# Patient Record
Sex: FEMALE | Race: BLACK OR AFRICAN AMERICAN | Employment: FULL TIME | URBAN - NONMETROPOLITAN AREA
[De-identification: names, ages, dates, MRNs, and addresses within clinical notes are randomized per-mention and may not be internally consistent; named-entity substitution may affect disease eponyms.]

---

## 2017-04-25 ENCOUNTER — HOSPITAL ENCOUNTER (EMERGENCY)
Age: 37
Discharge: HOME | End: 2017-04-25
Attending: EMERGENCY MEDICINE
Payer: COMMERCIAL

## 2017-04-25 ENCOUNTER — APPOINTMENT (OUTPATIENT)
Dept: GENERAL RADIOLOGY | Age: 37
End: 2017-04-25
Payer: COMMERCIAL

## 2017-04-25 VITALS
HEART RATE: 89 BPM | OXYGEN SATURATION: 98 % | DIASTOLIC BLOOD PRESSURE: 82 MMHG | RESPIRATION RATE: 18 BRPM | TEMPERATURE: 98.7 F | SYSTOLIC BLOOD PRESSURE: 126 MMHG

## 2017-04-25 DIAGNOSIS — S91.332A PUNCTURE WOUND OF FOOT, LEFT, INITIAL ENCOUNTER: Primary | ICD-10-CM

## 2017-04-25 DIAGNOSIS — M79.5 FOREIGN BODY IN SOFT TISSUE: ICD-10-CM

## 2017-04-25 PROCEDURE — 99282 EMERGENCY DEPT VISIT SF MDM: CPT

## 2017-04-25 PROCEDURE — 90715 TDAP VACCINE 7 YRS/> IM: CPT | Performed by: NURSE PRACTITIONER

## 2017-04-25 PROCEDURE — 6360000002 HC RX W HCPCS: Performed by: NURSE PRACTITIONER

## 2017-04-25 PROCEDURE — 90471 IMMUNIZATION ADMIN: CPT | Performed by: NURSE PRACTITIONER

## 2017-04-25 PROCEDURE — 73650 X-RAY EXAM OF HEEL: CPT

## 2017-04-25 PROCEDURE — 99282 EMERGENCY DEPT VISIT SF MDM: CPT | Performed by: EMERGENCY MEDICINE

## 2017-04-25 RX ORDER — LEFLUNOMIDE 10 MG/1
10 TABLET ORAL DAILY
COMMUNITY
End: 2017-06-29

## 2017-04-25 RX ADMIN — TETANUS TOXOID, REDUCED DIPHTHERIA TOXOID AND ACELLULAR PERTUSSIS VACCINE, ADSORBED 0.5 ML: 5; 2.5; 8; 8; 2.5 SUSPENSION INTRAMUSCULAR at 19:31

## 2017-04-25 ASSESSMENT — PAIN SCALES - GENERAL: PAINLEVEL_OUTOF10: 5

## 2017-04-27 ENCOUNTER — OFFICE VISIT (OUTPATIENT)
Dept: URGENT CARE | Age: 37
End: 2017-04-27
Payer: COMMERCIAL

## 2017-04-27 VITALS
TEMPERATURE: 97.8 F | RESPIRATION RATE: 20 BRPM | BODY MASS INDEX: 53.92 KG/M2 | WEIGHT: 293 LBS | HEIGHT: 62 IN | DIASTOLIC BLOOD PRESSURE: 81 MMHG | SYSTOLIC BLOOD PRESSURE: 131 MMHG | HEART RATE: 79 BPM | OXYGEN SATURATION: 99 %

## 2017-04-27 DIAGNOSIS — J02.9 SORE THROAT: ICD-10-CM

## 2017-04-27 DIAGNOSIS — J06.9 VIRAL URI: Primary | ICD-10-CM

## 2017-04-27 LAB — S PYO AG THROAT QL: NORMAL

## 2017-04-27 PROCEDURE — 99213 OFFICE O/P EST LOW 20 MIN: CPT | Performed by: NURSE PRACTITIONER

## 2017-04-27 PROCEDURE — 87880 STREP A ASSAY W/OPTIC: CPT | Performed by: NURSE PRACTITIONER

## 2017-04-27 RX ORDER — CETIRIZINE HYDROCHLORIDE 10 MG/1
10 TABLET ORAL DAILY
Qty: 30 TABLET | Refills: 0 | Status: SHIPPED | OUTPATIENT
Start: 2017-04-27 | End: 2017-06-29

## 2017-04-27 RX ORDER — FLUTICASONE PROPIONATE 50 MCG
1 SPRAY, SUSPENSION (ML) NASAL DAILY
Qty: 1 BOTTLE | Refills: 3 | Status: SHIPPED | OUTPATIENT
Start: 2017-04-27 | End: 2017-06-29

## 2017-04-27 ASSESSMENT — ENCOUNTER SYMPTOMS
VOMITING: 1
SORE THROAT: 1
COUGH: 0

## 2017-06-13 ENCOUNTER — HOSPITAL ENCOUNTER (OUTPATIENT)
Dept: GENERAL RADIOLOGY | Age: 37
Discharge: HOME | End: 2017-06-13
Payer: COMMERCIAL

## 2017-06-13 DIAGNOSIS — R00.2 PALPITATIONS: ICD-10-CM

## 2017-06-13 PROCEDURE — 71020 XR CHEST STANDARD TWO VW: CPT

## 2017-06-27 ENCOUNTER — HOSPITAL ENCOUNTER (OUTPATIENT)
Dept: WOMENS IMAGING | Age: 37
Discharge: HOME | End: 2017-06-27
Payer: COMMERCIAL

## 2017-06-27 DIAGNOSIS — N63.0 BREAST MASS: ICD-10-CM

## 2017-06-27 PROCEDURE — 76642 ULTRASOUND BREAST LIMITED: CPT

## 2017-06-27 PROCEDURE — G0279 TOMOSYNTHESIS, MAMMO: HCPCS

## 2017-06-29 ENCOUNTER — HOSPITAL ENCOUNTER (OUTPATIENT)
Dept: GENERAL RADIOLOGY | Age: 37
Discharge: HOME | End: 2017-06-29
Payer: COMMERCIAL

## 2017-06-29 ENCOUNTER — OFFICE VISIT (OUTPATIENT)
Dept: URGENT CARE | Age: 37
End: 2017-06-29
Payer: MEDICAID

## 2017-06-29 VITALS
DIASTOLIC BLOOD PRESSURE: 109 MMHG | TEMPERATURE: 98.6 F | WEIGHT: 281.2 LBS | BODY MASS INDEX: 51.75 KG/M2 | HEART RATE: 97 BPM | OXYGEN SATURATION: 100 % | RESPIRATION RATE: 20 BRPM | HEIGHT: 62 IN | SYSTOLIC BLOOD PRESSURE: 183 MMHG

## 2017-06-29 DIAGNOSIS — L03.319 CELLULITIS AND ABSCESS OF TRUNK: Primary | ICD-10-CM

## 2017-06-29 DIAGNOSIS — L02.219 CELLULITIS AND ABSCESS OF TRUNK: Primary | ICD-10-CM

## 2017-06-29 DIAGNOSIS — G93.2 IDIOPATHIC INTRACRANIAL HYPERTENSION: ICD-10-CM

## 2017-06-29 PROCEDURE — 73502 X-RAY EXAM HIP UNI 2-3 VIEWS: CPT

## 2017-06-29 PROCEDURE — 99213 OFFICE O/P EST LOW 20 MIN: CPT | Performed by: NURSE PRACTITIONER

## 2017-06-29 RX ORDER — SULFAMETHOXAZOLE AND TRIMETHOPRIM 800; 160 MG/1; MG/1
TABLET ORAL
COMMUNITY
Start: 2017-06-28 | End: 2017-08-10 | Stop reason: ALTCHOICE

## 2017-06-29 RX ORDER — ACETAZOLAMIDE 250 MG/1
TABLET ORAL
COMMUNITY
Start: 2017-05-19 | End: 2017-06-29 | Stop reason: SDUPTHER

## 2017-06-29 RX ORDER — MUPIROCIN CALCIUM 20 MG/G
CREAM TOPICAL
Qty: 15 G | Refills: 0 | Status: SHIPPED | OUTPATIENT
Start: 2017-06-29 | End: 2017-07-29

## 2017-06-29 RX ORDER — LANCETS 28 GAUGE
EACH MISCELLANEOUS
COMMUNITY
Start: 2017-06-19 | End: 2018-08-21 | Stop reason: SDUPTHER

## 2017-06-29 RX ORDER — LEFLUNOMIDE 20 MG/1
TABLET ORAL
COMMUNITY
Start: 2017-06-25 | End: 2018-05-14 | Stop reason: SDUPTHER

## 2017-06-29 RX ORDER — METHYLPREDNISOLONE 4 MG/1
TABLET ORAL
Qty: 1 KIT | Refills: 0 | Status: SHIPPED | OUTPATIENT
Start: 2017-06-29 | End: 2017-07-05

## 2017-06-29 RX ORDER — SITAGLIPTIN 100 MG/1
TABLET, FILM COATED ORAL
Refills: 5 | COMMUNITY
Start: 2017-06-15 | End: 2017-09-08 | Stop reason: SDUPTHER

## 2017-07-01 ENCOUNTER — OFFICE VISIT (OUTPATIENT)
Dept: URGENT CARE | Age: 37
End: 2017-07-01
Payer: COMMERCIAL

## 2017-07-01 VITALS
SYSTOLIC BLOOD PRESSURE: 136 MMHG | RESPIRATION RATE: 20 BRPM | DIASTOLIC BLOOD PRESSURE: 84 MMHG | HEART RATE: 84 BPM | OXYGEN SATURATION: 98 % | TEMPERATURE: 98.2 F

## 2017-07-01 DIAGNOSIS — N61.0 CELLULITIS OF BREAST: Primary | ICD-10-CM

## 2017-07-01 PROCEDURE — 99212 OFFICE O/P EST SF 10 MIN: CPT | Performed by: FAMILY MEDICINE

## 2017-07-01 ASSESSMENT — ENCOUNTER SYMPTOMS
SHORTNESS OF BREATH: 0
COLOR CHANGE: 0
COUGH: 0

## 2017-07-04 LAB
GRAM STAIN RESULT: ABNORMAL
ORGANISM: ABNORMAL
WOUND/ABSCESS: ABNORMAL

## 2017-07-14 ENCOUNTER — TELEPHONE (OUTPATIENT)
Dept: FAMILY MEDICINE CLINIC | Age: 37
End: 2017-07-14

## 2017-07-14 RX ORDER — FLUCONAZOLE 150 MG/1
7 TABLET ORAL DAILY
Refills: 0 | COMMUNITY
Start: 2017-06-27 | End: 2017-07-14 | Stop reason: SDUPTHER

## 2017-07-14 RX ORDER — FLUCONAZOLE 100 MG/1
100 TABLET ORAL DAILY
Qty: 7 TABLET | Refills: 0 | Status: CANCELLED | OUTPATIENT
Start: 2017-07-14 | End: 2017-07-21

## 2017-07-17 RX ORDER — FLUCONAZOLE 150 MG/1
TABLET ORAL
Qty: 1 TABLET | Refills: 0 | COMMUNITY
Start: 2017-07-17 | End: 2017-08-10 | Stop reason: ALTCHOICE

## 2017-08-03 ENCOUNTER — HOSPITAL ENCOUNTER (EMERGENCY)
Age: 37
Discharge: HOME | End: 2017-08-04
Attending: EMERGENCY MEDICINE
Payer: COMMERCIAL

## 2017-08-03 DIAGNOSIS — E11.65 UNCONTROLLED TYPE 2 DIABETES MELLITUS WITH HYPERGLYCEMIA, WITHOUT LONG-TERM CURRENT USE OF INSULIN (HCC): Primary | ICD-10-CM

## 2017-08-03 LAB
GLUCOSE BLD-MCNC: 418 MG/DL (ref 70–99)
PERFORMED ON: ABNORMAL

## 2017-08-03 PROCEDURE — 82948 REAGENT STRIP/BLOOD GLUCOSE: CPT

## 2017-08-03 PROCEDURE — 99284 EMERGENCY DEPT VISIT MOD MDM: CPT

## 2017-08-03 RX ORDER — 0.9 % SODIUM CHLORIDE 0.9 %
1000 INTRAVENOUS SOLUTION INTRAVENOUS ONCE
Status: COMPLETED | OUTPATIENT
Start: 2017-08-03 | End: 2017-08-04

## 2017-08-04 VITALS
BODY MASS INDEX: 48.76 KG/M2 | HEIGHT: 62 IN | DIASTOLIC BLOOD PRESSURE: 82 MMHG | TEMPERATURE: 97.8 F | HEART RATE: 72 BPM | RESPIRATION RATE: 20 BRPM | WEIGHT: 265 LBS | SYSTOLIC BLOOD PRESSURE: 139 MMHG | OXYGEN SATURATION: 96 %

## 2017-08-04 LAB
ALBUMIN SERPL-MCNC: 4 G/DL (ref 3.5–5.2)
ALP BLD-CCNC: 115 U/L (ref 35–104)
ALT SERPL-CCNC: 20 U/L (ref 5–33)
ANION GAP SERPL CALCULATED.3IONS-SCNC: 16 MMOL/L (ref 7–19)
AST SERPL-CCNC: 12 U/L (ref 5–32)
BASOPHILS ABSOLUTE: 0.1 K/UL (ref 0–0.2)
BASOPHILS RELATIVE PERCENT: 0.8 % (ref 0–1)
BILIRUB SERPL-MCNC: <0.2 MG/DL (ref 0.2–1.2)
BUN BLDV-MCNC: 10 MG/DL (ref 6–20)
CALCIUM SERPL-MCNC: 9.2 MG/DL (ref 8.6–10)
CHLORIDE BLD-SCNC: 95 MMOL/L (ref 98–111)
CO2: 23 MMOL/L (ref 22–29)
CREAT SERPL-MCNC: 0.7 MG/DL (ref 0.5–0.9)
EOSINOPHILS ABSOLUTE: 0 K/UL (ref 0–0.6)
EOSINOPHILS RELATIVE PERCENT: 0.4 % (ref 0–5)
GFR NON-AFRICAN AMERICAN: >60
GLUCOSE BLD-MCNC: 327 MG/DL (ref 70–99)
GLUCOSE BLD-MCNC: 446 MG/DL (ref 74–109)
HBA1C MFR BLD: 13.5 %
HCT VFR BLD CALC: 37.7 % (ref 37–47)
HEMOGLOBIN: 11.6 G/DL (ref 12–16)
LYMPHOCYTES ABSOLUTE: 2.5 K/UL (ref 1.1–4.5)
LYMPHOCYTES RELATIVE PERCENT: 25 % (ref 20–40)
MCH RBC QN AUTO: 22.6 PG (ref 27–31)
MCHC RBC AUTO-ENTMCNC: 30.8 G/DL (ref 33–37)
MCV RBC AUTO: 73.3 FL (ref 81–99)
MONOCYTES ABSOLUTE: 0.6 K/UL (ref 0–0.9)
MONOCYTES RELATIVE PERCENT: 6 % (ref 0–10)
NEUTROPHILS ABSOLUTE: 6.7 K/UL (ref 1.5–7.5)
NEUTROPHILS RELATIVE PERCENT: 67.4 % (ref 50–65)
PDW BLD-RTO: 19.5 % (ref 11.5–14.5)
PERFORMED ON: ABNORMAL
PLATELET # BLD: 238 K/UL (ref 130–400)
POTASSIUM SERPL-SCNC: 4.2 MMOL/L (ref 3.5–5)
RBC # BLD: 5.14 M/UL (ref 4.2–5.4)
SODIUM BLD-SCNC: 134 MMOL/L (ref 136–145)
TOTAL PROTEIN: 7.7 G/DL (ref 6.6–8.7)
WBC # BLD: 10 K/UL (ref 4.8–10.8)

## 2017-08-04 PROCEDURE — 36415 COLL VENOUS BLD VENIPUNCTURE: CPT

## 2017-08-04 PROCEDURE — 85025 COMPLETE CBC W/AUTO DIFF WBC: CPT

## 2017-08-04 PROCEDURE — 83036 HEMOGLOBIN GLYCOSYLATED A1C: CPT

## 2017-08-04 PROCEDURE — 82948 REAGENT STRIP/BLOOD GLUCOSE: CPT

## 2017-08-04 PROCEDURE — 6370000000 HC RX 637 (ALT 250 FOR IP): Performed by: EMERGENCY MEDICINE

## 2017-08-04 PROCEDURE — 99282 EMERGENCY DEPT VISIT SF MDM: CPT | Performed by: EMERGENCY MEDICINE

## 2017-08-04 PROCEDURE — 80053 COMPREHEN METABOLIC PANEL: CPT

## 2017-08-04 PROCEDURE — 2580000003 HC RX 258: Performed by: EMERGENCY MEDICINE

## 2017-08-04 RX ADMIN — INSULIN HUMAN 8 UNITS: 100 INJECTION, SOLUTION PARENTERAL at 00:15

## 2017-08-04 RX ADMIN — SODIUM CHLORIDE 1000 ML: 9 INJECTION, SOLUTION INTRAVENOUS at 00:15

## 2017-08-04 ASSESSMENT — ENCOUNTER SYMPTOMS
VOMITING: 0
DIARRHEA: 0
ABDOMINAL PAIN: 0
NAUSEA: 0
SHORTNESS OF BREATH: 0

## 2017-08-10 ENCOUNTER — HOSPITAL ENCOUNTER (EMERGENCY)
Age: 37
Discharge: HOME | End: 2017-08-10
Attending: EMERGENCY MEDICINE
Payer: COMMERCIAL

## 2017-08-10 ENCOUNTER — APPOINTMENT (OUTPATIENT)
Dept: CT IMAGING | Age: 37
End: 2017-08-10
Payer: COMMERCIAL

## 2017-08-10 VITALS
WEIGHT: 245 LBS | HEIGHT: 62 IN | SYSTOLIC BLOOD PRESSURE: 132 MMHG | BODY MASS INDEX: 45.08 KG/M2 | HEART RATE: 78 BPM | RESPIRATION RATE: 20 BRPM | OXYGEN SATURATION: 99 % | DIASTOLIC BLOOD PRESSURE: 90 MMHG | TEMPERATURE: 98 F

## 2017-08-10 DIAGNOSIS — G43.109 COMPLICATED MIGRAINE: ICD-10-CM

## 2017-08-10 DIAGNOSIS — R73.9 HYPERGLYCEMIA: Primary | ICD-10-CM

## 2017-08-10 DIAGNOSIS — R51.9 NONINTRACTABLE HEADACHE, UNSPECIFIED CHRONICITY PATTERN, UNSPECIFIED HEADACHE TYPE: ICD-10-CM

## 2017-08-10 LAB
ANION GAP SERPL CALCULATED.3IONS-SCNC: 14 MMOL/L (ref 7–19)
BUN BLDV-MCNC: 13 MG/DL (ref 6–20)
CALCIUM SERPL-MCNC: 9.6 MG/DL (ref 8.6–10)
CHLORIDE BLD-SCNC: 96 MMOL/L (ref 98–111)
CO2: 25 MMOL/L (ref 22–29)
CREAT SERPL-MCNC: 0.6 MG/DL (ref 0.5–0.9)
GFR NON-AFRICAN AMERICAN: >60
GLUCOSE BLD-MCNC: 269 MG/DL (ref 74–109)
HCG QUALITATIVE: NEGATIVE
HCT VFR BLD CALC: 37.9 % (ref 37–47)
HEMOGLOBIN: 11.4 G/DL (ref 12–16)
MCH RBC QN AUTO: 22.3 PG (ref 27–31)
MCHC RBC AUTO-ENTMCNC: 30.1 G/DL (ref 33–37)
MCV RBC AUTO: 74 FL (ref 81–99)
PDW BLD-RTO: 19.7 % (ref 11.5–14.5)
PLATELET # BLD: 222 K/UL (ref 130–400)
POTASSIUM SERPL-SCNC: 4.2 MMOL/L (ref 3.5–5)
RBC # BLD: 5.12 M/UL (ref 4.2–5.4)
SODIUM BLD-SCNC: 135 MMOL/L (ref 136–145)
WBC # BLD: 7.9 K/UL (ref 4.8–10.8)

## 2017-08-10 PROCEDURE — 96375 TX/PRO/DX INJ NEW DRUG ADDON: CPT

## 2017-08-10 PROCEDURE — 85027 COMPLETE CBC AUTOMATED: CPT

## 2017-08-10 PROCEDURE — 99283 EMERGENCY DEPT VISIT LOW MDM: CPT | Performed by: EMERGENCY MEDICINE

## 2017-08-10 PROCEDURE — 99284 EMERGENCY DEPT VISIT MOD MDM: CPT

## 2017-08-10 PROCEDURE — 6360000002 HC RX W HCPCS: Performed by: EMERGENCY MEDICINE

## 2017-08-10 PROCEDURE — 2580000003 HC RX 258: Performed by: EMERGENCY MEDICINE

## 2017-08-10 PROCEDURE — 36415 COLL VENOUS BLD VENIPUNCTURE: CPT

## 2017-08-10 PROCEDURE — 70450 CT HEAD/BRAIN W/O DYE: CPT

## 2017-08-10 PROCEDURE — 80048 BASIC METABOLIC PNL TOTAL CA: CPT

## 2017-08-10 PROCEDURE — 84703 CHORIONIC GONADOTROPIN ASSAY: CPT

## 2017-08-10 PROCEDURE — 96374 THER/PROPH/DIAG INJ IV PUSH: CPT

## 2017-08-10 RX ORDER — DIPHENHYDRAMINE HYDROCHLORIDE 50 MG/ML
25 INJECTION INTRAMUSCULAR; INTRAVENOUS ONCE
Status: COMPLETED | OUTPATIENT
Start: 2017-08-10 | End: 2017-08-10

## 2017-08-10 RX ORDER — KETOROLAC TROMETHAMINE 30 MG/ML
30 INJECTION, SOLUTION INTRAMUSCULAR; INTRAVENOUS ONCE
Status: COMPLETED | OUTPATIENT
Start: 2017-08-10 | End: 2017-08-10

## 2017-08-10 RX ORDER — METOCLOPRAMIDE HYDROCHLORIDE 5 MG/ML
10 INJECTION INTRAMUSCULAR; INTRAVENOUS ONCE
Status: COMPLETED | OUTPATIENT
Start: 2017-08-10 | End: 2017-08-10

## 2017-08-10 RX ORDER — 0.9 % SODIUM CHLORIDE 0.9 %
500 INTRAVENOUS SOLUTION INTRAVENOUS ONCE
Status: COMPLETED | OUTPATIENT
Start: 2017-08-10 | End: 2017-08-10

## 2017-08-10 RX ORDER — CETIRIZINE HYDROCHLORIDE 10 MG/1
10 TABLET ORAL DAILY
COMMUNITY
End: 2017-11-02 | Stop reason: CLARIF

## 2017-08-10 RX ADMIN — METOCLOPRAMIDE 10 MG: 5 INJECTION, SOLUTION INTRAMUSCULAR; INTRAVENOUS at 01:48

## 2017-08-10 RX ADMIN — DIPHENHYDRAMINE HYDROCHLORIDE 25 MG: 50 INJECTION, SOLUTION INTRAMUSCULAR; INTRAVENOUS at 01:48

## 2017-08-10 RX ADMIN — SODIUM CHLORIDE 500 ML: 9 INJECTION, SOLUTION INTRAVENOUS at 01:48

## 2017-08-10 RX ADMIN — KETOROLAC TROMETHAMINE 30 MG: 30 INJECTION, SOLUTION INTRAMUSCULAR at 01:48

## 2017-08-10 ASSESSMENT — ENCOUNTER SYMPTOMS
EYE PAIN: 0
DIARRHEA: 0
VOMITING: 0
SHORTNESS OF BREATH: 0
ABDOMINAL PAIN: 0

## 2017-08-10 ASSESSMENT — PAIN SCALES - GENERAL
PAINLEVEL_OUTOF10: 7
PAINLEVEL_OUTOF10: 5
PAINLEVEL_OUTOF10: 0

## 2017-08-10 ASSESSMENT — PAIN DESCRIPTION - LOCATION: LOCATION: HEAD

## 2017-08-11 ENCOUNTER — OFFICE VISIT (OUTPATIENT)
Dept: FAMILY MEDICINE CLINIC | Age: 37
End: 2017-08-11
Payer: COMMERCIAL

## 2017-08-11 VITALS
HEIGHT: 62 IN | HEART RATE: 82 BPM | WEIGHT: 257.6 LBS | OXYGEN SATURATION: 98 % | SYSTOLIC BLOOD PRESSURE: 142 MMHG | BODY MASS INDEX: 47.41 KG/M2 | DIASTOLIC BLOOD PRESSURE: 78 MMHG

## 2017-08-11 DIAGNOSIS — G44.201 ACUTE INTRACTABLE TENSION-TYPE HEADACHE: ICD-10-CM

## 2017-08-11 DIAGNOSIS — R03.0 ELEVATED BLOOD PRESSURE READING: ICD-10-CM

## 2017-08-11 DIAGNOSIS — E11.9 TYPE 2 DIABETES MELLITUS WITHOUT COMPLICATION, WITHOUT LONG-TERM CURRENT USE OF INSULIN (HCC): Primary | ICD-10-CM

## 2017-08-11 PROCEDURE — 99214 OFFICE O/P EST MOD 30 MIN: CPT | Performed by: CLINICAL NURSE SPECIALIST

## 2017-08-11 RX ORDER — LISINOPRIL 10 MG/1
10 TABLET ORAL DAILY
Qty: 90 TABLET | Refills: 3 | Status: SHIPPED | OUTPATIENT
Start: 2017-08-11 | End: 2017-11-02 | Stop reason: ALTCHOICE

## 2017-08-11 RX ORDER — HYDROXYCHLOROQUINE SULFATE 200 MG/1
TABLET, FILM COATED ORAL
Refills: 5 | COMMUNITY
Start: 2017-06-29 | End: 2019-06-26 | Stop reason: ALTCHOICE

## 2017-08-11 RX ORDER — CLONIDINE HYDROCHLORIDE 0.1 MG/1
0.1 TABLET ORAL ONCE
Status: COMPLETED | OUTPATIENT
Start: 2017-08-11 | End: 2017-08-11

## 2017-08-11 RX ADMIN — CLONIDINE HYDROCHLORIDE 0.1 MG: 0.1 TABLET ORAL at 11:13

## 2017-08-11 ASSESSMENT — ENCOUNTER SYMPTOMS
TROUBLE SWALLOWING: 0
EYE PAIN: 0
DIARRHEA: 1
BACK PAIN: 0
EYE DISCHARGE: 0
FACIAL SWELLING: 0
WHEEZING: 0
COUGH: 0
CONSTIPATION: 0
CHEST TIGHTNESS: 0
SORE THROAT: 0
ABDOMINAL PAIN: 0
SHORTNESS OF BREATH: 0
RHINORRHEA: 1
SINUS PRESSURE: 0
EYE REDNESS: 0

## 2017-08-18 ENCOUNTER — OFFICE VISIT (OUTPATIENT)
Dept: FAMILY MEDICINE CLINIC | Age: 37
End: 2017-08-18
Payer: COMMERCIAL

## 2017-08-18 VITALS
DIASTOLIC BLOOD PRESSURE: 84 MMHG | SYSTOLIC BLOOD PRESSURE: 128 MMHG | RESPIRATION RATE: 20 BRPM | HEART RATE: 94 BPM | TEMPERATURE: 97.9 F | OXYGEN SATURATION: 98 % | WEIGHT: 261 LBS | HEIGHT: 62 IN | BODY MASS INDEX: 48.03 KG/M2

## 2017-08-18 DIAGNOSIS — E11.9 TYPE 2 DIABETES MELLITUS WITHOUT COMPLICATION, WITHOUT LONG-TERM CURRENT USE OF INSULIN (HCC): Primary | ICD-10-CM

## 2017-08-18 PROCEDURE — 99213 OFFICE O/P EST LOW 20 MIN: CPT | Performed by: FAMILY MEDICINE

## 2017-08-19 ASSESSMENT — ENCOUNTER SYMPTOMS
DIARRHEA: 0
ABDOMINAL PAIN: 0
ANAL BLEEDING: 0
COUGH: 0
SHORTNESS OF BREATH: 0
CONSTIPATION: 0
CHEST TIGHTNESS: 0
NAUSEA: 0

## 2017-09-08 ENCOUNTER — OFFICE VISIT (OUTPATIENT)
Dept: FAMILY MEDICINE CLINIC | Age: 37
End: 2017-09-08
Payer: COMMERCIAL

## 2017-09-08 VITALS
OXYGEN SATURATION: 99 % | SYSTOLIC BLOOD PRESSURE: 116 MMHG | HEART RATE: 86 BPM | DIASTOLIC BLOOD PRESSURE: 74 MMHG | RESPIRATION RATE: 18 BRPM

## 2017-09-08 DIAGNOSIS — E11.9 TYPE 2 DIABETES MELLITUS WITHOUT COMPLICATION, WITHOUT LONG-TERM CURRENT USE OF INSULIN (HCC): Primary | ICD-10-CM

## 2017-09-08 DIAGNOSIS — R03.0 ELEVATED BLOOD PRESSURE READING: ICD-10-CM

## 2017-09-08 PROCEDURE — 99212 OFFICE O/P EST SF 10 MIN: CPT | Performed by: CLINICAL NURSE SPECIALIST

## 2017-09-08 RX ORDER — SITAGLIPTIN 100 MG/1
100 TABLET, FILM COATED ORAL DAILY
Qty: 90 TABLET | Refills: 3 | Status: SHIPPED | OUTPATIENT
Start: 2017-09-08 | End: 2018-03-05 | Stop reason: ALTCHOICE

## 2017-09-08 RX ORDER — DULOXETIN HYDROCHLORIDE 20 MG/1
20 CAPSULE, DELAYED RELEASE ORAL DAILY
COMMUNITY
End: 2017-09-08 | Stop reason: SDUPTHER

## 2017-09-08 RX ORDER — DULOXETIN HYDROCHLORIDE 60 MG/1
60 CAPSULE, DELAYED RELEASE ORAL DAILY
Qty: 120 CAPSULE | Refills: 2 | Status: SHIPPED | OUTPATIENT
Start: 2017-09-08 | End: 2018-07-06

## 2017-09-08 ASSESSMENT — ENCOUNTER SYMPTOMS
NAUSEA: 0
VOMITING: 0
COUGH: 0

## 2017-10-30 LAB
ALBUMIN SERPL-MCNC: 3.8 G/DL (ref 3.5–5.2)
ALP BLD-CCNC: 61 U/L (ref 35–104)
ALT SERPL-CCNC: 11 U/L (ref 5–33)
ANION GAP SERPL CALCULATED.3IONS-SCNC: 12 MMOL/L (ref 7–19)
AST SERPL-CCNC: 22 U/L (ref 5–32)
BASOPHILS ABSOLUTE: 0 K/UL (ref 0–0.2)
BASOPHILS RELATIVE PERCENT: 0.7 % (ref 0–1)
BILIRUB SERPL-MCNC: <0.2 MG/DL (ref 0.2–1.2)
BUN BLDV-MCNC: 11 MG/DL (ref 6–20)
C-REACTIVE PROTEIN: 3.5 MG/DL (ref 0–0.5)
CALCIUM SERPL-MCNC: 8.8 MG/DL (ref 8.6–10)
CHLORIDE BLD-SCNC: 101 MMOL/L (ref 98–111)
CO2: 28 MMOL/L (ref 22–29)
CREAT SERPL-MCNC: 0.6 MG/DL (ref 0.5–0.9)
EOSINOPHILS ABSOLUTE: 0.2 K/UL (ref 0–0.6)
EOSINOPHILS RELATIVE PERCENT: 3.9 % (ref 0–5)
GFR NON-AFRICAN AMERICAN: >60
GLUCOSE BLD-MCNC: 75 MG/DL (ref 74–109)
HCT VFR BLD CALC: 34.1 % (ref 37–47)
HEMOGLOBIN: 9.9 G/DL (ref 12–16)
LYMPHOCYTES ABSOLUTE: 2.2 K/UL (ref 1.1–4.5)
LYMPHOCYTES RELATIVE PERCENT: 36.3 % (ref 20–40)
MCH RBC QN AUTO: 22.3 PG (ref 27–31)
MCHC RBC AUTO-ENTMCNC: 29 G/DL (ref 33–37)
MCV RBC AUTO: 76.8 FL (ref 81–99)
MONOCYTES ABSOLUTE: 0.4 K/UL (ref 0–0.9)
MONOCYTES RELATIVE PERCENT: 5.9 % (ref 0–10)
NEUTROPHILS ABSOLUTE: 3.1 K/UL (ref 1.5–7.5)
NEUTROPHILS RELATIVE PERCENT: 52.9 % (ref 50–65)
PDW BLD-RTO: 17.2 % (ref 11.5–14.5)
PLATELET # BLD: 280 K/UL (ref 130–400)
PMV BLD AUTO: 11.2 FL (ref 9.4–12.3)
POTASSIUM SERPL-SCNC: 4.2 MMOL/L (ref 3.5–5)
RBC # BLD: 4.44 M/UL (ref 4.2–5.4)
SEDIMENTATION RATE, ERYTHROCYTE: 36 MM/HR (ref 0–20)
SODIUM BLD-SCNC: 141 MMOL/L (ref 136–145)
TOTAL PROTEIN: 7.5 G/DL (ref 6.6–8.7)
WBC # BLD: 5.9 K/UL (ref 4.8–10.8)

## 2017-11-02 ENCOUNTER — OFFICE VISIT (OUTPATIENT)
Dept: FAMILY MEDICINE CLINIC | Age: 37
End: 2017-11-02
Payer: MEDICAID

## 2017-11-02 VITALS
RESPIRATION RATE: 18 BRPM | WEIGHT: 281.5 LBS | DIASTOLIC BLOOD PRESSURE: 78 MMHG | BODY MASS INDEX: 51.8 KG/M2 | HEART RATE: 86 BPM | TEMPERATURE: 98.5 F | HEIGHT: 62 IN | OXYGEN SATURATION: 98 % | SYSTOLIC BLOOD PRESSURE: 122 MMHG

## 2017-11-02 DIAGNOSIS — E78.01 FAMILIAL HYPERCHOLESTEROLEMIA: ICD-10-CM

## 2017-11-02 DIAGNOSIS — I10 ESSENTIAL (PRIMARY) HYPERTENSION: Primary | ICD-10-CM

## 2017-11-02 DIAGNOSIS — E11.9 TYPE 2 DIABETES MELLITUS WITHOUT COMPLICATION, WITHOUT LONG-TERM CURRENT USE OF INSULIN (HCC): ICD-10-CM

## 2017-11-02 DIAGNOSIS — B07.0 PLANTAR WART OF LEFT FOOT: ICD-10-CM

## 2017-11-02 DIAGNOSIS — D64.9 ANEMIA, UNSPECIFIED TYPE: ICD-10-CM

## 2017-11-02 LAB — IRON: 33 UG/DL (ref 37–145)

## 2017-11-02 PROCEDURE — 99214 OFFICE O/P EST MOD 30 MIN: CPT | Performed by: FAMILY MEDICINE

## 2017-11-02 PROCEDURE — 17110 DESTRUCTION B9 LES UP TO 14: CPT | Performed by: FAMILY MEDICINE

## 2017-11-02 RX ORDER — LOSARTAN POTASSIUM 50 MG/1
50 TABLET ORAL DAILY
Qty: 30 TABLET | Refills: 5 | Status: SHIPPED | OUTPATIENT
Start: 2017-11-02 | End: 2018-05-30 | Stop reason: SDUPTHER

## 2017-11-02 NOTE — PROGRESS NOTES
120 capsule 2    JANUVIA 100 MG tablet Take 1 tablet by mouth daily 90 tablet 3    hydroxychloroquine (PLAQUENIL) 200 MG tablet TAKE 2 TABLETS DAILY WITH FOOD  5    leflunomide (ARAVA) 20 MG tablet       metFORMIN (GLUCOPHAGE) 1000 MG tablet       FREESTYLE LANCETS MISC        No current facility-administered medications for this visit. Allergies   Allergen Reactions    Mushroom Extract Complex        Health Maintenance   Topic Date Due    Diabetic foot exam  08/17/1990    Diabetic retinal exam  08/17/1990    Lipid screen  08/17/1990    HIV screen  08/17/1995    Diabetic microalbuminuria test  08/17/1998    Pneumococcal med risk (1 of 1 - PPSV23) 08/17/1999    Cervical cancer screen  08/17/2001    Diabetic hemoglobin A1C test  11/04/2017    Flu vaccine (1) 10/01/2018 (Originally 9/1/2017)    DTaP/Tdap/Td vaccine (2 - Td) 04/25/2027       Subjective:      Review of Systems  Review of Systems   Constitutional: Negative for chills and fever. HENT: Negative for congestion. Respiratory: Negative for cough, chest tightness and shortness of breath. Cardiovascular: Negative for chest pain, palpitations and leg swelling. Gastrointestinal: Negative for abdominal pain, anal bleeding, constipation, diarrhea and nausea. Genitourinary: Negative for difficulty urinating. Psychiatric/Behavioral: Negative. See HPI for visit specific review of symptoms. All others negative      Objective:   /78   Pulse 86   Temp 98.5 °F (36.9 °C) (Temporal)   Resp 18   Ht 5' 2\" (1.575 m)   Wt 281 lb 8 oz (127.7 kg)   SpO2 98%   BMI 51.49 kg/m²   Physical Exam  Physical Exam   Constitutional: appears well-developed. does not appear ill. Eyes: Pupils are equal, round, and reactive to light. Neck: Normal range of motion. Neck supple. Cardiovascular: Normal rate and regular rhythm. Exam reveals no friction rub. No murmur heard.   Pulmonary/Chest: Effort normal and breath sounds normal. No

## 2017-11-27 ENCOUNTER — TELEPHONE (OUTPATIENT)
Dept: FAMILY MEDICINE CLINIC | Age: 37
End: 2017-11-27

## 2017-12-15 LAB
QUANTIFERON (R) TB GOLD (INCUBATED): NEGATIVE
QUANTIFERON MITOGEN: 8.21 IU/ML
QUANTIFERON NIL: 0.04 IU/ML
QUANTIFERON TB AG MINUS NIL: 0 IU/ML (ref 0–0.34)

## 2018-01-08 ENCOUNTER — OFFICE VISIT (OUTPATIENT)
Dept: FAMILY MEDICINE CLINIC | Age: 38
End: 2018-01-08
Payer: MEDICAID

## 2018-01-08 VITALS
HEART RATE: 82 BPM | TEMPERATURE: 97.6 F | SYSTOLIC BLOOD PRESSURE: 126 MMHG | OXYGEN SATURATION: 98 % | WEIGHT: 263.5 LBS | DIASTOLIC BLOOD PRESSURE: 88 MMHG | BODY MASS INDEX: 48.19 KG/M2 | RESPIRATION RATE: 20 BRPM

## 2018-01-08 DIAGNOSIS — D64.9 ANEMIA, UNSPECIFIED TYPE: ICD-10-CM

## 2018-01-08 DIAGNOSIS — E78.01 FAMILIAL HYPERCHOLESTEROLEMIA: ICD-10-CM

## 2018-01-08 DIAGNOSIS — G47.33 OBSTRUCTIVE SLEEP APNEA: Primary | ICD-10-CM

## 2018-01-08 DIAGNOSIS — E11.9 TYPE 2 DIABETES MELLITUS WITHOUT COMPLICATION, WITHOUT LONG-TERM CURRENT USE OF INSULIN (HCC): ICD-10-CM

## 2018-01-08 DIAGNOSIS — I10 ESSENTIAL (PRIMARY) HYPERTENSION: ICD-10-CM

## 2018-01-08 LAB
ALBUMIN SERPL-MCNC: 3.7 G/DL (ref 3.5–5.2)
ALP BLD-CCNC: 62 U/L (ref 35–104)
ALT SERPL-CCNC: 12 U/L (ref 5–33)
ANION GAP SERPL CALCULATED.3IONS-SCNC: 9 MMOL/L (ref 7–19)
AST SERPL-CCNC: 16 U/L (ref 5–32)
BASOPHILS ABSOLUTE: 0.1 K/UL (ref 0–0.2)
BASOPHILS RELATIVE PERCENT: 0.7 % (ref 0–1)
BILIRUB SERPL-MCNC: 0.4 MG/DL (ref 0.2–1.2)
BUN BLDV-MCNC: 7 MG/DL (ref 6–20)
CALCIUM SERPL-MCNC: 9.1 MG/DL (ref 8.6–10)
CHLORIDE BLD-SCNC: 103 MMOL/L (ref 98–111)
CO2: 29 MMOL/L (ref 22–29)
CREAT SERPL-MCNC: 0.6 MG/DL (ref 0.5–0.9)
CREATININE URINE: 369.2 MG/DL (ref 4.2–622)
EOSINOPHILS ABSOLUTE: 0.2 K/UL (ref 0–0.6)
EOSINOPHILS RELATIVE PERCENT: 3 % (ref 0–5)
FERRITIN: 40 NG/ML (ref 13–150)
GFR NON-AFRICAN AMERICAN: >60
GLUCOSE BLD-MCNC: 79 MG/DL (ref 74–109)
HBA1C MFR BLD: 5.2 %
HCT VFR BLD CALC: 35.4 % (ref 37–47)
HEMOGLOBIN: 10.5 G/DL (ref 12–16)
IRON: 57 UG/DL (ref 37–145)
LYMPHOCYTES ABSOLUTE: 2.3 K/UL (ref 1.1–4.5)
LYMPHOCYTES RELATIVE PERCENT: 34.7 % (ref 20–40)
MCH RBC QN AUTO: 22.1 PG (ref 27–31)
MCHC RBC AUTO-ENTMCNC: 29.7 G/DL (ref 33–37)
MCV RBC AUTO: 74.5 FL (ref 81–99)
MICROALBUMIN UR-MCNC: 3.8 MG/DL (ref 0–19)
MICROALBUMIN/CREAT UR-RTO: 10.3 MG/G
MONOCYTES ABSOLUTE: 0.5 K/UL (ref 0–0.9)
MONOCYTES RELATIVE PERCENT: 7.8 % (ref 0–10)
NEUTROPHILS ABSOLUTE: 3.6 K/UL (ref 1.5–7.5)
NEUTROPHILS RELATIVE PERCENT: 53.7 % (ref 50–65)
PDW BLD-RTO: 17.5 % (ref 11.5–14.5)
PLATELET # BLD: 270 K/UL (ref 130–400)
PMV BLD AUTO: 11.2 FL (ref 9.4–12.3)
POTASSIUM SERPL-SCNC: 4.1 MMOL/L (ref 3.5–5)
RBC # BLD: 4.75 M/UL (ref 4.2–5.4)
SODIUM BLD-SCNC: 141 MMOL/L (ref 136–145)
TOTAL PROTEIN: 7.2 G/DL (ref 6.6–8.7)
VITAMIN B-12: 364 PG/ML (ref 211–946)
WBC # BLD: 6.7 K/UL (ref 4.8–10.8)

## 2018-01-08 PROCEDURE — 99213 OFFICE O/P EST LOW 20 MIN: CPT | Performed by: FAMILY MEDICINE

## 2018-01-08 RX ORDER — ACETAZOLAMIDE 250 MG/1
TABLET ORAL
Refills: 10 | COMMUNITY
Start: 2017-12-14 | End: 2018-12-18

## 2018-01-09 ENCOUNTER — TELEPHONE (OUTPATIENT)
Dept: FAMILY MEDICINE CLINIC | Age: 38
End: 2018-01-09

## 2018-01-11 ENCOUNTER — TELEPHONE (OUTPATIENT)
Dept: FAMILY MEDICINE CLINIC | Age: 38
End: 2018-01-11

## 2018-01-12 ENCOUNTER — CLINICAL DOCUMENTATION (OUTPATIENT)
Dept: FAMILY MEDICINE CLINIC | Age: 38
End: 2018-01-12

## 2018-02-02 ENCOUNTER — OFFICE VISIT (OUTPATIENT)
Dept: FAMILY MEDICINE CLINIC | Age: 38
End: 2018-02-02
Payer: MEDICAID

## 2018-02-02 VITALS
HEART RATE: 85 BPM | WEIGHT: 272 LBS | OXYGEN SATURATION: 98 % | TEMPERATURE: 96.5 F | SYSTOLIC BLOOD PRESSURE: 122 MMHG | BODY MASS INDEX: 49.75 KG/M2 | RESPIRATION RATE: 18 BRPM | DIASTOLIC BLOOD PRESSURE: 78 MMHG

## 2018-02-02 DIAGNOSIS — M79.10 MYALGIA: ICD-10-CM

## 2018-02-02 DIAGNOSIS — R60.9 FLUID RETENTION: ICD-10-CM

## 2018-02-02 DIAGNOSIS — R53.83 FATIGUE, UNSPECIFIED TYPE: ICD-10-CM

## 2018-02-02 DIAGNOSIS — R06.02 SHORTNESS OF BREATH: ICD-10-CM

## 2018-02-02 DIAGNOSIS — J02.9 SORE THROAT: ICD-10-CM

## 2018-02-02 DIAGNOSIS — M79.10 MYALGIA: Primary | ICD-10-CM

## 2018-02-02 LAB
ALBUMIN SERPL-MCNC: 4 G/DL (ref 3.5–5.2)
ALP BLD-CCNC: 63 U/L (ref 35–104)
ALT SERPL-CCNC: 10 U/L (ref 5–33)
ANION GAP SERPL CALCULATED.3IONS-SCNC: 14 MMOL/L (ref 7–19)
AST SERPL-CCNC: 12 U/L (ref 5–32)
BASOPHILS ABSOLUTE: 0.1 K/UL (ref 0–0.2)
BASOPHILS RELATIVE PERCENT: 1 % (ref 0–1)
BILIRUB SERPL-MCNC: <0.2 MG/DL (ref 0.2–1.2)
BUN BLDV-MCNC: 9 MG/DL (ref 6–20)
CALCIUM SERPL-MCNC: 8.9 MG/DL (ref 8.6–10)
CHLORIDE BLD-SCNC: 99 MMOL/L (ref 98–111)
CO2: 26 MMOL/L (ref 22–29)
CREAT SERPL-MCNC: 0.5 MG/DL (ref 0.5–0.9)
EOSINOPHILS ABSOLUTE: 0.2 K/UL (ref 0–0.6)
EOSINOPHILS RELATIVE PERCENT: 2.9 % (ref 0–5)
GFR NON-AFRICAN AMERICAN: >60
GLUCOSE BLD-MCNC: 76 MG/DL (ref 74–109)
HCT VFR BLD CALC: 35.3 % (ref 37–47)
HEMOGLOBIN: 10.2 G/DL (ref 12–16)
INFLUENZA A ANTIBODY: NORMAL
INFLUENZA B ANTIBODY: NORMAL
LYMPHOCYTES ABSOLUTE: 2.6 K/UL (ref 1.1–4.5)
LYMPHOCYTES RELATIVE PERCENT: 36.5 % (ref 20–40)
MCH RBC QN AUTO: 22 PG (ref 27–31)
MCHC RBC AUTO-ENTMCNC: 28.9 G/DL (ref 33–37)
MCV RBC AUTO: 76.2 FL (ref 81–99)
MONOCYTES ABSOLUTE: 0.4 K/UL (ref 0–0.9)
MONOCYTES RELATIVE PERCENT: 6 % (ref 0–10)
NEUTROPHILS ABSOLUTE: 3.8 K/UL (ref 1.5–7.5)
NEUTROPHILS RELATIVE PERCENT: 53.3 % (ref 50–65)
PDW BLD-RTO: 18.5 % (ref 11.5–14.5)
PLATELET # BLD: 311 K/UL (ref 130–400)
PMV BLD AUTO: 11.1 FL (ref 9.4–12.3)
POTASSIUM SERPL-SCNC: 4.2 MMOL/L (ref 3.5–5)
RBC # BLD: 4.63 M/UL (ref 4.2–5.4)
S PYO AG THROAT QL: NORMAL
SEDIMENTATION RATE, ERYTHROCYTE: 34 MM/HR (ref 0–20)
SODIUM BLD-SCNC: 139 MMOL/L (ref 136–145)
TOTAL PROTEIN: 7.6 G/DL (ref 6.6–8.7)
TSH SERPL DL<=0.05 MIU/L-ACNC: 1.55 UIU/ML (ref 0.27–4.2)
WBC # BLD: 7.2 K/UL (ref 4.8–10.8)

## 2018-02-02 PROCEDURE — 99213 OFFICE O/P EST LOW 20 MIN: CPT | Performed by: FAMILY MEDICINE

## 2018-02-02 PROCEDURE — 87804 INFLUENZA ASSAY W/OPTIC: CPT | Performed by: FAMILY MEDICINE

## 2018-02-02 PROCEDURE — 87880 STREP A ASSAY W/OPTIC: CPT | Performed by: FAMILY MEDICINE

## 2018-02-02 RX ORDER — DULOXETIN HYDROCHLORIDE 30 MG/1
CAPSULE, DELAYED RELEASE ORAL
Refills: 5 | COMMUNITY
Start: 2017-11-15 | End: 2018-02-02

## 2018-02-02 RX ORDER — AZITHROMYCIN 250 MG/1
TABLET, FILM COATED ORAL
Qty: 1 PACKET | Refills: 0 | Status: SHIPPED | OUTPATIENT
Start: 2018-02-02 | End: 2018-02-12

## 2018-02-02 RX ORDER — FUROSEMIDE 40 MG/1
40 TABLET ORAL DAILY
Qty: 60 TABLET | Refills: 3 | Status: SHIPPED | OUTPATIENT
Start: 2018-02-02 | End: 2020-08-21 | Stop reason: ALTCHOICE

## 2018-02-02 RX ORDER — FLUTICASONE PROPIONATE 50 MCG
SPRAY, SUSPENSION (ML) NASAL
Refills: 3 | COMMUNITY
Start: 2017-10-31 | End: 2018-08-07 | Stop reason: ALTCHOICE

## 2018-02-02 RX ORDER — DICLOFENAC SODIUM 75 MG/1
75 TABLET, DELAYED RELEASE ORAL 2 TIMES DAILY
Qty: 60 TABLET | Refills: 3 | Status: SHIPPED | OUTPATIENT
Start: 2018-02-02 | End: 2018-07-06 | Stop reason: SDUPTHER

## 2018-02-02 NOTE — PROGRESS NOTES
ONCE A WEEK 4 pen 2    losartan (COZAAR) 50 MG tablet Take 1 tablet by mouth daily 30 tablet 5    DULoxetine (CYMBALTA) 60 MG extended release capsule Take 1 capsule by mouth daily 120 capsule 2    JANUVIA 100 MG tablet Take 1 tablet by mouth daily 90 tablet 3    hydroxychloroquine (PLAQUENIL) 200 MG tablet TAKE 2 TABLETS DAILY WITH FOOD  5    leflunomide (ARAVA) 20 MG tablet       FREESTYLE LANCETS MISC        No current facility-administered medications for this visit. Allergies   Allergen Reactions    Mushroom Extract Complex        Health Maintenance   Topic Date Due    Diabetic foot exam  08/17/1990    Diabetic retinal exam  08/17/1990    Lipid screen  08/17/1990    HIV screen  08/17/1995    Pneumococcal med risk (1 of 1 - PPSV23) 08/17/1999    Cervical cancer screen  08/17/2001    Flu vaccine (1) 10/01/2018 (Originally 9/1/2017)    A1C test (Diabetic or Prediabetic)  01/08/2019    Diabetic microalbuminuria test  01/08/2019    Potassium monitoring  02/02/2019    Creatinine monitoring  02/02/2019    DTaP/Tdap/Td vaccine (2 - Td) 04/25/2027       Subjective:      Review of Systems   Constitutional: Positive for fever. Negative for chills. HENT: Positive for sinus pain, sinus pressure and sore throat. Negative for congestion. Respiratory: Positive for cough. Negative for chest tightness and shortness of breath. Cardiovascular: Negative for chest pain. Gastrointestinal: Negative for abdominal pain, constipation, diarrhea and nausea. Psychiatric/Behavioral: Negative. See HPI for visit specific review of symptoms. All others negative      Objective:   /78   Pulse 85   Temp 96.5 °F (35.8 °C) (Temporal)   Resp 18   Wt 272 lb (123.4 kg)   SpO2 98%   BMI 49.75 kg/m²   Physical Exam   Constitutional: She appears well-developed. She does not appear ill. HENT:   Nose: Right sinus exhibits maxillary sinus tenderness. Left sinus exhibits maxillary sinus tenderness. Eyes: Pupils are equal, round, and reactive to light. Neck: Normal range of motion. Neck supple. Cardiovascular: Normal rate and regular rhythm. Exam reveals no friction rub. No murmur heard. Pulmonary/Chest: Effort normal and breath sounds normal. No respiratory distress. She has no wheezes. She has no rales. Abdominal: Soft. Bowel sounds are normal. She exhibits no distension. There is no tenderness. There is no rebound and no guarding. Musculoskeletal: She exhibits no edema. Neurological: She is alert. Psychiatric: She has a normal mood and affect.  Her behavior is normal.         Recent Results (from the past 672 hour(s))   Hemoglobin A1C    Collection Time: 01/08/18 12:43 PM   Result Value Ref Range    Hemoglobin A1C 5.2 See comment %   Microalbumin / Creatinine Urine Ratio    Collection Time: 01/08/18 12:43 PM   Result Value Ref Range    Microalbumin, Random Urine 3.80 0.00 - 19.00 mg/dL    Creatinine, Ur 369.2 4.2 - 622.0 mg/dL    Microalbumin Creatinine Ratio 10.3 mg/g   Comprehensive Metabolic Panel    Collection Time: 01/08/18 12:43 PM   Result Value Ref Range    Sodium 141 136 - 145 mmol/L    Potassium 4.1 3.5 - 5.0 mmol/L    Chloride 103 98 - 111 mmol/L    CO2 29 22 - 29 mmol/L    Anion Gap 9 7 - 19 mmol/L    Glucose 79 74 - 109 mg/dL    BUN 7 6 - 20 mg/dL    CREATININE 0.6 0.5 - 0.9 mg/dL    GFR Non-African American >60 >60    Calcium 9.1 8.6 - 10.0 mg/dL    Total Protein 7.2 6.6 - 8.7 g/dL    Alb 3.7 3.5 - 5.2 g/dL    Total Bilirubin 0.4 0.2 - 1.2 mg/dL    Alkaline Phosphatase 62 35 - 104 U/L    ALT 12 5 - 33 U/L    AST 16 5 - 32 U/L   Iron    Collection Time: 01/08/18 12:43 PM   Result Value Ref Range    Iron 57 37 - 145 ug/dL   Vitamin B12    Collection Time: 01/08/18 12:43 PM   Result Value Ref Range    Vitamin B-12 364 211 - 946 pg/mL   Ferritin    Collection Time: 01/08/18 12:43 PM   Result Value Ref Range    Ferritin 40.0 13.0 - 150.0 ng/mL   CBC Auto Differential    Collection tab (250 mg) on days 2 through 5. Dispense: 1 packet; Refill: 0    3. Shortness of breath  Likely related to acute bronchitis and sinus issues    4. Fluid retention  Recommend she weighed daily and start taking Lasix. She may discontinue Lasix when her weight has dropped 4-5 pounds. She may then take as needed. If swelling persists return to clinic    5. Fatigue, unspecified type  Check following labs  - TSH without Reflex; Future  - CBC Auto Differential; Future  - Urinalysis      We'll call back regarding the results of the above labs    No Follow-up on file. Discussed use, benefit, and side effects of prescribed medications. All patient questions answered. Pt voiced understanding. Reviewed health maintenance. Instructed to continue current medications, diet and exercise. Patient agreed with treatment plan. Follow up as directed.

## 2018-02-05 ASSESSMENT — ENCOUNTER SYMPTOMS
SINUS PAIN: 1
CONSTIPATION: 0
COUGH: 1
SORE THROAT: 1
ABDOMINAL PAIN: 0
CHEST TIGHTNESS: 0
NAUSEA: 0
SINUS PRESSURE: 1
SHORTNESS OF BREATH: 0
DIARRHEA: 0

## 2018-02-20 ENCOUNTER — OFFICE VISIT (OUTPATIENT)
Dept: URGENT CARE | Age: 38
End: 2018-02-20

## 2018-02-20 DIAGNOSIS — R05.9 COUGH: Primary | ICD-10-CM

## 2018-02-20 PROCEDURE — 99999 PR OFFICE/OUTPT VISIT,PROCEDURE ONLY: CPT | Performed by: NURSE PRACTITIONER

## 2018-03-02 ENCOUNTER — HOSPITAL ENCOUNTER (EMERGENCY)
Age: 38
Discharge: HOME | End: 2018-03-02
Attending: EMERGENCY MEDICINE
Payer: MEDICAID

## 2018-03-02 VITALS
DIASTOLIC BLOOD PRESSURE: 69 MMHG | OXYGEN SATURATION: 96 % | SYSTOLIC BLOOD PRESSURE: 129 MMHG | HEIGHT: 62 IN | HEART RATE: 77 BPM | WEIGHT: 270 LBS | TEMPERATURE: 97.5 F | BODY MASS INDEX: 49.69 KG/M2 | RESPIRATION RATE: 18 BRPM

## 2018-03-02 DIAGNOSIS — E11.65 POORLY CONTROLLED TYPE 2 DIABETES MELLITUS (HCC): Primary | ICD-10-CM

## 2018-03-02 LAB
ALBUMIN SERPL-MCNC: 3.7 G/DL (ref 3.5–5.2)
ALP BLD-CCNC: 62 U/L (ref 35–104)
ALT SERPL-CCNC: 12 U/L (ref 5–33)
ANION GAP SERPL CALCULATED.3IONS-SCNC: 10 MMOL/L (ref 7–19)
AST SERPL-CCNC: 10 U/L (ref 5–32)
BASOPHILS ABSOLUTE: 0.1 K/UL (ref 0–0.2)
BASOPHILS RELATIVE PERCENT: 0.8 % (ref 0–1)
BILIRUB SERPL-MCNC: <0.2 MG/DL (ref 0.2–1.2)
BILIRUBIN URINE: NEGATIVE
BLOOD, URINE: NEGATIVE
BUN BLDV-MCNC: 11 MG/DL (ref 6–20)
CALCIUM SERPL-MCNC: 8.8 MG/DL (ref 8.6–10)
CHLORIDE BLD-SCNC: 100 MMOL/L (ref 98–111)
CLARITY: CLEAR
CO2: 27 MMOL/L (ref 22–29)
COLOR: YELLOW
CREAT SERPL-MCNC: 0.5 MG/DL (ref 0.5–0.9)
EOSINOPHILS ABSOLUTE: 0.2 K/UL (ref 0–0.6)
EOSINOPHILS RELATIVE PERCENT: 3.7 % (ref 0–5)
GFR NON-AFRICAN AMERICAN: >60
GLUCOSE BLD-MCNC: 83 MG/DL (ref 74–109)
GLUCOSE URINE: NEGATIVE MG/DL
HCG QUALITATIVE: NEGATIVE
HCT VFR BLD CALC: 34.6 % (ref 37–47)
HEMOGLOBIN: 10.2 G/DL (ref 12–16)
KETONES, URINE: NEGATIVE MG/DL
LEUKOCYTE ESTERASE, URINE: NEGATIVE
LYMPHOCYTES ABSOLUTE: 2.2 K/UL (ref 1.1–4.5)
LYMPHOCYTES RELATIVE PERCENT: 33.5 % (ref 20–40)
MCH RBC QN AUTO: 22.3 PG (ref 27–31)
MCHC RBC AUTO-ENTMCNC: 29.5 G/DL (ref 33–37)
MCV RBC AUTO: 75.7 FL (ref 81–99)
MONOCYTES ABSOLUTE: 0.4 K/UL (ref 0–0.9)
MONOCYTES RELATIVE PERCENT: 5.4 % (ref 0–10)
NEUTROPHILS ABSOLUTE: 3.6 K/UL (ref 1.5–7.5)
NEUTROPHILS RELATIVE PERCENT: 56.3 % (ref 50–65)
NITRITE, URINE: NEGATIVE
PDW BLD-RTO: 17.9 % (ref 11.5–14.5)
PH UA: 5.5
PLATELET # BLD: 257 K/UL (ref 130–400)
PMV BLD AUTO: 10.2 FL (ref 9.4–12.3)
POTASSIUM SERPL-SCNC: 4.3 MMOL/L (ref 3.5–5)
PROTEIN UA: NEGATIVE MG/DL
RBC # BLD: 4.57 M/UL (ref 4.2–5.4)
SODIUM BLD-SCNC: 137 MMOL/L (ref 136–145)
SPECIFIC GRAVITY UA: 1.02
TOTAL PROTEIN: 6.9 G/DL (ref 6.6–8.7)
UROBILINOGEN, URINE: 0.2 E.U./DL
WBC # BLD: 6.4 K/UL (ref 4.8–10.8)

## 2018-03-02 PROCEDURE — 84703 CHORIONIC GONADOTROPIN ASSAY: CPT

## 2018-03-02 PROCEDURE — 99285 EMERGENCY DEPT VISIT HI MDM: CPT | Performed by: EMERGENCY MEDICINE

## 2018-03-02 PROCEDURE — 93005 ELECTROCARDIOGRAM TRACING: CPT

## 2018-03-02 PROCEDURE — 80053 COMPREHEN METABOLIC PANEL: CPT

## 2018-03-02 PROCEDURE — 81003 URINALYSIS AUTO W/O SCOPE: CPT

## 2018-03-02 PROCEDURE — 36415 COLL VENOUS BLD VENIPUNCTURE: CPT

## 2018-03-02 PROCEDURE — 85025 COMPLETE CBC W/AUTO DIFF WBC: CPT

## 2018-03-02 PROCEDURE — 99284 EMERGENCY DEPT VISIT MOD MDM: CPT

## 2018-03-02 ASSESSMENT — ENCOUNTER SYMPTOMS
NAUSEA: 1
SHORTNESS OF BREATH: 0
VOMITING: 0

## 2018-03-02 NOTE — ED PROVIDER NOTES
[facility] EM[facility] DEPT  eMERGENCY dEPARTMENT eNCOUnter      Pt Name: Yenni Lyn  MRN: [id number]  [place] 1980  Date of evaluation: 3/2/2018  Provider: Latoya Kirby MD    [address] [place] [address]       Chief Complaint   Patient presents with    Dizziness         HISTORY OF PRESENT ILLNESS   (Location/Symptom, Timing/Onset, Context/Setting, Quality, Duration, Modifying Factors, Severity)  Note limiting factors. Yenni Lyn is a 40 y.o. female who presents to the emergency department      The history is provided by the patient. Dizziness   Quality:  Lightheadedness  Severity:  Moderate  Onset quality:  Sudden  Timing:  Intermittent (when \"blood sugar low\" 60s. fluctuates to 200s.)  Chronicity:  New  Relieved by:  Nothing  Exacerbated by: \"low blood sugar\"  Ineffective treatments:  None tried  Associated symptoms: headaches, nausea and weakness    Associated symptoms: no chest pain, no palpitations, no shortness of breath and no vomiting    Associated symptoms comment:  Diet fluctuates, weight fluctuating      Nursing Notes were reviewed. REVIEW OF SYSTEMS    (2-9 systems for level 4, 10 or more for level 5)     Review of Systems   Respiratory: Negative for shortness of breath. Cardiovascular: Negative for chest pain and palpitations. Gastrointestinal: Positive for nausea. Negative for vomiting. Neurological: Positive for dizziness, weakness and headaches. Negative for seizures and speech difficulty. All other systems reviewed and are negative. Except as noted above the remainder of the review of systems was reviewed and negative.        PAST MEDICAL HISTORY     Past Medical History:   Diagnosis Date    Anemia     Chronic rheumatic arthritis ([address]     Elevated blood pressure reading     Type 2 diabetes mellitus ([address]          SURGICAL HISTORY       Past Surgical History:   Procedure Laterality Date    CHOLECYSTECTOMY           CURRENT MEDICATIONS       Discharge Medication List as of 3/2/2018  5:16 PM      CONTINUE these medications which have NOT CHANGED    Details   fluticasone (FLONASE) 50 MCG/ACT nasal spray 1 SPRAY BY NASAL ROUTE DAILY, R-3Historical Med      furosemide (LASIX) 40 MG tablet Take 1 tablet by mouth daily, Disp-60 tablet, R-3Normal      diclofenac (VOLTAREN) 75 MG EC tablet Take 1 tablet by mouth 2 times daily, Disp-60 tablet, R-3Normal      acetaZOLAMIDE (DIAMOX) 250 MG tablet TAKE 2 TABLETS BY MOUTH TWICE A DAY, R-10Historical Med      metFORMIN (GLUCOPHAGE) 1000 MG tablet Take 1 tablet by mouth daily (with breakfast), Disp-30 tablet, [id number]      !! TRULICITY 9.53 CJ/2.1YX SOPN INJECT 0.75 MG INTO THE SKIN ONCE A WEEK, Disp-2 pen, [id number]      !! TRULICITY 3.39 CG/9.5BZ SOPN INJECT 0.75 MG INTO THE SKIN ONCE A WEEK, Disp-4 pen, R-2Normal      losartan (COZAAR) 50 MG tablet Take 1 tablet by mouth daily, Disp-30 tablet, R-5Normal      DULoxetine (CYMBALTA) 60 MG extended release capsule Take 1 capsule by mouth daily, Disp-120 capsule, R-2Print      JANUVIA 100 MG tablet Take 1 tablet by mouth daily, Disp-90 tablet, R-3, DAWPrint      hydroxychloroquine (PLAQUENIL) 200 MG tablet TAKE 2 TABLETS DAILY WITH FOOD, R-5Historical Med      leflunomide (ARAVA) 20 MG tablet Historical Med      FREESTYLE LANCETS MISC Starting 6/19/2017, Until Discontinued, Historical Med       !! - Potential duplicate medications found. Please discuss with provider.           ALLERGIES     Mushroom extract complex    FAMILY HISTORY       Family History   Problem Relation Age of Onset    High Blood Pressure Father     Diabetes Father     Atrial Fibrillation Father     Diabetes Sister     Diabetes Maternal Grandmother           SOCIAL HISTORY       Social History     Social History    Marital status: Single     Spouse name: N/A    Number of children: N/A    Years of education: N/A     Social History Main Topics    Smoking status: Never Smoker    Smokeless tobacco: Never Used    Alcohol use No    Drug use: No    Sexual activity: Not Asked     Other Topics Concern    None     Social History Narrative    None       SCREENINGS    [place] Coma Scale  Eye Opening: Spontaneous  Best Verbal Response: Oriented  Best Motor Response: Obeys commands  [place] Coma Scale Score: 15        PHYSICAL EXAM    (up to 7 for level 4, 8 or more for level 5)     ED Triage Vitals [03/02/18 1423]   BP Temp Temp Source Pulse Resp SpO2 Height Weight   (!) 124/106 97.7 °F (36.5 °C) Oral 84 17 98 % 5' 2\" (1.575 m) 270 lb (122.5 kg)       Physical Exam   Constitutional: She is oriented to person, place, and time. She appears well-developed and well-nourished. No distress. HENT:   Mouth/Throat: Oropharynx is clear and moist.   Eyes: EOM are normal. Pupils are equal, round, and reactive to light. Neck: Normal range of motion. Neck supple. Cardiovascular: Normal rate, regular rhythm and normal heart sounds. Pulmonary/Chest: Effort normal and breath sounds normal.   Musculoskeletal: She exhibits no edema. Neurological: She is alert and oriented to person, place, and time. Skin: Skin is warm and dry. She is not diaphoretic. Psychiatric: She has a normal mood and affect. Nursing note and vitals reviewed. DIAGNOSTIC RESULTS     EKG: All EKG's are interpreted by the Emergency Department Physician who either signs or Co-signs this chart in the absence of a cardiologist.     Regular rate and rhythm. Normal P waves and MS interval. Normal QRS. Normal QT interval. No ST elevation or depression. This EKG was interpreted by me.      RADIOLOGY:   Non-plain film images such as CT, Ultrasound and MRI are read by the radiologist. Plain radiographic images are visualized and preliminarily interpreted by the emergency physician with the below findings:        Interpretation per the Radiologist below, if available at the time of this note:    No orders to display         ED BEDSIDE ULTRASOUND:   Performed by ED Physician - none    LABS:  Labs Reviewed   CBC WITH AUTO DIFFERENTIAL - Abnormal; Notable for the following:        Result Value    Hemoglobin 10.2 (*)     Hematocrit 34.6 (*)     MCV 75.7 (*)     MCH 22.3 (*)     MCHC 29.5 (*)     RDW 17.9 (*)     All other components within normal limits   COMPREHENSIVE METABOLIC PANEL   URINALYSIS   HCG, SERUM, QUALITATIVE       All other labs were within normal range or not returned as of this dictation. EMERGENCY DEPARTMENT COURSE and DIFFERENTIAL DIAGNOSIS/MDM:   Vitals:    Vitals:    03/02/18 1423 03/02/18 1603 03/02/18 1702   BP: (!) 124/106 132/83 129/69   Pulse: 84 76 77   Resp: 17 18 18   Temp: 97.7 °F (36.5 °C)  97.5 °F (36.4 °C)   TempSrc: Oral     SpO2: 98% 97% 96%   Weight: 270 lb (122.5 kg)     Height: 5' 2\" (1.575 m)             MDM    CRITICAL CARE TIME   Total Critical Care time was 0 minutes, excluding separately reportable procedures. There was a high probability of clinically significant/life threatening deterioration in the patient's condition which required my urgent intervention. CONSULTS:  None    PROCEDURES:  Unless otherwise noted below, none     Procedures    FINAL IMPRESSION      1. Poorly controlled type 2 diabetes mellitus [facility])          DISPOSITION/PLAN   DISPOSITION        PATIENT REFERRED TO:  Mita Silver MD  [facility] [address]  [phone]    Schedule an appointment as soon as possible for a visit   Keep sugar log as we discussed before appointment. Follow diabetic diet.       DISCHARGE MEDICATIONS:  Discharge Medication List as of 3/2/2018  5:16 PM             (Please note that portions of this note were completed with a voice recognition program.  Efforts were made to edit the dictations but occasionally words are mis-transcribed.)    Jourdan Doan MD (electronically signed)  Attending Emergency Physician          Jourdan Doan MD  03/02/18 2970

## 2018-03-05 ENCOUNTER — OFFICE VISIT (OUTPATIENT)
Dept: FAMILY MEDICINE CLINIC | Age: 38
End: 2018-03-05
Payer: MEDICAID

## 2018-03-05 VITALS
RESPIRATION RATE: 18 BRPM | SYSTOLIC BLOOD PRESSURE: 142 MMHG | HEART RATE: 85 BPM | DIASTOLIC BLOOD PRESSURE: 98 MMHG | BODY MASS INDEX: 52.13 KG/M2 | TEMPERATURE: 98 F | WEIGHT: 285 LBS | OXYGEN SATURATION: 98 %

## 2018-03-05 DIAGNOSIS — E16.2 HYPOGLYCEMIA: Primary | ICD-10-CM

## 2018-03-05 LAB
EKG P AXIS: 33 DEGREES
EKG P-R INTERVAL: 150 MS
EKG Q-T INTERVAL: 384 MS
EKG QRS DURATION: 78 MS
EKG QTC CALCULATION (BAZETT): 420 MS
EKG T AXIS: 48 DEGREES
GLUCOSE BLD-MCNC: 69 MG/DL

## 2018-03-05 PROCEDURE — G8427 DOCREV CUR MEDS BY ELIG CLIN: HCPCS | Performed by: FAMILY MEDICINE

## 2018-03-05 PROCEDURE — 99213 OFFICE O/P EST LOW 20 MIN: CPT | Performed by: FAMILY MEDICINE

## 2018-03-05 PROCEDURE — G8484 FLU IMMUNIZE NO ADMIN: HCPCS | Performed by: FAMILY MEDICINE

## 2018-03-05 PROCEDURE — 1036F TOBACCO NON-USER: CPT | Performed by: FAMILY MEDICINE

## 2018-03-05 PROCEDURE — 82962 GLUCOSE BLOOD TEST: CPT | Performed by: FAMILY MEDICINE

## 2018-03-05 PROCEDURE — G8417 CALC BMI ABV UP PARAM F/U: HCPCS | Performed by: FAMILY MEDICINE

## 2018-03-30 ENCOUNTER — APPOINTMENT (OUTPATIENT)
Dept: CT IMAGING | Facility: HOSPITAL | Age: 38
End: 2018-03-30

## 2018-03-30 ENCOUNTER — APPOINTMENT (OUTPATIENT)
Dept: GENERAL RADIOLOGY | Facility: HOSPITAL | Age: 38
End: 2018-03-30

## 2018-03-30 ENCOUNTER — HOSPITAL ENCOUNTER (EMERGENCY)
Facility: HOSPITAL | Age: 38
Discharge: HOME | End: 2018-03-30
Admitting: EMERGENCY MEDICINE

## 2018-03-30 VITALS
RESPIRATION RATE: 16 BRPM | HEART RATE: 72 BPM | SYSTOLIC BLOOD PRESSURE: 171 MMHG | DIASTOLIC BLOOD PRESSURE: 91 MMHG | WEIGHT: 290 LBS | OXYGEN SATURATION: 98 % | BODY MASS INDEX: 53.37 KG/M2 | HEIGHT: 62 IN | TEMPERATURE: 98.2 F

## 2018-03-30 DIAGNOSIS — S63.92XA HAND SPRAIN, LEFT, INITIAL ENCOUNTER: ICD-10-CM
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
## 2018-03-30 DIAGNOSIS — S33.5XXA LUMBAR SPRAIN, INITIAL ENCOUNTER: ICD-10-CM

## 2018-03-30 DIAGNOSIS — S63.502A WRIST SPRAIN, LEFT, INITIAL ENCOUNTER: Primary | ICD-10-CM

## 2018-03-30 LAB
B-HCG UR QL: NEGATIVE
INTERNAL NEGATIVE CONTROL: NEGATIVE
INTERNAL POSITIVE CONTROL: POSITIVE
Lab: NORMAL

## 2018-03-30 PROCEDURE — 99283 EMERGENCY DEPT VISIT LOW MDM: CPT

## 2018-03-30 PROCEDURE — 70450 CT HEAD/BRAIN W/O DYE: CPT

## 2018-03-30 PROCEDURE — 73110 X-RAY EXAM OF WRIST: CPT

## 2018-03-30 PROCEDURE — 73130 X-RAY EXAM OF HAND: CPT

## 2018-03-30 PROCEDURE — 72110 X-RAY EXAM L-2 SPINE 4/>VWS: CPT

## 2018-03-30 RX ORDER — NAPROXEN 500 MG/1
500 TABLET ORAL 2 TIMES DAILY PRN
Qty: 15 TABLET | Refills: 0 | Status: SHIPPED | OUTPATIENT
Start: 2018-03-30

## 2018-03-30 RX ORDER — HYDROCODONE BITARTRATE AND ACETAMINOPHEN 5; 325 MG/1; MG/1
1 TABLET ORAL EVERY 4 HOURS PRN
Qty: 15 TABLET | Refills: 0 | Status: SHIPPED | OUTPATIENT
Start: 2018-03-30

## 2018-03-30 RX ORDER — ONDANSETRON 4 MG/1
4 TABLET, ORALLY DISINTEGRATING ORAL ONCE
Status: COMPLETED | OUTPATIENT
Start: 2018-03-30 | End: 2018-03-30

## 2018-03-30 RX ORDER — HYDROCODONE BITARTRATE AND ACETAMINOPHEN 5; 325 MG/1; MG/1
1 TABLET ORAL ONCE
Status: COMPLETED | OUTPATIENT
Start: 2018-03-30 | End: 2018-03-30

## 2018-03-30 RX ADMIN — ONDANSETRON 4 MG: 4 TABLET, ORALLY DISINTEGRATING ORAL at 11:15

## 2018-03-30 RX ADMIN — HYDROCODONE BITARTRATE AND ACETAMINOPHEN 1 TABLET: 5; 325 TABLET ORAL at 11:13

## 2018-04-03 NOTE — ED NOTES
"ED Call Back Questions    1. How are you doing since leaving the Emergency Department?    Doing very well  2. Do you have any questions about your discharge instructions? No     3. Have you filled your new prescriptions yet? Yes   a. Do you have any questions about those medications? N/A    4. Were you able to make a follow-up appointment with the physician? Yes     5. Do you have a primary care physician? Yes   a. If No, would you like for me to set you up with one? N/A  i. If Yes, “I will have our ED  give you a call right back at this number to work with you on the best time for an appointment.”    6. We are always looking to get better at what we do. Do you have any suggestions for what we can do to be even better? No   a. If Yes, \"Thank you for sharing your concerns. I apologize. I will follow up with our manager and patient . Would you like someone to call you back?\" No     7. Is there anything else I can do for you? No   Visit was very good     Damián Hernandez  04/03/18 1249    "

## 2018-05-07 RX ORDER — LEFLUNOMIDE 20 MG/1
TABLET ORAL
Qty: 30 TABLET | OUTPATIENT
Start: 2018-05-07

## 2018-05-14 DIAGNOSIS — Z76.0 MEDICATION REFILL: Primary | ICD-10-CM

## 2018-05-14 RX ORDER — LEFLUNOMIDE 20 MG/1
20 TABLET ORAL DAILY
Qty: 30 TABLET | Refills: 3 | Status: SHIPPED | OUTPATIENT
Start: 2018-05-14 | End: 2018-07-31 | Stop reason: ALTCHOICE

## 2018-05-22 ENCOUNTER — APPOINTMENT (OUTPATIENT)
Dept: GENERAL RADIOLOGY | Age: 38
End: 2018-05-22
Payer: MEDICAID

## 2018-05-22 ENCOUNTER — OFFICE VISIT (OUTPATIENT)
Dept: URGENT CARE | Age: 38
End: 2018-05-22

## 2018-05-22 ENCOUNTER — HOSPITAL ENCOUNTER (EMERGENCY)
Age: 38
Discharge: HOME | End: 2018-05-22
Payer: MEDICAID

## 2018-05-22 VITALS
RESPIRATION RATE: 16 BRPM | SYSTOLIC BLOOD PRESSURE: 136 MMHG | HEIGHT: 62 IN | HEART RATE: 75 BPM | WEIGHT: 280 LBS | BODY MASS INDEX: 51.53 KG/M2 | DIASTOLIC BLOOD PRESSURE: 75 MMHG | TEMPERATURE: 98.7 F | OXYGEN SATURATION: 98 %

## 2018-05-22 VITALS — SYSTOLIC BLOOD PRESSURE: 156 MMHG | DIASTOLIC BLOOD PRESSURE: 86 MMHG

## 2018-05-22 DIAGNOSIS — I10 HYPERTENSION, UNSPECIFIED TYPE: ICD-10-CM

## 2018-05-22 DIAGNOSIS — R07.89 BURNING IN THE CHEST: ICD-10-CM

## 2018-05-22 DIAGNOSIS — I10 ESSENTIAL HYPERTENSION: Primary | ICD-10-CM

## 2018-05-22 DIAGNOSIS — R07.9 CHEST PAIN, UNSPECIFIED TYPE: ICD-10-CM

## 2018-05-22 DIAGNOSIS — R06.02 SHORTNESS OF BREATH: ICD-10-CM

## 2018-05-22 DIAGNOSIS — R61 SWEATING PROFUSELY: ICD-10-CM

## 2018-05-22 DIAGNOSIS — R11.0 NAUSEA: Primary | ICD-10-CM

## 2018-05-22 LAB
ALBUMIN SERPL-MCNC: 3.7 G/DL (ref 3.5–5.2)
ALP BLD-CCNC: 66 U/L (ref 35–104)
ALT SERPL-CCNC: 17 U/L (ref 5–33)
ANION GAP SERPL CALCULATED.3IONS-SCNC: 8 MMOL/L (ref 7–19)
AST SERPL-CCNC: 13 U/L (ref 5–32)
BACTERIA: NEGATIVE /HPF
BASOPHILS ABSOLUTE: 0.1 K/UL (ref 0–0.2)
BASOPHILS RELATIVE PERCENT: 1 % (ref 0–1)
BILIRUB SERPL-MCNC: <0.2 MG/DL (ref 0.2–1.2)
BILIRUBIN URINE: NEGATIVE
BLOOD, URINE: ABNORMAL
BUN BLDV-MCNC: 10 MG/DL (ref 6–20)
CALCIUM SERPL-MCNC: 9 MG/DL (ref 8.6–10)
CHLORIDE BLD-SCNC: 105 MMOL/L (ref 98–111)
CLARITY: ABNORMAL
CO2: 27 MMOL/L (ref 22–29)
COLOR: ABNORMAL
CREAT SERPL-MCNC: 0.7 MG/DL (ref 0.5–0.9)
EOSINOPHILS ABSOLUTE: 0.2 K/UL (ref 0–0.6)
EOSINOPHILS RELATIVE PERCENT: 3.4 % (ref 0–5)
EPITHELIAL CELLS, UA: 1 /HPF (ref 0–5)
GFR NON-AFRICAN AMERICAN: >60
GLUCOSE BLD-MCNC: 103 MG/DL (ref 74–109)
GLUCOSE URINE: NEGATIVE MG/DL
HCG(URINE) PREGNANCY TEST: NEGATIVE
HCT VFR BLD CALC: 34.1 % (ref 37–47)
HEMOGLOBIN: 10.2 G/DL (ref 12–16)
HYALINE CASTS: 0 /HPF (ref 0–8)
KETONES, URINE: NEGATIVE MG/DL
LEUKOCYTE ESTERASE, URINE: NEGATIVE
LYMPHOCYTES ABSOLUTE: 2.5 K/UL (ref 1.1–4.5)
LYMPHOCYTES RELATIVE PERCENT: 42.3 % (ref 20–40)
MCH RBC QN AUTO: 22.9 PG (ref 27–31)
MCHC RBC AUTO-ENTMCNC: 29.9 G/DL (ref 33–37)
MCV RBC AUTO: 76.5 FL (ref 81–99)
MONOCYTES ABSOLUTE: 0.4 K/UL (ref 0–0.9)
MONOCYTES RELATIVE PERCENT: 6.4 % (ref 0–10)
NEUTROPHILS ABSOLUTE: 2.8 K/UL (ref 1.5–7.5)
NEUTROPHILS RELATIVE PERCENT: 46.6 % (ref 50–65)
NITRITE, URINE: NEGATIVE
PDW BLD-RTO: 18.7 % (ref 11.5–14.5)
PH UA: 6
PLATELET # BLD: 205 K/UL (ref 130–400)
PMV BLD AUTO: 10.9 FL (ref 9.4–12.3)
POTASSIUM SERPL-SCNC: 4.2 MMOL/L (ref 3.5–5)
PROTEIN UA: ABNORMAL MG/DL
RBC # BLD: 4.46 M/UL (ref 4.2–5.4)
RBC UA: >900 /HPF (ref 0–4)
SODIUM BLD-SCNC: 140 MMOL/L (ref 136–145)
SPECIFIC GRAVITY UA: 1.03
TOTAL PROTEIN: 7.3 G/DL (ref 6.6–8.7)
TROPONIN: <0.01 NG/ML (ref 0–0.03)
TROPONIN: <0.01 NG/ML (ref 0–0.03)
URINE REFLEX TO CULTURE: ABNORMAL
UROBILINOGEN, URINE: 0.2 E.U./DL
WBC # BLD: 5.9 K/UL (ref 4.8–10.8)
WBC UA: 2 /HPF (ref 0–5)

## 2018-05-22 PROCEDURE — 71045 X-RAY EXAM CHEST 1 VIEW: CPT

## 2018-05-22 PROCEDURE — 36415 COLL VENOUS BLD VENIPUNCTURE: CPT

## 2018-05-22 PROCEDURE — 99283 EMERGENCY DEPT VISIT LOW MDM: CPT

## 2018-05-22 PROCEDURE — 99284 EMERGENCY DEPT VISIT MOD MDM: CPT | Performed by: PHYSICIAN ASSISTANT

## 2018-05-22 PROCEDURE — 80053 COMPREHEN METABOLIC PANEL: CPT

## 2018-05-22 PROCEDURE — 99999 PR OFFICE/OUTPT VISIT,PROCEDURE ONLY: CPT | Performed by: NURSE PRACTITIONER

## 2018-05-22 PROCEDURE — 93005 ELECTROCARDIOGRAM TRACING: CPT

## 2018-05-22 PROCEDURE — 84484 ASSAY OF TROPONIN QUANT: CPT

## 2018-05-22 PROCEDURE — 85025 COMPLETE CBC W/AUTO DIFF WBC: CPT

## 2018-05-22 PROCEDURE — 81025 URINE PREGNANCY TEST: CPT

## 2018-05-22 PROCEDURE — 81001 URINALYSIS AUTO W/SCOPE: CPT

## 2018-05-22 PROCEDURE — 6370000000 HC RX 637 (ALT 250 FOR IP): Performed by: PHYSICIAN ASSISTANT

## 2018-05-22 RX ORDER — ASPIRIN 81 MG/1
81 TABLET, CHEWABLE ORAL DAILY
Qty: 30 TABLET | Refills: 0 | Status: SHIPPED | OUTPATIENT
Start: 2018-05-22 | End: 2018-12-18

## 2018-05-22 RX ORDER — CLONIDINE HYDROCHLORIDE 0.1 MG/1
0.1 TABLET ORAL ONCE
Status: COMPLETED | OUTPATIENT
Start: 2018-05-22 | End: 2018-05-22

## 2018-05-22 RX ORDER — ASPIRIN 325 MG
325 TABLET ORAL ONCE
Status: COMPLETED | OUTPATIENT
Start: 2018-05-22 | End: 2018-05-22

## 2018-05-22 RX ADMIN — CLONIDINE HYDROCHLORIDE 0.1 MG: 0.1 TABLET ORAL at 16:34

## 2018-05-22 RX ADMIN — ASPIRIN 325 MG ORAL TABLET 325 MG: 325 PILL ORAL at 16:34

## 2018-05-22 ASSESSMENT — ENCOUNTER SYMPTOMS
APNEA: 0
WHEEZING: 0
RHINORRHEA: 0
SINUS PRESSURE: 0
SORE THROAT: 0
VOMITING: 0
ABDOMINAL DISTENTION: 0
DIARRHEA: 0
NAUSEA: 0
COUGH: 0
ABDOMINAL PAIN: 0
EYE PAIN: 0
SHORTNESS OF BREATH: 0
CONSTIPATION: 0
CHEST TIGHTNESS: 0

## 2018-05-23 LAB
EKG P AXIS: 59 DEGREES
EKG P-R INTERVAL: 152 MS
EKG Q-T INTERVAL: 418 MS
EKG QRS DURATION: 78 MS
EKG QTC CALCULATION (BAZETT): 432 MS
EKG T AXIS: 26 DEGREES

## 2018-05-31 DIAGNOSIS — I10 ESSENTIAL (PRIMARY) HYPERTENSION: ICD-10-CM

## 2018-05-31 RX ORDER — LOSARTAN POTASSIUM 50 MG/1
50 TABLET ORAL DAILY
Qty: 30 TABLET | Refills: 5 | Status: SHIPPED | OUTPATIENT
Start: 2018-05-31 | End: 2018-11-13 | Stop reason: SDUPTHER

## 2018-07-06 ENCOUNTER — OFFICE VISIT (OUTPATIENT)
Dept: FAMILY MEDICINE CLINIC | Age: 38
End: 2018-07-06
Payer: MEDICAID

## 2018-07-06 VITALS
HEIGHT: 62 IN | OXYGEN SATURATION: 98 % | TEMPERATURE: 98.3 F | WEIGHT: 293 LBS | SYSTOLIC BLOOD PRESSURE: 120 MMHG | HEART RATE: 81 BPM | BODY MASS INDEX: 53.92 KG/M2 | DIASTOLIC BLOOD PRESSURE: 68 MMHG

## 2018-07-06 DIAGNOSIS — L98.9 SKIN LESION: ICD-10-CM

## 2018-07-06 DIAGNOSIS — G56.03 BILATERAL CARPAL TUNNEL SYNDROME: ICD-10-CM

## 2018-07-06 DIAGNOSIS — I10 ESSENTIAL (PRIMARY) HYPERTENSION: Primary | ICD-10-CM

## 2018-07-06 DIAGNOSIS — M79.7 FIBROMYALGIA: ICD-10-CM

## 2018-07-06 DIAGNOSIS — R07.9 CHEST PAIN, UNSPECIFIED TYPE: ICD-10-CM

## 2018-07-06 DIAGNOSIS — M79.10 MYALGIA: ICD-10-CM

## 2018-07-06 DIAGNOSIS — M06.9 RHEUMATOID ARTHRITIS INVOLVING MULTIPLE SITES, UNSPECIFIED RHEUMATOID FACTOR PRESENCE: ICD-10-CM

## 2018-07-06 DIAGNOSIS — R51.9 NONINTRACTABLE HEADACHE, UNSPECIFIED CHRONICITY PATTERN, UNSPECIFIED HEADACHE TYPE: ICD-10-CM

## 2018-07-06 PROCEDURE — 99214 OFFICE O/P EST MOD 30 MIN: CPT | Performed by: NURSE PRACTITIONER

## 2018-07-06 PROCEDURE — 93000 ELECTROCARDIOGRAM COMPLETE: CPT | Performed by: NURSE PRACTITIONER

## 2018-07-06 PROCEDURE — G8417 CALC BMI ABV UP PARAM F/U: HCPCS | Performed by: NURSE PRACTITIONER

## 2018-07-06 PROCEDURE — 1036F TOBACCO NON-USER: CPT | Performed by: NURSE PRACTITIONER

## 2018-07-06 PROCEDURE — G8427 DOCREV CUR MEDS BY ELIG CLIN: HCPCS | Performed by: NURSE PRACTITIONER

## 2018-07-06 RX ORDER — DICLOFENAC SODIUM 75 MG/1
75 TABLET, DELAYED RELEASE ORAL 2 TIMES DAILY PRN
Qty: 60 TABLET | Refills: 3 | Status: SHIPPED | OUTPATIENT
Start: 2018-07-06 | End: 2018-12-18

## 2018-07-06 RX ORDER — TIZANIDINE 4 MG/1
4 TABLET ORAL 2 TIMES DAILY PRN
Qty: 60 TABLET | Refills: 2 | Status: SHIPPED | OUTPATIENT
Start: 2018-07-06 | End: 2019-06-21 | Stop reason: SDUPTHER

## 2018-07-06 ASSESSMENT — ENCOUNTER SYMPTOMS
DIARRHEA: 0
SORE THROAT: 0
COUGH: 0
WHEEZING: 0
SHORTNESS OF BREATH: 0
ABDOMINAL PAIN: 0
NAUSEA: 0
CHEST TIGHTNESS: 0

## 2018-07-06 NOTE — PROGRESS NOTES
rheumatic arthritis (HCC)     Elevated blood pressure reading     Type 2 diabetes mellitus (HCC)        Current Outpatient Prescriptions   Medication Sig Dispense Refill    diclofenac (VOLTAREN) 75 MG EC tablet Take 1 tablet by mouth 2 times daily as needed for Pain 60 tablet 3    tiZANidine (ZANAFLEX) 4 MG tablet Take 1 tablet by mouth 2 times daily as needed (pain) . May cause drowsiness. 60 tablet 2    Elastic Bandages & Supports (WRIST SPLINT LEFT/RIGHT) MISC Bilateral wrist splints, use as directed. Dx G56.03 2 each 0    losartan (COZAAR) 50 MG tablet TAKE 1 TABLET BY MOUTH DAILY 30 tablet 5    aspirin (ASPIRIN CHILDRENS) 81 MG chewable tablet Take 1 tablet by mouth daily 30 tablet 0    leflunomide (ARAVA) 20 MG tablet Take 1 tablet by mouth daily 30 tablet 3    glucose blood VI test strips (ASCENSIA AUTODISC VI;ONE TOUCH ULTRA TEST VI) strip 1 each by In Vitro route 2 times daily As needed. 200 each 3    fluticasone (FLONASE) 50 MCG/ACT nasal spray 1 SPRAY BY NASAL ROUTE DAILY  3    furosemide (LASIX) 40 MG tablet Take 1 tablet by mouth daily 60 tablet 3    acetaZOLAMIDE (DIAMOX) 250 MG tablet TAKE 2 TABLETS BY MOUTH TWICE A DAY  10    hydroxychloroquine (PLAQUENIL) 200 MG tablet TAKE 2 TABLETS DAILY WITH FOOD  5    FREESTYLE LANCETS MISC        No current facility-administered medications for this visit.         Allergies   Allergen Reactions    Mushroom Extract Complex        Past Surgical History:   Procedure Laterality Date    CHOLECYSTECTOMY         Social History   Substance Use Topics    Smoking status: Never Smoker    Smokeless tobacco: Never Used    Alcohol use No       Family History   Problem Relation Age of Onset    High Blood Pressure Father     Diabetes Father     Atrial Fibrillation Father     Diabetes Sister     Diabetes Maternal Grandmother        /68   Pulse 81   Temp 98.3 °F (36.8 °C) (Temporal)   Ht 5' 2\" (1.575 m)   Wt 296 lb 4 oz (134.4 kg)   SpO2 98%

## 2018-07-31 ENCOUNTER — HOSPITAL ENCOUNTER (EMERGENCY)
Age: 38
Discharge: HOME | End: 2018-07-31
Attending: EMERGENCY MEDICINE
Payer: MEDICAID

## 2018-07-31 ENCOUNTER — APPOINTMENT (OUTPATIENT)
Dept: GENERAL RADIOLOGY | Age: 38
End: 2018-07-31
Payer: MEDICAID

## 2018-07-31 VITALS
BODY MASS INDEX: 53.37 KG/M2 | HEIGHT: 62 IN | OXYGEN SATURATION: 98 % | TEMPERATURE: 97.9 F | SYSTOLIC BLOOD PRESSURE: 130 MMHG | DIASTOLIC BLOOD PRESSURE: 83 MMHG | WEIGHT: 290 LBS | RESPIRATION RATE: 14 BRPM | HEART RATE: 87 BPM

## 2018-07-31 DIAGNOSIS — R07.89 NON-CARDIAC CHEST PAIN: Primary | ICD-10-CM

## 2018-07-31 LAB
ALBUMIN SERPL-MCNC: 3.9 G/DL (ref 3.5–5.2)
ALP BLD-CCNC: 70 U/L (ref 35–104)
ALT SERPL-CCNC: 23 U/L (ref 5–33)
ANION GAP SERPL CALCULATED.3IONS-SCNC: 12 MMOL/L (ref 7–19)
APTT: 28.9 SEC (ref 26–36.2)
AST SERPL-CCNC: 16 U/L (ref 5–32)
BASOPHILS ABSOLUTE: 0.1 K/UL (ref 0–0.2)
BASOPHILS RELATIVE PERCENT: 0.9 % (ref 0–1)
BILIRUB SERPL-MCNC: <0.2 MG/DL (ref 0.2–1.2)
BUN BLDV-MCNC: 10 MG/DL (ref 6–20)
CALCIUM SERPL-MCNC: 8.9 MG/DL (ref 8.6–10)
CHLORIDE BLD-SCNC: 100 MMOL/L (ref 98–111)
CO2: 26 MMOL/L (ref 22–29)
CREAT SERPL-MCNC: 0.7 MG/DL (ref 0.5–0.9)
EOSINOPHILS ABSOLUTE: 0.3 K/UL (ref 0–0.6)
EOSINOPHILS RELATIVE PERCENT: 3.8 % (ref 0–5)
GFR NON-AFRICAN AMERICAN: >60
GLUCOSE BLD-MCNC: 111 MG/DL (ref 74–109)
HCT VFR BLD CALC: 35.5 % (ref 37–47)
HEMOGLOBIN: 10.7 G/DL (ref 12–16)
INR BLD: 0.96 (ref 0.88–1.18)
LYMPHOCYTES ABSOLUTE: 2.3 K/UL (ref 1.1–4.5)
LYMPHOCYTES RELATIVE PERCENT: 35.8 % (ref 20–40)
MCH RBC QN AUTO: 23.3 PG (ref 27–31)
MCHC RBC AUTO-ENTMCNC: 30.1 G/DL (ref 33–37)
MCV RBC AUTO: 77.3 FL (ref 81–99)
MONOCYTES ABSOLUTE: 0.5 K/UL (ref 0–0.9)
MONOCYTES RELATIVE PERCENT: 7.1 % (ref 0–10)
NEUTROPHILS ABSOLUTE: 3.4 K/UL (ref 1.5–7.5)
NEUTROPHILS RELATIVE PERCENT: 52.1 % (ref 50–65)
PDW BLD-RTO: 17.2 % (ref 11.5–14.5)
PLATELET # BLD: 223 K/UL (ref 130–400)
PMV BLD AUTO: 11.2 FL (ref 9.4–12.3)
POTASSIUM SERPL-SCNC: 3.9 MMOL/L (ref 3.5–5)
PROTHROMBIN TIME: 12.7 SEC (ref 12–14.6)
RBC # BLD: 4.59 M/UL (ref 4.2–5.4)
SODIUM BLD-SCNC: 138 MMOL/L (ref 136–145)
TOTAL PROTEIN: 7.5 G/DL (ref 6.6–8.7)
TROPONIN: <0.01 NG/ML (ref 0–0.03)
WBC # BLD: 6.5 K/UL (ref 4.8–10.8)

## 2018-07-31 PROCEDURE — 71046 X-RAY EXAM CHEST 2 VIEWS: CPT

## 2018-07-31 PROCEDURE — 85730 THROMBOPLASTIN TIME PARTIAL: CPT

## 2018-07-31 PROCEDURE — 85025 COMPLETE CBC W/AUTO DIFF WBC: CPT

## 2018-07-31 PROCEDURE — 80053 COMPREHEN METABOLIC PANEL: CPT

## 2018-07-31 PROCEDURE — 6370000000 HC RX 637 (ALT 250 FOR IP): Performed by: EMERGENCY MEDICINE

## 2018-07-31 PROCEDURE — 93005 ELECTROCARDIOGRAM TRACING: CPT

## 2018-07-31 PROCEDURE — 96375 TX/PRO/DX INJ NEW DRUG ADDON: CPT

## 2018-07-31 PROCEDURE — 36415 COLL VENOUS BLD VENIPUNCTURE: CPT

## 2018-07-31 PROCEDURE — 99284 EMERGENCY DEPT VISIT MOD MDM: CPT | Performed by: EMERGENCY MEDICINE

## 2018-07-31 PROCEDURE — 96374 THER/PROPH/DIAG INJ IV PUSH: CPT

## 2018-07-31 PROCEDURE — 84484 ASSAY OF TROPONIN QUANT: CPT

## 2018-07-31 PROCEDURE — 99285 EMERGENCY DEPT VISIT HI MDM: CPT

## 2018-07-31 PROCEDURE — 6360000002 HC RX W HCPCS: Performed by: EMERGENCY MEDICINE

## 2018-07-31 PROCEDURE — 85610 PROTHROMBIN TIME: CPT

## 2018-07-31 RX ORDER — KETOROLAC TROMETHAMINE 30 MG/ML
30 INJECTION, SOLUTION INTRAMUSCULAR; INTRAVENOUS ONCE
Status: COMPLETED | OUTPATIENT
Start: 2018-07-31 | End: 2018-07-31

## 2018-07-31 RX ORDER — ASPIRIN 325 MG
325 TABLET ORAL ONCE
Status: COMPLETED | OUTPATIENT
Start: 2018-07-31 | End: 2018-07-31

## 2018-07-31 RX ORDER — LORAZEPAM 2 MG/ML
1 INJECTION INTRAMUSCULAR ONCE
Status: COMPLETED | OUTPATIENT
Start: 2018-07-31 | End: 2018-07-31

## 2018-07-31 RX ADMIN — ASPIRIN 325 MG ORAL TABLET 325 MG: 325 PILL ORAL at 21:35

## 2018-07-31 RX ADMIN — LORAZEPAM 1 MG: 2 INJECTION INTRAMUSCULAR; INTRAVENOUS at 21:36

## 2018-07-31 RX ADMIN — KETOROLAC TROMETHAMINE 30 MG: 30 INJECTION, SOLUTION INTRAMUSCULAR; INTRAVENOUS at 21:35

## 2018-07-31 ASSESSMENT — ENCOUNTER SYMPTOMS
ABDOMINAL PAIN: 0
SHORTNESS OF BREATH: 1
COLOR CHANGE: 0
CHEST TIGHTNESS: 0
NAUSEA: 1
BACK PAIN: 0
ABDOMINAL DISTENTION: 0
TROUBLE SWALLOWING: 0
PHOTOPHOBIA: 0
SORE THROAT: 0
DIARRHEA: 0
COUGH: 0
WHEEZING: 0
CONSTIPATION: 0
EYE PAIN: 0
RHINORRHEA: 0
VOMITING: 0

## 2018-07-31 ASSESSMENT — PAIN SCALES - GENERAL
PAINLEVEL_OUTOF10: 9
PAINLEVEL_OUTOF10: 6
PAINLEVEL_OUTOF10: 8

## 2018-08-01 NOTE — ED PROVIDER NOTES
and dry. No rash noted. Psychiatric: She has a normal mood and affect. Her behavior is normal. Judgment and thought content normal.   Nursing note and vitals reviewed. DIAGNOSTIC RESULTS     EKG: All EKG's are interpreted by the Emergency Department Physician who either signs or Co-signs this chart in the absence of a cardiologist.    NSR with rate of 84, normal axis, No acute ST elev/depression. RADIOLOGY:   Non-plain film images such as CT, Ultrasound and MRI are read by the radiologist. Plain radiographic images are visualized and preliminarily interpreted by the emergency physician with the below findings:    XR CHEST STANDARD (2 VW)   Final Result   Impression: No evidence of acute cardiopulmonary disease. Signed by Dr Ruben Barney on 7/31/2018 8:36 PM              LABS:  Labs Reviewed   CBC WITH AUTO DIFFERENTIAL - Abnormal; Notable for the following:        Result Value    Hemoglobin 10.7 (*)     Hematocrit 35.5 (*)     MCV 77.3 (*)     MCH 23.3 (*)     MCHC 30.1 (*)     RDW 17.2 (*)     All other components within normal limits   COMPREHENSIVE METABOLIC PANEL - Abnormal; Notable for the following:     Glucose 111 (*)     All other components within normal limits   TROPONIN   APTT   PROTIME-INR       All other labs were within normal range or not returned as of this dictation. EMERGENCY DEPARTMENT COURSE and DIFFERENTIAL DIAGNOSIS/MDM:   Vitals:    Vitals:    07/31/18 2010 07/31/18 2031 07/31/18 2132 07/31/18 2302   BP:  (!) 145/80 135/82 130/83   Pulse:  78 76 87   Resp:    14   Temp:    97.9 °F (36.6 °C)   SpO2:  95% 92% 98%   Weight: 290 lb (131.5 kg)      Height: 5' 2\" (1.575 m)          MDM  Number of Diagnoses or Management Options  Non-cardiac chest pain: new and requires workup  Diagnosis management comments: I do not believe patient's complaint this evening is related to a cardiac cause. She did not have a CVA as she feared.  She feels better at

## 2018-08-02 LAB
EKG P AXIS: 62 DEGREES
EKG P-R INTERVAL: 126 MS
EKG Q-T INTERVAL: 412 MS
EKG QRS DURATION: 80 MS
EKG QTC CALCULATION (BAZETT): 454 MS
EKG T AXIS: 13 DEGREES

## 2018-08-07 ENCOUNTER — OFFICE VISIT (OUTPATIENT)
Dept: CARDIOLOGY | Age: 38
End: 2018-08-07
Payer: MEDICAID

## 2018-08-07 VITALS
RESPIRATION RATE: 18 BRPM | WEIGHT: 293 LBS | HEIGHT: 62 IN | BODY MASS INDEX: 53.92 KG/M2 | SYSTOLIC BLOOD PRESSURE: 132 MMHG | HEART RATE: 83 BPM | DIASTOLIC BLOOD PRESSURE: 74 MMHG

## 2018-08-07 DIAGNOSIS — M79.7 FIBROMYALGIA: ICD-10-CM

## 2018-08-07 DIAGNOSIS — I10 ESSENTIAL HYPERTENSION: ICD-10-CM

## 2018-08-07 DIAGNOSIS — E11.9 TYPE 2 DIABETES MELLITUS WITHOUT COMPLICATION, WITHOUT LONG-TERM CURRENT USE OF INSULIN (HCC): ICD-10-CM

## 2018-08-07 DIAGNOSIS — M06.9 RHEUMATOID ARTHRITIS INVOLVING MULTIPLE SITES, UNSPECIFIED RHEUMATOID FACTOR PRESENCE: ICD-10-CM

## 2018-08-07 DIAGNOSIS — R07.89 OTHER CHEST PAIN: Primary | ICD-10-CM

## 2018-08-07 PROCEDURE — G8427 DOCREV CUR MEDS BY ELIG CLIN: HCPCS | Performed by: CLINICAL NURSE SPECIALIST

## 2018-08-07 PROCEDURE — 1036F TOBACCO NON-USER: CPT | Performed by: CLINICAL NURSE SPECIALIST

## 2018-08-07 PROCEDURE — 93000 ELECTROCARDIOGRAM COMPLETE: CPT | Performed by: CLINICAL NURSE SPECIALIST

## 2018-08-07 PROCEDURE — G8417 CALC BMI ABV UP PARAM F/U: HCPCS | Performed by: CLINICAL NURSE SPECIALIST

## 2018-08-07 PROCEDURE — 2022F DILAT RTA XM EVC RTNOPTHY: CPT | Performed by: CLINICAL NURSE SPECIALIST

## 2018-08-07 PROCEDURE — 3044F HG A1C LEVEL LT 7.0%: CPT | Performed by: CLINICAL NURSE SPECIALIST

## 2018-08-07 PROCEDURE — 99203 OFFICE O/P NEW LOW 30 MIN: CPT | Performed by: CLINICAL NURSE SPECIALIST

## 2018-08-07 RX ORDER — LEFLUNOMIDE 10 MG/1
10 TABLET ORAL DAILY
COMMUNITY
End: 2018-09-25 | Stop reason: SDUPTHER

## 2018-08-07 ASSESSMENT — ENCOUNTER SYMPTOMS
BLURRED VISION: 0
DIARRHEA: 1
SHORTNESS OF BREATH: 0
ORTHOPNEA: 0
NAUSEA: 1
COUGH: 0

## 2018-08-07 NOTE — PROGRESS NOTES
headache 08/11/2017    Type 2 diabetes mellitus (HCC)     Elevated blood pressure reading      Past Medical History:   Diagnosis Date    Anemia     Chronic rheumatic arthritis (HCC)     Elevated blood pressure reading     Fibromyalgia     Obesity     Type 2 diabetes mellitus (HCC)      Past Surgical History:   Procedure Laterality Date    CHOLECYSTECTOMY       Family History   Problem Relation Age of Onset    High Blood Pressure Father     Diabetes Father     Atrial Fibrillation Father     Heart Failure Father     Stroke Father     Diabetes Sister     Diabetes Maternal Grandmother     Heart Attack Other      Social History   Substance Use Topics    Smoking status: Never Smoker    Smokeless tobacco: Never Used    Alcohol use No      Current Outpatient Prescriptions   Medication Sig Dispense Refill    leflunomide (ARAVA) 10 MG tablet Take 10 mg by mouth daily      Adalimumab (HUMIRA) 20 MG/0.4ML PSKT Inject into the skin      diclofenac (VOLTAREN) 75 MG EC tablet Take 1 tablet by mouth 2 times daily as needed for Pain 60 tablet 3    Elastic Bandages & Supports (WRIST SPLINT LEFT/RIGHT) MISC Bilateral wrist splints, use as directed. Dx G56.03 2 each 0    losartan (COZAAR) 50 MG tablet TAKE 1 TABLET BY MOUTH DAILY 30 tablet 5    aspirin (ASPIRIN CHILDRENS) 81 MG chewable tablet Take 1 tablet by mouth daily 30 tablet 0    glucose blood VI test strips (ASCENSIA AUTODISC VI;ONE TOUCH ULTRA TEST VI) strip 1 each by In Vitro route 2 times daily As needed. 200 each 3    furosemide (LASIX) 40 MG tablet Take 1 tablet by mouth daily 60 tablet 3    acetaZOLAMIDE (DIAMOX) 250 MG tablet TAKE 2 TABLETS BY MOUTH TWICE A DAY  10    hydroxychloroquine (PLAQUENIL) 200 MG tablet TAKE 2 TABLETS DAILY WITH FOOD  5    FREESTYLE LANCETS MISC       tiZANidine (ZANAFLEX) 4 MG tablet Take 1 tablet by mouth 2 times daily as needed (pain) . May cause drowsiness.  60 tablet 2     No current facility-administered medications for this visit. Allergies: Mushroom extract complex    Review of Systems  Review of Systems   Constitutional: Positive for malaise/fatigue (Chronic). Negative for chills and fever. HENT: Negative for congestion. Eyes: Negative for blurred vision. Respiratory: Negative for cough and shortness of breath. Cardiovascular: Positive for chest pain and leg swelling (mild). Negative for palpitations, orthopnea and PND. Gastrointestinal: Positive for diarrhea and nausea. Belching   Musculoskeletal: Negative for falls and myalgias. Skin: Negative for rash. Neurological: Negative for dizziness, sensory change and speech change. Endo/Heme/Allergies: Does not bruise/bleed easily. Psychiatric/Behavioral: Negative for depression. The patient is not nervous/anxious. Objective  Vital Signs - /74   Pulse 83   Resp 18   Ht 5' 2\" (1.575 m)   Wt 300 lb (136.1 kg)   BMI 54.87 kg/m²   Physical Exam   Constitutional: She is oriented to person, place, and time. She appears well-developed and well-nourished. No distress. Obese   HENT:   Head: Normocephalic and atraumatic. Eyes: Pupils are equal, round, and reactive to light. Right eye exhibits no discharge. Left eye exhibits no discharge. Neck: No JVD present. No tracheal deviation present. Cardiovascular: Normal rate, regular rhythm, normal heart sounds and intact distal pulses. Exam reveals no gallop and no friction rub. No murmur heard. No carotid bruit   Pulmonary/Chest: Effort normal and breath sounds normal. No respiratory distress. She has no wheezes. She has no rales. Abdominal: Soft. There is no tenderness. Musculoskeletal: She exhibits no edema. Neurological: She is alert and oriented to person, place, and time. No cranial nerve deficit. Skin: Skin is warm and dry. No rash noted. Psychiatric: She has a normal mood and affect.  Her behavior is normal. Judgment normal.   Nursing note and vitals most days of the week. Cardiac / Healthy Diet  Continue current medications as directed  Continue plan of treatment  It is always recommended that you bring your medications bottles with you to each visit - this is for your safety!        NATACHA Gomez

## 2018-08-20 ENCOUNTER — OFFICE VISIT (OUTPATIENT)
Dept: FAMILY MEDICINE CLINIC | Age: 38
End: 2018-08-20
Payer: MEDICAID

## 2018-08-20 VITALS
DIASTOLIC BLOOD PRESSURE: 92 MMHG | BODY MASS INDEX: 56.15 KG/M2 | WEIGHT: 293 LBS | OXYGEN SATURATION: 98 % | SYSTOLIC BLOOD PRESSURE: 142 MMHG | TEMPERATURE: 98.6 F | HEART RATE: 78 BPM | RESPIRATION RATE: 18 BRPM

## 2018-08-20 DIAGNOSIS — M13.0 POLYARTHRITIS: ICD-10-CM

## 2018-08-20 DIAGNOSIS — R07.9 CHEST PAIN, UNSPECIFIED TYPE: ICD-10-CM

## 2018-08-20 DIAGNOSIS — M79.7 FIBROMYALGIA: ICD-10-CM

## 2018-08-20 DIAGNOSIS — I10 ESSENTIAL (PRIMARY) HYPERTENSION: ICD-10-CM

## 2018-08-20 DIAGNOSIS — E11.9 TYPE 2 DIABETES MELLITUS WITHOUT COMPLICATION, WITHOUT LONG-TERM CURRENT USE OF INSULIN (HCC): ICD-10-CM

## 2018-08-20 DIAGNOSIS — E28.2 PCOS (POLYCYSTIC OVARIAN SYNDROME): ICD-10-CM

## 2018-08-20 DIAGNOSIS — M79.10 MYALGIA: Primary | ICD-10-CM

## 2018-08-20 LAB
ALBUMIN SERPL-MCNC: 3.7 G/DL (ref 3.5–5.2)
ALP BLD-CCNC: 65 U/L (ref 35–104)
ALT SERPL-CCNC: 24 U/L (ref 5–33)
ANION GAP SERPL CALCULATED.3IONS-SCNC: 14 MMOL/L (ref 7–19)
AST SERPL-CCNC: 17 U/L (ref 5–32)
BACTERIA: NEGATIVE /HPF
BILIRUB SERPL-MCNC: <0.2 MG/DL (ref 0.2–1.2)
BILIRUBIN URINE: NEGATIVE
BLOOD, URINE: NEGATIVE
BUN BLDV-MCNC: 9 MG/DL (ref 6–20)
C-REACTIVE PROTEIN: 1.98 MG/DL (ref 0–0.5)
CALCIUM SERPL-MCNC: 8.9 MG/DL (ref 8.6–10)
CHLORIDE BLD-SCNC: 101 MMOL/L (ref 98–111)
CLARITY: CLEAR
CO2: 25 MMOL/L (ref 22–29)
COLOR: YELLOW
CREAT SERPL-MCNC: 0.6 MG/DL (ref 0.5–0.9)
EPITHELIAL CELLS, UA: 5 /HPF (ref 0–5)
GFR NON-AFRICAN AMERICAN: >60
GLUCOSE BLD-MCNC: 91 MG/DL (ref 74–109)
GLUCOSE URINE: NEGATIVE MG/DL
HBA1C MFR BLD: 6.4 % (ref 4–6)
HYALINE CASTS: 3 /HPF (ref 0–8)
KETONES, URINE: NEGATIVE MG/DL
LEUKOCYTE ESTERASE, URINE: NEGATIVE
NITRITE, URINE: NEGATIVE
PH UA: 6
POTASSIUM SERPL-SCNC: 4 MMOL/L (ref 3.5–5)
PROTEIN UA: ABNORMAL MG/DL
RBC UA: 1 /HPF (ref 0–4)
SEDIMENTATION RATE, ERYTHROCYTE: 34 MM/HR (ref 0–20)
SODIUM BLD-SCNC: 140 MMOL/L (ref 136–145)
SPECIFIC GRAVITY UA: 1.03
TOTAL PROTEIN: 7.3 G/DL (ref 6.6–8.7)
URIC ACID, SERUM: 3.8 MG/DL (ref 2.4–5.7)
UROBILINOGEN, URINE: 0.2 E.U./DL
WBC UA: 2 /HPF (ref 0–5)

## 2018-08-20 PROCEDURE — 99214 OFFICE O/P EST MOD 30 MIN: CPT | Performed by: FAMILY MEDICINE

## 2018-08-20 PROCEDURE — 1036F TOBACCO NON-USER: CPT | Performed by: FAMILY MEDICINE

## 2018-08-20 PROCEDURE — 3044F HG A1C LEVEL LT 7.0%: CPT | Performed by: FAMILY MEDICINE

## 2018-08-20 PROCEDURE — 2022F DILAT RTA XM EVC RTNOPTHY: CPT | Performed by: FAMILY MEDICINE

## 2018-08-20 PROCEDURE — G8417 CALC BMI ABV UP PARAM F/U: HCPCS | Performed by: FAMILY MEDICINE

## 2018-08-20 PROCEDURE — G8427 DOCREV CUR MEDS BY ELIG CLIN: HCPCS | Performed by: FAMILY MEDICINE

## 2018-08-20 RX ORDER — METHYLPREDNISOLONE 4 MG/1
TABLET ORAL
Qty: 1 KIT | Refills: 0 | Status: SHIPPED | OUTPATIENT
Start: 2018-08-20 | End: 2018-09-25 | Stop reason: ALTCHOICE

## 2018-08-20 RX ORDER — DULOXETIN HYDROCHLORIDE 30 MG/1
CAPSULE, DELAYED RELEASE ORAL
Qty: 30 CAPSULE | Refills: 5 | Status: SHIPPED | OUTPATIENT
Start: 2018-08-20 | End: 2018-09-25 | Stop reason: ALTCHOICE

## 2018-08-20 RX ORDER — DULOXETIN HYDROCHLORIDE 60 MG/1
60 CAPSULE, DELAYED RELEASE ORAL DAILY
Qty: 30 CAPSULE | Refills: 3 | Status: SHIPPED | OUTPATIENT
Start: 2018-08-20 | End: 2018-09-25 | Stop reason: ALTCHOICE

## 2018-08-20 NOTE — PATIENT INSTRUCTIONS
Instructions. \"     If you do not have an account, please click on the \"Sign Up Now\" link. Current as of: May 12, 2017  Content Version: 11.7  © 3651-9088 [employer]. Care instructions adapted under license by [facility] ([facility]). If you have questions about a medical condition or this instruction, always ask your healthcare professional. [address] any warranty or liability for your use of this information.

## 2018-08-20 NOTE — PROGRESS NOTES
[facility] [address] [place], [place] [address] [address] [address]  Dept: [phone]  Dept Fax: [phone]  Loc: [phone]    Emma Barroso is a 45 y.o. female who presents today for her medical conditions/complaints as noted below. Emma Barroso is here for Other (6-8 week follow up)        HPI:   CC: Here today to discuss the following:    Was in ED on 7/31 for numbness right side of face and chest pain. EKG showed normal sinus rhythm. Chest x-ray showed no acute cardiopulmonary disease. She has since followed up in the cardiology office. She was scheduled for 2-D echocardiogram. As well as a stress test. She's no longer having the symptoms. She has a hx of rheumatoid arthritis. She sees rheumatologist, Dr. Alejandro Marrero. She's been having worsening body aches lately. She has also been diagnosed with fibromyalgia and she feels this is the source of her discomfort. She is primarily having soft tissue pain of the shoulders or arms or back in lower extremities. She is having no signs of joint swelling or joint stiffness. She was able to successfully wean off her medication for diabetes she has since gained weight and noticed her blood sugars creeping up. She is requesting be placed back on metformin which may also assist in her weight loss.           HPI    Past Medical History:   Diagnosis Date    Anemia     Chronic rheumatic arthritis ([address]     Elevated blood pressure reading     Fibromyalgia     Obesity     Type 2 diabetes mellitus (HCC)       Past Surgical History:   Procedure Laterality Date    CHOLECYSTECTOMY         Family History   Problem Relation Age of Onset    High Blood Pressure Father     Diabetes Father     Atrial Fibrillation Father     Heart Failure Father     Stroke Father     Diabetes Sister     Diabetes Maternal Grandmother     Heart Attack Other        Social History   Substance Use Topics    Smoking status: Never Smoker    Calculation (Marti) 454 ms    P Axis 62 degrees    T Axis 13 degrees   CBC Auto Differential    Collection Time: 07/31/18  8:15 PM   Result Value Ref Range    WBC 6.5 4.8 - 10.8 K/uL    RBC 4.59 4.20 - 5.40 M/uL    Hemoglobin 10.7 (L) 12.0 - 16.0 g/dL    Hematocrit 35.5 (L) 37.0 - 47.0 %    MCV 77.3 (L) 81.0 - 99.0 fL    MCH 23.3 (L) 27.0 - 31.0 pg    MCHC 30.1 (L) 33.0 - 37.0 g/dL    RDW 17.2 (H) 11.5 - 14.5 %    Platelets 540 599 - 783 K/uL    MPV 11.2 9.4 - 12.3 fL    Neutrophils % 52.1 50.0 - 65.0 %    Lymphocytes % 35.8 20.0 - 40.0 %    Monocytes % 7.1 0.0 - 10.0 %    Eosinophils % 3.8 0.0 - 5.0 %    Basophils % 0.9 0.0 - 1.0 %    Neutrophils # 3.4 1.5 - 7.5 K/uL    Lymphocytes # 2.3 1.1 - 4.5 K/uL    Monocytes # 0.50 0.00 - 0.90 K/uL    Eosinophils # 0.30 0.00 - 0.60 K/uL    Basophils # 0.10 0.00 - 0.20 K/uL   Comprehensive Metabolic Panel    Collection Time: 07/31/18  8:15 PM   Result Value Ref Range    Sodium 138 136 - 145 mmol/L    Potassium 3.9 3.5 - 5.0 mmol/L    Chloride 100 98 - 111 mmol/L    CO2 26 22 - 29 mmol/L    Anion Gap 12 7 - 19 mmol/L    Glucose 111 (H) 74 - 109 mg/dL    BUN 10 6 - 20 mg/dL    CREATININE 0.7 0.5 - 0.9 mg/dL    GFR Non-African American >60 >60    Calcium 8.9 8.6 - 10.0 mg/dL    Total Protein 7.5 6.6 - 8.7 g/dL    Alb 3.9 3.5 - 5.2 g/dL    Total Bilirubin <0.2 0.2 - 1.2 mg/dL    Alkaline Phosphatase 70 35 - 104 U/L    ALT 23 5 - 33 U/L    AST 16 5 - 32 U/L   Troponin    Collection Time: 07/31/18  8:15 PM   Result Value Ref Range    Troponin <0.01 0.00 - 0.03 ng/mL   APTT    Collection Time: 07/31/18  8:15 PM   Result Value Ref Range    aPTT 28.9 26.0 - 36.2 sec   Protime-INR    Collection Time: 07/31/18  8:15 PM   Result Value Ref Range    Protime 12.7 12.0 - 14.6 sec    INR 0.96 0.88 - 1.18               Assessment & Plan: The following diagnoses and conditions are stable with no further orders unless indicated:  1.  Myalgia  Start her on Cymbalta 30 mg daily  Discussed risks and benefits of this new medication. Discussed potential side effects. She voiced understanding. 2. Chest pain, unspecified type  We'll await further input from cardiology. She is scheduled for stress test    3. Polyarthritis  We'll check the following lab studies to confirm her myalgia is more related to her fibromyalgia versus her history of rheumatoid arthritis  - Sedimentation Rate; Future  - C-Reactive Protein; Future  - Comprehensive Metabolic Panel; Future  - Uric Acid; Future    4. Essential (primary) hypertension  BP Readings from Last 3 Encounters:   08/21/18 130/79   08/20/18 (!) 142/92   08/07/18 132/74     Stable  - Urinalysis with Microscopic; Future    5. Type 2 diabetes mellitus without complication, without long-term current use of insulin ([facility])  Lab Results   Component Value Date    LABA1C 6.4 (H) 08/20/2018     No results found for: EAG  Restart metformin twice a day  Discussed risks and benefits of this new medication. Discussed potential side effects. She voiced understanding.     - Hemoglobin A1C; Future  - metFORMIN (GLUCOPHAGE) 500 MG tablet; Take 1 tablet by mouth 2 times daily (with meals)  Dispense: 60 tablet; Refill: 3    6. Fibromyalgia  Would recommend she take the Cymbalta 30 mg for 1-2 weeks then increase to 60 mg we'll see her back in one month  - DULoxetine (CYMBALTA) 30 MG extended release capsule; TAKE ONE CAPSULE BY MOUTH EVERY DAY  Dispense: 30 capsule; Refill: 5  - DULoxetine (CYMBALTA) 60 MG extended release capsule; Take 1 capsule by mouth daily  Dispense: 30 capsule; Refill: 3    7. PCOS (polycystic ovarian syndrome)  Discussed aggressive diet exercise and weight loss. We'll start her on metformin as well for PCO S            Return in about 1 month (around 9/20/2018) for Routine follow up - 15 minute visit. Discussed use, benefit, and side effects of prescribed medications. All patient questions answered. Pt voiced understanding.  Reviewed health maintenance. Instructed to continue current medications, diet and exercise. Patient agreed with treatment plan. Follow up as directed.

## 2018-08-21 ENCOUNTER — HOSPITAL ENCOUNTER (OUTPATIENT)
Dept: NON INVASIVE DIAGNOSTICS | Age: 38
Discharge: HOME | End: 2018-08-21
Payer: MEDICAID

## 2018-08-21 VITALS
WEIGHT: 293 LBS | HEART RATE: 107 BPM | DIASTOLIC BLOOD PRESSURE: 79 MMHG | SYSTOLIC BLOOD PRESSURE: 130 MMHG | BODY MASS INDEX: 56.15 KG/M2

## 2018-08-21 DIAGNOSIS — R07.89 OTHER CHEST PAIN: ICD-10-CM

## 2018-08-21 DIAGNOSIS — I10 ESSENTIAL HYPERTENSION: ICD-10-CM

## 2018-08-21 LAB
LV EF: 63 %
LVEF MODALITY: NORMAL

## 2018-08-21 PROCEDURE — 93306 TTE W/DOPPLER COMPLETE: CPT

## 2018-08-21 PROCEDURE — 93350 STRESS TTE ONLY: CPT

## 2018-08-21 PROCEDURE — 93017 CV STRESS TEST TRACING ONLY: CPT

## 2018-08-21 PROCEDURE — 2580000003 HC RX 258: Performed by: NURSE PRACTITIONER

## 2018-08-21 PROCEDURE — 6360000002 HC RX W HCPCS: Performed by: NURSE PRACTITIONER

## 2018-08-21 RX ORDER — DOBUTAMINE HYDROCHLORIDE 200 MG/100ML
10 INJECTION INTRAVENOUS CONTINUOUS PRN
Status: ACTIVE | OUTPATIENT
Start: 2018-08-21 | End: 2018-08-22

## 2018-08-21 RX ORDER — SODIUM CHLORIDE 9 MG/ML
INJECTION, SOLUTION INTRAVENOUS
Status: COMPLETED | OUTPATIENT
Start: 2018-08-21 | End: 2018-08-21

## 2018-08-21 RX ORDER — SODIUM CHLORIDE 0.9 % (FLUSH) 0.9 %
10 SYRINGE (ML) INJECTION PRN
Status: ACTIVE | OUTPATIENT
Start: 2018-08-21 | End: 2018-08-22

## 2018-08-21 RX ADMIN — DOBUTAMINE HYDROCHLORIDE 10 MCG/KG/MIN: 200 INJECTION INTRAVENOUS at 10:35

## 2018-08-21 RX ADMIN — SODIUM CHLORIDE: 9 INJECTION, SOLUTION INTRAVENOUS at 10:35

## 2018-08-21 RX ADMIN — SODIUM CHLORIDE, PRESERVATIVE FREE 10 ML: 5 INJECTION INTRAVENOUS at 10:30

## 2018-08-21 RX ADMIN — SODIUM CHLORIDE, PRESERVATIVE FREE 10 ML: 5 INJECTION INTRAVENOUS at 10:00

## 2018-08-25 ASSESSMENT — ENCOUNTER SYMPTOMS
NAUSEA: 0
ANAL BLEEDING: 0
SHORTNESS OF BREATH: 0
DIARRHEA: 0
CONSTIPATION: 0
ABDOMINAL PAIN: 0
BACK PAIN: 1
CHEST TIGHTNESS: 0
COUGH: 0

## 2018-08-31 PROCEDURE — 93306 TTE W/DOPPLER COMPLETE: CPT | Performed by: INTERNAL MEDICINE

## 2018-09-25 ENCOUNTER — HOSPITAL ENCOUNTER (OUTPATIENT)
Dept: GENERAL RADIOLOGY | Age: 38
Discharge: HOME | End: 2018-09-25
Payer: MEDICAID

## 2018-09-25 ENCOUNTER — OFFICE VISIT (OUTPATIENT)
Dept: FAMILY MEDICINE CLINIC | Age: 38
End: 2018-09-25
Payer: MEDICAID

## 2018-09-25 VITALS
OXYGEN SATURATION: 95 % | SYSTOLIC BLOOD PRESSURE: 128 MMHG | WEIGHT: 293 LBS | TEMPERATURE: 97.5 F | HEIGHT: 62 IN | BODY MASS INDEX: 53.92 KG/M2 | DIASTOLIC BLOOD PRESSURE: 82 MMHG | HEART RATE: 81 BPM

## 2018-09-25 DIAGNOSIS — M25.551 PAIN OF BOTH HIP JOINTS: Primary | ICD-10-CM

## 2018-09-25 DIAGNOSIS — M25.561 ACUTE PAIN OF RIGHT KNEE: ICD-10-CM

## 2018-09-25 DIAGNOSIS — R20.0 BILATERAL HAND NUMBNESS: ICD-10-CM

## 2018-09-25 DIAGNOSIS — I10 ESSENTIAL (PRIMARY) HYPERTENSION: ICD-10-CM

## 2018-09-25 DIAGNOSIS — M79.7 FIBROMYALGIA: ICD-10-CM

## 2018-09-25 DIAGNOSIS — M25.552 PAIN OF BOTH HIP JOINTS: Primary | ICD-10-CM

## 2018-09-25 DIAGNOSIS — Z76.0 MEDICATION REFILL: ICD-10-CM

## 2018-09-25 DIAGNOSIS — E11.9 TYPE 2 DIABETES MELLITUS WITHOUT COMPLICATION, WITHOUT LONG-TERM CURRENT USE OF INSULIN (HCC): ICD-10-CM

## 2018-09-25 DIAGNOSIS — M06.9 RHEUMATOID ARTHRITIS INVOLVING MULTIPLE SITES, UNSPECIFIED RHEUMATOID FACTOR PRESENCE: ICD-10-CM

## 2018-09-25 DIAGNOSIS — E28.2 PCOS (POLYCYSTIC OVARIAN SYNDROME): ICD-10-CM

## 2018-09-25 PROCEDURE — 99214 OFFICE O/P EST MOD 30 MIN: CPT | Performed by: FAMILY MEDICINE

## 2018-09-25 PROCEDURE — G8427 DOCREV CUR MEDS BY ELIG CLIN: HCPCS | Performed by: FAMILY MEDICINE

## 2018-09-25 PROCEDURE — G8417 CALC BMI ABV UP PARAM F/U: HCPCS | Performed by: FAMILY MEDICINE

## 2018-09-25 PROCEDURE — 1036F TOBACCO NON-USER: CPT | Performed by: FAMILY MEDICINE

## 2018-09-25 PROCEDURE — 2022F DILAT RTA XM EVC RTNOPTHY: CPT | Performed by: FAMILY MEDICINE

## 2018-09-25 PROCEDURE — 73562 X-RAY EXAM OF KNEE 3: CPT

## 2018-09-25 PROCEDURE — 3044F HG A1C LEVEL LT 7.0%: CPT | Performed by: FAMILY MEDICINE

## 2018-09-25 RX ORDER — LEFLUNOMIDE 20 MG/1
TABLET ORAL
Qty: 30 TABLET | Refills: 3 | Status: SHIPPED | OUTPATIENT
Start: 2018-09-25 | End: 2020-02-25 | Stop reason: SDUPTHER

## 2018-09-25 RX ORDER — PREGABALIN 75 MG/1
75 CAPSULE ORAL 2 TIMES DAILY
Qty: 60 CAPSULE | Refills: 3 | Status: SHIPPED | OUTPATIENT
Start: 2018-09-25 | End: 2018-11-13 | Stop reason: CLARIF

## 2018-09-25 ASSESSMENT — ENCOUNTER SYMPTOMS
BACK PAIN: 1
CHEST TIGHTNESS: 0
ANAL BLEEDING: 0
NAUSEA: 0
CONSTIPATION: 0
COUGH: 0
ABDOMINAL PAIN: 0
SHORTNESS OF BREATH: 0
DIARRHEA: 0

## 2018-09-25 NOTE — PROGRESS NOTES
Topics    Smoking status: Never Smoker    Smokeless tobacco: Never Used    Alcohol use No      Current Outpatient Prescriptions   Medication Sig Dispense Refill    leflunomide (ARAVA) 20 MG tablet TAKE 1 TABLET BY MOUTH EVERY DAY 30 tablet 3    pregabalin (LYRICA) 75 MG capsule Take 1 capsule by mouth 2 times daily for 30 days. . 60 capsule 3    FREESTYLE LANCETS MISC FOR USE TWICE DAILY 200 each 2    metFORMIN (GLUCOPHAGE) 500 MG tablet Take 1 tablet by mouth 2 times daily (with meals) 60 tablet 3    Adalimumab (HUMIRA) 20 MG/0.4ML PSKT Inject into the skin      diclofenac (VOLTAREN) 75 MG EC tablet Take 1 tablet by mouth 2 times daily as needed for Pain 60 tablet 3    tiZANidine (ZANAFLEX) 4 MG tablet Take 1 tablet by mouth 2 times daily as needed (pain) . May cause drowsiness. 60 tablet 2    Elastic Bandages & Supports (WRIST SPLINT LEFT/RIGHT) MISC Bilateral wrist splints, use as directed. Dx G56.03 2 each 0    losartan (COZAAR) 50 MG tablet TAKE 1 TABLET BY MOUTH DAILY 30 tablet 5    aspirin (ASPIRIN CHILDRENS) 81 MG chewable tablet Take 1 tablet by mouth daily 30 tablet 0    glucose blood VI test strips (ASCENSIA AUTODISC VI;ONE TOUCH ULTRA TEST VI) strip 1 each by In Vitro route 2 times daily As needed. 200 each 3    furosemide (LASIX) 40 MG tablet Take 1 tablet by mouth daily 60 tablet 3    acetaZOLAMIDE (DIAMOX) 250 MG tablet TAKE 2 TABLETS BY MOUTH TWICE A DAY  10    hydroxychloroquine (PLAQUENIL) 200 MG tablet TAKE 2 TABLETS DAILY WITH FOOD  5     No current facility-administered medications for this visit.       Allergies   Allergen Reactions    Mushroom Extract Complex        Health Maintenance   Topic Date Due    Diabetic foot exam  08/17/1990    Diabetic retinal exam  08/17/1990    Lipid screen  08/17/1990    HIV screen  08/17/1995    Pneumococcal med risk (1 of 1 - PPSV23) 08/17/1999    Cervical cancer screen  08/17/2001    Flu vaccine (1) 10/01/2018 (Originally 9/1/2018)   [facility] Diabetic microalbuminuria test  01/08/2019    A1C test (Diabetic or Prediabetic)  08/20/2019    Potassium monitoring  08/20/2019    Creatinine monitoring  08/20/2019    DTaP/Tdap/Td vaccine (2 - Td) 04/25/2027       Subjective:      Review of Systems   Constitutional: Positive for fatigue. Negative for chills and fever. HENT: Negative for congestion. Respiratory: Negative for cough, chest tightness and shortness of breath. Cardiovascular: Negative for chest pain, palpitations and leg swelling. Gastrointestinal: Negative for abdominal pain, anal bleeding, constipation, diarrhea and nausea. Genitourinary: Negative for difficulty urinating. Musculoskeletal: Positive for arthralgias, back pain, joint swelling, myalgias, neck pain and neck stiffness. Negative for gait problem. Psychiatric/Behavioral: Negative. See HPI for visit specific review of symptoms. All others negative      Objective:   /82 (Site: Right Upper Arm)   Pulse 81   Temp 97.5 °F (36.4 °C) (Temporal)   Ht 5' 2\" (1.575 m)   Wt 298 lb (135.2 kg)   SpO2 95%   BMI 54.50 kg/m²   Physical Exam   Constitutional: She appears well-developed. She does not appear ill. Eyes: Pupils are equal, round, and reactive to light. Neck: Normal range of motion. Neck supple. Cardiovascular: Normal rate and regular rhythm. Exam reveals no friction rub. No murmur heard. Pulmonary/Chest: Effort normal and breath sounds normal. No respiratory distress. She has no wheezes. She has no rales. Abdominal: Soft. Bowel sounds are normal. She exhibits no distension. There is no tenderness. There is no rebound and no guarding. Musculoskeletal: She exhibits no edema. Neurological: She is alert. Psychiatric: She has a normal mood and affect.  Her behavior is normal.         No results found for this or any previous visit (from the past 672 hour(s)).         Summary   Normal study with no clinical, electrocardiographic, or

## 2018-09-26 ENCOUNTER — TELEPHONE (OUTPATIENT)
Dept: FAMILY MEDICINE CLINIC | Age: 38
End: 2018-09-26

## 2018-10-04 DIAGNOSIS — M79.7 FIBROMYALGIA: Primary | ICD-10-CM

## 2018-10-04 RX ORDER — GABAPENTIN 300 MG/1
300 CAPSULE ORAL 3 TIMES DAILY
Qty: 90 CAPSULE | Refills: 3 | Status: SHIPPED | OUTPATIENT
Start: 2018-10-04 | End: 2018-12-18

## 2018-10-04 NOTE — TELEPHONE ENCOUNTER
Spoke with Flo Washington and let her know about the medication change. She voices understanding and agrees to this plan.  [address]

## 2018-11-13 ENCOUNTER — OFFICE VISIT (OUTPATIENT)
Dept: FAMILY MEDICINE CLINIC | Age: 38
End: 2018-11-13
Payer: MEDICAID

## 2018-11-13 VITALS
OXYGEN SATURATION: 100 % | RESPIRATION RATE: 16 BRPM | BODY MASS INDEX: 56.15 KG/M2 | HEART RATE: 84 BPM | DIASTOLIC BLOOD PRESSURE: 78 MMHG | WEIGHT: 293 LBS | TEMPERATURE: 97.3 F | SYSTOLIC BLOOD PRESSURE: 130 MMHG

## 2018-11-13 DIAGNOSIS — E11.9 TYPE 2 DIABETES MELLITUS WITHOUT COMPLICATION, WITHOUT LONG-TERM CURRENT USE OF INSULIN (HCC): ICD-10-CM

## 2018-11-13 DIAGNOSIS — Z00.00 WELL ADULT EXAM: ICD-10-CM

## 2018-11-13 DIAGNOSIS — Z76.0 MEDICATION REFILL: ICD-10-CM

## 2018-11-13 DIAGNOSIS — R05.9 COUGH: ICD-10-CM

## 2018-11-13 DIAGNOSIS — M79.7 FIBROMYALGIA: Primary | ICD-10-CM

## 2018-11-13 DIAGNOSIS — I10 ESSENTIAL (PRIMARY) HYPERTENSION: ICD-10-CM

## 2018-11-13 DIAGNOSIS — J10.1 INFLUENZA A: ICD-10-CM

## 2018-11-13 DIAGNOSIS — E78.01 FAMILIAL HYPERCHOLESTEROLEMIA: ICD-10-CM

## 2018-11-13 LAB
INFLUENZA A ANTIBODY: ABNORMAL
INFLUENZA B ANTIBODY: ABNORMAL

## 2018-11-13 PROCEDURE — G8427 DOCREV CUR MEDS BY ELIG CLIN: HCPCS | Performed by: NURSE PRACTITIONER

## 2018-11-13 PROCEDURE — 3044F HG A1C LEVEL LT 7.0%: CPT | Performed by: NURSE PRACTITIONER

## 2018-11-13 PROCEDURE — 86580 TB INTRADERMAL TEST: CPT | Performed by: NURSE PRACTITIONER

## 2018-11-13 PROCEDURE — 87804 INFLUENZA ASSAY W/OPTIC: CPT | Performed by: NURSE PRACTITIONER

## 2018-11-13 PROCEDURE — 2022F DILAT RTA XM EVC RTNOPTHY: CPT | Performed by: NURSE PRACTITIONER

## 2018-11-13 PROCEDURE — G8484 FLU IMMUNIZE NO ADMIN: HCPCS | Performed by: NURSE PRACTITIONER

## 2018-11-13 PROCEDURE — 99395 PREV VISIT EST AGE 18-39: CPT | Performed by: NURSE PRACTITIONER

## 2018-11-13 PROCEDURE — 1036F TOBACCO NON-USER: CPT | Performed by: NURSE PRACTITIONER

## 2018-11-13 PROCEDURE — G8417 CALC BMI ABV UP PARAM F/U: HCPCS | Performed by: NURSE PRACTITIONER

## 2018-11-13 RX ORDER — OSELTAMIVIR PHOSPHATE 75 MG/1
75 CAPSULE ORAL 2 TIMES DAILY
Qty: 10 CAPSULE | Refills: 0 | Status: SHIPPED | OUTPATIENT
Start: 2018-11-13 | End: 2018-11-18

## 2018-11-13 RX ORDER — LEFLUNOMIDE 20 MG/1
TABLET ORAL
Qty: 30 TABLET | Refills: 3 | Status: CANCELLED | OUTPATIENT
Start: 2018-11-13

## 2018-11-13 RX ORDER — LOSARTAN POTASSIUM 50 MG/1
50 TABLET ORAL DAILY
Qty: 30 TABLET | Refills: 5 | Status: SHIPPED | OUTPATIENT
Start: 2018-11-13 | End: 2019-08-03 | Stop reason: SDUPTHER

## 2018-11-13 ASSESSMENT — ENCOUNTER SYMPTOMS
SORE THROAT: 1
ABDOMINAL PAIN: 0
SHORTNESS OF BREATH: 0
COUGH: 1
CHEST TIGHTNESS: 0
DIARRHEA: 0
NAUSEA: 0
WHEEZING: 0

## 2018-11-13 NOTE — PROGRESS NOTES
Alcohol use No       Family History   Problem Relation Age of Onset    High Blood Pressure Father     Diabetes Father     Atrial Fibrillation Father     Heart Failure Father     Stroke Father     Diabetes Sister     Diabetes Maternal Grandmother     Heart Attack Other        /78   Pulse 84   Temp 97.3 °F (36.3 °C) (Temporal)   Resp 16   Wt (!) 307 lb (139.3 kg)   SpO2 100%   BMI 56.15 kg/m²     Physical Exam   Constitutional: She appears well-developed and well-nourished. HENT:   Head: Normocephalic. Right Ear: External ear normal.   Left Ear: External ear normal.   Nose: Nose normal.   Mouth/Throat: Oropharynx is clear and moist. No oropharyngeal exudate. Eyes: Pupils are equal, round, and reactive to light. Conjunctivae and EOM are normal.   Neck: Normal range of motion. Neck supple. No JVD present. No tracheal deviation present. No thyromegaly present. Cardiovascular: Normal rate, regular rhythm, normal heart sounds and intact distal pulses. No murmur heard. Pulmonary/Chest: Effort normal and breath sounds normal. No respiratory distress. Abdominal: Soft. Bowel sounds are normal. She exhibits no distension and no mass. There is no tenderness. There is no rebound and no guarding. Musculoskeletal: Normal range of motion. She exhibits no edema. Lymphadenopathy:     She has no cervical adenopathy. Neurological:   Grossly intact   Skin: Skin is warm and dry. No rash noted. Psychiatric: She has a normal mood and affect. Vitals reviewed. Assessment:    ICD-10-CM    1. Fibromyalgia M79.7    2. Essential (primary) hypertension I10 losartan (COZAAR) 50 MG tablet     CBC Auto Differential   3. Medication refill Z76.0    4. Familial hypercholesterolemia E78.01 Comprehensive Metabolic Panel     Lipid Panel   5. Type 2 diabetes mellitus without complication, without long-term current use of insulin (HCC) E11.9 Hemoglobin A1C     Microalbumin / Creatinine Urine Ratio   6.  Cough options were discussed in detail. Patient was instructed to call the office if she questionsregarding her treatment. Should her conditions worsen, she should return to office to be reassessed by NATACHA Woods. she Should to go the closest Emergency Department for any emergency. They verbalizedunderstanding the above instructions. No Follow-up on file.

## 2018-11-14 DIAGNOSIS — M79.7 FIBROMYALGIA: Primary | ICD-10-CM

## 2018-11-15 DIAGNOSIS — E78.01 FAMILIAL HYPERCHOLESTEROLEMIA: ICD-10-CM

## 2018-11-15 DIAGNOSIS — I10 ESSENTIAL (PRIMARY) HYPERTENSION: ICD-10-CM

## 2018-11-15 DIAGNOSIS — E11.9 TYPE 2 DIABETES MELLITUS WITHOUT COMPLICATION, WITHOUT LONG-TERM CURRENT USE OF INSULIN (HCC): ICD-10-CM

## 2018-11-15 DIAGNOSIS — Z00.00 WELL ADULT EXAM: ICD-10-CM

## 2018-11-15 LAB
ALBUMIN SERPL-MCNC: 3.8 G/DL (ref 3.5–5.2)
ALP BLD-CCNC: 71 U/L (ref 35–104)
ALT SERPL-CCNC: 45 U/L (ref 5–33)
ANION GAP SERPL CALCULATED.3IONS-SCNC: 9 MMOL/L (ref 7–19)
AST SERPL-CCNC: 22 U/L (ref 5–32)
BASOPHILS ABSOLUTE: 0.1 K/UL (ref 0–0.2)
BASOPHILS RELATIVE PERCENT: 0.7 % (ref 0–1)
BILIRUB SERPL-MCNC: 0.4 MG/DL (ref 0.2–1.2)
BUN BLDV-MCNC: 6 MG/DL (ref 6–20)
CALCIUM SERPL-MCNC: 9.2 MG/DL (ref 8.6–10)
CHLORIDE BLD-SCNC: 101 MMOL/L (ref 98–111)
CHOLESTEROL, TOTAL: 147 MG/DL (ref 160–199)
CO2: 28 MMOL/L (ref 22–29)
CREAT SERPL-MCNC: 0.6 MG/DL (ref 0.5–0.9)
CREATININE URINE: 203.1 MG/DL (ref 4.2–622)
EOSINOPHILS ABSOLUTE: 0.4 K/UL (ref 0–0.6)
EOSINOPHILS RELATIVE PERCENT: 5.5 % (ref 0–5)
GFR NON-AFRICAN AMERICAN: >60
GLUCOSE BLD-MCNC: 118 MG/DL (ref 74–109)
HBA1C MFR BLD: 6.2 % (ref 4–6)
HCT VFR BLD CALC: 36.9 % (ref 37–47)
HDLC SERPL-MCNC: 48 MG/DL (ref 65–121)
HEMOGLOBIN: 11.1 G/DL (ref 12–16)
LDL CHOLESTEROL CALCULATED: 80 MG/DL
LYMPHOCYTES ABSOLUTE: 2.1 K/UL (ref 1.1–4.5)
LYMPHOCYTES RELATIVE PERCENT: 28.8 % (ref 20–40)
MCH RBC QN AUTO: 23.9 PG (ref 27–31)
MCHC RBC AUTO-ENTMCNC: 30.1 G/DL (ref 33–37)
MCV RBC AUTO: 79.4 FL (ref 81–99)
MICROALBUMIN UR-MCNC: <1.2 MG/DL (ref 0–19)
MICROALBUMIN/CREAT UR-RTO: NORMAL MG/G
MONOCYTES ABSOLUTE: 0.5 K/UL (ref 0–0.9)
MONOCYTES RELATIVE PERCENT: 6.6 % (ref 0–10)
NEUTROPHILS ABSOLUTE: 4.1 K/UL (ref 1.5–7.5)
NEUTROPHILS RELATIVE PERCENT: 58 % (ref 50–65)
PDW BLD-RTO: 15.8 % (ref 11.5–14.5)
PLATELET # BLD: 247 K/UL (ref 130–400)
PMV BLD AUTO: 10.9 FL (ref 9.4–12.3)
POTASSIUM SERPL-SCNC: 4.1 MMOL/L (ref 3.5–5)
RBC # BLD: 4.65 M/UL (ref 4.2–5.4)
SODIUM BLD-SCNC: 138 MMOL/L (ref 136–145)
TOTAL PROTEIN: 7.5 G/DL (ref 6.6–8.7)
TRIGL SERPL-MCNC: 96 MG/DL (ref 0–149)
TSH SERPL DL<=0.05 MIU/L-ACNC: 1.75 UIU/ML (ref 0.27–4.2)
WBC # BLD: 7.1 K/UL (ref 4.8–10.8)

## 2018-11-16 DIAGNOSIS — I10 ESSENTIAL HYPERTENSION: ICD-10-CM

## 2018-11-16 DIAGNOSIS — E11.9 TYPE 2 DIABETES MELLITUS WITHOUT COMPLICATION, WITHOUT LONG-TERM CURRENT USE OF INSULIN (HCC): ICD-10-CM

## 2018-11-16 DIAGNOSIS — E78.01 FAMILIAL HYPERCHOLESTEROLEMIA: Primary | ICD-10-CM

## 2018-12-18 ENCOUNTER — APPOINTMENT (OUTPATIENT)
Dept: GENERAL RADIOLOGY | Age: 38
End: 2018-12-18
Payer: MEDICAID

## 2018-12-18 ENCOUNTER — HOSPITAL ENCOUNTER (EMERGENCY)
Age: 38
Discharge: HOME | End: 2018-12-18
Payer: MEDICAID

## 2018-12-18 VITALS
OXYGEN SATURATION: 97 % | TEMPERATURE: 98.3 F | DIASTOLIC BLOOD PRESSURE: 90 MMHG | BODY MASS INDEX: 53.37 KG/M2 | RESPIRATION RATE: 17 BRPM | WEIGHT: 290 LBS | HEART RATE: 89 BPM | SYSTOLIC BLOOD PRESSURE: 160 MMHG | HEIGHT: 62 IN

## 2018-12-18 DIAGNOSIS — J10.1 INFLUENZA B: ICD-10-CM

## 2018-12-18 DIAGNOSIS — J40 BRONCHITIS: Primary | ICD-10-CM

## 2018-12-18 LAB
ALBUMIN SERPL-MCNC: 3.9 G/DL (ref 3.5–5.2)
ALP BLD-CCNC: 75 U/L (ref 35–104)
ALT SERPL-CCNC: 24 U/L (ref 5–33)
ANION GAP SERPL CALCULATED.3IONS-SCNC: 7 MMOL/L (ref 7–19)
AST SERPL-CCNC: 16 U/L (ref 5–32)
BASOPHILS ABSOLUTE: 0.1 K/UL (ref 0–0.2)
BASOPHILS RELATIVE PERCENT: 0.8 % (ref 0–1)
BILIRUB SERPL-MCNC: <0.2 MG/DL (ref 0.2–1.2)
BUN BLDV-MCNC: 9 MG/DL (ref 6–20)
CALCIUM SERPL-MCNC: 9 MG/DL (ref 8.6–10)
CHLORIDE BLD-SCNC: 100 MMOL/L (ref 98–111)
CO2: 31 MMOL/L (ref 22–29)
CREAT SERPL-MCNC: 0.6 MG/DL (ref 0.5–0.9)
EKG P AXIS: 58 DEGREES
EKG P-R INTERVAL: 118 MS
EKG Q-T INTERVAL: 386 MS
EKG QRS DURATION: 80 MS
EKG QTC CALCULATION (BAZETT): 440 MS
EKG T AXIS: 13 DEGREES
EOSINOPHILS ABSOLUTE: 0.3 K/UL (ref 0–0.6)
EOSINOPHILS RELATIVE PERCENT: 3.4 % (ref 0–5)
GFR NON-AFRICAN AMERICAN: >60
GLUCOSE BLD-MCNC: 168 MG/DL (ref 74–109)
HCT VFR BLD CALC: 37.3 % (ref 37–47)
HEMOGLOBIN: 11.1 G/DL (ref 12–16)
LYMPHOCYTES ABSOLUTE: 2.6 K/UL (ref 1.1–4.5)
LYMPHOCYTES RELATIVE PERCENT: 33.8 % (ref 20–40)
MCH RBC QN AUTO: 23.5 PG (ref 27–31)
MCHC RBC AUTO-ENTMCNC: 29.8 G/DL (ref 33–37)
MCV RBC AUTO: 79 FL (ref 81–99)
MONOCYTES ABSOLUTE: 0.4 K/UL (ref 0–0.9)
MONOCYTES RELATIVE PERCENT: 4.9 % (ref 0–10)
NEUTROPHILS ABSOLUTE: 4.4 K/UL (ref 1.5–7.5)
NEUTROPHILS RELATIVE PERCENT: 56.7 % (ref 50–65)
PDW BLD-RTO: 15.7 % (ref 11.5–14.5)
PLATELET # BLD: 240 K/UL (ref 130–400)
PMV BLD AUTO: 10.5 FL (ref 9.4–12.3)
POTASSIUM REFLEX MAGNESIUM: 4 MMOL/L (ref 3.5–5)
PRO-BNP: 63 PG/ML (ref 0–450)
RAPID INFLUENZA  B AGN: POSITIVE
RAPID INFLUENZA A AGN: NEGATIVE
RBC # BLD: 4.72 M/UL (ref 4.2–5.4)
SODIUM BLD-SCNC: 138 MMOL/L (ref 136–145)
TOTAL PROTEIN: 7.3 G/DL (ref 6.6–8.7)
TROPONIN: <0.01 NG/ML (ref 0–0.03)
TSH SERPL DL<=0.05 MIU/L-ACNC: 2.46 UIU/ML (ref 0.27–4.2)
WBC # BLD: 7.7 K/UL (ref 4.8–10.8)

## 2018-12-18 PROCEDURE — 85025 COMPLETE CBC W/AUTO DIFF WBC: CPT

## 2018-12-18 PROCEDURE — 99285 EMERGENCY DEPT VISIT HI MDM: CPT

## 2018-12-18 PROCEDURE — 87804 INFLUENZA ASSAY W/OPTIC: CPT

## 2018-12-18 PROCEDURE — 71046 X-RAY EXAM CHEST 2 VIEWS: CPT

## 2018-12-18 PROCEDURE — 83880 ASSAY OF NATRIURETIC PEPTIDE: CPT

## 2018-12-18 PROCEDURE — 80053 COMPREHEN METABOLIC PANEL: CPT

## 2018-12-18 PROCEDURE — 2580000003 HC RX 258: Performed by: NURSE PRACTITIONER

## 2018-12-18 PROCEDURE — 99283 EMERGENCY DEPT VISIT LOW MDM: CPT | Performed by: NURSE PRACTITIONER

## 2018-12-18 PROCEDURE — 84443 ASSAY THYROID STIM HORMONE: CPT

## 2018-12-18 PROCEDURE — 36415 COLL VENOUS BLD VENIPUNCTURE: CPT

## 2018-12-18 PROCEDURE — 84484 ASSAY OF TROPONIN QUANT: CPT

## 2018-12-18 PROCEDURE — 6360000002 HC RX W HCPCS: Performed by: NURSE PRACTITIONER

## 2018-12-18 PROCEDURE — 96374 THER/PROPH/DIAG INJ IV PUSH: CPT

## 2018-12-18 PROCEDURE — 93005 ELECTROCARDIOGRAM TRACING: CPT

## 2018-12-18 RX ORDER — 0.9 % SODIUM CHLORIDE 0.9 %
1000 INTRAVENOUS SOLUTION INTRAVENOUS ONCE
Status: COMPLETED | OUTPATIENT
Start: 2018-12-18 | End: 2018-12-18

## 2018-12-18 RX ORDER — OSELTAMIVIR PHOSPHATE 75 MG/1
75 CAPSULE ORAL 2 TIMES DAILY
Qty: 10 CAPSULE | Refills: 0 | Status: SHIPPED | OUTPATIENT
Start: 2018-12-18 | End: 2018-12-23

## 2018-12-18 RX ORDER — KETOROLAC TROMETHAMINE 30 MG/ML
60 INJECTION, SOLUTION INTRAMUSCULAR; INTRAVENOUS ONCE
Status: DISCONTINUED | OUTPATIENT
Start: 2018-12-18 | End: 2018-12-18

## 2018-12-18 RX ORDER — FLUTICASONE PROPIONATE 50 MCG
2 SPRAY, SUSPENSION (ML) NASAL DAILY
Qty: 1 BOTTLE | Refills: 0 | Status: SHIPPED | OUTPATIENT
Start: 2018-12-18 | End: 2019-06-26 | Stop reason: ALTCHOICE

## 2018-12-18 RX ORDER — CETIRIZINE HYDROCHLORIDE 10 MG/1
10 TABLET ORAL DAILY
Qty: 30 TABLET | Refills: 0 | Status: SHIPPED | OUTPATIENT
Start: 2018-12-18 | End: 2019-06-26 | Stop reason: ALTCHOICE

## 2018-12-18 RX ORDER — KETOROLAC TROMETHAMINE 30 MG/ML
30 INJECTION, SOLUTION INTRAMUSCULAR; INTRAVENOUS ONCE
Status: COMPLETED | OUTPATIENT
Start: 2018-12-18 | End: 2018-12-18

## 2018-12-18 RX ORDER — GUAIFENESIN 600 MG/1
600 TABLET, EXTENDED RELEASE ORAL 2 TIMES DAILY
Qty: 30 TABLET | Refills: 0 | Status: SHIPPED | OUTPATIENT
Start: 2018-12-18 | End: 2019-01-02

## 2018-12-18 RX ORDER — PROMETHAZINE HYDROCHLORIDE 25 MG/1
25 TABLET ORAL EVERY 6 HOURS PRN
Qty: 15 TABLET | Refills: 0 | Status: SHIPPED | OUTPATIENT
Start: 2018-12-18 | End: 2018-12-25

## 2018-12-18 RX ORDER — BENZONATATE 100 MG/1
100-200 CAPSULE ORAL 3 TIMES DAILY PRN
Qty: 20 CAPSULE | Refills: 0 | Status: SHIPPED | OUTPATIENT
Start: 2018-12-18 | End: 2018-12-25

## 2018-12-18 RX ADMIN — SODIUM CHLORIDE 1000 ML: 9 INJECTION, SOLUTION INTRAVENOUS at 02:28

## 2018-12-18 RX ADMIN — KETOROLAC TROMETHAMINE 30 MG: 30 INJECTION, SOLUTION INTRAMUSCULAR at 01:42

## 2018-12-18 ASSESSMENT — PAIN DESCRIPTION - FREQUENCY: FREQUENCY: CONTINUOUS

## 2018-12-18 ASSESSMENT — PAIN DESCRIPTION - ONSET: ONSET: ON-GOING

## 2018-12-18 ASSESSMENT — ENCOUNTER SYMPTOMS
BACK PAIN: 0
ABDOMINAL PAIN: 0
COUGH: 1
WHEEZING: 0
SORE THROAT: 0
NAUSEA: 0
EYE DISCHARGE: 0
DIARRHEA: 0
VOMITING: 0
SHORTNESS OF BREATH: 1

## 2018-12-18 ASSESSMENT — PAIN SCALES - GENERAL
PAINLEVEL_OUTOF10: 3
PAINLEVEL_OUTOF10: 7
PAINLEVEL_OUTOF10: 7

## 2018-12-18 ASSESSMENT — PAIN DESCRIPTION - DESCRIPTORS: DESCRIPTORS: STABBING

## 2018-12-18 ASSESSMENT — PAIN DESCRIPTION - PAIN TYPE: TYPE: ACUTE PAIN

## 2018-12-18 ASSESSMENT — PAIN DESCRIPTION - LOCATION: LOCATION: GENERALIZED

## 2018-12-18 NOTE — ED PROVIDER NOTES
SOCIAL HISTORY       Social History     Social History    Marital status: Single     Spouse name: N/A    Number of children: N/A    Years of education: N/A     Social History Main Topics    Smoking status: Never Smoker    Smokeless tobacco: Never Used    Alcohol use No    Drug use: No    Sexual activity: Not Asked     Other Topics Concern    None     Social History Narrative    None       SCREENINGS    Freedom Coma Scale  Eye Opening: Spontaneous  Best Verbal Response: Oriented  Best Motor Response: Obeys commands  Kareem Coma Scale Score: 15      PHYSICAL EXAM    (up to 7 forlevel 4, 8 or more for level 5)     ED Triage Vitals [12/18/18 0019]   BP Temp Temp Source Pulse Resp SpO2 Height Weight   (!) 176/102 98.3 °F (36.8 °C) Oral 94 18 98 % 5' 2\" (1.575 m) 290 lb (131.5 kg)       Physical Exam   Constitutional: She is oriented to person, place, and time. She appears well-developed and well-nourished. No distress. HENT:   Head: Normocephalic. Mouth/Throat: No oropharyngeal exudate. Eyes: Pupils are equal, round, and reactive to light. EOM are normal. Right eye exhibits no discharge. Left eye exhibits no discharge. No scleral icterus. Neck: Normal range of motion. No tracheal deviation present. Cardiovascular: Normal rate, regular rhythm and normal heart sounds. No murmur heard. Pulmonary/Chest: Effort normal and breath sounds normal. No stridor. No respiratory distress. She has no wheezes. Abdominal: Soft. Bowel sounds are normal. She exhibits no distension. There is no tenderness. Musculoskeletal: Normal range of motion. Lymphadenopathy:     She has no cervical adenopathy. Neurological: She is alert and oriented to person, place, and time. Skin: Skin is warm and dry. Capillary refill takes less than 2 seconds. No rash noted. She is not diaphoretic. No erythema. No pallor. Psychiatric: She has a normal mood and affect.  Her behavior is normal. Judgment and thought content normal.   Nursing note and vitals reviewed. DIAGNOSTIC RESULTS     RADIOLOGY:   Non-plain film images such as CT, Ultrasound and MRI are read by the radiologist. Plain radiographic images are visualized and preliminarilyinterpreted by No att. providers found with the below findings:        Interpretation per the Radiologist below, if available at the time of this note:    XR CHEST STANDARD (2 VW)   Final Result   No active cardiopulmonary disease. The above finding are recorded on a digital voice clip in PACS. Signed by Dr Guero [place] on 12/18/2018 7:08 AM          LABS:  Labs Reviewed   RAPID INFLUENZA A/B ANTIGENS - Abnormal; Notable for the following:        Result Value    Rapid Influenza B Ag POSITIVE (*)     All other components within normal limits    Narrative:     CALL  [facility] tel. ,  Microbiology results called to and read back by Tristin Tovar RN/ER,  12/18/2018 01:58, by TEVIN   CBC WITH AUTO DIFFERENTIAL - Abnormal; Notable for the following:     Hemoglobin 11.1 (*)     MCV 79.0 (*)     MCH 23.5 (*)     MCHC 29.8 (*)     RDW 15.7 (*)     All other components within normal limits   COMPREHENSIVE METABOLIC PANEL W/ REFLEX TO MG FOR LOW K - Abnormal; Notable for the following:     CO2 31 (*)     Glucose 168 (*)     All other components within normal limits   TSH WITHOUT REFLEX   BRAIN NATRIURETIC PEPTIDE   TROPONIN     EKG shows a normal sinus rhythm heart rate of 92 there is some left atrial enlargement however no ST elevation or depression there is some borderline T-wave abnormalities noted throughout. All other labs were within normal range or notreturned as of this dictation.     RE-ASSESSMENT        EMERGENCY DEPARTMENT COURSE and DIFFERENTIAL DIAGNOSIS/MDM:   Vitals:    Vitals:    12/18/18 0019 12/18/18 0345   BP: (!) 176/102 (!) 160/90   Pulse: 94 89   Resp: 18 17   Temp: 98.3 °F (36.8 °C)    TempSrc: Oral    SpO2: 98% 97%   Weight: 290 lb (131.5 kg)    Height: 5' 2\" (1.575 m) worsen      DISCHARGE MEDICATIONS:  Discharge Medication List as of 12/18/2018  2:50 AM      START taking these medications    Details   guaiFENesin (MUCINEX) 600 MG extended release tablet Take 1 tablet by mouth 2 times daily for 15 days, Disp-30 tablet, R-0Print      benzonatate (TESSALON) 100 MG capsule Take 1-2 capsules by mouth 3 times daily as needed for Cough, Disp-20 capsule, R-0Print      cetirizine (ZYRTEC ALLERGY) 10 MG tablet Take 1 tablet by mouth daily, Disp-30 tablet, R-0Print      fluticasone (FLONASE) 50 MCG/ACT nasal spray 2 sprays by Nasal route daily, Disp-1 Bottle, R-0Print             (Please note that portions of this note were completed with a voice recognition program.  Efforts were made to edit the dictations but occasionallywords are mis-transcribed.)    NATACHA Goyal APRN  12/18/18 1823

## 2019-01-13 DIAGNOSIS — E11.9 TYPE 2 DIABETES MELLITUS WITHOUT COMPLICATION, WITHOUT LONG-TERM CURRENT USE OF INSULIN (HCC): ICD-10-CM

## 2019-02-15 ENCOUNTER — OFFICE VISIT (OUTPATIENT)
Dept: FAMILY MEDICINE CLINIC | Age: 39
End: 2019-02-15
Payer: MEDICAID

## 2019-02-15 VITALS — BODY MASS INDEX: 56.7 KG/M2 | WEIGHT: 293 LBS

## 2019-02-15 DIAGNOSIS — M06.9 RHEUMATOID ARTHRITIS INVOLVING MULTIPLE SITES, UNSPECIFIED RHEUMATOID FACTOR PRESENCE: ICD-10-CM

## 2019-02-15 DIAGNOSIS — E78.01 FAMILIAL HYPERCHOLESTEROLEMIA: ICD-10-CM

## 2019-02-15 DIAGNOSIS — I10 ESSENTIAL HYPERTENSION: ICD-10-CM

## 2019-02-15 DIAGNOSIS — E28.2 PCOS (POLYCYSTIC OVARIAN SYNDROME): ICD-10-CM

## 2019-02-15 DIAGNOSIS — M79.7 FIBROMYALGIA: ICD-10-CM

## 2019-02-15 DIAGNOSIS — E11.9 TYPE 2 DIABETES MELLITUS WITHOUT COMPLICATION, WITHOUT LONG-TERM CURRENT USE OF INSULIN (HCC): ICD-10-CM

## 2019-02-15 DIAGNOSIS — F41.8 DEPRESSION WITH ANXIETY: Primary | ICD-10-CM

## 2019-02-15 LAB
ALBUMIN SERPL-MCNC: 3.8 G/DL (ref 3.5–5.2)
ALP BLD-CCNC: 65 U/L (ref 35–104)
ALT SERPL-CCNC: 8 U/L (ref 5–33)
ANION GAP SERPL CALCULATED.3IONS-SCNC: 14 MMOL/L (ref 7–19)
AST SERPL-CCNC: 8 U/L (ref 5–32)
BASOPHILS ABSOLUTE: 0.1 K/UL (ref 0–0.2)
BASOPHILS RELATIVE PERCENT: 0.8 % (ref 0–1)
BILIRUB SERPL-MCNC: <0.2 MG/DL (ref 0.2–1.2)
BUN BLDV-MCNC: 10 MG/DL (ref 6–20)
CALCIUM SERPL-MCNC: 9.1 MG/DL (ref 8.6–10)
CHLORIDE BLD-SCNC: 100 MMOL/L (ref 98–111)
CHOLESTEROL, TOTAL: 130 MG/DL (ref 160–199)
CO2: 25 MMOL/L (ref 22–29)
CREAT SERPL-MCNC: 0.6 MG/DL (ref 0.5–0.9)
CREATININE URINE: 192.9 MG/DL (ref 4.2–622)
EOSINOPHILS ABSOLUTE: 0.3 K/UL (ref 0–0.6)
EOSINOPHILS RELATIVE PERCENT: 3.9 % (ref 0–5)
GFR NON-AFRICAN AMERICAN: >60
GLUCOSE BLD-MCNC: 112 MG/DL (ref 74–109)
HBA1C MFR BLD: 6.5 % (ref 4–6)
HCT VFR BLD CALC: 35.6 % (ref 37–47)
HDLC SERPL-MCNC: 45 MG/DL (ref 65–121)
HEMOGLOBIN: 10.2 G/DL (ref 12–16)
LDL CHOLESTEROL CALCULATED: 71 MG/DL
LYMPHOCYTES ABSOLUTE: 2.9 K/UL (ref 1.1–4.5)
LYMPHOCYTES RELATIVE PERCENT: 36.3 % (ref 20–40)
MCH RBC QN AUTO: 23.1 PG (ref 27–31)
MCHC RBC AUTO-ENTMCNC: 28.7 G/DL (ref 33–37)
MCV RBC AUTO: 80.5 FL (ref 81–99)
MICROALBUMIN UR-MCNC: <1.2 MG/DL (ref 0–19)
MICROALBUMIN/CREAT UR-RTO: NORMAL MG/G
MONOCYTES ABSOLUTE: 0.6 K/UL (ref 0–0.9)
MONOCYTES RELATIVE PERCENT: 7 % (ref 0–10)
NEUTROPHILS ABSOLUTE: 4.1 K/UL (ref 1.5–7.5)
NEUTROPHILS RELATIVE PERCENT: 51.6 % (ref 50–65)
PDW BLD-RTO: 17.2 % (ref 11.5–14.5)
PLATELET # BLD: 239 K/UL (ref 130–400)
PMV BLD AUTO: 10.9 FL (ref 9.4–12.3)
POTASSIUM SERPL-SCNC: 4.4 MMOL/L (ref 3.5–5)
RBC # BLD: 4.42 M/UL (ref 4.2–5.4)
SODIUM BLD-SCNC: 139 MMOL/L (ref 136–145)
TOTAL PROTEIN: 7.2 G/DL (ref 6.6–8.7)
TRIGL SERPL-MCNC: 70 MG/DL (ref 0–149)
WBC # BLD: 8 K/UL (ref 4.8–10.8)

## 2019-02-15 PROCEDURE — 99214 OFFICE O/P EST MOD 30 MIN: CPT | Performed by: FAMILY MEDICINE

## 2019-02-15 PROCEDURE — G8417 CALC BMI ABV UP PARAM F/U: HCPCS | Performed by: FAMILY MEDICINE

## 2019-02-15 PROCEDURE — 2022F DILAT RTA XM EVC RTNOPTHY: CPT | Performed by: FAMILY MEDICINE

## 2019-02-15 PROCEDURE — 1036F TOBACCO NON-USER: CPT | Performed by: FAMILY MEDICINE

## 2019-02-15 PROCEDURE — G8427 DOCREV CUR MEDS BY ELIG CLIN: HCPCS | Performed by: FAMILY MEDICINE

## 2019-02-15 PROCEDURE — G8484 FLU IMMUNIZE NO ADMIN: HCPCS | Performed by: FAMILY MEDICINE

## 2019-02-15 PROCEDURE — 3044F HG A1C LEVEL LT 7.0%: CPT | Performed by: FAMILY MEDICINE

## 2019-02-15 RX ORDER — BUPROPION HYDROCHLORIDE 150 MG/1
150 TABLET ORAL EVERY MORNING
Qty: 30 TABLET | Refills: 3 | Status: SHIPPED | OUTPATIENT
Start: 2019-02-15 | End: 2019-03-05 | Stop reason: ALTCHOICE

## 2019-02-15 ASSESSMENT — PATIENT HEALTH QUESTIONNAIRE - PHQ9
8. MOVING OR SPEAKING SO SLOWLY THAT OTHER PEOPLE COULD HAVE NOTICED. OR THE OPPOSITE, BEING SO FIGETY OR RESTLESS THAT YOU HAVE BEEN MOVING AROUND A LOT MORE THAN USUAL: 0
10. IF YOU CHECKED OFF ANY PROBLEMS, HOW DIFFICULT HAVE THESE PROBLEMS MADE IT FOR YOU TO DO YOUR WORK, TAKE CARE OF THINGS AT HOME, OR GET ALONG WITH OTHER PEOPLE: 2
SUM OF ALL RESPONSES TO PHQ QUESTIONS 1-9: 21
SUM OF ALL RESPONSES TO PHQ QUESTIONS 1-9: 21
7. TROUBLE CONCENTRATING ON THINGS, SUCH AS READING THE NEWSPAPER OR WATCHING TELEVISION: 3
6. FEELING BAD ABOUT YOURSELF - OR THAT YOU ARE A FAILURE OR HAVE LET YOURSELF OR YOUR FAMILY DOWN: 3
5. POOR APPETITE OR OVEREATING: 3
9. THOUGHTS THAT YOU WOULD BE BETTER OFF DEAD, OR OF HURTING YOURSELF: 0
SUM OF ALL RESPONSES TO PHQ9 QUESTIONS 1 & 2: 6
2. FEELING DOWN, DEPRESSED OR HOPELESS: 3
3. TROUBLE FALLING OR STAYING ASLEEP: 3
4. FEELING TIRED OR HAVING LITTLE ENERGY: 3
1. LITTLE INTEREST OR PLEASURE IN DOING THINGS: 3

## 2019-03-05 ENCOUNTER — OFFICE VISIT (OUTPATIENT)
Dept: FAMILY MEDICINE CLINIC | Age: 39
End: 2019-03-05
Payer: MEDICAID

## 2019-03-05 ENCOUNTER — HOSPITAL ENCOUNTER (OUTPATIENT)
Dept: VASCULAR LAB | Age: 39
Discharge: HOME | End: 2019-03-05
Payer: MEDICAID

## 2019-03-05 VITALS
OXYGEN SATURATION: 100 % | BODY MASS INDEX: 55.79 KG/M2 | DIASTOLIC BLOOD PRESSURE: 84 MMHG | SYSTOLIC BLOOD PRESSURE: 134 MMHG | TEMPERATURE: 98.5 F | WEIGHT: 293 LBS | HEART RATE: 79 BPM

## 2019-03-05 DIAGNOSIS — M79.605 LEFT LEG PAIN: ICD-10-CM

## 2019-03-05 DIAGNOSIS — M25.562 CHRONIC PAIN OF LEFT KNEE: Primary | ICD-10-CM

## 2019-03-05 DIAGNOSIS — F41.8 DEPRESSION WITH ANXIETY: ICD-10-CM

## 2019-03-05 DIAGNOSIS — G89.29 CHRONIC PAIN OF LEFT KNEE: Primary | ICD-10-CM

## 2019-03-05 PROCEDURE — G8427 DOCREV CUR MEDS BY ELIG CLIN: HCPCS | Performed by: FAMILY MEDICINE

## 2019-03-05 PROCEDURE — G8417 CALC BMI ABV UP PARAM F/U: HCPCS | Performed by: FAMILY MEDICINE

## 2019-03-05 PROCEDURE — 99213 OFFICE O/P EST LOW 20 MIN: CPT | Performed by: FAMILY MEDICINE

## 2019-03-05 PROCEDURE — G8484 FLU IMMUNIZE NO ADMIN: HCPCS | Performed by: FAMILY MEDICINE

## 2019-03-05 PROCEDURE — 93971 EXTREMITY STUDY: CPT

## 2019-03-05 PROCEDURE — 1036F TOBACCO NON-USER: CPT | Performed by: FAMILY MEDICINE

## 2019-04-08 ENCOUNTER — OFFICE VISIT (OUTPATIENT)
Dept: FAMILY MEDICINE CLINIC | Age: 39
End: 2019-04-08
Payer: MEDICAID

## 2019-04-08 VITALS
HEART RATE: 83 BPM | SYSTOLIC BLOOD PRESSURE: 132 MMHG | WEIGHT: 293 LBS | DIASTOLIC BLOOD PRESSURE: 84 MMHG | OXYGEN SATURATION: 99 % | BODY MASS INDEX: 55.05 KG/M2 | TEMPERATURE: 97.3 F

## 2019-04-08 DIAGNOSIS — E11.9 TYPE 2 DIABETES MELLITUS WITHOUT COMPLICATION, WITHOUT LONG-TERM CURRENT USE OF INSULIN (HCC): ICD-10-CM

## 2019-04-08 DIAGNOSIS — M54.42 MIDLINE LOW BACK PAIN WITH LEFT-SIDED SCIATICA, UNSPECIFIED CHRONICITY: ICD-10-CM

## 2019-04-08 DIAGNOSIS — F41.8 DEPRESSION WITH ANXIETY: ICD-10-CM

## 2019-04-08 DIAGNOSIS — M76.32 ILIOTIBIAL BAND SYNDROME OF LEFT SIDE: ICD-10-CM

## 2019-04-08 DIAGNOSIS — R20.0 LEFT LEG NUMBNESS: Primary | ICD-10-CM

## 2019-04-08 DIAGNOSIS — I10 ESSENTIAL HYPERTENSION: ICD-10-CM

## 2019-04-08 PROCEDURE — 2022F DILAT RTA XM EVC RTNOPTHY: CPT | Performed by: FAMILY MEDICINE

## 2019-04-08 PROCEDURE — G8417 CALC BMI ABV UP PARAM F/U: HCPCS | Performed by: FAMILY MEDICINE

## 2019-04-08 PROCEDURE — 1036F TOBACCO NON-USER: CPT | Performed by: FAMILY MEDICINE

## 2019-04-08 PROCEDURE — 99214 OFFICE O/P EST MOD 30 MIN: CPT | Performed by: FAMILY MEDICINE

## 2019-04-08 PROCEDURE — 3044F HG A1C LEVEL LT 7.0%: CPT | Performed by: FAMILY MEDICINE

## 2019-04-08 PROCEDURE — G8427 DOCREV CUR MEDS BY ELIG CLIN: HCPCS | Performed by: FAMILY MEDICINE

## 2019-04-08 RX ORDER — SULFASALAZINE 500 MG/1
2 TABLET ORAL DAILY
Refills: 3 | COMMUNITY
Start: 2019-01-18 | End: 2020-02-25 | Stop reason: SDUPTHER

## 2019-04-08 RX ORDER — CARBAMAZEPINE 100 MG/1
100 CAPSULE, EXTENDED RELEASE ORAL 2 TIMES DAILY
Qty: 60 CAPSULE | Refills: 5 | Status: SHIPPED | OUTPATIENT
Start: 2019-04-08 | End: 2019-06-26 | Stop reason: ALTCHOICE

## 2019-04-08 NOTE — PROGRESS NOTES
[facility] [address] [place] [address] Way [address]  Dept: [phone]  Dept Fax: [phone]  Loc: [phone]    Jennifer Abel is a 45 y.o. female who presents today for her medical conditions/complaints as noted below. Jennifer Abel is here for 1 Month Follow-Up        HPI:   CC: Here today to discuss the following:    She is here for follow-up after initiating Contrave for weight loss. She's lost about 4 pounds since her last visit. No adverse effects of the medication. She wishes to continue with it. Unfortunately, she continues to have left leg pain. Primarily it's associated with left hip pain radiating down laterally to her left knee. Pain worse with abduction of the left hip. She also has pain with bending of the left knee. No swelling or redness. No recent falls. Pain described as a dull ache at times. She also has a history of chronic lower back pain. Diabetes Mellitus  Has been compliant with medications. No side effects of medications since last visit. No polyuria, polydipsia, or vision changes since last visit. No symptomatic episodes of hypoglycemia. Hypertension  Compliant with medications. No adverse effects from medication. No lightheadedness, palpitations, or chest pain. Depression with Anxiety  Overall she's feeling well. She feels the Wellbutrin component of her Barbarann Yas is helping her mood.         HPI    Past Medical History:   Diagnosis Date    Anemia     Chronic rheumatic arthritis ([address]     Elevated blood pressure reading     Fibromyalgia     Obesity     Type 2 diabetes mellitus (HCC)       Past Surgical History:   Procedure Laterality Date    CHOLECYSTECTOMY         Family History   Problem Relation Age of Onset    High Blood Pressure Father     Diabetes Father     Atrial Fibrillation Father     Heart Failure Father     Stroke Father     Diabetes Sister     Diabetes Maternal Grandmother     Heart Attack Other        Social History     Tobacco Use    Smoking status: Never Smoker    Smokeless tobacco: Never Used   Substance Use Topics    Alcohol use: No     Current Outpatient Medications   Medication Sig Dispense Refill    sulfaSALAzine (AZULFIDINE) 500 MG tablet Take 2 tablets by mouth daily  3    carBAMazepine (CARBATROL) 100 MG extended release capsule Take 1 capsule by mouth 2 times daily 60 capsule 5    naltrexone-bupropion (CONTRAVE) 8-90 MG per extended release tablet Take 2 tablets by mouth 2 times daily 120 tablet 2    metFORMIN (GLUCOPHAGE) 500 MG tablet TAKE 1 TABLET BY MOUTH TWICE A DAY WITH MEALS 60 tablet 3    losartan (COZAAR) 50 MG tablet Take 1 tablet by mouth daily 30 tablet 5    leflunomide (ARAVA) 20 MG tablet TAKE 1 TABLET BY MOUTH EVERY DAY 30 tablet 3    FREESTYLE LANCETS MISC FOR USE TWICE DAILY 200 each 2    Adalimumab (HUMIRA) 20 MG/0.4ML PSKT Inject into the skin      tiZANidine (ZANAFLEX) 4 MG tablet Take 1 tablet by mouth 2 times daily as needed (pain) . May cause drowsiness. 60 tablet 2    Elastic Bandages & Supports (WRIST SPLINT LEFT/RIGHT) MISC Bilateral wrist splints, use as directed. Dx G56.03 2 each 0    glucose blood VI test strips (ASCENSIA AUTODISC VI;ONE TOUCH ULTRA TEST VI) strip 1 each by In Vitro route 2 times daily As needed. 200 each 3    furosemide (LASIX) 40 MG tablet Take 1 tablet by mouth daily 60 tablet 3    cetirizine (ZYRTEC ALLERGY) 10 MG tablet Take 1 tablet by mouth daily 30 tablet 0    fluticasone (FLONASE) 50 MCG/ACT nasal spray 2 sprays by Nasal route daily 1 Bottle 0    hydroxychloroquine (PLAQUENIL) 200 MG tablet TAKE 2 TABLETS DAILY WITH FOOD  5     No current facility-administered medications for this visit.       Allergies   Allergen Reactions    Mushroom Extract Complex        Health Maintenance   Topic Date Due    Pneumococcal 0-64 years Vaccine (1 of 1 - PPSV23) 08/17/1986    Diabetic foot exam  08/17/1990    Diabetic retinal exam  08/17/1990    Varicella Vaccine (1 of 2 - 13+ 2-dose series) 08/17/1993    HIV screen  08/17/1995    Hepatitis B Vaccine (1 of 3 - Risk 3-dose series) 08/17/1999    Cervical cancer screen  08/17/2001    Flu vaccine (Season Ended) 09/01/2019    A1C test (Diabetic or Prediabetic)  02/15/2020    Diabetic microalbuminuria test  02/15/2020    Lipid screen  02/15/2020    Potassium monitoring  02/15/2020    Creatinine monitoring  02/15/2020    DTaP/Tdap/Td vaccine (2 - Td) 04/25/2027       Subjective:      Review of Systems   Constitutional: Positive for fatigue. Negative for chills and fever. HENT: Negative for congestion. Respiratory: Negative for cough, chest tightness and shortness of breath. Cardiovascular: Negative for chest pain, palpitations and leg swelling. Gastrointestinal: Negative for abdominal pain, anal bleeding, constipation, diarrhea and nausea. Genitourinary: Negative for difficulty urinating. Musculoskeletal: Positive for arthralgias, back pain, gait problem, myalgias, neck pain and neck stiffness. Negative for joint swelling. Psychiatric/Behavioral: Negative. SeeHPI for visit specific review of symptoms. All others negative      Objective:   /84   Pulse 83   Temp 97.3 °F (36.3 °C)   Wt (!) 301 lb (136.5 kg)   SpO2 99%   BMI 55.05 kg/m²   Physical Exam   Constitutional: She appears well-developed. She does not appear ill. Eyes: Pupils are equal, round, and reactive to light. Neck: Normal range of motion. Neck supple. Cardiovascular: Normal rate and regular rhythm. Exam reveals no friction rub. No murmur heard. Pulmonary/Chest: Effort normal and breath sounds normal. No respiratory distress. She has no wheezes. She has no rales. Abdominal: Soft. Bowel sounds are normal. She exhibits no distension. There is no tenderness. There is no rebound and no guarding. Musculoskeletal: She exhibits no edema.    Tender overlying the left greater trochanter. Tenderness along the left lateral thigh. Full range of motion. Normal gait   Neurological: She is alert. Psychiatric: She has a normal mood and affect. Her behavior is normal.         No results found for this or any previous visit (from the past 672 hour(s)). Assessment & Plan: The following diagnoses and conditions are stable with no further orders unless indicated:  1. Left leg numbness  Recommended nerve conduction study of the left lower extremity to further evaluate the numbness. Recommended referral to physical therapy  - Nerve conduction test; Future  - [facility]    2. Iliotibial band syndrome of left side  She had received an injection by her rheumatologist for trochanteric bursitis without much relief. Suggested she may have iliotibial band syndrome. We'll get input from physical therapy  - [facility]    3. Midline low back pain with left-sided sciatica, unspecified chronicity    - [facility]    4. Type 2 diabetes mellitus without complication, without long-term current use of insulin ([facility])  Lab Results   Component Value Date    LABA1C 6.5 (H) 02/15/2019     No results found for: EAG  Blood sugar readings have been stable. Hopefully with weight loss we can see improvement in her diabetes    5. Essential hypertension  BP Readings from Last 3 Encounters:   04/08/19 132/84   03/05/19 134/84   12/18/18 (!) 160/90     Stable    6. Depression with anxiety  stable            Return in about 6 weeks (around 5/20/2019). Discussed use, benefit, and side effects of prescribed medications. All patient questions answered. Pt voiced understanding. Reviewed health maintenance. Instructedto continue current medications, diet and exercise. Patient agreed with treatmentplan. Follow up as directed.

## 2019-04-14 ASSESSMENT — ENCOUNTER SYMPTOMS
CONSTIPATION: 0
COUGH: 0
NAUSEA: 0
BACK PAIN: 1
CHEST TIGHTNESS: 0
ANAL BLEEDING: 0
DIARRHEA: 0
SHORTNESS OF BREATH: 0
ABDOMINAL PAIN: 0

## 2019-04-22 DIAGNOSIS — G89.29 CHRONIC PAIN OF LEFT KNEE: ICD-10-CM

## 2019-04-22 DIAGNOSIS — R20.0 LEFT LEG NUMBNESS: Primary | ICD-10-CM

## 2019-04-22 DIAGNOSIS — M54.42 MIDLINE LOW BACK PAIN WITH LEFT-SIDED SCIATICA, UNSPECIFIED CHRONICITY: ICD-10-CM

## 2019-04-22 DIAGNOSIS — M25.562 CHRONIC PAIN OF LEFT KNEE: ICD-10-CM

## 2019-04-26 ENCOUNTER — HOSPITAL ENCOUNTER (OUTPATIENT)
Dept: NEUROLOGY | Age: 39
Discharge: HOME | End: 2019-04-26
Payer: MEDICAID

## 2019-04-26 PROCEDURE — 95886 MUSC TEST DONE W/N TEST COMP: CPT | Performed by: PSYCHIATRY & NEUROLOGY

## 2019-04-26 PROCEDURE — 95908 NRV CNDJ TST 3-4 STUDIES: CPT

## 2019-04-26 PROCEDURE — 95886 MUSC TEST DONE W/N TEST COMP: CPT

## 2019-04-26 PROCEDURE — 95908 NRV CNDJ TST 3-4 STUDIES: CPT | Performed by: PSYCHIATRY & NEUROLOGY

## 2019-04-30 ENCOUNTER — HOSPITAL ENCOUNTER (OUTPATIENT)
Dept: PHYSICAL THERAPY | Age: 39
Setting detail: THERAPIES SERIES
Discharge: HOME | End: 2019-04-30
Payer: MEDICAID

## 2019-04-30 ENCOUNTER — HOSPITAL ENCOUNTER (EMERGENCY)
Age: 39
Discharge: LEFT BEFORE DISCHARGE | End: 2019-04-30
Payer: MEDICAID

## 2019-04-30 VITALS
TEMPERATURE: 98 F | HEART RATE: 95 BPM | RESPIRATION RATE: 18 BRPM | OXYGEN SATURATION: 98 % | DIASTOLIC BLOOD PRESSURE: 106 MMHG | SYSTOLIC BLOOD PRESSURE: 179 MMHG | HEIGHT: 62 IN | BODY MASS INDEX: 53.92 KG/M2 | WEIGHT: 293 LBS

## 2019-04-30 PROCEDURE — 4500000002 HC ER NO CHARGE

## 2019-04-30 PROCEDURE — 97163 PT EVAL HIGH COMPLEX 45 MIN: CPT

## 2019-04-30 ASSESSMENT — PAIN DESCRIPTION - LOCATION
LOCATION: BACK
LOCATION: BACK;HIP;LEG

## 2019-04-30 ASSESSMENT — PAIN SCALES - GENERAL: PAINLEVEL_OUTOF10: 10

## 2019-04-30 ASSESSMENT — PAIN DESCRIPTION - DESCRIPTORS: DESCRIPTORS: DULL

## 2019-04-30 ASSESSMENT — PAIN DESCRIPTION - PAIN TYPE: TYPE: ACUTE PAIN;CHRONIC PAIN

## 2019-04-30 NOTE — PROGRESS NOTES
stretch   Exercise 8: seated forward bend neutral and to right   Exercise 9: seated posterior pelvic tilts   Exercise 10: seated ta contraction with sit to stand  Exercise 11: standing right sidebend  Exercise 12: standing marching   Exercise 13: seated left laq  Exercise 14: seated left hs curls   Exercise 15: seated marching   Exercise 16: estim wtih moist heat to bilateral low back                       Assessment   Conditions Requiring Skilled Therapeutic Intervention  Body structures, Functions, Activity limitations: Decreased functional mobility ; Decreased ADL status; Decreased ROM; Decreased strength; Increased Pain;Decreased endurance;Decreased high-level IADLs  Assessment: Patient has constant left back and le pain that is increased with ambulation, standing, and general activity. Patient has decreased strength, sensation and increased muscular tension. She will benefit from skilled PT to increase strength, rom, and function with decreased pain  Treatment Diagnosis: lumbago with radiculopathy   Prognosis: Good  Decision Making: High Complexity  History: fibromyalgia, rheumatoid arthritis, obesity   Exam: decreased strength, decreased rom, pain with ambulation and standing, increased muscular tension   Clinical Presentation: unstable   Patient Education: clinical findings, poc   REQUIRES PT FOLLOW UP: Yes         Plan   Plan  Times per week: 2x/week  Plan weeks: 6 weeks  Current Treatment Recommendations: Strengthening, ROM, Functional Mobility Training, Safety Education & Training, Positioning, Home Exercise Program, Manual Therapy - Soft Tissue Mobilization, Endurance Training, Neuromuscular Re-education, Pain Management, Modalities, Manual Therapy - Joint Manipulation, Patient/Caregiver Education & Training                                                        Goals  Short term goals  Time Frame for Short term goals: 4 Weeks   Short term goal 1:  Increase strength to LLE psoas to 5/5, quad and hs 5/5  Short term goal 2: Increase left hip flex to 100 degrees without pain increase  Short term goal 3: Patient to have no > 5/10 pain in low back and LLE  Short term goal 4: Patient to be independent with HEP   Long term goals  Time Frame for Long term goals : 6 Weeks  Long term goal 1:  Increase function to report increased ability to stand for adl's with decreased pain increase  Long term goal 2: Patient to report decreased occurrance of radiating pain episodes into LLE   Long term goal 3: Patient to report decreased left foot numbness by 50%  Long term goal 4: Patient to demo increased function by scoring <= 35% impariment on LE Functional Scale   Patient Goals   Patient goals : decrease episodes of LLE pain        Therapy Time   Individual Concurrent Group Co-treatment   Time In 0842         Time Out 0910         Minutes [address], PT    Electronically signed by Ron Aguilar PT on 4/30/2019 at 9:49 AM

## 2019-05-02 ENCOUNTER — HOSPITAL ENCOUNTER (OUTPATIENT)
Dept: GENERAL RADIOLOGY | Age: 39
Discharge: HOME | End: 2019-05-02
Payer: MEDICAID

## 2019-05-02 ENCOUNTER — OFFICE VISIT (OUTPATIENT)
Dept: FAMILY MEDICINE CLINIC | Age: 39
End: 2019-05-02
Payer: MEDICAID

## 2019-05-02 VITALS
BODY MASS INDEX: 55.6 KG/M2 | TEMPERATURE: 97.8 F | OXYGEN SATURATION: 99 % | SYSTOLIC BLOOD PRESSURE: 122 MMHG | HEART RATE: 87 BPM | WEIGHT: 293 LBS | DIASTOLIC BLOOD PRESSURE: 76 MMHG

## 2019-05-02 DIAGNOSIS — M54.42 CHRONIC BILATERAL LOW BACK PAIN WITH LEFT-SIDED SCIATICA: ICD-10-CM

## 2019-05-02 DIAGNOSIS — G89.29 CHRONIC BILATERAL LOW BACK PAIN WITH LEFT-SIDED SCIATICA: ICD-10-CM

## 2019-05-02 DIAGNOSIS — M54.2 NECK PAIN: Primary | ICD-10-CM

## 2019-05-02 DIAGNOSIS — M06.9 RHEUMATOID ARTHRITIS INVOLVING MULTIPLE SITES, UNSPECIFIED RHEUMATOID FACTOR PRESENCE: ICD-10-CM

## 2019-05-02 DIAGNOSIS — M54.2 NECK PAIN: ICD-10-CM

## 2019-05-02 DIAGNOSIS — E11.9 TYPE 2 DIABETES MELLITUS WITHOUT COMPLICATION, WITHOUT LONG-TERM CURRENT USE OF INSULIN (HCC): ICD-10-CM

## 2019-05-02 PROCEDURE — 99214 OFFICE O/P EST MOD 30 MIN: CPT | Performed by: FAMILY MEDICINE

## 2019-05-02 PROCEDURE — 72100 X-RAY EXAM L-S SPINE 2/3 VWS: CPT

## 2019-05-02 PROCEDURE — 2022F DILAT RTA XM EVC RTNOPTHY: CPT | Performed by: FAMILY MEDICINE

## 2019-05-02 PROCEDURE — 1036F TOBACCO NON-USER: CPT | Performed by: FAMILY MEDICINE

## 2019-05-02 PROCEDURE — G8428 CUR MEDS NOT DOCUMENT: HCPCS | Performed by: FAMILY MEDICINE

## 2019-05-02 PROCEDURE — 72040 X-RAY EXAM NECK SPINE 2-3 VW: CPT

## 2019-05-02 PROCEDURE — 3044F HG A1C LEVEL LT 7.0%: CPT | Performed by: FAMILY MEDICINE

## 2019-05-02 PROCEDURE — G8417 CALC BMI ABV UP PARAM F/U: HCPCS | Performed by: FAMILY MEDICINE

## 2019-05-02 RX ORDER — DEXAMETHASONE SODIUM PHOSPHATE 10 MG/ML
10 INJECTION INTRAMUSCULAR; INTRAVENOUS ONCE
Status: COMPLETED | OUTPATIENT
Start: 2019-05-02 | End: 2019-05-02

## 2019-05-02 RX ORDER — PREDNISONE 20 MG/1
TABLET ORAL
Qty: 18 TABLET | Refills: 0 | Status: SHIPPED | OUTPATIENT
Start: 2019-05-02 | End: 2019-06-26 | Stop reason: ALTCHOICE

## 2019-05-02 RX ADMIN — DEXAMETHASONE SODIUM PHOSPHATE 10 MG: 10 INJECTION INTRAMUSCULAR; INTRAVENOUS at 11:49

## 2019-05-02 NOTE — PROGRESS NOTES
[facility] [address] [place] [address]  [address] [place] [address] Way [facility]  Dept: [phone]  Dept Fax: [phone]  Loc: [phone]    Eloise Weiss is a 45 y.o. female who presents today for her medical conditions/complaints as noted below. Eloise Weiss is here for Back Pain (Continues to have back and leg pain, was in PT on Tuesday and woke yesterday in worse pain. Took some of her mother's Lortab.)        HPI:   CC: Here today to discuss the following:    She continues to have issues with diffuse body aches and pains. She continues to have problems with her neck and lower back as well. She's found no relief with her current treatment regimens. Imaging studies and nerve conduction studies have failed to show obvious abnormalities. She does have a history of chronic rheumatoid arthritis and is currently off treatment due to insurance issues. HPI    Past Medical History:   Diagnosis Date    Anemia     Chronic rheumatic arthritis ([address]     Elevated blood pressure reading     Fibromyalgia     Obesity     Type 2 diabetes mellitus (HCC)       Past Surgical History:   Procedure Laterality Date    CHOLECYSTECTOMY         Family History   Problem Relation Age of Onset    High Blood Pressure Father     Diabetes Father     Atrial Fibrillation Father     Heart Failure Father     Stroke Father     Diabetes Sister     Diabetes Maternal Grandmother     Heart Attack Other        Social History     Tobacco Use    Smoking status: Never Smoker    Smokeless tobacco: Never Used   Substance Use Topics    Alcohol use: No     Current Outpatient Medications   Medication Sig Dispense Refill    predniSONE (DELTASONE) 20 MG tablet 3 tablets PO once a day for 3 days, 2 tablets PO once a day for 3 days, 1 tablet PO once a day for 3 days.  18 tablet 0    sulfaSALAzine (AZULFIDINE) 500 MG tablet Take 2 tablets by mouth daily  3    carBAMazepine (CARBATROL) 100 MG extended release capsule Take 1 capsule by mouth 2 times daily 60 capsule 5    naltrexone-bupropion (CONTRAVE) 8-90 MG per extended release tablet Take 2 tablets by mouth 2 times daily 120 tablet 2    metFORMIN (GLUCOPHAGE) 500 MG tablet TAKE 1 TABLET BY MOUTH TWICE A DAY WITH MEALS 60 tablet 3    losartan (COZAAR) 50 MG tablet Take 1 tablet by mouth daily 30 tablet 5    leflunomide (ARAVA) 20 MG tablet TAKE 1 TABLET BY MOUTH EVERY DAY 30 tablet 3    FREESTYLE LANCETS MISC FOR USE TWICE DAILY 200 each 2    tiZANidine (ZANAFLEX) 4 MG tablet Take 1 tablet by mouth 2 times daily as needed (pain) . May cause drowsiness. 60 tablet 2    Elastic Bandages & Supports (WRIST SPLINT LEFT/RIGHT) MISC Bilateral wrist splints, use as directed. Dx G56.03 2 each 0    glucose blood VI test strips (ASCENSIA AUTODISC VI;ONE TOUCH ULTRA TEST VI) strip 1 each by In Vitro route 2 times daily As needed. 200 each 3    furosemide (LASIX) 40 MG tablet Take 1 tablet by mouth daily 60 tablet 3    cetirizine (ZYRTEC ALLERGY) 10 MG tablet Take 1 tablet by mouth daily 30 tablet 0    fluticasone (FLONASE) 50 MCG/ACT nasal spray 2 sprays by Nasal route daily 1 Bottle 0    Adalimumab (HUMIRA) 20 MG/0.4ML PSKT Inject into the skin      hydroxychloroquine (PLAQUENIL) 200 MG tablet TAKE 2 TABLETS DAILY WITH FOOD  5     No current facility-administered medications for this visit.       Allergies   Allergen Reactions    Mushroom Extract Complex        Health Maintenance   Topic Date Due    Pneumococcal 0-64 years Vaccine (1 of 1 - PPSV23) 08/17/1986    Diabetic foot exam  08/17/1990    Diabetic retinal exam  08/17/1990    Varicella Vaccine (1 of 2 - 13+ 2-dose series) 08/17/1993    HIV screen  08/17/1995    Hepatitis B Vaccine (1 of 3 - Risk 3-dose series) 08/17/1999    Cervical cancer screen  08/17/2001    Flu vaccine (Season Ended) 09/01/2019    A1C test (Diabetic or Prediabetic)  02/15/2020    Diabetic microalbuminuria test  02/15/2020  Lipid screen  02/15/2020    Potassium monitoring  02/15/2020    Creatinine monitoring  02/15/2020    DTaP/Tdap/Td vaccine (2 - Td) 04/25/2027       Subjective:      Review of Systems   Constitutional: Negative for chills and fever. HENT: Negative for congestion. Respiratory: Negative for cough, chest tightness and shortness of breath. Cardiovascular: Negative for chest pain, palpitations and leg swelling. Gastrointestinal: Negative for abdominal pain, anal bleeding, constipation, diarrhea and nausea. Genitourinary: Negative for difficulty urinating. Musculoskeletal: Positive for arthralgias, back pain, gait problem, myalgias, neck pain and neck stiffness. Psychiatric/Behavioral: Negative. SeeHPI for visit specific review of symptoms. All others negative      Objective:   /76   Pulse 87   Temp 97.8 °F (36.6 °C) (Temporal)   Wt (!) 304 lb (137.9 kg)   SpO2 99%   BMI 55.60 kg/m²   Physical Exam   Constitutional: She appears well-developed. She does not appear ill. Eyes: Pupils are equal, round, and reactive to light. Neck: Normal range of motion. Neck supple. Cardiovascular: Normal rate and regular rhythm. Exam reveals no friction rub. No murmur heard. Pulmonary/Chest: Effort normal and breath sounds normal. No respiratory distress. She has no wheezes. She has no rales. Abdominal: Soft. Bowel sounds are normal. She exhibits no distension. There is no tenderness. There is no rebound and no guarding. Musculoskeletal: She exhibits no edema. Neurological: She is alert. Psychiatric: She has a normal mood and affect. Her behavior is normal.         No results found for this or any previous visit (from the past 672 hour(s)). Assessment & Plan: The following diagnoses and conditions are stable with no further orders unless indicated:  1. Neck pain  X-ray of her cervical spine shows mild anterolisthesis at C2 to C3 and mild endplate spurring.  No prescribed medications. All patient questions answered. Pt voiced understanding. Reviewed health maintenance. Instructedto continue current medications, diet and exercise. Patient agreed with treatmentplan. Follow up as directed.

## 2019-05-02 NOTE — PATIENT INSTRUCTIONS
1. Given dexamethasone in the office today (steroid)  2. Start prednisone as prescribed tomorrow  3. May take zanaflex as needed  4. Get xrays today in room 103  5. Call Rheumatology regarding you Humira  6.  Stop physical therapy until Humira can be resumed for at least a month

## 2019-05-12 ASSESSMENT — ENCOUNTER SYMPTOMS
CHEST TIGHTNESS: 0
NAUSEA: 0
CONSTIPATION: 0
ABDOMINAL PAIN: 0
ANAL BLEEDING: 0
BACK PAIN: 1
SHORTNESS OF BREATH: 0
DIARRHEA: 0
COUGH: 0

## 2019-05-21 ENCOUNTER — OFFICE VISIT (OUTPATIENT)
Dept: FAMILY MEDICINE CLINIC | Age: 39
End: 2019-05-21
Payer: MEDICAID

## 2019-05-21 VITALS
HEART RATE: 85 BPM | SYSTOLIC BLOOD PRESSURE: 124 MMHG | OXYGEN SATURATION: 100 % | TEMPERATURE: 98.2 F | BODY MASS INDEX: 55.6 KG/M2 | DIASTOLIC BLOOD PRESSURE: 80 MMHG | WEIGHT: 293 LBS

## 2019-05-21 DIAGNOSIS — I10 ESSENTIAL HYPERTENSION: ICD-10-CM

## 2019-05-21 DIAGNOSIS — M06.9 RHEUMATOID ARTHRITIS INVOLVING MULTIPLE SITES, UNSPECIFIED RHEUMATOID FACTOR PRESENCE: Primary | ICD-10-CM

## 2019-05-21 DIAGNOSIS — M79.7 FIBROMYALGIA: ICD-10-CM

## 2019-05-21 DIAGNOSIS — F41.8 DEPRESSION WITH ANXIETY: ICD-10-CM

## 2019-05-21 DIAGNOSIS — E11.9 TYPE 2 DIABETES MELLITUS WITHOUT COMPLICATION, WITHOUT LONG-TERM CURRENT USE OF INSULIN (HCC): ICD-10-CM

## 2019-05-21 PROCEDURE — G8427 DOCREV CUR MEDS BY ELIG CLIN: HCPCS | Performed by: FAMILY MEDICINE

## 2019-05-21 PROCEDURE — 99214 OFFICE O/P EST MOD 30 MIN: CPT | Performed by: FAMILY MEDICINE

## 2019-05-21 PROCEDURE — G8417 CALC BMI ABV UP PARAM F/U: HCPCS | Performed by: FAMILY MEDICINE

## 2019-05-21 PROCEDURE — 2022F DILAT RTA XM EVC RTNOPTHY: CPT | Performed by: FAMILY MEDICINE

## 2019-05-21 PROCEDURE — 3044F HG A1C LEVEL LT 7.0%: CPT | Performed by: FAMILY MEDICINE

## 2019-05-21 PROCEDURE — 1036F TOBACCO NON-USER: CPT | Performed by: FAMILY MEDICINE

## 2019-05-21 RX ORDER — PREDNISONE 20 MG/1
20 TABLET ORAL DAILY
Qty: 18 TABLET | Refills: 0 | Status: SHIPPED | OUTPATIENT
Start: 2019-05-21 | End: 2019-06-26 | Stop reason: ALTCHOICE

## 2019-05-21 NOTE — PROGRESS NOTES
[facility]  [address] [place] [address]  Dept: [phone]  Dept Fax: [phone]  Loc: [phone]    Saeid Joel is a 45 y.o. female who presents today for her medical conditions/complaints as noted below. Saeid Joel is here for Neck Pain (6 week follow up ) and Back Pain        HPI:   CC: Here today to discuss the following:    She was last seen about 6 weeks ago for increased muscle and joint pain. This was attributed to her untreated rheumatoid arthritis flares of her fibromyalgia. She received a steroid course which she states provided considerable improvement in her pain. She has since seen her rheumatologist and been started on Humira. Overall she feels she's doing much better but kitty setback over the past week. No obvious triggers. She's noticed increased joint pain and swelling. She's also noticed pain with minimal exertion and ambulation. HPI    Past Medical History:   Diagnosis Date    Anemia     Chronic rheumatic arthritis ([address]     Elevated blood pressure reading     Fibromyalgia     Obesity     Type 2 diabetes mellitus (HCC)       Past Surgical History:   Procedure Laterality Date    CHOLECYSTECTOMY         Family History   Problem Relation Age of Onset    High Blood Pressure Father     Diabetes Father     Atrial Fibrillation Father     Heart Failure Father     Stroke Father     Diabetes Sister     Diabetes Maternal Grandmother     Heart Attack Other        Social History     Tobacco Use    Smoking status: Never Smoker    Smokeless tobacco: Never Used   Substance Use Topics    Alcohol use: No     Current Outpatient Medications   Medication Sig Dispense Refill    predniSONE (DELTASONE) 20 MG tablet Take 1 tablet by mouth daily Take 3 tablets for 3 days, 2 tablets for 3 days, then 1 tablet for 3 days.  18 tablet 0    sulfaSALAzine (AZULFIDINE) 500 MG tablet Take 2 tablets by mouth daily  3    carBAMazepine (CARBATROL) 100 MG extended release capsule Take 1 capsule by mouth 2 times daily 60 capsule 5    naltrexone-bupropion (CONTRAVE) 8-90 MG per extended release tablet Take 2 tablets by mouth 2 times daily 120 tablet 2    metFORMIN (GLUCOPHAGE) 500 MG tablet TAKE 1 TABLET BY MOUTH TWICE A DAY WITH MEALS 60 tablet 3    cetirizine (ZYRTEC ALLERGY) 10 MG tablet Take 1 tablet by mouth daily 30 tablet 0    fluticasone (FLONASE) 50 MCG/ACT nasal spray 2 sprays by Nasal route daily 1 Bottle 0    losartan (COZAAR) 50 MG tablet Take 1 tablet by mouth daily 30 tablet 5    leflunomide (ARAVA) 20 MG tablet TAKE 1 TABLET BY MOUTH EVERY DAY 30 tablet 3    FREESTYLE LANCETS MISC FOR USE TWICE DAILY 200 each 2    Adalimumab (HUMIRA) 20 MG/0.4ML PSKT Inject into the skin      tiZANidine (ZANAFLEX) 4 MG tablet Take 1 tablet by mouth 2 times daily as needed (pain) . May cause drowsiness. 60 tablet 2    Elastic Bandages & Supports (WRIST SPLINT LEFT/RIGHT) MISC Bilateral wrist splints, use as directed. Dx G56.03 2 each 0    glucose blood VI test strips (ASCENSIA AUTODISC VI;ONE TOUCH ULTRA TEST VI) strip 1 each by In Vitro route 2 times daily As needed. 200 each 3    furosemide (LASIX) 40 MG tablet Take 1 tablet by mouth daily 60 tablet 3    predniSONE (DELTASONE) 20 MG tablet 3 tablets PO once a day for 3 days, 2 tablets PO once a day for 3 days, 1 tablet PO once a day for 3 days. 18 tablet 0    hydroxychloroquine (PLAQUENIL) 200 MG tablet TAKE 2 TABLETS DAILY WITH FOOD  5     No current facility-administered medications for this visit.       Allergies   Allergen Reactions    Mushroom Extract Complex        Health Maintenance   Topic Date Due    Pneumococcal 0-64 years Vaccine (1 of 1 - PPSV23) 08/17/1986    Diabetic foot exam  08/17/1990    Diabetic retinal exam  08/17/1990    Varicella Vaccine (1 of 2 - 13+ 2-dose series) 08/17/1993    HIV screen  08/17/1995    Hepatitis B Vaccine (1 of 3 - Risk Plan:        The following diagnoses and conditions are stable with no further orders unless indicated:  1. Rheumatoid arthritis involving multiple sites, unspecified rheumatoid factor presence ([facility]  She is requesting another course of steroids. I discussed the long-term harm in taking continual steroids. She voices an understanding. Unfortunately, she does not feel the Humira is provided her the relief this in. We'll give her a relatively short course of steroids at 60 mg for 3 days then 40 mg for 3 days followed by 20 mg for 3 days. Advised her to keep an eye on her blood sugar    2. Fibromyalgia  It is also possible her flareups of pain related to fibromyalgia. She is not interested in starting new medication at this time  3. Depression with anxiety  Overall her mood has been stable. 4. Type 2 diabetes mellitus without complication, without long-term current use of insulin ([facility]  Advised her to check her blood sugar daily. He is having good control of her blood sugar but concerned about continuing with the steroid use    5. Essential hypertension  BP Readings from Last 3 Encounters:   05/21/19 124/80   05/02/19 122/76   04/08/19 132/84     Stable            Return in about 3 months (around 8/21/2019) for Routine follow up - 15 minute visit. Discussed use, benefit, and side effects of prescribed medications. All patient questions answered. Pt voiced understanding. Reviewed health maintenance. Instructedto continue current medications, diet and exercise. Patient agreed with treatmentplan. Follow up as directed.

## 2019-05-23 ENCOUNTER — HOSPITAL ENCOUNTER (OUTPATIENT)
Dept: PHYSICAL THERAPY | Age: 39
Setting detail: THERAPIES SERIES
Discharge: HOME | End: 2019-05-23
Payer: MEDICAID

## 2019-05-23 PROCEDURE — 97110 THERAPEUTIC EXERCISES: CPT

## 2019-05-23 PROCEDURE — G0283 ELEC STIM OTHER THAN WOUND: HCPCS

## 2019-05-23 ASSESSMENT — PAIN DESCRIPTION - LOCATION: LOCATION: BACK;LEG;HIP

## 2019-05-23 ASSESSMENT — PAIN DESCRIPTION - PAIN TYPE: TYPE: CHRONIC PAIN

## 2019-05-23 ASSESSMENT — PAIN SCALES - GENERAL: PAINLEVEL_OUTOF10: 7

## 2019-05-23 ASSESSMENT — PAIN DESCRIPTION - ORIENTATION: ORIENTATION: LOWER;LEFT

## 2019-05-23 NOTE — PROGRESS NOTES
Physical Therapy  Daily Treatment Note  Date: 2019  Patient Name: Gerson Gomez  MRN: [id number]     :   1980    Subjective:   General  Referring Practitioner: Amilcar Patrick  PT Visit Information  Onset Date: 19  PT Insurance Information: [facility]  Total # of Visits Approved: 8  Total # of Visits to Date: 2  Plan of Care/Certification Expiration Date: 19  Progress Note Due Date: 19  Subjective: Patient states her low back always hurts and her L leg is more intermittent, after initial evaluation stated she hurt for about a week and went to the Dr and he started on a steriod dose pack  Patient Currently in Pain: Yes  Pain Level: 7  Pain Type: Chronic pain  Pain Location: Back;Leg;Hip  Pain Orientation: Lower; Left       Treatment Activities:         Exercises  Exercise 1: supine bilateral trunk rotation  x 10         *Patient has RA and fibromyalgia  Exercise 2: supine sktc with towel  5\" x 5 ea  Exercise 3: supine posterior pelvic tilts x 10  Exercise 4: supine ta contraction alone 5\" x 10 , (alt ue, alt le x 5 ea)  Exercise 5: supine left hs 90-90 stretch manually (gentle)  10\" x 3  Exercise 6: supine left frog leg stretch  30\" x 2  Exercise 7: sidelying left obers manual stretch 10\" x 3  Exercise 8: seated forward bend neutral and to right x 5 ea  Exercise 9: seated posterior pelvic tilts x 10  Exercise 10: seated ta contraction with sit to stand x 5  Exercise 11: seated marching x 10  Exercise 12: seated left hs curls (red) x 10  Exercise 13: seated left laq  x 10  Exercise 14: standing right sidebend  x 10  Exercise 15: standing marching x 10  Exercise 16: estim wtih moist heat to bilateral low back (IFC)  x 20'         Assessment:   Conditions Requiring Skilled Therapeutic Intervention  Body structures, Functions, Activity limitations: Decreased functional mobility ; Decreased ADL status; Decreased ROM; Decreased strength; Increased Pain;Decreased endurance;Decreased high-level IADLs  Assessment: Initiated POC per initial evaluation, had difficulty relaxing with manual h/s stretching and verbal and tactile cues to properly perform exercises after instruction. Advance exercises as appropriate  Treatment Diagnosis: lumbago with radiculopathy   REQUIRES PT FOLLOW UP: Yes      G-Code:    Goals:  Short term goals  Time Frame for Short term goals: 4 Weeks   Short term goal 1: Increase strength to LLE psoas to 5/5, quad and hs 5/5  Short term goal 2: Increase left hip flex to 100 degrees without pain increase  Short term goal 3: Patient to have no > 5/10 pain in low back and LLE  Short term goal 4: Patient to be independent with HEP   Long term goals  Time Frame for Long term goals : 6 Weeks  Long term goal 1:  Increase function to report increased ability to stand for adl's with decreased pain increase  Long term goal 2: Patient to report decreased occurrance of radiating pain episodes into LLE   Long term goal 3: Patient to report decreased left foot numbness by 50%  Long term goal 4: Patient to demo increased function by scoring <= 35% impariment on LE Functional Scale   Patient Goals   Patient goals : decrease episodes of LLE pain     Plan:    Times per week: 2x/week  Plan weeks: 6 weeks  Current Treatment Recommendations: Strengthening, ROM, Functional Mobility Training, Safety Education & Training, Positioning, Home Exercise Program, Manual Therapy - Soft Tissue Mobilization, Endurance Training, Neuromuscular Re-education, Pain Management, Modalities, Manual Therapy - Joint Manipulation, Patient/Caregiver Education & Training  Timed Code Treatment Minutes: 35 Minutes     Therapy Time   Individual Concurrent Group Co-treatment   Time In 2628         Time Out 8053         Minutes 55         Timed Code Treatment Minutes: 1503 Karsten Arrieta PTA    Electronically signed by Conor Austin PTA on 5/23/2019 at 4:48 PM

## 2019-05-29 ENCOUNTER — HOSPITAL ENCOUNTER (OUTPATIENT)
Dept: PHYSICAL THERAPY | Age: 39
Setting detail: THERAPIES SERIES
Discharge: HOME | End: 2019-05-29
Payer: MEDICAID

## 2019-05-29 PROCEDURE — 97110 THERAPEUTIC EXERCISES: CPT

## 2019-05-29 PROCEDURE — G0283 ELEC STIM OTHER THAN WOUND: HCPCS

## 2019-05-29 ASSESSMENT — ENCOUNTER SYMPTOMS
NAUSEA: 0
DIARRHEA: 0
ABDOMINAL PAIN: 0
CHEST TIGHTNESS: 0
CONSTIPATION: 0
SHORTNESS OF BREATH: 0
COUGH: 0
ANAL BLEEDING: 0

## 2019-05-29 ASSESSMENT — PAIN DESCRIPTION - PAIN TYPE: TYPE: CHRONIC PAIN

## 2019-05-29 ASSESSMENT — PAIN SCALES - GENERAL: PAINLEVEL_OUTOF10: 6

## 2019-05-29 NOTE — PROGRESS NOTES
Physical Therapy  Daily Treatment Note  Date: 2019  Patient Name: Callie Solomon  MRN: [id number]     :   1980    Subjective:   General  Referring Practitioner: Milton Worthington  PT Visit Information  Onset Date: 19  PT Insurance Information: [facility]  Total # of Visits Approved: 8  Total # of Visits to Date: 3  Plan of Care/Certification Expiration Date: 19  Progress Note Due Date: 19  Subjective: Patient states her pain isn't too bad today but then rates it at a 6/10. tolerated session last visit better than initial evaluation and wasn't sore for days like she was after initial eval  Patient Currently in Pain: Yes  Pain Level: 6  Pain Type: Chronic pain       Treatment Activities:        Exercises  Exercise 1: supine bilateral trunk rotation  x 10         *Patient has RA and fibromyalgia  Exercise 2: supine sktc with towel  5\" x 5 ea  Exercise 3: supine posterior pelvic tilts x 10  Exercise 4: supine ta contraction alone 5\" x 10 , (alt ue, alt le x 10 ea)  Exercise 5: supine left hs 90-90 stretch manually (gentle)  10\" x 3  Exercise 6: supine left frog leg stretch  30\" x 2  Exercise 7: sidelying left obers manual stretch 10\" x 3  Exercise 8: seated forward bend neutral and to right x 5 ea  Exercise 9: seated posterior pelvic tilts x 10  Exercise 10: seated ta contraction with sit to stand x 5  Exercise 11: seated marching x 10  Exercise 12: seated left hs curls (red) x 10  Exercise 13: seated left laq  x 10  Exercise 14: standing right sidebend  x 10  Exercise 15: standing marching x 10  Exercise 16: estim wtih moist heat to bilateral low back (IFC)  x 20'  Exercise 18: ; HEP ISSUED      Assessment:   Conditions Requiring Skilled Therapeutic Intervention  Body structures, Functions, Activity limitations: Decreased functional mobility ; Decreased ADL status; Decreased ROM; Decreased strength; Increased Pain;Decreased endurance;Decreased high-level IADLs  Assessment: HEP issued with pictures and written instructions and reviewed during therapy session. Increased reps with abdominal exercises and required cues mainly for proper technique with pelvic tilts. Treatment Diagnosis: lumbago with radiculopathy   REQUIRES PT FOLLOW UP: Yes      G-Code:    Goals:  Short term goals  Time Frame for Short term goals: 4 Weeks   Short term goal 1: Increase strength to LLE psoas to 5/5, quad and hs 5/5  Short term goal 2: Increase left hip flex to 100 degrees without pain increase  Short term goal 3: Patient to have no > 5/10 pain in low back and LLE  Short term goal 4: Patient to be independent with HEP   Long term goals  Time Frame for Long term goals : 6 Weeks  Long term goal 1:  Increase function to report increased ability to stand for adl's with decreased pain increase  Long term goal 2: Patient to report decreased occurrance of radiating pain episodes into LLE   Long term goal 3: Patient to report decreased left foot numbness by 50%  Long term goal 4: Patient to demo increased function by scoring <= 35% impariment on LE Functional Scale   Patient Goals   Patient goals : decrease episodes of LLE pain     Plan:    Times per week: 2x/week  Plan weeks: 6 weeks  Current Treatment Recommendations: Strengthening, ROM, Functional Mobility Training, Safety Education & Training, Positioning, Home Exercise Program, Manual Therapy - Soft Tissue Mobilization, Endurance Training, Neuromuscular Re-education, Pain Management, Modalities, Manual Therapy - Joint Manipulation, Patient/Caregiver Education & Training       Therapy Time   Individual Concurrent Group Co-treatment   Time In 4288         Time Out 1438         Minutes 50         Timed Code Treatment Minutes: 130 Suma Talbot PTA    Electronically signed by Demetrius Kevin PTA on 5/29/2019 at 2:41 PM

## 2019-06-01 ENCOUNTER — HOSPITAL ENCOUNTER (EMERGENCY)
Age: 39
Discharge: HOME | End: 2019-06-01
Attending: EMERGENCY MEDICINE
Payer: MEDICAID

## 2019-06-01 VITALS
TEMPERATURE: 98.6 F | BODY MASS INDEX: 53.92 KG/M2 | RESPIRATION RATE: 16 BRPM | DIASTOLIC BLOOD PRESSURE: 88 MMHG | SYSTOLIC BLOOD PRESSURE: 156 MMHG | HEART RATE: 79 BPM | WEIGHT: 293 LBS | OXYGEN SATURATION: 95 % | HEIGHT: 62 IN

## 2019-06-01 DIAGNOSIS — R10.9 ACUTE ABDOMINAL PAIN: Primary | ICD-10-CM

## 2019-06-01 DIAGNOSIS — R19.5 CHANGE IN STOOL: ICD-10-CM

## 2019-06-01 LAB
ALBUMIN SERPL-MCNC: 3.8 G/DL (ref 3.5–5.2)
ALP BLD-CCNC: 70 U/L (ref 35–104)
ALT SERPL-CCNC: 13 U/L (ref 5–33)
ANION GAP SERPL CALCULATED.3IONS-SCNC: 10 MMOL/L (ref 7–19)
AST SERPL-CCNC: 9 U/L (ref 5–32)
BASOPHILS ABSOLUTE: 0.1 K/UL (ref 0–0.2)
BASOPHILS RELATIVE PERCENT: 0.5 % (ref 0–1)
BILIRUB SERPL-MCNC: 0.3 MG/DL (ref 0.2–1.2)
BUN BLDV-MCNC: 12 MG/DL (ref 6–20)
CALCIUM SERPL-MCNC: 9.2 MG/DL (ref 8.6–10)
CHLORIDE BLD-SCNC: 99 MMOL/L (ref 98–111)
CO2: 28 MMOL/L (ref 22–29)
CREAT SERPL-MCNC: 0.6 MG/DL (ref 0.5–0.9)
EOSINOPHILS ABSOLUTE: 0.1 K/UL (ref 0–0.6)
EOSINOPHILS RELATIVE PERCENT: 0.5 % (ref 0–5)
GFR NON-AFRICAN AMERICAN: >60
GLUCOSE BLD-MCNC: 107 MG/DL (ref 74–109)
HCG QUALITATIVE: NEGATIVE
HCT VFR BLD CALC: 36.2 % (ref 37–47)
HEMOGLOBIN: 10.9 G/DL (ref 12–16)
LIPASE: 14 U/L (ref 13–60)
LYMPHOCYTES ABSOLUTE: 2.8 K/UL (ref 1.1–4.5)
LYMPHOCYTES RELATIVE PERCENT: 27.9 % (ref 20–40)
MCH RBC QN AUTO: 23.2 PG (ref 27–31)
MCHC RBC AUTO-ENTMCNC: 30.1 G/DL (ref 33–37)
MCV RBC AUTO: 77.2 FL (ref 81–99)
MONOCYTES ABSOLUTE: 0.8 K/UL (ref 0–0.9)
MONOCYTES RELATIVE PERCENT: 8.3 % (ref 0–10)
NEUTROPHILS ABSOLUTE: 6.2 K/UL (ref 1.5–7.5)
NEUTROPHILS RELATIVE PERCENT: 62.1 % (ref 50–65)
PDW BLD-RTO: 17.5 % (ref 11.5–14.5)
PLATELET # BLD: 271 K/UL (ref 130–400)
PMV BLD AUTO: 11.8 FL (ref 9.4–12.3)
POTASSIUM SERPL-SCNC: 4.1 MMOL/L (ref 3.5–5)
RBC # BLD: 4.69 M/UL (ref 4.2–5.4)
SODIUM BLD-SCNC: 137 MMOL/L (ref 136–145)
TOTAL PROTEIN: 7.4 G/DL (ref 6.6–8.7)
WBC # BLD: 10 K/UL (ref 4.8–10.8)

## 2019-06-01 PROCEDURE — 85025 COMPLETE CBC W/AUTO DIFF WBC: CPT

## 2019-06-01 PROCEDURE — 84703 CHORIONIC GONADOTROPIN ASSAY: CPT

## 2019-06-01 PROCEDURE — 99283 EMERGENCY DEPT VISIT LOW MDM: CPT | Performed by: EMERGENCY MEDICINE

## 2019-06-01 PROCEDURE — 80053 COMPREHEN METABOLIC PANEL: CPT

## 2019-06-01 PROCEDURE — 36415 COLL VENOUS BLD VENIPUNCTURE: CPT

## 2019-06-01 PROCEDURE — 83690 ASSAY OF LIPASE: CPT

## 2019-06-01 PROCEDURE — 99284 EMERGENCY DEPT VISIT MOD MDM: CPT

## 2019-06-01 ASSESSMENT — ENCOUNTER SYMPTOMS
RHINORRHEA: 0
SORE THROAT: 0
SHORTNESS OF BREATH: 0
NAUSEA: 1
DIARRHEA: 1
ABDOMINAL PAIN: 1
COUGH: 0
VOMITING: 1
RECTAL PAIN: 0
BACK PAIN: 0
BLOOD IN STOOL: 0

## 2019-06-01 NOTE — ED PROVIDER NOTES
[facility] EMERGENCY DEPT  eMERGENCY dEPARTMENT eNCOUnter      Pt Name: Alfreda Amado  MRN: [id number]  [place] 1980  Date of evaluation: 6/1/2019  Provider: Natalee Croft MD    [address]       Chief Complaint   Patient presents with    Abdominal Pain     pt presents to ED with c/o abdominal pain x 3 months. states that she passed 2 possible worms today. she states they were long and clear and had to be pulled from bottom. HISTORY OF PRESENT ILLNESS   (Location/Symptom, Timing/Onset,Context/Setting, Quality, Duration, Modifying Factors, Severity)  Note limiting factors. Alfreda Amado is a 45 y.o. female who presents to the emergency department for concern of crampy abdominal pain intermittently over the past 3 months. Patient states that she has had alternating stool with constipation and somewhat softer. States that she's been drinking some type of tea drink and that her stool has seemed to have improved. She is on contrave to help with her decreasing appetite and states since then she has had intermittent nausea and occasional vomiting. She denies any focal , will pain. Her main concern is today she noticed to clear lung structures in her stool. States that she does not believe that they're moving but was concerned that possibly were worms. She denies any anal pruritis. Only recent travel was to [place] back in March. Denies eating raw fish or raw meats. HPI    NursingNotes were reviewed. REVIEW OF SYSTEMS    (2-9 systems for level 4, 10 or more for level 5)     Review of Systems   Constitutional: Negative for activity change, appetite change, chills, fever and unexpected weight change. HENT: Negative for rhinorrhea and sore throat. Respiratory: Negative for cough and shortness of breath. Cardiovascular: Negative for chest pain and leg swelling. Gastrointestinal: Positive for abdominal pain, diarrhea, nausea and vomiting. Negative for blood in stool and rectal pain. MISC Disp-200 each, R-2, Normal      Adalimumab (HUMIRA) 20 MG/0.4ML PSKT Inject into the skinHistorical Med      tiZANidine (ZANAFLEX) 4 MG tablet Take 1 tablet by mouth 2 times daily as needed (pain) . May cause drowsiness. , Disp-60 tablet, R-2Normal      Elastic Bandages & Supports (WRIST SPLINT LEFT/RIGHT) MISC Bilateral wrist splints, use as directed. Dx G56.03, Disp-2 each, R-0Print      glucose blood VI test strips (ASCENSIA AUTODISC VI;ONE TOUCH ULTRA TEST VI) strip 2 TIMES DAILY Starting Mon 3/26/2018, Disp-200 each, R-3, NormalAs needed. furosemide (LASIX) 40 MG tablet Take 1 tablet by mouth daily, Disp-60 tablet, R-3Normal      hydroxychloroquine (PLAQUENIL) 200 MG tablet TAKE 2 TABLETS DAILY WITH FOOD, R-5Historical Med       !! - Potential duplicate medications found. Please discuss with provider.           ALLERGIES     Mushroom extract complex    FAMILY HISTORY       Family History   Problem Relation Age of Onset    High Blood Pressure Father     Diabetes Father     Atrial Fibrillation Father     Heart Failure Father     Stroke Father     Diabetes Sister     Diabetes Maternal Grandmother     Heart Attack Other           SOCIAL HISTORY       Social History     Socioeconomic History    Marital status: Single     Spouse name: Not on file    Number of children: Not on file    Years of education: Not on file    Highest education level: Not on file   Occupational History    Not on file   Social Needs    Financial resource strain: Not on file    Food insecurity:     Worry: Not on file     Inability: Not on file    Transportation needs:     Medical: Not on file     Non-medical: Not on file   Tobacco Use    Smoking status: Never Smoker    Smokeless tobacco: Never Used   Substance and Sexual Activity    Alcohol use: No    Drug use: No    Sexual activity: Not on file   Lifestyle    Physical activity:     Days per week: Not on file     Minutes per session: Not on file    Stress: Not on file   Relationships    Social connections:     Talks on phone: Not on file     Gets together: Not on file     Attends Sabianist service: Not on file     Active member of club or organization: Not on file     Attends meetings of clubs or organizations: Not on file     Relationship status: Not on file    Intimate partner violence:     Fear of current or ex partner: Not on file     Emotionally abused: Not on file     Physically abused: Not on file     Forced sexual activity: Not on file   Other Topics Concern    Not on file   Social History Narrative    Not on file       SCREENINGS             PHYSICAL EXAM    (up to 7 for level 4, 8 or more for level 5)     ED Triage Vitals   BP Temp Temp Source Pulse Resp SpO2 Height Weight   06/01/19 1649 06/01/19 1648 06/01/19 1648 06/01/19 1648 06/01/19 1648 06/01/19 1648 06/01/19 1648 06/01/19 1648   (!) 188/107 98.6 °F (37 °C) Oral 79 16 95 % 5' 2\" (1.575 m) (!) 304 lb (137.9 kg)       Physical Exam   Constitutional: She is oriented to person, place, and time. She appears well-developed and well-nourished. No distress. HENT:   Head: Normocephalic and atraumatic. Right Ear: External ear normal.   Left Ear: External ear normal.   Eyes: Conjunctivae and EOM are normal.   Neck: Normal range of motion. No tracheal deviation present. Cardiovascular: Normal rate, regular rhythm, normal heart sounds and intact distal pulses. No murmur heard. Pulmonary/Chest: Breath sounds normal. No respiratory distress. She has no wheezes. She has no rales. Abdominal: Soft. She exhibits no mass. There is no tenderness. There is no guarding. Musculoskeletal: Normal range of motion. She exhibits no edema. Neurological: She is alert and oriented to person, place, and time. GCS eye subscore is 4. GCS verbal subscore is 5. GCS motor subscore is 6. Skin: Skin is warm and dry. Vitals reviewed.       DIAGNOSTIC RESULTS               No orders to display           LABS:  Labs Reviewed CBC WITH AUTO DIFFERENTIAL - Abnormal; Notable for the following components:       Result Value    Hemoglobin 10.9 (*)     Hematocrit 36.2 (*)     MCV 77.2 (*)     MCH 23.2 (*)     MCHC 30.1 (*)     RDW 17.5 (*)     All other components within normal limits   CULTURE STOOL   GASTROINTESTINAL PANEL BY DNA   C DIFF TOXIN/ANTIGEN   O&P PANEL ([facility] [employer]) #1   GIARDIA ANTIGEN   COMPREHENSIVE METABOLIC PANEL   LIPASE   HCG, SERUM, QUALITATIVE       All other labs were within normal range or not returned as of this dictation. EMERGENCY DEPARTMENT COURSE and DIFFERENTIAL DIAGNOSIS/MDM:   Vitals:    Vitals:    06/01/19 1648   BP: (!) 156/88   Pulse: 79   Resp: 16   Temp: 98.6 °F (37 °C)   TempSrc: Oral   SpO2: 95%   Weight: (!) 304 lb (137.9 kg)   Height: 5' 2\" (1.575 m)       MDM  Number of Diagnoses or Management Options     Amount and/or Complexity of Data Reviewed  Clinical lab tests: ordered and reviewed      abd soft and benign, labs reassuring, unable to provide stool sample, instructed to return back with sample to lab for further evaluation        CONSULTS:  None    PROCEDURES:  Unless otherwise noted below, none     Procedures    FINAL IMPRESSION      1. Acute abdominal pain    2.  Change in stool          DISPOSITION/PLAN   DISPOSITION Decision To Discharge 06/01/2019 06:29:03 PM      PATIENT REFERRED TO:  Natalee Enrique MD  [address]  [phone]    Schedule an appointment as soon as possible for a visit on 6/5/2019        DISCHARGE MEDICATIONS:  Discharge Medication List as of 6/1/2019  6:30 PM             (Please note that portions of this note were completed with a voice recognition program.  Efforts were made to edit thedictations but occasionally words are mis-transcribed.)    Abdiaziz Chavez MD (electronically signed)  Attending Emergency Physician        Kev Lockhart MD  06/01/19 0579

## 2019-06-05 ENCOUNTER — APPOINTMENT (OUTPATIENT)
Dept: PHYSICAL THERAPY | Age: 39
End: 2019-06-05
Payer: MEDICAID

## 2019-06-07 ENCOUNTER — APPOINTMENT (OUTPATIENT)
Dept: PHYSICAL THERAPY | Age: 39
End: 2019-06-07
Payer: MEDICAID

## 2019-06-12 ENCOUNTER — APPOINTMENT (OUTPATIENT)
Dept: PHYSICAL THERAPY | Age: 39
End: 2019-06-12
Payer: MEDICAID

## 2019-06-17 ENCOUNTER — APPOINTMENT (OUTPATIENT)
Dept: PHYSICAL THERAPY | Age: 39
End: 2019-06-17
Payer: MEDICAID

## 2019-06-18 ENCOUNTER — HOSPITAL ENCOUNTER (OUTPATIENT)
Dept: PHYSICAL THERAPY | Age: 39
Setting detail: THERAPIES SERIES
Discharge: HOME | End: 2019-06-18
Payer: MEDICAID

## 2019-06-18 PROCEDURE — G0283 ELEC STIM OTHER THAN WOUND: HCPCS

## 2019-06-18 PROCEDURE — 97110 THERAPEUTIC EXERCISES: CPT

## 2019-06-18 ASSESSMENT — PAIN SCALES - GENERAL: PAINLEVEL_OUTOF10: 7

## 2019-06-18 ASSESSMENT — PAIN DESCRIPTION - DESCRIPTORS: DESCRIPTORS: ACHING

## 2019-06-18 ASSESSMENT — PAIN DESCRIPTION - LOCATION: LOCATION: BACK;LEG;HIP

## 2019-06-18 ASSESSMENT — PAIN DESCRIPTION - PAIN TYPE: TYPE: CHRONIC PAIN

## 2019-06-18 ASSESSMENT — PAIN DESCRIPTION - ORIENTATION: ORIENTATION: LEFT

## 2019-06-18 NOTE — PROGRESS NOTES
Physical Therapy  Daily Treatment Note/Re-assessment  Date: 2019  Patient Name: Bay Tripp  MRN: [id number]     :   1980    Subjective:   General  Chart Reviewed: Yes  Response To Previous Treatment: Patient with no complaints from previous session. Referring Practitioner: Melissa Jane  PT Visit Information  Onset Date: 19  PT Insurance Information: [employer]  Total # of Visits Approved: 8  Total # of Visits to Date: 4  Plan of Care/Certification Expiration Date: 19  Progress Note Due Date: 19  Subjective  Subjective: States that last week was bad- she had traveled to a [place], was walking on concrete, and sitting for long periods. Pain was increased. Has been trying to perform some stretches, though they are painful.   Pain Screening  Patient Currently in Pain: Yes  Pain Assessment  Pain Assessment: 0-10  Pain Level: 7  Pain Type: Chronic pain  Pain Location: Back;Leg;Hip  Pain Orientation: Left  Pain Descriptors: Aching  Vital Signs  Patient Currently in Pain: Yes       Treatment Activities:                                      Exercises  Exercise 1: supine bilateral trunk rotation  x 10         *Patient has RA and fibromyalgia  Exercise 2: supine sktc with towel  5\" x 5 ea- not today  Exercise 3: supine posterior pelvic tilts x 10  Exercise 4: supine ta contraction alone 5\" x 10 , (alt ue, alt le x 10 ea)  Exercise 5: supine left hs 90-90 stretch manually (gentle)  10\" x 3   (contract/relax today)  Exercise 6: supine left frog leg stretch  30\" x 2  Exercise 7: sidelying left obers manual stretch 10\" x 3- not today  Exercise 8: seated forward bend neutral and to right x 5 ea  Exercise 9: seated posterior pelvic tilts x 10- not today  Exercise 10: seated ta contraction with sit to stand x 5  Exercise 11: seated marching x 10  Exercise 12: seated left hs curls (red) x 10- not today  Exercise 13: seated left laq  x 10  Exercise 14: standing right sidebend  x 10  Exercise 15: standing marching x 10- not today  Exercise 16: estim wtih moist heat to bilateral low back (IFC)  x 20'                                   Assessment:   Conditions Requiring Skilled Therapeutic Intervention  Body structures, Functions, Activity limitations: Decreased functional mobility ; Decreased ADL status; Decreased ROM; Decreased strength; Increased Pain;Decreased endurance;Decreased high-level IADLs  Assessment: Re-assessment today. Pt continues with low back and hip pain, though states she has no LLE numbness. Facial grimacing noted during exercises- pt instructed to keep stretches within a pain-free ROM. Has 4 further visits approved by insurance, and will continue to benefit from skilled PT intervention. Treatment Diagnosis: lumbago with radiculopathy   REQUIRES PT FOLLOW UP: Yes  Discharge Recommendations: Continue to assess pending progress      G-Code:     OutComes Score                                                     Goals:  Short term goals  Time Frame for Short term goals: 4 Weeks   Short term goal 1: Increase strength to LLE psoas to 5/5, quad and hs 5/5- progress(6/18: L hip flex 5/5, knee ext 5/5, knee flex 4+/5)  Short term goal 2: Increase left hip flex to 100 degrees without pain increase- progress(6/18: 105 degrees but with low back pain)  Short term goal 3: Patient to have no > 5/10 pain in low back and LLE- not met(6/18: States 7/10 at this time.)  Short term goal 4: Patient to be independent with HEP- progress(6/18: Has been trying stretches occasionally, but with extra pain, and \"not every day. \")  Long term goals  Time Frame for Long term goals : 6 Weeks  Long term goal 1:  Increase function to report increased ability to stand for adl's with decreased pain increase- not met(6/18: Continues with increased pain with prolonged standing, and also with prolonged sitting.)  Long term goal 2: Patient to report decreased occurrance of radiating pain episodes into LLE - progress(6/18: Pain is intermittent. \"Not so bad now that I can't walk. \")  Long term goal 3: Patient to report decreased left foot numbness by 50%- met(6/18: Denies numbness in L foot.)  Long term goal 4: Patient to demo increased function by scoring <= 35% impariment on LE Functional Scale - not met(6/18: 56.25% impairment)  Patient Goals   Patient goals : decrease episodes of LLE pain     Plan:    Plan  Times per week: 2x/week  Plan weeks: 6 weeks  Current Treatment Recommendations: Strengthening, ROM, Functional Mobility Training, Safety Education & Training, Positioning, Home Exercise Program, Manual Therapy - Soft Tissue Mobilization, Endurance Training, Neuromuscular Re-education, Pain Management, Modalities, Manual Therapy - Joint Manipulation, Patient/Caregiver Education & Training  Timed Code Treatment Minutes: 30 Minutes(+20' IFC)     Therapy Time   Individual Concurrent Group Co-treatment   Time In 1259         Time Out 1349         Minutes 50         Timed Code Treatment Minutes: 30 Minutes(+20' IFC)       Selwyn Osler, PT

## 2019-06-19 ENCOUNTER — HOSPITAL ENCOUNTER (OUTPATIENT)
Dept: PHYSICAL THERAPY | Age: 39
Setting detail: THERAPIES SERIES
Discharge: HOME | End: 2019-06-19
Payer: MEDICAID

## 2019-06-19 PROCEDURE — G0283 ELEC STIM OTHER THAN WOUND: HCPCS

## 2019-06-19 PROCEDURE — 97110 THERAPEUTIC EXERCISES: CPT

## 2019-06-19 ASSESSMENT — PAIN SCALES - GENERAL: PAINLEVEL_OUTOF10: 10

## 2019-06-19 NOTE — PROGRESS NOTES
Physical Therapy  Daily Treatment Note  Date: 2019  Patient Name: Deepti Leslie  MRN: [id number]     :   1980    Subjective:   General  Referring Practitioner: Tana Freeman  PT Visit Information  Onset Date: 19  PT Insurance Information: Debra Carlin  Total # of Visits Approved: 8  Total # of Visits to Date: 5  Plan of Care/Certification Expiration Date: 19  Progress Note Due Date: 19  Subjective: everything feels like it is flaired up today  Patient Currently in Pain: Yes  Pain Level: 10       Treatment Activities:          Exercises  Exercise 1: supine bilateral trunk rotation  x 10         *Patient has RA and fibromyalgia  Exercise 2: supine sktc with towel  5\" x 5 ea  Exercise 3: supine posterior pelvic tilts x 10  Exercise 4: supine ta contraction alone 5\" x 10 , (alt ue, alt le x 10 ea)  Exercise 5: supine left hs 90-90 stretch manually (gentle)  10\" x 3   (contract/relax today)  Exercise 6: supine left frog leg stretch  30\" x 2  Exercise 7: sidelying left obers manual stretch 10\" x 3  Exercise 8: seated forward bend neutral and to right x 5 ea  Exercise 9: seated posterior pelvic tilts x 10  Exercise 10: seated ta contraction with sit to stand x 5  Exercise 11: seated marching x 10  Exercise 12: seated left hs curls (red) x 10  Exercise 13: seated left laq  x 10  Exercise 14: standing right sidebend  x 10  Exercise 15: standing marching x 10  Exercise 16: estim wtih moist heat to bilateral low back (IFC)  x 20'  Exercise 18: ; HEP ISSUED      Assessment:   Conditions Requiring Skilled Therapeutic Intervention  Body structures, Functions, Activity limitations: Decreased functional mobility ; Decreased ADL status; Decreased ROM; Decreased strength; Increased Pain;Decreased endurance;Decreased high-level IADLs  Assessment: Patient rated pain level at 10 prior to session but able to go thru exercise routine w/o complaints or facial grimace, Exercises not performed on reassessment added back to routine today  Treatment Diagnosis: lumbago with radiculopathy   REQUIRES PT FOLLOW UP: Yes      G-Code:    Goals:  Short term goals  Time Frame for Short term goals: 4 Weeks   Short term goal 1: Increase strength to LLE psoas to 5/5, quad and hs 5/5- progress(6/18: L hip flex 5/5, knee ext 5/5, knee flex 4+/5)  Short term goal 2: Increase left hip flex to 100 degrees without pain increase- progress(6/18: 105 degrees but with low back pain)  Short term goal 3: Patient to have no > 5/10 pain in low back and LLE- not met(6/18: States 7/10 at this time.)  Short term goal 4: Patient to be independent with HEP- progress(6/18: Has been trying stretches occasionally, but with extra pain, and \"not every day. \")  Long term goals  Time Frame for Long term goals : 6 Weeks  Long term goal 1: Increase function to report increased ability to stand for adl's with decreased pain increase- not met(6/18: Continues with increased pain with prolonged standing, and also with prolonged sitting.)  Long term goal 2: Patient to report decreased occurrance of radiating pain episodes into LLE - progress(6/18: Pain is intermittent. \"Not so bad now that I can't walk. \")  Long term goal 3: Patient to report decreased left foot numbness by 50%- met(6/18: Denies numbness in L foot.)  Long term goal 4: Patient to demo increased function by scoring <= 35% impariment on LE Functional Scale - not met(6/18: 56.25% impairment)  Patient Goals   Patient goals : decrease episodes of LLE pain     Plan:    Times per week: 2x/week  Plan weeks: 6 weeks  Current Treatment Recommendations: Strengthening, ROM, Functional Mobility Training, Safety Education & Training, Positioning, Home Exercise Program, Manual Therapy - Soft Tissue Mobilization, Endurance Training, Neuromuscular Re-education, Pain Management, Modalities, Manual Therapy - Joint Manipulation, Patient/Caregiver Education & Training       Therapy Time   Individual Concurrent Group Co-treatment Time In 1332         Time Out 1422         Minutes 50         Timed Code Treatment Minutes: 130 Suma Talbot PTA    Electronically signed by Jammie Patton PTA on 6/19/2019 at 2:25 PM

## 2019-06-21 RX ORDER — TIZANIDINE 4 MG/1
4 TABLET ORAL 2 TIMES DAILY PRN
Qty: 60 TABLET | Refills: 2 | Status: SHIPPED | OUTPATIENT
Start: 2019-06-21 | End: 2019-09-19 | Stop reason: SDUPTHER

## 2019-06-26 ENCOUNTER — OFFICE VISIT (OUTPATIENT)
Dept: FAMILY MEDICINE CLINIC | Age: 39
End: 2019-06-26
Payer: MEDICAID

## 2019-06-26 VITALS — HEART RATE: 84 BPM | TEMPERATURE: 96.6 F | OXYGEN SATURATION: 98 % | WEIGHT: 293 LBS | BODY MASS INDEX: 56.7 KG/M2

## 2019-06-26 DIAGNOSIS — M06.9 RHEUMATOID ARTHRITIS INVOLVING MULTIPLE SITES, UNSPECIFIED RHEUMATOID FACTOR PRESENCE: Primary | ICD-10-CM

## 2019-06-26 DIAGNOSIS — E11.9 TYPE 2 DIABETES MELLITUS WITHOUT COMPLICATION, WITHOUT LONG-TERM CURRENT USE OF INSULIN (HCC): ICD-10-CM

## 2019-06-26 DIAGNOSIS — M79.7 FIBROMYALGIA: ICD-10-CM

## 2019-06-26 PROCEDURE — 99213 OFFICE O/P EST LOW 20 MIN: CPT | Performed by: CLINICAL NURSE SPECIALIST

## 2019-06-26 PROCEDURE — G8417 CALC BMI ABV UP PARAM F/U: HCPCS | Performed by: CLINICAL NURSE SPECIALIST

## 2019-06-26 PROCEDURE — 1036F TOBACCO NON-USER: CPT | Performed by: CLINICAL NURSE SPECIALIST

## 2019-06-26 PROCEDURE — 2022F DILAT RTA XM EVC RTNOPTHY: CPT | Performed by: CLINICAL NURSE SPECIALIST

## 2019-06-26 PROCEDURE — 3044F HG A1C LEVEL LT 7.0%: CPT | Performed by: CLINICAL NURSE SPECIALIST

## 2019-06-26 PROCEDURE — G8427 DOCREV CUR MEDS BY ELIG CLIN: HCPCS | Performed by: CLINICAL NURSE SPECIALIST

## 2019-06-26 RX ORDER — IBUPROFEN 600 MG/1
600 TABLET ORAL 3 TIMES DAILY PRN
Qty: 90 TABLET | Refills: 0 | Status: SHIPPED | OUTPATIENT
Start: 2019-06-26 | End: 2019-07-22 | Stop reason: ALTCHOICE

## 2019-06-26 ASSESSMENT — ENCOUNTER SYMPTOMS
FACIAL SWELLING: 0
ABDOMINAL PAIN: 0
CONSTIPATION: 0
DIARRHEA: 0
SHORTNESS OF BREATH: 0
WHEEZING: 0
TROUBLE SWALLOWING: 0
SORE THROAT: 0
COUGH: 0
CHEST TIGHTNESS: 0
BACK PAIN: 1
COLOR CHANGE: 0
SINUS PRESSURE: 0

## 2019-06-26 NOTE — LETTER
[address] [facility]  [address] [facility]  Phone: [phone]  Fax: [phone]    NATACHA Ibanez        June 26, 2019     Patient: Germania Crook   YOB: 1980   Date of Visit: 6/26/2019       To Whom It May Concern: It is my medical opinion that Gema Juares has current medical diagnoses of rheumatoid arthritis and fibromyalgia that causes chronic pain. Due to this pain she is unable to work. If you have any questions or concerns, please don't hesitate to call.     Sincerely,        NATACHA Ibanez

## 2019-06-26 NOTE — PROGRESS NOTES
SUBJECTIVE:  Roberto Levin is a 45 y.o. who presents today for Letter for School/Work (Patient is needing a letter for long term disability.) and Diabetes (Patient is needing to get a order for a new glucometer and diabetic shoes.)      HPI    Ms Savannah Talavera presents today needing a letter stating she has chronic disease that causes pain that affects her ability to work. She has chronic RA that affects several joints as well as fibromyalgia diagnosed and treated by Dr Holly Duffy, rheumatology. She is using tizanidine prn, just started back on Humira as well as sulfasalazine. She is also attending PT for her left lower back and leg pain, which is helping. Due to these diseases she cannot sit or stand for one single period of time. She has been disabled for 2 years, currently in appeals for continuation. Her pain is uncontrolled, has acute flare ups at times. Does not really want anything to alter her mind or mentation. Type 2 DM without complication, needing new orders for glucometer and supplies as well as diabetic shoes. Foot exam today. Testing once daily.      Past Medical History:   Diagnosis Date    Anemia     Chronic rheumatic arthritis (HCC)     Elevated blood pressure reading     Fibromyalgia     Obesity     Type 2 diabetes mellitus (HCC)      Past Surgical History:   Procedure Laterality Date    CHOLECYSTECTOMY       Family History   Problem Relation Age of Onset    High Blood Pressure Father     Diabetes Father     Atrial Fibrillation Father     Heart Failure Father     Stroke Father     Diabetes Sister     Diabetes Maternal Grandmother     Heart Attack Other      Social History     Tobacco Use    Smoking status: Never Smoker    Smokeless tobacco: Never Used   Substance Use Topics    Alcohol use: No     Current Outpatient Medications   Medication Sig Dispense Refill    ibuprofen (ADVIL;MOTRIN) 600 MG tablet Take 1 tablet by mouth 3 times daily as needed for Pain 90 tablet 0    Psychiatric/Behavioral: Negative for confusion. The patient is not nervous/anxious. OBJECTIVE:  Pulse 84   Temp 96.6 °F (35.9 °C) (Temporal)   Wt (!) 310 lb (140.6 kg)   SpO2 98%   BMI 56.70 kg/m²    Physical Exam   Constitutional: She is oriented to person, place, and time. She appears well-developed and well-nourished. No distress. HENT:   Head: Normocephalic. Cardiovascular:   Pulses:       Dorsalis pedis pulses are 2+ on the right side, and 2+ on the left side. Musculoskeletal:        Lumbar back: She exhibits pain. Right foot: There is no deformity. Left foot: There is no deformity. Feet:   Right Foot:   Protective Sensation: 4 sites tested. 4 sites sensed. Skin Integrity: Positive for callus and dry skin. Left Foot:   Protective Sensation: 4 sites tested. 4 sites sensed. Skin Integrity: Positive for callus and dry skin. Neurological: She is alert and oriented to person, place, and time. ASSESSMENT/PLAN:  1. Rheumatoid arthritis involving multiple sites, unspecified rheumatoid factor presence (HCC)  - ibuprofen (ADVIL;MOTRIN) 600 MG tablet; Take 1 tablet by mouth 3 times daily as needed for Pain  Dispense: 90 tablet; Refill: 0    2. Fibromyalgia  - ibuprofen (ADVIL;MOTRIN) 600 MG tablet; Take 1 tablet by mouth 3 times daily as needed for Pain  Dispense: 90 tablet; Refill: 0    3. Type 2 diabetes mellitus without complication, without long-term current use of insulin ([facility] [address]  - DME Order for Diabetic Testing Supplies as OP  - DME Order for Glucometer as OP  - DME Order for (Specify) as OP  - HM DIABETES FOOT EXAM    Letter given to her for LTD with statement of her RA and fibro  May add IBU but limit to acute pain only given her HTN and DM dx          Return for already scheduled.

## 2019-06-27 ENCOUNTER — TELEPHONE (OUTPATIENT)
Dept: FAMILY MEDICINE CLINIC | Age: 39
End: 2019-06-27

## 2019-06-27 ENCOUNTER — APPOINTMENT (OUTPATIENT)
Dept: PHYSICAL THERAPY | Age: 39
End: 2019-06-27
Payer: MEDICAID

## 2019-06-27 DIAGNOSIS — E11.9 TYPE 2 DIABETES MELLITUS WITHOUT COMPLICATION, WITHOUT LONG-TERM CURRENT USE OF INSULIN (HCC): Primary | ICD-10-CM

## 2019-06-27 RX ORDER — BLOOD-GLUCOSE METER
1 KIT MISCELLANEOUS DAILY
Qty: 1 KIT | Refills: 0 | Status: SHIPPED | OUTPATIENT
Start: 2019-06-27 | End: 2020-08-21 | Stop reason: SDUPTHER

## 2019-06-27 NOTE — PROGRESS NOTES
to 100 degrees without pain increase- progress(6/18: 105 degrees but with low back pain)  Short term goal 3: Patient to have no > 5/10 pain in low back and LLE- not met(6/18: States 7/10 at this time.)  Short term goal 4: Patient to be independent with HEP- progress(6/18: Has been trying stretches occasionally, but with extra pain, and \"not every day. \")    Long term goals  Time Frame for Long term goals : 6 Weeks  Long term goal 1: Increase function to report increased ability to stand for adl's with decreased pain increase- not met(6/18: Continues with increased pain with prolonged standing, and also with prolonged sitting.)  Long term goal 2: Patient to report decreased occurrance of radiating pain episodes into LLE - progress(6/18: Pain is intermittent. \"Not so bad now that I can't walk. \")  Long term goal 3: Patient to report decreased left foot numbness by 50%- met(6/18: Denies numbness in L foot.)  Long term goal 4: Patient to demo increased function by scoring <= 35% impariment on LE Functional Scale - not met(6/18: 56.25% impairment)         Electronically signed by Remi Benitez PT on 6/27/2019 at 1:33 PM

## 2019-06-27 NOTE — TELEPHONE ENCOUNTER
Patient is needing RX for diabetic meter sent to [address] [place] [address]. Pharmacy told her they needed script for this.

## 2019-07-09 ENCOUNTER — TELEPHONE (OUTPATIENT)
Dept: FAMILY MEDICINE CLINIC | Age: 39
End: 2019-07-09

## 2019-07-09 DIAGNOSIS — E11.9 TYPE 2 DIABETES MELLITUS WITHOUT COMPLICATION, WITHOUT LONG-TERM CURRENT USE OF INSULIN (HCC): Primary | ICD-10-CM

## 2019-07-09 RX ORDER — GLUCOSAMINE HCL/CHONDROITIN SU 500-400 MG
CAPSULE ORAL
Qty: 100 STRIP | Refills: 3 | Status: SHIPPED | OUTPATIENT
Start: 2019-07-09 | End: 2020-08-21 | Stop reason: SDUPTHER

## 2019-08-03 DIAGNOSIS — I10 ESSENTIAL (PRIMARY) HYPERTENSION: ICD-10-CM

## 2019-08-05 RX ORDER — LOSARTAN POTASSIUM 50 MG/1
TABLET ORAL
Qty: 30 TABLET | Refills: 5 | Status: SHIPPED | OUTPATIENT
Start: 2019-08-05 | End: 2020-04-22

## 2019-08-22 DIAGNOSIS — E11.9 TYPE 2 DIABETES MELLITUS WITHOUT COMPLICATION, WITHOUT LONG-TERM CURRENT USE OF INSULIN (HCC): ICD-10-CM

## 2019-08-23 ENCOUNTER — OFFICE VISIT (OUTPATIENT)
Dept: FAMILY MEDICINE CLINIC | Age: 39
End: 2019-08-23
Payer: MEDICAID

## 2019-08-23 VITALS
HEART RATE: 87 BPM | SYSTOLIC BLOOD PRESSURE: 126 MMHG | TEMPERATURE: 98.1 F | BODY MASS INDEX: 54.6 KG/M2 | OXYGEN SATURATION: 98 % | DIASTOLIC BLOOD PRESSURE: 82 MMHG | WEIGHT: 293 LBS

## 2019-08-23 DIAGNOSIS — I10 ESSENTIAL (PRIMARY) HYPERTENSION: ICD-10-CM

## 2019-08-23 DIAGNOSIS — M06.9 RHEUMATOID ARTHRITIS INVOLVING MULTIPLE SITES, UNSPECIFIED RHEUMATOID FACTOR PRESENCE: ICD-10-CM

## 2019-08-23 DIAGNOSIS — E28.2 PCOS (POLYCYSTIC OVARIAN SYNDROME): ICD-10-CM

## 2019-08-23 DIAGNOSIS — B07.8 COMMON WART: ICD-10-CM

## 2019-08-23 DIAGNOSIS — E11.9 TYPE 2 DIABETES MELLITUS WITHOUT COMPLICATION, WITHOUT LONG-TERM CURRENT USE OF INSULIN (HCC): Primary | ICD-10-CM

## 2019-08-23 DIAGNOSIS — M79.7 FIBROMYALGIA: ICD-10-CM

## 2019-08-23 LAB — HBA1C MFR BLD: 6.2 %

## 2019-08-23 PROCEDURE — G8427 DOCREV CUR MEDS BY ELIG CLIN: HCPCS | Performed by: FAMILY MEDICINE

## 2019-08-23 PROCEDURE — G8417 CALC BMI ABV UP PARAM F/U: HCPCS | Performed by: FAMILY MEDICINE

## 2019-08-23 PROCEDURE — 99214 OFFICE O/P EST MOD 30 MIN: CPT | Performed by: FAMILY MEDICINE

## 2019-08-23 PROCEDURE — 83036 HEMOGLOBIN GLYCOSYLATED A1C: CPT | Performed by: FAMILY MEDICINE

## 2019-08-23 PROCEDURE — 2022F DILAT RTA XM EVC RTNOPTHY: CPT | Performed by: FAMILY MEDICINE

## 2019-08-23 PROCEDURE — 3044F HG A1C LEVEL LT 7.0%: CPT | Performed by: FAMILY MEDICINE

## 2019-08-23 PROCEDURE — 1036F TOBACCO NON-USER: CPT | Performed by: FAMILY MEDICINE

## 2019-08-23 PROCEDURE — 17110 DESTRUCTION B9 LES UP TO 14: CPT | Performed by: FAMILY MEDICINE

## 2019-08-23 RX ORDER — ACETAZOLAMIDE 125 MG/1
125 TABLET ORAL DAILY
Refills: 5 | COMMUNITY
Start: 2019-08-14 | End: 2020-02-25 | Stop reason: SDUPTHER

## 2019-08-23 NOTE — PROGRESS NOTES
(COZAAR) 50 MG tablet TAKE 1 TABLET BY MOUTH EVERY DAY 30 tablet 5    ONE TOUCH LANCETS MISC 1 each by Does not apply route daily 100 each 3    blood glucose monitor strips Test 1 times a day & as needed for symptoms of irregular blood glucose. 100 strip 3    blood glucose monitor kit and supplies Test one times a day & as needed for symptoms of irregular blood glucose. 1 kit 0    glucose monitoring kit (FREESTYLE) monitoring kit 1 kit by Does not apply route daily 1 kit 0    tiZANidine (ZANAFLEX) 4 MG tablet TAKE 1 TABLET BY MOUTH 2 TIMES DAILY AS NEEDED (PAIN) . MAY CAUSE DROWSINESS. 60 tablet 2    sulfaSALAzine (AZULFIDINE) 500 MG tablet Take 2 tablets by mouth daily  3    leflunomide (ARAVA) 20 MG tablet TAKE 1 TABLET BY MOUTH EVERY DAY 30 tablet 3    FREESTYLE LANCETS MISC FOR USE TWICE DAILY 200 each 2    Adalimumab (HUMIRA) 20 MG/0.4ML PSKT Inject into the skin      glucose blood VI test strips (ASCENSIA AUTODISC VI;ONE TOUCH ULTRA TEST VI) strip 1 each by In Vitro route 2 times daily As needed. 200 each 3    furosemide (LASIX) 40 MG tablet Take 1 tablet by mouth daily 60 tablet 3     No current facility-administered medications for this visit.       Allergies   Allergen Reactions    Mushroom Extract Complex        Health Maintenance   Topic Date Due    Pneumococcal 0-64 years Vaccine (1 of 1 - PPSV23) 08/17/1986    Diabetic retinal exam  08/17/1990    Varicella Vaccine (1 of 2 - 13+ 2-dose series) 08/17/1993    HIV screen  08/17/1995    Hepatitis B Vaccine (1 of 3 - Risk 3-dose series) 08/17/1999    Cervical cancer screen  08/17/2001    Flu vaccine (1) 09/01/2019    Diabetic microalbuminuria test  02/15/2020    Lipid screen  02/15/2020    Potassium monitoring  06/01/2020    Creatinine monitoring  06/01/2020    Diabetic foot exam  06/26/2020    A1C test (Diabetic or Prediabetic)  08/23/2020    DTaP/Tdap/Td vaccine (2 - Td) 04/25/2027       Subjective:      Review of Systems  Review of Systems   Constitutional: Negative for chills and fever. HENT: Negative for congestion. Respiratory: Negative for cough, chest tightness and shortness of breath. Cardiovascular: Negative for chest pain, palpitations and leg swelling. Gastrointestinal: Negative for abdominal pain, anal bleeding, constipation, diarrhea and nausea. Genitourinary: Negative for difficulty urinating. Psychiatric/Behavioral: Negative. SeeHPI for visit specific review of symptoms. All others negative      Objective:   /82   Pulse 87   Temp 98.1 °F (36.7 °C)   Wt 298 lb 8 oz (135.4 kg)   SpO2 98%   BMI 54.60 kg/m²   Physical Exam    Physical Exam   Constitutional: She appears well-developed. Does not appear ill. Eyes: Pupils are equal, round, and reactive to light. Conjunctiva and Lids normal.  Neck: Normal range of motion. Neck supple. No masses. Neck Symmetric. Normal tracheal position. No thyroid enlargement  Cardiovascular: Normal rate and regular rhythm. Exam reveals no friction rub. Carotid arteries: no bruit observed. No murmur heard. Respiratory:  Effort normal and breath sounds normal. No respiratory distress. No wheezes. No rales. No use of accessory muscles or intercostal retractions. Abdominal: Soft. Bowel sounds are normal. exhibits no distension. There is no tenderness. There is no rebound and no guarding. Musculoskeletal: exhibits no edema. Normal gait. Neurological: alert. Psychiatric: normal mood and affect. Her behavior is normal. Normal judgement and insight observed. Skin: Inflamed warty lesion on the right index finger and right thumb  Recent Results (from the past 672 hour(s))   POCT glycosylated hemoglobin (Hb A1C)    Collection Time: 08/23/19 12:14 PM   Result Value Ref Range    Hemoglobin A1C 6.2 %               Assessment & Plan: The following diagnoses and conditions are stable with no further orders unless indicated:  1.  Type 2 diabetes mellitus without

## 2019-09-19 RX ORDER — TIZANIDINE 4 MG/1
4 TABLET ORAL 2 TIMES DAILY PRN
Qty: 60 TABLET | Refills: 2 | Status: SHIPPED | OUTPATIENT
Start: 2019-09-19 | End: 2019-12-19

## 2019-11-25 ENCOUNTER — OFFICE VISIT (OUTPATIENT)
Dept: FAMILY MEDICINE CLINIC | Age: 39
End: 2019-11-25
Payer: MEDICAID

## 2019-11-25 VITALS
BODY MASS INDEX: 48.82 KG/M2 | OXYGEN SATURATION: 99 % | SYSTOLIC BLOOD PRESSURE: 126 MMHG | WEIGHT: 293 LBS | DIASTOLIC BLOOD PRESSURE: 78 MMHG | TEMPERATURE: 96.6 F | HEIGHT: 65 IN | HEART RATE: 96 BPM

## 2019-11-25 DIAGNOSIS — G89.29 CHRONIC PAIN OF RIGHT KNEE: ICD-10-CM

## 2019-11-25 DIAGNOSIS — E11.9 TYPE 2 DIABETES MELLITUS WITHOUT COMPLICATION, WITHOUT LONG-TERM CURRENT USE OF INSULIN (HCC): ICD-10-CM

## 2019-11-25 DIAGNOSIS — Z76.0 MEDICATION REFILL: ICD-10-CM

## 2019-11-25 DIAGNOSIS — Z13.220 LIPID SCREENING: ICD-10-CM

## 2019-11-25 DIAGNOSIS — M25.561 CHRONIC PAIN OF RIGHT KNEE: ICD-10-CM

## 2019-11-25 DIAGNOSIS — J06.9 VIRAL URI: ICD-10-CM

## 2019-11-25 DIAGNOSIS — I10 ESSENTIAL (PRIMARY) HYPERTENSION: ICD-10-CM

## 2019-11-25 DIAGNOSIS — E11.9 TYPE 2 DIABETES MELLITUS WITHOUT COMPLICATION, WITHOUT LONG-TERM CURRENT USE OF INSULIN (HCC): Primary | ICD-10-CM

## 2019-11-25 LAB
ALBUMIN SERPL-MCNC: 3.8 G/DL (ref 3.5–5.2)
ALP BLD-CCNC: 75 U/L (ref 35–104)
ALT SERPL-CCNC: 12 U/L (ref 5–33)
ANION GAP SERPL CALCULATED.3IONS-SCNC: 12 MMOL/L (ref 7–19)
ANISOCYTOSIS: ABNORMAL
AST SERPL-CCNC: 11 U/L (ref 5–32)
BASOPHILS ABSOLUTE: 0 K/UL (ref 0–0.2)
BASOPHILS RELATIVE PERCENT: 0.6 % (ref 0–1)
BILIRUB SERPL-MCNC: <0.2 MG/DL (ref 0.2–1.2)
BUN BLDV-MCNC: 10 MG/DL (ref 6–20)
CALCIUM SERPL-MCNC: 9 MG/DL (ref 8.6–10)
CHLORIDE BLD-SCNC: 102 MMOL/L (ref 98–111)
CHOLESTEROL, TOTAL: 154 MG/DL (ref 160–199)
CO2: 25 MMOL/L (ref 22–29)
CREAT SERPL-MCNC: 0.5 MG/DL (ref 0.5–0.9)
EOSINOPHILS ABSOLUTE: 0.2 K/UL (ref 0–0.6)
EOSINOPHILS RELATIVE PERCENT: 3.7 % (ref 0–5)
GFR NON-AFRICAN AMERICAN: >60
GLUCOSE BLD-MCNC: 166 MG/DL (ref 74–109)
HBA1C MFR BLD: 6.2 % (ref 4–6)
HCT VFR BLD CALC: 35.1 % (ref 37–47)
HDLC SERPL-MCNC: 47 MG/DL (ref 65–121)
HEMOGLOBIN: 10.1 G/DL (ref 12–16)
HYPOCHROMIA: ABNORMAL
IMMATURE GRANULOCYTES #: 0.1 K/UL
LDL CHOLESTEROL CALCULATED: 89 MG/DL
LYMPHOCYTES ABSOLUTE: 1.9 K/UL (ref 1.1–4.5)
LYMPHOCYTES RELATIVE PERCENT: 29.4 % (ref 20–40)
MCH RBC QN AUTO: 21.8 PG (ref 27–31)
MCHC RBC AUTO-ENTMCNC: 28.8 G/DL (ref 33–37)
MCV RBC AUTO: 75.6 FL (ref 81–99)
MONOCYTES ABSOLUTE: 0.4 K/UL (ref 0–0.9)
MONOCYTES RELATIVE PERCENT: 5.5 % (ref 0–10)
NEUTROPHILS ABSOLUTE: 3.9 K/UL (ref 1.5–7.5)
NEUTROPHILS RELATIVE PERCENT: 60 % (ref 50–65)
OVALOCYTES: ABNORMAL
PDW BLD-RTO: 19.4 % (ref 11.5–14.5)
PLATELET # BLD: 277 K/UL (ref 130–400)
PLATELET SLIDE REVIEW: ADEQUATE
PMV BLD AUTO: 11.6 FL (ref 9.4–12.3)
POLYCHROMASIA: ABNORMAL
POTASSIUM SERPL-SCNC: 4.1 MMOL/L (ref 3.5–5)
RBC # BLD: 4.64 M/UL (ref 4.2–5.4)
SODIUM BLD-SCNC: 139 MMOL/L (ref 136–145)
TOTAL PROTEIN: 7.2 G/DL (ref 6.6–8.7)
TRIGL SERPL-MCNC: 91 MG/DL (ref 0–149)
WBC # BLD: 6.5 K/UL (ref 4.8–10.8)

## 2019-11-25 PROCEDURE — G8427 DOCREV CUR MEDS BY ELIG CLIN: HCPCS | Performed by: FAMILY MEDICINE

## 2019-11-25 PROCEDURE — 99214 OFFICE O/P EST MOD 30 MIN: CPT | Performed by: FAMILY MEDICINE

## 2019-11-25 PROCEDURE — 2022F DILAT RTA XM EVC RTNOPTHY: CPT | Performed by: FAMILY MEDICINE

## 2019-11-25 PROCEDURE — G8417 CALC BMI ABV UP PARAM F/U: HCPCS | Performed by: FAMILY MEDICINE

## 2019-11-25 PROCEDURE — G8484 FLU IMMUNIZE NO ADMIN: HCPCS | Performed by: FAMILY MEDICINE

## 2019-11-25 PROCEDURE — 1036F TOBACCO NON-USER: CPT | Performed by: FAMILY MEDICINE

## 2019-11-25 PROCEDURE — 3044F HG A1C LEVEL LT 7.0%: CPT | Performed by: FAMILY MEDICINE

## 2019-11-25 RX ORDER — LEFLUNOMIDE 20 MG/1
TABLET ORAL
Qty: 30 TABLET | Refills: 3 | Status: CANCELLED | OUTPATIENT
Start: 2019-11-25

## 2019-12-19 RX ORDER — TIZANIDINE 4 MG/1
4 TABLET ORAL 2 TIMES DAILY PRN
Qty: 60 TABLET | Refills: 2 | Status: SHIPPED | OUTPATIENT
Start: 2019-12-19 | End: 2020-03-20

## 2019-12-27 ENCOUNTER — APPOINTMENT (OUTPATIENT)
Dept: GENERAL RADIOLOGY | Age: 39
End: 2019-12-27
Payer: MEDICAID

## 2019-12-27 ENCOUNTER — APPOINTMENT (OUTPATIENT)
Dept: CT IMAGING | Age: 39
End: 2019-12-27
Payer: MEDICAID

## 2019-12-27 ENCOUNTER — HOSPITAL ENCOUNTER (EMERGENCY)
Age: 39
Discharge: HOME | End: 2019-12-28
Payer: MEDICAID

## 2019-12-27 DIAGNOSIS — J01.20 ACUTE ETHMOIDAL SINUSITIS, RECURRENCE NOT SPECIFIED: Primary | ICD-10-CM

## 2019-12-27 DIAGNOSIS — Z87.39 HISTORY OF FIBROMYALGIA: ICD-10-CM

## 2019-12-27 LAB
ALBUMIN SERPL-MCNC: 3.9 G/DL (ref 3.5–5.2)
ALP BLD-CCNC: 74 U/L (ref 35–104)
ALT SERPL-CCNC: 12 U/L (ref 5–33)
ANION GAP SERPL CALCULATED.3IONS-SCNC: 10 MMOL/L (ref 7–19)
ANISOCYTOSIS: ABNORMAL
AST SERPL-CCNC: 15 U/L (ref 5–32)
BASOPHILS ABSOLUTE: 0.1 K/UL (ref 0–0.2)
BASOPHILS RELATIVE PERCENT: 0.7 % (ref 0–1)
BILIRUB SERPL-MCNC: <0.2 MG/DL (ref 0.2–1.2)
BUN BLDV-MCNC: 10 MG/DL (ref 6–20)
C-REACTIVE PROTEIN: 1.83 MG/DL (ref 0–0.5)
CALCIUM SERPL-MCNC: 9.1 MG/DL (ref 8.6–10)
CHLORIDE BLD-SCNC: 103 MMOL/L (ref 98–111)
CO2: 26 MMOL/L (ref 22–29)
CREAT SERPL-MCNC: 0.8 MG/DL (ref 0.5–0.9)
EOSINOPHILS ABSOLUTE: 0.3 K/UL (ref 0–0.6)
EOSINOPHILS RELATIVE PERCENT: 3.8 % (ref 0–5)
GFR NON-AFRICAN AMERICAN: >60
GLUCOSE BLD-MCNC: 114 MG/DL (ref 74–109)
HCT VFR BLD CALC: 36.6 % (ref 37–47)
HEMOGLOBIN: 10.5 G/DL (ref 12–16)
HYPOCHROMIA: ABNORMAL
IMMATURE GRANULOCYTES #: 0 K/UL
LIPASE: 26 U/L (ref 13–60)
LYMPHOCYTES ABSOLUTE: 2.7 K/UL (ref 1.1–4.5)
LYMPHOCYTES RELATIVE PERCENT: 34.6 % (ref 20–40)
MCH RBC QN AUTO: 21.3 PG (ref 27–31)
MCHC RBC AUTO-ENTMCNC: 28.7 G/DL (ref 33–37)
MCV RBC AUTO: 74.4 FL (ref 81–99)
MICROCYTES: ABNORMAL
MONOCYTES ABSOLUTE: 0.4 K/UL (ref 0–0.9)
MONOCYTES RELATIVE PERCENT: 5.5 % (ref 0–10)
NEUTROPHILS ABSOLUTE: 4.3 K/UL (ref 1.5–7.5)
NEUTROPHILS RELATIVE PERCENT: 55.1 % (ref 50–65)
OVALOCYTES: ABNORMAL
PDW BLD-RTO: 17.7 % (ref 11.5–14.5)
PLATELET # BLD: 294 K/UL (ref 130–400)
PLATELET SLIDE REVIEW: ADEQUATE
PMV BLD AUTO: 10.5 FL (ref 9.4–12.3)
POIKILOCYTES: ABNORMAL
POTASSIUM REFLEX MAGNESIUM: 4.1 MMOL/L (ref 3.5–5)
RBC # BLD: 4.92 M/UL (ref 4.2–5.4)
SEDIMENTATION RATE, ERYTHROCYTE: 31 MM/HR (ref 0–20)
SODIUM BLD-SCNC: 139 MMOL/L (ref 136–145)
TARGET CELLS: ABNORMAL
TEAR DROP CELLS: ABNORMAL
TOTAL PROTEIN: 7.5 G/DL (ref 6.6–8.7)
TROPONIN: <0.01 NG/ML (ref 0–0.03)
TROPONIN: <0.01 NG/ML (ref 0–0.03)
WBC # BLD: 7.7 K/UL (ref 4.8–10.8)

## 2019-12-27 PROCEDURE — 85025 COMPLETE CBC W/AUTO DIFF WBC: CPT

## 2019-12-27 PROCEDURE — 80053 COMPREHEN METABOLIC PANEL: CPT

## 2019-12-27 PROCEDURE — 93005 ELECTROCARDIOGRAM TRACING: CPT | Performed by: NURSE PRACTITIONER

## 2019-12-27 PROCEDURE — 86140 C-REACTIVE PROTEIN: CPT

## 2019-12-27 PROCEDURE — 6360000002 HC RX W HCPCS: Performed by: NURSE PRACTITIONER

## 2019-12-27 PROCEDURE — 83690 ASSAY OF LIPASE: CPT

## 2019-12-27 PROCEDURE — 99284 EMERGENCY DEPT VISIT MOD MDM: CPT

## 2019-12-27 PROCEDURE — 84484 ASSAY OF TROPONIN QUANT: CPT

## 2019-12-27 PROCEDURE — 96374 THER/PROPH/DIAG INJ IV PUSH: CPT

## 2019-12-27 PROCEDURE — 85652 RBC SED RATE AUTOMATED: CPT

## 2019-12-27 PROCEDURE — 36415 COLL VENOUS BLD VENIPUNCTURE: CPT

## 2019-12-27 PROCEDURE — 70450 CT HEAD/BRAIN W/O DYE: CPT

## 2019-12-27 PROCEDURE — 71045 X-RAY EXAM CHEST 1 VIEW: CPT

## 2019-12-27 RX ORDER — KETOROLAC TROMETHAMINE 30 MG/ML
30 INJECTION, SOLUTION INTRAMUSCULAR; INTRAVENOUS ONCE
Status: COMPLETED | OUTPATIENT
Start: 2019-12-27 | End: 2019-12-27

## 2019-12-27 RX ADMIN — KETOROLAC TROMETHAMINE 30 MG: 30 INJECTION, SOLUTION INTRAMUSCULAR; INTRAVENOUS at 22:54

## 2019-12-27 ASSESSMENT — PAIN SCALES - GENERAL
PAINLEVEL_OUTOF10: 7
PAINLEVEL_OUTOF10: 8

## 2019-12-28 VITALS
HEIGHT: 62 IN | TEMPERATURE: 98.7 F | BODY MASS INDEX: 53.92 KG/M2 | WEIGHT: 293 LBS | OXYGEN SATURATION: 95 % | DIASTOLIC BLOOD PRESSURE: 90 MMHG | SYSTOLIC BLOOD PRESSURE: 137 MMHG | RESPIRATION RATE: 20 BRPM | HEART RATE: 85 BPM

## 2019-12-28 PROCEDURE — 36415 COLL VENOUS BLD VENIPUNCTURE: CPT

## 2019-12-28 PROCEDURE — 84484 ASSAY OF TROPONIN QUANT: CPT

## 2019-12-28 RX ORDER — AMOXICILLIN AND CLAVULANATE POTASSIUM 875; 125 MG/1; MG/1
1 TABLET, FILM COATED ORAL 2 TIMES DAILY
Qty: 20 TABLET | Refills: 0 | Status: SHIPPED | OUTPATIENT
Start: 2019-12-28 | End: 2020-01-07

## 2019-12-28 RX ORDER — PREDNISONE 20 MG/1
20 TABLET ORAL 2 TIMES DAILY
Qty: 10 TABLET | Refills: 0 | Status: SHIPPED | OUTPATIENT
Start: 2019-12-28 | End: 2020-01-02

## 2019-12-28 ASSESSMENT — ENCOUNTER SYMPTOMS
CONSTIPATION: 0
SORE THROAT: 0
ABDOMINAL DISTENTION: 0
CHEST TIGHTNESS: 0
SINUS PAIN: 0
TROUBLE SWALLOWING: 0
ABDOMINAL PAIN: 0
DIARRHEA: 0
COUGH: 0
STRIDOR: 0
BACK PAIN: 0
EYE ITCHING: 0
COLOR CHANGE: 0
EYE REDNESS: 0
BLOOD IN STOOL: 0
WHEEZING: 0
SHORTNESS OF BREATH: 0
EYE PAIN: 0
EYE DISCHARGE: 0
VOMITING: 0
ALLERGIC/IMMUNOLOGIC NEGATIVE: 1
PHOTOPHOBIA: 0
NAUSEA: 0

## 2019-12-29 LAB
EKG P AXIS: 29 DEGREES
EKG P AXIS: 56 DEGREES
EKG P-R INTERVAL: 130 MS
EKG P-R INTERVAL: 162 MS
EKG Q-T INTERVAL: 404 MS
EKG Q-T INTERVAL: 408 MS
EKG QRS DURATION: 76 MS
EKG QRS DURATION: 76 MS
EKG QTC CALCULATION (BAZETT): 434 MS
EKG QTC CALCULATION (BAZETT): 439 MS
EKG T AXIS: 19 DEGREES
EKG T AXIS: 23 DEGREES

## 2019-12-29 PROCEDURE — 93010 ELECTROCARDIOGRAM REPORT: CPT | Performed by: INTERNAL MEDICINE

## 2020-01-18 ENCOUNTER — OFFICE VISIT (OUTPATIENT)
Dept: URGENT CARE | Age: 40
End: 2020-01-18
Payer: MEDICAID

## 2020-01-18 VITALS
OXYGEN SATURATION: 98 % | TEMPERATURE: 98.6 F | HEART RATE: 85 BPM | SYSTOLIC BLOOD PRESSURE: 138 MMHG | DIASTOLIC BLOOD PRESSURE: 86 MMHG | BODY MASS INDEX: 53.92 KG/M2 | RESPIRATION RATE: 18 BRPM | WEIGHT: 293 LBS | HEIGHT: 62 IN

## 2020-01-18 LAB
INFLUENZA A ANTIBODY: NEGATIVE
INFLUENZA B ANTIBODY: NEGATIVE
S PYO AG THROAT QL: NORMAL

## 2020-01-18 PROCEDURE — 99213 OFFICE O/P EST LOW 20 MIN: CPT | Performed by: NURSE PRACTITIONER

## 2020-01-18 PROCEDURE — G8484 FLU IMMUNIZE NO ADMIN: HCPCS | Performed by: NURSE PRACTITIONER

## 2020-01-18 PROCEDURE — 1036F TOBACCO NON-USER: CPT | Performed by: NURSE PRACTITIONER

## 2020-01-18 PROCEDURE — G8417 CALC BMI ABV UP PARAM F/U: HCPCS | Performed by: NURSE PRACTITIONER

## 2020-01-18 PROCEDURE — 87804 INFLUENZA ASSAY W/OPTIC: CPT | Performed by: NURSE PRACTITIONER

## 2020-01-18 PROCEDURE — G8427 DOCREV CUR MEDS BY ELIG CLIN: HCPCS | Performed by: NURSE PRACTITIONER

## 2020-01-18 PROCEDURE — 87880 STREP A ASSAY W/OPTIC: CPT | Performed by: NURSE PRACTITIONER

## 2020-01-18 ASSESSMENT — ENCOUNTER SYMPTOMS
SORE THROAT: 1
EYE REDNESS: 0
EYE DISCHARGE: 0
COUGH: 1
ABDOMINAL PAIN: 0
WHEEZING: 0
VOMITING: 0
DIARRHEA: 1

## 2020-01-18 NOTE — PATIENT INSTRUCTIONS
Patient Education        Gastroenteritis: Care Instructions  Your Care Instructions    Gastroenteritis is an illness that may cause nausea, vomiting, and diarrhea. It is sometimes called \"stomach flu. \" It can be caused by bacteria or a virus. You will probably begin to feel better in 1 to 2 days. In the meantime, get plenty of rest and make sure you do not become dehydrated. Dehydration occurs when your body loses too much fluid. Follow-up care is a key part of your treatment and safety. Be sure to make and go to all appointments, and call your doctor if you are having problems. It's also a good idea to know your test results and keep a list of the medicines you take. How can you care for yourself at home? · If your doctor prescribed antibiotics, take them as directed. Do not stop taking them just because you feel better. You need to take the full course of antibiotics. · Drink plenty of fluids to prevent dehydration, enough so that your urine is light yellow or clear like water. Choose water and other caffeine-free clear liquids until you feel better. If you have kidney, heart, or liver disease and have to limit fluids, talk with your doctor before you increase your fluid intake. · Drink fluids slowly, in frequent, small amounts, because drinking too much too fast can cause vomiting. · Begin eating mild foods, such as dry toast, yogurt, applesauce, bananas, and rice. Avoid spicy, hot, or high-fat foods, and do not drink alcohol or caffeine for a day or two. Do not drink milk or eat ice cream until you are feeling better. How to prevent gastroenteritis  · Keep hot foods hot and cold foods cold. · Do not eat meats, dressings, salads, or other foods that have been kept at room temperature for more than 2 hours. · Use a thermometer to check your refrigerator. It should be between 34°F and 40°F.  · Defrost meats in the refrigerator or microwave, not on the kitchen counter.   · Keep your hands and your kitchen with viral gastorenteritis. 2. Increase oral fluids and rest. Advance diet as tolerated. 3. If worsening this weekend, go to Ed. 4. Pt informed that rapid strep and flu are neg. 5. Recheck with Dr. Pauline Boyce as planned.

## 2020-01-18 NOTE — PROGRESS NOTES
09/01/2019    Diabetic microalbuminuria test  02/15/2020    Diabetic foot exam  06/26/2020    A1C test (Diabetic or Prediabetic)  11/25/2020    Lipid screen  11/25/2020    Potassium monitoring  12/27/2020    Creatinine monitoring  12/27/2020    DTaP/Tdap/Td vaccine (2 - Td) 04/25/2027       Subjective:     Review of Systems   Constitutional: Negative for activity change and fever. HENT: Positive for congestion and sore throat. Eyes: Negative for discharge and redness. Respiratory: Positive for cough. Negative for wheezing. Gastrointestinal: Positive for diarrhea. Negative for abdominal pain and vomiting. Skin: Negative for rash. Objective:     Physical Exam  Constitutional:       Appearance: She is well-developed. HENT:      Head: Normocephalic and atraumatic. Right Ear: Hearing, tympanic membrane, ear canal and external ear normal.      Left Ear: Hearing, tympanic membrane, ear canal and external ear normal.      Nose: Rhinorrhea present. Mouth/Throat:      Pharynx: Uvula midline. Posterior oropharyngeal erythema present. Eyes:      General:         Right eye: No discharge. Left eye: No discharge. Conjunctiva/sclera: Conjunctivae normal.      Pupils: Pupils are equal, round, and reactive to light. Neck:      Musculoskeletal: Normal range of motion and neck supple. Cardiovascular:      Rate and Rhythm: Normal rate and regular rhythm. Heart sounds: Normal heart sounds. Pulmonary:      Effort: Pulmonary effort is normal.      Breath sounds: Normal breath sounds. Abdominal:      General: Bowel sounds are normal.      Tenderness: There is no tenderness. Musculoskeletal: Normal range of motion. Skin:     General: Skin is warm. Findings: No rash. Neurological:      Mental Status: She is alert and oriented to person, place, and time. Psychiatric:         Behavior: Behavior normal. Behavior is cooperative. Thought Content:  Thought content take the full course of antibiotics. · Drink plenty of fluids to prevent dehydration, enough so that your urine is light yellow or clear like water. Choose water and other caffeine-free clear liquids until you feel better. If you have kidney, heart, or liver disease and have to limit fluids, talk with your doctor before you increase your fluid intake. · Drink fluids slowly, in frequent, small amounts, because drinking too much too fast can cause vomiting. · Begin eating mild foods, such as dry toast, yogurt, applesauce, bananas, and rice. Avoid spicy, hot, or high-fat foods, and do not drink alcohol or caffeine for a day or two. Do not drink milk or eat ice cream until you are feeling better. How to prevent gastroenteritis  · Keep hot foods hot and cold foods cold. · Do not eat meats, dressings, salads, or other foods that have been kept at room temperature for more than 2 hours. · Use a thermometer to check your refrigerator. It should be between 34°F and 40°F.  · Defrost meats in the refrigerator or microwave, not on the kitchen counter. · Keep your hands and your kitchen clean. Wash your hands, cutting boards, and countertops with hot soapy water frequently. · Cook meat until it is well done. · Do not eat raw eggs or uncooked sauces made with raw eggs. · Do not take chances. If food looks or tastes spoiled, throw it out. When should you call for help? Call 911 anytime you think you may need emergency care. For example, call if:    · You vomit blood or what looks like coffee grounds.     · You passed out (lost consciousness).     · You pass maroon or very bloody stools.    Call your doctor now or seek immediate medical care if:    · You have severe belly pain.     · You have signs of needing more fluids.  You have sunken eyes, a dry mouth, and pass only a little dark urine.     · You feel like you are going to faint.     · You have increased belly pain that does not go away in 1 to 2 days.     · You have new or increased nausea, or you are vomiting.     · You have a new or higher fever.     · Your stools are black and tarlike or have streaks of blood.    Watch closely for changes in your health, and be sure to contact your doctor if:    · You are dizzy or lightheaded.     · You urinate less than usual, or your urine is dark yellow or brown.     · You do not feel better with each day that goes by. Where can you learn more? Go to [email or web address]://[email or web address]. org and sign in to your [employer] account. Enter N142 in the [employer] Payment box to learn more about \"Gastroenteritis: Care Instructions. \"     If you do not have an account, please click on the \"Sign Up Now\" link. Current as of: June 9, 2019  Content Version: 12.3  © 7014-8849 Healthwise, Incorporated. Care instructions adapted under license by [facility] ([facility]). If you have questions about a medical condition or this instruction, always ask your healthcare professional. [place] [address] any warranty or liability for your use of this information. 1. Discussed that symptoms are consistent with viral gastorenteritis. 2. Increase oral fluids and rest. Advance diet as tolerated. 3. If worsening this weekend, go to Ed. 4. Pt informed that rapid strep and flu are neg. 5. Recheck with Dr. Yara Rinaldi as planned.         Electronically signed by NATACHA Dumont NP on 1/18/2020 at 11:41 AM

## 2020-02-25 ENCOUNTER — OFFICE VISIT (OUTPATIENT)
Dept: FAMILY MEDICINE CLINIC | Age: 40
End: 2020-02-25
Payer: MEDICAID

## 2020-02-25 VITALS
HEART RATE: 98 BPM | OXYGEN SATURATION: 99 % | WEIGHT: 293 LBS | DIASTOLIC BLOOD PRESSURE: 74 MMHG | SYSTOLIC BLOOD PRESSURE: 136 MMHG | TEMPERATURE: 97.2 F | BODY MASS INDEX: 56.52 KG/M2

## 2020-02-25 PROCEDURE — 3046F HEMOGLOBIN A1C LEVEL >9.0%: CPT | Performed by: FAMILY MEDICINE

## 2020-02-25 PROCEDURE — G8417 CALC BMI ABV UP PARAM F/U: HCPCS | Performed by: FAMILY MEDICINE

## 2020-02-25 PROCEDURE — 2022F DILAT RTA XM EVC RTNOPTHY: CPT | Performed by: FAMILY MEDICINE

## 2020-02-25 PROCEDURE — 99214 OFFICE O/P EST MOD 30 MIN: CPT | Performed by: FAMILY MEDICINE

## 2020-02-25 PROCEDURE — G8427 DOCREV CUR MEDS BY ELIG CLIN: HCPCS | Performed by: FAMILY MEDICINE

## 2020-02-25 PROCEDURE — 1036F TOBACCO NON-USER: CPT | Performed by: FAMILY MEDICINE

## 2020-02-25 PROCEDURE — G8484 FLU IMMUNIZE NO ADMIN: HCPCS | Performed by: FAMILY MEDICINE

## 2020-02-25 RX ORDER — SULFASALAZINE 500 MG/1
1000 TABLET ORAL DAILY
Qty: 180 TABLET | Refills: 0 | Status: SHIPPED | OUTPATIENT
Start: 2020-02-25 | End: 2020-08-21 | Stop reason: ALTCHOICE

## 2020-02-25 RX ORDER — ACETAZOLAMIDE 125 MG/1
125 TABLET ORAL DAILY
Qty: 90 TABLET | Refills: 5 | Status: SHIPPED | OUTPATIENT
Start: 2020-02-25 | End: 2020-08-21 | Stop reason: ALTCHOICE

## 2020-02-25 RX ORDER — LEFLUNOMIDE 20 MG/1
TABLET ORAL
Qty: 90 TABLET | Refills: 0 | Status: SHIPPED | OUTPATIENT
Start: 2020-02-25 | End: 2020-08-21 | Stop reason: ALTCHOICE

## 2020-02-25 NOTE — PROGRESS NOTES
Neurological:      Mental Status: She is alert. Psychiatric:         Behavior: Behavior normal.           No results found for this or any previous visit (from the past 672 hour(s)). Assessment & Plan: The following diagnoses and conditions are stable with no further orders unless indicated:  1. Type 2 diabetes mellitus without complication, without long-term current use of insulin ([facility])  Lab Results   Component Value Date    LABA1C 6.2 (H) 11/25/2019    LABA1C 6.2 08/23/2019    LABA1C 6.5 (H) 02/15/2019     Lab Results   Component Value Date    LABMICR <1.20 02/15/2019    LDLCALC 89 11/25/2019    CREATININE 0.8 12/27/2019     Discussed lifestyle changes such as diet and exercise. Recommended eliminate processed food from diet such as sugar and fried foods. Recommended exercising at least 150 minutes/week. Try to do full body resistance training twice a week as well. Offered suggestions for calorie counting such as phone apps and online resources such as [employer] fitness pal and Lose it. Discussed healthy weight. We will need to check following labs prior to next visit    - Hemoglobin A1C; Future  - Comprehensive Metabolic Panel; Future  - Microalbumin / Creatinine Urine Ratio; Future    2. Medication refill  Advised her to contact her rheumatologist.  Explained to her I do not manage rheumatoid arthritis and would need to her to follow-up with her rheumatologist on a regular basis and request further refills from them  - leflunomide (ARAVA) 20 MG tablet; TAKE 1 TABLET BY MOUTH EVERY DAY  Dispense: 90 tablet; Refill: 0    3. Essential (primary) hypertension  BP Readings from Last 3 Encounters:   02/25/20 136/74   01/18/20 138/86   12/28/19 (!) 137/90     Blood pressure stable continue with current medication  - CBC Auto Differential; Future    4. Fibromyalgia  Overall her symptoms are stable.     5. Idiopathic intracranial hypertension  Advised her to follow-up with her neurologist.  She should

## 2020-02-27 ASSESSMENT — ENCOUNTER SYMPTOMS
CHEST TIGHTNESS: 0
CONSTIPATION: 0
ABDOMINAL PAIN: 0
BACK PAIN: 1
DIARRHEA: 0
NAUSEA: 0
ANAL BLEEDING: 0
COUGH: 0
SHORTNESS OF BREATH: 0

## 2020-03-11 ENCOUNTER — OFFICE VISIT (OUTPATIENT)
Dept: URGENT CARE | Age: 40
End: 2020-03-11
Payer: MEDICAID

## 2020-03-11 VITALS
RESPIRATION RATE: 18 BRPM | SYSTOLIC BLOOD PRESSURE: 155 MMHG | TEMPERATURE: 98.2 F | HEART RATE: 88 BPM | WEIGHT: 293 LBS | DIASTOLIC BLOOD PRESSURE: 86 MMHG | HEIGHT: 62 IN | BODY MASS INDEX: 53.92 KG/M2 | OXYGEN SATURATION: 97 %

## 2020-03-11 LAB
INFLUENZA A ANTIBODY: NEGATIVE
INFLUENZA B ANTIBODY: NEGATIVE
S PYO AG THROAT QL: NORMAL

## 2020-03-11 PROCEDURE — 87880 STREP A ASSAY W/OPTIC: CPT | Performed by: NURSE PRACTITIONER

## 2020-03-11 PROCEDURE — 87804 INFLUENZA ASSAY W/OPTIC: CPT | Performed by: NURSE PRACTITIONER

## 2020-03-11 PROCEDURE — 99213 OFFICE O/P EST LOW 20 MIN: CPT | Performed by: NURSE PRACTITIONER

## 2020-03-11 RX ORDER — LEVOCETIRIZINE DIHYDROCHLORIDE 5 MG/1
5 TABLET, FILM COATED ORAL NIGHTLY
Qty: 30 TABLET | Refills: 0 | Status: SHIPPED | OUTPATIENT
Start: 2020-03-11 | End: 2020-04-10

## 2020-03-11 RX ORDER — FLUTICASONE PROPIONATE 50 MCG
2 SPRAY, SUSPENSION (ML) NASAL DAILY
Qty: 1 BOTTLE | Refills: 0 | Status: SHIPPED | OUTPATIENT
Start: 2020-03-11 | End: 2020-09-04

## 2020-03-11 RX ORDER — METHYLPREDNISOLONE 4 MG/1
TABLET ORAL
Qty: 1 KIT | Refills: 0 | Status: SHIPPED | OUTPATIENT
Start: 2020-03-11 | End: 2020-08-21 | Stop reason: ALTCHOICE

## 2020-03-11 ASSESSMENT — ENCOUNTER SYMPTOMS
EYES NEGATIVE: 1
VOMITING: 0
WHEEZING: 0
SORE THROAT: 0
DIARRHEA: 0
COUGH: 1
NAUSEA: 0
ABDOMINAL PAIN: 0
SINUS PRESSURE: 0
RHINORRHEA: 1
SHORTNESS OF BREATH: 0

## 2020-03-11 ASSESSMENT — VISUAL ACUITY: OU: 1

## 2020-03-11 NOTE — PROGRESS NOTES
[facility] URGENT CARE  [address] [place] [address] [address]  [address] [place] [id number]  Dept: [phone]  Loc: Cesar Truong is a 44 y.o. female who presents today for her medical conditions/complaintsas noted below. Eliza Faulkner is c/o of Rash (on back and chest); Cough; Congestion; and Fatigue        HPI:     Rash   This is a new problem. The current episode started 1 to 4 weeks ago. The problem has been waxing and waning since onset. The affected locations include the chest, torso and back. The rash is characterized by redness and itchiness. It is unknown if there was an exposure to a precipitant. Associated symptoms include congestion, coughing, fatigue and rhinorrhea. Pertinent negatives include no anorexia, diarrhea, fever, joint pain, shortness of breath, sore throat or vomiting. Past treatments include nothing. The treatment provided no relief. Her past medical history is significant for allergies. Cough   This is a new problem. The current episode started in the past 7 days. The problem has been unchanged. The problem occurs every few minutes. The cough is non-productive. Associated symptoms include ear pain, nasal congestion, a rash and rhinorrhea. Pertinent negatives include no chills, ear congestion, fever, headaches, myalgias, sore throat, shortness of breath or wheezing. Associated symptoms comments: Right ear. Nothing aggravates the symptoms. She has tried rest and body position changes for the symptoms. The treatment provided mild relief. Her past medical history is significant for environmental allergies. Fatigue   This is a new problem. The current episode started in the past 7 days. The problem occurs intermittently. The problem has been unchanged. Associated symptoms include congestion, coughing, fatigue and a rash. Pertinent negatives include no abdominal pain, anorexia, arthralgias, chills, fever, headaches, myalgias, nausea, sore throat or vomiting. Nothing aggravates the symptoms. She has tried nothing for the symptoms. The treatment provided no relief. She is having an intermittent red rash to chest and back off and on for 2 weeks. She notes the areas itch at times but them wax and wane and disappear and re appear in different areas. She has had no shortness of breath or difficulty swallowing or breathing. Past Medical History:   Diagnosis Date    Anemia     Chronic rheumatic arthritis (HCC)     Elevated blood pressure reading     Fibromyalgia     Obesity     Type 2 diabetes mellitus (HCC)      Past Surgical History:   Procedure Laterality Date    CHOLECYSTECTOMY         Family History   Problem Relation Age of Onset    High Blood Pressure Father     Diabetes Father     Atrial Fibrillation Father     Heart Failure Father     Stroke Father     Diabetes Sister     Diabetes Maternal Grandmother     Heart Attack Other        Social History     Tobacco Use    Smoking status: Never Smoker    Smokeless tobacco: Never Used   Substance Use Topics    Alcohol use: Yes     Comment: occ      Current Outpatient Medications   Medication Sig Dispense Refill    methylPREDNISolone (MEDROL DOSEPACK) 4 MG tablet Take by mouth. 1 kit 0    levocetirizine (XYZAL) 5 MG tablet Take 1 tablet by mouth nightly 30 tablet 0    fluticasone (FLONASE) 50 MCG/ACT nasal spray 2 sprays by Each Nostril route daily 1 Bottle 0    sulfaSALAzine (AZULFIDINE) 500 MG tablet Take 2 tablets by mouth daily 180 tablet 0    leflunomide (ARAVA) 20 MG tablet TAKE 1 TABLET BY MOUTH EVERY DAY 90 tablet 0    acetaZOLAMIDE (DIAMOX) 125 MG tablet Take 1 tablet by mouth daily 90 tablet 5    tiZANidine (ZANAFLEX) 4 MG tablet TAKE 1 TABLET BY MOUTH 2 TIMES DAILY AS NEEDED (PAIN) . MAY CAUSE DROWSINESS.  60 tablet 2    diclofenac sodium 1 % GEL Apply 4 g topically 4 times daily 4 Tube 1    losartan (COZAAR) 50 MG tablet TAKE 1 TABLET BY MOUTH EVERY DAY 30 tablet 5    ONE TOUCH LANCETS Negative. Respiratory: Positive for cough. Negative for shortness of breath and wheezing. Gastrointestinal: Negative for abdominal pain, anorexia, diarrhea, nausea and vomiting. Musculoskeletal: Negative for arthralgias, joint pain and myalgias. Skin: Positive for rash. Allergic/Immunologic: Positive for environmental allergies. Neurological: Negative for headaches. Hematological: Negative.        :Objective      Physical Exam  Vitals signs and nursing note reviewed. Constitutional:       General: She is awake. She is not in acute distress. Appearance: Normal appearance. She is well-developed and well-groomed. She is morbidly obese. She is not ill-appearing or toxic-appearing. HENT:      Head: Normocephalic. Right Ear: Hearing, tympanic membrane, ear canal and external ear normal.      Left Ear: Hearing, tympanic membrane, ear canal and external ear normal.      Nose: Rhinorrhea present. Rhinorrhea is clear. Right Turbinates: Swollen. Left Turbinates: Swollen. Right Sinus: No maxillary sinus tenderness or frontal sinus tenderness. Left Sinus: No maxillary sinus tenderness or frontal sinus tenderness. Mouth/Throat:      Lips: Pink. Mouth: Mucous membranes are moist.      Pharynx: Oropharynx is clear. Uvula midline. Posterior oropharyngeal erythema present. Tonsils: 0 on the right. 0 on the left. Eyes:      General: Vision grossly intact. Neck:      Musculoskeletal: Neck supple. Trachea: Phonation normal.   Cardiovascular:      Rate and Rhythm: Normal rate and regular rhythm. Heart sounds: Normal heart sounds, S1 normal and S2 normal. No murmur. No friction rub. No gallop. Pulmonary:      Effort: Pulmonary effort is normal. No respiratory distress. Breath sounds: Normal breath sounds and air entry. No wheezing, rhonchi or rales. Musculoskeletal:         General: No tenderness or deformity.    Lymphadenopathy:      Head:      Right side of head: No tonsillar adenopathy. Left side of head: No tonsillar adenopathy. Skin:     General: Skin is warm and dry. Capillary Refill: Capillary refill takes less than 2 seconds. Findings: Rash present. Rash is urticarial.      Comments: Urticarial rash to mid chest and upper back and right upper shoulder area, rash blanches to pressure   Neurological:      General: No focal deficit present. Mental Status: She is alert, oriented to person, place, and time and easily aroused. Mental status is at baseline. Psychiatric:         Attention and Perception: Attention normal.         Mood and Affect: Mood normal.         Speech: Speech normal.         Behavior: Behavior normal. Behavior is cooperative. BP (!) 155/86   Pulse 88   Temp 98.2 °F (36.8 °C)   Resp 18   Ht 5' 2\" (1.575 m)   Wt 298 lb (135.2 kg)   LMP 2020   SpO2 97%   BMI 54.50 kg/m²     :Assessment       Diagnosis Orders   1. Cough  POCT rapid strep A    POCT Influenza A/B   2. Allergic rhinitis, unspecified seasonality, unspecified trigger     3. Urticaria, acute         :Plan      Orders Placed This Encounter   Procedures    POCT rapid strep A    POCT Influenza A/B     Results for orders placed or performed in visit on 20   POCT rapid strep A   Result Value Ref Range    Strep A Ag None Detected None Detected   POCT Influenza A/B   Result Value Ref Range    Influenza A Ab Negative     Influenza B Ab Negative        No follow-ups on file. Orders Placed This Encounter   Medications    methylPREDNISolone (MEDROL DOSEPACK) 4 MG tablet     Sig: Take by mouth.      Dispense:  1 kit     Refill:  0    levocetirizine (XYZAL) 5 MG tablet     Sig: Take 1 tablet by mouth nightly     Dispense:  30 tablet     Refill:  0    fluticasone (FLONASE) 50 MCG/ACT nasal spray     Si sprays by Each Nostril route daily     Dispense:  1 Bottle     Refill:  0        Patient Instructions     Plenty of fluids  Rest  OTC giving an epinephrine shot call  911, even if you feel better.   Call 911 if:    · You have symptoms of a severe allergic reaction. These may include:  ? Sudden raised, red areas (hives) all over your body. ? Swelling of the throat, mouth, lips, or tongue. ? Trouble breathing. ? Passing out (losing consciousness). Or you may feel very lightheaded or suddenly feel weak, confused, or restless.     · You have been given an epinephrine shot, even if you feel better.    Call your doctor now or seek immediate medical care if:    · You have symptoms of an allergic reaction, such as:  ? A rash or hives (raised, red areas on the skin). ? Itching. ? Swelling. ? Belly pain, nausea, or vomiting.    Watch closely for changes in your health, and be sure to contact your doctor if:    · You do not get better as expected. Where can you learn more? Go to https://Solaria.BioMedFlex. org and sign in to your [employer] SEQ account. Enter P293 in the [facility] box to learn more about \"Allergies: Care Instructions. \"     If you do not have an account, please click on the \"Sign Up Now\" link. Current as of: April 7, 2019  Content Version: 12.3  © 2366-2769 Healthwise, Incorporated. Care instructions adapted under license by [facility] ([facility]). If you have questions about a medical condition or this instruction, always ask your healthcare professional. [place] [address] any warranty or liability for your use of this information. Patient Education        Hives: Care Instructions  Your Care Instructions  Hives are raised, red, itchy patches of skin. They are also called wheals or welts. They usually have red borders and pale centers. Hives range in size from ¼ inch to 3 inches or more across. They may seem to move from place to place on the skin. Several hives may form a large area of raised, red skin.   You can get hives after an insect sting, after taking medicine or eating certain foods, or because of infection or stress. Other causes include plants, things you breathe in, makeup, heat, cold, sunlight, and latex. You cannot spread hives to other people. Hives may last a few minutes or a few days, but a single spot may last less than 36 hours. Follow-up care is a key part of your treatment and safety. Be sure to make and go to all appointments, and call your doctor if you are having problems. It's also a good idea to know your test results and keep a list of the medicines you take. How can you care for yourself at home? · Avoid whatever you think may have caused your hives, such as a certain food or medicine. However, you may not know the cause. · Put a cool, wet towel on the area to relieve itching. · Take an over-the-counter antihistamine, such as diphenhydramine (Benadryl), cetirizine (Zyrtec), or loratadine (Claritin), to help stop the hives and calm the itching. Read and follow directions on the label. These medicines can make you feel sleepy. Do not drive while using them. · Stay away from strong soaps, detergents, and chemicals. These can make itching worse. When should you call for help? Call 911 anytime you think you may need emergency care. For example, call if:    · You have symptoms of a severe allergic reaction. These may include:  ? Sudden raised, red areas (hives) all over your body. ? Swelling of the throat, mouth, lips, or tongue. ? Trouble breathing. ? Passing out (losing consciousness). Or you may feel very lightheaded or suddenly feel weak, confused, or restless.    Call your doctor now or seek immediate medical care if:    · You have symptoms of an allergic reaction, such as:  ? A rash or hives (raised, red areas on the skin). ? Itching. ? Swelling. ?  Belly pain, nausea, or vomiting.     · You get hives after you start a new medicine.     · Hives have not gone away after 24 hours.    Watch closely for changes in your health, and be sure to contact your doctor if:    voiced understanding.        Electronically signed by NATACHA Lou CNP on 3/11/2020 at 2:12 PM

## 2020-03-11 NOTE — PATIENT INSTRUCTIONS
Plenty of fluids  Rest  OTC Tylenol or Motrin as needed  Medrol dosepack as directed  Xyzal 5 mg po daily  Flonase 2 sprays each nostril at bedtime  Follow up with PCP or return to Urgent Care for worsening or unresolved symptoms. Patient Education        Allergies: Care Instructions  Your Care Instructions    Allergies occur when your body's defense system (immune system) overreacts to certain substances. The immune system treats a harmless substance as if it were a harmful germ or virus. Many things can cause this overreaction, including pollens, medicine, food, dust, animal dander, and mold. Allergies can be mild or severe. Mild allergies can be managed with home treatment. But medicine may be needed to prevent problems. Managing your allergies is an important part of staying healthy. Your doctor may suggest that you have allergy testing to help find out what is causing your allergies. When you know what things trigger your symptoms, you can avoid them. This can prevent allergy symptoms and other health problems. For severe allergies that cause reactions that affect your whole body (anaphylactic reactions), your doctor may prescribe a shot of epinephrine to carry with you in case you have a severe reaction. Learn how to give yourself the shot and keep it with you at all times. Make sure it is not . Follow-up care is a key part of your treatment and safety. Be sure to make and go to all appointments, and call your doctor if you are having problems. It's also a good idea to know your test results and keep a list of the medicines you take. How can you care for yourself at home? · If you have been told by your doctor that dust or dust mites are causing your allergy, decrease the dust around your bed:  ? Wash sheets, pillowcases, and other bedding in hot water every week. ? Use dust-proof covers for pillows, duvets, and mattresses. Avoid plastic covers because they tear easily and do not \"breathe. \" and an itchy mouth.    After giving an epinephrine shot call  911, even if you feel better.   Call 911 if:    · You have symptoms of a severe allergic reaction. These may include:  ? Sudden raised, red areas (hives) all over your body. ? Swelling of the throat, mouth, lips, or tongue. ? Trouble breathing. ? Passing out (losing consciousness). Or you may feel very lightheaded or suddenly feel weak, confused, or restless.     · You have been given an epinephrine shot, even if you feel better.    Call your doctor now or seek immediate medical care if:    · You have symptoms of an allergic reaction, such as:  ? A rash or hives (raised, red areas on the skin). ? Itching. ? Swelling. ? Belly pain, nausea, or vomiting.    Watch closely for changes in your health, and be sure to contact your doctor if:    · You do not get better as expected. Where can you learn more? Go to https://Affinity China.Inkling Systems. org and sign in to your "[employer] account. Enter L166 in the [employer] box to learn more about \"Allergies: Care Instructions. \"     If you do not have an account, please click on the \"Sign Up Now\" link. Current as of: April 7, 2019  Content Version: 12.3  © 6683-8101 Healthwise, [employer]. Care instructions adapted under license by [facility] ([facility]). If you have questions about a medical condition or this instruction, always ask your healthcare professional. [place] [address] any warranty or liability for your use of this information. Patient Education        Hives: Care Instructions  Your Care Instructions  Hives are raised, red, itchy patches of skin. They are also called wheals or welts. They usually have red borders and pale centers. Hives range in size from ¼ inch to 3 inches or more across. They may seem to move from place to place on the skin. Several hives may form a large area of raised, red skin.   You can get hives after an insect sting, after taking medicine or contact your doctor if:    · You do not get better as expected. Where can you learn more? Go to https://chpepiceweb.Competitive Technologies. org and sign in to your Milk Mantra account. Enter B282 in the [facility] box to learn more about \"Hives: Care Instructions. \"     If you do not have an account, please click on the \"Sign Up Now\" link. Current as of: June 26, 2019  Content Version: 12.3  © 1426-7158 Mixbook. Care instructions adapted under license by [facility] ([facility]). If you have questions about a medical condition or this instruction, always ask your healthcare professional. [place] [address] any warranty or liability for your use of this information. Patient Education        Rhinitis: Care Instructions  Your Care Instructions  Rhinitis is swelling and irritation in the nose. Allergies and infections are often the cause. Your nose may run or feel stuffy. Other symptoms are itchy and sore eyes, ears, throat, and mouth. If allergies are the cause, your doctor may do tests to find out what you are allergic to. You may be able to stop symptoms if you avoid the things that cause them. Your doctor may suggest or prescribe medicine to ease your symptoms. Follow-up care is a key part of your treatment and safety. Be sure to make and go to all appointments, and call your doctor if you are having problems. It's also a good idea to know your test results and keep a list of the medicines you take. How can you care for yourself at home? · If your rhinitis is caused by allergies, try to find out what sets off (triggers) your symptoms. Take steps to avoid these triggers. ? Avoid yard work. It can stir up both pollen and mold. ? Do not smoke or allow others to smoke around you. If you need help quitting, talk to your doctor about stop-smoking programs and medicines. These can increase your chances of quitting for good.   ? Do not use aerosol sprays, cleaning products, or

## 2020-03-20 RX ORDER — TIZANIDINE 4 MG/1
TABLET ORAL
Qty: 60 TABLET | Refills: 2 | Status: SHIPPED | OUTPATIENT
Start: 2020-03-20 | End: 2020-09-04

## 2020-04-22 RX ORDER — LOSARTAN POTASSIUM 50 MG/1
TABLET ORAL
Qty: 30 TABLET | Refills: 5 | Status: SHIPPED | OUTPATIENT
Start: 2020-04-22 | End: 2020-09-04

## 2020-06-01 ENCOUNTER — VIRTUAL VISIT (OUTPATIENT)
Dept: FAMILY MEDICINE CLINIC | Age: 40
End: 2020-06-01
Payer: COMMERCIAL

## 2020-06-01 PROCEDURE — 99213 OFFICE O/P EST LOW 20 MIN: CPT | Performed by: FAMILY MEDICINE

## 2020-06-01 PROCEDURE — G8428 CUR MEDS NOT DOCUMENT: HCPCS | Performed by: FAMILY MEDICINE

## 2020-06-01 RX ORDER — FAMOTIDINE 20 MG/1
20 TABLET, FILM COATED ORAL 2 TIMES DAILY
Qty: 28 TABLET | Refills: 0 | Status: SHIPPED | OUTPATIENT
Start: 2020-06-01 | End: 2020-08-21 | Stop reason: ALTCHOICE

## 2020-06-01 RX ORDER — METHYLPREDNISOLONE 4 MG/1
TABLET ORAL
Qty: 1 KIT | Refills: 0 | Status: SHIPPED | OUTPATIENT
Start: 2020-06-01 | End: 2020-06-07

## 2020-06-01 ASSESSMENT — ENCOUNTER SYMPTOMS
CONSTIPATION: 0
CHEST TIGHTNESS: 0
SHORTNESS OF BREATH: 0
NAUSEA: 0
DIARRHEA: 0
ANAL BLEEDING: 0
COUGH: 0
ABDOMINAL PAIN: 0

## 2020-06-01 NOTE — PROGRESS NOTES
 Stroke Father     Diabetes Sister     Diabetes Maternal Grandmother     Heart Attack Other        PHYSICAL EXAMINATION:  [ INSTRUCTIONS:  \"[x]\" Indicates a positive item  \"[]\" Indicates a negative item  -- DELETE ALL ITEMS NOT EXAMINED]  Vital Signs: (As obtained by patient/caregiver or practitioner observation)    Blood pressure-  Heart rate-    Respiratory rate-    Temperature-  Pulse oximetry-     Constitutional: [x] Appears well-developed and well-nourished [x] No apparent distress      [] Abnormal-   Mental status  [x] Alert and awake  [x] Oriented to person/place/time []Able to follow commands      Eyes:  EOM    [x]  Normal  [] Abnormal-  Sclera  []  Normal  [] Abnormal -         Discharge []  None visible  [] Abnormal -    HENT:   [x] Normocephalic, atraumatic. [] Abnormal   [] Mouth/Throat: Mucous membranes are moist.     External Ears [] Normal  [] Abnormal-     Neck: [x] No visualized mass     Pulmonary/Chest: [x] Respiratory effort normal.  [] No visualized signs of difficulty breathing or respiratory distress        [] Abnormal-      Musculoskeletal:   [x] Normal gait with no signs of ataxia         [] Normal range of motion of neck        [] Abnormal-       Neurological:        [x] No Facial Asymmetry (Cranial nerve 7 motor function) (limited exam to video visit)          [] No gaze palsy        [] Abnormal-         Skin:        [] No significant exanthematous lesions or discoloration noted on facial skin         [x] Abnormal- erythematous papular rash on chest         Psychiatric:       [x] Normal Affect [] No Hallucinations        [] Abnormal-     Other pertinent observable physical exam findings-     ASSESSMENT/PLAN:  1. Skin rash  Tx with medrol dose pack. Benadryl for pruritis, pepcid for possible hives. Continue to hold off on sulfasalazine as seems to be improving per patient.    Refer to Derm to consider other etiology vs. Cutaneous lupus, drug allergy, urticaria, etc. Will follow

## 2020-08-21 ENCOUNTER — TELEPHONE (OUTPATIENT)
Dept: FAMILY MEDICINE CLINIC | Age: 40
End: 2020-08-21

## 2020-08-21 ENCOUNTER — NURSE TRIAGE (OUTPATIENT)
Dept: OTHER | Facility: CLINIC | Age: 40
End: 2020-08-21

## 2020-08-21 ENCOUNTER — TELEMEDICINE (OUTPATIENT)
Dept: FAMILY MEDICINE CLINIC | Age: 40
End: 2020-08-21
Payer: COMMERCIAL

## 2020-08-21 PROCEDURE — G8427 DOCREV CUR MEDS BY ELIG CLIN: HCPCS | Performed by: NURSE PRACTITIONER

## 2020-08-21 PROCEDURE — 99214 OFFICE O/P EST MOD 30 MIN: CPT | Performed by: NURSE PRACTITIONER

## 2020-08-21 PROCEDURE — 3046F HEMOGLOBIN A1C LEVEL >9.0%: CPT | Performed by: NURSE PRACTITIONER

## 2020-08-21 PROCEDURE — 2022F DILAT RTA XM EVC RTNOPTHY: CPT | Performed by: NURSE PRACTITIONER

## 2020-08-21 RX ORDER — GLUCOSAMINE HCL/CHONDROITIN SU 500-400 MG
CAPSULE ORAL
Qty: 100 STRIP | Refills: 5 | Status: SHIPPED | OUTPATIENT
Start: 2020-08-21 | End: 2020-09-04

## 2020-08-21 RX ORDER — BLOOD-GLUCOSE METER
KIT MISCELLANEOUS
Qty: 1 KIT | Refills: 0 | Status: SHIPPED | OUTPATIENT
Start: 2020-08-21 | End: 2020-09-04

## 2020-08-21 RX ORDER — LANCETS 30 GAUGE
1 EACH MISCELLANEOUS DAILY
Qty: 100 EACH | Refills: 3 | Status: SHIPPED | OUTPATIENT
Start: 2020-08-21 | End: 2020-09-04

## 2020-08-21 RX ORDER — UREA 10 %
800 LOTION (ML) TOPICAL DAILY
COMMUNITY
End: 2020-09-04

## 2020-08-21 RX ORDER — FERROUS SULFATE 325(65) MG
325 TABLET ORAL
COMMUNITY
End: 2020-09-04

## 2020-08-21 ASSESSMENT — ENCOUNTER SYMPTOMS
ABDOMINAL PAIN: 0
BLOOD IN STOOL: 0
COUGH: 0
CHEST TIGHTNESS: 0
SORE THROAT: 0
WHEEZING: 0
NAUSEA: 0
DIARRHEA: 0
SHORTNESS OF BREATH: 0
VOMITING: 0

## 2020-08-21 NOTE — TELEPHONE ENCOUNTER
Please request records from Dr. Paula Alex - last office note and recent labs. She has an appt with Giovanna Green on Tues.

## 2020-08-21 NOTE — PROGRESS NOTES
2020    TELEHEALTH EVALUATION -- Audio/Visual (During JFUPF-34 public health emergency)    HPI:    Sierra Ackerman (:  1980) has requested an audio/video evaluation for the following concern(s):    Virtual visit per my chart. Due to technical difficulties, visit was converted to telephone visit. RA followed by Dr. Broderick Simpson. She has been off all RA meds due to anemia. She is supposed to be on Humira but has had insurance issues, this is being addressed. She will start back on this soon. She had recent appointment with Dr. Broderick Simpson, labs. Apparently anemia has worsened and he has referred her to hematology. She has had significant weakness, fatigue. He started her on ferrous sulfate this week. She is taking folic acid on her own. Some of this is related to her RA flares off of her medication, but worse than normal.  She denies any abdominal pain, bowel changes, or rectal bleeding. No nausea or vomiting. She has never had colonoscopy or EGD. DM2, diet managed. She has been unable to check blood sugar recently, needs new supplies. A1c in November was 6.2. Review of Systems   Constitutional: Positive for fatigue. Negative for chills and fever. HENT: Negative for congestion, ear pain and sore throat. Eyes: Negative for visual disturbance. Respiratory: Negative for cough, chest tightness, shortness of breath and wheezing. Cardiovascular: Negative for chest pain. Gastrointestinal: Negative for abdominal pain, blood in stool, diarrhea, nausea and vomiting. Musculoskeletal: Negative for arthralgias and myalgias. Skin: Negative for rash. Neurological: Positive for weakness. Negative for dizziness and light-headedness. Prior to Visit Medications    Medication Sig Taking?  Authorizing Provider   ferrous sulfate (IRON 325) 325 (65 Fe) MG tablet Take 325 mg by mouth daily (with breakfast) Yes Historical Provider, MD   folic acid (FOLVITE) 169 MCG tablet Take 800 mcg by mouth daily Yes Historical Provider, MD   glucose monitoring kit (FREESTYLE) monitoring kit Check blood sugar fasting daily and as needed for symptoms of irregular blood sugar  ICD E11.9 Yes NATACHA Oconnell   Lancets MISC 1 each by Does not apply route daily ICD 10: E11.9 Yes NATACHA Oconnell   blood glucose monitor strips Test 1 time a day fasting & as needed for symptoms of irregular blood glucose. Dispense sufficient amount for indicated testing frequency plus additional to accommodate PRN testing needs.   ICD 10:E11.9 Yes NerisNATACHA Reynoso   losartan (COZAAR) 50 MG tablet TAKE 1 TABLET BY MOUTH EVERY DAY  Neris Alken SusanNATACHA griffith   tiZANidine (ZANAFLEX) 4 MG tablet TAKE 1 TABLET BY MOUTH TWICE A DAY AS NEEDED FOR PAIN  NATACHA Kim   fluticasone (FLONASE) 50 MCG/ACT nasal spray 2 sprays by Each Nostril route daily  Gila Pack, APRN - CNP   ONE TOUCH LANCETS MISC 1 each by Does not apply route daily  NATACHA Prabhakar   Adalimumab (HUMIRA) 20 MG/0.4ML PSKT Inject into the skin  Historical Provider, MD       Social History     Tobacco Use    Smoking status: Never Smoker    Smokeless tobacco: Never Used   Substance Use Topics    Alcohol use: Yes     Comment: occ    Drug use: No        Allergies   Allergen Reactions    Mushroom Extract Complex    ,   Past Medical History:   Diagnosis Date    Anemia     Chronic rheumatic arthritis ([address]     Elevated blood pressure reading     Fibromyalgia     Obesity     Type 2 diabetes mellitus (HCC)    ,   Past Surgical History:   Procedure Laterality Date    CHOLECYSTECTOMY     ,   Social History     Tobacco Use    Smoking status: Never Smoker    Smokeless tobacco: Never Used   Substance Use Topics    Alcohol use: Yes     Comment: occ    Drug use: No   ,   Family History   Problem Relation Age of Onset    High Blood Pressure Father     Diabetes Father     Atrial Fibrillation Father     Heart Failure Father     Stroke Father     Diabetes Sister     Diabetes Maternal Grandmother     Heart Attack Other        PHYSICAL EXAMINATION:  [ INSTRUCTIONS:  \"[x]\" Indicates a positive item  \"[]\" Indicates a negative item  -- DELETE ALL ITEMS NOT EXAMINED]  Vital Signs: (As obtained by patient/caregiver or practitioner observation)  Patient-Reported Vitals 8/21/2020   Patient-Reported Weight 291   Patient-Reported Height 5'2        Blood pressure-  Heart rate-    Respiratory rate-    Temperature-  Pulse oximetry-     Constitutional: [x] Appears well-developed and well-nourished [x] No apparent distress      [] Abnormal-   Mental status  [x] Alert and awake  [x] Oriented to person/place/time [x]Able to follow commands      Eyes:  EOM    [x]  Normal  [] Abnormal-  Sclera  [x]  Normal  [] Abnormal -         Discharge [x]  None visible  [] Abnormal -    HENT:   [x] Normocephalic, atraumatic. [] Abnormal   [] Mouth/Throat: Mucous membranes are moist.     External Ears [] Normal  [] Abnormal-     Neck: [] No visualized mass     Pulmonary/Chest: [x] Respiratory effort normal.  [x] No visualized signs of difficulty breathing or respiratory distress        [] Abnormal-      Musculoskeletal:   [] Normal gait with no signs of ataxia         [] Normal range of motion of neck        [] Abnormal-       Neurological:        [x] No Facial Asymmetry (Cranial nerve 7 motor function) (limited exam to video visit)          [x] No gaze palsy        [] Abnormal-         Skin:        [x] No significant exanthematous lesions or discoloration noted on facial skin         [] Abnormal-            Psychiatric:       [x] Normal Affect [x] No Hallucinations        [] Abnormal-     Other pertinent observable physical exam findings-     ASSESSMENT/PLAN:  1. Anemia, unspecified type  - We will request records from Dr. Misti Garza, labs. - Continue with hematology referral per rheumatology  - Will likely need GI referral for work-up.   We will review records when available. 2. Rheumatoid arthritis involving multiple sites, unspecified rheumatoid factor presence ([address]  - She will start back on Humira once worked out with insurance. - Continue routine follow-up with rheumatology    3. Type 2 diabetes mellitus without complication, without long-term current use of insulin (HCC)  - Needs A1c next week at her visit  - Diabetic supplies sent to pharmacy  - Advised to continue with dietary changes, working on weight loss      Follow-up with Dr. Daquan Bone next week as scheduled    Return for as scheduled. Omid Child is a 36 y.o. female being evaluated by a Virtual Visit (video visit) encounter to address concerns as mentioned above. A caregiver was present when appropriate. Due to this being a TeleHealth encounter (During HSGUO-03 public health emergency), evaluation of the following organ systems was limited: Vitals/Constitutional/EENT/Resp/CV/GI//MS/Neuro/Skin/Heme-Lymph-Imm. Pursuant to the emergency declaration under the [address] [place] [address] authority and the [employer] Act, this Virtual Visit was conducted with patient's (and/or legal guardian's) consent, to reduce the patient's risk of exposure to COVID-19 and provide necessary medical care. The patient (and/or legal guardian) has also been advised to contact this office for worsening conditions or problems, and seek emergency medical treatment and/or call 911 if deemed necessary. Patient identification was verified at the start of the visit: Yes    Total time spent on this encounter: Not billed by time    Services were provided through a video synchronous discussion virtually to substitute for in-person clinic visit. Patient and provider were located at their individual homes. --NATACHA Hinojosa on 8/21/2020 at 4:23 PM    An electronic signature was used to authenticate this note.

## 2020-08-25 ENCOUNTER — HOSPITAL ENCOUNTER (OUTPATIENT)
Dept: GENERAL RADIOLOGY | Age: 40
Discharge: HOME | End: 2020-08-25
Payer: COMMERCIAL

## 2020-08-25 ENCOUNTER — TELEMEDICINE (OUTPATIENT)
Dept: FAMILY MEDICINE CLINIC | Age: 40
End: 2020-08-25
Payer: COMMERCIAL

## 2020-08-25 DIAGNOSIS — E11.9 TYPE 2 DIABETES MELLITUS WITHOUT COMPLICATION, WITHOUT LONG-TERM CURRENT USE OF INSULIN (HCC): ICD-10-CM

## 2020-08-25 DIAGNOSIS — I10 ESSENTIAL (PRIMARY) HYPERTENSION: ICD-10-CM

## 2020-08-25 DIAGNOSIS — R53.83 OTHER FATIGUE: ICD-10-CM

## 2020-08-25 DIAGNOSIS — Z13.220 LIPID SCREENING: ICD-10-CM

## 2020-08-25 LAB
ALBUMIN SERPL-MCNC: 3.8 G/DL (ref 3.5–5.2)
ALP BLD-CCNC: 73 U/L (ref 35–104)
ALT SERPL-CCNC: 11 U/L (ref 5–33)
ANION GAP SERPL CALCULATED.3IONS-SCNC: 12 MMOL/L (ref 7–19)
ANISOCYTOSIS: ABNORMAL
AST SERPL-CCNC: 11 U/L (ref 5–32)
BASOPHILS ABSOLUTE: 0.1 K/UL (ref 0–0.2)
BASOPHILS RELATIVE PERCENT: 0.6 % (ref 0–1)
BILIRUB SERPL-MCNC: 0.4 MG/DL (ref 0.2–1.2)
BUN BLDV-MCNC: 9 MG/DL (ref 6–20)
CALCIUM SERPL-MCNC: 9.2 MG/DL (ref 8.6–10)
CHLORIDE BLD-SCNC: 101 MMOL/L (ref 98–111)
CHOLESTEROL, TOTAL: 148 MG/DL (ref 160–199)
CO2: 25 MMOL/L (ref 22–29)
CREAT SERPL-MCNC: 0.5 MG/DL (ref 0.5–0.9)
CREATININE URINE: 304.4 MG/DL (ref 4.2–622)
EOSINOPHILS ABSOLUTE: 0.3 K/UL (ref 0–0.6)
EOSINOPHILS RELATIVE PERCENT: 3.8 % (ref 0–5)
GFR AFRICAN AMERICAN: >59
GFR NON-AFRICAN AMERICAN: >60
GLUCOSE BLD-MCNC: 149 MG/DL (ref 74–109)
HBA1C MFR BLD: 7.3 % (ref 4–6)
HCT VFR BLD CALC: 33.4 % (ref 37–47)
HDLC SERPL-MCNC: 49 MG/DL (ref 65–121)
HEMOGLOBIN: 9.7 G/DL (ref 12–16)
HYPOCHROMIA: ABNORMAL
IMMATURE GRANULOCYTES #: 0.1 K/UL
LDL CHOLESTEROL CALCULATED: 82 MG/DL
LYMPHOCYTES ABSOLUTE: 2.5 K/UL (ref 1.1–4.5)
LYMPHOCYTES RELATIVE PERCENT: 27.6 % (ref 20–40)
MCH RBC QN AUTO: 20.4 PG (ref 27–31)
MCHC RBC AUTO-ENTMCNC: 29 G/DL (ref 33–37)
MCV RBC AUTO: 70.3 FL (ref 81–99)
MICROALBUMIN UR-MCNC: <1.2 MG/DL (ref 0–19)
MICROALBUMIN/CREAT UR-RTO: NORMAL MG/G
MICROCYTES: ABNORMAL
MONOCYTES ABSOLUTE: 0.4 K/UL (ref 0–0.9)
MONOCYTES RELATIVE PERCENT: 4.5 % (ref 0–10)
NEUTROPHILS ABSOLUTE: 5.7 K/UL (ref 1.5–7.5)
NEUTROPHILS RELATIVE PERCENT: 62.8 % (ref 50–65)
OVALOCYTES: ABNORMAL
PDW BLD-RTO: 19.2 % (ref 11.5–14.5)
PLATELET # BLD: 259 K/UL (ref 130–400)
PLATELET SLIDE REVIEW: ADEQUATE
PMV BLD AUTO: 11.5 FL (ref 9.4–12.3)
POTASSIUM SERPL-SCNC: 4 MMOL/L (ref 3.5–5)
RBC # BLD: 4.75 M/UL (ref 4.2–5.4)
SODIUM BLD-SCNC: 138 MMOL/L (ref 136–145)
T4 FREE: 1.23 NG/DL (ref 0.93–1.7)
TOTAL PROTEIN: 7.1 G/DL (ref 6.6–8.7)
TRIGL SERPL-MCNC: 86 MG/DL (ref 0–149)
TSH SERPL DL<=0.05 MIU/L-ACNC: 2.5 UIU/ML (ref 0.27–4.2)
WBC # BLD: 9.1 K/UL (ref 4.8–10.8)

## 2020-08-25 PROCEDURE — 2022F DILAT RTA XM EVC RTNOPTHY: CPT | Performed by: FAMILY MEDICINE

## 2020-08-25 PROCEDURE — 71046 X-RAY EXAM CHEST 2 VIEWS: CPT

## 2020-08-25 PROCEDURE — 99214 OFFICE O/P EST MOD 30 MIN: CPT | Performed by: FAMILY MEDICINE

## 2020-08-25 PROCEDURE — 3051F HG A1C>EQUAL 7.0%<8.0%: CPT | Performed by: FAMILY MEDICINE

## 2020-08-25 PROCEDURE — G8428 CUR MEDS NOT DOCUMENT: HCPCS | Performed by: FAMILY MEDICINE

## 2020-08-25 ASSESSMENT — PATIENT HEALTH QUESTIONNAIRE - PHQ9
2. FEELING DOWN, DEPRESSED OR HOPELESS: 0
SUM OF ALL RESPONSES TO PHQ9 QUESTIONS 1 & 2: 0
1. LITTLE INTEREST OR PLEASURE IN DOING THINGS: 0
SUM OF ALL RESPONSES TO PHQ QUESTIONS 1-9: 0
SUM OF ALL RESPONSES TO PHQ QUESTIONS 1-9: 0

## 2020-08-25 ASSESSMENT — ENCOUNTER SYMPTOMS
ABDOMINAL PAIN: 0
CHEST TIGHTNESS: 0
BACK PAIN: 1
NAUSEA: 0
SHORTNESS OF BREATH: 1
DIARRHEA: 0
COUGH: 0
CONSTIPATION: 0
ANAL BLEEDING: 0

## 2020-09-03 NOTE — PROGRESS NOTES
OP HEMATOLOGY/ONCOLOGY CONSULTATION      Pt Name: Elie Rojas  YOB: 1980  MRN: [id number]  Referring provider: NATACHA Almodovar  Requesting provider: Dr. Clinton Xavier  Reason for consultation: severe anemia  Date of evaluation: 9/4/2020    History Obtained From:  patient, electronic medical record    CHIEF COMPLAINT:    Chief Complaint   Patient presents with    New Patient     Severe Anemia     HISTORY OF PRESENT ILLNESS:    Elie Rojas is a 36 y.o.  female with a significant PMH of rheumatoid arthritis, fibromyalgia, type 2 diabetes, SETH wit CPAP use. She is referred to the clinic by Dr. Yary Acevedo for evaluation of severe anemia. Doc Santos has been off medications for rheumatoid arthritis since May, 2020. She was taken off leflunomide due to anemia. She stopped sulfasalazine due to rash (awaiting dermatology referral). She has been prescribed Humira and awaits insurance approval prior to initiation. Review of CBCs:  · CBC 4/15/2020: WBC 6.7, Hgb 11.1/MCV 74, platelets 499,701  · CBC 5/19/2020: WBC 6.9, Hgb 9.1/MCV 71, platelets 463,875  · CBC 8/25/2020: WBC 9.1, Hgb 9.7/MCV 70.3, platelets 873,475    Additional Labs 8/25/2020:  · CMP: creatinine 0.5/GFR >60, Ca+ 9.2, TP 7.1  · TSH: 2.5    Ferrous Sulfate po BID was initiated ~8/25/2020. Doc Santos has been taking folic acid on her own. Doc Santos reports heavy menses. Menstrual cycle is regular, occurring every month though lasts approximately 14 days and is painful with heavy clotting. She denies significant change in bowel habits, though states her bowels have been dark since iron initiation. She has had increased nausea since initiation of oral iron. Additional symptoms include significant fatigue, dizziness and exertional dyspnea. Hgb at presentation 9/4/2020 is 10.4 with MCV 72.4. Parenteral iron with Injectafer 750 mg IV weekly x2 doses will be arranged for symptomatic anemia and oral iron intolerance.   Follow-up with GYN, Dr. Hyun Helton, recommended regarding menorrhagia. Venkat Grady is agreeable to make an appointment. Agree with GI referral, in process by PCP according to Venkat Grady. Stool for occult blood requested. Past Medical History:   Diagnosis Date    Anemia     Chronic rheumatic arthritis ([address]     Elevated blood pressure reading     Fibromyalgia     Obesity     Type 2 diabetes mellitus (HCC)      Past Surgical History:   Procedure Laterality Date    CHOLECYSTECTOMY         Current Outpatient Medications:     vitamin B-12 (CYANOCOBALAMIN) 100 MCG tablet, Take 300 mcg by mouth daily OTC, Disp: , Rfl:     folic acid (FOLVITE) 1 MG tablet, Take 1 mg by mouth daily, Disp: , Rfl:     ferrous sulfate (IRON 325) 325 (65 Fe) MG tablet, Take 325 mg by mouth 2 times daily, Disp: , Rfl:     metFORMIN (GLUCOPHAGE) 500 MG tablet, TAKE 1 TABLET BY MOUTH TWICE A DAY WITH MEALS, Disp: 60 tablet, Rfl: 3    Adalimumab (HUMIRA) 20 MG/0.4ML PSKT, Inject into the skin, Disp: , Rfl:    Allergies:    Allergies   Allergen Reactions    Mushroom Extract Complex      Social History     Tobacco Use    Smoking status: Never Smoker    Smokeless tobacco: Never Used   Substance Use Topics    Alcohol use: Yes     Comment: occ    Drug use: No     Family History   Problem Relation Age of Onset    High Blood Pressure Father     Diabetes Father     Atrial Fibrillation Father     Heart Failure Father     Stroke Father     Diabetes Sister     Diabetes Maternal Grandmother     Heart Attack Other      Health Maintenance   Topic Date Due    Varicella vaccine (1 of 2 - 2-dose childhood series) 08/17/1981    Pneumococcal 0-64 years Vaccine (1 of 1 - PPSV23) 08/17/1986    Diabetic retinal exam  08/17/1990    HIV screen  08/17/1995    Hepatitis B vaccine (1 of 3 - Risk 3-dose series) 08/17/1999    Cervical cancer screen  08/17/2001    Diabetic foot exam  06/26/2020    Flu vaccine (1) 09/01/2020    A1C test (Diabetic or Prediabetic) diaphoretic. HENT:      Head: Normocephalic and atraumatic. Right Ear: External ear normal.      Left Ear: External ear normal.      Nose: Nose normal.      Mouth/Throat:      Mouth: Mucous membranes are moist.   Eyes:      General: No scleral icterus. Right eye: No discharge. Left eye: No discharge. Conjunctiva/sclera: Conjunctivae normal.   Neck:      Musculoskeletal: Neck supple. Trachea: No tracheal deviation. Cardiovascular:      Rate and Rhythm: Normal rate and regular rhythm. Pulmonary:      Effort: Pulmonary effort is normal. No respiratory distress. Breath sounds: Normal breath sounds. No wheezing or rales. Abdominal:      General: Bowel sounds are normal. There is no distension. Palpations: Abdomen is soft. Tenderness: There is no abdominal tenderness. There is no guarding. Genitourinary:     Comments: Exam deferred  Musculoskeletal:         General: No tenderness or deformity. Comments: Normal ROM all four extremities   Lymphadenopathy:      Cervical: No cervical adenopathy. Right cervical: No superficial cervical adenopathy. Left cervical: No superficial cervical adenopathy. Upper Body:      Right upper body: No supraclavicular adenopathy. Left upper body: No supraclavicular adenopathy. Comments: No bulky palpable cervical, clavicular, axillary or inguinal adenopathies on the left or right. Skin:     General: Skin is warm and dry. Findings: No rash. Neurological:      Mental Status: She is alert and oriented to person, place, and time. Comments: follows commands, non-focal   Psychiatric:         Behavior: Behavior normal. Behavior is cooperative. Thought Content: Thought content normal.         Judgment: Judgment normal.      Comments: Alert and oriented to person, place and time.        /86   Pulse 102   Temp 97.3 °F (36.3 °C) (Temporal)   Ht 5' 2\" (1.575 m)   Wt 293 lb (132.9 kg)   SpO2 99%   BMI 53.59 kg/m²   Wt Readings from Last 3 Encounters:   09/04/20 293 lb (132.9 kg)   03/11/20 298 lb (135.2 kg)   02/25/20 (!) 309 lb (140.2 kg)     Labs reviewed by me:  CBC 09/04/20: WBC 10.36, Hgb 10.4/MCV 72.4, platelets 879,848. Differentials normal.    ASSESSMENT/PLAN:    1. Iron deficiency anemia due to chronic blood loss    2. Microcytic anemia    3. Menorrhalgia        Orders Placed This Encounter   Procedures    Iron and TIBC    Ferritin    Vitamin B12    Folate     Yue continues to have significant weakness, exertional dyspnea, dizziness despite oral iron. Additionally, she reports increased nausea with oral iron supplementation. Iron studies are requested with plans to arrange parenteral iron with Injectafer 750 mg IV weekly x2 doses. Following discussion of risks, benefits and expectations, Venkat Grady desires to proceed. I have encouraged Venkat Grady to arrange a follow-up appointment with her gynecologist, Dr. Hyun Helton. She is anticipating referral to GI by Dr. Patito Guardado. We will go ahead and check stool for occult blood. CBC will be repeated in 6 weeks and I will see Venkat Grady in follow-up in 3 months. Return in about 3 months (around 12/4/2020) for follow up with NATACHA Jarrell. 30 minute encounter with the patient, out of which more than 50% of the time was spent in counseling patient or family and coordination of care. Counseling included but was not limited to time spent reviewing labs, imaging studies/ treatment plan and answering questions.        NATACHA Davis  10:38 AM  9/4/2020

## 2020-09-04 ENCOUNTER — OFFICE VISIT (OUTPATIENT)
Dept: HEMATOLOGY | Age: 40
End: 2020-09-04
Payer: COMMERCIAL

## 2020-09-04 ENCOUNTER — HOSPITAL ENCOUNTER (OUTPATIENT)
Dept: INFUSION THERAPY | Age: 40
Discharge: HOME | End: 2020-09-04
Payer: COMMERCIAL

## 2020-09-04 VITALS
DIASTOLIC BLOOD PRESSURE: 86 MMHG | WEIGHT: 293 LBS | OXYGEN SATURATION: 99 % | HEART RATE: 102 BPM | TEMPERATURE: 97.3 F | HEIGHT: 62 IN | BODY MASS INDEX: 53.92 KG/M2 | SYSTOLIC BLOOD PRESSURE: 138 MMHG

## 2020-09-04 DIAGNOSIS — D64.9 ANEMIA, UNSPECIFIED TYPE: ICD-10-CM

## 2020-09-04 DIAGNOSIS — D50.9 MICROCYTIC ANEMIA: ICD-10-CM

## 2020-09-04 DIAGNOSIS — D50.0 IRON DEFICIENCY ANEMIA DUE TO CHRONIC BLOOD LOSS: ICD-10-CM

## 2020-09-04 LAB
BASOPHILS ABSOLUTE: 0.03 K/UL (ref 0.01–0.08)
BASOPHILS RELATIVE PERCENT: 0.3 % (ref 0.1–1.2)
EOSINOPHILS ABSOLUTE: 0.37 K/UL (ref 0.04–0.54)
EOSINOPHILS RELATIVE PERCENT: 3.6 % (ref 0.7–7)
FERRITIN: 27.7 NG/ML (ref 6.2–137)
FOLATE: 23 NG/ML (ref 2.7–20)
HCT VFR BLD CALC: 35.1 % (ref 34.1–44.9)
HEMOGLOBIN: 10.4 G/DL (ref 11.2–15.7)
IRON SATURATION: 11 % (ref 14–50)
IRON: 23 UG/DL (ref 37–170)
LYMPHOCYTES ABSOLUTE: 2.97 K/UL (ref 1.18–3.74)
LYMPHOCYTES RELATIVE PERCENT: 28.7 % (ref 19.3–53.1)
MCH RBC QN AUTO: 21.4 PG (ref 25.6–32.2)
MCHC RBC AUTO-ENTMCNC: 29.6 G/DL (ref 32.3–35.5)
MCV RBC AUTO: 72.4 FL (ref 79.4–94.8)
MONOCYTES ABSOLUTE: 0.59 K/UL (ref 0.24–0.82)
MONOCYTES RELATIVE PERCENT: 5.7 % (ref 4.7–12.5)
NEUTROPHILS ABSOLUTE: 6.4 K/UL (ref 1.56–6.13)
NEUTROPHILS RELATIVE PERCENT: 61.7 % (ref 34–71.1)
PDW BLD-RTO: 19.8 % (ref 11.7–14.4)
PLATELET # BLD: 276 K/UL (ref 182–369)
PMV BLD AUTO: 10.6 FL (ref 7.4–10.4)
RBC # BLD: 4.85 M/UL (ref 3.93–5.22)
TOTAL IRON BINDING CAPACITY: 214 UG/DL (ref 265–497)
VITAMIN B-12: 893 PG/ML (ref 239–931)
WBC # BLD: 10.36 K/UL (ref 3.98–10.04)

## 2020-09-04 PROCEDURE — G8417 CALC BMI ABV UP PARAM F/U: HCPCS | Performed by: NURSE PRACTITIONER

## 2020-09-04 PROCEDURE — 99211 OFF/OP EST MAY X REQ PHY/QHP: CPT

## 2020-09-04 PROCEDURE — 83550 IRON BINDING TEST: CPT

## 2020-09-04 PROCEDURE — 1036F TOBACCO NON-USER: CPT | Performed by: NURSE PRACTITIONER

## 2020-09-04 PROCEDURE — 85025 COMPLETE CBC W/AUTO DIFF WBC: CPT

## 2020-09-04 PROCEDURE — 82728 ASSAY OF FERRITIN: CPT

## 2020-09-04 PROCEDURE — 99204 OFFICE O/P NEW MOD 45 MIN: CPT | Performed by: NURSE PRACTITIONER

## 2020-09-04 PROCEDURE — 82607 VITAMIN B-12: CPT

## 2020-09-04 PROCEDURE — 83540 ASSAY OF IRON: CPT

## 2020-09-04 PROCEDURE — G8427 DOCREV CUR MEDS BY ELIG CLIN: HCPCS | Performed by: NURSE PRACTITIONER

## 2020-09-04 PROCEDURE — 82746 ASSAY OF FOLIC ACID SERUM: CPT

## 2020-09-04 RX ORDER — METHYLPREDNISOLONE SODIUM SUCCINATE 125 MG/2ML
125 INJECTION, POWDER, LYOPHILIZED, FOR SOLUTION INTRAMUSCULAR; INTRAVENOUS ONCE
Status: CANCELLED | OUTPATIENT
Start: 2020-09-11

## 2020-09-04 RX ORDER — DIPHENHYDRAMINE HYDROCHLORIDE 50 MG/ML
50 INJECTION INTRAMUSCULAR; INTRAVENOUS ONCE
Status: CANCELLED | OUTPATIENT
Start: 2020-09-11

## 2020-09-04 RX ORDER — ACETAZOLAMIDE 500 MG/5ML
INJECTION, POWDER, LYOPHILIZED, FOR SOLUTION INTRAVENOUS
COMMUNITY
Start: 2019-04-10 | End: 2020-09-04

## 2020-09-04 RX ORDER — HEPARIN SODIUM (PORCINE) LOCK FLUSH IV SOLN 100 UNIT/ML 100 UNIT/ML
500 SOLUTION INTRAVENOUS PRN
Status: CANCELLED | OUTPATIENT
Start: 2020-09-11

## 2020-09-04 RX ORDER — LISINOPRIL 10 MG/1
TABLET ORAL
COMMUNITY
Start: 2019-04-10 | End: 2020-09-04

## 2020-09-04 RX ORDER — UBIDECARENONE 75 MG
300 CAPSULE ORAL DAILY
COMMUNITY
End: 2021-05-24 | Stop reason: ALTCHOICE

## 2020-09-04 RX ORDER — FOLIC ACID 1 MG/1
1 TABLET ORAL DAILY
COMMUNITY
End: 2021-05-24 | Stop reason: ALTCHOICE

## 2020-09-04 RX ORDER — SODIUM CHLORIDE 0.9 % (FLUSH) 0.9 %
10 SYRINGE (ML) INJECTION PRN
Status: CANCELLED | OUTPATIENT
Start: 2020-09-11

## 2020-09-04 RX ORDER — DULAGLUTIDE 0.75 MG/.5ML
INJECTION, SOLUTION SUBCUTANEOUS
COMMUNITY
Start: 2019-04-10 | End: 2020-09-04

## 2020-09-04 RX ORDER — SODIUM CHLORIDE 9 MG/ML
INJECTION, SOLUTION INTRAVENOUS CONTINUOUS
Status: CANCELLED | OUTPATIENT
Start: 2020-09-11

## 2020-09-04 RX ORDER — FERROUS SULFATE 325(65) MG
325 TABLET ORAL 2 TIMES DAILY
COMMUNITY
End: 2020-12-04 | Stop reason: SINTOL

## 2020-09-04 RX ORDER — SODIUM CHLORIDE 0.9 % (FLUSH) 0.9 %
5 SYRINGE (ML) INJECTION PRN
Status: CANCELLED | OUTPATIENT
Start: 2020-09-11

## 2020-09-04 RX ORDER — EPINEPHRINE 1 MG/ML
0.3 INJECTION, SOLUTION, CONCENTRATE INTRAVENOUS PRN
Status: CANCELLED | OUTPATIENT
Start: 2020-09-11

## 2020-09-04 ASSESSMENT — ENCOUNTER SYMPTOMS
DIARRHEA: 0
SORE THROAT: 0
VOMITING: 0
ABDOMINAL PAIN: 0
EYE ITCHING: 0
TROUBLE SWALLOWING: 0
EYE DISCHARGE: 0
CONSTIPATION: 0
COUGH: 0
EYE REDNESS: 0
PHOTOPHOBIA: 0
SHORTNESS OF BREATH: 1
NAUSEA: 1
WHEEZING: 0
BACK PAIN: 1

## 2020-09-04 NOTE — PATIENT INSTRUCTIONS
Patient Education        Iron Deficiency Anemia: Care Instructions  Your Care Instructions     Anemia means that you don't have enough red blood cells. Red blood cells carry oxygen around your body. When you have anemia, it can make you pale, weak, and tired. Many things can cause anemia. The most common cause is loss of blood. This can happen if you have heavy menstrual periods. It can also happen if you have bleeding in your stomach or bowel. You can also get anemia if you don't have enough iron in your diet or if it's hard for your body to absorb iron. In some cases, pregnancy causes anemia. That's because a pregnant woman needs more iron. Your doctor may do more tests to find the cause of your anemia. If a disease or other health problem is causing it, your doctor will treat that problem. It's important to follow up with your doctor to make sure that your iron level returns to normal.  Follow-up care is a key part of your treatment and safety. Be sure to make and go to all appointments, and call your doctor if you are having problems. It's also a good idea to know your test results and keep a list of the medicines you take. How can you care for yourself at home? · If your doctor recommended iron pills, take them as directed. ? Try to take the pills on an empty stomach. You can do this about 1 hour before or 2 hours after meals. But you may need to take iron with food to avoid an upset stomach. ? Do not take antacids or drink milk or anything with caffeine within 2 hours of when you take your iron. They can keep your body from absorbing the iron well. ? Vitamin C helps your body absorb iron. You may want to take iron pills with a glass of orange juice or some other food high in vitamin C.  ? Iron pills may cause stomach problems. These include heartburn, nausea, diarrhea, constipation, and cramps. It can help to drink plenty of fluids and include fruits, vegetables, and fiber in your diet.   ? It's normal for iron pills to make your stool a greenish or grayish black. But internal bleeding can also cause dark stool. So it's important to tell your doctor about any color changes. ? Call your doctor if you think you are having a problem with your iron pills. Even after you start to feel better, it will take several months for your body to build up its supply of iron. ? If you miss a pill, don't take a double dose. ? Keep iron pills out of the reach of small children. Too much iron can be very dangerous. · Eat foods with a lot of iron. These include red meat, shellfish, poultry, and eggs. They also include beans, raisins, whole-grain bread, and leafy green vegetables. · Steam your vegetables. This is the best way to prepare them if you want to get as much iron as possible. · Be safe with medicines. Do not take nonsteroidal anti-inflammatory pain relievers unless your doctor tells you to. These include aspirin, naproxen (Aleve), and ibuprofen (Advil, Motrin). · Liquid iron can stain your teeth. But you can mix it with water or juice and drink it with a straw. Then it won't get on your teeth. When should you call for help? [id number] anytime you think you may need emergency care. For example, call if:  · You passed out (lost consciousness). Call your doctor now or seek immediate medical care if:  · You are short of breath. · You are dizzy or light-headed, or you feel like you may faint. · You have new or worse bleeding. Watch closely for changes in your health, and be sure to contact your doctor if:  · You feel weaker or more tired than usual.  · You do not get better as expected. Where can you learn more? Go to [email or web address].[email or web address] org and sign in to your [employer] account. Enter L208 in the [employer] box to learn more about \"Iron Deficiency Anemia: Care Instructions. \"     If you do not have an account, please click on the \"Sign Up Now\" link.   Current as of: November 8, 4789               UEUOEJG Version: 12.5  © 3921-8340 Healthwise, [employer]. Care instructions adapted under license by [facility] ([facility]). If you have questions about a medical condition or this instruction, always ask your healthcare professional. [address] any warranty or liability for your use of this information. Patient Education        ferric carboxymaltose  Pronunciation:  JUSTIN snyder anna BOX ee LINNL tose  Brand:  Injectafer  What is the most important information I should know about ferric carboxymaltose? You should not use ferric carboxymaltose if you have iron overload disorder, or anemia that is not caused by iron deficiency. What is ferric carboxymaltose? Ferric carboxymaltose is an iron replacement product. You normally get iron from the foods you eat. Iron helps your body produce red blood cells that carry oxygen through your blood to tissues and organs. Ferric carboxymaltose is used in adults to treat iron deficiency anemia (a lack of red blood cells caused by having too little iron in the body). Ferric carboxymaltose is usually given when iron taken orally (by mouth) is not effective. Ferric carboxymaltose may also be used for purposes not listed in this medication guide. What should I discuss with my healthcare provider before receiving ferric carboxymaltose? You should not use ferric carboxymaltose if you are allergic to it, or if you have:  · iron overload disorder (hemochromatosis, hemosiderosis); or  · anemia that is not caused by iron deficiency. Tell your doctor if you have ever had:  · liver disease; or  · high blood pressure. It is not known whether this medicine will harm an unborn baby. Tell your doctor if you are pregnant or plan to become pregnant. It may not be safe to breast-feed a baby while you are using this medicine. Ask your doctor about any risks. How is ferric carboxymaltose given? Ferric carboxymaltose is given as an infusion into a vein. pressure;  · flushing (warmth, redness, or tingly feeling); or  · low phosphorus levels. This is not a complete list of side effects and others may occur. Call your doctor for medical advice about side effects. You may report side effects to FDA at 7-657-EFC-1671. What other drugs will affect ferric carboxymaltose? Other drugs may affect ferric carboxymaltose, including prescription and over-the-counter medicines, vitamins, and herbal products. Tell your doctor about all your current medicines and any medicine you start or stop using. Where can I get more information? Your pharmacist can provide more information about ferric carboxymaltose. Remember, keep this and all other medicines out of the reach of children, never share your medicines with others, and use this medication only for the indication prescribed. Every effort has been made to ensure that the information provided by [address] is accurate, up-to-date, and complete, but no guarantee is made to that effect. Drug information contained herein may be time sensitive. [employer]der information has been compiled for use by healthcare practitioners and consumers in the [place] and therefore [employer] does not warrant that uses outside of the [place] are appropriate, unless specifically indicated otherwise. [employer]'s drug information does not endorse drugs, diagnose patients or recommend therapy. [employer] drug information is an informational resource designed to assist licensed healthcare practitioners in caring for their patients and/or to serve consumers viewing this service as a supplement to, and not a substitute for, the expertise, skill, knowledge and judgment of healthcare practitioners. The absence of a warning for a given drug or drug combination in no way should be construed to indicate that the drug or drug combination is safe, effective or appropriate for any given patient.  [employer] does not assume any responsibility for

## 2020-09-22 ENCOUNTER — HOSPITAL ENCOUNTER (OUTPATIENT)
Dept: INFUSION THERAPY | Age: 40
Setting detail: INFUSION SERIES
Discharge: HOME | End: 2020-09-22
Payer: COMMERCIAL

## 2020-09-22 VITALS
DIASTOLIC BLOOD PRESSURE: 89 MMHG | SYSTOLIC BLOOD PRESSURE: 152 MMHG | HEART RATE: 78 BPM | TEMPERATURE: 97.7 F | OXYGEN SATURATION: 98 % | RESPIRATION RATE: 17 BRPM

## 2020-09-22 DIAGNOSIS — D50.9 IRON DEFICIENCY ANEMIA, UNSPECIFIED IRON DEFICIENCY ANEMIA TYPE: Primary | ICD-10-CM

## 2020-09-22 PROCEDURE — 2580000003 HC RX 258: Performed by: NURSE PRACTITIONER

## 2020-09-22 PROCEDURE — 96365 THER/PROPH/DIAG IV INF INIT: CPT

## 2020-09-22 PROCEDURE — 6360000002 HC RX W HCPCS: Performed by: NURSE PRACTITIONER

## 2020-09-22 RX ORDER — SODIUM CHLORIDE 0.9 % (FLUSH) 0.9 %
5 SYRINGE (ML) INJECTION PRN
Status: CANCELLED | OUTPATIENT
Start: 2020-09-29

## 2020-09-22 RX ORDER — SODIUM CHLORIDE 0.9 % (FLUSH) 0.9 %
10 SYRINGE (ML) INJECTION PRN
Status: CANCELLED | OUTPATIENT
Start: 2020-09-29

## 2020-09-22 RX ORDER — SODIUM CHLORIDE 9 MG/ML
INJECTION, SOLUTION INTRAVENOUS CONTINUOUS
Status: CANCELLED | OUTPATIENT
Start: 2020-09-29

## 2020-09-22 RX ORDER — SODIUM CHLORIDE 9 MG/ML
INJECTION, SOLUTION INTRAVENOUS CONTINUOUS
Status: ACTIVE | OUTPATIENT
Start: 2020-09-22 | End: 2020-09-22

## 2020-09-22 RX ORDER — DIPHENHYDRAMINE HYDROCHLORIDE 50 MG/ML
50 INJECTION INTRAMUSCULAR; INTRAVENOUS ONCE
Status: CANCELLED | OUTPATIENT
Start: 2020-09-29

## 2020-09-22 RX ORDER — METHYLPREDNISOLONE SODIUM SUCCINATE 125 MG/2ML
125 INJECTION, POWDER, LYOPHILIZED, FOR SOLUTION INTRAMUSCULAR; INTRAVENOUS ONCE
Status: CANCELLED | OUTPATIENT
Start: 2020-09-29

## 2020-09-22 RX ORDER — EPINEPHRINE 1 MG/ML
0.3 INJECTION, SOLUTION, CONCENTRATE INTRAVENOUS PRN
Status: CANCELLED | OUTPATIENT
Start: 2020-09-29

## 2020-09-22 RX ORDER — HEPARIN SODIUM (PORCINE) LOCK FLUSH IV SOLN 100 UNIT/ML 100 UNIT/ML
500 SOLUTION INTRAVENOUS PRN
Status: CANCELLED | OUTPATIENT
Start: 2020-09-29

## 2020-09-22 RX ADMIN — SODIUM CHLORIDE: 9 INJECTION, SOLUTION INTRAVENOUS at 10:00

## 2020-09-22 RX ADMIN — FERRIC CARBOXYMALTOSE INJECTION 750 MG: 50 INJECTION, SOLUTION INTRAVENOUS at 10:24

## 2020-09-29 ENCOUNTER — HOSPITAL ENCOUNTER (OUTPATIENT)
Dept: INFUSION THERAPY | Age: 40
Setting detail: INFUSION SERIES
Discharge: HOME | End: 2020-09-29
Payer: COMMERCIAL

## 2020-09-29 DIAGNOSIS — D50.9 IRON DEFICIENCY ANEMIA, UNSPECIFIED IRON DEFICIENCY ANEMIA TYPE: Primary | ICD-10-CM

## 2020-09-29 PROCEDURE — 2580000003 HC RX 258: Performed by: NURSE PRACTITIONER

## 2020-09-29 PROCEDURE — 96365 THER/PROPH/DIAG IV INF INIT: CPT

## 2020-09-29 PROCEDURE — 6360000002 HC RX W HCPCS: Performed by: NURSE PRACTITIONER

## 2020-09-29 RX ORDER — SODIUM CHLORIDE 0.9 % (FLUSH) 0.9 %
5 SYRINGE (ML) INJECTION PRN
Status: CANCELLED | OUTPATIENT
Start: 2020-09-29

## 2020-09-29 RX ORDER — SODIUM CHLORIDE 9 MG/ML
INJECTION, SOLUTION INTRAVENOUS CONTINUOUS
Status: CANCELLED | OUTPATIENT
Start: 2020-09-29

## 2020-09-29 RX ORDER — SODIUM CHLORIDE 0.9 % (FLUSH) 0.9 %
10 SYRINGE (ML) INJECTION PRN
Status: CANCELLED | OUTPATIENT
Start: 2020-09-29

## 2020-09-29 RX ORDER — SODIUM CHLORIDE 9 MG/ML
INJECTION, SOLUTION INTRAVENOUS CONTINUOUS
Status: ACTIVE | OUTPATIENT
Start: 2020-09-29 | End: 2020-09-29

## 2020-09-29 RX ORDER — METHYLPREDNISOLONE SODIUM SUCCINATE 125 MG/2ML
125 INJECTION, POWDER, LYOPHILIZED, FOR SOLUTION INTRAMUSCULAR; INTRAVENOUS ONCE
Status: CANCELLED | OUTPATIENT
Start: 2020-09-29

## 2020-09-29 RX ORDER — EPINEPHRINE 1 MG/ML
0.3 INJECTION, SOLUTION, CONCENTRATE INTRAVENOUS PRN
Status: CANCELLED | OUTPATIENT
Start: 2020-09-29

## 2020-09-29 RX ORDER — HEPARIN SODIUM (PORCINE) LOCK FLUSH IV SOLN 100 UNIT/ML 100 UNIT/ML
500 SOLUTION INTRAVENOUS PRN
Status: CANCELLED | OUTPATIENT
Start: 2020-09-29

## 2020-09-29 RX ORDER — DIPHENHYDRAMINE HYDROCHLORIDE 50 MG/ML
50 INJECTION INTRAMUSCULAR; INTRAVENOUS ONCE
Status: CANCELLED | OUTPATIENT
Start: 2020-09-29

## 2020-09-29 RX ADMIN — FERRIC CARBOXYMALTOSE INJECTION 750 MG: 50 INJECTION, SOLUTION INTRAVENOUS at 11:05

## 2020-09-29 RX ADMIN — SODIUM CHLORIDE: 9 INJECTION, SOLUTION INTRAVENOUS at 11:06

## 2020-11-05 ENCOUNTER — OFFICE VISIT (OUTPATIENT)
Dept: FAMILY MEDICINE CLINIC | Age: 40
End: 2020-11-05
Payer: COMMERCIAL

## 2020-11-05 VITALS
SYSTOLIC BLOOD PRESSURE: 112 MMHG | TEMPERATURE: 97 F | BODY MASS INDEX: 54.5 KG/M2 | HEART RATE: 79 BPM | OXYGEN SATURATION: 99 % | DIASTOLIC BLOOD PRESSURE: 82 MMHG | WEIGHT: 293 LBS

## 2020-11-05 PROCEDURE — 99213 OFFICE O/P EST LOW 20 MIN: CPT | Performed by: FAMILY MEDICINE

## 2020-11-05 PROCEDURE — 1036F TOBACCO NON-USER: CPT | Performed by: FAMILY MEDICINE

## 2020-11-05 PROCEDURE — G8427 DOCREV CUR MEDS BY ELIG CLIN: HCPCS | Performed by: FAMILY MEDICINE

## 2020-11-05 PROCEDURE — 2022F DILAT RTA XM EVC RTNOPTHY: CPT | Performed by: FAMILY MEDICINE

## 2020-11-05 PROCEDURE — G8417 CALC BMI ABV UP PARAM F/U: HCPCS | Performed by: FAMILY MEDICINE

## 2020-11-05 PROCEDURE — 3051F HG A1C>EQUAL 7.0%<8.0%: CPT | Performed by: FAMILY MEDICINE

## 2020-11-05 PROCEDURE — G8484 FLU IMMUNIZE NO ADMIN: HCPCS | Performed by: FAMILY MEDICINE

## 2020-11-05 RX ORDER — SYRING-NEEDL,DISP,INSUL,0.3 ML 30 GX5/16"
SYRINGE, EMPTY DISPOSABLE MISCELLANEOUS
Qty: 60 DEVICE | Refills: 11 | Status: SHIPPED | OUTPATIENT
Start: 2020-11-05 | End: 2020-11-05 | Stop reason: SDUPTHER

## 2020-11-05 RX ORDER — SYRING-NEEDL,DISP,INSUL,0.3 ML 30 GX5/16"
SYRINGE, EMPTY DISPOSABLE MISCELLANEOUS
Qty: 60 DEVICE | Refills: 11 | Status: SHIPPED | OUTPATIENT
Start: 2020-11-05

## 2020-11-05 NOTE — PROGRESS NOTES
[facility] PHYSICIAN SERVICES  [facility] FAMILY MEDICINE  [address] [facility] [address] [address]  Dept: [phone]  Dept Fax: [phone]  Loc: [phone]    Antonia Hull is a 36 y.o. female who presents today for her medical conditions/complaints as noted below. Antonia Hull is here for Follow-up        HPI:   CC: Here today to discuss the following:    Diabetes Mellitus  Has been compliant with medications. No side effects of medications since last visit. No polyuria, polydipsia, or vision changes since last visit. No symptomatic episodes of hypoglycemia. She has a history of PCOS as well. She continues to take Metformin. She has a history of fibromyalgia and rheumatoid arthritis  She continues to be followed by rheumatology. She is currently on Humira. HPI    Past Medical History:   Diagnosis Date    Anemia     Chronic rheumatic arthritis ([address]     Elevated blood pressure reading     Fibromyalgia     Intracranial hypertension     Obesity     PCOS (polycystic ovarian syndrome)     Sleep apnea     Type 2 diabetes mellitus ([address]       Past Surgical History:   Procedure Laterality Date    CHOLECYSTECTOMY         Family History   Problem Relation Age of Onset    High Blood Pressure Father     Diabetes Father     Atrial Fibrillation Father     Heart Failure Father     Stroke Father     Diabetes Sister     Diabetes Maternal Grandmother     Heart Attack Other        Social History     Tobacco Use    Smoking status: Former Smoker     Years: 2.00     Types: Cigarettes    Smokeless tobacco: Never Used   Substance Use Topics    Alcohol use:  Yes     Alcohol/week: 1.0 standard drinks     Types: 1 Glasses of wine per week     Comment: occ     Current Outpatient Medications   Medication Sig Dispense Refill    Omega-3 Fatty Acids (FISH OIL PO) Take by mouth      Lancet Device MISC 1 lancet twice a day 60 Device 11    vitamin B-12 (CYANOCOBALAMIN) 100 MCG tablet Take 300 mcg by mouth daily OTC      metFORMIN (GLUCOPHAGE) 500 MG tablet TAKE 1 TABLET BY MOUTH TWICE A DAY WITH MEALS 60 tablet 3    Adalimumab (HUMIRA) 20 MG/0.4ML PSKT Inject into the skin      folic acid (FOLVITE) 1 MG tablet Take 1 mg by mouth daily      ferrous sulfate (IRON 325) 325 (65 Fe) MG tablet Take 325 mg by mouth 2 times daily       No current facility-administered medications for this visit. Allergies   Allergen Reactions    Mushroom Extract Complex        Health Maintenance   Topic Date Due    Varicella vaccine (1 of 2 - 2-dose childhood series) 08/17/1981    Pneumococcal 0-64 years Vaccine (1 of 1 - PPSV23) 08/17/1986    Diabetic retinal exam  08/17/1990    HIV screen  08/17/1995    Hepatitis B vaccine (1 of 3 - Risk 3-dose series) 08/17/1999    Cervical cancer screen  08/17/2001    Diabetic foot exam  06/26/2020    Flu vaccine (1) 09/01/2020    A1C test (Diabetic or Prediabetic)  08/25/2021    Diabetic microalbuminuria test  08/25/2021    Lipid screen  08/25/2021    Potassium monitoring  08/25/2021    Creatinine monitoring  08/25/2021    DTaP/Tdap/Td vaccine (2 - Td) 04/25/2027    Hepatitis A vaccine  Aged Out    Hib vaccine  Aged Out    Meningococcal (ACWY) vaccine  Aged Out       Subjective:      Review of Systems    Review of Systems   Constitutional: Negative for chills and fever. HENT: Negative for congestion. Respiratory: Negative for cough, chest tightness and shortness of breath. Cardiovascular: Negative for chest pain, palpitations and leg swelling. Gastrointestinal: Negative for abdominal pain, anal bleeding, constipation, diarrhea and nausea. Genitourinary: Negative for difficulty urinating. Psychiatric/Behavioral: Negative. SeeHPI for visit specific review of symptoms.   All others negative      Objective:   /82   Pulse 79   Temp 97 °F (36.1 °C)   Wt 298 lb (135.2 kg)   SpO2 99%   BMI 54.50 kg/m²   Physical Exam  Physical Exam

## 2020-11-06 PROBLEM — G44.201 ACUTE INTRACTABLE TENSION-TYPE HEADACHE: Status: RESOLVED | Noted: 2017-08-11 | Resolved: 2020-11-06

## 2020-11-06 PROBLEM — I10 ESSENTIAL (PRIMARY) HYPERTENSION: Status: RESOLVED | Noted: 2017-11-02 | Resolved: 2020-11-06

## 2020-11-23 ENCOUNTER — TELEPHONE (OUTPATIENT)
Dept: FAMILY MEDICINE CLINIC | Age: 40
End: 2020-11-23

## 2020-12-04 ENCOUNTER — OFFICE VISIT (OUTPATIENT)
Dept: HEMATOLOGY | Age: 40
End: 2020-12-04
Payer: COMMERCIAL

## 2020-12-04 ENCOUNTER — HOSPITAL ENCOUNTER (OUTPATIENT)
Dept: INFUSION THERAPY | Age: 40
Discharge: HOME | End: 2020-12-04
Payer: COMMERCIAL

## 2020-12-04 VITALS
OXYGEN SATURATION: 97 % | WEIGHT: 293 LBS | HEIGHT: 62 IN | BODY MASS INDEX: 53.92 KG/M2 | HEART RATE: 84 BPM | DIASTOLIC BLOOD PRESSURE: 98 MMHG | SYSTOLIC BLOOD PRESSURE: 146 MMHG | TEMPERATURE: 96.8 F

## 2020-12-04 DIAGNOSIS — D64.9 ANEMIA, UNSPECIFIED TYPE: ICD-10-CM

## 2020-12-04 LAB
BASOPHILS ABSOLUTE: 0.02 K/UL (ref 0.01–0.08)
BASOPHILS RELATIVE PERCENT: 0.3 % (ref 0.1–1.2)
EOSINOPHILS ABSOLUTE: 0.24 K/UL (ref 0.04–0.54)
EOSINOPHILS RELATIVE PERCENT: 3.1 % (ref 0.7–7)
HCT VFR BLD CALC: 38.2 % (ref 34.1–44.9)
HEMOGLOBIN: 12.4 G/DL (ref 11.2–15.7)
LYMPHOCYTES ABSOLUTE: 2.25 K/UL (ref 1.18–3.74)
LYMPHOCYTES RELATIVE PERCENT: 29.4 % (ref 19.3–53.1)
MCH RBC QN AUTO: 26.8 PG (ref 25.6–32.2)
MCHC RBC AUTO-ENTMCNC: 32.5 G/DL (ref 32.3–35.5)
MCV RBC AUTO: 82.7 FL (ref 79.4–94.8)
MONOCYTES ABSOLUTE: 0.52 K/UL (ref 0.24–0.82)
MONOCYTES RELATIVE PERCENT: 6.8 % (ref 4.7–12.5)
NEUTROPHILS ABSOLUTE: 4.62 K/UL (ref 1.56–6.13)
NEUTROPHILS RELATIVE PERCENT: 60.4 % (ref 34–71.1)
PDW BLD-RTO: 20 % (ref 11.7–14.4)
PLATELET # BLD: 208 K/UL (ref 182–369)
PMV BLD AUTO: 11.5 FL (ref 7.4–10.4)
RBC # BLD: 4.62 M/UL (ref 3.93–5.22)
WBC # BLD: 7.65 K/UL (ref 3.98–10.04)

## 2020-12-04 PROCEDURE — 85025 COMPLETE CBC W/AUTO DIFF WBC: CPT

## 2020-12-04 PROCEDURE — G8417 CALC BMI ABV UP PARAM F/U: HCPCS | Performed by: NURSE PRACTITIONER

## 2020-12-04 PROCEDURE — G8484 FLU IMMUNIZE NO ADMIN: HCPCS | Performed by: NURSE PRACTITIONER

## 2020-12-04 PROCEDURE — G8427 DOCREV CUR MEDS BY ELIG CLIN: HCPCS | Performed by: NURSE PRACTITIONER

## 2020-12-04 PROCEDURE — 99213 OFFICE O/P EST LOW 20 MIN: CPT | Performed by: NURSE PRACTITIONER

## 2020-12-04 PROCEDURE — 1036F TOBACCO NON-USER: CPT | Performed by: NURSE PRACTITIONER

## 2020-12-04 PROCEDURE — 99211 OFF/OP EST MAY X REQ PHY/QHP: CPT

## 2020-12-04 RX ORDER — BLOOD-GLUCOSE METER
KIT MISCELLANEOUS
COMMUNITY
Start: 2020-09-17

## 2020-12-04 ASSESSMENT — ENCOUNTER SYMPTOMS
SORE THROAT: 0
PHOTOPHOBIA: 0
EYE REDNESS: 0
NAUSEA: 1
COUGH: 0
SHORTNESS OF BREATH: 1
DIARRHEA: 0
BACK PAIN: 1
EYE ITCHING: 0
CONSTIPATION: 0
EYE DISCHARGE: 0
WHEEZING: 0
TROUBLE SWALLOWING: 0
VOMITING: 0
ABDOMINAL PAIN: 0

## 2020-12-04 NOTE — PROGRESS NOTES
OP HEMATOLOGY/ONCOLOGY PROGRESS NOTE      Pt Name: Laura Mckeon  YOB: 1980  MRN: [id number]  Referring provider: NATACHA Manning  PCP: Dr. Maida Alvarenga  Rheumatology: Dr. Antonina Whipple    Date of evaluation: 12/4/2020    History Obtained From:  patient, electronic medical record    CHIEF COMPLAINT:    Chief Complaint   Patient presents with    Follow-up       Iron deficiency anemia due to chronic blood loss       HISTORY OF PRESENT ILLNESS:    Laura Mckeon is a 36 y.o.  female       HEMATOLOGY HISTORY: Iron Deficiency Anemia  Mj Sims was seen in hematology consultation 9/4/2020, referred by Dr. Alisa Lesches for evaluation of severe anemia. PMH is significant for rheumatoid arthritis, fibromyalgia, type 2 diabetes, SETH with CPAP use. Ariana Ayala had been off medications for rheumatoid arthritis since May, 2020. She was taken off leflunomide due to anemia. She stopped sulfasalazine due to rash. She is now taking Humira. Review of CBCs:  · CBC 4/15/2020: WBC 6.7, Hgb 11.1/MCV 74, platelets 272,421  · CBC 5/19/2020: WBC 6.9, Hgb 9.1/MCV 71, platelets 354,525  · CBC 8/25/2020: WBC 9.1, Hgb 9.7/MCV 70.3, platelets 144,518    Additional Labs 8/25/2020:  · CMP: creatinine 0.5/GFR >60, Ca+ 9.2, TP 7.1  · TSH: 2.5    Ferrous Sulfate po BID was initiated ~8/25/2020. Ariana Ayala has been taking folic acid on her own. Ariana Ayala reports heavy menses. Menstrual cycle is regular, occurring every month though lasts approximately 14 days and is painful with heavy clotting. She denies significant change in bowel habits, though states her bowels have been dark since iron initiation. She has had increased nausea since initiation of oral iron. Additional symptoms include significant fatigue, dizziness and exertional dyspnea. Hgb at presentation 9/4/2020 was 10.4 with MCV 72.4. Parenteral iron with Injectafer 750 mg IV delivered 9/22/2020 and 9/29/2020.   Occult blood stool was negative x3 on 10/1/2020. Jimena Young was referred to GI by her PCP. She did not keep this appointment as her likely source of anemia is related to menorrhagia. Jimena Young resumed BCP 11/2020 with decrease in vaginal bleeding. She was previously followed by Dr. Charlee Silva, now establishing care with [address] TREATMENT SUMMARY:  · Injectafer 9/2020    Past Medical History:   Diagnosis Date    Anemia     Chronic rheumatic arthritis ([address]     Elevated blood pressure reading     Fibromyalgia     Intracranial hypertension     Obesity     PCOS (polycystic ovarian syndrome)     Sleep apnea     Type 2 diabetes mellitus (HCC)      Past Surgical History:   Procedure Laterality Date    CHOLECYSTECTOMY         Current Outpatient Medications:     Blood Glucose Monitoring Suppl (FREESTYLE LITE) ANDREEA, USE AS DIRECTED, Disp: , Rfl:     Omega-3 Fatty Acids (FISH OIL PO), Take by mouth, Disp: , Rfl:     Lancet Device MISC, 1 lancet twice a day, Disp: 60 Device, Rfl: 11    vitamin B-12 (CYANOCOBALAMIN) 100 MCG tablet, Take 300 mcg by mouth daily OTC, Disp: , Rfl:     folic acid (FOLVITE) 1 MG tablet, Take 1 mg by mouth daily, Disp: , Rfl:    Allergies: Allergies   Allergen Reactions    Mushroom Extract Complex      Social History     Tobacco Use    Smoking status: Former Smoker     Years: 2.00     Types: Cigarettes    Smokeless tobacco: Never Used   Substance Use Topics    Alcohol use:  Yes     Alcohol/week: 1.0 standard drinks     Types: 1 Glasses of wine per week     Comment: occ    Drug use: No     Family History   Problem Relation Age of Onset    High Blood Pressure Father     Diabetes Father     Atrial Fibrillation Father     Heart Failure Father     Stroke Father     Diabetes Sister     Diabetes Maternal Grandmother     Heart Attack Other      Health Maintenance   Topic Date Due    Varicella vaccine (1 of 2 - 2-dose childhood series) 08/17/1981    Diabetic retinal exam  08/17/1990    HIV screen 08/17/1995    Hepatitis B vaccine (1 of 3 - Risk 3-dose series) 08/17/1999    Cervical cancer screen  08/17/2001    Diabetic foot exam  06/26/2020    Flu vaccine (1) 09/01/2020    A1C test (Diabetic or Prediabetic)  08/25/2021    Diabetic microalbuminuria test  08/25/2021    Lipid screen  08/25/2021    DTaP/Tdap/Td vaccine (2 - Td) 04/25/2027    Hepatitis A vaccine  Aged Out    Hib vaccine  Aged Out    Meningococcal (ACWY) vaccine  Aged Out    Pneumococcal 0-64 years Vaccine  Aged Out     Subjective   Review of Systems   Constitutional: Positive for fatigue (Significantly improved) and unexpected weight change (gain). Negative for fever. No night sweats   HENT: Negative for dental problem, hearing loss, mouth sores, nosebleeds, sore throat and trouble swallowing. Eyes: Negative for photophobia, discharge, redness and itching. No blurring of vision, no double vision   Respiratory: Positive for shortness of breath (exertional, improved). Negative for cough and wheezing. No hemoptysis   Cardiovascular: Negative for chest pain, palpitations and leg swelling. Gastrointestinal: Positive for nausea. Negative for abdominal pain, constipation, diarrhea and vomiting. Endocrine: Negative for cold intolerance and heat intolerance. Genitourinary: Positive for menstrual problem (heavy menses with clotting, improved with oral BCP). Negative for dysuria, frequency, hematuria and urgency. Musculoskeletal: Positive for arthralgias and back pain. Negative for joint swelling and myalgias. Rheumatoid arthritis    Skin: Negative for pallor and rash. Allergic/Immunologic: Negative for environmental allergies and immunocompromised state. Neurological: Negative for dizziness, seizures, syncope and numbness. Hematological: Negative for adenopathy. Does not bruise/bleed easily. Psychiatric/Behavioral: Negative for agitation, behavioral problems and confusion.  The patient is not time.       BP (!) 146/98   Pulse 84   Temp 96.8 °F (36 °C) (Temporal)   Ht 5' 2\" (1.575 m)   Wt 293 lb 8 oz (133.1 kg)   SpO2 97%   BMI 53.68 kg/m²   Wt Readings from Last 3 Encounters:   12/04/20 293 lb 8 oz (133.1 kg)   11/05/20 298 lb (135.2 kg)   09/04/20 293 lb (132.9 kg)     Labs reviewed by me:  CBC 12/04/20: WBC 7.65, Hgb 12.4/MCV 82.7, platelets 640,706    ASSESSMENT/PLAN:    1. Iron deficiency anemia due to chronic blood loss    2. Menorrhagia with regular cycle    3. Elevated blood pressure reading without diagnosis of hypertension    4. BMI 50.0-59.9, adult (HCC)      Iron deficiency anemia related to heavy menses  Yue discontinued oral iron due to GI upset including nausea. Jair Rojas did not attend her appointment for GI referral.  Hemoccult stool x3 was negative on 10/1/2020. She received parenteral iron with Injectafer 9/22/2020 and 9/29/2020. She has an appointment with Brianna Nelson next week regarding heavy menses. Elevated blood pressure reading in office without diagnosis of hypertension  Blood pressure is 146/98, asymptomatic  Jair Rojas states her blood pressure is normally much better  She has a means of checking her blood pressure at home and is agreeable to report persistent elevations to her PCP    Body mass index is 53.68 kg/m². BMI is above goal.  Federal guidelines recommend that people under the age of 72 should have a BMI of 18.5-25 and people age 72 and older should have a BMI of 23-30. If yours is outside the range, we recommend you utilize a diet and exercise program to get yours into the range. We also recommend you speak with your primary care doctor should any specific advice be needed regarding special diets or programs which would be appropriate for your circumstances. Orders Placed This Encounter   Procedures    Iron and TIBC    Ferritin     CBC will be repeated in 2 months, sooner if needed.     Return in about 4 months (around 4/4/2021) for follow up with NATACHA Churchill.         NATACHA Wagoner  8:06 PM  12/4/2020

## 2020-12-08 RX ORDER — FERROUS SULFATE 325(65) MG
325 TABLET ORAL
Qty: 30 TABLET | Refills: 2 | Status: SHIPPED | OUTPATIENT
Start: 2020-12-08 | End: 2021-05-24 | Stop reason: ALTCHOICE

## 2020-12-22 ENCOUNTER — OFFICE VISIT (OUTPATIENT)
Dept: OBGYN | Age: 40
End: 2020-12-22
Payer: COMMERCIAL

## 2020-12-22 VITALS
HEART RATE: 72 BPM | DIASTOLIC BLOOD PRESSURE: 72 MMHG | HEIGHT: 62 IN | WEIGHT: 291 LBS | SYSTOLIC BLOOD PRESSURE: 135 MMHG | BODY MASS INDEX: 53.55 KG/M2

## 2020-12-22 DIAGNOSIS — Z11.3 SCREENING FOR STD (SEXUALLY TRANSMITTED DISEASE): ICD-10-CM

## 2020-12-22 PROBLEM — Z80.41 FAMILY HISTORY OF OVARIAN CANCER: Status: ACTIVE | Noted: 2020-12-22

## 2020-12-22 PROBLEM — N92.0 MENORRHAGIA WITH REGULAR CYCLE: Status: ACTIVE | Noted: 2020-12-22

## 2020-12-22 LAB
HAV IGM SER IA-ACNC: NORMAL
HEPATITIS B CORE IGM ANTIBODY: NORMAL
HEPATITIS B SURFACE ANTIGEN INTERPRETATION: NORMAL
HEPATITIS C ANTIBODY INTERPRETATION: NORMAL
HIV-1 P24 AG: NORMAL
RAPID HIV 1&2: NORMAL

## 2020-12-22 PROCEDURE — G8427 DOCREV CUR MEDS BY ELIG CLIN: HCPCS | Performed by: ADVANCED PRACTICE MIDWIFE

## 2020-12-22 PROCEDURE — 99204 OFFICE O/P NEW MOD 45 MIN: CPT | Performed by: ADVANCED PRACTICE MIDWIFE

## 2020-12-22 PROCEDURE — G8484 FLU IMMUNIZE NO ADMIN: HCPCS | Performed by: ADVANCED PRACTICE MIDWIFE

## 2020-12-22 PROCEDURE — 99386 PREV VISIT NEW AGE 40-64: CPT | Performed by: ADVANCED PRACTICE MIDWIFE

## 2020-12-22 PROCEDURE — G8417 CALC BMI ABV UP PARAM F/U: HCPCS | Performed by: ADVANCED PRACTICE MIDWIFE

## 2020-12-22 PROCEDURE — 1036F TOBACCO NON-USER: CPT | Performed by: ADVANCED PRACTICE MIDWIFE

## 2020-12-22 RX ORDER — NORGESTIMATE AND ETHINYL ESTRADIOL 7DAYSX3 28
1 KIT ORAL DAILY
Qty: 84 TABLET | Refills: 3 | Status: SHIPPED | OUTPATIENT
Start: 2020-12-22 | End: 2021-11-29

## 2020-12-22 ASSESSMENT — ENCOUNTER SYMPTOMS
RESPIRATORY NEGATIVE: 1
ALLERGIC/IMMUNOLOGIC NEGATIVE: 1
GASTROINTESTINAL NEGATIVE: 1
EYES NEGATIVE: 1

## 2020-12-22 NOTE — PROGRESS NOTES
Pt presents today for pap smear and breast exam.  She also complains of anemia and states her periods are heavy. Pt started birth control a month ago and is having BTB. Prescribed by Dr. Celio Torre and not sure which ones. Pt also would like to get her tubes tied.     Last mammogram:  2018  Last pap smear:  2018, normal  Contraception:  OCP  :  2  Para:  1  AB:  1  Last bone density:  na  Last colonoscopy:   never

## 2020-12-22 NOTE — PATIENT INSTRUCTIONS
Patient Education        Breast Self-Exam: Care Instructions  Your Care Instructions     A breast self-exam is when you check your breasts for lumps or changes. This regular exam helps you learn how your breasts normally look and feel. Most breast problems or changes are not because of cancer. Breast self-exam is not a substitute for a mammogram. Having regular breast exams by your doctor and regular mammograms improve your chances of finding any problems with your breasts. Some women set a time each month to do a step-by-step breast self-exam. Other women like a less formal system. They might look at their breasts as they brush their teeth, or feel their breasts once in a while in the shower. If you notice a change in your breast, tell your doctor. Follow-up care is a key part of your treatment and safety. Be sure to make and go to all appointments, and call your doctor if you are having problems. It's also a good idea to know your test results and keep a list of the medicines you take. How do you do a breast self-exam?  · The best time to examine your breasts is usually one week after your menstrual period begins. Your breasts should not be tender then. If you do not have periods, you might do your exam on a day of the month that is easy to remember. · To examine your breasts:  ? Remove all your clothes above the waist and lie down. When you are lying down, your breast tissue spreads evenly over your chest wall, which makes it easier to feel all your breast tissue. ? Use the padsnot the fingertipsof the 3 middle fingers of your left hand to check your right breast. Move your fingers slowly in small coin-sized circles that overlap. ? Use three levels of pressure to feel of all your breast tissue. Use light pressure to feel the tissue close to the skin surface. Use medium pressure to feel a little deeper. Use firm pressure to feel your tissue close to your breastbone and ribs. Use each pressure level to feel your breast tissue before moving on to the next spot. ? Check your entire breast, moving up and down as if following a strip from the collarbone to the bra line, and from the armpit to the ribs. Repeat until you have covered the entire breast.  ? Repeat this procedure for your left breast, using the pads of the 3 middle fingers of your right hand. · To examine your breasts while in the shower:  ? Place one arm over your head and lightly soap your breast on that side. ? Using the pads of your fingers, gently move your hand over your breast (in the strip pattern described above), feeling carefully for any lumps or changes. ? Repeat for the other breast.  · Have your doctor inspect anything you notice to see if you need further testing. Where can you learn more? Go to https://Genius Digital.Audibase. org and sign in to your BlueSprig account. Enter P148 in the [facility] box to learn more about \"Breast Self-Exam: Care Instructions. \"     If you do not have an account, please click on the \"Sign Up Now\" link. Current as of: April 29, 2020               Content Version: 12.6  © 6100-0145 appweevr, Incorporated. Care instructions adapted under license by [facility]. If you have questions about a medical condition or this instruction, always ask your healthcare professional. [place] [address] any warranty or liability for your use of this information.          Patient Education        Body Mass Index: Care Instructions  Your Care Instructions Body mass index (BMI) can help you see if your weight is raising your risk for health problems. It uses a formula to compare how much you weigh with how tall you are. · A BMI lower than 18.5 is considered underweight. · A BMI between 18.5 and 24.9 is considered healthy. · A BMI between 25 and 29.9 is considered overweight. A BMI of 30 or higher is considered obese. If your BMI is in the normal range, it means that you have a lower risk for weight-related health problems. If your BMI is in the overweight or obese range, you may be at increased risk for weight-related health problems, such as high blood pressure, heart disease, stroke, arthritis or joint pain, and diabetes. If your BMI is in the underweight range, you may be at increased risk for health problems such as fatigue, lower protection (immunity) against illness, muscle loss, bone loss, hair loss, and hormone problems. BMI is just one measure of your risk for weight-related health problems. You may be at higher risk for health problems if you are not active, you eat an unhealthy diet, or you drink too much alcohol or use tobacco products. Follow-up care is a key part of your treatment and safety. Be sure to make and go to all appointments, and call your doctor if you are having problems. It's also a good idea to know your test results and keep a list of the medicines you take. How can you care for yourself at home? · Practice healthy eating habits. This includes eating plenty of fruits, vegetables, whole grains, lean protein, and low-fat dairy. · If your doctor recommends it, get more exercise. Walking is a good choice. Bit by bit, increase the amount you walk every day. Try for at least 30 minutes on most days of the week. · Do not smoke. Smoking can increase your risk for health problems. If you need help quitting, talk to your doctor about stop-smoking programs and medicines. These can increase your chances of quitting for good. · Limit alcohol to 2 drinks a day for men and 1 drink a day for women. Too much alcohol can cause health problems. If you have a BMI higher than 25  · Your doctor may do other tests to check your risk for weight-related health problems. This may include measuring the distance around your waist. A waist measurement of more than 40 inches in men or 35 inches in women can increase the risk of weight-related health problems. · Talk with your doctor about steps you can take to stay healthy or improve your health. You may need to make lifestyle changes to lose weight and stay healthy, such as changing your diet and getting regular exercise. If you have a BMI lower than 18.5  · Your doctor may do other tests to check your risk for health problems. · Talk with your doctor about steps you can take to stay healthy or improve your health. You may need to make lifestyle changes to gain or maintain weight and stay healthy, such as getting more healthy foods in your diet and doing exercises to build muscle. Where can you learn more? Go to https://IPLSHOP BrasilpeBroadSoft.ZeePearl. org and sign in to your Wealthfront account. Enter S176 in the [facility] box to learn more about \"Body Mass Index: Care Instructions. \"     If you do not have an account, please click on the \"Sign Up Now\" link. Current as of: December 11, 2019               Content Version: 12.6  © 5228-3892 SpotlessCity, Incorporated. Care instructions adapted under license by [facility] ([facility] If you have questions about a medical condition or this instruction, always ask your healthcare professional. [place] [address] any warranty or liability for your use of this information.          Patient Education        A Healthy Lifestyle: Care Instructions  Your Care Instructions A healthy lifestyle can help you feel good, stay at a healthy weight, and have plenty of energy for both work and play. A healthy lifestyle is something you can share with your whole family. A healthy lifestyle also can lower your risk for serious health problems, such as high blood pressure, heart disease, and diabetes. You can follow a few steps listed below to improve your health and the health of your family. Follow-up care is a key part of your treatment and safety. Be sure to make and go to all appointments, and call your doctor if you are having problems. It's also a good idea to know your test results and keep a list of the medicines you take. How can you care for yourself at home? · Do not eat too much sugar, fat, or fast foods. You can still have dessert and treats now and then. The goal is moderation. · Start small to improve your eating habits. Pay attention to portion sizes, drink less juice and soda pop, and eat more fruits and vegetables. ? Eat a healthy amount of food. A 3-ounce serving of meat, for example, is about the size of a deck of cards. Fill the rest of your plate with vegetables and whole grains. ? Limit the amount of soda and sports drinks you have every day. Drink more water when you are thirsty. ? Eat at least 5 servings of fruits and vegetables every day. It may seem like a lot, but it is not hard to reach this goal. A serving or helping is 1 piece of fruit, 1 cup of vegetables, or 2 cups of leafy, raw vegetables. Have an apple or some carrot sticks as an afternoon snack instead of a candy bar.  Try to have fruits and/or vegetables at every meal. ? Walk to do errands or to take your child to school or the bus.  ? Go for a family bike ride after dinner instead of watching TV. Where can you learn more? Go to [email or web address]://[email or web address] [email or web address].healthCarreira [email or web address] org and sign in to your iFit account. Enter W121 in the [facility] box to learn more about \"A Healthy Lifestyle: Care Instructions. \"     If you do not have an account, please click on the \"Sign Up Now\" link. Current as of: January 31, 2020               Content Version: 12.6  © 0642-7915 SnapYeti, DRESSBOOM. Care instructions adapted under license by [facility] [place] ([facility]). If you have questions about a medical condition or this instruction, always ask your healthcare professional. [address] any warranty or liability for your use of this information. Patient Education        Combination Birth Control Pills: Care Instructions  Your Care Instructions     Combination birth control pills are used to prevent pregnancy. They give you a regular dose of the hormones estrogen and progestin. You take a hormone pill every day to prevent pregnancy. Birth control pills come in packs. The most common type has 3 weeks of hormone pills. Some packs have sugar pills (they do not contain any hormones) for the fourth week. During that fourth no-hormone week, you have your period. After the fourth week (28 days), you start a new pack. Some birth control pills are packaged in different ways. For example, some have hormone pills for the fourth week instead of sugar pills. Taking hormones for the entire month causes you to not have periods or to have fewer periods. Others are packaged so that you have a period every 3 months. Your doctor will tell you what type of pills you have. Follow-up care is a key part of your treatment and safety. Be sure to make and go to all appointments, and call your doctor if you are having problems. It's also a good idea to know your test results and keep a list of the medicines you take. How can you care for yourself at home? How do you take the pill? · Follow your doctor's instructions about when to start taking your pills. Use backup birth control, such as a condom, or don't have intercourse for 7 days after you start your pills. · Take your pills every day, at about the same time of day. To help yourself do this, try to take them when you do something else every day, such as brushing your teeth. What if you forget to take a pill? Always read the label for specific instructions, or call your doctor. Here are some basic guidelines:  · If you miss 1 hormone pill, take it as soon as you remember. Ask your doctor if you may need to use a backup birth control method, such as a condom, or not have intercourse. · If you miss 2 or more hormone pills, take one as soon as you remember you forgot them. Then read the pill label or call your doctor about instructions on how to take your missed pills. Use a backup method of birth control or don't have intercourse for 7 days. Pregnancy is more likely if you miss more than 1 pill. · If you had intercourse, you can use emergency contraception to help prevent pregnancy. The most effective emergency contraception is the copper IUD (inserted by a doctor). You can also get emergency contraceptive pills without a prescription [facility]. What else do you need to know? · The pill can have side effects. ? You may have very light or skipped periods. ? You may have bleeding between periods (spotting). This usually decreases after 3 to 4 months. ? You may have mood changes, less interest in sex, or weight gain. · The pill may reduce acne, heavy bleeding and cramping, and symptoms of premenstrual syndrome. · Check with your doctor before you use any other medicines, including over-the-counter medicines, vitamins, herbal products, and supplements. Birth control hormones may not work as well to prevent pregnancy when combined with other medicines. · The pill doesn't protect against sexually transmitted infection (STIs), such as herpes or HIV/AIDS. If you're not sure whether your sex partner might have an STI, use a condom to protect against disease. When should you call for help? Call your doctor now or seek immediate medical care if:    · You have severe belly pain.     · You have signs of a blood clot, such as:  ? Pain in your calf, back of the knee, thigh, or groin. ? Redness and swelling in your leg or groin.     · You have blurred vision or other problems seeing.     · You have a severe headache.     · You have severe trouble breathing. Watch closely for changes in your health, and be sure to contact your doctor if:    · You think you might be pregnant.     · You think you may be depressed.     · You think you may have been exposed to or have a sexually transmitted infection. Where can you learn more? Go to https://Proxima Cancion.healthPaper Battery [email or web address] org and sign in to your mBlox account. Enter Y044 in the [facility] box to learn more about \"Combination Birth Control Pills: Care Instructions. \"     If you do not have an account, please click on the \"Sign Up Now\" link. Current as of: February 11, 2020               Content Version: 12.6  © 5502-0359 Renaissance Learning, Vimodi. Care instructions adapted under license by [facility] ([facility]). If you have questions about a medical condition or this instruction, always ask your healthcare professional. [place] [address] any warranty or liability for your use of this information.          Patient Education        Well Visit, Ages 25 to 48: Care Instructions  Your Care Instructions Physical exams can help you stay healthy. Your doctor has checked your overall health and may have suggested ways to take good care of yourself. He or she also may have recommended tests. At home, you can help prevent illness with healthy eating, regular exercise, and other steps. Follow-up care is a key part of your treatment and safety. Be sure to make and go to all appointments, and call your doctor if you are having problems. It's also a good idea to know your test results and keep a list of the medicines you take. How can you care for yourself at home? · Reach and stay at a healthy weight. This will lower your risk for many problems, such as obesity, diabetes, heart disease, and high blood pressure. · Get at least 30 minutes of physical activity on most days of the week. Walking is a good choice. You also may want to do other activities, such as running, swimming, cycling, or playing tennis or team sports. Discuss any changes in your exercise program with your doctor. · Do not smoke or allow others to smoke around you. If you need help quitting, talk to your doctor about stop-smoking programs and medicines. These can increase your chances of quitting for good. · Talk to your doctor about whether you have any risk factors for sexually transmitted infections (STIs). Having one sex partner (who does not have STIs and does not have sex with anyone else) is a good way to avoid these infections. · Use birth control if you do not want to have children at this time. Talk with your doctor about the choices available and what might be best for you. · Protect your skin from too much sun. When you're outdoors from 10 a.m. to 4 p.m., stay in the shade or cover up with clothing and a hat with a wide brim. Wear sunglasses that block UV rays. Even when it's cloudy, put broad-spectrum sunscreen (SPF 30 or higher) on any exposed skin. · See a dentist one or two times a year for checkups and to have your teeth cleaned. · Wear a seat belt in the car. Follow your doctor's advice about when to have certain tests. These tests can spot problems early. For everyone  · Cholesterol. Have the fat (cholesterol) in your blood tested after age 21. Your doctor will tell you how often to have this done based on your age, family history, or other things that can increase your risk for heart disease. · Blood pressure. Have your blood pressure checked during a routine doctor visit. Your doctor will tell you how often to check your blood pressure based on your age, your blood pressure results, and other factors. · Vision. Talk with your doctor about how often to have a glaucoma test.  · Diabetes. Ask your doctor whether you should have tests for diabetes. · Colon cancer. Your risk for colorectal cancer gets higher as you get older. Some experts say that adults should start regular screening at age 48 and stop at age 76. Others say to start before age 48 or continue after age 76. Talk with your doctor about your risk and when to start and stop screening. For women  · Breast exam and mammogram. Talk to your doctor about when you should have a clinical breast exam and a mammogram. Medical experts differ on whether and how often women under 50 should have these tests. Your doctor can help you decide what is right for you. · Cervical cancer screening test and pelvic exam. Begin with a Pap test at age 24. The test often is part of a pelvic exam. Starting at age 27, you may choose to have a Pap test, an HPV test, or both tests at the same time (called co-testing). Talk with your doctor about how often to have testing. · Tests for sexually transmitted infections (STIs). Ask whether you should have tests for STIs. You may be at risk if you have sex with more than one person, especially if your partners do not wear condoms.   For men · Tests for sexually transmitted infections (STIs). Ask whether you should have tests for STIs. You may be at risk if you have sex with more than one person, especially if you do not wear a condom. · Testicular cancer exam. Ask your doctor whether you should check your testicles regularly. · Prostate exam. Talk to your doctor about whether you should have a blood test (called a PSA test) for prostate cancer. Experts differ on whether and when men should have this test. Some experts suggest it if you are older than 39 and are -American or have a father or brother who got prostate cancer when he was younger than 72. When should you call for help? Watch closely for changes in your health, and be sure to contact your doctor if you have any problems or symptoms that concern you. Where can you learn more? Go to https://DatabrickspeMichigan Home Brokerseweb.healthACCO [email or web address]. org and sign in to your Unified Inbox account. Enter P072 in the [employer] box to learn more about \"Well Visit, Ages 25 to 48: Care Instructions. \"     If you do not have an account, please click on the \"Sign Up Now\" link. Current as of: May 27, 2020               Content Version: 12.6  © 8070-0169 CRAVE, Incorporated. Care instructions adapted under license by [facility] ([facility]). If you have questions about a medical condition or this instruction, always ask your healthcare professional. [address] any warranty or liability for your use of this information.

## 2020-12-22 NOTE — PROGRESS NOTES
[facility] RHIANNA VAZQUEZ OB/GYN  CNM Office Note    Jason Geronimo is a 36 y.o. female who presents today for her medical conditions/ complaints as noted below. Chief Complaint   Patient presents with   [address] presents for annual gyn exam. She c/o BTB on first month of OCP. She has hx of fibroids with heavy menstrual bleeding and anemia. She desires BTL and possible ablation. She requests STD screen and is due for mammogram.      Last mammogram:    Last pap smear:  2018, normal  Contraception:  OCP  :  2  Para:  1  AB:  1  Last bone density:  na  Last colonoscopy:   never  Patient Active Problem List   Diagnosis    Type 2 diabetes mellitus ([address]    Anemia    Familial hypercholesterolemia    Chronic rheumatic arthritis ([address]    Fibromyalgia    Bilateral carpal tunnel syndrome    PCOS (polycystic ovarian syndrome)    Iron deficiency anemia       Patient's last menstrual period was 2020.     Past Medical History:   Diagnosis Date    Anemia     Chronic rheumatic arthritis ([address]     Elevated blood pressure reading     Fibromyalgia     Intracranial hypertension     Obesity     PCOS (polycystic ovarian syndrome)     Sleep apnea     Type 2 diabetes mellitus ([address]      Past Surgical History:   Procedure Laterality Date    CHOLECYSTECTOMY       Family History   Problem Relation Age of Onset    High Blood Pressure Father     Diabetes Father     Atrial Fibrillation Father     Heart Failure Father     Stroke Father     Diabetes Sister     Diabetes Maternal Grandmother     Heart Attack Other     Ovarian Cancer Paternal Grandmother      Social History     Tobacco Use    Smoking status: Former Smoker     Years: 2.00     Types: Cigarettes    Smokeless tobacco: Never Used   Substance Use Topics    Alcohol use:  Yes     Alcohol/week: 1.0 standard drinks     Types: 1 Glasses of wine per week     Comment: occ       Current Outpatient Medications Medication Sig Dispense Refill    ferrous sulfate (IRON 325) 325 (65 Fe) MG tablet Take 1 tablet by mouth daily (with breakfast) 30 tablet 2    Blood Glucose Monitoring Suppl (FREESTYLE LITE) ANDREEA USE AS DIRECTED      Omega-3 Fatty Acids (FISH OIL PO) Take by mouth      Lancet Device MISC 1 lancet twice a day 60 Device 11    vitamin B-12 (CYANOCOBALAMIN) 100 MCG tablet Take 300 mcg by mouth daily OTC      folic acid (FOLVITE) 1 MG tablet Take 1 mg by mouth daily       No current facility-administered medications for this visit. Allergies   Allergen Reactions    Mushroom Extract Complex      Vitals:    12/22/20 1055   BP: 135/72   Pulse: 72     Body mass index is 53.22 kg/m². Review of Systems   Constitutional: Negative. HENT: Negative. Eyes: Negative. Respiratory: Negative. Cardiovascular: Negative. Gastrointestinal: Negative. Endocrine: Negative. Genitourinary: Positive for menstrual problem. Musculoskeletal: Negative. Skin: Negative. Allergic/Immunologic: Negative. Neurological: Negative. Hematological: Negative. Psychiatric/Behavioral: Negative. Physical Exam  Constitutional:       Appearance: She is well-developed. HENT:      Head: Normocephalic and atraumatic. Eyes:      Conjunctiva/sclera: Conjunctivae normal.      Pupils: Pupils are equal, round, and reactive to light. Neck:      Musculoskeletal: Normal range of motion and neck supple. Thyroid: No thyromegaly. Trachea: No tracheal deviation. Cardiovascular:      Rate and Rhythm: Normal rate and regular rhythm. Heart sounds: Normal heart sounds. No murmur. Pulmonary:      Effort: Pulmonary effort is normal. No respiratory distress. Breath sounds: Normal breath sounds. Chest:      Comments: Breasts symmetrical without tenderness, masses, or nipple discharge. Nipples everted bilaterally. Abdominal:      General: There is no distension. Palpations: Abdomen is soft. Tenderness: There is no abdominal tenderness. There is no guarding. Genitourinary:     General: Normal vulva. Exam position: Lithotomy position. Labia:         Right: No rash or lesion. Left: No rash or lesion. Vagina: Normal. No erythema or lesions. Cervix: Normal.      Uterus: Normal. Not tender. Adnexa: Right adnexa normal and left adnexa normal.        Right: No mass, tenderness or fullness. Left: No mass, tenderness or fullness. Musculoskeletal: Normal range of motion. Skin:     General: Skin is warm and dry. Capillary Refill: Capillary refill takes less than 2 seconds. Neurological:      Mental Status: She is alert and oriented to person, place, and time. Psychiatric:         Behavior: Behavior normal.         Thought Content: Thought content normal.         Judgment: Judgment normal.          Diagnosis Orders   1. Encounter to establish care     2. Screening for STD (sexually transmitted disease)  Herpes simplex virus (HSV) I/II antibodies IgG & IgM w/ reflex    Hepatitis Panel, Acute    HIV Rapid 1&2    RPR Reflex to Titer and TPPA   3. Encounter for gynecological examination without abnormal finding  PAP SMEAR    Human papillomavirus (HPV) DNA probe thin prep high risk   4. Menorrhagia with regular cycle     5. Screening for malignant neoplasm of cervix  PAP SMEAR    Human papillomavirus (HPV) DNA probe thin prep high risk   6. Screening mammogram, encounter for  GABRIEL DIGITAL SCREEN W OR WO CAD BILATERAL       MEDICATIONS:  No orders of the defined types were placed in this encounter.       ORDERS:  Orders Placed This Encounter   Procedures    GABRIEL DIGITAL SCREEN W OR WO CAD BILATERAL    Herpes simplex virus (HSV) I/II antibodies IgG & IgM w/ reflex    Hepatitis Panel, Acute    HIV Rapid 1&2    RPR Reflex to Titer and TPPA    PAP SMEAR    Human papillomavirus (HPV) DNA probe thin prep high risk       PLAN: 1. WWE - PAP w HPV collected, mammogram ordered, SBE reviewed and CBE performed. No annual labs today. 2. Heavy periods - u/s ordered to evaluate fibroids  3. Contraception - Refill, was on sprintec. 4. STD screen - Diatherix collected  Risks, benefits, and alternatives were discussed with Dalia Sage today concerning contraception choices. No contraindications were noted. ACHES (abdominal pain, chest pain, headaches, visual changes, or severe leg pain) were reviewed with the patient and I stressed the importance of stopping the medication and notifying the provider if these occurred. I also educated the patient on menstrual irregularity associated with OCP initiation and/or continuation.

## 2020-12-26 LAB
HERPES TYPE 1/2 IGM COMBINED: 1.08 IV
HERPES TYPE I/II IGG COMBINED: >22.4 IV
HSV 1 GLYCOPROTEIN G AB IGG: 54.8 IV
HSV 2 GLYCOPROTEIN G AB IGG: >23.6 IV

## 2020-12-27 LAB — RPR: NORMAL

## 2020-12-29 ENCOUNTER — TELEPHONE (OUTPATIENT)
Dept: OBGYN | Age: 40
End: 2020-12-29

## 2020-12-29 RX ORDER — METRONIDAZOLE 500 MG/1
500 TABLET ORAL 2 TIMES DAILY
Qty: 14 TABLET | Refills: 0 | Status: SHIPPED | OUTPATIENT
Start: 2020-12-29 | End: 2021-01-05

## 2020-12-29 RX ORDER — DOXYCYCLINE HYCLATE 100 MG
100 TABLET ORAL 2 TIMES DAILY
Qty: 14 TABLET | Refills: 0 | Status: SHIPPED | OUTPATIENT
Start: 2020-12-29 | End: 2021-01-05

## 2020-12-30 LAB
HPV COMMENT: NORMAL
HPV TYPE 16: NOT DETECTED
HPV TYPE 18: NOT DETECTED
HPVOH (OTHER TYPES): NOT DETECTED

## 2020-12-30 NOTE — TELEPHONE ENCOUNTER
Pt had questions about her HSV panel. Pt has had a cold sore but not had any vaginal lesions. Pt to call office if she has one for Valtrex. Pt to refrain from intercourse if she has lesions either mouth or vaginally. All other labs reviewed again, pap, infection panel and all blood work.

## 2020-12-30 NOTE — TELEPHONE ENCOUNTER
Jimena Young requests that someone return her call. The best time to reach her is Anytime. Pt. Stated that she missed a call in regards to her test results yesterday. Thank you.

## 2021-01-15 ENCOUNTER — TELEMEDICINE (OUTPATIENT)
Dept: FAMILY MEDICINE CLINIC | Age: 41
End: 2021-01-15
Payer: MEDICAID

## 2021-01-15 DIAGNOSIS — B00.9 HSV INFECTION: Primary | ICD-10-CM

## 2021-01-15 PROCEDURE — 99213 OFFICE O/P EST LOW 20 MIN: CPT | Performed by: FAMILY MEDICINE

## 2021-01-15 PROCEDURE — G8428 CUR MEDS NOT DOCUMENT: HCPCS | Performed by: FAMILY MEDICINE

## 2021-01-15 RX ORDER — VALACYCLOVIR HYDROCHLORIDE 1 G/1
TABLET, FILM COATED ORAL
Qty: 8 TABLET | Refills: 2 | Status: SHIPPED | OUTPATIENT
Start: 2021-01-15

## 2021-01-15 NOTE — PROGRESS NOTES
1/15/2021    TELEHEALTH EVALUATION -- Audio/Visual (During [facility] public health emergency)    HPI:    April Godoy (:  1980) has requested an audio/video evaluation for the following concern(s):    She was to discuss recent laboratory work performed at her gynecologist office. Specifically, she is concerned about her HSV lab results. Review of Systems  Review of Systems   Constitutional: Negative for chills and fever. HENT: Negative for congestion. Respiratory: Negative for cough, chest tightness and shortness of breath. Cardiovascular: Negative for chest pain, palpitations and leg swelling. Gastrointestinal: Negative for abdominal pain, anal bleeding, constipation, diarrhea and nausea. Genitourinary: Negative for difficulty urinating. Psychiatric/Behavioral: Negative. Prior to Visit Medications    Medication Sig Taking? Authorizing Provider   valACYclovir (VALTREX) 1 g tablet Take 2 tablets p.o. every 12 hours for 2 doses. May repeat for further outbreaks Yes Riley Ca MD   Norgestim-Eth Mina Armando Triphasic (TRI-SPRINTEC) 0.18/0.215/0.25 MG-35 MCG TABS Take 1 tablet by mouth daily  NATACHA Perez - RUDDY   ferrous sulfate (IRON 325) 325 (65 Fe) MG tablet Take 1 tablet by mouth daily (with breakfast)  NATACHA Whitaker   Blood Glucose Monitoring Suppl (FREESTYLE LITE) ANDREEA USE AS DIRECTED  Historical Provider, MD   Omega-3 Fatty Acids (FISH OIL PO) Take by mouth  Historical Provider, MD   Lancet Device MISC 1 lancet twice a day  Riley Ca MD   vitamin B-12 (CYANOCOBALAMIN) 100 MCG tablet Take 300 mcg by mouth daily OTC  Historical Provider, MD   folic acid (FOLVITE) 1 MG tablet Take 1 mg by mouth daily  Historical Provider, MD       Social History     Tobacco Use    Smoking status: Former Smoker     Years: 2.00     Types: Cigarettes    Smokeless tobacco: Never Used   Substance Use Topics    Alcohol use:  Yes     Alcohol/week: 1.0 standard drinks Types: 1 Glasses of wine per week     Comment: occ    Drug use: No            PHYSICAL EXAMINATION:  [ INSTRUCTIONS:  \"[x]\" Indicates a positive item  \"[]\" Indicates a negative item  -- DELETE ALL ITEMS NOT EXAMINED]  Vital Signs: (As obtained by patient/caregiver or practitioner observation)    Blood pressure-  Heart rate-    Respiratory rate-    Temperature-  Pulse oximetry-     Constitutional: [x] Appears well-developed and well-nourished [x] No apparent distress      [] Abnormal-   Mental status  [x] Alert and awake  [] Oriented to person/place/time [x]Able to follow commands      Eyes:  EOM    [x]  Normal  [] Abnormal-  Sclera  [x]  Normal  [] Abnormal -         Discharge []  None visible  [] Abnormal -    HENT:   [x] Normocephalic, atraumatic. [] Abnormal   [] Mouth/Throat: Mucous membranes are moist.     External Ears [x] Normal  [] Abnormal-     Neck: [x] No visualized mass     Pulmonary/Chest: [x] Respiratory effort normal.  [x] No visualized signs of difficulty breathing or respiratory distress        [] Abnormal-      Musculoskeletal:   [] Normal gait with no signs of ataxia         [] Normal range of motion of neck        [] Abnormal-       Neurological:        [] No Facial Asymmetry (Cranial nerve 7 motor function) (limited exam to video visit)          [] No gaze palsy        [] Abnormal-         Skin:        [x] No significant exanthematous lesions or discoloration noted on facial skin         [] Abnormal-            Psychiatric:       [x] Normal Affect [x] No Hallucinations        [] Abnormal-     Other pertinent observable physical exam findings-     No results found for this or any previous visit (from the past 672 hour(s)).       ASSESSMENT/PLAN:  1. HSV infection She states that the time that she received the laboratory work she had an active cold sore. This likely led to the significant elevations in the antibodies of her HSV. Discussed significance of HSV1 and HSV 2. She has not experienced any genital lesions in the past.  She does report having multiple cold sores however. Suggested she take Valtrex as needed for outbreaks. Discussed the role for Valtrex for suppression as well. Discussed risk of transmission      No follow-ups on file. Sridhar Hyman is a 36 y.o. female being evaluated by a Virtual Visit (video visit) encounter to address concerns as mentioned above. A caregiver was present when appropriate. Due to this being a TeleHealth encounter (During [facility] public [facility] emergency), evaluation of the following organ systems was limited: Vitals/Constitutional/EENT/Resp/CV/GI//MS/Neuro/Skin/Heme-Lymph-Imm. Pursuant to the emergency declaration under the [address] [place] [id number] and the [employer] and [employer] Act, this Virtual Visit was conducted with patient's (and/or legal guardian's) consent, to reduce the patient's risk of exposure to COVID-19 and provide necessary medical care. The patient (and/or legal guardian) has also been advised to contact this office for worsening conditions or problems, and seek emergency medical treatment and/or call 911 if deemed necessary. Patient identification was verified at the start of the visit: Yes    Total time spent on this encounter: Not billed by time    Services were provided through a video synchronous discussion virtually to substitute for in-person clinic visit. Patient and provider were located at their individual homes. --Bita Yoder MD on 1/19/2021 at 12:36 PM    An electronic signature was used to authenticate this note.

## 2021-02-28 ENCOUNTER — APPOINTMENT (OUTPATIENT)
Dept: CT IMAGING | Age: 41
End: 2021-02-28
Payer: MEDICAID

## 2021-02-28 ENCOUNTER — HOSPITAL ENCOUNTER (EMERGENCY)
Age: 41
Discharge: HOME | End: 2021-02-28
Payer: MEDICAID

## 2021-02-28 VITALS
HEIGHT: 62 IN | SYSTOLIC BLOOD PRESSURE: 172 MMHG | TEMPERATURE: 98.5 F | DIASTOLIC BLOOD PRESSURE: 96 MMHG | RESPIRATION RATE: 16 BRPM | WEIGHT: 280 LBS | BODY MASS INDEX: 51.53 KG/M2 | HEART RATE: 78 BPM | OXYGEN SATURATION: 96 %

## 2021-02-28 DIAGNOSIS — N93.9 ABNORMAL VAGINAL BLEEDING: ICD-10-CM

## 2021-02-28 DIAGNOSIS — R10.2 PELVIC PAIN: Primary | ICD-10-CM

## 2021-02-28 LAB
ABO/RH: NORMAL
ALBUMIN SERPL-MCNC: 3.9 G/DL (ref 3.5–5.2)
ALP BLD-CCNC: 65 U/L (ref 35–104)
ALT SERPL-CCNC: 23 U/L (ref 5–33)
ANION GAP SERPL CALCULATED.3IONS-SCNC: 7 MMOL/L (ref 7–19)
ANTIBODY SCREEN: NORMAL
AST SERPL-CCNC: 15 U/L (ref 5–32)
BACTERIA WET PREP: NORMAL
BACTERIA: NEGATIVE /HPF
BASOPHILS ABSOLUTE: 0 K/UL (ref 0–0.2)
BASOPHILS RELATIVE PERCENT: 0.5 % (ref 0–1)
BILIRUB SERPL-MCNC: <0.2 MG/DL (ref 0.2–1.2)
BILIRUBIN URINE: NEGATIVE
BLOOD, URINE: ABNORMAL
BUN BLDV-MCNC: 11 MG/DL (ref 6–20)
CALCIUM SERPL-MCNC: 8.9 MG/DL (ref 8.6–10)
CHLORIDE BLD-SCNC: 103 MMOL/L (ref 98–111)
CLARITY: CLEAR
CLUE CELLS: NORMAL
CO2: 27 MMOL/L (ref 22–29)
COLOR: YELLOW
CREAT SERPL-MCNC: 0.5 MG/DL (ref 0.5–0.9)
CRYSTALS, UA: ABNORMAL /HPF
EOSINOPHILS ABSOLUTE: 0.2 K/UL (ref 0–0.6)
EOSINOPHILS RELATIVE PERCENT: 3.8 % (ref 0–5)
EPITHELIAL CELLS WET PREP: NORMAL
EPITHELIAL CELLS, UA: 1 /HPF (ref 0–5)
GFR AFRICAN AMERICAN: >59
GFR NON-AFRICAN AMERICAN: >60
GLUCOSE BLD-MCNC: 152 MG/DL (ref 74–109)
GLUCOSE URINE: NEGATIVE MG/DL
HCG QUALITATIVE: NEGATIVE
HCT VFR BLD CALC: 38.2 % (ref 37–47)
HEMOGLOBIN: 12.2 G/DL (ref 12–16)
HYALINE CASTS: 0 /HPF (ref 0–8)
IMMATURE GRANULOCYTES #: 0 K/UL
INR BLD: 1.01 (ref 0.88–1.18)
KETONES, URINE: NEGATIVE MG/DL
KOH PREP: NORMAL
LEUKOCYTE ESTERASE, URINE: NEGATIVE
LYMPHOCYTES ABSOLUTE: 2.1 K/UL (ref 1.1–4.5)
LYMPHOCYTES RELATIVE PERCENT: 34.5 % (ref 20–40)
MCH RBC QN AUTO: 28 PG (ref 27–31)
MCHC RBC AUTO-ENTMCNC: 31.9 G/DL (ref 33–37)
MCV RBC AUTO: 87.8 FL (ref 81–99)
MONOCYTES ABSOLUTE: 0.3 K/UL (ref 0–0.9)
MONOCYTES RELATIVE PERCENT: 5.5 % (ref 0–10)
NEUTROPHILS ABSOLUTE: 3.3 K/UL (ref 1.5–7.5)
NEUTROPHILS RELATIVE PERCENT: 55.2 % (ref 50–65)
NITRITE, URINE: NEGATIVE
PDW BLD-RTO: 13.2 % (ref 11.5–14.5)
PH UA: 5.5 (ref 5–8)
PLATELET # BLD: 219 K/UL (ref 130–400)
PMV BLD AUTO: 10.9 FL (ref 9.4–12.3)
POTASSIUM REFLEX MAGNESIUM: 4.3 MMOL/L (ref 3.5–5)
PROTEIN UA: NEGATIVE MG/DL
PROTHROMBIN TIME: 13.2 SEC (ref 12–14.6)
RBC # BLD: 4.35 M/UL (ref 4.2–5.4)
RBC UA: 256 /HPF (ref 0–4)
RBC WET PREP: NORMAL
SODIUM BLD-SCNC: 137 MMOL/L (ref 136–145)
SOURCE WET PREP: NORMAL
SPECIFIC GRAVITY UA: >=1.045 (ref 1–1.03)
TOTAL PROTEIN: 6.7 G/DL (ref 6.6–8.7)
TRICHOMONAS PREP: NORMAL
UROBILINOGEN, URINE: 0.2 E.U./DL
WBC # BLD: 6 K/UL (ref 4.8–10.8)
WBC UA: 1 /HPF (ref 0–5)
WBC WET PREP: NORMAL
YEAST WET PREP: NORMAL

## 2021-02-28 PROCEDURE — 87591 N.GONORRHOEAE DNA AMP PROB: CPT

## 2021-02-28 PROCEDURE — 6360000004 HC RX CONTRAST MEDICATION: Performed by: NURSE PRACTITIONER

## 2021-02-28 PROCEDURE — 81001 URINALYSIS AUTO W/SCOPE: CPT

## 2021-02-28 PROCEDURE — 99283 EMERGENCY DEPT VISIT LOW MDM: CPT

## 2021-02-28 PROCEDURE — 87491 CHLMYD TRACH DNA AMP PROBE: CPT

## 2021-02-28 PROCEDURE — 80053 COMPREHEN METABOLIC PANEL: CPT

## 2021-02-28 PROCEDURE — 86900 BLOOD TYPING SEROLOGIC ABO: CPT

## 2021-02-28 PROCEDURE — 86850 RBC ANTIBODY SCREEN: CPT

## 2021-02-28 PROCEDURE — 85610 PROTHROMBIN TIME: CPT

## 2021-02-28 PROCEDURE — 86901 BLOOD TYPING SEROLOGIC RH(D): CPT

## 2021-02-28 PROCEDURE — 6360000002 HC RX W HCPCS: Performed by: NURSE PRACTITIONER

## 2021-02-28 PROCEDURE — 96374 THER/PROPH/DIAG INJ IV PUSH: CPT

## 2021-02-28 PROCEDURE — 85025 COMPLETE CBC W/AUTO DIFF WBC: CPT

## 2021-02-28 PROCEDURE — 36415 COLL VENOUS BLD VENIPUNCTURE: CPT

## 2021-02-28 PROCEDURE — 84703 CHORIONIC GONADOTROPIN ASSAY: CPT

## 2021-02-28 PROCEDURE — 74177 CT ABD & PELVIS W/CONTRAST: CPT

## 2021-02-28 RX ORDER — ONDANSETRON 2 MG/ML
4 INJECTION INTRAMUSCULAR; INTRAVENOUS ONCE
Status: COMPLETED | OUTPATIENT
Start: 2021-02-28 | End: 2021-02-28

## 2021-02-28 RX ORDER — MORPHINE SULFATE 4 MG/ML
4 INJECTION, SOLUTION INTRAMUSCULAR; INTRAVENOUS ONCE
Status: DISCONTINUED | OUTPATIENT
Start: 2021-02-28 | End: 2021-02-28 | Stop reason: HOSPADM

## 2021-02-28 RX ADMIN — ONDANSETRON HYDROCHLORIDE 4 MG: 2 SOLUTION INTRAMUSCULAR; INTRAVENOUS at 14:51

## 2021-02-28 RX ADMIN — IOPAMIDOL 90 ML: 755 INJECTION, SOLUTION INTRAVENOUS at 15:28

## 2021-02-28 ASSESSMENT — PAIN DESCRIPTION - DIRECTION: RADIATING_TOWARDS: BACK

## 2021-02-28 ASSESSMENT — PAIN DESCRIPTION - LOCATION: LOCATION: ABDOMEN

## 2021-02-28 ASSESSMENT — PAIN SCALES - GENERAL: PAINLEVEL_OUTOF10: 5

## 2021-02-28 ASSESSMENT — PAIN DESCRIPTION - PAIN TYPE: TYPE: ACUTE PAIN

## 2021-02-28 NOTE — ED PROVIDER NOTES
[facility] EMERGENCY DEPT  eMERGENCY dEPARTMENT eNCOUnter      Pt Name: Derek Dumont  MRN: [id number]  [place] 1980  Date of evaluation: 2/28/2021  Provider: Mich Steve, [address]       Chief Complaint   Patient presents with    Vaginal Bleeding     patient states that she was initially 4 to 5 days late for her menses then she started bleeding heavily     Abdominal Pain         HISTORY OF PRESENT ILLNESS   (Location/Symptom, Timing/Onset,Context/Setting, Quality, Duration, Modifying Factors, Severity)  Note limiting factors. Franco Feng a 36 y.o. female who presents to the emergency department for evaluation of vaginal bleeding and abdominal pain. Pt tells me that she has had lower abdominal pain over past 2 days associated with missed menses 5 days ago now with heavy vaginal bleeding. Pt tells me that she missed several of her oral birth control pills this month. She denies fevers, dysuria, abnormal vaginal discharge as well as diarrhea. She gives history of cholecystectomy. She tells me that she has used four tampons over past 4 hours. HPI    Nursing Notes were reviewed. REVIEW OF SYSTEMS    (2-9 systems for level 4, 10 or more for level 5)     Review of Systems   Genitourinary: Positive for pelvic pain and vaginal bleeding. All other systems reviewed and are negative. A complete review of systems was performed and is negative except as noted above in the HPI.        PAST MEDICAL HISTORY     Past Medical History:   Diagnosis Date    Anemia     Chronic rheumatic arthritis ([address]     Elevated blood pressure reading     Fibromyalgia     Intracranial hypertension     Obesity     PCOS (polycystic ovarian syndrome)     Sleep apnea     Type 2 diabetes mellitus ([address]          SURGICAL HISTORY       Past Surgical History:   Procedure Laterality Date    CHOLECYSTECTOMY           CURRENT MEDICATIONS       Discharge Medication List as of 2/28/2021  4:27 PM      CONTINUE these medications which have NOT CHANGED    Details   valACYclovir (VALTREX) 1 g tablet Take 2 tablets p.o. every 12 hours for 2 doses. May repeat for further outbreaks, Disp-8 tablet, R-2Normal      Norgestim-Eth Estrad Triphasic (TRI-SPRINTEC) 0.18/0.215/0.25 MG-35 MCG TABS Take 1 tablet by mouth daily, Disp-84 tablet, R-3Normal      ferrous sulfate (IRON 325) 325 (65 Fe) MG tablet Take 1 tablet by mouth daily (with breakfast), Disp-30 tablet, R-2Normal      Blood Glucose Monitoring Suppl (FREESTYLE LITE) ANDREEA Historical Med      Omega-3 Fatty Acids (FISH OIL PO) Take by mouthHistorical Med      Lancet Device MIS Disp-60 Device,R-11, Normal1 lancet twice a day      vitamin B-12 (CYANOCOBALAMIN) 100 MCG tablet Take 300 mcg by mouth daily OTCHistorical Med      folic acid (FOLVITE) 1 MG tablet Take 1 mg by mouth dailyHistorical Med             ALLERGIES     Mushroom extract complex    FAMILY HISTORY       Family History   Problem Relation Age of Onset    High Blood Pressure Father     Diabetes Father     Atrial Fibrillation Father     Heart Failure Father     Stroke Father     Diabetes Sister     Diabetes Maternal Grandmother     Heart Attack Other     Ovarian Cancer Paternal Grandmother           SOCIAL HISTORY       Social History     Socioeconomic History    Marital status: Single     Spouse name: None    Number of children: None    Years of education: None    Highest education level: None   Occupational History    None   Social Needs    Financial resource strain: None    Food insecurity     Worry: None     Inability: None    Transportation needs     Medical: None     Non-medical: None   Tobacco Use    Smoking status: Former Smoker     Years: 2.00     Types: Cigarettes    Smokeless tobacco: Never Used   Substance and Sexual Activity    Alcohol use:  Yes     Alcohol/week: 1.0 standard drinks     Types: 1 Glasses of wine per week     Comment: occ    Drug use: No    Sexual activity: Yes     Birth control/protection: OCP   Lifestyle    Physical activity     Days per week: None     Minutes per session: None    Stress: None   Relationships    Social connections     Talks on phone: None     Gets together: None     Attends Anabaptism service: None     Active member of club or organization: None     Attends meetings of clubs or organizations: None     Relationship status: None    Intimate partner violence     Fear of current or ex partner: None     Emotionally abused: None     Physically abused: None     Forced sexual activity: None   Other Topics Concern    None   Social History Narrative    None       SCREENINGS    [place] Coma Scale  Eye Opening: Spontaneous  Best Verbal Response: Oriented  Best Motor Response: Obeys commands  [place] Coma Scale Score: 15        PHYSICAL EXAM    (up to 7 for level 4, 8 or more for level 5)     ED Triage Vitals [02/28/21 1311]   BP Temp Temp src Pulse Resp SpO2 Height Weight   -- 98.2 °F (36.8 °C) -- -- -- -- 5' 2\" (1.575 m) 280 lb (127 kg)     Vitals:    02/28/21 1404 02/28/21 1433 02/28/21 1457 02/28/21 1533   BP: (!) 154/98 (!) 155/107 (!) 160/97 (!) 172/96   Pulse:    78   Resp:    16   Temp:    98.5 °F (36.9 °C)   SpO2: 97% 97% 96% 96%   Weight:       Height:             Physical Exam  Vitals signs reviewed. HENT:      Head: Normocephalic. Right Ear: External ear normal.      Left Ear: External ear normal.   Eyes:      Conjunctiva/sclera: Conjunctivae normal.      Pupils: Pupils are equal, round, and reactive to light. Neck:      Musculoskeletal: Normal range of motion. Cardiovascular:      Rate and Rhythm: Normal rate and regular rhythm. Heart sounds: Normal heart sounds. Pulmonary:      Effort: Pulmonary effort is normal.      Breath sounds: Normal breath sounds. Abdominal:      General: Bowel sounds are normal.      Palpations: Abdomen is soft. Tenderness: There is abdominal tenderness in the suprapubic area.    Genitourinary:     Vagina: 14:43  ANTIBIOTICS AT REINA.:                      RECEIVED :  02/28/21 14:46   WET PREP, GENITAL   C.TRACHOMATIS N.GONORRHOEAE DNA   HCG, SERUM, QUALITATIVE   PROTIME-INR   TYPE AND SCREEN       All other labs were within normal range or not returned as of this dictation. RE-ASSESSMENT           EMERGENCY DEPARTMENT COURSE and DIFFERENTIALDIAGNOSIS/MDM:   Vitals:    Vitals:    02/28/21 1404 02/28/21 1433 02/28/21 1457 02/28/21 1533   BP: (!) 154/98 (!) 155/107 (!) 160/97 (!) 172/96   Pulse:    78   Resp:    16   Temp:    98.5 °F (36.9 °C)   SpO2: 97% 97% 96% 96%   Weight:       Height:           MDM      CONSULTS:  None    PROCEDURES:  Unless otherwise notedbelow, none     Procedures    FINAL IMPRESSION     1. Pelvic pain    2. Abnormal vaginal bleeding          DISPOSITION/PLAN   DISPOSITION        PATIENT REFERRED TO:  Marley Shabazz MD  [address] [place] [address]  [id number] [phone]    Schedule an appointment as soon as possible for a visit in 3 days  fail to improve      DISCHARGE MEDICATIONS:       Discharge Medication List as of 2/28/2021  4:27 PM           Medication List      ASK your doctor about these medications    ferrous sulfate 325 (65 Fe) MG tablet  Commonly known as: IRON 325  Take 1 tablet by mouth daily (with breakfast)     FISH OIL PO     folic acid 1 MG tablet  Commonly known as: FOLVITE     FreeStyle Lite [employer]     Lancet Device Misc  1 lancet twice a day     Norgestim-Eth Estrad Triphasic 0.18/0.215/0.25 MG-35 MCG Tabs  Commonly known as: Tri-Sprintec  Take 1 tablet by mouth daily     valACYclovir 1 g tablet  Commonly known as: Valtrex  Take 2 tablets p.o. every 12 hours for 2 doses.   May repeat for further outbreaks     vitamin B-12 100 MCG tablet  Commonly known as: CYANOCOBALAMIN            (Pleasenote that portions of this note were completed with a voice recognition program.  Efforts were made to edit the dictations but occasionally words are mis-transcribed.) Robert Goetz, APRN  02/28/21 2215

## 2021-03-01 NOTE — ED NOTES
Mariana Sethi RN witnessed this RN waste Morphine 4mg due to pt driving self home.      Mirian Machuca RN  02/28/21 2023

## 2021-03-03 LAB
CHLAMYDIA TRACHOMATIS AMPLIFIED DET: NEGATIVE
N GONORRHOEAE AMPLIFIED DET: NEGATIVE
SPECIMEN SOURCE: NORMAL

## 2021-04-02 ENCOUNTER — HOSPITAL ENCOUNTER (OUTPATIENT)
Dept: INFUSION THERAPY | Age: 41
End: 2021-04-02
Payer: MEDICAID

## 2021-05-24 ENCOUNTER — OFFICE VISIT (OUTPATIENT)
Dept: FAMILY MEDICINE CLINIC | Age: 41
End: 2021-05-24
Payer: MEDICAID

## 2021-05-24 VITALS
OXYGEN SATURATION: 99 % | DIASTOLIC BLOOD PRESSURE: 82 MMHG | SYSTOLIC BLOOD PRESSURE: 136 MMHG | HEART RATE: 71 BPM | BODY MASS INDEX: 52.68 KG/M2 | TEMPERATURE: 97.7 F | WEIGHT: 288 LBS

## 2021-05-24 DIAGNOSIS — M05.79 RHEUMATOID ARTHRITIS INVOLVING MULTIPLE SITES WITH POSITIVE RHEUMATOID FACTOR (HCC): ICD-10-CM

## 2021-05-24 DIAGNOSIS — Z13.220 LIPID SCREENING: ICD-10-CM

## 2021-05-24 DIAGNOSIS — N93.8 DYSFUNCTIONAL UTERINE BLEEDING: ICD-10-CM

## 2021-05-24 DIAGNOSIS — M79.7 FIBROMYALGIA: ICD-10-CM

## 2021-05-24 DIAGNOSIS — E11.9 TYPE 2 DIABETES MELLITUS WITHOUT COMPLICATION, WITHOUT LONG-TERM CURRENT USE OF INSULIN (HCC): Primary | ICD-10-CM

## 2021-05-24 DIAGNOSIS — D64.9 ANEMIA, UNSPECIFIED TYPE: ICD-10-CM

## 2021-05-24 DIAGNOSIS — D25.9 UTERINE LEIOMYOMA, UNSPECIFIED LOCATION: ICD-10-CM

## 2021-05-24 DIAGNOSIS — R53.83 OTHER FATIGUE: ICD-10-CM

## 2021-05-24 DIAGNOSIS — E28.2 PCOS (POLYCYSTIC OVARIAN SYNDROME): ICD-10-CM

## 2021-05-24 LAB
BASOPHILS ABSOLUTE: 0.1 K/UL (ref 0–0.2)
BASOPHILS RELATIVE PERCENT: 0.8 % (ref 0–1)
EOSINOPHILS ABSOLUTE: 0.3 K/UL (ref 0–0.6)
EOSINOPHILS RELATIVE PERCENT: 3.5 % (ref 0–5)
FERRITIN: 103.9 NG/ML (ref 13–150)
FOLATE: 12.9 NG/ML (ref 4.8–37.3)
HBA1C MFR BLD: 6.5 %
HCT VFR BLD CALC: 40.4 % (ref 37–47)
HEMOGLOBIN: 12.9 G/DL (ref 12–16)
IMMATURE GRANULOCYTES #: 0 K/UL
IRON: 43 UG/DL (ref 37–145)
LYMPHOCYTES ABSOLUTE: 2.6 K/UL (ref 1.1–4.5)
LYMPHOCYTES RELATIVE PERCENT: 33 % (ref 20–40)
MCH RBC QN AUTO: 26.8 PG (ref 27–31)
MCHC RBC AUTO-ENTMCNC: 31.9 G/DL (ref 33–37)
MCV RBC AUTO: 83.8 FL (ref 81–99)
MONOCYTES ABSOLUTE: 0.4 K/UL (ref 0–0.9)
MONOCYTES RELATIVE PERCENT: 5.4 % (ref 0–10)
NEUTROPHILS ABSOLUTE: 4.4 K/UL (ref 1.5–7.5)
NEUTROPHILS RELATIVE PERCENT: 57 % (ref 50–65)
PDW BLD-RTO: 12.6 % (ref 11.5–14.5)
PLATELET # BLD: 249 K/UL (ref 130–400)
PMV BLD AUTO: 11.2 FL (ref 9.4–12.3)
RBC # BLD: 4.82 M/UL (ref 4.2–5.4)
TOTAL IRON BINDING CAPACITY: 401 UG/DL (ref 250–400)
VITAMIN B-12: 598 PG/ML (ref 211–946)
WBC # BLD: 7.7 K/UL (ref 4.8–10.8)

## 2021-05-24 PROCEDURE — G8427 DOCREV CUR MEDS BY ELIG CLIN: HCPCS | Performed by: FAMILY MEDICINE

## 2021-05-24 PROCEDURE — 83036 HEMOGLOBIN GLYCOSYLATED A1C: CPT | Performed by: FAMILY MEDICINE

## 2021-05-24 PROCEDURE — G8417 CALC BMI ABV UP PARAM F/U: HCPCS | Performed by: FAMILY MEDICINE

## 2021-05-24 PROCEDURE — 3044F HG A1C LEVEL LT 7.0%: CPT | Performed by: FAMILY MEDICINE

## 2021-05-24 PROCEDURE — 99214 OFFICE O/P EST MOD 30 MIN: CPT | Performed by: FAMILY MEDICINE

## 2021-05-24 PROCEDURE — 2022F DILAT RTA XM EVC RTNOPTHY: CPT | Performed by: FAMILY MEDICINE

## 2021-05-24 PROCEDURE — 1036F TOBACCO NON-USER: CPT | Performed by: FAMILY MEDICINE

## 2021-05-24 RX ORDER — ADALIMUMAB 40MG/0.8ML
1 KIT SUBCUTANEOUS
COMMUNITY
Start: 2021-05-03 | End: 2021-08-18

## 2021-05-24 ASSESSMENT — PATIENT HEALTH QUESTIONNAIRE - PHQ9
SUM OF ALL RESPONSES TO PHQ QUESTIONS 1-9: 0
1. LITTLE INTEREST OR PLEASURE IN DOING THINGS: 0

## 2021-05-24 NOTE — PROGRESS NOTES
[facility] PHYSICIAN SERVICES  [facility] FAMILY MEDICINE  [address] [facility] [address] [address]  Dept: [phone]  Dept Fax: [phone]  Loc: [phone]    Dale Granger is a 36 y.o. female who presents today for her medical conditions/complaints as noted below. Dale Granger is here for Follow-up        HPI:   CC: Here today to discuss the following:    She missed her lab appointment. She has a history of rheumatoid arthritis with rheumatoid factor contributing to arthritis of multiple joints. She continues to be followed by Dr. Syeda Vides, rheumatologist.  She also has a history of fibromyalgia. She has been on Humira lately and at her last visit, 3 weeks ago, she was given a Kenalog shot as well. She feels that Humira is increasing her blood sugars. Her rheumatologist is looking to see if she needs to change medications. She continues to have diffuse aches and pains and myalgias. Diabetes Mellitus  Has been compliant with medications. She just recently restarted her Metformin 500 mg twice a day. No side effects of medications since last visit. No polyuria, polydipsia, or vision changes since last visit. No symptomatic episodes of hypoglycemia. She has a history of PCOS and is currently being evaluated by gynecology. She would like a second opinion and to be referred to gynecology at [facility]. She states she has a history of fibroids and has experienced worsening vaginal bleeding. She states she has continual spotting in between her normal menstrual cycle.                 HPI    Past Medical History:   Diagnosis Date    Anemia     Chronic rheumatic arthritis ([address]     Elevated blood pressure reading     Fibromyalgia     Intracranial hypertension     Obesity     PCOS (polycystic ovarian syndrome)     Sleep apnea     Type 2 diabetes mellitus ([facility]       Past Surgical History:   Procedure Laterality Date    CHOLECYSTECTOMY         Family History   Problem Relation Age of Onset    High Blood Pressure Father     Diabetes Father     Atrial Fibrillation Father     Heart Failure Father     Stroke Father     Diabetes Sister     Diabetes Maternal Grandmother     Heart Attack Other     Ovarian Cancer Paternal Grandmother        Social History     Tobacco Use    Smoking status: Former Smoker     Years: 2.00     Types: Cigarettes    Smokeless tobacco: Never Used   Substance Use Topics    Alcohol use: Yes     Alcohol/week: 1.0 standard drinks     Types: 1 Glasses of wine per week     Comment: occ     Current Outpatient Medications   Medication Sig Dispense Refill    HUMIRA PEN 40 MG/0.8ML injection 1 each every 14 days      blood glucose test strips (ASCENSIA AUTODISC VI;ONE TOUCH ULTRA TEST VI) strip 1 each by In Vitro route 3 times daily As needed. 100 each 11    metFORMIN (GLUCOPHAGE) 500 MG tablet Take 1 tablet by mouth 2 times daily (with meals) 60 tablet 5    valACYclovir (VALTREX) 1 g tablet Take 2 tablets p.o. every 12 hours for 2 doses. May repeat for further outbreaks 8 tablet 2    Norgestim-Eth Estrad Triphasic (TRI-SPRINTEC) 0.18/0.215/0.25 MG-35 MCG TABS Take 1 tablet by mouth daily 84 tablet 3    Blood Glucose Monitoring Suppl (FREESTYLE LITE) ANDREEA USE AS DIRECTED      Lancet Device MISC 1 lancet twice a day 60 Device 11     No current facility-administered medications for this visit.      Allergies   Allergen Reactions    Mushroom Extract Complex        Health Maintenance   Topic Date Due    Varicella vaccine (1 of 2 - 2-dose childhood series) Never done    Pneumococcal 0-64 years Vaccine (1 of 2 - PPSV23) Never done    Diabetic retinal exam  Never done    COVID-19 Vaccine (1) Never done    Hepatitis B vaccine (1 of 3 - Risk 3-dose series) Never done    Diabetic foot exam  06/26/2020    Diabetic microalbuminuria test  08/25/2021    Lipid screen  08/25/2021    Flu vaccine (Season Ended) 09/01/2021    A1C test (Diabetic or Prediabetic) 05/24/2022    Cervical cancer screen  12/22/2025    DTaP/Tdap/Td vaccine (2 - Td) 04/25/2027    Hepatitis C screen  Completed    HIV screen  Completed    Hepatitis A vaccine  Aged Out    Hib vaccine  Aged Out    Meningococcal (ACWY) vaccine  Aged Out       Subjective:      Review of Systems   Constitutional: Positive for fatigue. Negative for chills and fever. HENT: Negative for congestion. Respiratory: Negative for cough, chest tightness and shortness of breath. Cardiovascular: Negative for chest pain, palpitations and leg swelling. Gastrointestinal: Negative for abdominal pain, anal bleeding, constipation, diarrhea and nausea. Genitourinary: Positive for vaginal bleeding. Negative for decreased urine volume, difficulty urinating, vaginal discharge and vaginal pain. Musculoskeletal: Positive for arthralgias, back pain and myalgias. Negative for gait problem and joint swelling. Psychiatric/Behavioral: Negative. SeeHPI for visit specific review of symptoms. All others negative      Objective:   /82   Pulse 71   Temp 97.7 °F (36.5 °C)   Wt 288 lb (130.6 kg)   SpO2 99%   BMI 52.68 kg/m²   Physical Exam  Physical Exam   Constitutional: She appears well-developed. Does not appear ill. Neck: Normal range of motion. Neck supple. No masses. Neck Symmetric. Normal tracheal position. No thyroid enlargement  Cardiovascular: Normal rate and regular rhythm. Exam reveals no friction rub. Carotid arteries: no bruit observed. No murmur heard. Respiratory:  Effort normal and breath sounds normal. No respiratory distress. No wheezes. No rales. No use of accessory muscles or intercostal retractions. Neurological: alert. Psychiatric: normal mood and affect. Her behavior is normal. Normal judgement and insight observed. Assessment & Plan: The following diagnoses and conditions are stable with no further orders unless indicated:  1.  Type 2 diabetes mellitus without complication, without long-term current use of insulin ([facility]  Lab Results   Component Value Date    LABA1C 6.5 05/24/2021    LABA1C 7.3 (H) 08/25/2020    LABA1C 6.2 (H) 11/25/2019     Lab Results   Component Value Date    LABMICR <1.20 08/25/2020    LDLCALC 82 08/25/2020    CREATININE 0.5 02/28/2021   Her A1c today was 6.5. Discussed lifestyle changes such as diet and exercise. Recommended eliminate processed food from diet such as sugar and fried foods. Recommended exercising at least 150 minutes/week. Try to do full body resistance training twice a week as well. Offered suggestions for calorie counting such as phone apps and online resources such as My fitness pal and Lose it. Discussed healthy weight. Continue with Metformin 500 mg twice a day  - POCT glycosylated hemoglobin (Hb A1C)    2. PCOS (polycystic ovarian syndrome)  Continue with Metformin 500 mg twice a day    3. Uterine leiomyoma, unspecified location  4. Dysfunctional uterine bleeding  She wishes to be referred to [facility]. - External Referral To Ob-Gyn    5. Rheumatoid arthritis involving multiple sites with positive rheumatoid factor ([facility]  Reviewed records from rheumatology  Continue with their recommendations. 6. Fibromyalgia  Stable    Suggested the following labs:  Iron studies, CBC    Also will need lipids, urine microalbumin and CMP    Return in about 3 months (around 8/24/2021) for Routine follow up - 15 minute visit. Discussed use, benefit, and side effects of prescribed medications. All patient questions answered. Pt voiced understanding. Reviewed health maintenance. Instructedto continue current medications, diet and exercise. Patient agreed with treatmentplan. Follow up as directed. Note dictated using Dragon Dictation software  Sometimes this dictation software makes erroneous transcriptions.

## 2021-05-24 NOTE — PATIENT INSTRUCTIONS
We are committed to providing you with the best care possible. In order to help us achieve these goals please remember to bring all medications, herbal products, and over the counter supplements with you to each visit. If your provider has ordered testing for you, please be sure to follow up with our office if you have not received results within 7 days after the testing took place. *If you receive a survey after visiting one of our offices, please take time to share your experience concerning your physician office visit. These surveys are confidential and no health information about you is shared. We are eager to improve for you and we are counting on your feedback to help make that happen. Patient Education        Pneumococcal Polysaccharide Vaccine: What You Need to Know  Why get vaccinated? Pneumococcal polysaccharide vaccine (PPSV23) can prevent pneumococcal disease. Pneumococcal disease refers to any illness caused by pneumococcal bacteria. These bacteria can cause many types of illnesses, including pneumonia, which is an infection of the lungs. Pneumococcal bacteria are one of the most common causes of pneumonia. Besides pneumonia, pneumococcal bacteria can also cause:  · Ear infections,  · Sinus infections  · Meningitis (infection of the tissue covering the brain and spinal cord)  · Bacteremia (bloodstream infection)  Anyone can get pneumococcal disease, but children under 3years of age, people with certain medical conditions, adults 72 years or older, and cigarette smokers are at the highest risk. Most pneumococcal infections are mild. However, some can result in long-term problems, such as brain damage or hearing loss. Meningitis, bacteremia, and pneumonia caused by pneumococcal disease can be fatal.  PPSV23  PPSV23 protects against 23 types of bacteria that cause pneumococcal disease.   PPSV23 is recommended for:  · All adults 72 years or older,  · Anyone 2 years or older with certain medical conditions that can lead to an increased risk for pneumococcal disease. Most people need only one dose of PPSV23. A second dose of PPSV23, and another type of pneumococcal vaccine called PCV13, are recommended for certain high-risk groups. Your health care provider can give you more information. People 65 years or older should get a dose of PPSV23 even if they have already gotten one or more doses of the vaccine before they turned 65. Talk with your health care provider  Tell your vaccine provider if the person getting the vaccine:  · Has had an allergic reaction after a previous dose of PPSV23, or has any severe, life-threatening allergies. In some cases, your health care provider may decide to postpone PPSV23 vaccination to a future visit. People with minor illnesses, such as a cold, may be vaccinated. People who are moderately or severely ill should usually wait until they recover before getting PPSV23. Your health care provider can give you more information. Risks of a vaccine reaction  · Redness or pain where the shot is given, feeling tired, fever, or muscle aches can happen after PPSV23. People sometimes faint after medical procedures, including vaccination. Tell your provider if you feel dizzy or have vision changes or ringing in the ears. As with any medicine, there is a very remote chance of a vaccine causing a severe allergic reaction, other serious injury, or death. What if there is a serious problem? An allergic reaction could occur after the vaccinated person leaves the clinic. If you see signs of a severe allergic reaction (hives, swelling of the face and throat, difficulty breathing, a fast heartbeat, dizziness, or weakness), call 9-1-1 and get the person to the nearest hospital.  For other signs that concern you, call your health care provider. Adverse reactions should be reported to the Vaccine Adverse Event Reporting System (VAERS).  Your health care provider will usually file this report, or you can do it yourself. Visit the VAERS website at www.vaers. hhs.gov at www.vaers. hhs.gov or call 9-548.484.2089. VAERS is only for reporting reactions, and [facility] staff do not give medical advice. How can I learn more? · Ask your health care provider. · Call your local or state health department. · Contact the Centers for Disease Control and Prevention (CDC):  ? Call 4-712.272.6592 (1-800-CDC-INFO) or  ? Visit CDC's website at www.cdc.gov/vaccines  Vaccine Information Statement  PPSV23 Vaccine  10/30/2019  [facility] and Counts include 234 beds at the [facility] for Disease Control and Prevention  Many Vaccine Information Statements are available in [place] and other languages. See www.immunize.org/vis. Hojas de información Sobre Vacunas están disponibles en español y en muchos otros idiomas. Visite Nina.si. Care instructions adapted under license by [facility] ([facility]). If you have questions about a medical condition or this instruction, always ask your healthcare professional. [place] [address] any warranty or liability for your use of this information. Www.contrave. com  Www.saxenda. com (similar to Trulicity and Ozempic)

## 2021-05-25 PROBLEM — M05.79 RHEUMATOID ARTHRITIS INVOLVING MULTIPLE SITES WITH POSITIVE RHEUMATOID FACTOR (HCC): Status: ACTIVE | Noted: 2018-07-06

## 2021-05-25 ASSESSMENT — ENCOUNTER SYMPTOMS
DIARRHEA: 0
ANAL BLEEDING: 0
NAUSEA: 0
BACK PAIN: 1
ABDOMINAL PAIN: 0
CHEST TIGHTNESS: 0
CONSTIPATION: 0
SHORTNESS OF BREATH: 0
COUGH: 0

## 2021-06-15 ENCOUNTER — HOSPITAL ENCOUNTER (OUTPATIENT)
Dept: GENERAL RADIOLOGY | Age: 41
Discharge: HOME | End: 2021-06-15
Payer: MEDICAID

## 2021-06-15 DIAGNOSIS — M05.79 RHEUMATOID ARTHRITIS OF MULTIPLE SITES WITHOUT ORGAN OR SYSTEM INVOLVEMENT WITH POSITIVE RHEUMATOID FACTOR (HCC): ICD-10-CM

## 2021-06-15 PROCEDURE — 71046 X-RAY EXAM CHEST 2 VIEWS: CPT

## 2021-06-28 ENCOUNTER — OFFICE VISIT (OUTPATIENT)
Dept: FAMILY MEDICINE CLINIC | Age: 41
End: 2021-06-28
Payer: MEDICAID

## 2021-06-28 VITALS
OXYGEN SATURATION: 98 % | TEMPERATURE: 97 F | BODY MASS INDEX: 55.24 KG/M2 | DIASTOLIC BLOOD PRESSURE: 82 MMHG | HEART RATE: 78 BPM | WEIGHT: 293 LBS | SYSTOLIC BLOOD PRESSURE: 132 MMHG

## 2021-06-28 DIAGNOSIS — I10 ESSENTIAL (PRIMARY) HYPERTENSION: ICD-10-CM

## 2021-06-28 DIAGNOSIS — E28.2 PCOS (POLYCYSTIC OVARIAN SYNDROME): ICD-10-CM

## 2021-06-28 DIAGNOSIS — E11.9 TYPE 2 DIABETES MELLITUS WITHOUT COMPLICATION, WITHOUT LONG-TERM CURRENT USE OF INSULIN (HCC): Primary | ICD-10-CM

## 2021-06-28 PROCEDURE — 1036F TOBACCO NON-USER: CPT | Performed by: FAMILY MEDICINE

## 2021-06-28 PROCEDURE — 3044F HG A1C LEVEL LT 7.0%: CPT | Performed by: FAMILY MEDICINE

## 2021-06-28 PROCEDURE — 2022F DILAT RTA XM EVC RTNOPTHY: CPT | Performed by: FAMILY MEDICINE

## 2021-06-28 PROCEDURE — 99213 OFFICE O/P EST LOW 20 MIN: CPT | Performed by: FAMILY MEDICINE

## 2021-06-28 PROCEDURE — G8417 CALC BMI ABV UP PARAM F/U: HCPCS | Performed by: FAMILY MEDICINE

## 2021-06-28 PROCEDURE — G8428 CUR MEDS NOT DOCUMENT: HCPCS | Performed by: FAMILY MEDICINE

## 2021-06-28 ASSESSMENT — ENCOUNTER SYMPTOMS
CHEST TIGHTNESS: 0
SHORTNESS OF BREATH: 0
BACK PAIN: 0

## 2021-06-28 NOTE — PROGRESS NOTES
tablet Take 1 tablet by mouth 2 times daily (with meals) 60 tablet 5    valACYclovir (VALTREX) 1 g tablet Take 2 tablets p.o. every 12 hours for 2 doses. May repeat for further outbreaks 8 tablet 2    Norgestim-Eth Estrad Triphasic (TRI-SPRINTEC) 0.18/0.215/0.25 MG-35 MCG TABS Take 1 tablet by mouth daily 84 tablet 3    Blood Glucose Monitoring Suppl (FREESTYLE LITE) ANDREEA USE AS DIRECTED      Lancet Device MISC 1 lancet twice a day 60 Device 11    HUMIRA PEN 40 MG/0.8ML injection 1 each every 14 days (Patient not taking: Reported on 6/28/2021)       No current facility-administered medications for this visit. Allergies   Allergen Reactions    Mushroom Extract Complex        Health Maintenance   Topic Date Due    Varicella vaccine (1 of 2 - 2-dose childhood series) Never done    Pneumococcal 0-64 years Vaccine (1 of 2 - PPSV23) Never done    Diabetic retinal exam  Never done    COVID-19 Vaccine (1) Never done    Hepatitis B vaccine (1 of 3 - Risk 3-dose series) Never done    Diabetic foot exam  06/26/2020    Diabetic microalbuminuria test  08/25/2021    Lipid screen  08/25/2021    Flu vaccine (Season Ended) 09/01/2021    A1C test (Diabetic or Prediabetic)  05/24/2022    Cervical cancer screen  12/22/2025    DTaP/Tdap/Td vaccine (2 - Td or Tdap) 04/25/2027    Hepatitis C screen  Completed    HIV screen  Completed    Hepatitis A vaccine  Aged Out    Hib vaccine  Aged Out    Meningococcal (ACWY) vaccine  Aged Out       Subjective:      Review of Systems   Constitutional: Negative for fatigue. Respiratory: Negative for chest tightness and shortness of breath. Musculoskeletal: Positive for arthralgias and myalgias. Negative for back pain. SeeHPI for visit specific review of symptoms.   All others negative      Objective:   /82   Pulse 78   Temp 97 °F (36.1 °C)   Wt (!) 302 lb (137 kg)   SpO2 98%   BMI 55.24 kg/m²   Physical Exam  Constitutional:       Appearance: She is well-developed. She is not ill-appearing. Eyes:      Pupils: Pupils are equal, round, and reactive to light. Cardiovascular:      Rate and Rhythm: Normal rate and regular rhythm. Heart sounds: No murmur heard. No friction rub. Pulmonary:      Effort: Pulmonary effort is normal. No respiratory distress. Breath sounds: Normal breath sounds. No wheezing or rales. Abdominal:      General: Bowel sounds are normal. There is no distension. Palpations: Abdomen is soft. Tenderness: There is no abdominal tenderness. There is no guarding or rebound. Musculoskeletal:      Cervical back: Normal range of motion and neck supple. Neurological:      Mental Status: She is alert. Psychiatric:         Behavior: Behavior normal.           No results found for this or any previous visit (from the past 672 hour(s)). Assessment & Plan: The following diagnoses and conditions are stable with no further orders unless indicated:  1. Type 2 diabetes mellitus without complication, without long-term current use of insulin ([facility]  Lab Results   Component Value Date    LABA1C 6.5 05/24/2021    LABA1C 7.3 (H) 08/25/2020    LABA1C 6.2 (H) 11/25/2019     Lab Results   Component Value Date    LABMICR <1.20 08/25/2020    [address] 08/25/2020    CREATININE 0.5 02/28/2021     Encouraged ADA diet  Continue with Metformin  Discussed starting Ozempic  Discussed risks and benefits of this new medication. Discussed potential side effects. She voiced understanding. Start 0.25 mg q. weekly for 4 weeks then increase to 0.5 mg    2. PCOS (polycystic ovarian syndrome)  Continue with Metformin    3.  Essential (primary) hypertension  BP Readings from Last 3 Encounters:   06/28/21 132/82   05/24/21 136/82   02/28/21 (!) 172/96     Blood pressure remained stable  She is not currently taking medication hopefully with weight loss will have better improvement            Return in about 3 months (around 9/28/2021) for Routine follow up - 15 minute visit. Discussed use, benefit, and side effects of prescribed medications. All patient questions answered. Pt voiced understanding. Reviewed health maintenance. Instructedto continue current medications, diet and exercise. Patient agreed with treatmentplan. Follow up as directed. Note dictated using Dragon Dictation software  Sometimes this dictation software makes erroneous transcriptions.

## 2021-07-27 ENCOUNTER — TELEPHONE (OUTPATIENT)
Dept: FAMILY MEDICINE CLINIC | Age: 41
End: 2021-07-27

## 2021-07-30 ENCOUNTER — HOSPITAL ENCOUNTER (OUTPATIENT)
Age: 41
Setting detail: OBSERVATION
Discharge: HOME | End: 2021-08-01
Attending: EMERGENCY MEDICINE | Admitting: INTERNAL MEDICINE
Payer: MEDICAID

## 2021-07-30 DIAGNOSIS — R20.0 RIGHT FACIAL NUMBNESS: ICD-10-CM

## 2021-07-30 DIAGNOSIS — E11.9 TYPE 2 DIABETES MELLITUS WITHOUT COMPLICATION, WITHOUT LONG-TERM CURRENT USE OF INSULIN (HCC): ICD-10-CM

## 2021-07-30 DIAGNOSIS — I16.0 HYPERTENSIVE URGENCY: Primary | ICD-10-CM

## 2021-07-30 LAB
ALBUMIN SERPL-MCNC: 4 G/DL (ref 3.5–5.2)
ALP BLD-CCNC: 75 U/L (ref 35–104)
ALT SERPL-CCNC: 33 U/L (ref 5–33)
ANION GAP SERPL CALCULATED.3IONS-SCNC: 11 MMOL/L (ref 7–19)
AST SERPL-CCNC: 22 U/L (ref 5–32)
BACTERIA: NEGATIVE /HPF
BASOPHILS ABSOLUTE: 0.1 K/UL (ref 0–0.2)
BASOPHILS RELATIVE PERCENT: 0.8 % (ref 0–1)
BILIRUB SERPL-MCNC: 0.3 MG/DL (ref 0.2–1.2)
BILIRUBIN URINE: NEGATIVE
BLOOD, URINE: ABNORMAL
BUN BLDV-MCNC: 9 MG/DL (ref 6–20)
CALCIUM SERPL-MCNC: 9.5 MG/DL (ref 8.6–10)
CHLORIDE BLD-SCNC: 99 MMOL/L (ref 98–111)
CLARITY: CLEAR
CO2: 25 MMOL/L (ref 22–29)
COLOR: YELLOW
CREAT SERPL-MCNC: 0.6 MG/DL (ref 0.5–0.9)
CRYSTALS, UA: ABNORMAL /HPF
EOSINOPHILS ABSOLUTE: 0.3 K/UL (ref 0–0.6)
EOSINOPHILS RELATIVE PERCENT: 3 % (ref 0–5)
EPITHELIAL CELLS, UA: 1 /HPF (ref 0–5)
GFR AFRICAN AMERICAN: >59
GFR NON-AFRICAN AMERICAN: >60
GLUCOSE BLD-MCNC: 129 MG/DL (ref 74–109)
GLUCOSE URINE: NEGATIVE MG/DL
HCG(URINE) PREGNANCY TEST: NEGATIVE
HCT VFR BLD CALC: 42.8 % (ref 37–47)
HEMOGLOBIN: 13.6 G/DL (ref 12–16)
HYALINE CASTS: 1 /HPF (ref 0–8)
IMMATURE GRANULOCYTES #: 0 K/UL
KETONES, URINE: NEGATIVE MG/DL
LEUKOCYTE ESTERASE, URINE: NEGATIVE
LYMPHOCYTES ABSOLUTE: 3.3 K/UL (ref 1.1–4.5)
LYMPHOCYTES RELATIVE PERCENT: 36.5 % (ref 20–40)
MCH RBC QN AUTO: 26.8 PG (ref 27–31)
MCHC RBC AUTO-ENTMCNC: 31.8 G/DL (ref 33–37)
MCV RBC AUTO: 84.3 FL (ref 81–99)
MONOCYTES ABSOLUTE: 0.6 K/UL (ref 0–0.9)
MONOCYTES RELATIVE PERCENT: 6.6 % (ref 0–10)
NEUTROPHILS ABSOLUTE: 4.8 K/UL (ref 1.5–7.5)
NEUTROPHILS RELATIVE PERCENT: 52.8 % (ref 50–65)
NITRITE, URINE: NEGATIVE
PDW BLD-RTO: 14 % (ref 11.5–14.5)
PH UA: 6 (ref 5–8)
PLATELET # BLD: 289 K/UL (ref 130–400)
PMV BLD AUTO: 11.4 FL (ref 9.4–12.3)
POTASSIUM REFLEX MAGNESIUM: 4.3 MMOL/L (ref 3.5–5)
PROTEIN UA: NEGATIVE MG/DL
RBC # BLD: 5.08 M/UL (ref 4.2–5.4)
RBC UA: 36 /HPF (ref 0–4)
SARS-COV-2, NAAT: NOT DETECTED
SODIUM BLD-SCNC: 135 MMOL/L (ref 136–145)
SPECIFIC GRAVITY UA: 1.01 (ref 1–1.03)
TOTAL PROTEIN: 8.1 G/DL (ref 6.6–8.7)
UROBILINOGEN, URINE: 1 E.U./DL
WBC # BLD: 9.1 K/UL (ref 4.8–10.8)
WBC UA: 1 /HPF (ref 0–5)

## 2021-07-30 PROCEDURE — 85025 COMPLETE CBC W/AUTO DIFF WBC: CPT

## 2021-07-30 PROCEDURE — 83880 ASSAY OF NATRIURETIC PEPTIDE: CPT

## 2021-07-30 PROCEDURE — 96374 THER/PROPH/DIAG INJ IV PUSH: CPT

## 2021-07-30 PROCEDURE — 2500000003 HC RX 250 WO HCPCS: Performed by: EMERGENCY MEDICINE

## 2021-07-30 PROCEDURE — 36415 COLL VENOUS BLD VENIPUNCTURE: CPT

## 2021-07-30 PROCEDURE — 84484 ASSAY OF TROPONIN QUANT: CPT

## 2021-07-30 PROCEDURE — 99285 EMERGENCY DEPT VISIT HI MDM: CPT

## 2021-07-30 PROCEDURE — 80053 COMPREHEN METABOLIC PANEL: CPT

## 2021-07-30 RX ORDER — LABETALOL HYDROCHLORIDE 5 MG/ML
10 INJECTION, SOLUTION INTRAVENOUS ONCE
Status: COMPLETED | OUTPATIENT
Start: 2021-07-30 | End: 2021-07-30

## 2021-07-30 RX ADMIN — Medication 10 MG: at 23:21

## 2021-07-30 ASSESSMENT — ENCOUNTER SYMPTOMS
VOMITING: 0
VOICE CHANGE: 0
EYE PAIN: 0
SHORTNESS OF BREATH: 0
ABDOMINAL PAIN: 0
DIARRHEA: 0
COUGH: 0
RHINORRHEA: 0
EYE REDNESS: 0

## 2021-07-30 ASSESSMENT — PAIN SCALES - GENERAL: PAINLEVEL_OUTOF10: 5

## 2021-07-30 ASSESSMENT — PAIN DESCRIPTION - LOCATION: LOCATION: HEAD

## 2021-07-30 ASSESSMENT — PAIN DESCRIPTION - DESCRIPTORS: DESCRIPTORS: ACHING

## 2021-07-31 ENCOUNTER — APPOINTMENT (OUTPATIENT)
Dept: CT IMAGING | Age: 41
End: 2021-07-31
Payer: MEDICAID

## 2021-07-31 ENCOUNTER — APPOINTMENT (OUTPATIENT)
Dept: MRI IMAGING | Age: 41
End: 2021-07-31
Payer: MEDICAID

## 2021-07-31 PROBLEM — R20.0 FACIAL NUMBNESS: Status: ACTIVE | Noted: 2021-07-31

## 2021-07-31 PROBLEM — I10 HYPERTENSION: Status: ACTIVE | Noted: 2021-07-31

## 2021-07-31 LAB
C-REACTIVE PROTEIN: 1.86 MG/DL (ref 0–0.5)
CHOLESTEROL, TOTAL: 167 MG/DL (ref 160–199)
FOLATE: 13.8 NG/ML (ref 4.8–37.3)
GLUCOSE BLD-MCNC: 110 MG/DL (ref 70–99)
GLUCOSE BLD-MCNC: 113 MG/DL (ref 70–99)
GLUCOSE BLD-MCNC: 180 MG/DL (ref 70–99)
HBA1C MFR BLD: 6.5 % (ref 4–6)
HDLC SERPL-MCNC: 51 MG/DL (ref 65–121)
LDL CHOLESTEROL CALCULATED: 105 MG/DL
LV EF: 58 %
LVEF MODALITY: NORMAL
PERFORMED ON: ABNORMAL
PRO-BNP: 78 PG/ML (ref 0–450)
SEDIMENTATION RATE, ERYTHROCYTE: 28 MM/HR (ref 0–20)
TRIGL SERPL-MCNC: 54 MG/DL (ref 0–149)
TROPONIN: <0.01 NG/ML (ref 0–0.03)
VITAMIN B-12: 761 PG/ML (ref 211–946)
VITAMIN D 25-HYDROXY: 18.3 NG/ML

## 2021-07-31 PROCEDURE — 99219 PR INITIAL OBSERVATION CARE/DAY 50 MINUTES: CPT | Performed by: PSYCHIATRY & NEUROLOGY

## 2021-07-31 PROCEDURE — 80061 LIPID PANEL: CPT

## 2021-07-31 PROCEDURE — 6360000004 HC RX CONTRAST MEDICATION: Performed by: EMERGENCY MEDICINE

## 2021-07-31 PROCEDURE — 83090 ASSAY OF HOMOCYSTEINE: CPT

## 2021-07-31 PROCEDURE — 82746 ASSAY OF FOLIC ACID SERUM: CPT

## 2021-07-31 PROCEDURE — 86140 C-REACTIVE PROTEIN: CPT

## 2021-07-31 PROCEDURE — 6360000002 HC RX W HCPCS: Performed by: HOSPITALIST

## 2021-07-31 PROCEDURE — 82306 VITAMIN D 25 HYDROXY: CPT

## 2021-07-31 PROCEDURE — 86592 SYPHILIS TEST NON-TREP QUAL: CPT

## 2021-07-31 PROCEDURE — 84703 CHORIONIC GONADOTROPIN ASSAY: CPT

## 2021-07-31 PROCEDURE — 6360000002 HC RX W HCPCS: Performed by: EMERGENCY MEDICINE

## 2021-07-31 PROCEDURE — 96376 TX/PRO/DX INJ SAME DRUG ADON: CPT

## 2021-07-31 PROCEDURE — 70498 CT ANGIOGRAPHY NECK: CPT

## 2021-07-31 PROCEDURE — 6370000000 HC RX 637 (ALT 250 FOR IP): Performed by: HOSPITALIST

## 2021-07-31 PROCEDURE — 82947 ASSAY GLUCOSE BLOOD QUANT: CPT

## 2021-07-31 PROCEDURE — 2580000003 HC RX 258: Performed by: HOSPITALIST

## 2021-07-31 PROCEDURE — 83036 HEMOGLOBIN GLYCOSYLATED A1C: CPT

## 2021-07-31 PROCEDURE — 96375 TX/PRO/DX INJ NEW DRUG ADDON: CPT

## 2021-07-31 PROCEDURE — 84484 ASSAY OF TROPONIN QUANT: CPT

## 2021-07-31 PROCEDURE — 85652 RBC SED RATE AUTOMATED: CPT

## 2021-07-31 PROCEDURE — 93306 TTE W/DOPPLER COMPLETE: CPT

## 2021-07-31 PROCEDURE — 70450 CT HEAD/BRAIN W/O DYE: CPT

## 2021-07-31 PROCEDURE — 96372 THER/PROPH/DIAG INJ SC/IM: CPT

## 2021-07-31 PROCEDURE — G0378 HOSPITAL OBSERVATION PER HR: HCPCS

## 2021-07-31 PROCEDURE — 81001 URINALYSIS AUTO W/SCOPE: CPT

## 2021-07-31 PROCEDURE — 82607 VITAMIN B-12: CPT

## 2021-07-31 PROCEDURE — 87635 SARS-COV-2 COVID-19 AMP PRB: CPT

## 2021-07-31 PROCEDURE — 2500000003 HC RX 250 WO HCPCS: Performed by: HOSPITALIST

## 2021-07-31 PROCEDURE — 36415 COLL VENOUS BLD VENIPUNCTURE: CPT

## 2021-07-31 PROCEDURE — 70496 CT ANGIOGRAPHY HEAD: CPT

## 2021-07-31 PROCEDURE — 70551 MRI BRAIN STEM W/O DYE: CPT

## 2021-07-31 RX ORDER — ONDANSETRON 4 MG/1
4 TABLET, ORALLY DISINTEGRATING ORAL EVERY 8 HOURS PRN
Status: DISCONTINUED | OUTPATIENT
Start: 2021-07-31 | End: 2021-08-01 | Stop reason: HOSPADM

## 2021-07-31 RX ORDER — LOSARTAN POTASSIUM 100 MG/1
100 TABLET ORAL DAILY
COMMUNITY
End: 2021-08-20 | Stop reason: SDUPTHER

## 2021-07-31 RX ORDER — DEXTROSE MONOHYDRATE 25 G/50ML
12.5 INJECTION, SOLUTION INTRAVENOUS PRN
Status: DISCONTINUED | OUTPATIENT
Start: 2021-07-31 | End: 2021-08-01 | Stop reason: HOSPADM

## 2021-07-31 RX ORDER — HYDRALAZINE HYDROCHLORIDE 20 MG/ML
10 INJECTION INTRAMUSCULAR; INTRAVENOUS ONCE
Status: COMPLETED | OUTPATIENT
Start: 2021-07-31 | End: 2021-07-31

## 2021-07-31 RX ORDER — ACETAMINOPHEN 325 MG/1
650 TABLET ORAL EVERY 6 HOURS PRN
Status: DISCONTINUED | OUTPATIENT
Start: 2021-07-31 | End: 2021-08-01 | Stop reason: HOSPADM

## 2021-07-31 RX ORDER — SODIUM CHLORIDE 9 MG/ML
25 INJECTION, SOLUTION INTRAVENOUS PRN
Status: DISCONTINUED | OUTPATIENT
Start: 2021-07-31 | End: 2021-08-01 | Stop reason: HOSPADM

## 2021-07-31 RX ORDER — SODIUM CHLORIDE 0.9 % (FLUSH) 0.9 %
5-40 SYRINGE (ML) INJECTION PRN
Status: DISCONTINUED | OUTPATIENT
Start: 2021-07-31 | End: 2021-08-01 | Stop reason: HOSPADM

## 2021-07-31 RX ORDER — POLYETHYLENE GLYCOL 3350 17 G/17G
17 POWDER, FOR SOLUTION ORAL DAILY PRN
Status: DISCONTINUED | OUTPATIENT
Start: 2021-07-31 | End: 2021-08-01 | Stop reason: HOSPADM

## 2021-07-31 RX ORDER — INSULIN GLARGINE 100 [IU]/ML
15 INJECTION, SOLUTION SUBCUTANEOUS NIGHTLY
Status: DISCONTINUED | OUTPATIENT
Start: 2021-07-31 | End: 2021-08-01 | Stop reason: HOSPADM

## 2021-07-31 RX ORDER — LABETALOL HYDROCHLORIDE 5 MG/ML
10 INJECTION, SOLUTION INTRAVENOUS ONCE
Status: COMPLETED | OUTPATIENT
Start: 2021-07-31 | End: 2021-07-31

## 2021-07-31 RX ORDER — CALCIUM CARBONATE 200(500)MG
500 TABLET,CHEWABLE ORAL 3 TIMES DAILY PRN
Status: DISCONTINUED | OUTPATIENT
Start: 2021-07-31 | End: 2021-08-01 | Stop reason: HOSPADM

## 2021-07-31 RX ORDER — ONDANSETRON 2 MG/ML
4 INJECTION INTRAMUSCULAR; INTRAVENOUS EVERY 6 HOURS PRN
Status: DISCONTINUED | OUTPATIENT
Start: 2021-07-31 | End: 2021-08-01 | Stop reason: HOSPADM

## 2021-07-31 RX ORDER — NORGESTIMATE AND ETHINYL ESTRADIOL 7DAYSX3 28
1 KIT ORAL DAILY
Status: DISCONTINUED | OUTPATIENT
Start: 2021-07-31 | End: 2021-08-01 | Stop reason: HOSPADM

## 2021-07-31 RX ORDER — NALOXONE HYDROCHLORIDE 0.4 MG/ML
0.4 INJECTION, SOLUTION INTRAMUSCULAR; INTRAVENOUS; SUBCUTANEOUS PRN
Status: DISCONTINUED | OUTPATIENT
Start: 2021-07-31 | End: 2021-08-01 | Stop reason: HOSPADM

## 2021-07-31 RX ORDER — ATORVASTATIN CALCIUM 40 MG/1
40 TABLET, FILM COATED ORAL NIGHTLY
Status: DISCONTINUED | OUTPATIENT
Start: 2021-07-31 | End: 2021-08-01 | Stop reason: HOSPADM

## 2021-07-31 RX ORDER — ACYCLOVIR 200 MG/1
400 CAPSULE ORAL 3 TIMES DAILY
Refills: 2 | Status: DISCONTINUED | OUTPATIENT
Start: 2021-07-31 | End: 2021-07-31 | Stop reason: ALTCHOICE

## 2021-07-31 RX ORDER — DEXTROSE MONOHYDRATE 50 MG/ML
100 INJECTION, SOLUTION INTRAVENOUS PRN
Status: DISCONTINUED | OUTPATIENT
Start: 2021-07-31 | End: 2021-08-01 | Stop reason: HOSPADM

## 2021-07-31 RX ORDER — NICOTINE POLACRILEX 4 MG
15 LOZENGE BUCCAL PRN
Status: DISCONTINUED | OUTPATIENT
Start: 2021-07-31 | End: 2021-08-01 | Stop reason: HOSPADM

## 2021-07-31 RX ORDER — INSULIN GLARGINE 100 [IU]/ML
15 INJECTION, SOLUTION SUBCUTANEOUS ONCE
Status: COMPLETED | OUTPATIENT
Start: 2021-07-31 | End: 2021-07-31

## 2021-07-31 RX ORDER — ACETAMINOPHEN 650 MG/1
650 SUPPOSITORY RECTAL EVERY 6 HOURS PRN
Status: DISCONTINUED | OUTPATIENT
Start: 2021-07-31 | End: 2021-08-01 | Stop reason: HOSPADM

## 2021-07-31 RX ORDER — MECOBALAMIN 5000 MCG
5 TABLET,DISINTEGRATING ORAL NIGHTLY PRN
Status: DISCONTINUED | OUTPATIENT
Start: 2021-07-31 | End: 2021-08-01 | Stop reason: HOSPADM

## 2021-07-31 RX ORDER — SODIUM CHLORIDE 0.9 % (FLUSH) 0.9 %
5-40 SYRINGE (ML) INJECTION EVERY 12 HOURS SCHEDULED
Status: DISCONTINUED | OUTPATIENT
Start: 2021-07-31 | End: 2021-08-01 | Stop reason: HOSPADM

## 2021-07-31 RX ORDER — HYDRALAZINE HYDROCHLORIDE 20 MG/ML
10 INJECTION INTRAMUSCULAR; INTRAVENOUS EVERY 4 HOURS PRN
Status: DISCONTINUED | OUTPATIENT
Start: 2021-07-31 | End: 2021-08-01

## 2021-07-31 RX ADMIN — ENOXAPARIN SODIUM 40 MG: 40 INJECTION SUBCUTANEOUS at 09:49

## 2021-07-31 RX ADMIN — IOPAMIDOL 90 ML: 755 INJECTION, SOLUTION INTRAVENOUS at 01:14

## 2021-07-31 RX ADMIN — INSULIN LISPRO 3 UNITS: 100 INJECTION, SOLUTION INTRAVENOUS; SUBCUTANEOUS at 13:46

## 2021-07-31 RX ADMIN — Medication 10 MG: at 03:37

## 2021-07-31 RX ADMIN — HYDRALAZINE HYDROCHLORIDE 10 MG: 20 INJECTION INTRAMUSCULAR; INTRAVENOUS at 00:32

## 2021-07-31 RX ADMIN — INSULIN GLARGINE 15 UNITS: 100 INJECTION, SOLUTION SUBCUTANEOUS at 03:39

## 2021-07-31 RX ADMIN — SODIUM CHLORIDE, PRESERVATIVE FREE 10 ML: 5 INJECTION INTRAVENOUS at 09:52

## 2021-07-31 RX ADMIN — ONDANSETRON 4 MG: 4 TABLET, ORALLY DISINTEGRATING ORAL at 03:37

## 2021-07-31 RX ADMIN — ACETAMINOPHEN 650 MG: 325 TABLET ORAL at 18:02

## 2021-07-31 ASSESSMENT — PAIN DESCRIPTION - LOCATION: LOCATION: HEAD

## 2021-07-31 ASSESSMENT — PAIN DESCRIPTION - PAIN TYPE: TYPE: ACUTE PAIN

## 2021-07-31 ASSESSMENT — PAIN DESCRIPTION - PROGRESSION: CLINICAL_PROGRESSION: GRADUALLY IMPROVING

## 2021-07-31 ASSESSMENT — PAIN DESCRIPTION - FREQUENCY: FREQUENCY: CONTINUOUS

## 2021-07-31 ASSESSMENT — PAIN DESCRIPTION - ONSET: ONSET: ON-GOING

## 2021-07-31 ASSESSMENT — PAIN DESCRIPTION - DESCRIPTORS: DESCRIPTORS: ACHING

## 2021-07-31 ASSESSMENT — PAIN - FUNCTIONAL ASSESSMENT: PAIN_FUNCTIONAL_ASSESSMENT: ACTIVITIES ARE NOT PREVENTED

## 2021-07-31 ASSESSMENT — PAIN SCALES - GENERAL
PAINLEVEL_OUTOF10: 6
PAINLEVEL_OUTOF10: 1

## 2021-07-31 NOTE — PLAN OF CARE
EP Sign Out:    Severe HTN  No end organ damage  Has received multiple doses of antihypertensives in the ED without anything other than a       Preliminary Plans: (ordered)    HTN refractory:  \"admit\" to observation status  Hydraalzine 10mg IVP PRN Q4h  Labetalol 10mg IVP PRN Q4h  Elevate Head of bed 30 degrees    Chronic Medical Problems:  Continue home regimen as indicated    Supportive and Prophylactic Txx:  DVT PPx: Lovenox SQ  GI (PUD) PPx: Not indicated  PT: not indicated

## 2021-07-31 NOTE — ED PROVIDER NOTES
[facility] [address] [facility]      Pt Name: Arcadio Ferrari  MRN: [id number]  [place] 1980  Date of evaluation: 7/30/2021  Provider: Carlos Leiva MD    [address] [place] [address]       Chief Complaint   Patient presents with    Numbness     Facial numbness on the right, patient states feels funny in throat. Began approx 1 hour pta    Patient states this was the same feeling she had a few days when she was admitted to the hospital           HISTORY OF PRESENT ILLNESS   (Location/Symptom, Timing/Onset,Context/Setting, Quality, Duration, Modifying Factors, Severity)  Note limiting factors. Arcadio Ferrari is a 36 y.o. female who presents to the emergency department with complaint of elevated blood pressure along with right-sided facial and throat numbness that started this afternoon. Patient was discharged from the hospital in [facility] 2 days ago after being admitted for hypertension. States at that time she was having chest pressure and palpitations but also had some similar symptoms of numbness. During hospitalization there, patient had a stress test and an echo. States they tried several different blood pressure medications before ultimately deciding on losartan which she has been on since discharge from the hospital.  States she has a history of hypertension in the past and was previously on losartan but was taken off of it at her blood pressure normalized about 3 years ago. Blood pressure had been fine until recently. Patient also has a history of benign intracranial hypertension, PCOS, obesity, type 2 diabetes. Is followed by Dr. Isadora Go and has follow-up appointment scheduled for Monday but after starting to have symptoms and uncontrolled blood pressure despite taking her losartan today came here for evaluation. HPI    NursingNotes were reviewed.     REVIEW OF SYSTEMS    (2-9 systems for level 4, 10 or more for level 5)     Review of Systems   Constitutional: Negative for fatigue and fever.   HENT: Negative for congestion, rhinorrhea and voice change. Eyes: Negative for pain and redness. Respiratory: Negative for cough and shortness of breath. Cardiovascular: Negative for chest pain. Gastrointestinal: Negative for abdominal pain, diarrhea and vomiting. Endocrine: Negative. Genitourinary: Negative. Musculoskeletal: Negative for arthralgias and gait problem. Skin: Negative for rash and wound. Neurological: Positive for numbness. Negative for weakness and headaches. Hematological: Negative. Psychiatric/Behavioral: Negative. All other systems reviewed and are negative. A complete review of systems was performed and is negative except as noted above in the HPI. PAST MEDICAL HISTORY     Past Medical History:   Diagnosis Date    Anemia     Chronic rheumatic arthritis ([facility] [address]     Elevated blood pressure reading     Fibromyalgia     Intracranial hypertension     Obesity     PCOS (polycystic ovarian syndrome)     Sleep apnea     Type 2 diabetes mellitus ([facility]          SURGICAL HISTORY       Past Surgical History:   Procedure Laterality Date    CHOLECYSTECTOMY           CURRENT MEDICATIONS       Current Discharge Medication List      CONTINUE these medications which have NOT CHANGED    Details   losartan (COZAAR) 100 MG tablet Take 100 mg by mouth daily      metFORMIN (GLUCOPHAGE) 500 MG tablet Take 1 tablet by mouth 2 times daily (with meals)  Qty: 60 tablet, Refills: 5      Norgestim-Eth Estrad Triphasic (TRI-SPRINTEC) 0.18/0.215/0.25 MG-35 MCG TABS Take 1 tablet by mouth daily  Qty: 84 tablet, Refills: 3      CIMZIA STARTER KIT 6 X 200 MG/ML KIT       Semaglutide,0.25 or 0.5MG/DOS, 2 MG/1.5ML SOPN Inject 0.5 mg into the skin once a week  Qty: 1 pen, Refills: 5      HUMIRA PEN 40 MG/0.8ML injection 1 each every 14 days      blood glucose test strips (ASCENSIA AUTODISC VI;ONE TOUCH ULTRA TEST VI) strip 1 each by In Vitro route 3 times daily As needed.   Qty: 100 each, Refills: 11      valACYclovir (VALTREX) 1 g tablet Take 2 tablets p.o. every 12 hours for 2 doses. May repeat for further outbreaks  Qty: 8 tablet, Refills: 2      Blood Glucose Monitoring Suppl (FREESTYLE LITE) ANDREEA USE AS DIRECTED      Lancet Device MISC 1 lancet twice a day  Qty: 60 Device, Refills: 11             ALLERGIES     Mushroom extract complex    FAMILY HISTORY       Family History   Problem Relation Age of Onset    High Blood Pressure Father     Diabetes Father     Atrial Fibrillation Father     Heart Failure Father     Stroke Father     Diabetes Sister     Diabetes Maternal Grandmother     Heart Attack Other     Ovarian Cancer Paternal Grandmother           SOCIAL HISTORY       Social History     Socioeconomic History    Marital status: Single     Spouse name: None    Number of children: None    Years of education: None    Highest education level: None   Occupational History    None   Tobacco Use    Smoking status: Former Smoker     Years: 2.00     Types: Cigarettes    Smokeless tobacco: Never Used   Vaping Use    Vaping Use: Never used   Substance and Sexual Activity    Alcohol use: Yes     Alcohol/week: 1.0 standard drinks     Types: 1 Glasses of wine per week     Comment: occ    Drug use: No    Sexual activity: Yes     Birth control/protection: OCP   Other Topics Concern    None   Social History Narrative    None     Social Determinants of Health     Financial Resource Strain:     Difficulty of Paying Living Expenses:    Food Insecurity:     Worried About Running Out of Food in the Last Year:     Ran Out of Food in the Last Year:    Transportation Needs:     Lack of Transportation (Medical):      Lack of Transportation (Non-Medical):    Physical Activity:     Days of Exercise per Week:     Minutes of Exercise per Session:    Stress:     Feeling of Stress :    Social Connections:     Frequency of Communication with Friends and Family:     Frequency of Social Gatherings with Friends and Family:     Attends [employer] Services:     Active Member of Clubs or Organizations:     Attends Club or Organization Meetings:     Marital Status:    Intimate Partner Violence:     Fear of Current or Ex-Partner:     Emotionally Abused:     Physically Abused:     Sexually Abused:        SCREENINGS   NIH Stroke Scale  Interval: Baseline  Level of Consciousness (1a. ): Alert  LOC Questions (1b. ): Answers both correctly  LOC Commands (1c. ): Performs both tasks correctly  Best Gaze (2. ): Normal  Visual (3. ): No visual loss  Facial Palsy (4. ): Normal symmetrical movement  Motor Arm, Left (5a. ): No drift  Motor Arm, Right (5b. ): No drift  Motor Leg, Left (6a. ): No drift  Motor Leg, Right (6b. ): No drift  Limb Ataxia (7. ): Absent  Sensory (8. ): (!) Mild to Moderate (R sided facial numbness)  Best Language (9. ): No aphasia  Dysarthria (10. ): Normal  Extinction and Inattention (11): No abnormality  Total: 1Glasgow Coma Scale  Eye Opening: Spontaneous  Best Verbal Response: Oriented  Best Motor Response: Obeys commands  [place] Coma Scale Score: 15        PHYSICAL EXAM    (up to 7 for level 4, 8 or more for level 5)     ED Triage Vitals [07/30/21 2236]   BP Temp Temp Source Pulse Resp SpO2 Height Weight   (!) 185/128 98.5 °F (36.9 °C) Infrared 86 21 98 % 5' 2\" (1.575 m) --       Physical Exam  Vitals and nursing note reviewed. Constitutional:       General: She is not in acute distress. Appearance: She is well-developed. She is not toxic-appearing or diaphoretic. HENT:      Head: Normocephalic and atraumatic. Eyes:      General: No scleral icterus. Right eye: No discharge. Left eye: No discharge. Pupils: Pupils are equal, round, and reactive to light. Cardiovascular:      Rate and Rhythm: Normal rate and regular rhythm. Pulses: Normal pulses. Heart sounds: Normal heart sounds.    Pulmonary:      Effort: Pulmonary effort is normal. No respiratory distress. Breath sounds: Normal breath sounds. No stridor. No wheezing or rhonchi. Abdominal:      General: There is no distension. Musculoskeletal:         General: No deformity. Normal range of motion. Cervical back: Normal range of motion. Right lower leg: No edema. Left lower leg: No edema. Skin:     General: Skin is warm and dry. Neurological:      General: No focal deficit present. Mental Status: She is alert and oriented to person, place, and time. GCS: GCS eye subscore is 4. GCS verbal subscore is 5. GCS motor subscore is 6. Cranial Nerves: Cranial nerves are intact. No cranial nerve deficit. Sensory: Sensation is intact. Motor: Motor function is intact. No weakness or abnormal muscle tone. Coordination: Coordination is intact. Psychiatric:         Behavior: Behavior normal.         Thought Content:  Thought content normal.         Judgment: Judgment normal.         DIAGNOSTIC RESULTS     EKG: All EKG's are interpreted by the Emergency Department Physician who either signs or Co-signs this chart in the absence of a cardiologist.        RADIOLOGY:   Non-plain film images such as CT, Ultrasound and MRI are read by the radiologist. Marline Marker images are visualized and preliminarily interpreted by the emergency physician with the below findings:      Interpretation per the Radiologist below, if available at the time of this note:    CT Head WO Contrast    (Results Pending)   CTA NECK W CONTRAST    (Results Pending)   CTA HEAD W CONTRAST    (Results Pending)         ED BEDSIDE ULTRASOUND:   Performed by ED Physician - none    LABS:  Labs Reviewed   CBC WITH AUTO DIFFERENTIAL - Abnormal; Notable for the following components:       Result Value    MCH 26.8 (*)     MCHC 31.8 (*)     All other components within normal limits   COMPREHENSIVE METABOLIC PANEL W/ REFLEX TO MG FOR LOW K - Abnormal; Notable for the following components:    Sodium administration in time range)   insulin lispro (HUMALOG) injection vial 0-18 Units (has no administration in time range)   insulin lispro (HUMALOG) injection vial 0-9 Units (has no administration in time range)   glucose (GLUTOSE) 40 % oral gel 15 g (has no administration in time range)   dextrose 50 % IV solution (has no administration in time range)   glucagon (rDNA) injection 1 mg (has no administration in time range)   dextrose 5 % solution (has no administration in time range)   labetalol (NORMODYNE;TRANDATE) injection 10 mg (10 mg Intravenous Given 7/30/21 2321)   hydrALAZINE (APRESOLINE) injection 10 mg (10 mg Intravenous Given 7/31/21 0032)   iopamidol (ISOVUE-370) 76 % injection 90 mL (90 mLs Intravenous Given 7/31/21 0114)   labetalol (NORMODYNE;TRANDATE) injection 10 mg (10 mg Intravenous Given 7/31/21 0337)   insulin glargine (LANTUS) injection vial 15 Units (15 Units Subcutaneous Given 7/31/21 0339)       EMERGENCY DEPARTMENT COURSE and DIFFERENTIALDIAGNOSIS/MDM:   Vitals:    Vitals:    07/31/21 0200 07/31/21 0215 07/31/21 0230 07/31/21 0245   BP: (!) 173/95 (!) 161/91 (!) 183/97 (!) 168/101   Pulse: 89 82 91 86   Resp: 20 18 16 16   Temp:       TempSrc:       SpO2: 98% 96% 98% 96%   Height:           [facility]    ED Course as of Jul 31 0356 Fri Jul 30, 2021   2309 EKG shows sinus rhythm with a rate of 86. No findings of acute ischemia or infarction. No evidence of acute right heart strain. Normal intervals. [HIGINIO]   9191 No objective focal neurologic deficits on exam.  Blood pressure elevated at 415X systolic. Given symptoms possibly suggestive of endorgan damage, labetalol ordered. [HIGINIO]   Sat Jul 31, 2021   0223 After initial administration of labetalol, patient's blood pressure did not improve and continued to have systolic blood pressure readings over 200. Hydralazine was given. Patient did have improvement in blood pressure readings with systolic blood pressure at this point down to the 160s.

## 2021-07-31 NOTE — ED NOTES
Bed: 01-A  Expected date:   Expected time:   Means of arrival:   Comments:  rich Benton RN  07/30/21 9524

## 2021-07-31 NOTE — H&P
[facility]      History & Physical    [id number]  PCP: Tasneem Garcia MD    Date of Admission:7/30/2021    Patient:  Svitlana Stratton  MRN: [id number]    Date of Service: Pt seen/examined on 7/31/2021 and placed in Observation expected LOS less than two midnights due to medical therapy. CHIEF COMPLAINT:     Chief Complaint   Patient presents with    Numbness     Facial numbness on the right, patient states feels funny in throat. Began approx 1 hour pta    Patient states this was the same feeling she had a few days when she was admitted to the hospital         History Obtained From:  patient, electronic medical record  Primary Care Physician: Tasneem Garcia MD    HISTORY OF PRESENT ILLNESS:    Ms. Ifrah Lr, a 36 y.o. female with a history of DMT2, benign intracranial HTN, PCOS, HTN, SETH, RA, presenting to [facility] ED (07/30/2021), with complaints of elevated blood pressure, as well as a 1 history of right facial and throat numbness. Associated symptoms reported include; chest pressure and palpitations. Patient was reportedly discharged from [facility] in [facility] about 2 days prior to presenting to [facility], after being admitted for uncontrolled HTN. Initial BP up to 202/109 on presentation. CT head, CTA head and neck with contrast, with no acute pathology. Patient placed on observation, further work-up and management       PAST MEDICAL & SURGICAL HISTORY    Past Medical History:      Diagnosis Date    Anemia     Chronic rheumatic arthritis ([facility] [address]     Elevated blood pressure reading     Fibromyalgia     Intracranial hypertension     Obesity     PCOS (polycystic ovarian syndrome)     Sleep apnea     Type 2 diabetes mellitus ([facility] [address]          Past Surgical History:      Procedure Laterality Date    CHOLECYSTECTOMY            SOCIAL & FAMILY HISTORY:    Social History:   TOBACCO:   reports that she has quit smoking. Her smoking use included cigarettes. She quit after 2.00 years of use.  She has never used smokeless tobacco.  ETOH:   reports current alcohol use of about 1.0 standard drinks of alcohol per week. Family History:       Problem Relation Age of Onset    High Blood Pressure Father     Diabetes Father     Atrial Fibrillation Father     Heart Failure Father     Stroke Father     Diabetes Sister     Diabetes Maternal Grandmother     Heart Attack Other     Ovarian Cancer Paternal Grandmother         MEDICATIONS:    Medications Prior to Admission:    Prior to Admission medications    Medication Sig Start Date End Date Taking? Authorizing Provider   losartan (COZAAR) 100 MG tablet Take 100 mg by mouth daily   Yes Historical Provider, MD   metFORMIN (GLUCOPHAGE) 500 MG tablet Take 1 tablet by mouth 2 times daily (with meals) 5/24/21  Yes Gloria Solitario MD   Norgestim-Eth Estrad Triphasic (TRI-SPRINTEC) 0.18/0.215/0.25 MG-35 MCG TABS Take 1 tablet by mouth daily 12/22/20  Yes NATACHA Villa CNM   CIMZIA STARTER KIT 6 X 200 MG/ML KIT  6/15/21   Historical Provider, MD   Semaglutide,0.25 or 0.5MG/DOS, 2 MG/1.5ML SOPN Inject 0.5 mg into the skin once a week 6/28/21   Gloria Solitario MD   HUMIRA PEN 40 MG/0.8ML injection 1 each every 14 days  Patient not taking: Reported on 6/28/2021 5/3/21   Historical Provider, MD   blood glucose test strips (ASCENSIA AUTODISC VI;ONE TOUCH ULTRA TEST VI) strip 1 each by In Vitro route 3 times daily As needed. 5/24/21   Gloria Solitario MD   valACYclovir (VALTREX) 1 g tablet Take 2 tablets p.o. every 12 hours for 2 doses. May repeat for further outbreaks 1/15/21   Gloria Solitario MD   Blood Glucose Monitoring Suppl (FREESTYLE LITE) ANDREEA USE AS DIRECTED 9/17/20   Historical Provider, MD   Lancet Device MISC 1 lancet twice a day 11/5/20   Gloria Solitario MD        ALLERGIES:    Allergies:  Mushroom extract complex       REVIEW OF SYSTEMS :    Review of Systems  14 point review of systems is negative except as specifically addressed above. Skin is warm and dry. Capillary Refill: Capillary refill takes less than 2 seconds. Coloration: Skin is not jaundiced or pale. Findings: No bruising, erythema, lesion or rash. Neurological:      General: No focal deficit present. Mental Status: She is alert and oriented to person, place, and time. Cranial Nerves: No cranial nerve deficit. Sensory: No sensory deficit. Motor: No weakness. Coordination: Coordination normal.   Psychiatric:         Mood and Affect: Mood normal.         Behavior: Behavior normal.         Thought Content: Thought content normal.         Judgment: Judgment normal.             DIAGNOSTIC STUDIES:    I have reviewedLaboratory and Imaging data report today. Recent Labs     07/30/21  2240   WBC 9.1   HGB 13.6        Recent Labs     07/30/21  2240   *   K 4.3   CL 99   CO2 25   BUN 9   CREATININE 0.6   GLUCOSE 129*   AST 22   ALT 33   BILITOT 0.3   ALKPHOS 75     Troponin T:   Recent Labs     07/30/21  2240 07/31/21  0924 07/31/21  1419   TROPONINI <0.01 <0.01 <0.01     Pro-BNP: No results found for: BNP  Lactate: No results found for: LACTA      ABGs: No results found for: PHART, PO2ART, GYI9HGH  INR: No results for input(s): INR in the last 72 hours. TSH:   Lab Results   Component Value Date    TSH 2.500 08/25/2020     URINALYSIS:  Recent Labs     07/30/21  2315   COLORU YELLOW   PHUR 6.0   WBCUA 1   RBCUA 36*   BACTERIA NEGATIVE*   CLARITYU Clear   SPECGRAV 1.008   LEUKOCYTESUR Negative   UROBILINOGEN 1.0   BILIRUBINUR Negative   BLOODU MODERATE*   GLUCOSEU Negative          RADIOLOGY / IMAGING REPORTS:     CT Head WO Contrast    Result Date: 7/31/2021  EXAM: CT HEAD WO CONTRAST -- 7/31/2021 1:30 AM HISTORY: 40 years, Female, right-sided facial numbness COMPARISON: 12/27/2019 DLP: 852 mGy cm. Automated exposure control was utilized to minimize patient radiation dose.  TECHNIQUE: Unenhanced axial CT images obtained from vertex to skull base with coronal and sagittal reformats. FINDINGS: No acute intracranial hemorrhage, midline shift, mass effect or large territory cortical infarct. Gray-white matter differentiation maintained. Ventricles, sulci, basilar cisterns are normal in size and configuration for the patient's age. Partially empty sella. Globes, retrobulbar soft tissues, paranasal sinuses, mastoid air cells and external auditory canals are within normal limits. Calvarium appears intact. Subcutaneous tissues within normal limits. 1. No acute intracranial findings. Preliminary interpretation performed by [facility] and I agree with the preliminary findings. Signed by Dr Katrina Burnett    Result Date: 7/31/2021  EXAM: CTA NECK W CONTRAST DATE: 7/31/2021 1:38 AM HISTORY: Right-sided facial numbness COMPARISON: No existing relevant imaging studies available DLP: 329 mGy cm. Automated exposure control was utilized to minimize patient radiation dose. TECHNIQUE: Enhanced CT images obtained of the neck with multiplanar reformats. 3D postprocessing, including MIPs, performed and images saved to PACS. Grading of carotid stenosis performed using NASCET criteria. FINDINGS: Aorta. Partially visualized aorta with 3 vessel arch, appears within normal limits. Right carotid artery. Right common and internal carotid arteries are patent. No evidence of critical stenosis, dissection or aneurysm. Medialization of the right internal carotid artery. Left carotid artery. Left common and internal carotid arteries are patent. No evidence of critical stenosis, dissection or aneurysm. Medialization of the left internal carotid artery. Vertebrobasilar. Technique limits evaluation of the vertebral arteries at their origin. Remaining vertebral arteries appear normal in course and caliber. Basilar artery patent. Soft tissues. The aerodigestive tract is within normal limits of appearance. No cervical adenopathy by size or morphologic criteria.  Major salivary glands within normal limits of appearance. Thyroid is unremarkable. Visualized portion of the paranasal sinuses appear clear. No mastoid effusion. External auditory canals patent. No acute or suspicious intracranial abnormality within the imaged field of view. No acute or suspicious bony finding. 1. Limited exam. Vasculature of the neck appears grossly patent. 2. See separately dictated exams of the same day. Preliminary interpretation performed by [facility] and I agree with the preliminary findings. Signed by Dr Cate Peres    Result Date: 7/31/2021  EXAM: CTA HEAD W CONTRAST DATE: 7/31/2021 1:13 AM HISTORY: Right side facial numbness COMPARISON: Same day CT head DLP: 329 mGy cm. Automated exposure control was utilized to minimize patient radiation dose. TECHNIQUE: Enhanced CT performed of the head with multiplanar reformats. 3D postprocessing, including MIPs, performed and images saved to PACS. Grading of carotid artery stenosis was performed according to the NASCET criteria. FINDINGS: Vascular. Bilateral internal carotid arteries patent. Bilateral middle and anterior cerebral arteries patent. Partially visualized bilateral vertebral arteries patent and normal caliber. Basilar artery patent. Bilateral posterior cerebral and superior cerebellar arteries patent. Visualized intracranial contents. No mass effect or midline shift. Ventricles, sulci, basilar cisterns are normal in size and configuration for the patient's age. Parenchyma better evaluated on same-day CT head. Globes, retrobulbar soft tissues, paranasal sinuses, mastoid air cells and external auditory canals are within normal limits. CTA head. 1. No evidence of occlusion, high-grade stenosis, or aneurysm. Preliminary interpretation performed by [facility] and I agree with the preliminary findings.  Signed by Dr Sravani Thompson          ECHOCARDIOGRAM:      Summary   Structurally normal mitral valve with normal leaflet 2 diabetes mellitus ([facility] [address]    Familial hypercholesterolemia    Hypertension  Resolved Problems:    * No resolved hospital problems. *              Right Facial Numbness. Acute CVA vsTIA  · Patient was not a candidate for thrombolytic therapy. · - Consider Dual Antiplatelet Therapy (DAPT)  -> after 48 hours from onset of stroke since this is the period of greatest risk for hemorrhagictransformation. · - Statin: Atorvastatin 40 mg by mouth nightly (once cleared to swallow)  · - Blood pressure goals: Allow Permissive HTN   · TTE, with agitated saline bubble study report as above  · MRI Brain WO Con (07/31/2021): Pending  · CTA Neck & Head W / WO Con-report as noted above. Risk stratification   - 25 hydroxyvitamin D level,    - B12 level,    - Homocystine level,    - RPR,    - Folate,   - ESR   - CRP   - Lipid profile. Rehab consultations:    - PT/OT    - SLP   -Neuro check  Q4 hours. Paula España -Neurology consult       History of Diabetes Mellitus II   Uncontrolled   Hemoglobin A1C    Inpatient Regimens to include;  o - Insulin Glargine (Lantus) 15 units subcu nightly  o - Insulin Lispro (Humalog) on a High dose sliding scale   Monitor POC glucose, and adjust insulin regimen accordingly based on daily insulin requirement. History of uncontrolled Essential Hypertension   Continue to monitor    Chest pain / tightness    - Initial troponin negative    - Will trend troponin    - Serial EKGs for chest pain   - Optimized medical management   --> Nitro glycerin sublingual when necessary for chest pain   - 2D Echocardiogram: Report as noted above.  - Continue to monitor on telemetry   - Fasting Lipid panel in a.m         Continue management of other chronic medical conditions - Please see orders above         CONSULTS:    Nick Skinner:      Nutrition: ADULT DIET; Regular; 3 carb choices (45 gm/meal); Low Sodium (2 gm);  Likes rye bread    Prophylaxis Orders:   VTE -enoxaparin    CODE STATUS: FULL  ISOLATION:       DISCHARGE PLAN: tbd     Total face-to-face time spent with this patient, time spent reviewing medical records, and in coordination of care with the emergency department physician, nursing staff, in the examination, evaluation/assessment, counseling, review of medications and plan, was  50 mins . Electronically signed by   Arcadio Boggs MD, MPH, MD  Internal Medicine Hospitalist   7/31/2021 4:54 PM      EMR Dragon/Transcription disclaimer:   Much of this encounter note is an electronic transcription/translation of spoken language to printed text.  The electronic translation of spoken language may permit erroneous, or at times, nonsensical words or phrases to be inadvertently transcribed; although attempts have made to review the note for such errors, some may still exist.

## 2021-07-31 NOTE — DISCHARGE INSTR - COC
Continuity of Care Form    Patient Name: Price Cope   :  1980  MRN:  [id number]    Admit date:  2021  Discharge date:  ***    Code Status Order: No Order   Advance Directives:     Admitting Physician:  No admitting provider for patient encounter.   PCP: Coral Issa MD    Discharging Nurse: [facility] Unit/Room#: A  Discharging Unit Phone Number: ***    Emergency Contact:   Extended Emergency Contact Information  Primary Emergency Contact: Ammy Schwartz [address] [place] [address] Phone: [phone]  Relation: Parent  Secondary Emergency Contact: Shaji Newberry Phone: [phone]  Relation: Other    Past Surgical History:  Past Surgical History:   Procedure Laterality Date    CHOLECYSTECTOMY         Immunization History:   Immunization History   Administered Date(s) Administered    PPD Test 2018    Tdap (Boostrix, Adacel) 2017       Active Problems:  Patient Active Problem List   Diagnosis Code    Type 2 diabetes mellitus ([facility] [address] E11.9    Anemia D64.9    Familial hypercholesterolemia E78.01    Rheumatoid arthritis involving multiple sites with positive rheumatoid factor (HCC) M05.79    Fibromyalgia M79.7    Bilateral carpal tunnel syndrome G56.03    PCOS (polycystic ovarian syndrome) E28.2    Iron deficiency anemia D50.9    Family history of ovarian cancer Z80.41    Menorrhagia with regular cycle N92.0       Isolation/Infection:   Isolation          No Isolation        Patient Infection Status     None to display          Nurse Assessment:  Last Vital Signs: BP (!) 185/128   Pulse 71   Temp 98.5 °F (36.9 °C) (Infrared)   Resp 21   Ht 5' 2\" (1.575 m)   LMP 2021 (Approximate)   SpO2 98%   BMI 55.24 kg/m²     Last documented pain score (0-10 scale): Pain Level: 5  Last Weight:   Wt Readings from Last 1 Encounters:   21 (!) 302 lb (137 kg)     Mental Status:  {IP PT MENTAL STATUS:}    IV Access:  [address] IV ACCESS:423693764}    Nursing Mobility/ADLs:  Walking   {CHP DME EOME:259527498}  Transfer  {CHP DME VDAZ:897138259}  Bathing  {CHP DME ILHW:334433756}  Dressing  {CHP DME NESQ:785751245}  Toileting  {CHP DME RBQM:349943756}  Feeding  {CHP DME NRZB:676979760}  Med Admin  {P DME BHRH:417949738}  Med Delivery   {[facility] MED Delivery:603983240}    Wound Care Documentation and Therapy:        Elimination:  Continence:   · Bowel: {YES / CZ:36354}  · Bladder: {YES / OF:02809}  Urinary Catheter: {Urinary Catheter:198837050}   Colostomy/Ileostomy/Ileal Conduit: {YES / WT:75405}       Date of Last BM: ***  No intake or output data in the 24 hours ending 21 0003  No intake/output data recorded.     Safety Concerns:     [address] Safety Concerns:605283770}    Impairments/Disabilities:      [address] Impairments/Disabilities:984145297}    Nutrition Therapy:  Current Nutrition Therapy:   [address] Diet List:879346324}    Routes of Feeding: {[facility] DME Other Feedings:820148921}  Liquids: {Slp liquid thickness:73582}  Daily Fluid Restriction: {CHP DME Yes amt example:377791091}  Last Modified Barium Swallow with Video (Video Swallowing Test): {Done Not Done UUFW:179974758}    Treatments at the Time of Hospital Discharge:   Respiratory Treatments: ***  Oxygen Therapy:  {Therapy; copd oxygen:95637}  Ventilator:    {[facility] Vent GZEC:412592586}    Rehab Therapies: {THERAPEUTIC INTERVENTION:7428500507}  Weight Bearing Status/Restrictions: [address] Weight Bearin}  Other Medical Equipment (for information only, NOT a DME order):  {EQUIPMENT:409267973}  Other Treatments: ***    Patient's personal belongings (please select all that are sent with patient):  {[facility] DME Belongings:016193344}    RN SIGNATURE:  {Esignature:183843545}    CASE MANAGEMENT/SOCIAL WORK SECTION    Inpatient Status Date: ***    Readmission Risk Assessment Score:  Readmission Risk              Risk of Unplanned Readmission:  0 Discharging to Facility/ Agency   · Name:   · Address:  · Phone:  · Fax:    Dialysis Facility (if applicable)   · Name:  · Address:  · Dialysis Schedule:  · Phone:  · Fax:    / signature: {Esignature:019382860}    PHYSICIAN SECTION    Prognosis: {Prognosis:1814224084}    Condition at Discharge: Sandip Bauer Patient Condition:692744531}    Rehab Potential (if transferring to Rehab): {Prognosis:0217714960}    Recommended Labs or Other Treatments After Discharge: ***    Physician Certification: I certify the above information and transfer of Carla Faith  is necessary for the continuing treatment of the diagnosis listed and that she requires {Admit to Appropriate Level of Care:58032} for {GREATER/LESS:61980} 30 days.      Update Admission H&P: {CHP DME Changes in [facility]:818090250}    PHYSICIAN SIGNATURE:  {Esignature:660753971}

## 2021-07-31 NOTE — PROGRESS NOTES
Nico Seat arrived to room # 525. Presented with: Hypertension  Mental Status: Patient is oriented, alert, coherent, logical, thought processes intact and able to concentrate and follow conversation. Vitals:    07/31/21 0245   BP: (!) 168/101   Pulse: 86   Resp: 16   Temp:    SpO2: 96%     Patient safety contract and falls prevention contract reviewed with patient Yes. Oriented Patient to room. Call light within reach. Yes.   Needs, issues or concerns expressed at this time: no.      Electronically signed by Ayesha Mata RN on 7/31/2021 at 4:24 AM

## 2021-07-31 NOTE — PROGRESS NOTES
Comprehensive Nutrition Assessment    Type and Reason for Visit:  Initial, Positive Nutrition Screen    Nutrition Recommendations/Plan: modify current diet to 3 carb control with higher protein levels    Nutrition Assessment:  Pt is well nourished. Modified current diet to include Carb Control. Pt requested a 3 carb level. New A1C  NA    Malnutrition Assessment:  Malnutrition Status:  No malnutrition    Context:  Acute Illness     Findings of the 6 clinical characteristics of malnutrition:  Energy Intake:  Mild decrease in energy intake (Comment)  Weight Loss:  No significant weight loss     Body Fat Loss:  No significant body fat loss     Muscle Mass Loss:  No significant muscle mass loss    Fluid Accumulation:  No significant fluid accumulation     Strength:  Not Performed    Nutrition Related Findings:  well nourished--morbidly obese      Wounds:  None       Current Nutrition Therapies:    ADULT DIET; Regular; 3 carb choices (45 gm/meal); Low Sodium (2 gm)    Anthropometric Measures:  · Height: 5' 2\" (157.5 cm)  · Current Body Weight:  (NA)   · Admission Body Weight:  (NA)    · Usual Body Weight: 302 lb (137 kg) (6/2021)     · Ideal Body Weight: 110 lbs; % Ideal Body Weight     · BMI:    · Adjusted Body Weight:  ; No Adjustment    · BMI Categories: Obese Class 3 (BMI 40.0 or greater)       Nutrition Diagnosis:   · Overweight/Obese related to excessive energy intake as evidenced by BMI      Nutrition Interventions:   Food and/or Nutrient Delivery:  Modify Current Diet  Nutrition Education/Counseling:  No recommendation at this time   Coordination of Nutrition Care:  Continue to monitor while inpatient    Goals:  PO intake 50% or greater. Glucose/accuchek's under 140.        Nutrition Monitoring and Evaluation:   Behavioral-Environmental Outcomes:  None Identified   Food/Nutrient Intake Outcomes:  Food and Nutrient Intake  Physical Signs/Symptoms Outcomes:  Weight, Nutrition Focused Physical Findings Discharge Planning:          Electronically signed by Mario May MS, RD, LD on 7/31/21 at 12:17 PM CDT    Contact: [phone]

## 2021-07-31 NOTE — PROGRESS NOTES
4 Eyes Skin Assessment    Timothy Coker is being assessed upon: Admission    I agree that I, Edmund Flores RN, along with Chente Giles RN (either 2 RN's or 1 LPN and 1 RN) have performed a thorough Head to Toe Skin Assessment on the patient. ALL assessment sites listed below have been assessed. Areas assessed by both nurses:     [x]   Head, Face, and Ears   [x]   Shoulders, Back, and Chest  [x]   Arms, Elbows, and Hands   [x]   Coccyx, Sacrum, and Ischium  [x]   Legs, Feet, and Heels    Does the Patient have Skin Breakdown?  No    Seth Prevention initiated: Yes  Wound Care Orders initiated: No    WOC nurse consulted for Pressure Injury (Stage 3,4, Unstageable, DTI, NWPT, and Complex wounds) and New or Established Ostomies: NA        Primary Nurse eSignature: Edmund Flores RN on 7/31/2021 at 4:25 AM      Co-Signer eSignature: Electronically signed by Chente Giles RN on 7/31/21 at 4:40 AM CDT

## 2021-07-31 NOTE — CONSULTS
[facility] Neurology Consult  ? ? Patient: Gail Hartman  MR#: [id number]  Account Number: [de-identified]   Room: 0525/525-01   YOB: 1980  Date of Progress Note: 7/31/2021  Time of Note 12:39 PM  Attending Physician: Roly Kumar MD  Consulting Physician: Adolfo Baxter M.D.  ?  ? CHIEF COMPLAINT: Right facial numbness and hypertensive urgency  ? HISTORY OF PRESENT ILLNESS:   This 36 y.o. female who presents with hypertension and right facial numbness. She had the same problem about a week ago [facility] and was admitted and believes she had an unremarkable MRI at that time. Nevertheless she was placed on a baby aspirin. She came into our ED on 7/30 with similar symptoms. Blood pressure was greater than 185/120. She was admitted for this. As her blood pressure came down her right facial numbness went away. Denies any other focal neurological difficulties. Says she has rheumatoid arthritis and fibromyalgia along with a history of pseudotumor cerebri. She does not take Diamox. Otherwise denies any focal signs or symptoms. Chart reviewed. REVIEW OF SYSTEMS:  Constitutional - No fever or chills. No diaphoresis or significant fatigue. HENT - No tinnitus or significant hearing loss. Eyes - no sudden vision change or eye pain  Respiratory - no significant shortness of breath or cough  Cardiovascular - no chest pain No palpitations or significant leg swelling  Gastrointestinal - no abdominal swelling or pain. Genitourinary - No difficulty urinating, dysuria  Musculoskeletal - no back pain or myalgia. Skin - no color change or rash  Neurologic - No seizures. No lateralizing weakness. Hematologic - no easy bruising or excessive bleeding. Psychiatric - no severe anxiety or nervousness. All other review of systems are negative. ?   Past Medical History:      Diagnosis Date    Anemia     Chronic rheumatic arthritis ([address]     Elevated blood pressure reading     Fibromyalgia     Intracranial hypertension     Obesity     PCOS (polycystic ovarian syndrome)     Sleep apnea     Type 2 diabetes mellitus ([facility] [address]      Past Surgical History:      Procedure Laterality Date    CHOLECYSTECTOMY       Medications in Hospital:     Current Facility-Administered Medications:     Norgestim-Eth Estrad Triphasic 0.18/0.215/0.25 MG-35 MCG TABS 1 tablet, 1 tablet, Oral, Daily, Brittany Wolfe MD    hydrALAZINE (APRESOLINE) injection 10 mg, 10 mg, Intravenous, Q4H PRN, Brittany Wolfe MD    sodium chloride flush 0.9 % injection 5-40 mL, 5-40 mL, Intravenous, 2 times per day, Brittany Wolfe MD, 10 mL at 07/31/21 3418    sodium chloride flush 0.9 % injection 5-40 mL, 5-40 mL, Intravenous, PRN, Brittany Wolfe MD    0.9 % sodium chloride infusion, 25 mL, Intravenous, PRN, Brittany Wolfe MD    enoxaparin (LOVENOX) injection 40 mg, 40 mg, Subcutaneous, Daily, Brittany Wolfe MD, 40 mg at 07/31/21 0949    ondansetron (ZOFRAN-ODT) disintegrating tablet 4 mg, 4 mg, Oral, Q8H PRN, 4 mg at 07/31/21 0337 **OR** ondansetron (ZOFRAN) injection 4 mg, 4 mg, Intravenous, Q6H PRN, Brittany Wolfe MD    acetaminophen (TYLENOL) tablet 650 mg, 650 mg, Oral, Q6H PRN **OR** acetaminophen (TYLENOL) suppository 650 mg, 650 mg, Rectal, Q6H PRN, Brittany Wolfe MD    polyethylene glycol (GLYCOLAX) packet 17 g, 17 g, Oral, Daily PRN, Brittany Wolfe MD    melatonin disintegrating tablet 5 mg, 5 mg, Oral, Nightly PRN, Brittany Wolfe MD    naloxone [facility]) injection 0.4 mg, 0.4 mg, Intravenous, PRN, Brittany Wolfe MD    calcium carbonate (TUMS) chewable tablet 500 mg, 500 mg, Oral, TID PRN, Brittany Wolfe MD    insulin glargine (LANTUS) injection vial 15 Units, 15 Units, Subcutaneous, Nightly, Brittany Wolfe MD    insulin lispro (HUMALOG) injection vial 0-18 Units, 0-18 Units, Subcutaneous, TID WC, Brittany Wolfe MD    insulin lispro (HUMALOG) injection vial 0-9 Units, 0-9 Units, Subcutaneous, Nightly, Brittany Wolfe MD    glucose (GLUTOSE) 40 % oral gel 15 g, 15 g, Oral, PRN, Panfilo Mancia MD    dextrose 50 % IV solution, 12.5 g, Intravenous, PRN, Panfilo Mancia MD    glucagon (rDNA) injection 1 mg, 1 mg, Intramuscular, PRN, Panfilo Mancia MD    dextrose 5 % solution, 100 mL/hr, Intravenous, PRN, Panfilo Mancia MD    atorvastatin (LIPITOR) tablet 40 mg, 40 mg, Oral, Nightly, Roly Kumar MD  Allergies: Mushroom extract complex  Social History:   TOBACCO:  reports that she has quit smoking. Her smoking use included cigarettes. She quit after 2.00 years of use. She has never used smokeless tobacco.  ETOH:  reports current alcohol use of about 1.0 standard drinks of alcohol per week. Family History:       Problem Relation Age of Onset    High Blood Pressure Father     Diabetes Father     Atrial Fibrillation Father     Heart Failure Father     Stroke Father     Diabetes Sister     Diabetes Maternal Grandmother     Heart Attack Other     Ovarian Cancer Paternal Grandmother      ? ? Physical Exam:    Vitals: BP (!) 140/76   Pulse 70   Temp 97 °F (36.1 °C) (Temporal)   Resp 18   Ht 5' 2\" (1.575 m)   LMP 07/28/2021 (Approximate)   SpO2 98%   BMI 55.24 kg/m²   Constitutional - well developed, well nourished. Morbid obesity  Eyes - conjunctiva normal. Pupils react to light  Ear, nose, throat -hearing intact to finger rub No scars, masses, or lesions over external nose or ears, no atrophy of tongue  Neck-symmetric, no masses noted, no jugular vein distension  Respiration- chest wall appears symmetric, good expansion,   normal effort without use of accessory muscles  Musculoskeletal - no significant wasting of muscles noted, no bony deformities  Extremities-no clubbing, cyanosis or edema  Skin - warm, dry, and intact. No rash, erythema, or pallor.   Psychiatric - mood, affect, and behavior appear normal.   Neurological exam  Awake, alert, fluent oriented x 3 appropriate affect  Attention and concentration appear appropriate  Recent and remote memory appears unremarkable  Speech normal without dysarthria  No clear issues with language of fund of knowledge  Cranial Nerve Exam   CN II- Visual fields grossly unremarkable  CN III, IV,VI-EOMI, No nystagmus, conjugate eye movements, no ptosis  CN V-sensation intact to LT over face  CN VII-no facial assymetry  CN VIII-Hearing intact to voice  CN IX and X- Palate elevates in midline  CN XI-good shoulder shrug  CN XII-Tongue midline with no fasciculations or fibrillations  Motor Exam  V/V throughout upper and lower extremities bilaterally, no cogwheeling, normal tone  Sensory Exam  Sensation intact to light touch and temperature upper and lower extremities bilaterally  Reflexes   Absent throughout. No Babinski  Tremors- no tremors in hands or head noted  Gait  Not tested  Coordination  Finger to nose-unremarkable  ? ?  CBC:   Recent Labs     07/30/21  2240   WBC 9.1   HGB 13.6        BMP:   Recent Labs     07/30/21  2240   *   K 4.3   CL 99   CO2 25   BUN 9   CREATININE 0.6   GLUCOSE 129*     Hepatic:   Recent Labs     07/30/21  2240   AST 22   ALT 33   BILITOT 0.3   ALKPHOS 75     Lipids:   Recent Labs     07/31/21  0924   CHOL 167   HDL 51*     INR: No results for input(s): INR in the last 72 hours. ?  ?  Assessment and Plan   1. Right facial numbness in the setting of hypertensive urgency. Symptoms resolved when her blood pressure went down. CTA of the head and neck were unremarkable through the ED. Symptoms likely related to elevated blood pressure. Would continue baby aspirin. See no reason to pursue MRI currently. We had a long talk regarding all the above. Available as needed.   ?  ?      Koby Sanchez MD  Board Certified in Neurology

## 2021-08-01 VITALS
SYSTOLIC BLOOD PRESSURE: 136 MMHG | BODY MASS INDEX: 55.24 KG/M2 | HEART RATE: 67 BPM | RESPIRATION RATE: 16 BRPM | OXYGEN SATURATION: 98 % | HEIGHT: 62 IN | DIASTOLIC BLOOD PRESSURE: 86 MMHG | TEMPERATURE: 96 F

## 2021-08-01 LAB
ANION GAP SERPL CALCULATED.3IONS-SCNC: 13 MMOL/L (ref 7–19)
BASOPHILS ABSOLUTE: 0.1 K/UL (ref 0–0.2)
BASOPHILS RELATIVE PERCENT: 0.7 % (ref 0–1)
BUN BLDV-MCNC: 10 MG/DL (ref 6–20)
CALCIUM SERPL-MCNC: 9.4 MG/DL (ref 8.6–10)
CHLORIDE BLD-SCNC: 100 MMOL/L (ref 98–111)
CO2: 21 MMOL/L (ref 22–29)
CREAT SERPL-MCNC: 0.6 MG/DL (ref 0.5–0.9)
EOSINOPHILS ABSOLUTE: 0.4 K/UL (ref 0–0.6)
EOSINOPHILS RELATIVE PERCENT: 4.8 % (ref 0–5)
GFR AFRICAN AMERICAN: >59
GFR NON-AFRICAN AMERICAN: >60
GLUCOSE BLD-MCNC: 101 MG/DL (ref 70–99)
GLUCOSE BLD-MCNC: 164 MG/DL (ref 70–99)
GLUCOSE BLD-MCNC: 192 MG/DL (ref 74–109)
GLUCOSE BLD-MCNC: 95 MG/DL (ref 70–99)
HCT VFR BLD CALC: 40.3 % (ref 37–47)
HEMOGLOBIN: 12.7 G/DL (ref 12–16)
IMMATURE GRANULOCYTES #: 0 K/UL
LYMPHOCYTES ABSOLUTE: 2.6 K/UL (ref 1.1–4.5)
LYMPHOCYTES RELATIVE PERCENT: 34.5 % (ref 20–40)
MCH RBC QN AUTO: 26.7 PG (ref 27–31)
MCHC RBC AUTO-ENTMCNC: 31.5 G/DL (ref 33–37)
MCV RBC AUTO: 84.8 FL (ref 81–99)
MONOCYTES ABSOLUTE: 0.5 K/UL (ref 0–0.9)
MONOCYTES RELATIVE PERCENT: 7.1 % (ref 0–10)
NEUTROPHILS ABSOLUTE: 3.9 K/UL (ref 1.5–7.5)
NEUTROPHILS RELATIVE PERCENT: 52.5 % (ref 50–65)
PDW BLD-RTO: 14.1 % (ref 11.5–14.5)
PERFORMED ON: ABNORMAL
PERFORMED ON: ABNORMAL
PERFORMED ON: NORMAL
PLATELET # BLD: 252 K/UL (ref 130–400)
PMV BLD AUTO: 10.7 FL (ref 9.4–12.3)
POTASSIUM REFLEX MAGNESIUM: 4.3 MMOL/L (ref 3.5–5)
RBC # BLD: 4.75 M/UL (ref 4.2–5.4)
SODIUM BLD-SCNC: 134 MMOL/L (ref 136–145)
WBC # BLD: 7.4 K/UL (ref 4.8–10.8)

## 2021-08-01 PROCEDURE — 96372 THER/PROPH/DIAG INJ SC/IM: CPT

## 2021-08-01 PROCEDURE — 82947 ASSAY GLUCOSE BLOOD QUANT: CPT

## 2021-08-01 PROCEDURE — 36415 COLL VENOUS BLD VENIPUNCTURE: CPT

## 2021-08-01 PROCEDURE — 85025 COMPLETE CBC W/AUTO DIFF WBC: CPT

## 2021-08-01 PROCEDURE — G0378 HOSPITAL OBSERVATION PER HR: HCPCS

## 2021-08-01 PROCEDURE — 2580000003 HC RX 258: Performed by: HOSPITALIST

## 2021-08-01 PROCEDURE — 6360000002 HC RX W HCPCS: Performed by: HOSPITALIST

## 2021-08-01 PROCEDURE — 80048 BASIC METABOLIC PNL TOTAL CA: CPT

## 2021-08-01 PROCEDURE — 99225 PR SBSQ OBSERVATION CARE/DAY 25 MINUTES: CPT | Performed by: PSYCHIATRY & NEUROLOGY

## 2021-08-01 RX ORDER — LOSARTAN POTASSIUM 100 MG/1
100 TABLET ORAL DAILY
Status: DISCONTINUED | OUTPATIENT
Start: 2021-08-02 | End: 2021-08-01 | Stop reason: HOSPADM

## 2021-08-01 RX ADMIN — SODIUM CHLORIDE, PRESERVATIVE FREE 10 ML: 5 INJECTION INTRAVENOUS at 08:50

## 2021-08-01 RX ADMIN — SODIUM CHLORIDE, PRESERVATIVE FREE 10 ML: 5 INJECTION INTRAVENOUS at 00:25

## 2021-08-01 RX ADMIN — ENOXAPARIN SODIUM 40 MG: 40 INJECTION SUBCUTANEOUS at 08:50

## 2021-08-01 NOTE — PROGRESS NOTES
Patient:   Tom Bynum  MR#:    [id number]   Room:    [facility]9/618-78   YOB: 1980  Date of Progress Note: 8/1/2021  Time of Note                           11:05 AM  Consulting Physician:   Lexie Nieves M.D. Attending Physician:  Fermin Henry MD     CHIEF COMPLAINT: Right facial numbness and hypertensive urgency  ? Subjective  This 36 y.o. female who presents with hypertension and right facial numbness. She had the same problem about a week ago [place] and was admitted and believes she had an unremarkable MRI at that time. Nevertheless she was placed on a baby aspirin. She came into our ED on 7/30 with similar symptoms. Blood pressure was greater than 185/120. She was admitted for this. As her blood pressure came down her right facial numbness went away. Denies any other focal neurological difficulties. Says she has rheumatoid arthritis and fibromyalgia along with a history of pseudotumor cerebri. She does not take Diamox. Otherwise denies any focal signs or symptoms. No further difficulties overnight. Blood pressure better. REVIEW OF SYSTEMS:  Constitutional: No fevers No chills  Neck:No stiffness  Respiratory: No shortness of breath  Cardiovascular: No chest pain No palpitations  Gastrointestinal: No abdominal pain    Genitourinary: No Dysuria  Neurological: No headache, no confusion      PHYSICAL EXAM:  /86   Pulse 62   Temp 97.4 °F (36.3 °C) (Temporal)   Resp 16   Ht 5' 2\" (1.575 m)   LMP 07/28/2021 (Approximate)   SpO2 98%   BMI 55.24 kg/m²     Constitutional: she appears well-developed and well-nourished. Eyes - conjunctiva normal.  Pupils react to light  Ear, nose, throat -hearing intact to voice.  No scars, masses, or lesions over external nose or ears, no atrophy of tongue  Neck-symmetric, no masses noted, no jugular vein distension  Respiration- chest wall appears symmetric, good expansion,   normal effort without use of accessory muscles  Cardiovascular- RRR  Musculoskeletal - no significant wasting of muscles noted, no bony deformities, gait no gross ataxia  Extremities-no clubbing, cyanosis or edema  Skin - warm, dry, and intact. No rash, erythema, or pallor. Psychiatric - mood, affect, and behavior appear normal.      Neurology  NEUROLOGICAL EXAM:      Mental status   Awake, alert, fluent oriented x 3 appropriate affect  Attention and concentration appear appropriate  Recent and remote memory appears unremarkable  Speech normal without dysarthria  No clear issues with language       Cranial Nerves   CN II- Visual fields grossly unremarkable  CN III, IV,VI-EOMI, No nystagmus, conjugate eye movements, no ptosis  CN VII-no facial asymmetry  CN VIII-Hearing intact   CN IX and X- Palate elevates in midline  CN XI-good shoulder shrug  CN XII-Tongue midline with no fasciculations or fibrillations          Motor function  Antigravity x 4     Sensory function Intact to light touch     Cerebellar F-N intact     Tremor None present     Gait                  Not tested           Nursing/pcp notes, imaging,labs and vitals reviewed.      PT,OT and/or speech notes reviewed    Lab Results   Component Value Date    WBC 7.4 08/01/2021    HGB 12.7 08/01/2021    HCT 40.3 08/01/2021    MCV 84.8 08/01/2021     08/01/2021     Lab Results   Component Value Date     (L) 07/30/2021    K 4.3 07/30/2021    CL 99 07/30/2021    CO2 25 07/30/2021    BUN 9 07/30/2021    CREATININE 0.6 07/30/2021    GLUCOSE 129 (H) 07/30/2021    CALCIUM 9.5 07/30/2021    PROT 8.1 07/30/2021    LABALBU 4.0 07/30/2021    BILITOT 0.3 07/30/2021    ALKPHOS 75 07/30/2021    AST 22 07/30/2021    ALT 33 07/30/2021    LABGLOM >60 07/30/2021    GFRAA >59 07/30/2021     Lab Results   Component Value Date    INR 1.01 02/28/2021    INR 0.96 07/31/2018    INR 1.08 02/23/2012     Lab Results   Component Value Date    CRP 1.86 (H) 07/31/2021     EXAM: MRI BRAIN WO CONTRAST -- 7/31/2021 4:04 PM   HISTORY: 40 years, Female, facial numbness. COMPARISON: 7/31/2021 CT head   TECHNIQUE:    Routine pulse sequences of the brain were obtained without IV   contrast.    FINDINGS:    No acute intracranial hemorrhage, midline shift, mass effect, or   restricted diffusion to indicate acute infarct. Ventricles, cisterns   and sulci are normal in size and configuration. Partially empty sella. Midline structures otherwise within normal limits. Brainstem and   posterior fossa are within normal limits. Visualized flow voids within normal limits. Paranasal sinus mucosal   thickening. No mastoid effusion. Globes and retrobulbar soft tissues   grossly within normal limits considering technique. Visualized   overlying soft tissues within normal limits.       Impression   1. No acute or suspicious intracranial finding. 2. Paranasal sinus mucosal thickening. Signed by Dr Lilian Krause: Previous medical records, medications were reviewed at today's visit    IMPRESSION:   Right facial numbness in the setting of hypertensive urgency. Symptoms resolved when her blood pressure went down. CTA of the head and neck were unremarkable through the ED. Symptoms likely related to elevated blood pressure. Would continue baby aspirin. MRI unremarkable. No further recommendations. Discharge okay from a neurological standpoint. Follow-up on an as-needed basis only.

## 2021-08-01 NOTE — DISCHARGE SUMMARY
[place], [place], [address]  DEPARTMENT OF HOSPITALIST MEDICINE    DISCHARGE SUMMARY:        Svitlana Stratton  :  1980  MRN:  [id number]    Admit date:  2021  Discharge date:  2021      Admitting Physician:  Savannah Valera MD    Advance Directive: Full Code    Consults Made:   IP CONSULT TO NEUROLOGY      Primary Care Physician:  Tasneem Garcia MD    Discharge Diagnoses:  Principal Problem:    Facial numbness  Active Problems:    Type 2 diabetes mellitus ([address]    Familial hypercholesterolemia    Hypertension  Resolved Problems:    * No resolved hospital problems. *          Pertinent Labs:  CBC with DIFF:  Recent Labs     21  2240 21  1008   WBC 9.1 7.4   RBC 5.08 4.75   HGB 13.6 12.7   HCT 42.8 40.3   MCV 84.3 84.8   MCH 26.8* 26.7*   MCHC 31.8* 31.5*   RDW 14.0 14.1    252   MPV 11.4 10.7   NEUTOPHILPCT 52.8 52.5   LYMPHOPCT 36.5 34.5   MONOPCT 6.6 7.1   EOSRELPCT 3.0 4.8   BASOPCT 0.8 0.7   NEUTROABS 4.8 3.9   LYMPHSABS 3.3 2.6   MONOSABS 0.60 0.50   EOSABS 0.30 0.40   BASOSABS 0.10 0.10       CMP/BMP:  Recent Labs     21  2240 21  1008   * 134*   K 4.3 4.3   CL 99 100   CO2 25 21*   ANIONGAP 11 13   GLUCOSE 129* 192*   BUN 9 10   CREATININE 0.6 0.6   LABGLOM >60 >60   CALCIUM 9.5 9.4   PROT 8.1  --    LABALBU 4.0  --    BILITOT 0.3  --    ALKPHOS 75  --    ALT 33  --    AST 22  --          CRP:    Recent Labs     21  0924   CRP 1.86*     Sed Rate:    Recent Labs     21  0924   SEDRATE 28*         HgBA1c:  No components found for: HGBA1C  FLP:    Lab Results   Component Value Date    TRIG 54 2021    HDL 51 2021    LDLCALC 105 2021     TSH:    Lab Results   Component Value Date    TSH 2.500 2020     Troponin T:   Recent Labs     21  2240 21  0924 21  1419   TROPONINI <0.01 <0.01 <0.01     Pro-BNP: No results for input(s): BNP in the last 72 hours.   INR: No results for input(s): INR in the last Right-sided facial numbness COMPARISON: No existing relevant imaging studies available DLP: 329 mGy cm. Automated exposure control was utilized to minimize patient radiation dose. TECHNIQUE: Enhanced CT images obtained of the neck with multiplanar reformats. 3D postprocessing, including MIPs, performed and images saved to PACS. Grading of carotid stenosis performed using NASCET criteria. FINDINGS: Aorta. Partially visualized aorta with 3 vessel arch, appears within normal limits. Right carotid artery. Right common and internal carotid arteries are patent. No evidence of critical stenosis, dissection or aneurysm. Medialization of the right internal carotid artery. Left carotid artery. Left common and internal carotid arteries are patent. No evidence of critical stenosis, dissection or aneurysm. Medialization of the left internal carotid artery. Vertebrobasilar. Technique limits evaluation of the vertebral arteries at their origin. Remaining vertebral arteries appear normal in course and caliber. Basilar artery patent. Soft tissues. The aerodigestive tract is within normal limits of appearance. No cervical adenopathy by size or morphologic criteria. Major salivary glands within normal limits of appearance. Thyroid is unremarkable. Visualized portion of the paranasal sinuses appear clear. No mastoid effusion. External auditory canals patent. No acute or suspicious intracranial abnormality within the imaged field of view. No acute or suspicious bony finding. 1. Limited exam. Vasculature of the neck appears grossly patent. 2. See separately dictated exams of the same day. Preliminary interpretation performed by [facility] and I agree with the preliminary findings. Signed by Dr Stephanie Bardley    Result Date: 7/31/2021  EXAM: CTA HEAD W CONTRAST DATE: 7/31/2021 1:13 AM HISTORY: Right side facial numbness COMPARISON: Same day CT head DLP: 329 mGy cm.  Automated exposure control was utilized to minimize patient radiation dose. TECHNIQUE: Enhanced CT performed of the head with multiplanar reformats. 3D postprocessing, including MIPs, performed and images saved to PACS. Grading of carotid artery stenosis was performed according to the NASCET criteria. FINDINGS: Vascular. Bilateral internal carotid arteries patent. Bilateral middle and anterior cerebral arteries patent. Partially visualized bilateral vertebral arteries patent and normal caliber. Basilar artery patent. Bilateral posterior cerebral and superior cerebellar arteries patent. Visualized intracranial contents. No mass effect or midline shift. Ventricles, sulci, basilar cisterns are normal in size and configuration for the patient's age. Parenchyma better evaluated on same-day CT head. Globes, retrobulbar soft tissues, paranasal sinuses, mastoid air cells and external auditory canals are within normal limits. CTA head. 1. No evidence of occlusion, high-grade stenosis, or aneurysm. Preliminary interpretation performed by [facility] and I agree with the preliminary findings. Signed by Dr Luis Martinez    MRI BRAIN WO CONTRAST    Result Date: 7/31/2021  EXAM: MRI BRAIN WO CONTRAST -- 7/31/2021 4:04 PM HISTORY: 40 years, Female, facial numbness. COMPARISON: 7/31/2021 CT head TECHNIQUE: Routine pulse sequences of the brain were obtained without IV contrast. FINDINGS: No acute intracranial hemorrhage, midline shift, mass effect, or restricted diffusion to indicate acute infarct. Ventricles, cisterns and sulci are normal in size and configuration. Partially empty sella. Midline structures otherwise within normal limits. Brainstem and posterior fossa are within normal limits. Visualized flow voids within normal limits. Paranasal sinus mucosal thickening. No mastoid effusion. Globes and retrobulbar soft tissues grossly within normal limits considering technique. Visualized overlying soft tissues within normal limits.     1. No acute or suspicious intracranial finding. 2. Paranasal sinus mucosal thickening. Signed by Dr Isaiah Reddy    ECHO 2D [address]    Result Date: 7/31/2021  Transthoracic Echocardiography Report (TTE)  Demographics   Patient Name  INA Currie All   Date of Study          07/31/2021   MRN           [id number]        Gender                 Female   Date of Birth 1980    Room Number            MHL-0525   Age           36 year(s)   Height:       62 inches     Referring Physician    Randell Rock MD   Weight:       280.01 pounds Sonographer            Kandiec Roberts [facility]   BSA:          2.21 m^2      Interpreting Physician Gabbie Moore MD   BMI:          51.21 kg/m^2  Procedure Type of Study   TTE procedure:ECHO NO CONTRAST WITH DOP/COLR. Study Location: Echo Lab Technical Quality: Adequate visualization Patient Status: Inpatient Contrast Medium: Bubble Study. BP: 114/76 mmHg Indications:CVA and TIA. Conclusions   Summary  Structurally normal mitral valve with normal leaflet mobility. No evidence  of mitral valve stenosis or mild mitral regurgitation. Aortic valve appears to be tricuspid. Structurally normal aortic valve. No significant aortic regurgitation or stenosis is noted. Tricuspid valve is structurally normal.  No evidence of tricuspid regurgitation. The pulmonic valve was not well visualized. LAe  Normal left ventricular size with preserved LV function and an estimated  ejection fraction of approximately 55-60%. LVH  No evidence of left ventricular mass or thrombus noted. Normal right atrial dimension with no evidence of thrombus or mass noted. Normal right ventricular size with preserved RV function. No evidence of significant pericardial effusion is noted. Aortic root is within normal limits.    Signature   ----------------------------------------------------------------  Electronically signed by Gabbie Moore MD(Interpreting  physician) on 07/31/2021 11:16 AM ----------------------------------------------------------------   Findings   Mitral Valve  Structurally normal mitral valve with normal leaflet mobility. No evidence  of mitral valve stenosis or mild mitral regurgitation. Aortic Valve  Aortic valve appears to be tricuspid. Structurally normal aortic valve. No significant aortic regurgitation or stenosis is noted. Tricuspid Valve  Tricuspid valve is structurally normal.  No evidence of tricuspid regurgitation. Pulmonic Valve  The pulmonic valve was not well visualized. Left Atrium  LAe   Left Ventricle  Normal left ventricular size with preserved LV function and an estimated  ejection fraction of approximately 55-60%. LVH  No evidence of left ventricular mass or thrombus noted. Right Atrium  Normal right atrial dimension with no evidence of thrombus or mass noted. Right Ventricle  Normal right ventricular size with preserved RV function. Pericardial Effusion  No evidence of significant pericardial effusion is noted. Miscellaneous  Aortic root is within normal limits. M-Mode Measurements (cm)   LVIDd: 4.41 cm                       LVIDs: 2.83 cm  IVSd: 1.15 cm  LVPWd: 1.19 cm                       AO Root Dimension: 2.3 cm  % Ejection Fraction: 65 %            LA: 4.4 cm                                       LVOT: 2 cm  Doppler Measurements:   AV Peak Velocity:203 cm/s           MV Peak E-Wave: 79.7 cm/s  AV Peak Gradient: 16.48 mmHg        MV Peak A-Wave: 91.3 cm/s                                      MV E/A Ratio: 0.87 %  TR Velocity:197 cm/s                MV Peak Gradient: 2.54 mmHg  TR Gradient:15.52 mmHg  Estimated RAP:5 mmHg  RVSP:23 mmHg        ECHOCARDIOGRAM:     Summary   Structurally normal mitral valve with normal leaflet mobility.  No evidence   of mitral valve stenosis or mild mitral regurgitation.   Aortic valve appears to be tricuspid.   Structurally normal aortic valve.   No significant aortic regurgitation or stenosis is noted.   Tricuspid valve is structurally normal.   No evidence of tricuspid regurgitation.   The pulmonic valve was not well visualized.  LAe   Normal left ventricular size with preserved LV function and an estimated   ejection fraction of approximately 55-60%. LVH   No evidence of left ventricular mass or thrombus noted.   Normal right atrial dimension with no evidence of thrombus or mass noted.   Normal right ventricular size with preserved RV function.   No evidence of significant pericardial effusion is noted.   Aortic root is within normal limits.      Signature   ----------------------------------------------------------------   Electronically signed by Sydney Vicente MD(Interpreting   physician) on 07/31/2021 11:16 AM   ----------------------------------------------------------------        Hospital Course:   Ms. Aníbal Hyde, a 36 y.o. female with a history of DMT2, benign intracranial HTN, PCOS, HTN, SETH, RA, presenting to [facility] ED (07/30/2021), with complaints of elevated blood pressure, as well as a 1 history of right facial and throat numbness.     Associated symptoms reported include; chest pressure and palpitations.     Patient was reportedly discharged from [facility] in [facility] about 2 days prior to presenting to [facility], after being admitted for uncontrolled HTN.     Initial BP up to 202/109 on presentation. CT head, CTA head and neck with contrast, with no acute pathology.     Patient placed on observation, further work-up and management          Right Facial Numbness. Acute CVA vsTIA -ruled out  Hypertensive urgency-resolved  · Patient was not a candidate for thrombolytic therapy. · Atorvastatin 40 mg by mouth nightly   · Resume home antihypertensive regimen  · Losartan 100 mg p.o. daily  · TTE, with agitated saline bubble study report as above  · MRI Brain WO Con (07/31/2021): No acute or suspicious intracranial finding. Paranasal sinus mucosal thickening.   · CTA Neck & Head W / WO Con-report as noted above.  · Followed by neurology  · Cyndi for discharge from neurology standpoint       History of Diabetes Mellitus II  · Hemoglobin A1C: 6.5%  · Inpatient Regimens to included;  ? - Insulin Glargine (Lantus) 15 units subcu nightly  ? - Insulin Lispro (Humalog) on a High dose sliding scale  · Monitored POC glucose, and adjusted insulin regimen accordingly based on daily insulin requirement. · Home diabetic regimen upon discharge          Chest pain  - resolved  · - Initial troponin negative  x3  · - Serial EKGs for chest pain  · - Optimized medical management  · --> Nitro glycerin sublingual when necessary for chest pain  · - 2D Echocardiogram: Report as noted above. · - Continued to monitor on telemetry  · Patient has denied any active chest pain since admission. Chest pain was associated with initial elevated BP. · Follow-up with PCP outpatient.           Continue management of other chronic medical conditions     Physical Exam:  Vital Signs: /86   Pulse 67   Temp 96 °F (35.6 °C) (Temporal)   Resp 16   Ht 5' 2\" (1.575 m)   LMP 07/28/2021 (Approximate)   SpO2 98%   BMI 55.24 kg/m²   Physical Exam  Vitals and nursing note reviewed. Constitutional:       General: She is not in acute distress. Appearance: Normal appearance. She is obese. She is not ill-appearing, toxic-appearing or diaphoretic. HENT:      Head: Normocephalic and atraumatic. Right Ear: External ear normal.      Left Ear: External ear normal.      Nose: Nose normal. No congestion or rhinorrhea. Mouth/Throat:      Mouth: Mucous membranes are moist.      Pharynx: Oropharynx is clear. No oropharyngeal exudate or posterior oropharyngeal erythema. Eyes:      General: No scleral icterus. Right eye: No discharge. Left eye: No discharge. Extraocular Movements: Extraocular movements intact. Conjunctiva/sclera: Conjunctivae normal.      Pupils: Pupils are equal, round, and reactive to light.    Neck: Vascular: No carotid bruit. Cardiovascular:      Rate and Rhythm: Normal rate and regular rhythm. Pulses: Normal pulses. Heart sounds: Normal heart sounds. No murmur heard. No friction rub. No gallop. Pulmonary:      Effort: Pulmonary effort is normal. No respiratory distress. Breath sounds: Normal breath sounds. No stridor. No wheezing, rhonchi or rales. Chest:      Chest wall: No tenderness. Abdominal:      General: Bowel sounds are normal. There is no distension. Palpations: Abdomen is soft. Tenderness: There is no abdominal tenderness. There is no guarding or rebound. Musculoskeletal:         General: No swelling, tenderness, deformity or signs of injury. Normal range of motion. Cervical back: Normal range of motion and neck supple. No rigidity. No muscular tenderness. Right lower leg: No edema. Left lower leg: No edema. Skin:     General: Skin is warm and dry. Capillary Refill: Capillary refill takes less than 2 seconds. Coloration: Skin is not jaundiced or pale. Findings: No bruising, erythema, lesion or rash. Neurological:      General: No focal deficit present. Mental Status: She is alert and oriented to person, place, and time. Cranial Nerves: No cranial nerve deficit. Sensory: No sensory deficit. Motor: No weakness. Coordination: Coordination normal.   Psychiatric:         Mood and Affect: Mood normal.         Behavior: Behavior normal.         Thought Content: Thought content normal.         Judgment: Judgment normal.         Discharge Medications:       Valentino Espinosa   [place] Medication Instructions VALDO:[id number]    Printed on:08/01/21 [id number]   Medication Information                      Blood Glucose Monitoring Suppl (FREESTYLE LITE) ANDREEA  USE AS DIRECTED             blood glucose test strips (ASCENSIA AUTODISC VI;ONE TOUCH ULTRA TEST VI) strip  1 each by In Vitro route 3 times daily As needed. CIMZIA STARTER KIT 6 X 200 MG/ML KIT               HUMIRA PEN 40 MG/0.8ML injection  1 each every 14 days             Lancet Device MISC  1 lancet twice a day             losartan (COZAAR) 100 MG tablet  Take 100 mg by mouth daily             metFORMIN (GLUCOPHAGE) 500 MG tablet  Take 1 tablet by mouth 2 times daily (with meals)             Norgestim-Eth Estrad Triphasic (TRI-SPRINTEC) 0.18/0.215/0.25 MG-35 MCG TABS  Take 1 tablet by mouth daily             Semaglutide,0.25 or 0.5MG/DOS, 2 MG/1.5ML SOPN  Inject 0.5 mg into the skin once a week             valACYclovir (VALTREX) 1 g tablet  Take 2 tablets p.o. every 12 hours for 2 doses. May repeat for further outbreaks                 Discharge Instructions: Follow up with Lyn Yadav MD in 7 days. Take medications as directed. Resume activity as tolerated. Diet: ADULT DIET; Regular; 3 carb choices (45 gm/meal); Low Sodium (2 gm); Likes rye bread        DISCHARGE STATUS:    Condition: Fair  Disposition: Patient is medically stable and will be discharged Home    Extended Emergency Contact Information  Primary Emergency Contact: Ammy Roberts [address] Phone: [phone]  Relation: Parent  Secondary Emergency Contact: Shaji Newberry Phone: [phone]  Relation: Other       Time Spent on discharge is  34 mins in the examination, evaluation, counseling and review of medications and discharge plan. Electronically signed by   Rufina Woodward MD, MPH, MD,   Internal Medicine Hospitalist   8/1/2021 11:58 AM      Thank you Lyn Yadav MD for the opportunity to be involved in this patient's care. If you have any questions or concerns please feel free to contact me at [phone]        EMR Dragon/Transcription disclaimer:   Much of this encounter note is an electronic transcription/translation of spoken language to printed text.  The electronic translation of spoken language may permit erroneous, or at times, nonsensical words or phrases to be inadvertently transcribed; although attempts have made to review the note for such errors, some may still exist.

## 2021-08-02 ENCOUNTER — CARE COORDINATION (OUTPATIENT)
Dept: CASE MANAGEMENT | Age: 41
End: 2021-08-02

## 2021-08-02 ENCOUNTER — OFFICE VISIT (OUTPATIENT)
Dept: FAMILY MEDICINE CLINIC | Age: 41
End: 2021-08-02
Payer: MEDICAID

## 2021-08-02 VITALS
HEART RATE: 90 BPM | OXYGEN SATURATION: 98 % | BODY MASS INDEX: 52.49 KG/M2 | WEIGHT: 287 LBS | SYSTOLIC BLOOD PRESSURE: 144 MMHG | DIASTOLIC BLOOD PRESSURE: 94 MMHG

## 2021-08-02 DIAGNOSIS — I10 ESSENTIAL (PRIMARY) HYPERTENSION: Primary | ICD-10-CM

## 2021-08-02 DIAGNOSIS — G47.33 OSA ON CPAP: ICD-10-CM

## 2021-08-02 DIAGNOSIS — R07.9 CHEST PAIN, UNSPECIFIED TYPE: ICD-10-CM

## 2021-08-02 DIAGNOSIS — Z99.89 OSA ON CPAP: ICD-10-CM

## 2021-08-02 LAB
HOMOCYSTEINE: 6 UMOL/L (ref 0–15)
RPR: NORMAL

## 2021-08-02 PROCEDURE — 99215 OFFICE O/P EST HI 40 MIN: CPT | Performed by: FAMILY MEDICINE

## 2021-08-02 RX ORDER — CARVEDILOL 3.12 MG/1
3.12 TABLET ORAL 2 TIMES DAILY
Qty: 60 TABLET | Refills: 3 | Status: SHIPPED | OUTPATIENT
Start: 2021-08-02 | End: 2021-08-06 | Stop reason: SDUPTHER

## 2021-08-02 RX ORDER — CLONIDINE HYDROCHLORIDE 0.1 MG/1
TABLET ORAL
Qty: 60 TABLET | Refills: 5 | OUTPATIENT
Start: 2021-08-02 | End: 2022-04-14

## 2021-08-02 NOTE — PROGRESS NOTES
Formerly [facility] PHYSICIAN SERVICES  [facility] FAMILY MEDICINE  [address] [address]  Dept: [phone]  Dept Fax: [phone]  Loc: [phone]    Arcadio Ferrari is a 36 y.o. female who presents today for her medical conditions/complaints asnoted below. Arcadio Ferrari is here for Follow-Up from hospitals date:  7/30/2021                      Discharge date:  8/1/2021       Admitting Physician:  Hudson Roach MD     Advance Directive: Full Code     Consults Made:   IP CONSULT TO NEUROLOGY        Primary Care Physician:  Tri Knight MD     Discharge Diagnoses:  Principal Problem:    Facial numbness  Active Problems:    Type 2 diabetes mellitus ([address]    Familial hypercholesterolemia    Hypertension  Resolved Problems:    * No resolved hospital problems. *        Inpatient course: Discharge summary reviewed- see chart for full details. 80-year-old who was admitted on July 25 through July 27 in [facility] for elevated blood pressures. She returned to the emergency department at [facility]) on July 30 complaining of chest pressure, right facial numbness, and high blood pressure. Initial blood pressure was up to 202/109. Regarding her right facial numbness, she had the following work-up:  ECHOCARDIOGRAM:     Summary   Structurally normal mitral valve with normal leaflet mobility. No evidence   of mitral valve stenosis or mild mitral regurgitation.   Aortic valve appears to be tricuspid.   Structurally normal aortic valve.   No significant aortic regurgitation or stenosis is noted.   Tricuspid valve is structurally normal.   No evidence of tricuspid regurgitation.   The pulmonic valve was not well visualized.  LAe   Normal left ventricular size with preserved LV function and an estimated   ejection fraction of approximately 55-60%.  LVH   No evidence of left ventricular mass or thrombus noted.   Normal right atrial dimension with no evidence of thrombus or mass noted.   Normal right ventricular size with preserved RV function.   No evidence of significant pericardial effusion is noted.   Aortic root is within normal limits.      Signature   ----------------------------------------------------------------   Electronically signed by Maggie Mitchell MD(Interpreting   physician) on 07/31/2021 11:16 AM   ----------------------------------------------------------------    MRI of brain showed no acute or suspicious intracranial findings. CT angiogram of head showed no evidence of occlusion, high-grade stenosis or aneurysm. CT angiogram of neck showed no specific abnormalities. Neurology was also consulted. Blood pressure was managed in the hospital she was discharged in stable condition. Regarding her chest pain, her troponins were negative and serial EKGs were unchanged. [facility] Risk Score (risk of hospital readmission):No data recorded  Active Problems:    * No active hospital problems. *        Care management risk score Rising risk (score 2-5) and Complex Care (Scores >=6): 5     Non face to face  following discharge, date last encounter closed (first attempt may havebeen earlier): 8/3/2021 12:33 PM    Call initiated 2 business days of discharge: Yes      HPI andCOURSE SINCE DISCHARGE   CC:  Here today to discuss thefollowing:    Blood pressures have improved since being home. She is tolerating losartan 100 mg  She denies any headache or facial numbness since being home. She does not feel rested during the day. She states she is compliant with her CPAP but does feel drowsy.       HPI    Past Medical History:   Diagnosis Date    Anemia     Chronic rheumatic arthritis [address] [address]     Elevated blood pressure reading     Fibromyalgia     Intracranial hypertension     Obesity     PCOS (polycystic ovarian syndrome)     Sleep apnea     Type 2 diabetes mellitus ([facility] [address]       Past Surgical History:   Procedure Laterality Date    CHOLECYSTECTOMY         Family History   Problem Relation Age of Onset    High Blood Pressure Father     Diabetes Father     Atrial Fibrillation Father     Heart Failure Father     Stroke Father     Diabetes Sister     Diabetes Maternal Grandmother     Heart Attack Other     Ovarian Cancer Paternal Grandmother        Social History     Tobacco Use    Smoking status: Former Smoker     Years: 2.00     Types: Cigarettes    Smokeless tobacco: Never Used   Substance Use Topics    Alcohol use: Yes     Alcohol/week: 1.0 standard drinks     Types: 1 Glasses of wine per week     Comment: occ        Medications patient taking as of now reconciled against medications ordered at time of hospital discharge     Current Outpatient Medications   Medication Sig Dispense Refill    cloNIDine (CATAPRES) 0.1 MG tablet Twice a day as needed if blood pressure >180/100 60 tablet 5    losartan (COZAAR) 100 MG tablet Take 100 mg by mouth daily      CIMZIA STARTER KIT 6 X 200 MG/ML KIT       blood glucose test strips (ASCENSIA AUTODISC VI;ONE TOUCH ULTRA TEST VI) strip 1 each by In Vitro route 3 times daily As needed. 100 each 11    metFORMIN (GLUCOPHAGE) 500 MG tablet Take 1 tablet by mouth 2 times daily (with meals) 60 tablet 5    valACYclovir (VALTREX) 1 g tablet Take 2 tablets p.o. every 12 hours for 2 doses.   May repeat for further outbreaks 8 tablet 2    Norgestim-Eth Estrad Triphasic (TRI-SPRINTEC) 0.18/0.215/0.25 MG-35 MCG TABS Take 1 tablet by mouth daily 84 tablet 3    Blood Glucose Monitoring Suppl (FREESTYLE LITE) ANDREEA USE AS DIRECTED      Lancet Device MISC 1 lancet twice a day 60 Device 11    predniSONE (DELTASONE) 5 MG tablet PLEASE SEE ATTACHED FOR DETAILED DIRECTIONS      carvedilol (COREG) 6.25 MG tablet Take 1 tablet by mouth 2 times daily 60 tablet 0    HUMIRA PEN 40 MG/0.8ML injection 1 each every 14 days (Patient not taking: Reported on 6/28/2021)       No current arteries: no bruit observed. No murmur heard. Respiratory:  Effort normal and breath sounds normal. No respiratory distress. No wheezes. No rales. No use of accessory muscles or intercostal retractions. Neurological: alert. Psychiatric: normal mood and affect. Her behavior is normal. Normal judgement and insight observed.       Recent Results (from the past 672 hour(s))   CBC Auto Differential    Collection Time: 07/30/21 10:40 PM   Result Value Ref Range    WBC 9.1 4.8 - 10.8 K/uL    RBC 5.08 4.20 - 5.40 M/uL    Hemoglobin 13.6 12.0 - 16.0 g/dL    Hematocrit 42.8 37.0 - 47.0 %    MCV 84.3 81.0 - 99.0 fL    MCH 26.8 (L) 27.0 - 31.0 pg    MCHC 31.8 (L) 33.0 - 37.0 g/dL    RDW 14.0 11.5 - 14.5 %    Platelets 497 805 - 343 K/uL    MPV 11.4 9.4 - 12.3 fL    Neutrophils % 52.8 50.0 - 65.0 %    Lymphocytes % 36.5 20.0 - 40.0 %    Monocytes % 6.6 0.0 - 10.0 %    Eosinophils % 3.0 0.0 - 5.0 %    Basophils % 0.8 0.0 - 1.0 %    Neutrophils Absolute 4.8 1.5 - 7.5 K/uL    Immature Granulocytes # 0.0 K/uL    Lymphocytes Absolute 3.3 1.1 - 4.5 K/uL    Monocytes Absolute 0.60 0.00 - 0.90 K/uL    Eosinophils Absolute 0.30 0.00 - 0.60 K/uL    Basophils Absolute 0.10 0.00 - 0.20 K/uL   Comprehensive Metabolic Panel w/ Reflex to MG    Collection Time: 07/30/21 10:40 PM   Result Value Ref Range    Sodium 135 (L) 136 - 145 mmol/L    Potassium reflex Magnesium 4.3 3.5 - 5.0 mmol/L    Chloride 99 98 - 111 mmol/L    CO2 25 22 - 29 mmol/L    Anion Gap 11 7 - 19 mmol/L    Glucose 129 (H) 74 - 109 mg/dL    BUN 9 6 - 20 mg/dL    CREATININE 0.6 0.5 - 0.9 mg/dL    GFR Non-African American >60 >60    GFR African American >59 >59    Calcium 9.5 8.6 - 10.0 mg/dL    Total Protein 8.1 6.6 - 8.7 g/dL    Albumin 4.0 3.5 - 5.2 g/dL    Total Bilirubin 0.3 0.2 - 1.2 mg/dL    Alkaline Phosphatase 75 35 - 104 U/L    ALT 33 5 - 33 U/L    AST 22 5 - 32 U/L   Troponin    Collection Time: 07/30/21 10:40 PM   Result Value Ref Range    Troponin <0.01 0.00 - 0.03 ng/mL   Brain Natriuretic Peptide    Collection Time: 07/30/21 10:40 PM   Result Value Ref Range    Pro-BNP 78 0 - 450 pg/mL   Urinalysis Reflex to Culture    Collection Time: 07/30/21 11:15 PM    Specimen: Urine, clean catch   Result Value Ref Range    Color, UA YELLOW Straw/Yellow    Clarity, UA Clear Clear    Glucose, Ur Negative Negative mg/dL    Bilirubin Urine Negative Negative    Ketones, Urine Negative Negative mg/dL    Specific Gravity, UA 1.008 1.005 - 1.030    Blood, Urine MODERATE (A) Negative    pH, UA 6.0 5.0 - 8.0    Protein, UA Negative Negative mg/dL    Urobilinogen, Urine 1.0 <2.0 E.U./dL    Nitrite, Urine Negative Negative    Leukocyte Esterase, Urine Negative Negative   Pregnancy, Urine    Collection Time: 07/30/21 11:15 PM   Result Value Ref Range    HCG(Urine) Pregnancy Test Negative    Microscopic Urinalysis    Collection Time: 07/30/21 11:15 PM   Result Value Ref Range    Bacteria, UA NEGATIVE (A) None Seen /HPF    Crystals, UA NEG (A) None Seen /HPF    Hyaline Casts, UA 1 0 - 8 /HPF    WBC, UA 1 0 - 5 /HPF    RBC, UA 36 (H) 0 - 4 /HPF    Epithelial Cells, UA 1 0 - 5 /HPF   COVID-19, Rapid    Collection Time: 07/30/21 11:25 PM    Specimen: Nasopharyngeal Swab   Result Value Ref Range    SARS-CoV-2, NAAT Not Detected Not Detected   POCT Glucose    Collection Time: 07/31/21  8:06 AM   Result Value Ref Range    POC Glucose 110 (H) 70 - 99 mg/dl    Performed on AccuChek    Vitamin D 25 Hydroxy    Collection Time: 07/31/21  9:24 AM   Result Value Ref Range    Vit D, 25-Hydroxy 18.3 (L) >=30 ng/mL   Vitamin B12    Collection Time: 07/31/21  9:24 AM   Result Value Ref Range    Vitamin B-12 761 211 - 946 pg/mL   Homocysteine, Plasma    Collection Time: 07/31/21  9:24 AM   Result Value Ref Range    Homocysteine 6 0 - 15 umol/L   RPR    Collection Time: 07/31/21  9:24 AM   Result Value Ref Range    RPR Non-reactive Non-reactive   Folate    Collection Time: 07/31/21  9:24 AM   Result Value Ref Range    Folate 13.8 4.8 - 37.3 ng/mL   Lipid Panel    Collection Time: 07/31/21  9:24 AM   Result Value Ref Range    Cholesterol, Total 167 160 - 199 mg/dL    Triglycerides 54 0 - 149 mg/dL    HDL 51 (L) 65 - 121 mg/dL    LDL Calculated 105 <100 mg/dL   Sedimentation Rate    Collection Time: 07/31/21  9:24 AM   Result Value Ref Range    Sed Rate 28 (H) 0 - 20 mm/Hr   C-Reactive Protein    Collection Time: 07/31/21  9:24 AM   Result Value Ref Range    CRP 1.86 (H) 0.00 - 0.50 mg/dL   Hemoglobin A1C    Collection Time: 07/31/21  9:24 AM   Result Value Ref Range    Hemoglobin A1C 6.5 (H) 4.0 - 6.0 %   Troponin    Collection Time: 07/31/21  9:24 AM   Result Value Ref Range    Troponin <0.01 0.00 - 0.03 ng/mL   POCT Glucose    Collection Time: 07/31/21 11:24 AM   Result Value Ref Range    POC Glucose 180 (H) 70 - 99 mg/dl    Performed on AccuChek    Troponin    Collection Time: 07/31/21  2:19 PM   Result Value Ref Range    Troponin <0.01 0.00 - 0.03 ng/mL   POCT Glucose    Collection Time: 07/31/21  5:39 PM   Result Value Ref Range    POC Glucose 113 (H) 70 - 99 mg/dl    Performed on AccuChek    POCT Glucose    Collection Time: 08/01/21 12:18 AM   Result Value Ref Range    POC Glucose 95 70 - 99 mg/dl    Performed on AccuChek    POCT Glucose    Collection Time: 08/01/21  7:15 AM   Result Value Ref Range    POC Glucose 101 (H) 70 - 99 mg/dl    Performed on AccuChek    Basic Metabolic Panel w/ Reflex to MG    Collection Time: 08/01/21 10:08 AM   Result Value Ref Range    Sodium 134 (L) 136 - 145 mmol/L    Potassium reflex Magnesium 4.3 3.5 - 5.0 mmol/L    Chloride 100 98 - 111 mmol/L    CO2 21 (L) 22 - 29 mmol/L    Anion Gap 13 7 - 19 mmol/L    Glucose 192 (H) 74 - 109 mg/dL    BUN 10 6 - 20 mg/dL    CREATININE 0.6 0.5 - 0.9 mg/dL    GFR Non-African American >60 >60    GFR African American >59 >59    Calcium 9.4 8.6 - 10.0 mg/dL   CBC Auto Differential    Collection Time: 08/01/21 10:08 AM   Result Value Ref Range    WBC 7.4 4.8 - 10.8 K/uL    RBC 4.75 4.20 - 5.40 M/uL    Hemoglobin 12.7 12.0 - 16.0 g/dL    Hematocrit 40.3 37.0 - 47.0 %    MCV 84.8 81.0 - 99.0 fL    MCH 26.7 (L) 27.0 - 31.0 pg    MCHC 31.5 (L) 33.0 - 37.0 g/dL    RDW 14.1 11.5 - 14.5 %    Platelets 860 617 - 835 K/uL    MPV 10.7 9.4 - 12.3 fL    Neutrophils % 52.5 50.0 - 65.0 %    Lymphocytes % 34.5 20.0 - 40.0 %    Monocytes % 7.1 0.0 - 10.0 %    Eosinophils % 4.8 0.0 - 5.0 %    Basophils % 0.7 0.0 - 1.0 %    Neutrophils Absolute 3.9 1.5 - 7.5 K/uL    Immature Granulocytes # 0.0 K/uL    Lymphocytes Absolute 2.6 1.1 - 4.5 K/uL    Monocytes Absolute 0.50 0.00 - 0.90 K/uL    Eosinophils Absolute 0.40 0.00 - 0.60 K/uL    Basophils Absolute 0.10 0.00 - 0.20 K/uL   POCT Glucose    Collection Time: 08/01/21 11:17 AM   Result Value Ref Range    POC Glucose 164 (H) 70 - 99 mg/dl    Performed on AccuChek    CBC Auto Differential    Collection Time: 08/04/21  9:56 PM   Result Value Ref Range    WBC 9.0 4.8 - 10.8 K/uL    RBC 4.91 4.20 - 5.40 M/uL    Hemoglobin 13.3 12.0 - 16.0 g/dL    Hematocrit 41.7 37.0 - 47.0 %    MCV 84.9 81.0 - 99.0 fL    MCH 27.1 27.0 - 31.0 pg    MCHC 31.9 (L) 33.0 - 37.0 g/dL    RDW 13.7 11.5 - 14.5 %    Platelets 816 467 - 246 K/uL    MPV 11.2 9.4 - 12.3 fL    Neutrophils % 63.2 50.0 - 65.0 %    Lymphocytes % 27.1 20.0 - 40.0 %    Monocytes % 5.8 0.0 - 10.0 %    Eosinophils % 2.9 0.0 - 5.0 %    Basophils % 0.8 0.0 - 1.0 %    Neutrophils Absolute 5.7 1.5 - 7.5 K/uL    Immature Granulocytes # 0.0 K/uL    Lymphocytes Absolute 2.5 1.1 - 4.5 K/uL    Monocytes Absolute 0.50 0.00 - 0.90 K/uL    Eosinophils Absolute 0.30 0.00 - 0.60 K/uL    Basophils Absolute 0.10 0.00 - 0.20 K/uL   SPECIMEN REJECTION    Collection Time: 08/04/21 10:43 PM   Result Value Ref Range    Rejected Test cmp     Reason for Rejection see below    SPECIMEN REJECTION    Collection Time: 08/04/21 11:10 PM   Result Value Ref Range    Rejected Test cmp     Reason for Rejection see below Comprehensive Metabolic Panel    Collection Time: 08/04/21 11:14 PM   Result Value Ref Range    Sodium 138 136 - 145 mmol/L    Potassium 3.8 3.5 - 5.0 mmol/L    Chloride 105 98 - 111 mmol/L    CO2 23 22 - 29 mmol/L    Anion Gap 10 7 - 19 mmol/L    Glucose 119 (H) 74 - 109 mg/dL    BUN 7 6 - 20 mg/dL    CREATININE 0.6 0.5 - 0.9 mg/dL    GFR Non-African American >60 >60    GFR African American >59 >59    Calcium 8.5 (L) 8.6 - 10.0 mg/dL    Total Protein 7.0 6.6 - 8.7 g/dL    Albumin 3.6 3.5 - 5.2 g/dL    Total Bilirubin 0.4 0.2 - 1.2 mg/dL    Alkaline Phosphatase 63 35 - 104 U/L    ALT 18 5 - 33 U/L    AST 12 5 - 32 U/L       Assessment & Plan: The following diagnoses and conditions are stable with no further orders unless indicated:  1. Essential (primary) hypertension  Suggest the following studies  Clonidine as needed  Start carvedilol 3.125 mg twice a day    She did not tolerate amlodipine    - US RENAL ARTERIES DUPLEX; Future  - Renin Activity and Aldosterone; Future  - Metanephrines, urine, 24 hour; Future  - cloNIDine (CATAPRES) 0.1 MG tablet; Twice a day as needed if blood pressure >180/100  Dispense: 60 tablet; Refill: 5    2. SETH on CPAP    - Sleep Study with PAP Titration; Future  - Ambulatory referral to Sleep Medicine    Regarding her chest discomfort, suggest we get a stress test  Diagnostic test results reviewed. Medical Decision Making: high complexity    Return in about 3 weeks (around 8/23/2021).

## 2021-08-02 NOTE — CARE COORDINATION
[address] Transitions Initial Follow Up Call    Call within 2 business days of discharge: Yes    Patient: Chris Godinez Patient : 1980   MRN: [id number]  Reason for Admission:   Discharge Date: 21 RARS: No data recorded    Last Discharge [facility]       Complaint Diagnosis Description Type Department Provider    21 Numbness Hypertensive urgency . .. ED to Hosp-Admission (Discharged) (ADMITTED) [facility] Megan Mercado MD; [facility]. .. Spoke with: N/A    Facility: [place] [id number]    Non-face-to-face services provided:  Reviewed encounter information for continuity of care prior to follow up phone call - chart notes, consults, progress notes, test results, med list, appointments, AVS, other information. Care Transitions 24 Hour Call    Care Transitions Interventions         Follow Up : Attempted to make contact with Jair Rojas for an initial follow up call post discharge from the hospital without success. Unable to leave a message regarding intent of call and call back information. Will try again my next business day.      Future Appointments   Date Time Provider Sindy Verde   2021  3:30 PM Evangelist Earl MD [facility], RN

## 2021-08-02 NOTE — PATIENT INSTRUCTIONS
We are committed to providing you with the best care possible. In order to help us achieve these goals please remember to bring all medications, herbal products, and over the counter supplements with you to each visit. If your provider has ordered testing for you, please be sure to follow up with our office if you have not received results within 7 days after the testing took place. *If you receive a survey after visiting one of our offices, please take time to share your experience concerning your physician office visit. These surveys are confidential and no health information about you is shared. We are eager to improve for you and we are counting on your feedback to help make that happen.    _______________________________________________________________    1. Start carvedilol 3.125mg twice a day  2. Continue losartan 100 mg once a day  3. Take clonidine if blood pressure is greater than 180/110  4. Drink 60 oz of fluid per day preferably water. 5. I will call you back once  I get full records for next testing. I will order a sleep titration study.

## 2021-08-03 ENCOUNTER — CARE COORDINATION (OUTPATIENT)
Dept: CASE MANAGEMENT | Age: 41
End: 2021-08-03

## 2021-08-03 NOTE — CARE COORDINATION
[address] [facility] Transitions Initial Follow Up Call    Call within 2 business days of discharge: Yes    Patient: Donaldo Vazquez Patient : 1980   MRN: [id number]  Reason for Admission:   Discharge Date: 21 RARS: No data recorded    Last Discharge [facility]       Complaint Diagnosis Description Type Department Provider    21 Numbness Hypertensive urgency . .. ED to Hosp-Admission (Discharged) (ADMITTED) [facility] Khalif London MD; Etha Osgood Pi. .. Spoke with: [id number] [employer] [facility] Decision Maker:    Primary Decision Maker: Pedro Tadeo Nilam Amanda  [phone]    Transitions of Care Initial Call      Challenges to be reviewed by the provider   Additional needs identified to be addressed with provider: No  none             Method of communication with provider : none      Advance Care Planning:   Does patient have an Advance Directive: reviewed and current, reviewed and needs to be updated, not on file; education provided, not on file, patient declined education, decision maker updated and referral to internal ACP facilitator. Was this a readmission? No  Patient stated reason for admission: HTN emergency  Patients top risk factors for readmission: medical condition-HTN and medication management    Care Transition Nurse (CTN) contacted the patient by telephone to perform post hospital discharge assessment. Verified name and  with patient as identifiers. Provided introduction to self, and explanation of the CTN role. CTN reviewed discharge instructions, medical action plan and red flags with patient who verbalized understanding. Patient given an opportunity to ask questions and does not have any further questions or concerns at this time. Were discharge instructions available to patient? Yes. Reviewed appropriate site of care based on symptoms and resources available to patient including: PCP.  The patient agrees to contact the PCP office for questions she has seen Dr. Abel Oviedo yesterday, he added Clonidine and Coreg to her medication regimen. She says blood pressure has been about like it was as inpatient, but hopefully these new medications will get it under control. She says she is eating and drinking ok. She does not check her blood sugars every day, says she usually does not eat a lot of carbs, and her readings are good. Her A1C was 6.5. CTN discussed calls and follow up and she is accepting. She does not have LW, and Mom is PHCDM, and she has not had the Covid vaccine. She dose have RA and is on a biologic for treatment. Encouraged to matt with prn needs. Will follow up at a later time.     Future Appointments   Date Time Provider Sindy Verde   8/27/2021  2:45 PM [facility]   9/14/2021  3:30 PM [facility]       Edith Baron RN

## 2021-08-04 ENCOUNTER — HOSPITAL ENCOUNTER (EMERGENCY)
Age: 41
Discharge: HOME | End: 2021-08-05
Attending: EMERGENCY MEDICINE
Payer: MEDICAID

## 2021-08-04 DIAGNOSIS — I10 ESSENTIAL HYPERTENSION: Primary | ICD-10-CM

## 2021-08-04 LAB
ALBUMIN SERPL-MCNC: 3.6 G/DL (ref 3.5–5.2)
ALP BLD-CCNC: 63 U/L (ref 35–104)
ALT SERPL-CCNC: 18 U/L (ref 5–33)
ANION GAP SERPL CALCULATED.3IONS-SCNC: 10 MMOL/L (ref 7–19)
AST SERPL-CCNC: 12 U/L (ref 5–32)
BASOPHILS ABSOLUTE: 0.1 K/UL (ref 0–0.2)
BASOPHILS RELATIVE PERCENT: 0.8 % (ref 0–1)
BILIRUB SERPL-MCNC: 0.4 MG/DL (ref 0.2–1.2)
BUN BLDV-MCNC: 7 MG/DL (ref 6–20)
CALCIUM SERPL-MCNC: 8.5 MG/DL (ref 8.6–10)
CHLORIDE BLD-SCNC: 105 MMOL/L (ref 98–111)
CO2: 23 MMOL/L (ref 22–29)
CREAT SERPL-MCNC: 0.6 MG/DL (ref 0.5–0.9)
EOSINOPHILS ABSOLUTE: 0.3 K/UL (ref 0–0.6)
EOSINOPHILS RELATIVE PERCENT: 2.9 % (ref 0–5)
GFR AFRICAN AMERICAN: >59
GFR NON-AFRICAN AMERICAN: >60
GLUCOSE BLD-MCNC: 119 MG/DL (ref 74–109)
HCT VFR BLD CALC: 41.7 % (ref 37–47)
HEMOGLOBIN: 13.3 G/DL (ref 12–16)
IMMATURE GRANULOCYTES #: 0 K/UL
LYMPHOCYTES ABSOLUTE: 2.5 K/UL (ref 1.1–4.5)
LYMPHOCYTES RELATIVE PERCENT: 27.1 % (ref 20–40)
MCH RBC QN AUTO: 27.1 PG (ref 27–31)
MCHC RBC AUTO-ENTMCNC: 31.9 G/DL (ref 33–37)
MCV RBC AUTO: 84.9 FL (ref 81–99)
MONOCYTES ABSOLUTE: 0.5 K/UL (ref 0–0.9)
MONOCYTES RELATIVE PERCENT: 5.8 % (ref 0–10)
NEUTROPHILS ABSOLUTE: 5.7 K/UL (ref 1.5–7.5)
NEUTROPHILS RELATIVE PERCENT: 63.2 % (ref 50–65)
PDW BLD-RTO: 13.7 % (ref 11.5–14.5)
PLATELET # BLD: 295 K/UL (ref 130–400)
PMV BLD AUTO: 11.2 FL (ref 9.4–12.3)
POTASSIUM SERPL-SCNC: 3.8 MMOL/L (ref 3.5–5)
RBC # BLD: 4.91 M/UL (ref 4.2–5.4)
REASON FOR REJECTION: NORMAL
REASON FOR REJECTION: NORMAL
REJECTED TEST: NORMAL
REJECTED TEST: NORMAL
SODIUM BLD-SCNC: 138 MMOL/L (ref 136–145)
TOTAL PROTEIN: 7 G/DL (ref 6.6–8.7)
WBC # BLD: 9 K/UL (ref 4.8–10.8)

## 2021-08-04 PROCEDURE — 96374 THER/PROPH/DIAG INJ IV PUSH: CPT

## 2021-08-04 PROCEDURE — 93005 ELECTROCARDIOGRAM TRACING: CPT

## 2021-08-04 PROCEDURE — 36415 COLL VENOUS BLD VENIPUNCTURE: CPT

## 2021-08-04 PROCEDURE — 6360000002 HC RX W HCPCS: Performed by: EMERGENCY MEDICINE

## 2021-08-04 PROCEDURE — 2580000003 HC RX 258: Performed by: EMERGENCY MEDICINE

## 2021-08-04 PROCEDURE — 85025 COMPLETE CBC W/AUTO DIFF WBC: CPT

## 2021-08-04 PROCEDURE — 99283 EMERGENCY DEPT VISIT LOW MDM: CPT

## 2021-08-04 RX ORDER — CLONIDINE HYDROCHLORIDE 0.1 MG/1
0.1 TABLET ORAL ONCE
Status: COMPLETED | OUTPATIENT
Start: 2021-08-05 | End: 2021-08-05

## 2021-08-04 RX ORDER — ONDANSETRON 2 MG/ML
4 INJECTION INTRAMUSCULAR; INTRAVENOUS ONCE
Status: COMPLETED | OUTPATIENT
Start: 2021-08-04 | End: 2021-08-04

## 2021-08-04 RX ORDER — SODIUM CHLORIDE, SODIUM LACTATE, POTASSIUM CHLORIDE, AND CALCIUM CHLORIDE .6; .31; .03; .02 G/100ML; G/100ML; G/100ML; G/100ML
1000 INJECTION, SOLUTION INTRAVENOUS ONCE
Status: COMPLETED | OUTPATIENT
Start: 2021-08-04 | End: 2021-08-04

## 2021-08-04 RX ADMIN — SODIUM CHLORIDE, SODIUM LACTATE, POTASSIUM CHLORIDE, AND CALCIUM CHLORIDE 1000 ML: 600; 310; 30; 20 INJECTION, SOLUTION INTRAVENOUS at 22:30

## 2021-08-04 RX ADMIN — ONDANSETRON HYDROCHLORIDE 4 MG: 2 INJECTION, SOLUTION INTRAMUSCULAR; INTRAVENOUS at 22:30

## 2021-08-04 ASSESSMENT — PAIN DESCRIPTION - PAIN TYPE: TYPE: CHRONIC PAIN

## 2021-08-04 ASSESSMENT — PAIN DESCRIPTION - FREQUENCY: FREQUENCY: INTERMITTENT

## 2021-08-05 VITALS
BODY MASS INDEX: 51.53 KG/M2 | WEIGHT: 280 LBS | SYSTOLIC BLOOD PRESSURE: 156 MMHG | RESPIRATION RATE: 16 BRPM | HEIGHT: 62 IN | HEART RATE: 78 BPM | OXYGEN SATURATION: 98 % | TEMPERATURE: 98.1 F | DIASTOLIC BLOOD PRESSURE: 86 MMHG

## 2021-08-05 PROCEDURE — 36415 COLL VENOUS BLD VENIPUNCTURE: CPT

## 2021-08-05 PROCEDURE — 6370000000 HC RX 637 (ALT 250 FOR IP): Performed by: EMERGENCY MEDICINE

## 2021-08-05 PROCEDURE — 80053 COMPREHEN METABOLIC PANEL: CPT

## 2021-08-05 RX ADMIN — CLONIDINE HYDROCHLORIDE 0.1 MG: 0.1 TABLET ORAL at 00:08

## 2021-08-05 NOTE — ED NOTES
Bed: RME04  Expected date:   Expected time:   Means of arrival:   Comments:     Scott Foote RN  08/04/21 2110

## 2021-08-05 NOTE — ED PROVIDER NOTES
[facility] EMERGENCY DEPT  eMERGENCY dEPARTMENT eNCOUnter      Pt Name: Nico Ornelas  MRN: [id number]  [place] 1980  Date of evaluation: 8/4/2021  Provider: Jael Silverio MD    CHIEF COMPLAINT       Chief Complaint   Patient presents with    Hypertension     states 215/115 took bp meds at home          HISTORY OF PRESENT ILLNESS   (Location/Symptom, Timing/Onset,Context/Setting, Quality, Duration, Modifying Factors, Severity)  Note limiting factors. Nico Ornelas is a 39 y.o. female who presents to the emergency department for evaluation regarding elevated blood pressure. Patient states that she has been taking her medications for blood pressure management at home as directed. Earlier she noted her blood pressure was about 215/115. Patient states that she is not experiencing any chest pain or shortness of breath. Reported that she is not had a headache or acute changes in vision. She denies any recent changes in her blood pressure medication dosing. Has not had recent illnesses, fever or chills. No prior history of significantly elevated blood pressure she noted today. HPI    NursingNotes were reviewed. REVIEW OF SYSTEMS    (2-9 systems for level 4, 10 or more for level 5)     Review of Systems   Constitutional: Negative for chills and fever. HENT: Negative for congestion. Eyes: Negative for visual disturbance. Respiratory: Negative for shortness of breath. Cardiovascular: Negative for chest pain. Gastrointestinal: Negative for abdominal distention and abdominal pain. Neurological: Negative for dizziness, syncope, speech difficulty and headaches. All other systems reviewed and are negative.            PAST MEDICALHISTORY     Past Medical History:   Diagnosis Date    Anemia     Chronic rheumatic arthritis ([address]     Elevated blood pressure reading     Fibromyalgia     Intracranial hypertension     Obesity     PCOS (polycystic ovarian syndrome)     Sleep apnea     Type 2 diabetes mellitus ([facility]          SURGICAL HISTORY       Past Surgical History:   Procedure Laterality Date    CHOLECYSTECTOMY           CURRENT MEDICATIONS     Discharge Medication List as of 8/5/2021 12:08 AM      CONTINUE these medications which have NOT CHANGED    Details   carvedilol (COREG) 3.125 MG tablet Take 1 tablet by mouth 2 times daily, Disp-60 tablet, R-3Normal      cloNIDine (CATAPRES) 0.1 MG tablet Twice a day as needed if blood pressure >180/100, Disp-60 tablet, R-5Normal      losartan (COZAAR) 100 MG tablet Take 100 mg by mouth dailyHistorical Med      CIMZIA STARTER KIT 6 X 200 MG/ML KIT DAWHistorical Med      Semaglutide,0.25 or 0.5MG/DOS, 2 MG/1.5ML SOPN Inject 0.5 mg into the skin once a week, Disp-1 pen, R-5Print      blood glucose test strips (ASCENSIA AUTODISC VI;ONE TOUCH ULTRA TEST VI) strip 3 TIMES DAILY Starting Mon 5/24/2021, Disp-100 each, R-11, NormalAs needed. metFORMIN (GLUCOPHAGE) 500 MG tablet Take 1 tablet by mouth 2 times daily (with meals), Disp-60 tablet, R-5Normal      HUMIRA PEN 40 MG/0.8ML injection 1 each every 14 days, DAWHistorical Med      valACYclovir (VALTREX) 1 g tablet Take 2 tablets p.o. every 12 hours for 2 doses.   May repeat for further outbreaks, Disp-8 tablet, R-2Normal      Norgestim-Eth Estrad Triphasic (TRI-SPRINTEC) 0.18/0.215/0.25 MG-35 MCG TABS Take 1 tablet by mouth daily, Disp-84 tablet, R-3Normal      Blood Glucose Monitoring Suppl (FREESTYLE LITE) ANDREEA Historical Med      Lancet Device MISC Disp-60 Device,R-11, Normal1 lancet twice a day             ALLERGIES     Mushroom extract complex    FAMILY HISTORY       Family History   Problem Relation Age of Onset    High Blood Pressure Father     Diabetes Father     Atrial Fibrillation Father     Heart Failure Father     Stroke Father     Diabetes Sister     Diabetes Maternal Grandmother     Heart Attack Other     Ovarian Cancer Paternal Grandmother           SOCIAL HISTORY       Social History Socioeconomic History    Marital status: Single     Spouse name: None    Number of children: None    Years of education: None    Highest education level: None   Occupational History    None   Tobacco Use    Smoking status: Former Smoker     Years: 2.00     Types: Cigarettes    Smokeless tobacco: Never Used   Vaping Use    Vaping Use: Never used   Substance and Sexual Activity    Alcohol use: Yes     Alcohol/week: 1.0 standard drinks     Types: 1 Glasses of wine per week     Comment: occ    Drug use: No    Sexual activity: Yes     Birth control/protection: OCP   Other Topics Concern    None   Social History Narrative    None     Social Determinants of Health     Financial Resource Strain:     Difficulty of Paying Living Expenses:    Food Insecurity:     Worried About Running Out of Food in the Last Year:     Ran Out of Food in the Last Year:    Transportation Needs:     Lack of Transportation (Medical):  Lack of Transportation (Non-Medical):    Physical Activity:     Days of Exercise per Week:     Minutes of Exercise per Session:    Stress:     Feeling of Stress :    Social Connections:     Frequency of Communication with Friends and Family:     Frequency of Social Gatherings with Friends and Family:     Attends Orthodox Services:     Active Member of Clubs or Organizations:     Attends Club or Organization Meetings:     Marital Status:    Intimate Partner Violence:     Fear of Current or Ex-Partner:     Emotionally Abused:     Physically Abused:     Sexually Abused:        SCREENINGS             PHYSICAL EXAM    (up to 7 for level 4, 8 or more for level 5)     ED Triage Vitals   BP Temp Temp Source Pulse Resp SpO2 Height Weight   08/04/21 2053 08/04/21 2051 08/04/21 2051 08/04/21 2051 08/04/21 2051 08/04/21 2051 08/04/21 2133 08/04/21 2133   (!) 179/105 97 °F (36.1 °C) Temporal 85 17 96 % 5' 2\" (1.575 m) 280 lb (127 kg)       Physical Exam  Vitals and nursing note reviewed.

## 2021-08-06 ENCOUNTER — APPOINTMENT (OUTPATIENT)
Dept: CT IMAGING | Age: 41
End: 2021-08-06
Payer: MEDICAID

## 2021-08-06 ENCOUNTER — CARE COORDINATION (OUTPATIENT)
Dept: CASE MANAGEMENT | Age: 41
End: 2021-08-06

## 2021-08-06 ENCOUNTER — HOSPITAL ENCOUNTER (EMERGENCY)
Age: 41
Discharge: HOME | End: 2021-08-06
Attending: EMERGENCY MEDICINE
Payer: MEDICAID

## 2021-08-06 ENCOUNTER — TELEMEDICINE (OUTPATIENT)
Dept: FAMILY MEDICINE CLINIC | Age: 41
End: 2021-08-06
Payer: MEDICAID

## 2021-08-06 ENCOUNTER — APPOINTMENT (OUTPATIENT)
Dept: GENERAL RADIOLOGY | Age: 41
End: 2021-08-06
Payer: MEDICAID

## 2021-08-06 VITALS
HEART RATE: 90 BPM | DIASTOLIC BLOOD PRESSURE: 82 MMHG | TEMPERATURE: 98.1 F | WEIGHT: 280 LBS | HEIGHT: 62 IN | BODY MASS INDEX: 51.53 KG/M2 | OXYGEN SATURATION: 98 % | SYSTOLIC BLOOD PRESSURE: 138 MMHG | RESPIRATION RATE: 20 BRPM

## 2021-08-06 DIAGNOSIS — R42 LIGHTHEADED: ICD-10-CM

## 2021-08-06 DIAGNOSIS — R07.9 CHEST PAIN, UNSPECIFIED TYPE: ICD-10-CM

## 2021-08-06 DIAGNOSIS — I10 HYPERTENSION, UNSPECIFIED TYPE: Primary | ICD-10-CM

## 2021-08-06 DIAGNOSIS — K59.00 CONSTIPATION, UNSPECIFIED CONSTIPATION TYPE: ICD-10-CM

## 2021-08-06 DIAGNOSIS — I10 ESSENTIAL (PRIMARY) HYPERTENSION: ICD-10-CM

## 2021-08-06 DIAGNOSIS — I15.9 SECONDARY HYPERTENSION: Primary | ICD-10-CM

## 2021-08-06 DIAGNOSIS — R25.1 TREMOR: ICD-10-CM

## 2021-08-06 LAB
ALBUMIN SERPL-MCNC: 3.6 G/DL (ref 3.5–5.2)
ALP BLD-CCNC: 74 U/L (ref 35–104)
ALT SERPL-CCNC: 22 U/L (ref 5–33)
ANION GAP SERPL CALCULATED.3IONS-SCNC: 10 MMOL/L (ref 7–19)
AST SERPL-CCNC: 18 U/L (ref 5–32)
BACTERIA: NEGATIVE /HPF
BILIRUB SERPL-MCNC: 0.3 MG/DL (ref 0.2–1.2)
BILIRUBIN URINE: NEGATIVE
BLOOD, URINE: NEGATIVE
BUN BLDV-MCNC: 13 MG/DL (ref 6–20)
CALCIUM SERPL-MCNC: 9.3 MG/DL (ref 8.6–10)
CHLORIDE BLD-SCNC: 99 MMOL/L (ref 98–111)
CLARITY: CLEAR
CO2: 26 MMOL/L (ref 22–29)
COLOR: YELLOW
CREAT SERPL-MCNC: 0.9 MG/DL (ref 0.5–0.9)
CRYSTALS, UA: ABNORMAL /HPF
EPITHELIAL CELLS, UA: 3 /HPF (ref 0–5)
GFR AFRICAN AMERICAN: >59
GFR NON-AFRICAN AMERICAN: >60
GLUCOSE BLD-MCNC: 113 MG/DL (ref 74–109)
GLUCOSE URINE: NEGATIVE MG/DL
HCG QUALITATIVE: NEGATIVE
HCT VFR BLD CALC: 37.8 % (ref 37–47)
HEMOGLOBIN: 12.1 G/DL (ref 12–16)
HYALINE CASTS: 1 /HPF (ref 0–8)
KETONES, URINE: 15 MG/DL
LEUKOCYTE ESTERASE, URINE: ABNORMAL
LIPASE: 24 U/L (ref 13–60)
MCH RBC QN AUTO: 26.9 PG (ref 27–31)
MCHC RBC AUTO-ENTMCNC: 32 G/DL (ref 33–37)
MCV RBC AUTO: 84.2 FL (ref 81–99)
NITRITE, URINE: NEGATIVE
PDW BLD-RTO: 13.6 % (ref 11.5–14.5)
PH UA: 5.5 (ref 5–8)
PLATELET # BLD: 255 K/UL (ref 130–400)
PMV BLD AUTO: 10.8 FL (ref 9.4–12.3)
POTASSIUM REFLEX MAGNESIUM: 4.2 MMOL/L (ref 3.5–5)
PROTEIN UA: NEGATIVE MG/DL
RBC # BLD: 4.49 M/UL (ref 4.2–5.4)
RBC UA: 1 /HPF (ref 0–4)
SARS-COV-2, NAAT: NOT DETECTED
SODIUM BLD-SCNC: 135 MMOL/L (ref 136–145)
SPECIFIC GRAVITY UA: 1.01 (ref 1–1.03)
TOTAL PROTEIN: 7.8 G/DL (ref 6.6–8.7)
TROPONIN: <0.01 NG/ML (ref 0–0.03)
TROPONIN: <0.01 NG/ML (ref 0–0.03)
UROBILINOGEN, URINE: 0.2 E.U./DL
WBC # BLD: 9.1 K/UL (ref 4.8–10.8)
WBC UA: 5 /HPF (ref 0–5)

## 2021-08-06 PROCEDURE — 74022 RADEX COMPL AQT ABD SERIES: CPT

## 2021-08-06 PROCEDURE — 99214 OFFICE O/P EST MOD 30 MIN: CPT | Performed by: FAMILY MEDICINE

## 2021-08-06 PROCEDURE — 84484 ASSAY OF TROPONIN QUANT: CPT

## 2021-08-06 PROCEDURE — 70450 CT HEAD/BRAIN W/O DYE: CPT

## 2021-08-06 PROCEDURE — 83690 ASSAY OF LIPASE: CPT

## 2021-08-06 PROCEDURE — 36415 COLL VENOUS BLD VENIPUNCTURE: CPT

## 2021-08-06 PROCEDURE — 85027 COMPLETE CBC AUTOMATED: CPT

## 2021-08-06 PROCEDURE — 93005 ELECTROCARDIOGRAM TRACING: CPT | Performed by: EMERGENCY MEDICINE

## 2021-08-06 PROCEDURE — 81001 URINALYSIS AUTO W/SCOPE: CPT

## 2021-08-06 PROCEDURE — 87635 SARS-COV-2 COVID-19 AMP PRB: CPT

## 2021-08-06 PROCEDURE — 84703 CHORIONIC GONADOTROPIN ASSAY: CPT

## 2021-08-06 PROCEDURE — 80053 COMPREHEN METABOLIC PANEL: CPT

## 2021-08-06 PROCEDURE — 99282 EMERGENCY DEPT VISIT SF MDM: CPT

## 2021-08-06 PROCEDURE — G8427 DOCREV CUR MEDS BY ELIG CLIN: HCPCS | Performed by: FAMILY MEDICINE

## 2021-08-06 RX ORDER — PREDNISONE 1 MG/1
TABLET ORAL
COMMUNITY
Start: 2021-07-14 | End: 2021-11-12

## 2021-08-06 RX ORDER — CARVEDILOL 6.25 MG/1
6.25 TABLET ORAL 2 TIMES DAILY
Qty: 60 TABLET | Refills: 0 | Status: SHIPPED | OUTPATIENT
Start: 2021-08-06 | End: 2021-08-29

## 2021-08-06 NOTE — PROGRESS NOTES
Fabiola Larios (:  1980) is a 36 y.o. female,Established patient, here for evaluation of the following chief complaint(s): Tremors and Hypertension         ASSESSMENT/PLAN:  1. Secondary hypertension  2. Tremor  3. Essential (primary) hypertension  -     carvedilol (COREG) 6.25 MG tablet; Take 1 tablet by mouth 2 times daily, Disp-60 tablet, R-0Normal    Per up-to-date, tremor is a rather uncommon side effect of the clonidine. Will increase Coreg from 3.125 to 6.25 mg twice a day, in hopes that she will require the clonidine less. She voiced understanding. Follow-up as scheduled. Return for already scheduled follow-up. SUBJECTIVE/OBJECTIVE:  HPI  Patient of Tomi Guevara MD presents for follow-up of hypertension and \"shaky feeling\". Her blood pressure has been running high. She was admitted to the hospital recently for the same and did a TCM with Dr. Avril Lynn on , notes reviewed. She was started on Coreg 3.125 mg twice a day at that time. She states her blood pressure has continued to run high, the same the use of as needed clonidine. She states that each time she takes the clonidine, she notes a generalized shaking feeling on her left side, that lasts about 10 minutes. Her blood pressure this morning is 165/94. She denies any other urgency symptoms. Review of Systems   Constitutional: Negative for chills and fever. HENT: Negative for facial swelling and mouth sores. Eyes: Negative for discharge and itching. Respiratory: Negative for apnea and stridor. Cardiovascular: Negative for chest pain and palpitations. Gastrointestinal: Negative for nausea and vomiting. Endocrine: Negative for cold intolerance and heat intolerance. Genitourinary: Negative for frequency and urgency. Musculoskeletal: Negative for arthralgias and back pain. Skin: Negative for color change and rash. Neurological: Positive for tremors.        Patient-Reported Vitals 2021 Patient-Reported Weight -   Patient-Reported Height -   Patient-Reported Systolic 497   Patient-Reported Diastolic 94   Patient-Reported Pulse 88        Physical Exam    [INSTRUCTIONS:  \"[x]\" Indicates a positive item  \"[]\" Indicates a negative item  -- DELETE ALL ITEMS NOT EXAMINED]    Constitutional: [x] Appears well-developed and well-nourished [x] No apparent distress      [] Abnormal -     Mental status: [x] Alert and awake  [x] Oriented to person/place/time [x] Able to follow commands    [] Abnormal -     Eyes:   EOM    [x]  Normal    [] Abnormal -   Sclera  [x]  Normal    [] Abnormal -          Discharge [x]  None visible   [] Abnormal -     HENT: [x] Normocephalic, atraumatic  [] Abnormal -   [x] Mouth/Throat: Mucous membranes are moist    External Ears [x] Normal  [] Abnormal -    Neck: [x] No visualized mass [] Abnormal -     Pulmonary/Chest: [x] Respiratory effort normal   [x] No visualized signs of difficulty breathing or respiratory distress        [] Abnormal -      Musculoskeletal:   [x] Normal gait with no signs of ataxia         [x] Normal range of motion of neck        [] Abnormal -     Neurological:        [x] No Facial Asymmetry (Cranial nerve 7 motor function) (limited exam due to video visit)          [x] No gaze palsy        [] Abnormal -          Skin:        [x] No significant exanthematous lesions or discoloration noted on facial skin         [] Abnormal -            Psychiatric:       [x] Normal Affect [] Abnormal -        [x] No Hallucinations    Other pertinent observable physical exam findings:-                [facility], was evaluated through a synchronous (real-time) audio-video encounter. The patient (or guardian if applicable) is aware that this is a billable service. Verbal consent to proceed has been obtained within the past 12 months.  The visit was conducted pursuant to the emergency declaration under the [facility], [facility]5 waiver authority and the [employer] and [employer] Act. Patient identification was verified, and a caregiver was present when appropriate. The patient was located in a state where the provider was credentialed to provide care.       An electronic signature was used to authenticate this note.    --Dieudonne Hayes MD

## 2021-08-06 NOTE — CARE COORDINATION
[facility] Transitions Follow Up Call    2021    Patient: Ernst Hernandez  Patient : 1980   MRN: [id number]  Reason for Admission:   Discharge Date: 21 RARS: No data recorded       Spoke with: N/A    Care Transitions Subsequent and Final Call    Subsequent and Final Calls  Care Transitions Interventions  Other Interventions: Follow Up ; Attempted to make f/u call after ED visit with no answer. Will try back at a later time.    Future Appointments   Date Time Provider Sindy Verde   2021  2:45 PM [facility]   2021  3:30 PM Saw Miranda MD [facility]       Stephanie Pak RN

## 2021-08-07 ASSESSMENT — ENCOUNTER SYMPTOMS
SHORTNESS OF BREATH: 0
EYE DISCHARGE: 0
VOMITING: 0
EYE ITCHING: 0
BACK PAIN: 0
DIARRHEA: 0
COUGH: 1
FACIAL SWELLING: 0
NAUSEA: 0
STRIDOR: 0
APNEA: 0
EYE PAIN: 0
COLOR CHANGE: 0
VOMITING: 0
ABDOMINAL PAIN: 0

## 2021-08-07 NOTE — ED PROVIDER NOTES
[facility] EMERGENCY DEPT  eMERGENCY dEPARTMENT eNCOUnter      Pt Name: Arcadio Ferrari  MRN: [id number]  [place] 1980  Date of evaluation: 8/6/2021  Provider: Patti Camargo MD    CHIEF COMPLAINT       Chief Complaint   Patient presents with    Hypertension     174/97         HISTORY OF PRESENT ILLNESS   (Location/Symptom, Timing/Onset,Context/Setting, Quality, Duration, Modifying Factors, Severity)  Note limiting factors. Arcadio Ferrari is a 36 y.o. female who presents to the emergency department complaining of elevated blood pressure. Patient has been seen for this multiple times recently. Seen earlier today by her PCP who has her taking Coreg 6.25 mg 2 pills in the morning 2 pills in the evening. Also takes losartan daily which is something she is been on for a while. Had been on clonidine but her doctor took her off the clonidine. Was recently admitted to the hospital here for similar complaints and had extensive work-up including echo, CTA of the head and neck, noncontrasted head CT and MRI of the brain. Few hours prior to arrival began feeling lightheaded and had some chest discomfort and checked her blood pressure and found it to be elevated. Tells me she has taken all of her blood pressure medications like she is supposed to today. No vision changes numbness or weakness. No shortness of breath. Mild cough intermittently for several days which she describes very mild. Nonproductive. No dyspnea or hemoptysis. No unilateral leg swelling or pain. No history of DVT or PE. No abdominal pain but she says she feels a little bloated. No nausea or vomiting. Tells me she had some mildly loose stools a couple days ago. No urinary complaints. No vaginal discharge or bleeding or pelvic discomfort. HPI    NursingNotes were reviewed. REVIEW OF SYSTEMS    (2-9 systems for level 4, 10 or more for level 5)     Review of Systems   Constitutional: Negative for fever.    Eyes: Negative for pain and visual disturbance. Respiratory: Positive for cough. Negative for shortness of breath. Cardiovascular: Positive for chest pain. Negative for palpitations and leg swelling. Gastrointestinal: Negative for abdominal pain, diarrhea and vomiting. Genitourinary: Negative for dysuria. Skin: Negative for rash. Neurological: Positive for light-headedness. Negative for weakness, numbness and headaches. All other systems reviewed and are negative. A complete review of systems was performed and is negative except as noted above in the HPI. PAST MEDICAL HISTORY     Past Medical History:   Diagnosis Date    Anemia     Chronic rheumatic arthritis ([facility] [facility]     Elevated blood pressure reading     Fibromyalgia     Intracranial hypertension     Obesity     PCOS (polycystic ovarian syndrome)     Sleep apnea     Type 2 diabetes mellitus ([facility]          SURGICAL HISTORY       Past Surgical History:   Procedure Laterality Date    CHOLECYSTECTOMY           CURRENT MEDICATIONS       Discharge Medication List as of 8/6/2021 10:26 PM      CONTINUE these medications which have NOT CHANGED    Details   predniSONE (DELTASONE) 5 MG tablet PLEASE SEE ATTACHED FOR DETAILED DIRECTIONSHistorical Med      carvedilol (COREG) 6.25 MG tablet Take 1 tablet by mouth 2 times daily, Disp-60 tablet, R-0Normal      losartan (COZAAR) 100 MG tablet Take 100 mg by mouth dailyHistorical Med      CIMZIA STARTER KIT 6 X 200 MG/ML KIT DAWHistorical Med      HUMIRA PEN 40 MG/0.8ML injection 1 each every 14 days, [facility]      blood glucose test strips (ASCENSIA AUTODISC VI;ONE TOUCH ULTRA TEST VI) strip 3 TIMES DAILY Starting Mon 5/24/2021, Disp-100 each, R-11, NormalAs needed. metFORMIN (GLUCOPHAGE) 500 MG tablet Take 1 tablet by mouth 2 times daily (with meals), Disp-60 tablet, R-5Normal      valACYclovir (VALTREX) 1 g tablet Take 2 tablets p.o. every 12 hours for 2 doses.   May repeat for further outbreaks, Disp-8 tablet,  Frequency of Social Gatherings with Friends and Family:     Attends Jehovah's witness Services:     Active Member of Clubs or Organizations:     Attends Club or Organization Meetings:     Marital Status:    Intimate Partner Violence:     Fear of Current or Ex-Partner:     Emotionally Abused:     Physically Abused:     Sexually Abused:        SCREENINGS             PHYSICAL EXAM    (up to 7 for level 4, 8 or more for level 5)     ED Triage Vitals [08/06/21 1924]   BP Temp Temp Source Pulse Resp SpO2 Height Weight   (!) 174/97 98.1 °F (36.7 °C) Oral 90 20 98 % 5' 2\" (1.575 m) 280 lb (127 kg)       Physical Exam  Vitals reviewed. Constitutional:       General: She is not in acute distress. Appearance: She is well-developed. HENT:      Head: Normocephalic and atraumatic. Right Ear: Tympanic membrane, ear canal and external ear normal.      Left Ear: Tympanic membrane, ear canal and external ear normal.      Mouth/Throat:      Mouth: Mucous membranes are moist.      Pharynx: Oropharynx is clear. Eyes:      General: No scleral icterus. Extraocular Movements: Extraocular movements intact. Pupils: Pupils are equal, round, and reactive to light. Funduscopic exam:     Right eye: No hemorrhage or papilledema. Left eye: No hemorrhage or papilledema. Visual Fields: Right eye visual fields normal and left eye visual fields normal.   Neck:      Vascular: No JVD. Cardiovascular:      Rate and Rhythm: Normal rate and regular rhythm. Pulses: Normal pulses. Heart sounds: Normal heart sounds. Pulmonary:      Effort: Pulmonary effort is normal. No respiratory distress. Breath sounds: Normal breath sounds. Abdominal:      General: There is no distension. Palpations: Abdomen is soft. Tenderness: There is no abdominal tenderness. There is no guarding or rebound. Musculoskeletal:         General: No tenderness.       Cervical back: Normal range of motion and neck supple. Right lower leg: No edema. Left lower leg: No edema. Skin:     General: Skin is warm and dry. Capillary Refill: Capillary refill takes less than 2 seconds. Neurological:      General: No focal deficit present. Mental Status: She is alert and oriented to person, place, and time. GCS: GCS eye subscore is 4. GCS verbal subscore is 5. GCS motor subscore is 6. Cranial Nerves: Cranial nerves are intact. Sensory: Sensation is intact. Motor: Motor function is intact. Coordination: Coordination is intact. Psychiatric:         Mood and Affect: Mood normal.         Behavior: Behavior normal.         DIAGNOSTIC RESULTS     EKG: All EKG's are interpreted by the Emergency Department Physician who either signs or Co-signs this chart in the absence of a cardiologist.    Sinus rhythm. Normal QT. Borderline ST changes in several leads but do not appreciate any signs of acute ischemia compared to prior EKG from 12/27/2019    Repeat EKG shows normal sinus rhythm. Normal QT. Borderline ST changes. Looks similar to prior EKG. Do not see signs of acute ischemia. RADIOLOGY:   Non-plain film images such as CT, Ultrasound and MRI are read by the radiologist. Brittanyivy You images are visualized and preliminarily interpreted by the emergency physician with the below findings:    Interpretation per the Radiologist below, if available at the time of this note:    CT Head WO Contrast   Final Result   1. No acute intracranial abnormality is seen. Signed by Dr Amy Chua      XR ACUTE ABD SERIES CHEST 1 VW   Final Result   1. No acute abnormality is seen within the chest or abdomen. 2. Appearance of constipation noted.    Signed by Dr Chris Barger:  Labs Reviewed   URINE RT REFLEX TO CULTURE - Abnormal; Notable for the following components:       Result Value    Ketones, Urine 15 (*)     Leukocyte Esterase, Urine TRACE (*)     All other components within normal limits   CBC - Abnormal; Notable for the following components:    MCH 26.9 (*)     MCHC 32.0 (*)     All other components within normal limits   COMPREHENSIVE METABOLIC PANEL W/ REFLEX TO MG FOR LOW K - Abnormal; Notable for the following components:    Sodium 135 (*)     Glucose 113 (*)     All other components within normal limits   MICROSCOPIC URINALYSIS - Abnormal; Notable for the following components:    Bacteria, UA NEGATIVE (*)     Crystals, UA NEG (*)     All other components within normal limits   COVID-19, RAPID   TROPONIN   HCG, SERUM, QUALITATIVE   LIPASE   TROPONIN       All other labs were within normal range or not returned as of this dictation. EMERGENCY DEPARTMENT COURSE and DIFFERENTIALDIAGNOSIS/MDM:   Vitals:    Vitals:    08/06/21 1924 08/06/21 2002 08/06/21 2216   BP: (!) 174/97 (!) 178/102 138/82   Pulse: 90     Resp: 20     Temp: 98.1 °F (36.7 °C)     TempSrc: Oral     SpO2: 98%     Weight: 280 lb (127 kg)     Height: 5' 2\" (1.575 m)         MDM  Low risk heart score     Patient resting comfortably. Symptoms improved. Blood pressure improved. Patient's resting comfortably no distress nontoxic on exam.  Stable for discharge. See no indication for admission. Discussed patient's results with her. Told her to keep a log of her blood pressure and follow-up with her primary doctor. Told return to the ER for any change worsening symptoms or new concerns. Patient agreeable plan. CONSULTS:  None    PROCEDURES:  Unless otherwise notedbelow, none     Procedures    FINAL IMPRESSION     1. Hypertension, unspecified type    2. Lightheaded    3. Chest pain, unspecified type    4.  Constipation, unspecified constipation type          DISPOSITION/PLAN   DISPOSITION Decision To Discharge 08/06/2021 10:25:35 PM      PATIENT REFERRED TO:  @FUP@    DISCHARGE MEDICATIONS:  Discharge Medication List as of 8/6/2021 10:26 PM             (Please note that portions of this

## 2021-08-09 ENCOUNTER — CARE COORDINATION (OUTPATIENT)
Dept: CASE MANAGEMENT | Age: 41
End: 2021-08-09

## 2021-08-09 LAB
EKG P AXIS: 52 DEGREES
EKG P AXIS: 61 DEGREES
EKG P AXIS: 76 DEGREES
EKG P-R INTERVAL: 118 MS
EKG P-R INTERVAL: 124 MS
EKG P-R INTERVAL: 136 MS
EKG Q-T INTERVAL: 356 MS
EKG Q-T INTERVAL: 382 MS
EKG Q-T INTERVAL: 414 MS
EKG QRS DURATION: 74 MS
EKG QRS DURATION: 84 MS
EKG QRS DURATION: 92 MS
EKG QTC CALCULATION (BAZETT): 399 MS
EKG QTC CALCULATION (BAZETT): 418 MS
EKG QTC CALCULATION (BAZETT): 426 MS
EKG T AXIS: 11 DEGREES
EKG T AXIS: 28 DEGREES
EKG T AXIS: 57 DEGREES

## 2021-08-09 NOTE — CARE COORDINATION
[facility] Transitions Follow Up Call    2021    Patient: Yamile Eid  Patient : 1980   MRN: [id number]   Discharge Date: 21 RARS: No data recorded       Spoke with: [facility] Transitions Subsequent and Final Call    Schedule Follow Up Appointment with PCP: Completed  Subsequent and Final Calls  Have your medications changed?: No  Do you have any questions related to your medications?: No  Do you currently have any active services?: No  Do you have any needs or concerns that I can assist you with?: No  Care Transitions Interventions  Other Interventions:         Placed a call to the patient for a follow up. She reported that she is not too bad today. She said she had to come back to the ED a few days ago because her blood pressure was still really high. She said they did not change any of her meds and she has decided to not check it so often and that seems to be helping her. She said she thought maybe she was getting anxious when it was up a little and that made her more likely to run high. Encouraged to only check it about twice a day, in the morning and perhaps evening/bedtime and then only if she feels symptomatic. She said she will sometimes have a headache, sometimes get a little dizzy. Said she is at the [address] [place] [address] office now for follow up. Did not want to impede on her time there and privacy, not know if she was in the lobby or exam room. Reminded of CTC process, purpose, future calls, etc and is agreeable with appropriate follow up. Ensured to have CTN contact information to be used as needed. Encouraged to call as needed for new issues, questions, etc.  Given the opportunity to ask questions and answered appropriately. CTN to follow up as indicated.      Follow Up  Future Appointments   Date Time Provider Sindy Verde   2021 11:30 AM [facility] ECHO ROOM 2 [facility]   2021 12:00 PM [facility] US RM 1 [facility] ULTRA [facility]   2021  2:45 PM Carlos GREENWOOD Rosamond Scheuermann, MD [facility]   9/14/2021  3:30 PM Hailey Green MD [facility]       Jessie Soto RN

## 2021-08-11 ENCOUNTER — TELEPHONE (OUTPATIENT)
Dept: SLEEP CENTER | Age: 41
End: 2021-08-11

## 2021-08-12 ENCOUNTER — CARE COORDINATION (OUTPATIENT)
Dept: CASE MANAGEMENT | Age: 41
End: 2021-08-12

## 2021-08-14 LAB
ALBUMIN SERPL-MCNC: 4 G/DL (ref 3.5–5.2)
ALBUMIN/GLOB SERPL: 1.1 G/DL
ALP SERPL-CCNC: 84 U/L (ref 39–117)
ALT SERPL W P-5'-P-CCNC: 169 U/L (ref 1–33)
ANION GAP SERPL CALCULATED.3IONS-SCNC: 9 MMOL/L (ref 5–15)
AST SERPL-CCNC: 84 U/L (ref 1–32)
BASOPHILS # BLD AUTO: 0.06 10*3/MM3 (ref 0–0.2)
BASOPHILS NFR BLD AUTO: 0.8 % (ref 0–1.5)
BILIRUB SERPL-MCNC: 0.3 MG/DL (ref 0–1.2)
BUN SERPL-MCNC: 9 MG/DL (ref 6–20)
BUN/CREAT SERPL: 13 (ref 7–25)
CALCIUM SPEC-SCNC: 9.4 MG/DL (ref 8.6–10.5)
CHLORIDE SERPL-SCNC: 102 MMOL/L (ref 98–107)
CO2 SERPL-SCNC: 25 MMOL/L (ref 22–29)
CREAT SERPL-MCNC: 0.69 MG/DL (ref 0.57–1)
DEPRECATED RDW RBC AUTO: 39.2 FL (ref 37–54)
EOSINOPHIL # BLD AUTO: 0.29 10*3/MM3 (ref 0–0.4)
EOSINOPHIL NFR BLD AUTO: 3.6 % (ref 0.3–6.2)
ERYTHROCYTE [DISTWIDTH] IN BLOOD BY AUTOMATED COUNT: 13.4 % (ref 12.3–15.4)
GFR SERPL CREATININE-BSD FRML MDRD: 114 ML/MIN/1.73
GLOBULIN UR ELPH-MCNC: 3.7 GM/DL
GLUCOSE SERPL-MCNC: 103 MG/DL (ref 65–99)
HCT VFR BLD AUTO: 36.6 % (ref 34–46.6)
HGB BLD-MCNC: 12.1 G/DL (ref 12–15.9)
HOLD SPECIMEN: NORMAL
HOLD SPECIMEN: NORMAL
IMM GRANULOCYTES # BLD AUTO: 0.02 10*3/MM3 (ref 0–0.05)
IMM GRANULOCYTES NFR BLD AUTO: 0.3 % (ref 0–0.5)
LYMPHOCYTES # BLD AUTO: 2.98 10*3/MM3 (ref 0.7–3.1)
LYMPHOCYTES NFR BLD AUTO: 37.4 % (ref 19.6–45.3)
MCH RBC QN AUTO: 26.8 PG (ref 26.6–33)
MCHC RBC AUTO-ENTMCNC: 33.1 G/DL (ref 31.5–35.7)
MCV RBC AUTO: 81 FL (ref 79–97)
MONOCYTES # BLD AUTO: 0.59 10*3/MM3 (ref 0.1–0.9)
MONOCYTES NFR BLD AUTO: 7.4 % (ref 5–12)
NEUTROPHILS NFR BLD AUTO: 4.03 10*3/MM3 (ref 1.7–7)
NEUTROPHILS NFR BLD AUTO: 50.5 % (ref 42.7–76)
NRBC BLD AUTO-RTO: 0 /100 WBC (ref 0–0.2)
PLATELET # BLD AUTO: 252 10*3/MM3 (ref 140–450)
PMV BLD AUTO: 11.4 FL (ref 6–12)
POTASSIUM SERPL-SCNC: 4 MMOL/L (ref 3.5–5.2)
PROT SERPL-MCNC: 7.7 G/DL (ref 6–8.5)
RBC # BLD AUTO: 4.52 10*6/MM3 (ref 3.77–5.28)
SODIUM SERPL-SCNC: 136 MMOL/L (ref 136–145)
WBC # BLD AUTO: 7.97 10*3/MM3 (ref 3.4–10.8)
WHOLE BLOOD HOLD SPECIMEN: NORMAL

## 2021-08-14 PROCEDURE — 85025 COMPLETE CBC W/AUTO DIFF WBC: CPT

## 2021-08-14 PROCEDURE — 99283 EMERGENCY DEPT VISIT LOW MDM: CPT

## 2021-08-14 PROCEDURE — 80053 COMPREHEN METABOLIC PANEL: CPT

## 2021-08-14 RX ORDER — SODIUM CHLORIDE 0.9 % (FLUSH) 0.9 %
10 SYRINGE (ML) INJECTION AS NEEDED
Status: DISCONTINUED | OUTPATIENT
Start: 2021-08-14 | End: 2021-08-15 | Stop reason: HOSPADM

## 2021-08-15 ENCOUNTER — HOSPITAL ENCOUNTER (EMERGENCY)
Facility: HOSPITAL | Age: 41
Discharge: HOME | End: 2021-08-15
Attending: FAMILY MEDICINE | Admitting: FAMILY MEDICINE

## 2021-08-15 VITALS
WEIGHT: 284 LBS | RESPIRATION RATE: 16 BRPM | HEIGHT: 62 IN | TEMPERATURE: 98.4 F | HEART RATE: 67 BPM | DIASTOLIC BLOOD PRESSURE: 100 MMHG | BODY MASS INDEX: 52.26 KG/M2 | OXYGEN SATURATION: 98 % | SYSTOLIC BLOOD PRESSURE: 161 MMHG

## 2021-08-15 DIAGNOSIS — I10 HYPERTENSION, UNSPECIFIED TYPE: Primary | ICD-10-CM

## 2021-08-15 RX ORDER — LOSARTAN POTASSIUM 100 MG/1
100 TABLET ORAL DAILY
COMMUNITY

## 2021-08-15 RX ORDER — CLONIDINE HYDROCHLORIDE 0.1 MG/1
0.1 TABLET ORAL ONCE
Status: DISCONTINUED | OUTPATIENT
Start: 2021-08-15 | End: 2021-08-15

## 2021-08-15 RX ORDER — AMLODIPINE BESYLATE 5 MG/1
5 TABLET ORAL ONCE
Status: COMPLETED | OUTPATIENT
Start: 2021-08-15 | End: 2021-08-15

## 2021-08-15 RX ORDER — CARVEDILOL 3.12 MG/1
3.12 TABLET ORAL 2 TIMES DAILY WITH MEALS
COMMUNITY

## 2021-08-15 RX ADMIN — AMLODIPINE BESYLATE 5 MG: 5 TABLET ORAL at 01:39

## 2021-08-15 NOTE — ED PROVIDER NOTES
Subjective   This patient is a 40-year-old morbidly obese female who is being treated for hypertension.  She states that the last couple days she took the wrong dose of medicine in the evening and ended up throwing up that medicine because she was scared.  That threw her off and she essentially missed a couple doses of medication because of that.  She presents tonight because of concerns about elevated blood pressure.  She states that yesterday she had a blood pressure was over 200 systolic.  She also complains of a vague tingly feeling around her head but no focal neurologic symptoms otherwise.  She has no chest pain or shortness of breath.          Review of Systems   Neurological: Positive for numbness.   All other systems reviewed and are negative.      Past Medical History:   Diagnosis Date   • Arthritis    • Fibromyalgia    • Hypertension    • Osteoporosis        Allergies   Allergen Reactions   • Mushroom Rash       Past Surgical History:   Procedure Laterality Date   • CHOLECYSTECTOMY         History reviewed. No pertinent family history.    Social History     Socioeconomic History   • Marital status: Single     Spouse name: Not on file   • Number of children: Not on file   • Years of education: Not on file   • Highest education level: Not on file   Tobacco Use   • Smoking status: Never Smoker   Substance and Sexual Activity   • Alcohol use: Yes   • Drug use: No           Objective   Physical Exam  Vitals and nursing note reviewed.   Constitutional:       Appearance: She is well-developed. She is obese.   HENT:      Head: Normocephalic and atraumatic.      Right Ear: External ear normal.      Left Ear: External ear normal.      Nose: Nose normal.      Mouth/Throat:      Mouth: Mucous membranes are moist.      Pharynx: Oropharynx is clear.   Eyes:      Conjunctiva/sclera: Conjunctivae normal.   Cardiovascular:      Rate and Rhythm: Normal rate and regular rhythm.      Heart sounds: Normal heart sounds.    Pulmonary:      Effort: Pulmonary effort is normal.      Breath sounds: Normal breath sounds.   Abdominal:      General: Bowel sounds are normal.      Palpations: Abdomen is soft.   Musculoskeletal:         General: Normal range of motion.      Cervical back: Normal range of motion and neck supple.   Skin:     General: Skin is warm and dry.      Capillary Refill: Capillary refill takes less than 2 seconds.   Neurological:      Mental Status: She is alert and oriented to person, place, and time.   Psychiatric:         Behavior: Behavior normal.         Thought Content: Thought content normal.         Judgment: Judgment normal.         Procedures           ED Course                                           MDM  Number of Diagnoses or Management Options     Amount and/or Complexity of Data Reviewed  Clinical lab tests: ordered and reviewed    Patient Progress  Patient progress: stable      Final diagnoses:   Hypertension, unspecified type       ED Disposition  ED Disposition     ED Disposition Condition Comment    Discharge Stable           Lenin Lemons MD  [address] [place] [address]   [address] [id number] [phone]               Medication List      No changes were made to your prescriptions during this visit.       The patient's blood pressure eventually was about 173/102.  She had no symptoms and I felt it was safe to discharge her home.  I had given her a dose of long-acting Norvasc and encouraged her to get back into her regular regimen of med medication and follow-up with her primary care physician.     Mario Wallace MD  08/15/21 [id number]

## 2021-08-16 ENCOUNTER — APPOINTMENT (OUTPATIENT)
Dept: NON INVASIVE DIAGNOSTICS | Age: 41
End: 2021-08-16
Payer: MEDICAID

## 2021-08-16 ENCOUNTER — HOSPITAL ENCOUNTER (OUTPATIENT)
Dept: ULTRASOUND IMAGING | Age: 41
Discharge: HOME | End: 2021-08-16
Payer: MEDICAID

## 2021-08-16 DIAGNOSIS — I10 ESSENTIAL (PRIMARY) HYPERTENSION: ICD-10-CM

## 2021-08-16 PROCEDURE — 93975 VASCULAR STUDY: CPT

## 2021-08-18 ENCOUNTER — OFFICE VISIT (OUTPATIENT)
Dept: FAMILY MEDICINE CLINIC | Age: 41
End: 2021-08-18
Payer: MEDICAID

## 2021-08-18 VITALS
HEART RATE: 80 BPM | BODY MASS INDEX: 50.61 KG/M2 | SYSTOLIC BLOOD PRESSURE: 142 MMHG | WEIGHT: 275 LBS | DIASTOLIC BLOOD PRESSURE: 100 MMHG | HEIGHT: 62 IN | OXYGEN SATURATION: 98 %

## 2021-08-18 DIAGNOSIS — R07.9 CHEST PAIN, UNSPECIFIED TYPE: Primary | ICD-10-CM

## 2021-08-18 DIAGNOSIS — M05.79 RHEUMATOID ARTHRITIS INVOLVING MULTIPLE SITES WITH POSITIVE RHEUMATOID FACTOR (HCC): ICD-10-CM

## 2021-08-18 DIAGNOSIS — I10 UNCONTROLLED HYPERTENSION: ICD-10-CM

## 2021-08-18 DIAGNOSIS — F41.9 ANXIETY: ICD-10-CM

## 2021-08-18 PROCEDURE — 1036F TOBACCO NON-USER: CPT | Performed by: NURSE PRACTITIONER

## 2021-08-18 PROCEDURE — 99214 OFFICE O/P EST MOD 30 MIN: CPT | Performed by: NURSE PRACTITIONER

## 2021-08-18 PROCEDURE — G8417 CALC BMI ABV UP PARAM F/U: HCPCS | Performed by: NURSE PRACTITIONER

## 2021-08-18 PROCEDURE — G8427 DOCREV CUR MEDS BY ELIG CLIN: HCPCS | Performed by: NURSE PRACTITIONER

## 2021-08-18 RX ORDER — BUSPIRONE HYDROCHLORIDE 5 MG/1
5 TABLET ORAL 2 TIMES DAILY PRN
Qty: 60 TABLET | Refills: 2 | Status: SHIPPED | OUTPATIENT
Start: 2021-08-18 | End: 2022-01-02

## 2021-08-18 RX ORDER — AMLODIPINE BESYLATE 5 MG/1
5 TABLET ORAL DAILY
Qty: 30 TABLET | Refills: 5 | Status: SHIPPED | OUTPATIENT
Start: 2021-08-18 | End: 2021-08-24 | Stop reason: DRUGHIGH

## 2021-08-18 RX ORDER — SEMAGLUTIDE 1.34 MG/ML
INJECTION, SOLUTION SUBCUTANEOUS
COMMUNITY
Start: 2021-08-06 | End: 2021-10-29 | Stop reason: ALTCHOICE

## 2021-08-18 ASSESSMENT — ENCOUNTER SYMPTOMS
SORE THROAT: 0
COUGH: 0
DIARRHEA: 0
NAUSEA: 0
ABDOMINAL PAIN: 0
CHEST TIGHTNESS: 0
WHEEZING: 0
SHORTNESS OF BREATH: 0

## 2021-08-18 NOTE — PROGRESS NOTES
Ena Cobos is a 39 y.o. female who presents today for  Chief Complaint   Patient presents with    Hypertension       HPI:  She continues to have uncontrolled hypertension. She is noticing occasional chest pressure in the left chest, left arm particularly with activity. Some pain in the left back as well. Difficult for her to distinguish recent symptoms from her RA. She has also had increased anxiety with the above symptoms. BP readings up to 160s/90s. Sometimes higher. She notices that evening readings are typically higher. Currently taking clonidine twice a day morning and evening, losartan midday. She cannot tolerate clonidine, caused tremor. She thinks she had trouble tolerating amlodipine while admitted in [facility], felt \"out of it\" but had several other medications at the time. She states she had a negative DSE during her hospitalization in [facility]. Echocardiogram 7/30 showed EF 55 to 60%, RVSP normal  Renal Doppler showed no significant evidence of MARK. Renal function has been normal on lab. Review of Systems   Constitutional: Negative for chills and fever. HENT: Negative for congestion, ear pain and sore throat. Respiratory: Negative for cough, chest tightness, shortness of breath and wheezing. Cardiovascular: Positive for chest pain. Negative for palpitations. Gastrointestinal: Negative for abdominal pain, diarrhea and nausea. Musculoskeletal: Negative for arthralgias and myalgias. Skin: Negative for rash. Psychiatric/Behavioral: The patient is nervous/anxious.         Past Medical History:   Diagnosis Date    Anemia     Chronic rheumatic arthritis (HCC)     Elevated blood pressure reading     Fibromyalgia     Intracranial hypertension     Obesity     PCOS (polycystic ovarian syndrome)     Sleep apnea     Type 2 diabetes mellitus (HCC)        Current Outpatient Medications   Medication Sig Dispense Refill    amLODIPine (NORVASC) 5 MG tablet Take 1 Other     Ovarian Cancer Paternal Grandmother        BP (!) 142/100   Pulse 80   Ht 5' 2\" (1.575 m)   Wt 275 lb (124.7 kg)   LMP 07/28/2021 (Approximate)   SpO2 98%   BMI 50.30 kg/m²     Physical Exam  Vitals reviewed. Constitutional:       General: She is not in acute distress. Appearance: Normal appearance. She is well-developed. HENT:      Head: Normocephalic. Eyes:      Conjunctiva/sclera: Conjunctivae normal.      Pupils: Pupils are equal, round, and reactive to light. Neck:      Thyroid: No thyromegaly. Vascular: No carotid bruit or JVD. Trachea: No tracheal deviation. Cardiovascular:      Rate and Rhythm: Normal rate and regular rhythm. Heart sounds: Normal heart sounds. No murmur heard. Pulmonary:      Effort: Pulmonary effort is normal. No respiratory distress. Breath sounds: Normal breath sounds. No wheezing or rhonchi. Musculoskeletal:         General: Normal range of motion. Cervical back: Normal range of motion and neck supple. Lymphadenopathy:      Cervical: No cervical adenopathy. Skin:     General: Skin is warm and dry. Findings: No rash. Neurological:      General: No focal deficit present. Mental Status: She is alert. Psychiatric:         Mood and Affect: Mood normal.         Behavior: Behavior normal.         Thought Content: Thought content normal.         ASSESSMENT/PLAN:  1. Uncontrolled hypertension  -Add amlodipine 5 mg daily. She thinks she may have not tolerated well during a recent hospitalization but may have been multifactorial.  She will report any problems.  -Continue carvedilol, losartan at current doses  -Keep appointment with Dr. Suman Arriola next week to reassess  - Johanna Lobato MD, Cardiology, [place]    2. Chest pain, unspecified type  -Referred to cardiology for evaluation, recommendations.  -We will request records from [facility] in [facility].   She reports a normal DSE there.  -Likely some anxiety component but need to rule out ischemia. - Sylvia Wallace MD, Cardiology, [place]    3. Anxiety  -Buspirone 5 mg twice daily as needed for anxiety. SE profile reviewed. 4. Rheumatoid arthritis involving multiple sites with positive rheumatoid factor ([facility]  -Continue recommendations per rheumatology         Return for as scheduled. Kian Kwan was seen today for hypertension. Diagnoses and all orders for this visit:    Chest pain, unspecified type  -     Sylvia Wallace MD, Cardiology, [place]    Uncontrolled hypertension  -     Sylvia Wallace MD, Cardiology, [place]    Anxiety    Rheumatoid arthritis involving multiple sites with positive rheumatoid factor ([facility]    Other orders  -     amLODIPine (NORVASC) 5 MG tablet; Take 1 tablet by mouth daily  -     busPIRone (BUSPAR) 5 MG tablet; Take 1 tablet by mouth 2 times daily as needed (anxiety)      Medications Discontinued During This Encounter   Medication Reason    HUMIRA PEN 40 MG/0.8ML injection LIST CLEANUP     There are no Patient Instructions on file for this visit. Patient voicesunderstanding and agrees to plans along with risks and benefits of plan. Counseling:  Yue Richardson's case, medications and options were discussed in detail. Patient was instructed to call the office if she questionsregarding her treatment. Should her conditions worsen, she should return to office to be reassessed by NATACHA Bridges. she Should to go the closest Emergency Department for any emergency. They verbalizedunderstanding the above instructions. Return for as scheduled.

## 2021-08-19 ENCOUNTER — CARE COORDINATION (OUTPATIENT)
Dept: CASE MANAGEMENT | Age: 41
End: 2021-08-19

## 2021-08-19 NOTE — CARE COORDINATION
[facility] Transitions Follow Up Call    2021    Patient: Laura Mckeon  Patient : 1980   MRN: [id number]  Reason for Admission:   Discharge Date: 21 RARS: No data recorded       Spoke with: [facility] Transitions Subsequent and Final Call    Subsequent and Final Calls  Do you have any ongoing symptoms?: No  Have your medications changed?: No  Do you have any questions related to your medications?: No  Do you currently have any active services?: No  Do you have any needs or concerns that I can assist you with?: No  Identified Barriers: None  Care Transitions Interventions  Other Interventions: Follow Up : Spoke with patient today for follow up call. She says she did see PCP yesterday for follow up on her bp. She says amlodipine was added and she was given buspar. She says she just started so no real changes yet. Today it was 164/100. CTN advised taking the buspar when needed for anxiety, and some of the sleepiness will wear off after her body becomes use to it. As well as the Amlodipine, hopefully it will help in bringing her bp back to a more normal range. She is being scheduled with new Cardiologist at [address] [place] [address] soon, by her PCP. She is awaiting an appointment. No other issues at this time. CTN will follow up at a later time.    Future Appointments   Date Time Provider Sindy Verde   2021  2:45 PM Tyson Skiff, MD [facility]   2021  3:30 PM Tyson Skiff, MD [facility]   10/6/2021  8:00 PM [facility] SLEEP  [facility]

## 2021-08-20 ENCOUNTER — PATIENT MESSAGE (OUTPATIENT)
Dept: FAMILY MEDICINE CLINIC | Age: 41
End: 2021-08-20

## 2021-08-20 RX ORDER — LOSARTAN POTASSIUM 100 MG/1
100 TABLET ORAL DAILY
Qty: 30 TABLET | Refills: 5 | Status: SHIPPED | OUTPATIENT
Start: 2021-08-20 | End: 2022-02-10

## 2021-08-20 NOTE — TELEPHONE ENCOUNTER
From: Ernst Hernandez  To: Saw Miranda MD  Sent: 8/20/2021 10:56 AM CDT  Subject: Prescription Question    Hello,    My Losartan 100mg was prescribed by an ER doctor and is almost out can i get a refill sent to [address] [place] [address]?      Thanks ,     Esteban Galvan

## 2021-08-23 ASSESSMENT — ENCOUNTER SYMPTOMS
ABDOMINAL DISTENTION: 0
ABDOMINAL PAIN: 0
SHORTNESS OF BREATH: 0

## 2021-08-24 ENCOUNTER — OFFICE VISIT (OUTPATIENT)
Dept: CARDIOLOGY CLINIC | Age: 41
End: 2021-08-24
Payer: MEDICAID

## 2021-08-24 VITALS
WEIGHT: 286 LBS | HEIGHT: 62 IN | OXYGEN SATURATION: 98 % | DIASTOLIC BLOOD PRESSURE: 90 MMHG | HEART RATE: 82 BPM | SYSTOLIC BLOOD PRESSURE: 152 MMHG | BODY MASS INDEX: 52.63 KG/M2

## 2021-08-24 DIAGNOSIS — I10 ESSENTIAL HYPERTENSION: Primary | ICD-10-CM

## 2021-08-24 PROCEDURE — 99214 OFFICE O/P EST MOD 30 MIN: CPT | Performed by: INTERNAL MEDICINE

## 2021-08-24 PROCEDURE — 1036F TOBACCO NON-USER: CPT | Performed by: INTERNAL MEDICINE

## 2021-08-24 PROCEDURE — G8428 CUR MEDS NOT DOCUMENT: HCPCS | Performed by: INTERNAL MEDICINE

## 2021-08-24 PROCEDURE — 93000 ELECTROCARDIOGRAM COMPLETE: CPT | Performed by: INTERNAL MEDICINE

## 2021-08-24 PROCEDURE — G8417 CALC BMI ABV UP PARAM F/U: HCPCS | Performed by: INTERNAL MEDICINE

## 2021-08-24 RX ORDER — HYDROCHLOROTHIAZIDE 25 MG/1
25 TABLET ORAL EVERY MORNING
Qty: 90 TABLET | Refills: 1 | Status: SHIPPED | OUTPATIENT
Start: 2021-08-24 | End: 2022-02-17

## 2021-08-24 RX ORDER — AMLODIPINE BESYLATE 5 MG/1
5 TABLET ORAL 2 TIMES DAILY
Qty: 60 TABLET | Refills: 3 | Status: SHIPPED | OUTPATIENT
Start: 2021-08-24 | End: 2021-10-05 | Stop reason: SDUPTHER

## 2021-08-24 ASSESSMENT — ENCOUNTER SYMPTOMS
WHEEZING: 0
EYE PAIN: 0
VOMITING: 0
SHORTNESS OF BREATH: 0
EYE REDNESS: 0
ABDOMINAL PAIN: 0
CHEST TIGHTNESS: 0
SORE THROAT: 0
COUGH: 0
FACIAL SWELLING: 0
BLOOD IN STOOL: 0
CONSTIPATION: 0
EYE DISCHARGE: 0
ABDOMINAL DISTENTION: 0
NAUSEA: 0
APNEA: 0
DIARRHEA: 0

## 2021-08-24 NOTE — PROGRESS NOTES
Cardiology Office Visit Note  [place]  [id number]  Phone: [phone]  Fax: [phone]                            Date:  8/24/2021  Patient: Svitlana Stratton  Age:  39 y.o., 1980    Referral: Argelia Owusu, *    REASON FOR VISIT:  New Patient  Uncontrolled hypertension      PROBLEM LIST:    Patient Active Problem List    Diagnosis Date Noted    Hypertension 07/31/2021     Priority: Low    Facial numbness 07/31/2021     Priority: Low    Family history of ovarian cancer 12/22/2020     Priority: Low    Menorrhagia with regular cycle 12/22/2020     Priority: Low    Iron deficiency anemia 09/04/2020     Priority: Low    PCOS (polycystic ovarian syndrome) 08/20/2018     Priority: Low    Rheumatoid arthritis involving multiple sites with positive rheumatoid factor [facility] 07/06/2018     Priority: Low    Fibromyalgia 07/06/2018     Priority: Low    Bilateral carpal tunnel syndrome 07/06/2018     Priority: Low    Anemia 11/02/2017     Priority: Low    Familial hypercholesterolemia 11/02/2017     Priority: Low    Type 2 diabetes mellitus ([facility]      Priority: Low         PRESENTATION: Svitlana Stratton is a 39y.o. year old female who seen in cardiology consultation today for further evaluation of newly diagnosed hypertension which has been poorly controlled despite multiple medications. Her medical diagnoses notable for morbid obesity, type 2 diabetes mellitus, rheumatoid arthritis involving multiple sites, fibromyalgia and iron deficiency anemia. She reported that after an unusual day including excessive physical stress and increased alcohol consumption she developed an episode of chest pain and panic attack with associated blood pressure of greater than 200/100. Chest pain described as right-sided, occurring mostly at nights at this point which she has attributed to intermittent rheumatoid flare. Notably she also has bilateral carpal tunnel disease.     Concerning her blood pressure this has remained elevated and she is now on antihypertensives. 3 years ago she did have a diagnosis of high blood pressure however at some point her medications were weaned to off. She has had a regular office visits with vital sign monitoring over the years. She has had a fairly comprehensive work-up for secondary etiologies of hypertension including negative ultrasound renal arteries, laboratory biomarkers including thyroid function and catecholamines as well as renal function. Echocardiogram with bubble study dated 7/30/2021 without mention of any major structural changes with mild concentric LVH. REVIEW OF SYSTEMS:  Review of Systems   Constitutional: Negative for chills, fatigue and fever. HENT: Negative for congestion, facial swelling, hearing loss and sore throat. Eyes: Negative for pain, discharge, redness and visual disturbance. Respiratory: Negative for apnea, cough, chest tightness, shortness of breath and wheezing. Cardiovascular: Negative for chest pain, palpitations and leg swelling. Gastrointestinal: Negative for abdominal distention, abdominal pain, blood in stool, constipation, diarrhea, nausea and vomiting. Endocrine: Negative for polydipsia, polyphagia and polyuria. Genitourinary: Negative for dysuria, flank pain, frequency and hematuria. Musculoskeletal: Negative for joint swelling, myalgias and neck pain. Skin: Negative for pallor and rash. Neurological: Negative for dizziness, syncope, speech difficulty, light-headedness, numbness and headaches. Psychiatric/Behavioral: Negative for confusion, hallucinations and sleep disturbance.        Past Medical History:      Diagnosis Date    Anemia     Chronic rheumatic arthritis ([facility]     Elevated blood pressure reading     Fibromyalgia     Intracranial hypertension     Obesity     PCOS (polycystic ovarian syndrome)     Sleep apnea     Type 2 diabetes mellitus ([facility]        Past Surgical History:      Procedure Laterality Date    CHOLECYSTECTOMY         Medications:  Current Outpatient Medications   Medication Sig Dispense Refill    losartan (COZAAR) 100 MG tablet Take 1 tablet by mouth daily 30 tablet 5    OZEMPIC, 0.25 OR 0.5 MG/DOSE, 2 MG/1.5ML SOPN INJECT 0.5 MG INTO THE SKIN ONCE A WEEK      amLODIPine (NORVASC) 5 MG tablet Take 1 tablet by mouth daily 30 tablet 5    busPIRone (BUSPAR) 5 MG tablet Take 1 tablet by mouth 2 times daily as needed (anxiety) 60 tablet 2    predniSONE (DELTASONE) 5 MG tablet PLEASE SEE ATTACHED FOR DETAILED DIRECTIONS      carvedilol (COREG) 6.25 MG tablet Take 1 tablet by mouth 2 times daily 60 tablet 0    CIMZIA STARTER KIT 6 X 200 MG/ML KIT       blood glucose test strips (ASCENSIA AUTODISC VI;ONE TOUCH ULTRA TEST VI) strip 1 each by In Vitro route 3 times daily As needed. 100 each 11    metFORMIN (GLUCOPHAGE) 500 MG tablet Take 1 tablet by mouth 2 times daily (with meals) 60 tablet 5    valACYclovir (VALTREX) 1 g tablet Take 2 tablets p.o. every 12 hours for 2 doses. May repeat for further outbreaks 8 tablet 2    Norgestim-Eth Estrad Triphasic (TRI-SPRINTEC) 0.18/0.215/0.25 MG-35 MCG TABS Take 1 tablet by mouth daily 84 tablet 3    Blood Glucose Monitoring Suppl (FREESTYLE LITE) ANDREEA USE AS DIRECTED      Lancet Device MISC 1 lancet twice a day 60 Device 11     No current facility-administered medications for this visit. Allergies:  Mushroom extract complex    Social History:  Social History     Occupational History    Not on file   Tobacco Use    Smoking status: Former Smoker     Years: 2.00     Types: Cigarettes    Smokeless tobacco: Never Used   Vaping Use    Vaping Use: Never used   Substance and Sexual Activity    Alcohol use:  Yes     Alcohol/week: 1.0 standard drinks     Types: 1 Glasses of wine per week     Comment: occ    Drug use: No    Sexual activity: Yes     Birth control/protection: OCP         Family History:       Problem Relation Age of Onset    High Blood Pressure Father     Diabetes Father     Atrial Fibrillation Father     Heart Failure Father     Stroke Father     Diabetes Sister     Diabetes Maternal Grandmother     Heart Attack Other     Ovarian Cancer Paternal Grandmother          Physical Examination:  BP (!) 152/90   Pulse 82   Ht 5' 2\" (1.575 m)   Wt 286 lb (129.7 kg)   LMP 07/28/2021 (Approximate)   SpO2 98%   BMI 52.31 kg/m²   Physical Exam  Vitals reviewed. Constitutional:       General: She is not in acute distress. Appearance: She is obese. She is not ill-appearing, toxic-appearing or diaphoretic. HENT:      Head: Normocephalic and atraumatic. Eyes:      General: No scleral icterus. Right eye: No discharge. Left eye: No discharge. Conjunctiva/sclera: Conjunctivae normal.   Neck:      Vascular: No carotid bruit. Cardiovascular:      Rate and Rhythm: Normal rate and regular rhythm. No extrasystoles are present. Chest Wall: PMI is not displaced. No thrill. Heart sounds: S1 normal and S2 normal. No murmur heard. No friction rub. No gallop. Pulmonary:      Effort: Pulmonary effort is normal. No tachypnea or respiratory distress. Breath sounds: Normal breath sounds. No stridor. No wheezing, rhonchi or rales. Chest:      Chest wall: No tenderness. Abdominal:      General: Bowel sounds are normal. There is no distension. Palpations: Abdomen is soft. There is no mass. Tenderness: There is no abdominal tenderness. There is no guarding. Musculoskeletal:         General: No swelling. Cervical back: Normal range of motion and neck supple. No rigidity. Right lower leg: No edema. Left lower leg: No edema. Skin:     General: Skin is warm and dry. Coloration: Skin is not jaundiced. Findings: No erythema or rash. Neurological:      General: No focal deficit present.       Mental Status: She is alert and oriented to person, place, and time. Mental status is at baseline. Psychiatric:         Mood and Affect: Mood normal.         Behavior: Behavior normal.         Thought Content: Thought content normal.           Labs:   CBC: No results found for: CBC   BMP: No results found for: BMP    BNP: No results found for: BNPINT   PT/INR:   Prothrombin Time   Date Value Ref Range Status   02/23/2012 11.60 9.7 - 12.5 SEC Final     Protime   Date Value Ref Range Status   02/28/2021 13.2 12.0 - 14.6 sec Final     INR   Date Value Ref Range Status   02/28/2021 1.01 0.88 - 1.18 Final     Comment:     INR  < or = 1.3  Normal  INR = 2.0 - 3.0  Therapeutic  INR = 2.5 - 3.5  Therapeutic for patients with mechanical  prosthetic heart valve & MI prophylaxis  INR  > or = 3.5  Abnormal/Elevated  INR  > or = 5.0  Critical (requires immediate physician  notification)         APTT:    aPTT   Date Value Ref Range Status   07/31/2018 28.9 26.0 - 36.2 sec Final      CARDIAC ENZYMES:   Troponin   Date Value Ref Range Status   08/06/2021 <0.01 0.00 - 0.03 ng/mL Final     Comment:     <0.030 ng/ml       No measurable cardiac damage. 0.030-0.099 ng/ml  Values of troponin in this range suggest  possible myocardial damage. Repeat assay  4 to 6 hours after the current specimen.    >= 0.100 ng/ml     Indicative of myocardial damage. Recommend continued monitoring of  patient status and cardiac markers.         LIPID PANEL: No results found for: Meryle Ores  LIVER PROFILE:   AST   Date Value Ref Range Status   08/06/2021 18 5 - 32 U/L Final     ALT   Date Value Ref Range Status   08/06/2021 22 5 - 33 U/L Final     Albumin   Date Value Ref Range Status   08/06/2021 3.6 3.5 - 5.2 g/dL Final   02/23/2012 4.3 3.4 - 4.8 G/DL Final              ASSESSMENT and PLAN:    Recently diagnosed hypertension, remains suboptimally controlled  Evidence for hypertensive heart disease, mild concentric left ventricular hypertrophy  Negative work-up for secondary etiologies  Continue losartan at current dose  Uptitrate amlodipine to 5 mg p.o. twice daily  Add diuretic in the form of hydrochlorothiazide 25 mg once daily  Recheck BMP in 1 to 2 weeks after starting drug therapy  Home blood pressure monitoring  Limit sodium to less than 2 g per 24 hours     Extreme morbid obesity, BMI 51.25   Over time we will need to lose a consider amount of weight by heart healthy dieting and exercise    Abnormal liver function tests  Repeat LFTs at the time of next scheduled lab draw  If remains elevated would recommend follow-up with primary care provider for further evaluation including possible dedicated liver ultrasound                  Electronically signed by Sheyla Sotomayor MD on 8/24/2021 at 3:06 PM    Sheyla Sotomayor MD, [place], [facility]  Noninvasive Cardiology Consultant    This dictation was generated by voice recognition computer software. Although all attempts are made to edit the dictation for accuracy, there may be errors in the transcription that are not intended.

## 2021-08-25 ENCOUNTER — OFFICE VISIT (OUTPATIENT)
Age: 41
End: 2021-08-25

## 2021-08-25 ENCOUNTER — OFFICE VISIT (OUTPATIENT)
Dept: URGENT CARE | Age: 41
End: 2021-08-25
Payer: MEDICAID

## 2021-08-25 VITALS
TEMPERATURE: 97.6 F | OXYGEN SATURATION: 98 % | WEIGHT: 280.2 LBS | HEIGHT: 62 IN | HEART RATE: 79 BPM | SYSTOLIC BLOOD PRESSURE: 160 MMHG | BODY MASS INDEX: 51.56 KG/M2 | DIASTOLIC BLOOD PRESSURE: 97 MMHG | RESPIRATION RATE: 20 BRPM

## 2021-08-25 DIAGNOSIS — Z11.59 SCREENING FOR VIRAL DISEASE: ICD-10-CM

## 2021-08-25 DIAGNOSIS — Z11.59 SCREENING FOR VIRAL DISEASE: Primary | ICD-10-CM

## 2021-08-25 DIAGNOSIS — B34.9 VIRAL ILLNESS: Primary | ICD-10-CM

## 2021-08-25 LAB — SARS-COV-2, NAAT: NOT DETECTED

## 2021-08-25 PROCEDURE — G8427 DOCREV CUR MEDS BY ELIG CLIN: HCPCS | Performed by: NURSE PRACTITIONER

## 2021-08-25 PROCEDURE — G8417 CALC BMI ABV UP PARAM F/U: HCPCS | Performed by: NURSE PRACTITIONER

## 2021-08-25 PROCEDURE — 99999 PR OFFICE/OUTPT VISIT,PROCEDURE ONLY: CPT | Performed by: NURSE PRACTITIONER

## 2021-08-25 PROCEDURE — 1036F TOBACCO NON-USER: CPT | Performed by: NURSE PRACTITIONER

## 2021-08-25 PROCEDURE — 99213 OFFICE O/P EST LOW 20 MIN: CPT | Performed by: NURSE PRACTITIONER

## 2021-08-25 ASSESSMENT — ENCOUNTER SYMPTOMS: COUGH: 1

## 2021-08-25 NOTE — PATIENT INSTRUCTIONS
Patient Education        Viral Infections: Care Instructions  Your Care Instructions     You don't feel well, but it's not clear what's causing it. You may have a viral infection. Viruses cause many illnesses, such as the common cold, influenza, fever, rashes, and the diarrhea, nausea, and vomiting that are often called \"stomach flu. \" You may wonder if antibiotic medicines could make you feel better. But antibiotics only treat infections caused by bacteria. They don't work on viruses. The good news is that viral infections usually aren't serious. Most will go away in a few days without medical treatment. In the meantime, there are a few things you can do to make yourself more comfortable. Follow-up care is a key part of your treatment and safety. Be sure to make and go to all appointments, and call your doctor if you are having problems. It's also a good idea to know your test results and keep a list of the medicines you take. How can you care for yourself at home? · Get plenty of rest if you feel tired. · Take an over-the-counter pain medicine if needed, such as acetaminophen (Tylenol), ibuprofen (Advil, Motrin), or naproxen (Aleve). Read and follow all instructions on the label. · Be careful when taking over-the-counter cold or flu medicines and Tylenol at the same time. Many of these medicines have acetaminophen, which is Tylenol. Read the labels to make sure that you are not taking more than the recommended dose. Too much acetaminophen (Tylenol) can be harmful. · Drink plenty of fluids. If you have kidney, heart, or liver disease and have to limit fluids, talk with your doctor before you increase the amount of fluids you drink. · Stay home from work, school, and other public places while you have a fever. When should you call for help? Call 911 anytime you think you may need emergency care. For example, call if:    · You have severe trouble breathing.     · You passed out (lost consciousness). Call your doctor now or seek immediate medical care if:    · You seem to be getting much sicker.     · You have a new or higher fever.     · You have blood in your stools.     · You have new belly pain, or your pain gets worse.     · You have a new rash. Watch closely for changes in your health, and be sure to contact your doctor if:    · You start to get better and then get worse.     · You do not get better as expected. Where can you learn more? Go to https://TeachScapepe[employer]. org and sign in to your Vontoo account. Enter W823 in the KylesYuDoGlobal box to learn more about \"Viral Infections: Care Instructions. \"     If you do not have an account, please click on the \"Sign Up Now\" link. Current as of: September 23, 2020               Content Version: 12.9  © 0139-8678 Healthwise, Wow[employer]. Care instructions adapted under license by [facility] [place] ([facility]). If you have questions about a medical condition or this instruction, always ask your healthcare professional. [address] any warranty or liability for your use of this information. .1. Rest and increase fluid intake. 2. Covid-19 testing. Quarantine at home until results are called to you. If positive you will have to quarantine for 14days. If positive the health dept will also contact you. 3. Monitor for fever and treat as needed with tylenol or ibuprofen  4. If patient is not improving or developing any new/worsening symptoms then return to clinic as needed or go to ER. Patient is to follow up with PCP as needed.

## 2021-08-25 NOTE — PROGRESS NOTES
[facility]  [address] [address] [place] [address]  Dept: [phone]  Dept Fax: [phone]  Loc: [phone]    Bernie Marina is a 39 y.o. female who presents today for her medical conditions/complaintsas noted below. Bernie Marina is c/o of Chills and Nasal Congestion        HPI:     URI   This is a new problem. The current episode started in the past 7 days. The problem has been unchanged. There has been no fever. Associated symptoms include congestion and coughing. She has tried nothing for the symptoms. The treatment provided no relief.     + covid exposure   Past Medical History:   Diagnosis Date    Anemia     Chronic rheumatic arthritis ([facility]     Elevated blood pressure reading     Fibromyalgia     Intracranial hypertension     Obesity     PCOS (polycystic ovarian syndrome)     Sleep apnea     Type 2 diabetes mellitus ([facility]      Past Surgical History:   Procedure Laterality Date    CHOLECYSTECTOMY         Family History   Problem Relation Age of Onset    High Blood Pressure Father     Diabetes Father     Atrial Fibrillation Father     Heart Failure Father     Stroke Father     Diabetes Sister     Diabetes Maternal Grandmother     Heart Attack Other     Ovarian Cancer Paternal Grandmother        Social History     Tobacco Use    Smoking status: Former Smoker     Years: 2.00     Types: Cigarettes    Smokeless tobacco: Never Used   Substance Use Topics    Alcohol use:  Yes     Alcohol/week: 1.0 standard drinks     Types: 1 Glasses of wine per week     Comment: occ      Current Outpatient Medications   Medication Sig Dispense Refill    amLODIPine (NORVASC) 5 MG tablet Take 1 tablet by mouth 2 times daily 60 tablet 3    hydroCHLOROthiazide (HYDRODIURIL) 25 MG tablet Take 1 tablet by mouth every morning 90 tablet 1    losartan (COZAAR) 100 MG tablet Take 1 tablet by mouth daily 30 tablet 5    OZEMPIC, 0.25 OR 0.5 MG/DOSE, 2 MG/1.5ML SOPN INJECT 0.5 MG INTO THE SKIN ONCE A WEEK      busPIRone (BUSPAR) 5 MG tablet Take 1 tablet by mouth 2 times daily as needed (anxiety) 60 tablet 2    predniSONE (DELTASONE) 5 MG tablet PLEASE SEE ATTACHED FOR DETAILED DIRECTIONS      carvedilol (COREG) 6.25 MG tablet Take 1 tablet by mouth 2 times daily 60 tablet 0    CIMZIA STARTER KIT 6 X 200 MG/ML KIT       blood glucose test strips (ASCENSIA AUTODISC VI;ONE TOUCH ULTRA TEST VI) strip 1 each by In Vitro route 3 times daily As needed. 100 each 11    metFORMIN (GLUCOPHAGE) 500 MG tablet Take 1 tablet by mouth 2 times daily (with meals) 60 tablet 5    valACYclovir (VALTREX) 1 g tablet Take 2 tablets p.o. every 12 hours for 2 doses. May repeat for further outbreaks 8 tablet 2    Norgestim-Eth Estrad Triphasic (TRI-SPRINTEC) 0.18/0.215/0.25 MG-35 MCG TABS Take 1 tablet by mouth daily 84 tablet 3    Blood Glucose Monitoring Suppl (FREESTYLE LITE) ANDREEA USE AS DIRECTED      Lancet Device MISC 1 lancet twice a day 60 Device 11     No current facility-administered medications for this visit.      Allergies   Allergen Reactions    Mushroom Extract Complex        Health Maintenance   Topic Date Due    Varicella vaccine (1 of 2 - 2-dose childhood series) Never done    Pneumococcal 0-64 years Vaccine (1 of 2 - PPSV23) Never done    Diabetic retinal exam  Never done    COVID-19 Vaccine (1) Never done    Hepatitis B vaccine (1 of 3 - Risk 3-dose series) Never done    Diabetic foot exam  06/26/2020    Diabetic microalbuminuria test  08/25/2021    Flu vaccine (1) 09/01/2021    A1C test (Diabetic or Prediabetic)  07/31/2022    Lipid screen  07/31/2022    Potassium monitoring  08/06/2022    Creatinine monitoring  08/06/2022    Cervical cancer screen  12/22/2025    DTaP/Tdap/Td vaccine (2 - Td or Tdap) 04/25/2027    Hepatitis C screen  Completed    HIV screen  Completed    Hepatitis A vaccine  Aged Out    Hib vaccine  Aged Out    Meningococcal (ACWY) vaccine  Aged Out       Subjective:     Review of Systems   Constitutional: Negative for chills and fever. HENT: Positive for congestion. Respiratory: Positive for cough. All other systems reviewed and are negative.      :Objective      Physical Exam  Vitals and nursing note reviewed. Constitutional:       General: She is not in acute distress. Appearance: Normal appearance. She is well-developed. She is ill-appearing. She is not diaphoretic. HENT:      Head: Normocephalic and atraumatic. Right Ear: Tympanic membrane, ear canal and external ear normal.      Left Ear: Tympanic membrane, ear canal and external ear normal.      Nose: Nose normal.      Mouth/Throat:      Mouth: Mucous membranes are moist.      Pharynx: Oropharynx is clear. Eyes:      Conjunctiva/sclera: Conjunctivae normal.      Pupils: Pupils are equal, round, and reactive to light. Cardiovascular:      Rate and Rhythm: Normal rate and regular rhythm. Heart sounds: Normal heart sounds. No murmur heard. Pulmonary:      Effort: Pulmonary effort is normal. No respiratory distress. Breath sounds: Normal breath sounds. No wheezing. Musculoskeletal:      Cervical back: Normal range of motion. Skin:     General: Skin is warm and dry. Findings: No rash. Neurological:      Mental Status: She is alert and oriented to person, place, and time. Psychiatric:         Mood and Affect: Mood normal.         Behavior: Behavior normal.       BP (!) 160/97 Comment: Pt states she is dealing with elevated B/P her PCP aware  Pulse 79   Temp 97.6 °F (36.4 °C)   Resp 20   Ht 5' 2\" (1.575 m)   Wt 280 lb 3.2 oz (127.1 kg)   LMP 07/28/2021 (Approximate)   SpO2 98%   BMI 51.25 kg/m²     :Assessment       Diagnosis Orders   1. Viral illness     2. Screening for viral disease         :Plan   . 1. Rest and increase fluid intake. 2. Covid-19 testing. Quarantine at home until results are called to you.  If positive you will have to quarantine for 14days. If positive the health dept will also contact you. 3. Monitor for fever and treat as needed with tylenol or ibuprofen  4. If patient is not improving or developing any new/worsening symptoms then return to clinic as needed or go to ER. Patient is to follow up with PCP as needed. No orders of the defined types were placed in this encounter. No follow-ups on file. No orders of the defined types were placed in this encounter. Patient given educational materials- see patient instructions. Discussed use, benefit, and side effects of prescribedmedications. All patient questions answered. Pt voiced understanding. Patient Instructions       Patient Education        Viral Infections: Care Instructions  Your Care Instructions     You don't feel well, but it's not clear what's causing it. You may have a viral infection. Viruses cause many illnesses, such as the common cold, influenza, fever, rashes, and the diarrhea, nausea, and vomiting that are often called \"stomach flu. \" You may wonder if antibiotic medicines could make you feel better. But antibiotics only treat infections caused by bacteria. They don't work on viruses. The good news is that viral infections usually aren't serious. Most will go away in a few days without medical treatment. In the meantime, there are a few things you can do to make yourself more comfortable. Follow-up care is a key part of your treatment and safety. Be sure to make and go to all appointments, and call your doctor if you are having problems. It's also a good idea to know your test results and keep a list of the medicines you take. How can you care for yourself at home? · Get plenty of rest if you feel tired. · Take an over-the-counter pain medicine if needed, such as acetaminophen (Tylenol), ibuprofen (Advil, Motrin), or naproxen (Aleve). Read and follow all instructions on the label.   · Be careful when taking over-the-counter cold or flu medicines and Tylenol at the same time. Many of these medicines have acetaminophen, which is Tylenol. Read the labels to make sure that you are not taking more than the recommended dose. Too much acetaminophen (Tylenol) can be harmful. · Drink plenty of fluids. If you have kidney, heart, or liver disease and have to limit fluids, talk with your doctor before you increase the amount of fluids you drink. · Stay home from work, school, and other public places while you have a fever. When should you call for help? Call 911 anytime you think you may need emergency care. For example, call if:    · You have severe trouble breathing.     · You passed out (lost consciousness). Call your doctor now or seek immediate medical care if:    · You seem to be getting much sicker.     · You have a new or higher fever.     · You have blood in your stools.     · You have new belly pain, or your pain gets worse.     · You have a new rash. Watch closely for changes in your health, and be sure to contact your doctor if:    · You start to get better and then get worse.     · You do not get better as expected. Where can you learn more? Go to https://MediaBrix.XMOS. org and sign in to your CabbyGo account. Enter G386 in the MegaHoot box to learn more about \"Viral Infections: Care Instructions. \"     If you do not have an account, please click on the \"Sign Up Now\" link. Current as of: September 23, 2020               Content Version: 12.9  © 2006-2021 Healthwise, Incorporated. Care instructions adapted under license by [facility] ([facility]). If you have questions about a medical condition or this instruction, always ask your healthcare professional. [address] [address] any warranty or liability for your use of this information. .1. Rest and increase fluid intake. 2. Covid-19 testing. Quarantine at home until results are called to you.  If positive you will have to quarantine

## 2021-08-25 NOTE — PROGRESS NOTES
Patient swabbed for COVID-19 using Rapid swab. Patient specimen collected due to order present by Urgent Care provider.

## 2021-08-26 ENCOUNTER — CARE COORDINATION (OUTPATIENT)
Dept: CASE MANAGEMENT | Age: 41
End: 2021-08-26

## 2021-08-26 NOTE — CARE COORDINATION
[address] Transitions Follow Up Call    2021    Patient: Pedro Gutierrez  Patient : 1980   MRN: [id number]  Reason for Admission:   Discharge Date: 21 RARS: No data recorded       Spoke with: [facility] Transitions Subsequent and Final Call    Subsequent and Final Calls  Do you have any ongoing symptoms?: No  Have your medications changed?: No  Do you have any questions related to your medications?: No  Do you currently have any active services?: No  Do you have any needs or concerns that I can assist you with?: No  Identified Barriers: None  Care Transitions Interventions  Other Interventions: Follow Up ; Spoke with patient today for follow up call. She is sick with cough and congestion, has been seen, Covid negative. She is doing better BP wise. Has seen PCP and had follow up with Cardiology and they added a fluid pill and increased her amlodipine. She says she has noticed a big change. Today's reading was 150/89 which is much better than previous readings. She reports that aspect is good. No problems or concerns at this time. She is good about going to her follow up appointments and being vigilant in her health. Will discharge from [facility] services at this time.    Future Appointments   Date Time Provider Sindy Verde   2021  2:45 PM MD [facility]   2021  3:30 PM Alok Yip [facility]   2021 11:00 AM MD SAY Farr [facility]   10/6/2021  8:00 PM [facility] SLEEP BED [address] [place] [address]

## 2021-08-27 ENCOUNTER — OFFICE VISIT (OUTPATIENT)
Dept: FAMILY MEDICINE CLINIC | Age: 41
End: 2021-08-27
Payer: MEDICAID

## 2021-08-27 VITALS
HEIGHT: 62 IN | BODY MASS INDEX: 50.42 KG/M2 | WEIGHT: 274 LBS | HEART RATE: 80 BPM | OXYGEN SATURATION: 98 % | SYSTOLIC BLOOD PRESSURE: 130 MMHG | DIASTOLIC BLOOD PRESSURE: 82 MMHG

## 2021-08-27 DIAGNOSIS — M05.79 RHEUMATOID ARTHRITIS INVOLVING MULTIPLE SITES WITH POSITIVE RHEUMATOID FACTOR (HCC): ICD-10-CM

## 2021-08-27 DIAGNOSIS — I10 UNCONTROLLED HYPERTENSION: Primary | ICD-10-CM

## 2021-08-27 PROCEDURE — G8427 DOCREV CUR MEDS BY ELIG CLIN: HCPCS | Performed by: FAMILY MEDICINE

## 2021-08-27 PROCEDURE — 99213 OFFICE O/P EST LOW 20 MIN: CPT | Performed by: FAMILY MEDICINE

## 2021-08-27 PROCEDURE — G8417 CALC BMI ABV UP PARAM F/U: HCPCS | Performed by: FAMILY MEDICINE

## 2021-08-27 PROCEDURE — 1036F TOBACCO NON-USER: CPT | Performed by: FAMILY MEDICINE

## 2021-08-27 NOTE — PATIENT INSTRUCTIONS
1.  Cut Hydrochlorothiazide 25 mg in half. 2.  Check blood pressure at home daily. Send readings next week.

## 2021-09-01 ASSESSMENT — ENCOUNTER SYMPTOMS
ABDOMINAL PAIN: 0
NAUSEA: 0
ANAL BLEEDING: 0
DIARRHEA: 0
CONSTIPATION: 0
SHORTNESS OF BREATH: 0
COUGH: 0
CHEST TIGHTNESS: 0

## 2021-09-10 DIAGNOSIS — I10 ESSENTIAL (PRIMARY) HYPERTENSION: Primary | ICD-10-CM

## 2021-09-14 ENCOUNTER — OFFICE VISIT (OUTPATIENT)
Dept: FAMILY MEDICINE CLINIC | Age: 41
End: 2021-09-14
Payer: MEDICAID

## 2021-09-14 VITALS
OXYGEN SATURATION: 98 % | TEMPERATURE: 97.5 F | HEART RATE: 76 BPM | WEIGHT: 277 LBS | SYSTOLIC BLOOD PRESSURE: 132 MMHG | BODY MASS INDEX: 50.66 KG/M2 | DIASTOLIC BLOOD PRESSURE: 86 MMHG

## 2021-09-14 DIAGNOSIS — I10 ESSENTIAL (PRIMARY) HYPERTENSION: Primary | ICD-10-CM

## 2021-09-14 DIAGNOSIS — R79.89 ELEVATED LFTS: ICD-10-CM

## 2021-09-14 PROCEDURE — G8417 CALC BMI ABV UP PARAM F/U: HCPCS | Performed by: FAMILY MEDICINE

## 2021-09-14 PROCEDURE — G8428 CUR MEDS NOT DOCUMENT: HCPCS | Performed by: FAMILY MEDICINE

## 2021-09-14 PROCEDURE — 99213 OFFICE O/P EST LOW 20 MIN: CPT | Performed by: FAMILY MEDICINE

## 2021-09-14 PROCEDURE — 1036F TOBACCO NON-USER: CPT | Performed by: FAMILY MEDICINE

## 2021-09-14 NOTE — PROGRESS NOTES
[facility] PHYSICIAN SERVICES  [facility]  [address] [address]  Dept: [phone]  Dept Fax: [phone]  Loc: [phone]    Keyur Palma is a 39 y.o. female who presents today for her medical conditions/complaints as noted below. Keyur Palma is here for 3 Month Follow-Up        HPI:   CC: Here today to discuss the following:    Hypertension  Compliant with medications. No adverse effects from medication. No lightheadedness, palpitations, or chest pain. Last visit her hydrochlorothiazide was dropped to 12.5 mg and she was developing orthostasis. She states the symptoms have resolved. She was started on Ozempic for glucose control and for assistance for weight loss. She developed extreme nausea when she increase the dose to 0.5 mg. She remains on 0.25 currently and is tolerating well. HPI    Past Medical History:   Diagnosis Date    Anemia     Chronic rheumatic arthritis ([address]     Elevated blood pressure reading     Fibromyalgia     Intracranial hypertension     Obesity     PCOS (polycystic ovarian syndrome)     Sleep apnea     Type 2 diabetes mellitus ([facility] [address]       Past Surgical History:   Procedure Laterality Date    CHOLECYSTECTOMY         Family History   Problem Relation Age of Onset    High Blood Pressure Father     Diabetes Father     Atrial Fibrillation Father     Heart Failure Father     Stroke Father     Diabetes Sister     Diabetes Maternal Grandmother     Heart Attack Other     Ovarian Cancer Paternal Grandmother        Social History     Tobacco Use    Smoking status: Former Smoker     Years: 2.00     Types: Cigarettes    Smokeless tobacco: Never Used   Substance Use Topics    Alcohol use:  Yes     Alcohol/week: 1.0 standard drinks     Types: 1 Glasses of wine per week     Comment: occ     Current Outpatient Medications   Medication Sig Dispense Refill    carvedilol (COREG) 6.25 MG tablet TAKE 1 TABLET BY MOUTH TWICE A DAY 60 tablet 3    amLODIPine (NORVASC) 5 MG tablet Take 1 tablet by mouth 2 times daily 60 tablet 3    hydroCHLOROthiazide (HYDRODIURIL) 25 MG tablet Take 1 tablet by mouth every morning (Patient taking differently: Take 12.5 mg by mouth every morning ) 90 tablet 1    losartan (COZAAR) 100 MG tablet Take 1 tablet by mouth daily 30 tablet 5    OZEMPIC, 0.25 OR 0.5 MG/DOSE, 2 MG/1.5ML SOPN INJECT 0.5 MG INTO THE SKIN ONCE A WEEK      busPIRone (BUSPAR) 5 MG tablet Take 1 tablet by mouth 2 times daily as needed (anxiety) 60 tablet 2    CIMZIA STARTER KIT 6 X 200 MG/ML KIT       blood glucose test strips (ASCENSIA AUTODISC VI;ONE TOUCH ULTRA TEST VI) strip 1 each by In Vitro route 3 times daily As needed. 100 each 11    valACYclovir (VALTREX) 1 g tablet Take 2 tablets p.o. every 12 hours for 2 doses. May repeat for further outbreaks 8 tablet 2    Norgestim-Eth Estrad Triphasic (TRI-SPRINTEC) 0.18/0.215/0.25 MG-35 MCG TABS Take 1 tablet by mouth daily 84 tablet 3    Blood Glucose Monitoring Suppl (FREESTYLE LITE) ANDREEA USE AS DIRECTED      Lancet Device MISC 1 lancet twice a day 60 Device 11    predniSONE (DELTASONE) 5 MG tablet PLEASE SEE ATTACHED FOR DETAILED DIRECTIONS (Patient not taking: Reported on 9/14/2021)      metFORMIN (GLUCOPHAGE) 500 MG tablet Take 1 tablet by mouth 2 times daily (with meals) (Patient not taking: Reported on 9/14/2021) 60 tablet 5     No current facility-administered medications for this visit.      Allergies   Allergen Reactions    Mushroom Extract Complex        Health Maintenance   Topic Date Due    Varicella vaccine (1 of 2 - 2-dose childhood series) Never done    Pneumococcal 0-64 years Vaccine (1 of 2 - PPSV23) Never done    Diabetic retinal exam  Never done    Hepatitis B vaccine (1 of 3 - Risk 3-dose series) Never done    Diabetic foot exam  06/26/2020    Diabetic microalbuminuria test  08/25/2021    Flu vaccine (1) Never done    COVID-19 Vaccine (2 - Pfizer 2-dose series) 09/27/2021    A1C test (Diabetic or Prediabetic)  07/31/2022    Lipid screen  07/31/2022    Potassium monitoring  09/03/2022    Creatinine monitoring  09/03/2022    Cervical cancer screen  12/22/2025    DTaP/Tdap/Td vaccine (2 - Td or Tdap) 04/25/2027    Hepatitis C screen  Completed    HIV screen  Completed    Hepatitis A vaccine  Aged Out    Hib vaccine  Aged Out    Meningococcal (ACWY) vaccine  Aged Out       Subjective:      Review of Systems  Review of Systems   Constitutional: Negative for chills and fever. HENT: Negative for congestion. Respiratory: Negative for cough, chest tightness and shortness of breath. Cardiovascular: Negative for chest pain, palpitations and leg swelling. Gastrointestinal: Negative for abdominal pain, anal bleeding, constipation, diarrhea and nausea. Genitourinary: Negative for difficulty urinating. Psychiatric/Behavioral: Negative. SeeHPI for visit specific review of symptoms. All others negative      Objective:   /86   Pulse 76   Temp 97.5 °F (36.4 °C)   Wt 277 lb (125.6 kg)   SpO2 98%   BMI 50.66 kg/m²   Physical Exam    Physical Exam   Constitutional: She appears well-developed. Does not appear ill. Neck: Normal range of motion. Neck supple. No masses. Neck Symmetric. Normal tracheal position. No thyroid enlargement  Cardiovascular: Normal rate and regular rhythm. Exam reveals no friction rub. Carotid arteries: no bruit observed. No murmur heard. Respiratory:  Effort normal and breath sounds normal. No respiratory distress. No wheezes. No rales. No use of accessory muscles or intercostal retractions. Neurological: alert. Psychiatric: normal mood and affect. Her behavior is normal. Normal judgement and insight observed.     Recent Results (from the past 672 hour(s))   COVID-19, Rapid    Collection Time: 08/25/21  2:17 PM   Result Value Ref Range    SARS-CoV-2, NAAT Not Detected Not Detected   Hepatic Function Panel    Collection Time: 09/03/21  2:21 PM   Result Value Ref Range    Total Protein 7.8 6.6 - 8.7 g/dL    Albumin 3.8 3.5 - 5.2 g/dL    Alkaline Phosphatase 80 35 - 104 U/L     (H) 5 - 33 U/L    AST 58 (H) 5 - 32 U/L    Total Bilirubin <0.2 0.2 - 1.2 mg/dL    Bilirubin, Direct 0.2 0.0 - 0.3 mg/dL    Bilirubin, Indirect 0.0 (L) 0.1 - 1.0 mg/dL   Basic Metabolic Panel    Collection Time: 09/03/21  2:21 PM   Result Value Ref Range    Sodium 139 136 - 145 mmol/L    Potassium 4.1 3.5 - 5.0 mmol/L    Chloride 102 98 - 111 mmol/L    CO2 27 22 - 29 mmol/L    Anion Gap 10 7 - 19 mmol/L    Glucose 110 (H) 74 - 109 mg/dL    BUN 13 6 - 20 mg/dL    CREATININE 0.9 0.5 - 0.9 mg/dL    GFR Non-African American >60 >60    GFR African American >59 >59    Calcium 9.3 8.6 - 10.0 mg/dL   Metanephrines Urine    Collection Time: 09/14/21  3:25 PM   Result Value Ref Range    Hours Collected 24     Urine Total Volume 1250          Lab Results   Component Value Date    LABA1C 6.5 (H) 07/31/2021     No results found for: EAG  Lab Results   Component Value Date    TSH 2.500 08/25/2020           Assessment & Plan: The following diagnoses and conditions are stable with no further orders unless indicated:  1. Essential (primary) hypertension  BP Readings from Last 3 Encounters:   09/14/21 132/86   08/27/21 130/82   08/25/21 (!) 160/97   Considerable improvement in her blood pressure. Ideally would like to get her blood pressure less than 130/80 if she tolerates it. For the time being, we will continue with current regimen and follow-up in 6 weeks    If she tolerates Ozempic and has success in management of her weight loss that may also help contribute to improvements of her blood pressure      2. Elevated LFTs    - US LIVER; Future  - Alpha-1-Antitrypsin; Future  - SAÚL Titer IgG by IFA; Future  - Ceruloplasmin; Future  - Comprehensive Metabolic Panel;  Future  - F-actin (smooth muscle) antibody w/ reflex to titer; Future  - Ferritin; Future  - Hepatitis Panel, Acute; Future  - Iron; Future  - Mitochondrial antibodies, M2; Future  - Transferrin; Future      Her liver enzymes were normal in August 6. She had laboratory work at [facility] on August 14 which showed elevations her ALT and AST of 169 and 84 respectively. Repeat liver enzymes on September 3 showed an ALT of 233 and an AST of 58. His elevations in LFTs preceded initiation of her Ozempic. Concern for Cimzia may be contributing to this. Return in about 6 weeks (around 10/26/2021). Discussed use, benefit, and side effects of prescribed medications. All patient questions answered. Pt voiced understanding. Reviewed health maintenance. Instructedto continue current medications, diet and exercise. Patient agreed with treatmentplan. Follow up as directed. Note dictated using Dragon Dictation software  Sometimes this dictation software makes erroneous transcriptions.

## 2021-09-21 ENCOUNTER — OFFICE VISIT (OUTPATIENT)
Dept: CARDIOLOGY CLINIC | Age: 41
End: 2021-09-21
Payer: MEDICAID

## 2021-09-21 VITALS
HEART RATE: 77 BPM | WEIGHT: 273 LBS | BODY MASS INDEX: 50.24 KG/M2 | SYSTOLIC BLOOD PRESSURE: 102 MMHG | HEIGHT: 62 IN | DIASTOLIC BLOOD PRESSURE: 80 MMHG

## 2021-09-21 DIAGNOSIS — I10 ESSENTIAL HYPERTENSION: Primary | ICD-10-CM

## 2021-09-21 PROCEDURE — G8417 CALC BMI ABV UP PARAM F/U: HCPCS | Performed by: INTERNAL MEDICINE

## 2021-09-21 PROCEDURE — 1036F TOBACCO NON-USER: CPT | Performed by: INTERNAL MEDICINE

## 2021-09-21 PROCEDURE — G8427 DOCREV CUR MEDS BY ELIG CLIN: HCPCS | Performed by: INTERNAL MEDICINE

## 2021-09-21 PROCEDURE — 99212 OFFICE O/P EST SF 10 MIN: CPT | Performed by: INTERNAL MEDICINE

## 2021-09-21 ASSESSMENT — ENCOUNTER SYMPTOMS
ABDOMINAL PAIN: 0
NAUSEA: 0
EYE REDNESS: 0
SHORTNESS OF BREATH: 0
CHEST TIGHTNESS: 0
BLOOD IN STOOL: 0
COUGH: 0
ABDOMINAL DISTENTION: 0
EYE PAIN: 0
EYE DISCHARGE: 0
WHEEZING: 0
DIARRHEA: 0
CONSTIPATION: 0
SORE THROAT: 0
VOMITING: 0
APNEA: 0
FACIAL SWELLING: 0

## 2021-09-21 NOTE — PROGRESS NOTES
Cardiology Office Visit Note  [facility], [address] [place] [address]  [id number]  Phone: [phone]  Fax: [phone]                            Date:  9/21/2021  Patient: Julio Hunt  Age:  39 y.o., 1980    Referral: No ref. provider found    REASON FOR VISIT:  Check-Up and Hypertension         PROBLEM LIST:    Patient Active Problem List    Diagnosis Date Noted    Hypertension 07/31/2021     Priority: Low    Facial numbness 07/31/2021     Priority: Low    Family history of ovarian cancer 12/22/2020     Priority: Low    Menorrhagia with regular cycle 12/22/2020     Priority: Low    Iron deficiency anemia 09/04/2020     Priority: Low    PCOS (polycystic ovarian syndrome) 08/20/2018     Priority: Low    Rheumatoid arthritis involving multiple sites with positive rheumatoid factor ([facility] 07/06/2018     Priority: Low    Fibromyalgia 07/06/2018     Priority: Low    Bilateral carpal tunnel syndrome 07/06/2018     Priority: Low    Anemia 11/02/2017     Priority: Low    Familial hypercholesterolemia 11/02/2017     Priority: Low    Type 2 diabetes mellitus ([facility]      Priority: Low         PRESENTATION: Julio Hunt is a 39y.o. year old female who is seen today for an early cardiology follow-up appointment. She was seen for further evaluation of newly diagnosed hypertension uncontrolled despite multiple medications. Her medical history otherwise notable for type 2 diabetes mellitus, morbid obesity, rheumatoid arthritis involving multiple sites, fibromyalgia and iron deficiency anemia. Her antihypertensive medication regimen was adjusted and over the last few weeks her blood pressure has normalized. She has been logging blood pressures in the 1 85-4 30 systolic range and 58-58 diastolic range. Her hydrochlorothiazide was decreased from 25mg to 12.5 mg daily. She is otherwise on amlodipine, losartan and carvedilol. No reports of chest pain, shortness of breath or diaphoresis.   She does complain of fatigue which she attributes to her Ozempic which has since been adjusted by her primary care provider. She has no new concerns at this time. REVIEW OF SYSTEMS:  Review of Systems   Constitutional: Positive for fatigue. Negative for chills and fever. HENT: Negative for congestion, facial swelling, hearing loss and sore throat. Eyes: Negative for pain, discharge, redness and visual disturbance. Respiratory: Negative for apnea, cough, chest tightness, shortness of breath and wheezing. Cardiovascular: Negative for chest pain, palpitations and leg swelling. Gastrointestinal: Negative for abdominal distention, abdominal pain, blood in stool, constipation, diarrhea, nausea and vomiting. Endocrine: Negative for polydipsia, polyphagia and polyuria. Genitourinary: Negative for dysuria, flank pain, frequency and hematuria. Musculoskeletal: Negative for joint swelling, myalgias and neck pain. Skin: Negative for pallor and rash. Neurological: Negative for dizziness, syncope, speech difficulty, light-headedness, numbness and headaches. Psychiatric/Behavioral: Negative for confusion, hallucinations and sleep disturbance.        Past Medical History:      Diagnosis Date    Anemia     Chronic rheumatic arthritis (HCC)     Elevated blood pressure reading     Fibromyalgia     Intracranial hypertension     Obesity     PCOS (polycystic ovarian syndrome)     Sleep apnea     Type 2 diabetes mellitus (HCC)        Past Surgical History:      Procedure Laterality Date    CHOLECYSTECTOMY         Medications:  Current Outpatient Medications   Medication Sig Dispense Refill    carvedilol (COREG) 6.25 MG tablet TAKE 1 TABLET BY MOUTH TWICE A DAY 60 tablet 3    amLODIPine (NORVASC) 5 MG tablet Take 1 tablet by mouth 2 times daily 60 tablet 3    hydroCHLOROthiazide (HYDRODIURIL) 25 MG tablet Take 1 tablet by mouth every morning (Patient taking differently: Take 12.5 mg by mouth every morning ) 90 tablet 1    losartan (COZAAR) 100 MG tablet Take 1 tablet by mouth daily 30 tablet 5    OZEMPIC, 0.25 OR 0.5 MG/DOSE, 2 MG/1.5ML SOPN INJECT 0.5 MG INTO THE SKIN ONCE A WEEK      predniSONE (DELTASONE) 5 MG tablet PLEASE SEE ATTACHED FOR DETAILED DIRECTIONS      CIMZIA STARTER KIT 6 X 200 MG/ML KIT       blood glucose test strips (ASCENSIA AUTODISC VI;ONE TOUCH ULTRA TEST VI) strip 1 each by In Vitro route 3 times daily As needed. 100 each 11    metFORMIN (GLUCOPHAGE) 500 MG tablet Take 1 tablet by mouth 2 times daily (with meals) 60 tablet 5    valACYclovir (VALTREX) 1 g tablet Take 2 tablets p.o. every 12 hours for 2 doses. May repeat for further outbreaks 8 tablet 2    Norgestim-Eth Estrad Triphasic (TRI-SPRINTEC) 0.18/0.215/0.25 MG-35 MCG TABS Take 1 tablet by mouth daily 84 tablet 3    Blood Glucose Monitoring Suppl (FREESTYLE LITE) ANDREEA USE AS DIRECTED      Lancet Device MISC 1 lancet twice a day 60 Device 11     No current facility-administered medications for this visit. Allergies:  Mushroom extract complex    Social History:  Social History     Occupational History    Not on file   Tobacco Use    Smoking status: Former Smoker     Years: 2.00     Types: Cigarettes    Smokeless tobacco: Never Used   Vaping Use    Vaping Use: Never used   Substance and Sexual Activity    Alcohol use:  Yes     Alcohol/week: 1.0 standard drinks     Types: 1 Glasses of wine per week     Comment: occ    Drug use: No    Sexual activity: Yes     Birth control/protection: OCP         Family History:       Problem Relation Age of Onset    High Blood Pressure Father     Diabetes Father     Atrial Fibrillation Father     Heart Failure Father     Stroke Father     Diabetes Sister     Diabetes Maternal Grandmother     Heart Attack Other     Ovarian Cancer Paternal Grandmother          Physical Examination:  /80   Pulse 77   Ht 5' 2\" (1.575 m)   Wt 273 lb (123.8 kg)   BMI 49.93 kg/m²   Physical Exam  Vitals reviewed. Constitutional:       General: She is not in acute distress. Appearance: Normal appearance. She is not ill-appearing, toxic-appearing or diaphoretic. HENT:      Head: Normocephalic and atraumatic. Eyes:      General: No scleral icterus. Right eye: No discharge. Left eye: No discharge. Conjunctiva/sclera: Conjunctivae normal.   Neck:      Vascular: No carotid bruit. Cardiovascular:      Rate and Rhythm: Normal rate and regular rhythm. No extrasystoles are present. Chest Wall: PMI is not displaced. No thrill. Pulses:           Carotid pulses are 3+ on the right side and 3+ on the left side. Radial pulses are 3+ on the right side and 3+ on the left side. Dorsalis pedis pulses are 3+ on the right side and 3+ on the left side. Posterior tibial pulses are 3+ on the right side and 3+ on the left side. Heart sounds: S1 normal and S2 normal. No murmur heard. No friction rub. No gallop. Pulmonary:      Effort: Pulmonary effort is normal. No tachypnea or respiratory distress. Breath sounds: Normal breath sounds. No stridor. No wheezing, rhonchi or rales. Chest:      Chest wall: No tenderness. Abdominal:      General: Bowel sounds are normal. There is no distension. Palpations: Abdomen is soft. There is no mass. Tenderness: There is no abdominal tenderness. There is no guarding. Musculoskeletal:         General: No swelling. Cervical back: Normal range of motion and neck supple. No rigidity. Right lower leg: No edema. Left lower leg: No edema. Skin:     General: Skin is warm and dry. Coloration: Skin is not jaundiced. Findings: No erythema or rash. Neurological:      General: No focal deficit present. Mental Status: She is alert and oriented to person, place, and time. Mental status is at baseline.    Psychiatric:         Mood and Affect: Mood normal.         Behavior: Behavior normal.         Thought Content: Thought content normal.           Labs:   CBC: No results found for: CBC   BMP: No results found for: BMP    BNP: No results found for: BNPINT   PT/INR:   Prothrombin Time   Date Value Ref Range Status   02/23/2012 11.60 9.7 - 12.5 SEC Final     Protime   Date Value Ref Range Status   02/28/2021 13.2 12.0 - 14.6 sec Final     INR   Date Value Ref Range Status   02/28/2021 1.01 0.88 - 1.18 Final     Comment:     INR  < or = 1.3  Normal  INR = 2.0 - 3.0  Therapeutic  INR = 2.5 - 3.5  Therapeutic for patients with mechanical  prosthetic heart valve & MI prophylaxis  INR  > or = 3.5  Abnormal/Elevated  INR  > or = 5.0  Critical (requires immediate physician  notification)         APTT:    aPTT   Date Value Ref Range Status   07/31/2018 28.9 26.0 - 36.2 sec Final      CARDIAC ENZYMES:   Troponin   Date Value Ref Range Status   08/06/2021 <0.01 0.00 - 0.03 ng/mL Final     Comment:     <0.030 ng/ml       No measurable cardiac damage. 0.030-0.099 ng/ml  Values of troponin in this range suggest  possible myocardial damage. Repeat assay  4 to 6 hours after the current specimen.    >= 0.100 ng/ml     Indicative of myocardial damage. Recommend continued monitoring of  patient status and cardiac markers. LIPID PANEL: No results found for: LIPIDPAN  LIVER PROFILE:   AST   Date Value Ref Range Status   09/03/2021 58 (H) 5 - 32 U/L Final     ALT   Date Value Ref Range Status   09/03/2021 233 (H) 5 - 33 U/L Final     Albumin   Date Value Ref Range Status   09/03/2021 3.8 3.5 - 5.2 g/dL Final   02/23/2012 4.3 3.4 - 4.8 G/DL Final              ASSESSMENT and PLAN:      Essential hypertension with mild concentric left ventricular hypertrophy. Negative work-up for secondary etiologies to date.   Extreme morbid obesity, BMI, 49.93    Blood pressure well controlled and currently at goal, less than 130/80  No further changes to antihypertensive regimen  Continue home blood pressure monitoring at least 2-3 times per week  Limit sodium to less than 2 g per 24 hours  Will need to lose a consider amount of weight by way of heart healthy dieting and exercise  Continue conservative medical management          Return in about 1 year (around 9/21/2022). Electronically signed by Omkar Kenny MD on 9/21/2021 at 11:57 AM    Omkar Kenny MD, [place], [facility]  Noninvasive Cardiology Consultant    This dictation was generated by voice recognition computer software. Although all attempts are made to edit the dictation for accuracy, there may be errors in the transcription that are not intended.

## 2021-10-05 ENCOUNTER — HOSPITAL ENCOUNTER (OUTPATIENT)
Dept: ULTRASOUND IMAGING | Age: 41
Discharge: HOME | End: 2021-10-05
Payer: MEDICAID

## 2021-10-05 DIAGNOSIS — R79.89 ELEVATED LFTS: ICD-10-CM

## 2021-10-05 PROCEDURE — 76705 ECHO EXAM OF ABDOMEN: CPT

## 2021-10-05 RX ORDER — AMLODIPINE BESYLATE 5 MG/1
5 TABLET ORAL 2 TIMES DAILY
Qty: 60 TABLET | Refills: 3 | Status: SHIPPED | OUTPATIENT
Start: 2021-10-05 | End: 2021-12-30

## 2021-10-06 ENCOUNTER — HOSPITAL ENCOUNTER (OUTPATIENT)
Dept: SLEEP CENTER | Age: 41
Discharge: HOME | End: 2021-10-08
Payer: MEDICAID

## 2021-10-06 PROCEDURE — 99999 PR OFFICE/OUTPT VISIT,PROCEDURE ONLY: CPT | Performed by: INTERNAL MEDICINE

## 2021-10-06 PROCEDURE — 95811 POLYSOM 6/>YRS CPAP 4/> PARM: CPT

## 2021-10-07 NOTE — PROGRESS NOTES
[address], [address]  Phone [phone] Fax [phone]     Sleep Study Technician Review    Patient Name:  Radha Zuñiga  :   1980  Referring Provider: Gerard Perdomo MD    Brief History:  Radha Zuñiga is a 39 y.o. female with a history of Hypertension, Diabewtes, SETH, Fibromyalgia and Obesity who has been referred for a titration study. She does not feel rested during the day. She states she is compliant with her CPAP but does feel drowsy. Height:   5'2\"  Weight:  273 lbs  BMI: 49.93  Neck Circ: 16\"  MALLAMPATI: Type 4  ESS:      Type of Study: Titration  Time Stage Position Snore Hypopnea Obs Apnea Gabrielle Apnea PAP O2   2200 Awake Right No No No No Cpap 6 RA   2300 2 Supine No No No No Cpap 9 RA   2400 2 Supine No No No No Bipap 11/7 RA   0100 2 Supine No No No No Bipap 13/9 RA   0200 2 Supine No No No No Bipap 13/9 RA   0300 2 Supine No No No No Bipap 14/10 RA   0400 2 Supine No No No No Bipap 14/10 RA   0430 1 Supine No No No No Bipap 14/10 RA                Summary:  Pt stated that it has been several years since she has had a sleep study and her machine is old. Pt stated that she is not sure if it even works right. Pt was switched to bipap due to not able to tolerate cpap therapy. Pt tolerated mask well. DME: Laura Hart     Final PAP settings:  Bipap 14/10   Mask Type: Nasal  Mask: Dreamwear   Mask Size: Medium    The study was reviewed briefly with Radha Zuñiga. She will be notified of the formal results and recommendations after the study is scored and interpreted. The report will be sent to his referring provider.     Technician: YORDY Rosales

## 2021-10-10 DIAGNOSIS — G47.33 OSA (OBSTRUCTIVE SLEEP APNEA): Primary | ICD-10-CM

## 2021-10-10 NOTE — PROCEDURES
Polysomnography Report  Patient Name [facility] Christoph Account Number [de-identified]    1980 Referring Provider Luli Davis M.D.   Age/ Gender 41 years/F Interpreting physician Beny Morgan M.D., [facility]   Neck circumference/  Mallampati classification 16.0 in/class 4 Night Technician Kaye Day, RPSGT   Bryan score  Scoring Technician Griselda Eastman, CRT, RPSGT   Height 62.0 in Indications for the test Obstructive Sleep Apnea   Weight 273.0 lbs Test Titration Polysomnogram   BMI 49.9 Date of test 10/6/2021     Procedure  A Titration Polysomnogram was conducted on the night of 10/6/2021. The study was performed and scored per AASM guidelines. The following were monitored: frontal, central, and occipital EEG, electrooculogram (EOG), submentalis EMG, nasal and oral airflow, intranasal pressure, thoracic plethysmography, abdominal plethysmography, anterior tibialis EMG, electrocardiogram, body position, and positive airway pressure (PAP). Arterial oxygen saturation was monitored with a pulse oximeter. The study was scored utilizing 30 second epochs. Hypopneas were scored using per AASM definition VIII, D, 1B.     Sleep Scoring Data  Lights out 9:13:44 PM Sleep latency 1.0 min Time in N1 50.5 min N1% 12.7%   Lights on 4:31:38 AM WASO 37.9 min Time in N2 296.5 min N2% 74.3%   TIB/.9 min Sleep efficiency 91.4% Time in N3 0.5 min N3% 0.1%   .0 min REM latency 173.0 min Time in R 51.5 min R% 12.9%     Respiratory Events Summary   NREM REM Total   Hypopnea index 1.9 3.5 2.1   Apnea index 0.0 0.0 0.0   RERA index 0.0 0.0 0.0   AHI 1.9 3.5 2.1   RDI (AHI + RERA index) 1.9 3.5 2.1     Respiratory Events by Sleep Stage   Obstructive Apneas OA Index Central Apneas CA Index Mixed Apneas MA Index   Hypopneas H Index   RERAs RERA Index   NREM 0 0.0 0 0.0 0 0.0 11 1.9 0 0.0   REM 0 0.0 0 0.0 0 0.0 3 3.5 0 0.0   Total 0 0.0 0 0.0 0 0.0 14 2.1 0 0.0     Respiratory Events by Body Position   Time (min) Obstructive Apneas OA Index Central Apneas CA Index Mixed Apneas MA Index   Hypopneas H Index   RERAs RERA  Index Total  AHI Total RDI   Supine 374.7  0 0.0 0 0.0 0 0.0 9 1.5 0 0.0 1.5 1.5   R/Supine                                           L/Supine                                           Right 61.6  0 0.0 0 0.0 0 0.0 5 6.0 0 0.0 6.0 6.0   Left                                           Prone                                                 R/Prone                                               L/Prone                                             Up 0.2  0 0.0 0 0.0 0 0.0 0 0.0 0 0.0 0.0 0.0     Snoring  Snoring Rating (loudness 0-4) 1   Snoring Amount (% of total sleep time with snoring) 0%     Oximetry Data              Wake NREM REM TST   Average Saturation  98% 96% 96% 96%   Desaturation Index (#/hour)  0.7 2.3 0.9   Desaturation Max Duration (sec)  43.0 34.5 43.0   Minimum O2 Saturation    84%     Oximetry Distribution              WaKe REM NREM TOTAL %TIB   <90% (min) 1.1 0.0 0.0 1.1 0.3%   <85% (min) 0.3 0.0 0.0 0.3 0.1%   <80% (min) 0.1 0.0 0.0 0.1 0.0%   <75% (min) 0.0 0.0 0.0 0.0 0.0%   <70% (min) 0.0 0.0 0.0 0.0 0.0%   <60% (min) 0.0 0.0 0.0 0.0 0.0%   <50% (min) 0.0 0.0 0.0 0.0 0.0%   Fail    (min) 0.6 0.0 0.0 0.6      Respiratory Pattern   Present Absent Duration   Hypoventilation  ? N/A   Cheyne Coburn Respirations  ? N/A   Periodic Breathing  ? N/A     Heart Rate   Mean heart rate (bmp) Maximum heart rate (bmp)   During recording       106   During sleep 68.2 106     Heart Rhythm Summary  Normal sinus rhythm     Heart Rhythm Events   Type of events Present Absent Rate-Duration/Total Duration   Bradycardia  ? N/A   Asystole  ? N/A   Sinus Tachycardia During Sleep  ? N/A   Narrow Complex Tachycardia  ? N/A   Wide Complex Tachycardia  ? N/A   Atrial Fibrillation  ? N/A   Other Arrhythmias  ?  N/A     Arousals   NREM REM Total Arousal Index   Spontaneous 25 4 35 5.3   Respiratory 10 3 13 2.0   Snore 0 0 0 0.0   Leg movement 10 0 10 1.5   Total 45 7 59 8.9     Periodic Limb Movement Data (legs unless otherwise noted)   Leg Movements PLMS PLMS with arousal   # of events 13 13 10   Index (#/hr TST) 2.0 2.0 1.5     Therapy Titration  IPAP EPAP TIB Sleep REM Apneas Hypopneas RERAs   Min     (min) (min) (min) CA# OA# MA# Index # Index # Index AHI RDI SpO2    4 4 17.5 5.0 0.0 0 0 0 0.0 1 12.0 0 0.0 12.0 12.0 95    6 6 35.7 22.5 0.0 0 0 0 0.0 6 16.0 0 0.0 16.0 16.0 95    8 8 35.1 31.6 0.0 0 0 0 0.0 2 3.8 0 0.0 3.8 3.8 93    9 9 26.8 24.3 0.0 0 0 0 0.0 1 2.5 0 0.0 2.5 2.5 88    10 10 15.9 15.9 0.0 0 0 0 0.0 0 0.0 0 0.0 0.0 0.0 93    11 7 52.8 51.8 14.4 0 0 0 0.0 2 2.3 0 0.0 2.3 2.3 92    12 8 18.0 18.0 18.0 0 0 0 0.0 1 3.3 0 0.0 3.3 3.3 92    13 9 120.3 116.3 5.3 0 0 0 0.0 1 0.5 0 0.0 0.5 0.5 84    14 10 109.8 109.3 12.8 0 0 0 0.0 0 0.0 0 0.0 0.0 0.0 90          Interpretation:     1. Obstructive sleep apnea. 2. Morbid obesity. 3. Subjective hypersomnia. 4. Adequate PAP titration. Recommendations:    1. BIPAP setting: IPAP 14 cm water pressure. EPAP 10 cm water pressure. 2. Weight loss and exercise. 3. Avoid risky activity such as driving if sleepy. 4. Discuss sleep hygiene with the patient. 5. Monitor PAP use and effectiveness. Junior Sierra MD, [facility], [facility]                 Note:   The interpreting physician named above, reviewed this record in its entirety, including sleep staging, EMG activity, EEG, EKG, breathing parameters, oxygen saturation, body position, and behavior unless otherwise noted. The interpretation is based on this information in addition to available clinical history and physical examination data.

## 2021-10-29 ENCOUNTER — OFFICE VISIT (OUTPATIENT)
Dept: FAMILY MEDICINE CLINIC | Age: 41
End: 2021-10-29
Payer: MEDICAID

## 2021-10-29 VITALS
HEART RATE: 87 BPM | WEIGHT: 270 LBS | OXYGEN SATURATION: 99 % | SYSTOLIC BLOOD PRESSURE: 122 MMHG | BODY MASS INDEX: 49.38 KG/M2 | TEMPERATURE: 97.9 F | DIASTOLIC BLOOD PRESSURE: 82 MMHG

## 2021-10-29 DIAGNOSIS — R06.00 DYSPNEA, UNSPECIFIED TYPE: ICD-10-CM

## 2021-10-29 DIAGNOSIS — E28.2 PCOS (POLYCYSTIC OVARIAN SYNDROME): ICD-10-CM

## 2021-10-29 DIAGNOSIS — Z99.89 BIPAP (BIPHASIC POSITIVE AIRWAY PRESSURE) DEPENDENCE: ICD-10-CM

## 2021-10-29 DIAGNOSIS — R79.89 ELEVATED LFTS: ICD-10-CM

## 2021-10-29 DIAGNOSIS — I10 ESSENTIAL (PRIMARY) HYPERTENSION: ICD-10-CM

## 2021-10-29 DIAGNOSIS — E11.9 TYPE 2 DIABETES MELLITUS WITHOUT COMPLICATION, WITHOUT LONG-TERM CURRENT USE OF INSULIN (HCC): Primary | ICD-10-CM

## 2021-10-29 DIAGNOSIS — G56.03 BILATERAL CARPAL TUNNEL SYNDROME: ICD-10-CM

## 2021-10-29 DIAGNOSIS — R53.83 OTHER FATIGUE: ICD-10-CM

## 2021-10-29 PROCEDURE — G8484 FLU IMMUNIZE NO ADMIN: HCPCS | Performed by: FAMILY MEDICINE

## 2021-10-29 PROCEDURE — 3044F HG A1C LEVEL LT 7.0%: CPT | Performed by: FAMILY MEDICINE

## 2021-10-29 PROCEDURE — 2022F DILAT RTA XM EVC RTNOPTHY: CPT | Performed by: FAMILY MEDICINE

## 2021-10-29 PROCEDURE — G8428 CUR MEDS NOT DOCUMENT: HCPCS | Performed by: FAMILY MEDICINE

## 2021-10-29 PROCEDURE — 1036F TOBACCO NON-USER: CPT | Performed by: FAMILY MEDICINE

## 2021-10-29 PROCEDURE — G8417 CALC BMI ABV UP PARAM F/U: HCPCS | Performed by: FAMILY MEDICINE

## 2021-10-29 PROCEDURE — 99213 OFFICE O/P EST LOW 20 MIN: CPT | Performed by: FAMILY MEDICINE

## 2021-10-29 NOTE — PROGRESS NOTES
[facility] PHYSICIAN SERVICES  [facility]  [address] [address] [place] [address]  Dept: [phone]  Dept Fax: [phone]  Loc: [phone]    Gurinder Saleh is a 39 y.o. female who presents today for her medical conditions/complaints as noted below. Gurinder Saleh is here for Follow-up        HPI:   CC: Here today to discuss the followin-month follow-up for chronic medical issues which include hypertension, weight loss and diabetes. She saw cardiology in September and her blood pressure was well controlled at that visit. No further changes were made to her medication regimen. She was advised to continue checking her home blood pressure readings. They suggested follow-up in 1 year. She was also being evaluated for elevated liver enzymes. Her liver ultrasound was normal.  Normal appearance of the kidney. She attributes her elevated liver enzymes to starting Cimzia. She also had a sleep study indicating obstructive sleep apnea. BiPAP was suggested. She has a follow-up appointment with pulmonology in December. Diabetes Mellitus  Has been compliant with medications. No side effects of medications since last visit. No polyuria, polydipsia, or vision changes since last visit. No symptomatic episodes of hypoglycemia. She remains on Metformin  She was attempted on Ozempic but developed severe nausea and has since discontinued it. She is had issues with carpal tunnel syndrome. She knows she needs to have this addressed but she is currently waiting for the right time for recovery. She continues to have numbness and occasional weakness of her bilateral hands. She continues to be followed by rheumatology. She is currently off Cimzia as it was not making her feel well. She states at the moment she feels \"okay\". She has had her covid vaccine.            HPI    Past Medical History:   Diagnosis Date    Anemia     Chronic rheumatic arthritis ([address]     Elevated blood pressure reading     Fibromyalgia     Intracranial hypertension     Obesity     PCOS (polycystic ovarian syndrome)     Sleep apnea     Type 2 diabetes mellitus ([facility]       Past Surgical History:   Procedure Laterality Date    CHOLECYSTECTOMY         Family History   Problem Relation Age of Onset    High Blood Pressure Father     Diabetes Father     Atrial Fibrillation Father     Heart Failure Father     Stroke Father     Diabetes Sister     Diabetes Maternal Grandmother     Heart Attack Other     Ovarian Cancer Paternal Grandmother        Social History     Tobacco Use    Smoking status: Former Smoker     Years: 2.00     Types: Cigarettes    Smokeless tobacco: Never Used   Substance Use Topics    Alcohol use: Yes     Alcohol/week: 1.0 standard drinks     Types: 1 Glasses of wine per week     Comment: occ     Current Outpatient Medications   Medication Sig Dispense Refill    amLODIPine (NORVASC) 5 MG tablet Take 1 tablet by mouth 2 times daily 60 tablet 3    carvedilol (COREG) 6.25 MG tablet TAKE 1 TABLET BY MOUTH TWICE A DAY 60 tablet 3    hydroCHLOROthiazide (HYDRODIURIL) 25 MG tablet Take 1 tablet by mouth every morning (Patient taking differently: Take 12.5 mg by mouth every morning ) 90 tablet 1    losartan (COZAAR) 100 MG tablet Take 1 tablet by mouth daily 30 tablet 5    blood glucose test strips (ASCENSIA AUTODISC VI;ONE TOUCH ULTRA TEST VI) strip 1 each by In Vitro route 3 times daily As needed.  100 each 11    Norgestim-Eth Estrad Triphasic (TRI-SPRINTEC) 0.18/0.215/0.25 MG-35 MCG TABS Take 1 tablet by mouth daily 84 tablet 3    Blood Glucose Monitoring Suppl (FREESTYLE LITE) ANDREEA USE AS DIRECTED      Lancet Device MISC 1 lancet twice a day 60 Device 11    predniSONE (DELTASONE) 5 MG tablet PLEASE SEE ATTACHED FOR DETAILED DIRECTIONS (Patient not taking: Reported on 10/29/2021)      CIMZIA STARTER KIT 6 X 200 MG/ML KIT  (Patient not taking: Reported on 10/29/2021)  metFORMIN (GLUCOPHAGE) 500 MG tablet Take 1 tablet by mouth 2 times daily (with meals) (Patient not taking: Reported on 10/29/2021) 60 tablet 5    valACYclovir (VALTREX) 1 g tablet Take 2 tablets p.o. every 12 hours for 2 doses. May repeat for further outbreaks (Patient not taking: Reported on 10/29/2021) 8 tablet 2     No current facility-administered medications for this visit. Allergies   Allergen Reactions    Mushroom Extract Complex        Health Maintenance   Topic Date Due    Varicella vaccine (1 of 2 - 2-dose childhood series) Never done    Pneumococcal 0-64 years Vaccine (1 of 2 - PPSV23) Never done    Diabetic retinal exam  Never done    Hepatitis B vaccine (1 of 3 - Risk 3-dose series) Never done    Diabetic foot exam  06/26/2020    Flu vaccine (1) Never done    A1C test (Diabetic or Prediabetic)  10/05/2022    Diabetic microalbuminuria test  10/05/2022    Lipid screen  10/05/2022    Potassium monitoring  10/05/2022    Creatinine monitoring  10/05/2022    Cervical cancer screen  12/22/2025    DTaP/Tdap/Td vaccine (2 - Td or Tdap) 04/25/2027    COVID-19 Vaccine  Completed    Hepatitis C screen  Completed    HIV screen  Completed    Hepatitis A vaccine  Aged Out    Hib vaccine  Aged Out    Meningococcal (ACWY) vaccine  Aged Out       Subjective:      Review of Systems  Review of Systems   Constitutional: Negative for chills and fever. HENT: Negative for congestion. Respiratory: Shortness of breath cardiovascular: Negative for chest pain, palpitations and leg swelling. Gastrointestinal: Negative for abdominal pain, anal bleeding, constipation, diarrhea and nausea. Genitourinary: Negative for difficulty urinating. Psychiatric/Behavioral: Negative. SeeHPI for visit specific review of symptoms.   All others negative      Objective:   /82   Pulse 87   Temp 97.9 °F (36.6 °C)   Wt 270 lb (122.5 kg)   SpO2 99%   BMI 49.38 kg/m²   Physical Exam    Physical Exam   Constitutional: She appears well-developed. Does not appear ill. Neck: Normal range of motion. Neck supple. No masses. Neck Symmetric. Normal tracheal position. No thyroid enlargement  Cardiovascular: Normal rate and regular rhythm. Exam reveals no friction rub. Carotid arteries: no bruit observed. No murmur heard. Respiratory:  Effort normal and breath sounds normal. No respiratory distress. No wheezes. No rales. No use of accessory muscles or intercostal retractions. Neurological: alert. Psychiatric: normal mood and affect. Her behavior is normal. Normal judgement and insight observed.     Recent Results (from the past 672 hour(s))   Lipase    Collection Time: 10/05/21 11:14 AM   Result Value Ref Range    Lipase 32 13 - 60 U/L   Amylase    Collection Time: 10/05/21 11:14 AM   Result Value Ref Range    Amylase 29 28 - 100 U/L   Transferrin    Collection Time: 10/05/21 11:14 AM   Result Value Ref Range    Transferrin 283 200 - 400 mg/dL   Mitochondrial antibodies, M2    Collection Time: 10/05/21 11:14 AM   Result Value Ref Range    Mitochondrial M2 Ab, Igg 15.5 0.0 - 24.9 Units   Iron    Collection Time: 10/05/21 11:14 AM   Result Value Ref Range    Iron 42 37 - 145 ug/dL   Hepatitis Panel, Acute    Collection Time: 10/05/21 11:14 AM   Result Value Ref Range    Hep A IgM Non-Reactive Non-reactive    Hep B Core Ab, IgM Non-Reactive Non-reactive    Hep B S Ag Interp Non-Reactive Non-reactive    Hep C Ab Interp Non-Reactive    Ferritin    Collection Time: 10/05/21 11:14 AM   Result Value Ref Range    Ferritin 117.7 13.0 - 150.0 ng/mL   F-actin (smooth muscle) antibody w/ reflex to titer    Collection Time: 10/05/21 11:14 AM   Result Value Ref Range    F-ACTIN AB IGG 10 0 - 19 Units   Comprehensive Metabolic Panel    Collection Time: 10/05/21 11:14 AM   Result Value Ref Range    Sodium 141 136 - 145 mmol/L    Potassium 4.0 3.5 - 5.0 mmol/L    Chloride 101 98 - 111 mmol/L    CO2 25 22 - 29 mmol/L Anion Gap 15 7 - 19 mmol/L    Glucose 98 74 - 109 mg/dL    BUN 10 6 - 20 mg/dL    CREATININE 0.8 0.5 - 0.9 mg/dL    GFR Non-African American >60 >60    GFR African American >59 >59    Calcium 9.6 8.6 - 10.0 mg/dL    Total Protein 7.1 6.6 - 8.7 g/dL    Albumin 3.9 3.5 - 5.2 g/dL    Total Bilirubin 0.4 0.2 - 1.2 mg/dL    Alkaline Phosphatase 64 35 - 104 U/L    ALT 89 (H) 5 - 33 U/L    AST 28 5 - 32 U/L   Ceruloplasmin    Collection Time: 10/05/21 11:14 AM   Result Value Ref Range    Ceruloplasmin 43 16 - 45 mg/dL   Alpha-1-Antitrypsin    Collection Time: 10/05/21 11:14 AM   Result Value Ref Range    A-1 Antitrypsin 187 90 - 200 mg/dL   Renin Activity and Aldosterone    Collection Time: 10/05/21 11:14 AM   Result Value Ref Range    Aldosterone 16.7 ng/dL    Aldosterone/Renin Activity Calculation 2.8 <=25.0 ratio    Renin Activity 6.0 ng/mL/hr   T4, Free    Collection Time: 10/05/21 11:14 AM   Result Value Ref Range    T4 Free 1.41 0.93 - 1.70 ng/dL   TSH without Reflex    Collection Time: 10/05/21 11:14 AM   Result Value Ref Range    TSH 2.220 0.270 - 4.200 uIU/mL   Microalbumin / Creatinine Urine Ratio    Collection Time: 10/05/21 11:14 AM   Result Value Ref Range    Microalbumin, Random Urine 3.70 0.00 - 19.00 mg/dL    Creatinine, Ur 361.0 4.2 - 622.0 mg/dL    Microalbumin Creatinine Ratio 10.2 mg/g   Hemoglobin A1C    Collection Time: 10/05/21 11:14 AM   Result Value Ref Range    Hemoglobin A1C 6.6 (H) 4.0 - 6.0 %   Lipid Panel    Collection Time: 10/05/21 11:14 AM   Result Value Ref Range    Cholesterol, Total 163 160 - 199 mg/dL    Triglycerides 68 0 - 149 mg/dL    HDL 55 (L) 65 - 121 mg/dL    LDL Calculated 94 <100 mg/dL   CBC Auto Differential    Collection Time: 10/05/21 11:14 AM   Result Value Ref Range    WBC 8.5 4.8 - 10.8 K/uL    RBC 3.97 (L) 4.20 - 5.40 M/uL    Hemoglobin 10.9 (L) 12.0 - 16.0 g/dL    Hematocrit 34.0 (L) 37.0 - 47.0 %    MCV 85.6 81.0 - 99.0 fL    MCH 27.5 27.0 - 31.0 pg    MCHC 32.1 (L) 33.0 - 37.0 g/dL    RDW 14.0 11.5 - 14.5 %    Platelets 521 802 - 500 K/uL    MPV 11.2 9.4 - 12.3 fL    Neutrophils % 61.9 50.0 - 65.0 %    Lymphocytes % 27.1 20.0 - 40.0 %    Monocytes % 6.0 0.0 - 10.0 %    Eosinophils % 3.8 0.0 - 5.0 %    Basophils % 0.7 0.0 - 1.0 %    Neutrophils Absolute 5.3 1.5 - 7.5 K/uL    Immature Granulocytes # 0.0 K/uL    Lymphocytes Absolute 2.3 1.1 - 4.5 K/uL    Monocytes Absolute 0.50 0.00 - 0.90 K/uL    Eosinophils Absolute 0.30 0.00 - 0.60 K/uL    Basophils Absolute 0.10 0.00 - 0.20 K/uL   SAÚL Screen with Reflex    Collection Time: 10/05/21 11:14 AM   Result Value Ref Range    SAÚL Ab, IgG OPAL None Detected None Detected       Narrative   XR ACUTE ABD SERIES CHEST 1 VW    8/6/2021 10:25 PM   History: Chest pain. Abdominal distention. Complete Abdominal Series   PCXR and 2-view abdomen study. The heart and mediastinum are within normal limits. The lungs are adequately expanded with no pneumonia, pneumothorax, or   active failure.     The bones and bowel gas pattern are within normal limits. There is no bowel obstruction or sign of free peritoneal air. No abnormal calcifications are seen. There is prominent fecal material within the colon.        Impression   1. No acute abnormality is seen within the chest or abdomen. 2. Appearance of constipation noted. Signed by Dr Sierra Brady             Conclusions      Summary   Structurally normal mitral valve with normal leaflet mobility. No evidence   of mitral valve stenosis or mild mitral regurgitation. Aortic valve appears to be tricuspid. Structurally normal aortic valve. No significant aortic regurgitation or stenosis is noted. Tricuspid valve is structurally normal.   No evidence of tricuspid regurgitation. The pulmonic valve was not well visualized. LAe   Normal left ventricular size with preserved LV function and an estimated   ejection fraction of approximately 55-60%.  LVH   No evidence of left ventricular mass or thrombus noted. Normal right atrial dimension with no evidence of thrombus or mass noted. Normal right ventricular size with preserved RV function. No evidence of significant pericardial effusion is noted. Aortic root is within normal limits. Signature      ----------------------------------------------------------------   Electronically signed by Dennise Shepherd MD(Interpreting   physician) on 07/31/2021 11:16 AM   ----------------------------------------------------------------             Assessment & Plan: The following diagnoses and conditions are stable with no further orders unless indicated:  1. Type 2 diabetes mellitus without complication, without long-term current use of insulin ([facility])  Lab Results   Component Value Date    LABA1C 6.6 (H) 10/05/2021    LABA1C 6.5 (H) 07/31/2021    LABA1C 6.5 05/24/2021     Lab Results   Component Value Date    LABMICR 3.70 10/05/2021    LDLCALC 94 10/05/2021    CREATININE 0.8 10/05/2021   She reattempted to start Ozempic and became nauseated again. Discontinue the medication  Continue with Metformin  Suggested she speak to diabetic educator    2. Essential (primary) hypertension  BP Readings from Last 3 Encounters:   10/29/21 122/82   09/21/21 102/80   09/14/21 132/86     Blood pressure stable    3. BiPAP (biphasic positive airway pressure) dependence  She has just initiated BiPAP encouraged her to continue    4. Dyspnea, unspecified type  Likely multifactorial  She has newly diagnosed obstructive sleep apnea  We related to deconditioning  Suggest we get pulmonary function testing  She had a chest x-ray in June which showed no acute cardiopulmonary disease  - Full PFT Study With Bronchodilator    5. Other fatigue  Given likely multifactorial she is been evaluated by cardiology, ordered pulmonary function test  She was just started on BiPAP    6.  PCOS (polycystic ovarian syndrome)  Discussed risk factors including hyperglycemia, obesity, and fatty liver disease      7. Elevated LFTs  Reviewed liver enzymes  We will continue to monitor her liver enzymes off of Cimzia      8. Bilateral carpal tunnel syndrome  We will place referral when she is agreeable        Return in about 1 month (around 11/29/2021) for Routine follow up - 30 minutes. Discussed use, benefit, and side effects of prescribed medications. All patient questions answered. Pt voiced understanding. Reviewed health maintenance. Instructedto continue current medications, diet and exercise. Patient agreed with treatmentplan. Follow up as directed. Note dictated using Dragon Dictation software  Sometimes this dictation software makes erroneous transcriptions. No

## 2021-11-12 ENCOUNTER — HOSPITAL ENCOUNTER (EMERGENCY)
Age: 41
Discharge: HOME | End: 2021-11-13
Attending: PEDIATRICS
Payer: MEDICAID

## 2021-11-12 ENCOUNTER — APPOINTMENT (OUTPATIENT)
Dept: GENERAL RADIOLOGY | Age: 41
End: 2021-11-12
Payer: MEDICAID

## 2021-11-12 DIAGNOSIS — I10 HYPERTENSION, UNSPECIFIED TYPE: ICD-10-CM

## 2021-11-12 DIAGNOSIS — R07.9 CHEST PAIN, UNSPECIFIED TYPE: Primary | ICD-10-CM

## 2021-11-12 LAB
ALBUMIN SERPL-MCNC: 3.8 G/DL (ref 3.5–5.2)
ALP BLD-CCNC: 72 U/L (ref 35–104)
ALT SERPL-CCNC: 90 U/L (ref 5–33)
ANION GAP SERPL CALCULATED.3IONS-SCNC: 13 MMOL/L (ref 7–19)
AST SERPL-CCNC: 39 U/L (ref 5–32)
BACTERIA: NEGATIVE /HPF
BASOPHILS ABSOLUTE: 0.1 K/UL (ref 0–0.2)
BASOPHILS RELATIVE PERCENT: 1 % (ref 0–1)
BILIRUB SERPL-MCNC: <0.2 MG/DL (ref 0.2–1.2)
BILIRUBIN URINE: NEGATIVE
BLOOD, URINE: NEGATIVE
BUN BLDV-MCNC: 12 MG/DL (ref 6–20)
C-REACTIVE PROTEIN: 3.21 MG/DL (ref 0–0.5)
CALCIUM SERPL-MCNC: 9.2 MG/DL (ref 8.6–10)
CHLORIDE BLD-SCNC: 100 MMOL/L (ref 98–111)
CLARITY: ABNORMAL
CO2: 20 MMOL/L (ref 22–29)
COLOR: YELLOW
CREAT SERPL-MCNC: 0.8 MG/DL (ref 0.5–0.9)
CRYSTALS, UA: ABNORMAL /HPF
D DIMER: 0.45 UG/ML FEU (ref 0–0.48)
EOSINOPHILS ABSOLUTE: 0.4 K/UL (ref 0–0.6)
EOSINOPHILS RELATIVE PERCENT: 4.1 % (ref 0–5)
EPITHELIAL CELLS, UA: 13 /HPF (ref 0–5)
GFR AFRICAN AMERICAN: >59
GFR NON-AFRICAN AMERICAN: >60
GLUCOSE BLD-MCNC: 122 MG/DL (ref 74–109)
GLUCOSE URINE: NEGATIVE MG/DL
HCG(URINE) PREGNANCY TEST: NEGATIVE
HCT VFR BLD CALC: 37.1 % (ref 37–47)
HEMOGLOBIN: 11.9 G/DL (ref 12–16)
HYALINE CASTS: 4 /HPF (ref 0–8)
IMMATURE GRANULOCYTES #: 0.1 K/UL
KETONES, URINE: 15 MG/DL
LEUKOCYTE ESTERASE, URINE: ABNORMAL
LIPASE: 41 U/L (ref 13–60)
LYMPHOCYTES ABSOLUTE: 3.5 K/UL (ref 1.1–4.5)
LYMPHOCYTES RELATIVE PERCENT: 35.2 % (ref 20–40)
MAGNESIUM: 1.9 MG/DL (ref 1.6–2.6)
MCH RBC QN AUTO: 28.3 PG (ref 27–31)
MCHC RBC AUTO-ENTMCNC: 32.1 G/DL (ref 33–37)
MCV RBC AUTO: 88.1 FL (ref 81–99)
MONOCYTES ABSOLUTE: 0.8 K/UL (ref 0–0.9)
MONOCYTES RELATIVE PERCENT: 7.6 % (ref 0–10)
NEUTROPHILS ABSOLUTE: 5.1 K/UL (ref 1.5–7.5)
NEUTROPHILS RELATIVE PERCENT: 51.6 % (ref 50–65)
NITRITE, URINE: NEGATIVE
PDW BLD-RTO: 13.4 % (ref 11.5–14.5)
PH UA: 5 (ref 5–8)
PLATELET # BLD: 358 K/UL (ref 130–400)
PMV BLD AUTO: 11.4 FL (ref 9.4–12.3)
POTASSIUM REFLEX MAGNESIUM: 3.4 MMOL/L (ref 3.5–5)
PROTEIN UA: NEGATIVE MG/DL
RBC # BLD: 4.21 M/UL (ref 4.2–5.4)
RBC UA: 1 /HPF (ref 0–4)
SARS-COV-2, NAAT: NOT DETECTED
SODIUM BLD-SCNC: 133 MMOL/L (ref 136–145)
SPECIFIC GRAVITY UA: 1.01 (ref 1–1.03)
TOTAL PROTEIN: 7.9 G/DL (ref 6.6–8.7)
TROPONIN: <0.01 NG/ML (ref 0–0.03)
UROBILINOGEN, URINE: 0.2 E.U./DL
WBC # BLD: 9.8 K/UL (ref 4.8–10.8)
WBC UA: 7 /HPF (ref 0–5)

## 2021-11-12 PROCEDURE — 96375 TX/PRO/DX INJ NEW DRUG ADDON: CPT

## 2021-11-12 PROCEDURE — 6360000002 HC RX W HCPCS: Performed by: PEDIATRICS

## 2021-11-12 PROCEDURE — 85025 COMPLETE CBC W/AUTO DIFF WBC: CPT

## 2021-11-12 PROCEDURE — 86140 C-REACTIVE PROTEIN: CPT

## 2021-11-12 PROCEDURE — 80053 COMPREHEN METABOLIC PANEL: CPT

## 2021-11-12 PROCEDURE — 93005 ELECTROCARDIOGRAM TRACING: CPT | Performed by: PEDIATRICS

## 2021-11-12 PROCEDURE — 71046 X-RAY EXAM CHEST 2 VIEWS: CPT

## 2021-11-12 PROCEDURE — 99284 EMERGENCY DEPT VISIT MOD MDM: CPT

## 2021-11-12 PROCEDURE — 83690 ASSAY OF LIPASE: CPT

## 2021-11-12 PROCEDURE — 36415 COLL VENOUS BLD VENIPUNCTURE: CPT

## 2021-11-12 PROCEDURE — 96374 THER/PROPH/DIAG INJ IV PUSH: CPT

## 2021-11-12 PROCEDURE — 83735 ASSAY OF MAGNESIUM: CPT

## 2021-11-12 PROCEDURE — 6370000000 HC RX 637 (ALT 250 FOR IP): Performed by: PEDIATRICS

## 2021-11-12 PROCEDURE — 84484 ASSAY OF TROPONIN QUANT: CPT

## 2021-11-12 RX ORDER — ONDANSETRON 2 MG/ML
INJECTION INTRAMUSCULAR; INTRAVENOUS
Status: DISCONTINUED
Start: 2021-11-12 | End: 2021-11-13 | Stop reason: HOSPADM

## 2021-11-12 RX ORDER — ONDANSETRON 2 MG/ML
4 INJECTION INTRAMUSCULAR; INTRAVENOUS ONCE
Status: COMPLETED | OUTPATIENT
Start: 2021-11-12 | End: 2021-11-12

## 2021-11-12 RX ORDER — NITROGLYCERIN 0.4 MG/1
0.4 TABLET SUBLINGUAL EVERY 5 MIN PRN
Status: DISCONTINUED | OUTPATIENT
Start: 2021-11-12 | End: 2021-11-13 | Stop reason: HOSPADM

## 2021-11-12 RX ORDER — ASPIRIN 81 MG/1
324 TABLET, CHEWABLE ORAL ONCE
Status: COMPLETED | OUTPATIENT
Start: 2021-11-12 | End: 2021-11-12

## 2021-11-12 RX ADMIN — ASPIRIN 324 MG: 81 TABLET, CHEWABLE ORAL at 23:26

## 2021-11-12 RX ADMIN — ONDANSETRON 4 MG: 2 INJECTION INTRAMUSCULAR; INTRAVENOUS at 23:36

## 2021-11-12 RX ADMIN — NITROGLYCERIN 0.4 MG: 0.4 TABLET, ORALLY DISINTEGRATING SUBLINGUAL at 23:28

## 2021-11-12 ASSESSMENT — ENCOUNTER SYMPTOMS
COLOR CHANGE: 0
ABDOMINAL PAIN: 0
BACK PAIN: 0
EYE DISCHARGE: 0
NAUSEA: 0
SHORTNESS OF BREATH: 1
COUGH: 0
VOMITING: 0
DIARRHEA: 0
RHINORRHEA: 0

## 2021-11-12 ASSESSMENT — PAIN SCALES - GENERAL: PAINLEVEL_OUTOF10: 5

## 2021-11-12 ASSESSMENT — PAIN DESCRIPTION - LOCATION: LOCATION: CHEST

## 2021-11-12 ASSESSMENT — PAIN DESCRIPTION - PAIN TYPE: TYPE: ACUTE PAIN

## 2021-11-12 NOTE — Clinical Note
Ronni Acuna was seen and treated in our emergency department on 11/12/2021. She may return to work on 11/15/2021. If you have any questions or concerns, please don't hesitate to call.       Ori German MD

## 2021-11-13 VITALS
RESPIRATION RATE: 10 BRPM | HEIGHT: 62 IN | HEART RATE: 64 BPM | SYSTOLIC BLOOD PRESSURE: 120 MMHG | DIASTOLIC BLOOD PRESSURE: 68 MMHG | OXYGEN SATURATION: 95 % | WEIGHT: 267 LBS | TEMPERATURE: 98.3 F | BODY MASS INDEX: 49.13 KG/M2

## 2021-11-13 LAB — TROPONIN: <0.01 NG/ML (ref 0–0.03)

## 2021-11-13 PROCEDURE — 93005 ELECTROCARDIOGRAM TRACING: CPT | Performed by: PEDIATRICS

## 2021-11-13 PROCEDURE — 81001 URINALYSIS AUTO W/SCOPE: CPT

## 2021-11-13 PROCEDURE — 6360000002 HC RX W HCPCS: Performed by: PEDIATRICS

## 2021-11-13 PROCEDURE — C9113 INJ PANTOPRAZOLE SODIUM, VIA: HCPCS | Performed by: PEDIATRICS

## 2021-11-13 PROCEDURE — 87086 URINE CULTURE/COLONY COUNT: CPT

## 2021-11-13 PROCEDURE — 6370000000 HC RX 637 (ALT 250 FOR IP): Performed by: PEDIATRICS

## 2021-11-13 PROCEDURE — 96375 TX/PRO/DX INJ NEW DRUG ADDON: CPT

## 2021-11-13 PROCEDURE — 84484 ASSAY OF TROPONIN QUANT: CPT

## 2021-11-13 PROCEDURE — 36415 COLL VENOUS BLD VENIPUNCTURE: CPT

## 2021-11-13 PROCEDURE — 87635 SARS-COV-2 COVID-19 AMP PRB: CPT

## 2021-11-13 PROCEDURE — 2580000003 HC RX 258: Performed by: PEDIATRICS

## 2021-11-13 PROCEDURE — 85379 FIBRIN DEGRADATION QUANT: CPT

## 2021-11-13 PROCEDURE — 84703 CHORIONIC GONADOTROPIN ASSAY: CPT

## 2021-11-13 RX ORDER — PANTOPRAZOLE SODIUM 40 MG/10ML
40 INJECTION, POWDER, LYOPHILIZED, FOR SOLUTION INTRAVENOUS ONCE
Status: COMPLETED | OUTPATIENT
Start: 2021-11-13 | End: 2021-11-13

## 2021-11-13 RX ORDER — IBUPROFEN 400 MG/1
800 TABLET ORAL ONCE
Status: COMPLETED | OUTPATIENT
Start: 2021-11-13 | End: 2021-11-13

## 2021-11-13 RX ORDER — SODIUM CHLORIDE 9 MG/ML
10 INJECTION INTRAVENOUS ONCE
Status: COMPLETED | OUTPATIENT
Start: 2021-11-13 | End: 2021-11-13

## 2021-11-13 RX ADMIN — PANTOPRAZOLE SODIUM 40 MG: 40 INJECTION, POWDER, FOR SOLUTION INTRAVENOUS at 00:25

## 2021-11-13 RX ADMIN — IBUPROFEN 800 MG: 400 TABLET, FILM COATED ORAL at 00:26

## 2021-11-13 RX ADMIN — SODIUM CHLORIDE, PRESERVATIVE FREE 10 ML: 5 INJECTION INTRAVENOUS at 00:28

## 2021-11-13 ASSESSMENT — PAIN SCALES - GENERAL: PAINLEVEL_OUTOF10: 3

## 2021-11-13 NOTE — ED NOTES
Verbal order form Dr. Rabia Muir for Protonix 40 mg IV and 800 mg Ibuprofen PO.      Brenda Acevedo RN  11/13/21 0020

## 2021-11-13 NOTE — ED NOTES
Pt c/o nausea. Dr. Glynn Gibson notified, verbal order for 4mg Zofran IV.       Maribell Gomes, RN  11/12/21 [id number]

## 2021-11-13 NOTE — ED PROVIDER NOTES
[address] [facility] EMERGENCY DEPT  eMERGENCY dEPARTMENT eNCOUnter      Pt Name: Anika Francisco  MRN: [id number]  [place] 1980  Date of evaluation: 11/12/2021  Provider: Cynthia Harper MD    CHIEF COMPLAINT       Chief Complaint   Patient presents with    Chest Pain     Pt states she started having midsternal chest pain around 1930 with intermittent sharp pains to both sides of her chest.          HISTORY OF PRESENT ILLNESS   (Location/Symptom, Timing/Onset,Context/Setting, Quality, Duration, Modifying Factors, Severity)  Note limiting factors. Anika Francisco is a 39 y.o. female who presents to the emergency department with high blood pressure. Patient states that she has a history of hypertension \"but it is usually controlled. \"  Today, at work, patient began to feel fatigued. She took her blood pressure and it was 225/105. At 8:30 PM she took her Coreg, amlodipine, and hydrochlorothiazide 12.5 mg. Patient states that on recheck her blood pressure remained at 200/101. Patient states that \"I began to feel funny. \"  She began having pressure in her lower chest patient states that intermittently she would feel the sharp pain that would last \"a few seconds. \"  Patient denies radiation, exacerbating factors, or alleviating factors that she can identify. Patient denies radiation of pain. Patient states that she felt \"a little lightheaded and like I could not quite catch my breath. \"  Patient states that she has a history of anxiety disorder and thinks that she may have been anxious. Patient states that she is feeling much better since arrival to the emergency department and since her blood pressure came down a little more. Patient states that she recently lost 40 pounds and \"they are taking me off my Ozempic. \"    HPI    NursingNotes were reviewed. REVIEW OF SYSTEMS    (2-9 systems for level 4, 10 or more for level 5)     Review of Systems   Constitutional: Positive for fatigue. Negative for chills, diaphoresis and fever. tablet by mouth 2 times daily (with meals), Disp-60 tablet, R-5Normal      valACYclovir (VALTREX) 1 g tablet Take 2 tablets p.o. every 12 hours for 2 doses. May repeat for further outbreaks, Disp-8 tablet, R-2Normal      Norgestim-Eth Estrad Triphasic (TRI-SPRINTEC) 0.18/0.215/0.25 MG-35 MCG TABS Take 1 tablet by mouth daily, Disp-84 tablet, R-3Normal      Blood Glucose Monitoring Suppl (FREESTYLE LITE) ANDREEA Historical Med      Lancet Device MISC Disp-60 Device,R-11, Normal1 lancet twice a day             ALLERGIES     Mushroom extract complex    FAMILY HISTORY       Family History   Problem Relation Age of Onset    High Blood Pressure Father     Diabetes Father     Atrial Fibrillation Father     Heart Failure Father     Stroke Father     Diabetes Sister     Diabetes Maternal Grandmother     Heart Attack Other     Ovarian Cancer Paternal Grandmother           SOCIAL HISTORY       Social History     Socioeconomic History    Marital status: Single     Spouse name: Not on file    Number of children: Not on file    Years of education: Not on file    Highest education level: Not on file   Occupational History    Not on file   Tobacco Use    Smoking status: Former Smoker     Years: 2.00     Types: Cigarettes    Smokeless tobacco: Never Used   Vaping Use    Vaping Use: Never used   Substance and Sexual Activity    Alcohol use: Yes     Alcohol/week: 1.0 standard drink     Types: 1 Glasses of wine per week     Comment: occ    Drug use: No    Sexual activity: Yes     Birth control/protection: OCP   Other Topics Concern    Not on file   Social History Narrative    Not on file     Social Determinants of Health     Financial Resource Strain:     Difficulty of Paying Living Expenses: Not on file   Food Insecurity:     Worried About Running Out of Food in the Last Year: Not on file    Sonali of Food in the Last Year: Not on file   Transportation Needs:     Lack of Transportation (Medical):  Not on file Rate and Rhythm: Normal rate and regular rhythm. Pulses: Normal pulses. Heart sounds: Normal heart sounds. Pulmonary:      Effort: Pulmonary effort is normal.      Breath sounds: Normal breath sounds. Abdominal:      General: Bowel sounds are normal.      Palpations: Abdomen is soft. Tenderness: There is no abdominal tenderness. There is no guarding. Musculoskeletal:         General: No tenderness or deformity. Cervical back: Neck supple. No rigidity. Right lower leg: No edema. Left lower leg: No edema. Skin:     General: Skin is warm and dry. Capillary Refill: Capillary refill takes less than 2 seconds. Coloration: Skin is not jaundiced. Neurological:      General: No focal deficit present. Mental Status: She is alert and oriented to person, place, and time. Mental status is at baseline. Cranial Nerves: No cranial nerve deficit. Sensory: No sensory deficit. Motor: No weakness. Coordination: Coordination normal.   Psychiatric:         Mood and Affect: Mood normal.         Behavior: Behavior normal.         DIAGNOSTIC RESULTS     EKG: All EKG's areinterpreted by the Emergency Department Physician who either signs or Co-signs this chart in the absence of a cardiologist.    EKG dated 11/12/2021 at 22:34 PM: Normal sinus rhythm, rate 93. Unusual R wave progression. EKG #2 dated 11/13/2021 at 0 3:28 AM: Normal sinus rhythm, rate 66. Abnormal R wave progression. Otherwise normal EKG. No significant change from earlier EKG. RADIOLOGY:  Non-plain film images such as CT, Ultrasound and MRI are read by the radiologist. Plain radiographic images are visualized and preliminarily interpreted bythe emergency physician with the below findings:      XR CHEST (2 VW)   Final Result   No evidence of acute cardiopulmonary process.    Signed by Dr Henreitta Essex:  Labs Reviewed   CBC WITH AUTO DIFFERENTIAL - Abnormal; Notable for the [id number]  [phone]    Schedule an appointment as soon as possible for a visit       Marisela Cruz MD  [address] [facility]  [phone]    Schedule an appointment as soon as possible for a visit         DISCHARGE MEDICATIONS:  Discharge Medication List as of 11/13/2021  3:49 AM             (Please note that portions of this note were completed with a voice recognition program.  Efforts were made to edit thedictations but occasionally words are mis-transcribed.)    Karla Newby MD (electronically signed)  Attending Emergency Physician          aKrla Newby MD  11/13/21 2431

## 2021-11-15 ENCOUNTER — NURSE TRIAGE (OUTPATIENT)
Dept: OTHER | Facility: CLINIC | Age: 41
End: 2021-11-15

## 2021-11-15 ENCOUNTER — HOSPITAL ENCOUNTER (EMERGENCY)
Age: 41
Discharge: HOME | End: 2021-11-15
Attending: EMERGENCY MEDICINE
Payer: MEDICAID

## 2021-11-15 ENCOUNTER — APPOINTMENT (OUTPATIENT)
Dept: GENERAL RADIOLOGY | Age: 41
End: 2021-11-15
Payer: MEDICAID

## 2021-11-15 VITALS
DIASTOLIC BLOOD PRESSURE: 107 MMHG | SYSTOLIC BLOOD PRESSURE: 137 MMHG | HEART RATE: 72 BPM | TEMPERATURE: 98.2 F | RESPIRATION RATE: 16 BRPM | OXYGEN SATURATION: 97 %

## 2021-11-15 DIAGNOSIS — E87.6 HYPOKALEMIA: ICD-10-CM

## 2021-11-15 DIAGNOSIS — R11.0 NAUSEA: ICD-10-CM

## 2021-11-15 DIAGNOSIS — R42 LIGHT HEADEDNESS: Primary | ICD-10-CM

## 2021-11-15 DIAGNOSIS — K21.9 GASTROESOPHAGEAL REFLUX DISEASE WITHOUT ESOPHAGITIS: ICD-10-CM

## 2021-11-15 LAB
ALBUMIN SERPL-MCNC: 4.1 G/DL (ref 3.5–5.2)
ALP BLD-CCNC: 75 U/L (ref 35–104)
ALT SERPL-CCNC: 158 U/L (ref 5–33)
ANION GAP SERPL CALCULATED.3IONS-SCNC: 12 MMOL/L (ref 7–19)
AST SERPL-CCNC: 86 U/L (ref 5–32)
BACTERIA: ABNORMAL /HPF
BASOPHILS ABSOLUTE: 0.1 K/UL (ref 0–0.2)
BASOPHILS RELATIVE PERCENT: 0.9 % (ref 0–1)
BILIRUB SERPL-MCNC: 0.7 MG/DL (ref 0.2–1.2)
BILIRUBIN URINE: NEGATIVE
BLOOD, URINE: NEGATIVE
BUN BLDV-MCNC: 9 MG/DL (ref 6–20)
CALCIUM SERPL-MCNC: 9.5 MG/DL (ref 8.6–10)
CHLORIDE BLD-SCNC: 98 MMOL/L (ref 98–111)
CLARITY: ABNORMAL
CO2: 24 MMOL/L (ref 22–29)
COLOR: YELLOW
CREAT SERPL-MCNC: 0.8 MG/DL (ref 0.5–0.9)
CRYSTALS, UA: ABNORMAL /HPF
EKG P AXIS: 3 DEGREES
EKG P AXIS: 57 DEGREES
EKG P-R INTERVAL: 112 MS
EKG P-R INTERVAL: 158 MS
EKG Q-T INTERVAL: 364 MS
EKG Q-T INTERVAL: 456 MS
EKG QRS DURATION: 82 MS
EKG QRS DURATION: 86 MS
EKG QTC CALCULATION (BAZETT): 421 MS
EKG QTC CALCULATION (BAZETT): 464 MS
EKG T AXIS: 11 DEGREES
EKG T AXIS: 44 DEGREES
EOSINOPHILS ABSOLUTE: 0.2 K/UL (ref 0–0.6)
EOSINOPHILS RELATIVE PERCENT: 2.3 % (ref 0–5)
EPITHELIAL CELLS, UA: 6 /HPF (ref 0–5)
GFR AFRICAN AMERICAN: >59
GFR NON-AFRICAN AMERICAN: >60
GLUCOSE BLD-MCNC: 166 MG/DL (ref 74–109)
GLUCOSE URINE: NEGATIVE MG/DL
HCG(URINE) PREGNANCY TEST: NEGATIVE
HCT VFR BLD CALC: 37 % (ref 37–47)
HEMOGLOBIN: 12.1 G/DL (ref 12–16)
HYALINE CASTS: 3 /HPF (ref 0–8)
IMMATURE GRANULOCYTES #: 0.1 K/UL
KETONES, URINE: ABNORMAL MG/DL
LEUKOCYTE ESTERASE, URINE: ABNORMAL
LIPASE: 35 U/L (ref 13–60)
LYMPHOCYTES ABSOLUTE: 2.2 K/UL (ref 1.1–4.5)
LYMPHOCYTES RELATIVE PERCENT: 25.3 % (ref 20–40)
MAGNESIUM: 1.7 MG/DL (ref 1.6–2.6)
MCH RBC QN AUTO: 28.3 PG (ref 27–31)
MCHC RBC AUTO-ENTMCNC: 32.7 G/DL (ref 33–37)
MCV RBC AUTO: 86.4 FL (ref 81–99)
MONOCYTES ABSOLUTE: 0.5 K/UL (ref 0–0.9)
MONOCYTES RELATIVE PERCENT: 6.3 % (ref 0–10)
NEUTROPHILS ABSOLUTE: 5.5 K/UL (ref 1.5–7.5)
NEUTROPHILS RELATIVE PERCENT: 64.5 % (ref 50–65)
NITRITE, URINE: NEGATIVE
PDW BLD-RTO: 12.8 % (ref 11.5–14.5)
PH UA: 6 (ref 5–8)
PLATELET # BLD: 322 K/UL (ref 130–400)
PMV BLD AUTO: 11 FL (ref 9.4–12.3)
POTASSIUM REFLEX MAGNESIUM: 3.1 MMOL/L (ref 3.5–5)
PROTEIN UA: NEGATIVE MG/DL
RBC # BLD: 4.28 M/UL (ref 4.2–5.4)
RBC UA: 1 /HPF (ref 0–4)
SODIUM BLD-SCNC: 134 MMOL/L (ref 136–145)
SPECIFIC GRAVITY UA: 1.01 (ref 1–1.03)
TOTAL PROTEIN: 7.8 G/DL (ref 6.6–8.7)
TROPONIN: <0.01 NG/ML (ref 0–0.03)
URINE CULTURE, ROUTINE: NORMAL
UROBILINOGEN, URINE: 1 E.U./DL
WBC # BLD: 8.6 K/UL (ref 4.8–10.8)
WBC UA: 8 /HPF (ref 0–5)

## 2021-11-15 PROCEDURE — 83735 ASSAY OF MAGNESIUM: CPT

## 2021-11-15 PROCEDURE — 36415 COLL VENOUS BLD VENIPUNCTURE: CPT

## 2021-11-15 PROCEDURE — 93010 ELECTROCARDIOGRAM REPORT: CPT | Performed by: INTERNAL MEDICINE

## 2021-11-15 PROCEDURE — 85025 COMPLETE CBC W/AUTO DIFF WBC: CPT

## 2021-11-15 PROCEDURE — 99284 EMERGENCY DEPT VISIT MOD MDM: CPT

## 2021-11-15 PROCEDURE — 81001 URINALYSIS AUTO W/SCOPE: CPT

## 2021-11-15 PROCEDURE — 6370000000 HC RX 637 (ALT 250 FOR IP): Performed by: EMERGENCY MEDICINE

## 2021-11-15 PROCEDURE — 84484 ASSAY OF TROPONIN QUANT: CPT

## 2021-11-15 PROCEDURE — 83690 ASSAY OF LIPASE: CPT

## 2021-11-15 PROCEDURE — 80053 COMPREHEN METABOLIC PANEL: CPT

## 2021-11-15 PROCEDURE — 93005 ELECTROCARDIOGRAM TRACING: CPT | Performed by: EMERGENCY MEDICINE

## 2021-11-15 PROCEDURE — 84703 CHORIONIC GONADOTROPIN ASSAY: CPT

## 2021-11-15 PROCEDURE — 71045 X-RAY EXAM CHEST 1 VIEW: CPT

## 2021-11-15 RX ORDER — ONDANSETRON 4 MG/1
4 TABLET, ORALLY DISINTEGRATING ORAL EVERY 8 HOURS PRN
Qty: 20 TABLET | Refills: 0 | Status: SHIPPED | OUTPATIENT
Start: 2021-11-15 | End: 2022-01-28 | Stop reason: ALTCHOICE

## 2021-11-15 RX ORDER — POTASSIUM CHLORIDE 20 MEQ/1
20 TABLET, EXTENDED RELEASE ORAL 2 TIMES DAILY
Qty: 20 TABLET | Refills: 0 | Status: SHIPPED | OUTPATIENT
Start: 2021-11-15 | End: 2021-12-07 | Stop reason: SDUPTHER

## 2021-11-15 RX ORDER — POTASSIUM CHLORIDE 20 MEQ/1
40 TABLET, EXTENDED RELEASE ORAL ONCE
Status: COMPLETED | OUTPATIENT
Start: 2021-11-15 | End: 2021-11-15

## 2021-11-15 RX ORDER — FAMOTIDINE 20 MG/1
20 TABLET, FILM COATED ORAL 2 TIMES DAILY
Qty: 180 TABLET | Refills: 0 | Status: SHIPPED | OUTPATIENT
Start: 2021-11-15 | End: 2021-12-07 | Stop reason: ALTCHOICE

## 2021-11-15 RX ADMIN — POTASSIUM CHLORIDE 40 MEQ: 20 TABLET, EXTENDED RELEASE ORAL at 13:40

## 2021-11-15 ASSESSMENT — ENCOUNTER SYMPTOMS
RHINORRHEA: 0
VOICE CHANGE: 0
SHORTNESS OF BREATH: 0
ABDOMINAL PAIN: 0
COUGH: 0
DIARRHEA: 0
EYE PAIN: 0
EYE REDNESS: 0
NAUSEA: 1

## 2021-11-15 ASSESSMENT — PAIN SCALES - GENERAL: PAINLEVEL_OUTOF10: 5

## 2021-11-15 NOTE — ED PROVIDER NOTES
[facility] EMERGENCY DEPT  EMERGENCY DEPARTMENT ENCOUNTER      Pt Name: Willy Nolan  MRN: [id number]  [place] 1980  Date of evaluation: 11/15/2021  Provider: Tyler Up MD    CHIEF COMPLAINT       Chief Complaint   Patient presents with    Chest Pain     pt presents to ED with c/o chest pain x 2 days. states feels like her foog gets stuck.  Dizziness         HISTORY OF PRESENT ILLNESS   (Location/Symptom, Timing/Onset,Context/Setting, Quality, Duration, Modifying Factors, Severity)  Note limiting factors. Willy Nolan is a 39 y.o. female who presents to the emergency department with complaint of lightheadedness associated with nausea this morning. States she has had decreased oral intake recently since she stopped taking Ozempic. States she has had decreased appetite but also has some discomfort in her chest after she eats sometimes feels like food gets stuck. States she has taken as needed over-the-counter antacids but not wanting scheduled GERD treatment. Has a history of type 2 diabetes, PCOS and rheumatoid arthritis. States her blood pressure was elevated yesterday. States she was fatigued when she woke up this morning but did not have the symptoms until she had gotten up and was about to take her morning medications. HPI    NursingNotes were reviewed. REVIEW OF SYSTEMS    (2-9 systems for level 4, 10 or more for level 5)     Review of Systems   Constitutional: Positive for appetite change and fatigue. Negative for fever. HENT: Negative for congestion, rhinorrhea and voice change. Eyes: Negative for pain and redness. Respiratory: Negative for cough and shortness of breath. Cardiovascular: Negative for chest pain. Gastrointestinal: Positive for nausea. Negative for abdominal pain and diarrhea. Endocrine: Negative. Genitourinary: Negative. Musculoskeletal: Negative for arthralgias and gait problem. Skin: Negative for rash and wound.    Neurological: Positive for dizziness and light-headedness. Negative for weakness and headaches. Hematological: Negative. Psychiatric/Behavioral: Negative. All other systems reviewed and are negative. A complete review of systems was performed and is negative except as noted above in the HPI. PAST MEDICAL HISTORY     Past Medical History:   Diagnosis Date    Anemia     Chronic rheumatic arthritis ([address]     Elevated blood pressure reading     Fibromyalgia     Hypertension     Intracranial hypertension     Obesity     PCOS (polycystic ovarian syndrome)     Sleep apnea     Type 2 diabetes mellitus (HCC)          SURGICAL HISTORY       Past Surgical History:   Procedure Laterality Date    CHOLECYSTECTOMY           CURRENT MEDICATIONS       Previous Medications    AMLODIPINE (NORVASC) 5 MG TABLET    Take 1 tablet by mouth 2 times daily    BLOOD GLUCOSE MONITORING SUPPL (FREESTYLE LITE) ANDREEA    USE AS DIRECTED    BLOOD GLUCOSE TEST STRIPS (ASCENSIA AUTODISC VI;ONE TOUCH ULTRA TEST VI) STRIP    1 each by In Vitro route 3 times daily As needed. CARVEDILOL (COREG) 6.25 MG TABLET    TAKE 1 TABLET BY MOUTH TWICE A DAY    CIMZIA STARTER KIT 6 X 200 MG/ML KIT        HYDROCHLOROTHIAZIDE (HYDRODIURIL) 25 MG TABLET    Take 1 tablet by mouth every morning    LANCET DEVICE MISC    1 lancet twice a day    LOSARTAN (COZAAR) 100 MG TABLET    Take 1 tablet by mouth daily    METFORMIN (GLUCOPHAGE) 500 MG TABLET    Take 1 tablet by mouth 2 times daily (with meals)    NORGESTIM-ETH ESTRAD TRIPHASIC (TRI-SPRINTEC) 0.18/0.215/0.25 MG-35 MCG TABS    Take 1 tablet by mouth daily    VALACYCLOVIR (VALTREX) 1 G TABLET    Take 2 tablets p.o. every 12 hours for 2 doses.   May repeat for further outbreaks       ALLERGIES     Mushroom extract complex    FAMILY HISTORY       Family History   Problem Relation Age of Onset    High Blood Pressure Father     Diabetes Father     Atrial Fibrillation Father     Heart Failure Father     Stroke Father     Diabetes Sister     Diabetes Maternal Grandmother     Heart Attack Other     Ovarian Cancer Paternal Grandmother           SOCIAL HISTORY       Social History     Socioeconomic History    Marital status: Single     Spouse name: Not on file    Number of children: Not on file    Years of education: Not on file    Highest education level: Not on file   Occupational History    Not on file   Tobacco Use    Smoking status: Former Smoker     Years: 2.00     Types: Cigarettes    Smokeless tobacco: Never Used   Vaping Use    Vaping Use: Never used   Substance and Sexual Activity    Alcohol use: Yes     Alcohol/week: 1.0 standard drink     Types: 1 Glasses of wine per week     Comment: occ    Drug use: No    Sexual activity: Yes     Birth control/protection: OCP   Other Topics Concern    Not on file   Social History Narrative    Not on file     Social Determinants of Health     Financial Resource Strain:     Difficulty of Paying Living Expenses: Not on file   Food Insecurity:     Worried About Running Out of Food in the Last Year: Not on file    Sonali of Food in the Last Year: Not on file   Transportation Needs:     Lack of Transportation (Medical): Not on file    Lack of Transportation (Non-Medical):  Not on file   Physical Activity:     Days of Exercise per Week: Not on file    Minutes of Exercise per Session: Not on file   Stress:     Feeling of Stress : Not on file   Social Connections:     Frequency of Communication with Friends and Family: Not on file    Frequency of Social Gatherings with Friends and Family: Not on file    Attends Druze Services: Not on file    Active Member of Clubs or Organizations: Not on file    Attends Club or Organization Meetings: Not on file    Marital Status: Not on file   Intimate Partner Violence:     Fear of Current or Ex-Partner: Not on file    Emotionally Abused: Not on file    Physically Abused: Not on file    Sexually Abused: Not on file   Housing Stability:     Unable to Pay for Housing in the Last Year: Not on file    Number of Places Lived in the Last Year: Not on file    Unstable Housing in the Last Year: Not on file       SCREENINGS    [place] Coma Scale  Eye Opening: Spontaneous  Best Verbal Response: Oriented  Best Motor Response: Obeys commands  [place] Coma Scale Score: 15        PHYSICAL EXAM    (up to 7 for level 4, 8 or more for level 5)     ED Triage Vitals [11/15/21 1123]   BP Temp Temp src Pulse Resp SpO2 Height Weight   (!) 137/107 98.2 °F (36.8 °C) -- 77 16 94 % -- --       Physical Exam  Vitals and nursing note reviewed. Constitutional:       General: She is not in acute distress. Appearance: She is well-developed. She is obese. She is not diaphoretic. HENT:      Head: Normocephalic and atraumatic. Eyes:      General: No scleral icterus. Neck:      Vascular: No JVD. Cardiovascular:      Rate and Rhythm: Normal rate and regular rhythm. Pulses:           Radial pulses are 2+ on the right side and 2+ on the left side. Dorsalis pedis pulses are 2+ on the right side and 2+ on the left side. Heart sounds: Normal heart sounds. No murmur heard. No friction rub. No gallop. Pulmonary:      Effort: Pulmonary effort is normal. No accessory muscle usage or respiratory distress. Breath sounds: Normal breath sounds. No stridor. No decreased breath sounds, wheezing, rhonchi or rales. Chest:      Chest wall: No tenderness. Abdominal:      General: There is no distension. Palpations: Abdomen is soft. Tenderness: There is no abdominal tenderness. There is no guarding or rebound. Musculoskeletal:         General: No deformity. Normal range of motion. Right lower leg: No edema. Left lower leg: No edema. Skin:     General: Skin is warm and dry. Coloration: Skin is not pale. Findings: No erythema. Neurological:      Mental Status: She is alert and oriented to person, place, and time. GCS: GCS eye subscore is 4. GCS verbal subscore is 5. GCS motor subscore is 6. Cranial Nerves: No cranial nerve deficit. Motor: No abnormal muscle tone. Coordination: Coordination normal.   Psychiatric:         Behavior: Behavior normal.         Judgment: Judgment normal.         DIAGNOSTIC RESULTS     EKG: All EKG's are interpreted by the Emergency Department Physician who either signs or Co-signs this chart in the absence of a cardiologist.        RADIOLOGY:   Non-plain film images such as CT, Ultrasound and MRI are read by the radiologist. Plainradiographic images are visualized and preliminarily interpreted by the emergency physician with the below findings:        Interpretation per the Radiologist below, if available at the time of this note:    XR CHEST PORTABLE   Final Result   1. No acute radiographic cardiopulmonary process.    Signed by Dr Erik Garcia            ED BEDSIDE ULTRASOUND:   Performed by ED Physician - none    LABS:  Labs Reviewed   CBC WITH AUTO DIFFERENTIAL - Abnormal; Notable for the following components:       Result Value    MCHC 32.7 (*)     All other components within normal limits   COMPREHENSIVE METABOLIC PANEL W/ REFLEX TO MG FOR LOW K - Abnormal; Notable for the following components:    Sodium 134 (*)     Potassium reflex Magnesium 3.1 (*)     Glucose 166 (*)      (*)     AST 86 (*)     All other components within normal limits   URINE RT REFLEX TO CULTURE - Abnormal; Notable for the following components:    Clarity, UA CLOUDY (*)     Ketones, Urine TRACE (*)     Leukocyte Esterase, Urine TRACE (*)     All other components within normal limits   MICROSCOPIC URINALYSIS - Abnormal; Notable for the following components:    Bacteria, UA TRACE (*)     Crystals, UA NEG (*)     WBC, UA 8 (*)     All other components within normal limits   TROPONIN   PREGNANCY, URINE   LIPASE   MAGNESIUM       All other labs were within normal range or not returned as of this dictation. EMERGENCY DEPARTMENT COURSE and DIFFERENTIALDIAGNOSIS/MDM:   Vitals:    Vitals:    11/15/21 1300 11/15/21 1301 11/15/21 1344 11/15/21 1345   BP:       Pulse: 66 70 74 72   Resp:       Temp:       SpO2: 99% 99% 98% 97%       MDM  ED Course as of 11/15/21 1352   Mon Nov 15, 2021   1125 EKG shows sinus rhythm with a rate of 73. No findings of acute ischemia or infarction. No findings of acute right heart strain. Overall normal EKG. [HIGINIO]      ED Course User Index  [HIGINIO] Austyn Skelton MD     Evaluation and work-up here revealed no signs of emergent or life-threatening pathology that would necessitate admission for further work-up or management at this time. Patient is felt to be stable for discharge home with return precautions for worsening of the condition or development of new concerning symptoms. Patient was encouraged to follow-up with their primary care doctor in the appropriate timeframe. Necessary prescriptions and information have been provided for treatment at home. Patient voices understanding and agreement with the plan. CONSULTS:  None    PROCEDURES:  Unless otherwise notedbelow, none     Procedures    FINAL IMPRESSION     1. Light headedness    2. Hypokalemia    3. Nausea    4.  Gastroesophageal reflux disease without esophagitis          DISPOSITION/PLAN   DISPOSITION Decision To Discharge 11/15/2021 01:40:37 PM      PATIENT REFERRED TO:  [facility] EMERGENCY DEPT  Luke Louis  [phone]    If symptoms worsen    Isabel Grande MD  [address] [address]  [phone]    Schedule an appointment as soon as possible for a visit         DISCHARGE MEDICATIONS:  New Prescriptions    FAMOTIDINE (PEPCID) 20 MG TABLET    Take 1 tablet by mouth 2 times daily    ONDANSETRON (ZOFRAN ODT) 4 MG DISINTEGRATING TABLET    Take 1 tablet by mouth every 8 hours as needed for Nausea or Vomiting    POTASSIUM CHLORIDE (KLOR-CON M) 20 MEQ

## 2021-11-15 NOTE — TELEPHONE ENCOUNTER
Received call from [address] [place] [address] at [facility] with Red Flag Complaint. Brief description of triage: c/o weak tired and faint and high bp 160/98 was in er on Saturday and now feels has worsening symptoms of nausea, h/a on/off and dizziness and feeling faint    Triage indicates for patient to ucc/er or pcp with approval  Devin Root said go to er unable to speak with a np or pcp at this time offcie took the call to talk with the pt     Care advice provided, patient verbalizes understanding; denies any other questions or concerns; instructed to call back for any new or worsening symptoms. .    Attention Provider: Thank you for allowing me to participate in the care of your patient. The patient was connected to triage in response to information provided to the ECC/PSC. Please do not respond through this encounter as the response is not directed to a shared pool. Reason for Disposition   Systolic BP >= 757 OR Diastolic >= 327, and any cardiac or neurologic symptoms (e.g., chest pain, difficulty breathing, unsteady gait, blurred vision)    Answer Assessment - Initial Assessment Questions  1. BLOOD PRESSURE: \"What is the blood pressure? \" \"Did you take at least two measurements 5 minutes apart?\"      160/98    2. ONSET: \"When did you take your blood pressure? \"      This am    3. HOW: \"How did you obtain the blood pressure? \" (e.g., visiting nurse, automatic home BP monitor)      Automatic cuff    4. HISTORY: \"Do you have a history of high blood pressure? \"      Yes    5. MEDICATIONS: Curlie Do you taking any medications for blood pressure? \" \"Have you missed any doses recently? \"     Yes no missed doses    6. OTHER SYMPTOMS: \"Do you have any symptoms? \" (e.g., headache, chest pain, blurred vision, difficulty breathing, weakness)      Cp, nausea and h/a    7. PREGNANCY: \"Is there any chance you are pregnant? \" \"When was your last menstrual period? \"      n/a    Protocols used: HIGH BLOOD PRESSURE-ADULT-[place]

## 2021-11-16 LAB
EKG P AXIS: 26 DEGREES
EKG P-R INTERVAL: 152 MS
EKG Q-T INTERVAL: 438 MS
EKG QRS DURATION: 86 MS
EKG QTC CALCULATION (BAZETT): 460 MS
EKG T AXIS: 2 DEGREES

## 2021-11-16 PROCEDURE — 93010 ELECTROCARDIOGRAM REPORT: CPT | Performed by: INTERNAL MEDICINE

## 2021-11-24 DIAGNOSIS — I10 ESSENTIAL (PRIMARY) HYPERTENSION: ICD-10-CM

## 2021-11-24 RX ORDER — CARVEDILOL 6.25 MG/1
TABLET ORAL
Qty: 180 TABLET | Refills: 1 | Status: SHIPPED | OUTPATIENT
Start: 2021-11-24 | End: 2022-04-22 | Stop reason: SDUPTHER

## 2021-11-24 NOTE — TELEPHONE ENCOUNTER
Sharon Dixon called requesting a refill of the below medication which has been pended for you:     Requested Prescriptions     Pending Prescriptions Disp Refills    carvedilol (COREG) 6.25 MG tablet [Pharmacy Med Name: CARVEDILOL 6.25 MG TABLET] 180 tablet 1     Sig: TAKE 1 TABLET BY MOUTH TWICE A DAY       Last Appointment Date: 8/6/2021  Next Appointment Date: 12/7/2021    Allergies   Allergen Reactions    Mushroom Extract Complex

## 2021-12-02 ENCOUNTER — OFFICE VISIT (OUTPATIENT)
Dept: URGENT CARE | Age: 41
End: 2021-12-02
Payer: MEDICAID

## 2021-12-02 VITALS
HEIGHT: 62 IN | SYSTOLIC BLOOD PRESSURE: 136 MMHG | BODY MASS INDEX: 48.14 KG/M2 | WEIGHT: 261.6 LBS | OXYGEN SATURATION: 99 % | TEMPERATURE: 98.2 F | HEART RATE: 80 BPM | DIASTOLIC BLOOD PRESSURE: 85 MMHG

## 2021-12-02 DIAGNOSIS — B34.9 VIRAL ILLNESS: Primary | ICD-10-CM

## 2021-12-02 LAB
ADENOVIRUS BY PCR: NOT DETECTED
BORDETELLA PARAPERTUSSIS BY PCR: NOT DETECTED
BORDETELLA PERTUSSIS BY PCR: NOT DETECTED
CHLAMYDOPHILIA PNEUMONIAE BY PCR: NOT DETECTED
CORONAVIRUS 229E BY PCR: NOT DETECTED
CORONAVIRUS HKU1 BY PCR: NOT DETECTED
CORONAVIRUS NL63 BY PCR: NOT DETECTED
CORONAVIRUS OC43 BY PCR: NOT DETECTED
HUMAN METAPNEUMOVIRUS BY PCR: NOT DETECTED
HUMAN RHINOVIRUS/ENTEROVIRUS BY PCR: NOT DETECTED
INFLUENZA A ANTIBODY: NORMAL
INFLUENZA A BY PCR: NOT DETECTED
INFLUENZA B ANTIBODY: NORMAL
INFLUENZA B BY PCR: NOT DETECTED
MYCOPLASMA PNEUMONIAE BY PCR: NOT DETECTED
PARAINFLUENZA VIRUS 1 BY PCR: NOT DETECTED
PARAINFLUENZA VIRUS 2 BY PCR: NOT DETECTED
PARAINFLUENZA VIRUS 3 BY PCR: NOT DETECTED
PARAINFLUENZA VIRUS 4 BY PCR: NOT DETECTED
RESPIRATORY SYNCYTIAL VIRUS BY PCR: NOT DETECTED
S PYO AG THROAT QL: NORMAL
SARS-COV-2, PCR: NOT DETECTED

## 2021-12-02 PROCEDURE — 87880 STREP A ASSAY W/OPTIC: CPT | Performed by: NURSE PRACTITIONER

## 2021-12-02 PROCEDURE — 1036F TOBACCO NON-USER: CPT | Performed by: NURSE PRACTITIONER

## 2021-12-02 PROCEDURE — 87804 INFLUENZA ASSAY W/OPTIC: CPT | Performed by: NURSE PRACTITIONER

## 2021-12-02 PROCEDURE — 99213 OFFICE O/P EST LOW 20 MIN: CPT | Performed by: NURSE PRACTITIONER

## 2021-12-02 PROCEDURE — G8427 DOCREV CUR MEDS BY ELIG CLIN: HCPCS | Performed by: NURSE PRACTITIONER

## 2021-12-02 PROCEDURE — G8417 CALC BMI ABV UP PARAM F/U: HCPCS | Performed by: NURSE PRACTITIONER

## 2021-12-02 PROCEDURE — G8484 FLU IMMUNIZE NO ADMIN: HCPCS | Performed by: NURSE PRACTITIONER

## 2021-12-02 RX ORDER — ONDANSETRON 4 MG/1
4 TABLET, ORALLY DISINTEGRATING ORAL 3 TIMES DAILY PRN
Qty: 21 TABLET | Refills: 0 | Status: SHIPPED | OUTPATIENT
Start: 2021-12-02 | End: 2021-12-30 | Stop reason: SDUPTHER

## 2021-12-02 RX ORDER — METHYLPREDNISOLONE 4 MG/1
TABLET ORAL
Qty: 1 KIT | Refills: 0 | Status: SHIPPED | OUTPATIENT
Start: 2021-12-02 | End: 2021-12-07 | Stop reason: ALTCHOICE

## 2021-12-02 ASSESSMENT — ENCOUNTER SYMPTOMS
NAUSEA: 1
EYES NEGATIVE: 1
SORE THROAT: 0
COUGH: 1
VOMITING: 1

## 2021-12-02 NOTE — PATIENT INSTRUCTIONS
Flu and strep negative    Respiratory panel was done in the office    We will call you results- this will also be on MyChart    Start steroid pack    Zofran as needed for nausea    Follow up for worsening symptoms     Follow up with primary care provider as scheduled

## 2021-12-02 NOTE — PROGRESS NOTES
[address] [place] [address]   [address], [id number]     Phone:  [phone]  Fax:  [phone]      Priyanka Aguirre is a 39 y.o. female who presents today for her medical conditions/complaints as noted below. Priyanka Aguirre is c/o of Nausea & Vomiting, Dizziness, Nasal Congestion, Fatigue, and Cough      Chief Complaint   Patient presents with    Nausea & Vomiting    Dizziness    Nasal Congestion    Fatigue    Cough       HPI:     Priyanka Aguirre presents today for   Nausea & Vomiting  This is a new problem. The current episode started in the past 7 days (3 days). The problem occurs intermittently. The problem has been gradually worsening. Associated symptoms include chills, congestion, coughing, fatigue, nausea and vomiting (x1). Pertinent negatives include no sore throat. The symptoms are aggravated by exertion. She has tried nothing for the symptoms. The treatment provided no relief. She also has dizziness with changing positions. Sh eis not currently having dizziness. She does have a history of vertigo and BP flucuations. Has had COVID vaccine    Past Medical History:   Diagnosis Date    Anemia     Chronic rheumatic arthritis ([address]     Elevated blood pressure reading     Fibromyalgia     Hypertension     Intracranial hypertension     Obesity     PCOS (polycystic ovarian syndrome)     Sleep apnea     Type 2 diabetes mellitus (HCC)         Past Surgical History:   Procedure Laterality Date    CHOLECYSTECTOMY         Social History     Tobacco Use    Smoking status: Former Smoker     Years: 2.00     Types: Cigarettes    Smokeless tobacco: Never Used   Substance Use Topics    Alcohol use: Yes     Alcohol/week: 1.0 standard drink     Types: 1 Glasses of wine per week     Comment: occ        Current Outpatient Medications   Medication Sig Dispense Refill    methylPREDNISolone (MEDROL DOSEPACK) 4 MG tablet Take by mouth.  1 kit 0    ondansetron (ZOFRAN-ODT) 4 MG disintegrating tablet Take 1 tablet by mouth 3 times daily as needed for Nausea or Vomiting 21 tablet 0    Norgestim-Eth Estrad Triphasic (TRI-ESTARYLLA) 0.18/0.215/0.25 MG-35 MCG TABS Take 1 packet by mouth daily 28 tablet 0    metFORMIN (GLUCOPHAGE) 500 MG tablet TAKE 1 TABLET BY MOUTH TWICE A DAY WITH MEALS 180 tablet 1    carvedilol (COREG) 6.25 MG tablet TAKE 1 TABLET BY MOUTH TWICE A  tablet 1    ondansetron (ZOFRAN ODT) 4 MG disintegrating tablet Take 1 tablet by mouth every 8 hours as needed for Nausea or Vomiting 20 tablet 0    famotidine (PEPCID) 20 MG tablet Take 1 tablet by mouth 2 times daily 180 tablet 0    hydroCHLOROthiazide (HYDRODIURIL) 25 MG tablet Take 1 tablet by mouth every morning (Patient taking differently: Take 12.5 mg by mouth every morning ) 90 tablet 1    losartan (COZAAR) 100 MG tablet Take 1 tablet by mouth daily 30 tablet 5    CIMZIA STARTER KIT 6 X 200 MG/ML KIT       blood glucose test strips (ASCENSIA AUTODISC VI;ONE TOUCH ULTRA TEST VI) strip 1 each by In Vitro route 3 times daily As needed. 100 each 11    Blood Glucose Monitoring Suppl (FREESTYLE LITE) ANDREEA USE AS DIRECTED      Lancet Device MISC 1 lancet twice a day 60 Device 11    potassium chloride (KLOR-CON M) 20 MEQ extended release tablet Take 1 tablet by mouth 2 times daily for 10 days 20 tablet 0    amLODIPine (NORVASC) 5 MG tablet Take 1 tablet by mouth 2 times daily 60 tablet 3    valACYclovir (VALTREX) 1 g tablet Take 2 tablets p.o. every 12 hours for 2 doses. May repeat for further outbreaks (Patient not taking: Reported on 10/29/2021) 8 tablet 2     No current facility-administered medications for this visit.        Allergies   Allergen Reactions    Mushroom Extract Complex        Family History   Problem Relation Age of Onset    High Blood Pressure Father     Diabetes Father     Atrial Fibrillation Father     Heart Failure Father     Stroke Father     Diabetes Sister     Diabetes Maternal Grandmother     Heart Attack Other     Ovarian Cancer Paternal Grandmother                Review of Systems   Constitutional: Positive for chills and fatigue. HENT: Positive for congestion. Negative for sore throat. Eyes: Negative. Respiratory: Positive for cough. Gastrointestinal: Positive for nausea and vomiting (x1). Genitourinary: Negative. Neurological: Positive for dizziness. Objective:     Physical Exam  Vitals and nursing note reviewed. Constitutional:       General: She is not in acute distress. Appearance: Normal appearance. She is well-developed. She is obese. She is not ill-appearing, toxic-appearing or diaphoretic. HENT:      Head: Normocephalic and atraumatic. Right Ear: External ear normal. A middle ear effusion is present. There is no impacted cerumen. Tympanic membrane is not erythematous. Left Ear: External ear normal. There is no impacted cerumen. Tympanic membrane is not erythematous. Nose: Congestion present. Mouth/Throat:      Dentition: Normal dentition. Pharynx: No oropharyngeal exudate or posterior oropharyngeal erythema. Eyes:      General:         Right eye: No discharge. Left eye: No discharge. Conjunctiva/sclera: Conjunctivae normal.   Cardiovascular:      Rate and Rhythm: Normal rate and regular rhythm. Pulmonary:      Effort: Pulmonary effort is normal. No respiratory distress. Breath sounds: Normal breath sounds. No stridor. No wheezing, rhonchi or rales. Abdominal:      General: Bowel sounds are normal.      Palpations: Abdomen is soft. Musculoskeletal:         General: Normal range of motion. Cervical back: Normal range of motion and neck supple. Lumbar back: Normal range of motion. Lymphadenopathy:      Cervical: No cervical adenopathy. Skin:     General: Skin is warm and dry. Capillary Refill: Capillary refill takes less than 2 seconds. Findings: No rash.    Neurological:      Mental Status: She is alert and oriented to person, place, and time. Psychiatric:         Mood and Affect: Mood normal.         Behavior: Behavior normal.         /85   Pulse 80   Temp 98.2 °F (36.8 °C)   Ht 5' 2\" (1.575 m)   Wt 261 lb 9.6 oz (118.7 kg)   LMP 11/17/2021   SpO2 99%   BMI 47.85 kg/m²     Assessment:      Diagnosis Orders   1. Viral illness  POCT rapid strep A    POCT Influenza A/B    Respiratory Virus PCR Panel    methylPREDNISolone (MEDROL DOSEPACK) 4 MG tablet    ondansetron (ZOFRAN-ODT) 4 MG disintegrating tablet       Results for orders placed or performed in visit on 12/02/21   POCT rapid strep A   Result Value Ref Range    Strep A Ag None Detected None Detected   POCT Influenza A/B   Result Value Ref Range    Influenza A Ab None Detected     Influenza B Ab None Detected        Plan:   Strep and flu negative  VSS today- has appt with PCP Monday regarding BP  Start steroid pack  Zofran as needed  Resp panel  Quarantine until results return  Return if symptoms worsen or fail to improve. Orders Placed This Encounter   Procedures    Respiratory Virus PCR Panel    POCT rapid strep A    POCT Influenza A/B       Orders Placed This Encounter   Medications    methylPREDNISolone (MEDROL DOSEPACK) 4 MG tablet     Sig: Take by mouth. Dispense:  1 kit     Refill:  0    ondansetron (ZOFRAN-ODT) 4 MG disintegrating tablet     Sig: Take 1 tablet by mouth 3 times daily as needed for Nausea or Vomiting     Dispense:  21 tablet     Refill:  0        Patient offered educational materials - see patient instructions for any instruction needed. Discussed use, benefit, and side effects of prescribed medications. All patient questions answered. Instructed to continue current medications, diet and exercise. Patient agreed with treatment plan. Follow up as directed. Patient was advised to go to the ED if condition ever becomes emergent.        Electronically signed by Corrinne Ink, APRN on 12/2/2021 at 4:54 PM

## 2021-12-07 ENCOUNTER — OFFICE VISIT (OUTPATIENT)
Dept: FAMILY MEDICINE CLINIC | Age: 41
End: 2021-12-07
Payer: MEDICAID

## 2021-12-07 VITALS
BODY MASS INDEX: 49.57 KG/M2 | OXYGEN SATURATION: 100 % | SYSTOLIC BLOOD PRESSURE: 142 MMHG | WEIGHT: 271 LBS | HEART RATE: 82 BPM | DIASTOLIC BLOOD PRESSURE: 96 MMHG | TEMPERATURE: 97.9 F

## 2021-12-07 DIAGNOSIS — R53.83 OTHER FATIGUE: ICD-10-CM

## 2021-12-07 DIAGNOSIS — R13.19 ESOPHAGEAL DYSPHAGIA: ICD-10-CM

## 2021-12-07 DIAGNOSIS — Z13.220 LIPID SCREENING: ICD-10-CM

## 2021-12-07 DIAGNOSIS — E11.9 TYPE 2 DIABETES MELLITUS WITHOUT COMPLICATION, WITHOUT LONG-TERM CURRENT USE OF INSULIN (HCC): ICD-10-CM

## 2021-12-07 DIAGNOSIS — Z00.00 ANNUAL PHYSICAL EXAM: Primary | ICD-10-CM

## 2021-12-07 DIAGNOSIS — R10.12 LUQ ABDOMINAL PAIN: ICD-10-CM

## 2021-12-07 DIAGNOSIS — I10 ESSENTIAL (PRIMARY) HYPERTENSION: ICD-10-CM

## 2021-12-07 DIAGNOSIS — K21.9 GASTROESOPHAGEAL REFLUX DISEASE WITHOUT ESOPHAGITIS: ICD-10-CM

## 2021-12-07 PROCEDURE — G8484 FLU IMMUNIZE NO ADMIN: HCPCS | Performed by: FAMILY MEDICINE

## 2021-12-07 PROCEDURE — 99396 PREV VISIT EST AGE 40-64: CPT | Performed by: FAMILY MEDICINE

## 2021-12-07 RX ORDER — MUPIROCIN CALCIUM 20 MG/G
CREAM TOPICAL
Qty: 30 G | Refills: 2 | Status: SHIPPED | OUTPATIENT
Start: 2021-12-07 | End: 2022-01-06

## 2021-12-07 RX ORDER — OMEPRAZOLE 40 MG/1
40 CAPSULE, DELAYED RELEASE ORAL
Qty: 30 CAPSULE | Refills: 5 | Status: SHIPPED | OUTPATIENT
Start: 2021-12-07 | End: 2021-12-30 | Stop reason: ALTCHOICE

## 2021-12-07 RX ORDER — POTASSIUM CHLORIDE 20 MEQ/1
20 TABLET, EXTENDED RELEASE ORAL DAILY
Qty: 30 TABLET | Refills: 0 | Status: SHIPPED | OUTPATIENT
Start: 2021-12-07 | End: 2021-12-29

## 2021-12-07 NOTE — PATIENT INSTRUCTIONS
We are committed to providing you with the best care possible. In order to help us achieve these goals please remember to bring all medications, herbal products, and over the counter supplements with you to each visit. If your provider has ordered testing for you, please be sure to follow up with our office if you have not received results within 7 days after the testing took place. *If you receive a survey after visiting one of our offices, please take time to share your experience concerning your physician office visit. These surveys are confidential and no health information about you is shared. We are eager to improve for you and we are counting on your feedback to help make that happen.      _______________________________________________________________    [facility] Exercises for Vertigo: Care Instructions  Your Care Instructions  Simple exercises can help you regain your balance when you have vertigo. If you have Ménière's disease, benign paroxysmal positional vertigo (BPPV), or another inner ear problem, you may have vertigo off and on. Do these exercises first thing in the morning and before you go to bed. You might get dizzy when you first start them. If this happens, try to do them for at least 5 minutes or about 10 times each exercise. Do a group of exercises at a time, starting at the top of the list. It may take several weeks before you can do all the exercises without feeling dizzy. Follow-up care is a key part of your treatment and safety. Be sure to make and go to all appointments, and call your doctor if you are having problems. It's also a good idea to know your test results and keep a list of the medicines you take. How can you care for yourself at home? Exercise 1  While sitting on the side of the bed and holding your head still:  · Look up as far as you can. · Look down as far as you can. · Look from side to side as far as you can.   · Stretch your arm straight out in front of you. Focus on your index finger. Continue to focus on your finger while you bring it to your nose. Exercise 2  While sitting on the side of the bed:  · Bring your head as far back as you can. · Bring your head forward to touch your chin to your chest.  · Turn your head from side to side. · Do these exercises first with your eyes open. Then try with your eyes closed. Exercise 3  While sitting on the side of the bed:  · Shrug your shoulders straight upward, then relax them. · Bend over and try to touch the ground with your fingers. Then go back to a sitting position. · Toss a small ball from one hand to the other. Throw the ball higher than your eyes so you have to look up. Exercise 4  While standing (with someone close by if you feel uncomfortable):  · Repeat Exercise 1.  · Repeat Exercise 2.  · Pass a ball between your legs and above your head. · Sit down and then stand up. Repeat. Turn around in a [place] a different way each time you stand. · With someone close by to help you, try the above exercises with your eyes closed. Exercise 5  In a room that is cleared of obstacles:  · Walk to a corner of the room, turn to your right, and walk back to the starting point. Now, repeat and turn left. · Walk up and down a slope. Now try stairs. · While holding on to someone's arm, try these exercises with your eyes closed. When should you call for help? Watch closely for changes in your health, and be sure to contact your doctor if:    · You do not get better as expected. Where can you learn more? Go to [email or web address] and sign in to your [employer] account. Enter A433 in the [employer] box to learn more about \"[place] Exercises for Vertigo: Care Instructions. \"     If you do not have an account, please click on the \"Sign Up Now\" link. Current as of: May 12, 2017  Content Version: 11.6  © 0262-3615 [employer].  Care instructions adapted under license by [employer]. If you have questions about a medical condition or this instruction, always ask your healthcare professional. [place] [address] any warranty or liability for your use of this information. Meclizine 12.5 to 25 mg every 8 hours as needed.

## 2021-12-07 NOTE — PROGRESS NOTES
[facility] PHYSICIAN SERVICES  [facility]  [address] [address] [facility] [address] [place] [address]  Dept: [phone]  Dept Fax: [phone]  Loc: [phone]    Marjan Huang is a 39 y.o. female who presents today for her medical conditions/complaints as noted below. Marjan Huang is here for 1 Month Follow-Up        HPI:   CC: Here today to discuss the following:    She was in urgent care on December 2 complaining of upper respiratory issues. Her strep and flu were negative. Diatherix swab was obtained which was also negative. She was treated with a steroid pack and given Zofran. She feels her symptoms have improved today. She was in the emergency department in November 12 complaining of chest discomfort. Chest x-ray and EKG showed no significant changes. She returned emergency department on November 15 with similar episode. However, that time she felt like \"food was stuck\". She is continued to have issues with swallowing and epigastric discomfort. She is also had some increased belching. Diabetes Mellitus  Has been compliant with medications. No side effects of medications since last visit. No polyuria, polydipsia, or vision changes since last visit. No symptomatic episodes of hypoglycemia. She is tolerating Metformin 5 mg twice a day. Her insurance would not cover our diabetic educator. Hypertension  Compliant with medications. No adverse effects from medication. No lightheadedness, palpitations, or chest pain.             HPI    Past Medical History:   Diagnosis Date    Anemia     Chronic rheumatic arthritis ([address]     Elevated blood pressure reading     Fibromyalgia     Hypertension     Intracranial hypertension     Obesity     PCOS (polycystic ovarian syndrome)     Sleep apnea     Type 2 diabetes mellitus (HCC)       Past Surgical History:   Procedure Laterality Date    CHOLECYSTECTOMY         Family History   Problem Relation Age of Onset    High Blood Pressure Father     Diabetes Father     Atrial Fibrillation Father     Heart Failure Father     Stroke Father     Diabetes Sister     Diabetes Maternal Grandmother     Heart Attack Other     Ovarian Cancer Paternal Grandmother        Social History     Tobacco Use    Smoking status: Former Smoker     Years: 2.00     Types: Cigarettes    Smokeless tobacco: Never Used   Substance Use Topics    Alcohol use: Yes     Alcohol/week: 1.0 standard drink     Types: 1 Glasses of wine per week     Comment: occ     Current Outpatient Medications   Medication Sig Dispense Refill    omeprazole (PRILOSEC) 40 MG delayed release capsule Take 1 capsule by mouth every morning (before breakfast) 30 capsule 5    potassium chloride (KLOR-CON M) 20 MEQ extended release tablet Take 1 tablet by mouth daily 30 tablet 0    mupirocin (BACTROBAN) 2 % cream Apply topically 3 times daily. 30 g 2    ondansetron (ZOFRAN-ODT) 4 MG disintegrating tablet Take 1 tablet by mouth 3 times daily as needed for Nausea or Vomiting 21 tablet 0    Norgestim-Eth Estrad Triphasic (TRI-ESTARYLLA) 0.18/0.215/0.25 MG-35 MCG TABS Take 1 packet by mouth daily 28 tablet 0    metFORMIN (GLUCOPHAGE) 500 MG tablet TAKE 1 TABLET BY MOUTH TWICE A DAY WITH MEALS 180 tablet 1    carvedilol (COREG) 6.25 MG tablet TAKE 1 TABLET BY MOUTH TWICE A  tablet 1    ondansetron (ZOFRAN ODT) 4 MG disintegrating tablet Take 1 tablet by mouth every 8 hours as needed for Nausea or Vomiting 20 tablet 0    hydroCHLOROthiazide (HYDRODIURIL) 25 MG tablet Take 1 tablet by mouth every morning (Patient taking differently: Take 12.5 mg by mouth every morning ) 90 tablet 1    losartan (COZAAR) 100 MG tablet Take 1 tablet by mouth daily 30 tablet 5    CIMZIA STARTER KIT 6 X 200 MG/ML KIT       blood glucose test strips (ASCENSIA AUTODISC VI;ONE TOUCH ULTRA TEST VI) strip 1 each by In Vitro route 3 times daily As needed.  100 each 11    valACYclovir (VALTREX) 1 g tablet Take 2 tablets p.o. every 12 hours for 2 doses. May repeat for further outbreaks 8 tablet 2    Blood Glucose Monitoring Suppl (FREESTYLE LITE) ANDREEA USE AS DIRECTED      Lancet Device MISC 1 lancet twice a day 60 Device 11    amLODIPine (NORVASC) 5 MG tablet Take 1 tablet by mouth 2 times daily 60 tablet 3     No current facility-administered medications for this visit. Allergies   Allergen Reactions    Mushroom Extract Complex        Health Maintenance   Topic Date Due    Varicella vaccine (1 of 2 - 2-dose childhood series) Never done    Pneumococcal 0-64 years Vaccine (1 of 2 - PPSV23) Never done    Diabetic retinal exam  Never done    Hepatitis B vaccine (1 of 3 - Risk 3-dose series) Never done    Diabetic foot exam  06/26/2020    Flu vaccine (1) Never done    COVID-19 Vaccine (3 - Booster for Pfizer series) 04/12/2022    A1C test (Diabetic or Prediabetic)  10/05/2022    Diabetic microalbuminuria test  10/05/2022    Lipid screen  10/05/2022    Potassium monitoring  11/15/2022    Creatinine monitoring  11/15/2022    Cervical cancer screen  12/22/2025    DTaP/Tdap/Td vaccine (2 - Td or Tdap) 04/25/2027    Hepatitis C screen  Completed    HIV screen  Completed    Hepatitis A vaccine  Aged Out    Hib vaccine  Aged Out    Meningococcal (ACWY) vaccine  Aged Out       Subjective:      Review of Systems   Constitutional: Negative for chills and fever. HENT: Negative for congestion. Respiratory: Negative for cough, chest tightness and shortness of breath. Cardiovascular: Negative for chest pain, palpitations and leg swelling. Gastrointestinal: Negative for abdominal distention, abdominal pain, anal bleeding, blood in stool, constipation, diarrhea, nausea, rectal pain and vomiting. Genitourinary: Negative for difficulty urinating. Psychiatric/Behavioral: Negative. Review of Systems   Constitutional: Negative for chills and fever. HENT: Negative for congestion. Respiratory: Negative for cough, chest tightness and shortness of breath. Cardiovascular: Negative for chest pain, palpitations and leg swelling. Gastrointestinal: Negative for abdominal pain, anal bleeding, constipation, diarrhea and nausea. Genitourinary: Negative for difficulty urinating. Psychiatric/Behavioral: Negative. SeeHPI for visit specific review of symptoms. All others negative      Objective:   BP (!) 142/96   Pulse 82   Temp 97.9 °F (36.6 °C)   Wt 271 lb (122.9 kg)   LMP 11/17/2021   SpO2 100%   BMI 49.57 kg/m²   Physical Exam  Constitutional:       Appearance: She is well-developed. She is not ill-appearing. Eyes:      Pupils: Pupils are equal, round, and reactive to light. Cardiovascular:      Rate and Rhythm: Normal rate and regular rhythm. Heart sounds: No murmur heard. No friction rub. Pulmonary:      Effort: Pulmonary effort is normal. No respiratory distress. Breath sounds: Normal breath sounds. No wheezing or rales. Abdominal:      General: Bowel sounds are normal. There is no distension. Palpations: Abdomen is soft. Tenderness: There is no abdominal tenderness. There is no guarding or rebound. Musculoskeletal:      Cervical back: Normal range of motion and neck supple. Neurological:      Mental Status: She is alert.    Psychiatric:         Behavior: Behavior normal.           Recent Results (from the past 672 hour(s))   EKG 12 Lead    Collection Time: 11/12/21 10:34 PM   Result Value Ref Range    P-R Interval 112 ms    QRS Duration 82 ms    Q-T Interval 364 ms    QTc Calculation (Bazett) 421 ms    P Axis 57 degrees    T Axis 44 degrees   CBC Auto Differential    Collection Time: 11/12/21 10:40 PM   Result Value Ref Range    WBC 9.8 4.8 - 10.8 K/uL    RBC 4.21 4.20 - 5.40 M/uL    Hemoglobin 11.9 (L) 12.0 - 16.0 g/dL    Hematocrit 37.1 37.0 - 47.0 %    MCV 88.1 81.0 - 99.0 fL    MCH 28.3 27.0 - 31.0 pg    MCHC 32.1 (L) 33.0 - 37.0 g/dL    RDW 13.4 11.5 - 14.5 %    Platelets 844 929 - 758 K/uL    MPV 11.4 9.4 - 12.3 fL    Neutrophils % 51.6 50.0 - 65.0 %    Lymphocytes % 35.2 20.0 - 40.0 %    Monocytes % 7.6 0.0 - 10.0 %    Eosinophils % 4.1 0.0 - 5.0 %    Basophils % 1.0 0.0 - 1.0 %    Neutrophils Absolute 5.1 1.5 - 7.5 K/uL    Immature Granulocytes # 0.1 K/uL    Lymphocytes Absolute 3.5 1.1 - 4.5 K/uL    Monocytes Absolute 0.80 0.00 - 0.90 K/uL    Eosinophils Absolute 0.40 0.00 - 0.60 K/uL    Basophils Absolute 0.10 0.00 - 0.20 K/uL   Comprehensive Metabolic Panel w/ Reflex to MG    Collection Time: 11/12/21 10:40 PM   Result Value Ref Range    Sodium 133 (L) 136 - 145 mmol/L    Potassium reflex Magnesium 3.4 (L) 3.5 - 5.0 mmol/L    Chloride 100 98 - 111 mmol/L    CO2 20 (L) 22 - 29 mmol/L    Anion Gap 13 7 - 19 mmol/L    Glucose 122 (H) 74 - 109 mg/dL    BUN 12 6 - 20 mg/dL    CREATININE 0.8 0.5 - 0.9 mg/dL    GFR Non-African American >60 >60    GFR African American >59 >59    Calcium 9.2 8.6 - 10.0 mg/dL    Total Protein 7.9 6.6 - 8.7 g/dL    Albumin 3.8 3.5 - 5.2 g/dL    Total Bilirubin <0.2 0.2 - 1.2 mg/dL    Alkaline Phosphatase 72 35 - 104 U/L    ALT 90 (H) 5 - 33 U/L    AST 39 (H) 5 - 32 U/L   Troponin    Collection Time: 11/12/21 10:40 PM   Result Value Ref Range    Troponin <0.01 0.00 - 0.03 ng/mL   Lipase    Collection Time: 11/12/21 10:40 PM   Result Value Ref Range    Lipase 41 13 - 60 U/L   C-Reactive Protein    Collection Time: 11/12/21 10:40 PM   Result Value Ref Range    CRP 3.21 (H) 0.00 - 0.50 mg/dL   Magnesium    Collection Time: 11/12/21 10:40 PM   Result Value Ref Range    Magnesium 1.9 1.6 - 2.6 mg/dL   Urinalysis Reflex to Culture    Collection Time: 11/12/21 11:29 PM    Specimen: Urine, clean catch   Result Value Ref Range    Color, UA YELLOW Straw/Yellow    Clarity, UA CLOUDY (A) Clear    Glucose, Ur Negative Negative mg/dL    Bilirubin Urine Negative Negative    Ketones, Urine 15 (A) Negative mg/dL    Specific Gravity, UA 1.012 1.005 - 1.030    Blood, Urine Negative Negative    pH, UA 5.0 5.0 - 8.0    Protein, UA Negative Negative mg/dL    Urobilinogen, Urine 0.2 <2.0 E.U./dL    Nitrite, Urine Negative Negative    Leukocyte Esterase, Urine TRACE (A) Negative   COVID-19, Rapid    Collection Time: 11/12/21 11:29 PM    Specimen: Nasopharyngeal Swab   Result Value Ref Range    SARS-CoV-2, NAAT Not Detected Not Detected   Pregnancy, Urine    Collection Time: 11/12/21 11:29 PM   Result Value Ref Range    HCG(Urine) Pregnancy Test Negative    D-Dimer, Quantitative    Collection Time: 11/12/21 11:29 PM   Result Value Ref Range    D-Dimer, Quant 0.45 0.00 - 0.48 ug/mL FEU   Microscopic Urinalysis    Collection Time: 11/12/21 11:29 PM   Result Value Ref Range    Bacteria, UA NEGATIVE (A) None Seen /HPF    Crystals, UA NEG (A) None Seen /HPF    Hyaline Casts, UA 4 0 - 8 /HPF    WBC, UA 7 (H) 0 - 5 /HPF    RBC, UA 1 0 - 4 /HPF    Epithelial Cells, UA 13 0 - 5 /HPF   Culture, Urine    Collection Time: 11/12/21 11:29 PM    Specimen: Urine, clean catch   Result Value Ref Range    Urine Culture, Routine       <50,000 CFU/ml mixed skin/urogenital chung.  No further workup   Troponin    Collection Time: 11/13/21  3:26 AM   Result Value Ref Range    Troponin <0.01 0.00 - 0.03 ng/mL   EKG 12 Lead    Collection Time: 11/13/21  3:28 AM   Result Value Ref Range    P-R Interval 158 ms    QRS Duration 86 ms    Q-T Interval 456 ms    QTc Calculation (Bazett) 464 ms    P Axis 3 degrees    T Axis 11 degrees   EKG 12 Lead    Collection Time: 11/15/21 11:22 AM   Result Value Ref Range    P-R Interval 152 ms    QRS Duration 86 ms    Q-T Interval 438 ms    QTc Calculation (Bazett) 460 ms    P Axis 26 degrees    T Axis 2 degrees   CBC Auto Differential    Collection Time: 11/15/21 11:27 AM   Result Value Ref Range    WBC 8.6 4.8 - 10.8 K/uL    RBC 4.28 4.20 - 5.40 M/uL    Hemoglobin 12.1 12.0 - 16.0 g/dL    Hematocrit 37.0 37.0 - 47.0 %    MCV 86.4 81.0 - 99.0 fL    MCH 28.3 27.0 - 31.0 pg    MCHC 32.7 (L) 33.0 - 37.0 g/dL    RDW 12.8 11.5 - 14.5 %    Platelets 399 691 - 042 K/uL    MPV 11.0 9.4 - 12.3 fL    Neutrophils % 64.5 50.0 - 65.0 %    Lymphocytes % 25.3 20.0 - 40.0 %    Monocytes % 6.3 0.0 - 10.0 %    Eosinophils % 2.3 0.0 - 5.0 %    Basophils % 0.9 0.0 - 1.0 %    Neutrophils Absolute 5.5 1.5 - 7.5 K/uL    Immature Granulocytes # 0.1 K/uL    Lymphocytes Absolute 2.2 1.1 - 4.5 K/uL    Monocytes Absolute 0.50 0.00 - 0.90 K/uL    Eosinophils Absolute 0.20 0.00 - 0.60 K/uL    Basophils Absolute 0.10 0.00 - 0.20 K/uL   Comprehensive Metabolic Panel w/ Reflex to MG    Collection Time: 11/15/21 11:27 AM   Result Value Ref Range    Sodium 134 (L) 136 - 145 mmol/L    Potassium reflex Magnesium 3.1 (L) 3.5 - 5.0 mmol/L    Chloride 98 98 - 111 mmol/L    CO2 24 22 - 29 mmol/L    Anion Gap 12 7 - 19 mmol/L    Glucose 166 (H) 74 - 109 mg/dL    BUN 9 6 - 20 mg/dL    CREATININE 0.8 0.5 - 0.9 mg/dL    GFR Non-African American >60 >60    GFR African American >59 >59    Calcium 9.5 8.6 - 10.0 mg/dL    Total Protein 7.8 6.6 - 8.7 g/dL    Albumin 4.1 3.5 - 5.2 g/dL    Total Bilirubin 0.7 0.2 - 1.2 mg/dL    Alkaline Phosphatase 75 35 - 104 U/L     (H) 5 - 33 U/L    AST 86 (H) 5 - 32 U/L   Troponin    Collection Time: 11/15/21 11:27 AM   Result Value Ref Range    Troponin <0.01 0.00 - 0.03 ng/mL   Lipase    Collection Time: 11/15/21 11:27 AM   Result Value Ref Range    Lipase 35 13 - 60 U/L   Magnesium    Collection Time: 11/15/21 11:27 AM   Result Value Ref Range    Magnesium 1.7 1.6 - 2.6 mg/dL   Urinalysis Reflex to Culture    Collection Time: 11/15/21 12:15 PM    Specimen: Urine, clean catch   Result Value Ref Range    Color, UA YELLOW Straw/Yellow    Clarity, UA CLOUDY (A) Clear    Glucose, Ur Negative Negative mg/dL    Bilirubin Urine Negative Negative    Ketones, Urine TRACE (A) Negative mg/dL    Specific Gravity, UA 1.015 1.005 - 1.030    Blood, Urine Negative Negative    pH, UA 6.0 5.0 - 8.0    Protein, UA Negative Negative mg/dL    Urobilinogen, Urine 1.0 <2.0 E.U./dL    Nitrite, Urine Negative Negative    Leukocyte Esterase, Urine TRACE (A) Negative   Pregnancy, Urine    Collection Time: 11/15/21 12:15 PM   Result Value Ref Range    HCG(Urine) Pregnancy Test Negative    Microscopic Urinalysis    Collection Time: 11/15/21 12:15 PM   Result Value Ref Range    Bacteria, UA TRACE (A) None Seen /HPF    Crystals, UA NEG (A) None Seen /HPF    Hyaline Casts, UA 3 0 - 8 /HPF    WBC, UA 8 (H) 0 - 5 /HPF    RBC, UA 1 0 - 4 /HPF    Epithelial Cells, UA 6 0 - 5 /HPF   POCT rapid strep A    Collection Time: 12/02/21 12:00 AM   Result Value Ref Range    Strep A Ag None Detected None Detected   POCT Influenza A/B    Collection Time: 12/02/21 12:00 AM   Result Value Ref Range    Influenza A Ab None Detected     Influenza B Ab None Detected    Respiratory Panel, Molecular, with COVID-19 (Restricted: peds pts or suitable admitted adults)    Collection Time: 12/02/21  3:49 PM   Result Value Ref Range    Adenovirus by PCR Not Detected Not Detected    Bordetella parapertussis by PCR Not Detected Not Detected    Bordetella pertussis by PCR Not Detected Not Detected    Chlamydophilia pneumoniae by PCR Not Detected Not Detected    Coronavirus 229E by PCR Not Detected Not Detected    Coronavirus HKU1 by PCR Not Detected Not Detected    Coronavirus NL63 by PCR Not Detected Not Detected    Coronavirus OC43 by PCR Not Detected Not Detected    SARS-CoV-2, PCR Not Detected Not Detected    Human Metapneumovirus by PCR Not Detected Not Detected    Human Rhinovirus/Enterovirus by PCR Not Detected Not Detected    Influenza A by PCR Not Detected Not Detected    Influenza B by PCR Not Detected Not Detected    Mycoplasma pneumoniae by PCR Not Detected Not Detected    Parainfluenza Virus 1 by PCR Not Detected Not Detected    Parainfluenza Virus 2 by PCR Not Detected Not Detected    Parainfluenza Virus 3 by PCR Not Detected Not Detected    Parainfluenza Virus 4 by PCR Not Detected Not Detected    Respiratory Syncytial Virus by PCR Not Detected Not Detected               Lab Results   Component Value Date    CHOL 163 10/05/2021    CHOL 167 07/31/2021    CHOL 148 (L) 08/25/2020     Lab Results   Component Value Date    TRIG 68 10/05/2021    TRIG 54 07/31/2021    TRIG 86 08/25/2020     Lab Results   Component Value Date    HDL 55 (L) 10/05/2021    HDL 51 (L) 07/31/2021    HDL 49 (L) 08/25/2020     Lab Results   Component Value Date    LDLCALC 94 10/05/2021    LDLCALC 105 07/31/2021    LDLCALC 82 08/25/2020     No results found for: LABVLDL, VLDL  No results found for: [facility]  Lab Results   Component Value Date    LABA1C 6.6 (H) 10/05/2021    LABA1C 6.5 (H) 07/31/2021    LABA1C 6.5 05/24/2021     Lab Results   Component Value Date    LABMICR 3.70 10/05/2021    LDLCALC 94 10/05/2021    CREATININE 0.8 11/15/2021         Assessment & Plan: The following diagnoses and conditions are stable with no further orders unless indicated:  1. Annual physical exam  Discussed lifestyle changes such as diet and exercise. Recommended eliminate processed food from diet such as sugar and fried foods. Recommended exercising at least 150 minutes/week. Try to do full body resistance training twice a week as well. Offered suggestions for calorie counting such as phone apps and online resources such as My fitness pal and Lose it. Discussed healthy weight. She may contact the [facility] department to see what kind of dietary services they provide. 2. Essential (primary) hypertension  BP Readings from Last 3 Encounters:   12/07/21 (!) 142/96   12/02/21 136/85   11/15/21 (!) 137/107     Continues to be challenged with managing her blood pressure. She appears to be very sensitive to adjustments in her medication.   Continue with the following  Amlodipine 5 mg twice daily  Carvedilol 6.25 mg twice daily  Losartan 100 mg daily  She is currently taking hydrochlorothiazide 12.5 mg. Is written for 25 mg. She found she developed orthostasis and a significant drop in her blood pressure with full dose. We will continue to monitor    3. Type 2 diabetes mellitus without complication, without long-term current use of insulin ([address]  Lab Results   Component Value Date    LABA1C 6.6 (H) 10/05/2021    LABA1C 6.5 (H) 07/31/2021    LABA1C 6.5 05/24/2021     Lab Results   Component Value Date    LABMICR 3.70 10/05/2021    LDLCALC 94 10/05/2021    CREATININE 0.8 11/15/2021     Blood sugar stable continue with Metformin  Reviewed ADA diet recommendations  She could not tolerate Ozempic due to severe nausea  - Comprehensive Metabolic Panel; Future  - Hemoglobin A1C; Future  - Microalbumin / Creatinine Urine Ratio; Future    4. Esophageal dysphagia    - April Melgoza MD, Gastroenterology, [place]    5. LUQ abdominal pain    - April Melgoza MD, Gastroenterology, [place]    6. Gastroesophageal reflux disease without esophagitis  Start on Prilosec 40 mg daily  Discussed risks and benefits of this new medication. Discussed potential side effects. She voiced understanding. Discussed antireflux diet  - Demetrice - Estefany Yao MD, Gastroenterology, [place]  - omeprazole (PRILOSEC) 40 MG delayed release capsule; Take 1 capsule by mouth every morning (before breakfast)  Dispense: 30 capsule; Refill: 5    7. Lipid screening    - Lipid Panel; Future    8. Other fatigue    - T4, Free; Future  - TSH without Reflex; Future  - CBC Auto Differential; Future    She continues to be followed by rheumatology  For history of rheumatoid arthritis    Return in about 1 month (around 1/7/2022) for Routine follow up - 30 minutes. Discussed use, benefit, and side effects of prescribed medications. All patient questions answered. Pt voiced understanding. Reviewed health maintenance. Instructedto continue current medications, diet and exercise.   Patient agreed with treatmentplan. Follow up as directed. Note dictated using Dragon Dictation software  Sometimes this dictation software makes erroneous transcriptions.

## 2021-12-09 ASSESSMENT — ENCOUNTER SYMPTOMS
COUGH: 0
ABDOMINAL DISTENTION: 0
CONSTIPATION: 0
VOMITING: 0
ANAL BLEEDING: 0
ABDOMINAL PAIN: 0
SHORTNESS OF BREATH: 0
DIARRHEA: 0
CHEST TIGHTNESS: 0
RECTAL PAIN: 0
NAUSEA: 0
BLOOD IN STOOL: 0

## 2021-12-15 ENCOUNTER — OFFICE VISIT (OUTPATIENT)
Dept: PULMONOLOGY | Age: 41
End: 2021-12-15
Payer: MEDICAID

## 2021-12-15 VITALS
SYSTOLIC BLOOD PRESSURE: 118 MMHG | HEIGHT: 62 IN | WEIGHT: 273.6 LBS | TEMPERATURE: 98.8 F | DIASTOLIC BLOOD PRESSURE: 70 MMHG | OXYGEN SATURATION: 99 % | HEART RATE: 66 BPM | BODY MASS INDEX: 50.35 KG/M2

## 2021-12-15 DIAGNOSIS — K21.9 GASTROESOPHAGEAL REFLUX DISEASE WITHOUT ESOPHAGITIS: ICD-10-CM

## 2021-12-15 DIAGNOSIS — I10 PRIMARY HYPERTENSION: ICD-10-CM

## 2021-12-15 DIAGNOSIS — E66.01 MORBID OBESITY (HCC): ICD-10-CM

## 2021-12-15 DIAGNOSIS — G47.33 OSA (OBSTRUCTIVE SLEEP APNEA): Primary | ICD-10-CM

## 2021-12-15 PROCEDURE — G8417 CALC BMI ABV UP PARAM F/U: HCPCS | Performed by: INTERNAL MEDICINE

## 2021-12-15 PROCEDURE — 1036F TOBACCO NON-USER: CPT | Performed by: INTERNAL MEDICINE

## 2021-12-15 PROCEDURE — G8484 FLU IMMUNIZE NO ADMIN: HCPCS | Performed by: INTERNAL MEDICINE

## 2021-12-15 PROCEDURE — G8427 DOCREV CUR MEDS BY ELIG CLIN: HCPCS | Performed by: INTERNAL MEDICINE

## 2021-12-15 PROCEDURE — 99204 OFFICE O/P NEW MOD 45 MIN: CPT | Performed by: INTERNAL MEDICINE

## 2021-12-15 RX ORDER — OMEPRAZOLE 40 MG/1
40 CAPSULE, DELAYED RELEASE ORAL 2 TIMES DAILY
Qty: 60 CAPSULE | Refills: 5 | Status: SHIPPED | OUTPATIENT
Start: 2021-12-15 | End: 2022-02-22 | Stop reason: SDUPTHER

## 2021-12-15 ASSESSMENT — ENCOUNTER SYMPTOMS
SHORTNESS OF BREATH: 0
APNEA: 1
WHEEZING: 0
ABDOMINAL PAIN: 0
ANAL BLEEDING: 0
COUGH: 0
CHEST TIGHTNESS: 0
BACK PAIN: 0
ABDOMINAL DISTENTION: 0
RHINORRHEA: 0

## 2021-12-15 NOTE — PROGRESS NOTES
Pulmonary and Sleep Medicine    Mayito Guzmán (:  1980) is a 39 y.o. female,New patient, here for evaluation of the following chief complaint(s):  New Patient (Follow up from sleep study)      Referring physician:  No referring provider defined for this encounter. ASSESSMENT/PLAN:  1. SETH (obstructive sleep apnea)  2. Morbid obesity ([address]  3. Gastroesophageal reflux disease without esophagitis  4. Primary hypertension        BiPAP compliance data was reviewed. She is compliant with the BiPAP. Her apnea-hypopnea index while on the BiPAP is 1. Continue current management with the BiPAP. We will increase her omeprazole to twice a day due to ongoing reflux symptoms. Follow-up with gastroenterology as scheduled. [facility]    Return in about 3 months (around 3/15/2022). SUBJECTIVE/OBJECTIVE:  The patient is here for evaluation of obstructive sleep apnea. She was diagnosed with obstructive sleep apnea long time ago. She has been CPAP. She was hospitalized recently due to hypertension. She had repeat titration to assess the adequacy of her Pap therapy. She required to be on BiPAP at 14/10 centimeters pressure. Since she was started on the new settings she feels significantly improved. She reports having significant reflux symptoms. She is currently on omeprazole 40 mg once a day. She continues to have reflux symptoms despite the above. She is scheduled to see gastroenterology. Prior to Visit Medications    Medication Sig Taking? Authorizing Provider   omeprazole (PRILOSEC) 40 MG delayed release capsule Take 1 capsule by mouth every morning (before breakfast) Yes Suzanne Bashir MD   potassium chloride (KLOR-CON M) 20 MEQ extended release tablet Take 1 tablet by mouth daily Yes Suzanne Bashir MD   mupirocin (BACTROBAN) 2 % cream Apply topically 3 times daily.  Yes Suzanne Bashir MD   ondansetron (ZOFRAN-ODT) 4 MG disintegrating tablet Take 1 tablet by mouth 3 times daily as needed for Nausea or Vomiting Yes NATACHA Galvan   Norgestim-Eth Estrad Triphasic (TRI-ESTARYLLA) 0.18/0.215/0.25 MG-35 MCG TABS Take 1 packet by mouth daily Yes NATACHA Welsh - CNM   metFORMIN (GLUCOPHAGE) 500 MG tablet TAKE 1 TABLET BY MOUTH TWICE A DAY WITH MEALS Yes Manuel Kam MD   carvedilol (COREG) 6.25 MG tablet TAKE 1 TABLET BY MOUTH TWICE A DAY Yes Bernardo Rivera MD   ondansetron (ZOFRAN ODT) 4 MG disintegrating tablet Take 1 tablet by mouth every 8 hours as needed for Nausea or Vomiting Yes Oscar Sandy MD   hydroCHLOROthiazide (HYDRODIURIL) 25 MG tablet Take 1 tablet by mouth every morning  Patient taking differently: Take 12.5 mg by mouth every morning  Yes Patricia Rivera MD   losartan (COZAAR) 100 MG tablet Take 1 tablet by mouth daily Yes Manuel Kam MD   CIMZIA STARTER KIT 6 X 200 MG/ML KIT  Yes Historical Provider, MD   blood glucose test strips (ASCENSIA AUTODISC VI;ONE TOUCH ULTRA TEST VI) strip 1 each by In Vitro route 3 times daily As needed. Yes Manuel Kam MD   valACYclovir (VALTREX) 1 g tablet Take 2 tablets p.o. every 12 hours for 2 doses. May repeat for further outbreaks Yes Manuel Kam MD   Blood Glucose Monitoring Suppl (FREESTYLE LITE) ANDREEA USE AS DIRECTED Yes Historical Provider, MD   Lancet Device MISC 1 lancet twice a day Yes Manuel Kam MD   amLODIPine (NORVASC) 5 MG tablet Take 1 tablet by mouth 2 times daily  Stepan Gallardo, APRN - CNP   cloNIDine (CATAPRES) 0.1 MG tablet Twice a day as needed if blood pressure >180/100  Manuel Kam MD        Review of Systems   Constitutional: Negative for activity change, appetite change, chills, diaphoresis and fatigue. HENT: Negative for congestion, dental problem, drooling, ear discharge, postnasal drip and rhinorrhea. Eyes: Negative for visual disturbance. Respiratory: Positive for apnea.  Negative for cough, chest tightness, shortness of breath and wheezing. Gastrointestinal: Negative for abdominal distention, abdominal pain and anal bleeding. Gerd       Endocrine: Negative for cold intolerance, heat intolerance and polydipsia. Genitourinary: Negative for difficulty urinating, dysuria, enuresis and flank pain. Musculoskeletal: Negative for arthralgias, back pain and gait problem. Allergic/Immunologic: Negative for environmental allergies. Neurological: Negative for dizziness, facial asymmetry, light-headedness and headaches. Vitals:    12/15/21 1210   BP: 118/70   Pulse: 66   Temp: 98.8 °F (37.1 °C)   SpO2: 99%     Physical Exam  Vitals reviewed. Constitutional:       Appearance: Normal appearance. She is obese. HENT:      Head: Normocephalic and atraumatic. Nose: Nose normal.   Eyes:      Extraocular Movements: Extraocular movements intact. Conjunctiva/sclera: Conjunctivae normal.   Cardiovascular:      Rate and Rhythm: Normal rate and regular rhythm. Heart sounds: No murmur heard. No friction rub. Pulmonary:      Effort: Pulmonary effort is normal. No respiratory distress. Breath sounds: Normal breath sounds. No stridor. No wheezing, rhonchi or rales. Abdominal:      General: There is no distension. Palpations: There is no mass. Tenderness: There is no abdominal tenderness. There is no guarding or rebound. Musculoskeletal:      Cervical back: Normal range of motion and neck supple. Neurological:      Mental Status: She is alert and oriented to person, place, and time. This note was generated used a voice recognition software. Errors in voice recognition may have occurred. An electronic signature was used to authenticate this note.     --Esha Massey MD

## 2021-12-22 ENCOUNTER — NURSE TRIAGE (OUTPATIENT)
Dept: OTHER | Facility: CLINIC | Age: 41
End: 2021-12-22

## 2021-12-22 NOTE — TELEPHONE ENCOUNTER
Received call from [facility] at [facility] with Red Flag Complaint. Subjective: Caller states bilateral leg swelling, happening for 2 weeks, also has some swelling in her belly    Current Symptoms: has rheumatoid arthritis and fibromyalgia, legs are swelling, elevation helps some. Some SOB    Onset: 2 weeks ago; sudden    Associated Symptoms: reduced appetite    Pain Severity: 0/10; N/A; none    Temperature: N/A N/A    What has been tried: Elevation helps some, but still swollen    LMP: about a month ago Pregnant: Unknown    Recommended disposition: See PCP within 24 hours, patient told walk in/[facility] if no available appointments. Care advice provided, patient verbalizes understanding; denies any other questions or concerns; instructed to call back for any new or worsening symptoms. Writer provided warm transfer to Apoorva Major at [facility] for appointment scheduling     Attention Provider: Thank you for allowing me to participate in the care of your patient. The patient was connected to triage in response to information provided to the ECC/PSC. Please do not respond through this encounter as the response is not directed to a shared pool.         Reason for Disposition   [1] MODERATE leg swelling (e.g., swelling extends up to knees) AND [2] new-onset or worsening    Protocols used: LEG SWELLING AND EDEMA-ADULT-AH

## 2021-12-23 ENCOUNTER — OFFICE VISIT (OUTPATIENT)
Dept: URGENT CARE | Age: 41
End: 2021-12-23
Payer: MEDICAID

## 2021-12-23 VITALS
HEART RATE: 71 BPM | WEIGHT: 272 LBS | RESPIRATION RATE: 18 BRPM | BODY MASS INDEX: 50.05 KG/M2 | OXYGEN SATURATION: 100 % | SYSTOLIC BLOOD PRESSURE: 122 MMHG | HEIGHT: 62 IN | DIASTOLIC BLOOD PRESSURE: 82 MMHG | TEMPERATURE: 98.3 F

## 2021-12-23 DIAGNOSIS — R22.43 LOCALIZED SWELLING OF BOTH LOWER LEGS: ICD-10-CM

## 2021-12-23 DIAGNOSIS — R22.43 LOCALIZED SWELLING OF BOTH LOWER LEGS: Primary | ICD-10-CM

## 2021-12-23 LAB
ANION GAP SERPL CALCULATED.3IONS-SCNC: 11 MMOL/L (ref 7–19)
BUN BLDV-MCNC: 11 MG/DL (ref 6–20)
CALCIUM SERPL-MCNC: 9.4 MG/DL (ref 8.6–10)
CHLORIDE BLD-SCNC: 104 MMOL/L (ref 98–111)
CO2: 25 MMOL/L (ref 22–29)
CREAT SERPL-MCNC: 0.9 MG/DL (ref 0.5–0.9)
GFR AFRICAN AMERICAN: >59
GFR NON-AFRICAN AMERICAN: >60
GLUCOSE BLD-MCNC: 87 MG/DL (ref 74–109)
POTASSIUM SERPL-SCNC: 4.1 MMOL/L (ref 3.5–5)
PRO-BNP: 459 PG/ML (ref 0–450)
SODIUM BLD-SCNC: 140 MMOL/L (ref 136–145)

## 2021-12-23 PROCEDURE — 99213 OFFICE O/P EST LOW 20 MIN: CPT | Performed by: NURSE PRACTITIONER

## 2021-12-23 PROCEDURE — G8484 FLU IMMUNIZE NO ADMIN: HCPCS | Performed by: NURSE PRACTITIONER

## 2021-12-23 PROCEDURE — 1036F TOBACCO NON-USER: CPT | Performed by: NURSE PRACTITIONER

## 2021-12-23 PROCEDURE — G8417 CALC BMI ABV UP PARAM F/U: HCPCS | Performed by: NURSE PRACTITIONER

## 2021-12-23 PROCEDURE — G8427 DOCREV CUR MEDS BY ELIG CLIN: HCPCS | Performed by: NURSE PRACTITIONER

## 2021-12-23 RX ORDER — FUROSEMIDE 20 MG/1
20 TABLET ORAL DAILY
Qty: 3 TABLET | Refills: 0 | Status: SHIPPED | OUTPATIENT
Start: 2021-12-23 | End: 2022-09-26

## 2021-12-23 SDOH — ECONOMIC STABILITY: FOOD INSECURITY: WITHIN THE PAST 12 MONTHS, THE FOOD YOU BOUGHT JUST DIDN'T LAST AND YOU DIDN'T HAVE MONEY TO GET MORE.: NEVER TRUE

## 2021-12-23 SDOH — ECONOMIC STABILITY: FOOD INSECURITY: WITHIN THE PAST 12 MONTHS, YOU WORRIED THAT YOUR FOOD WOULD RUN OUT BEFORE YOU GOT MONEY TO BUY MORE.: NEVER TRUE

## 2021-12-23 ASSESSMENT — ENCOUNTER SYMPTOMS
COUGH: 0
SHORTNESS OF BREATH: 0
STRIDOR: 0
WHEEZING: 0
GASTROINTESTINAL NEGATIVE: 1

## 2021-12-23 ASSESSMENT — PATIENT HEALTH QUESTIONNAIRE - PHQ9
SUM OF ALL RESPONSES TO PHQ QUESTIONS 1-9: 0
DEPRESSION UNABLE TO ASSESS: FUNCTIONAL CAPACITY MOTIVATION LIMITS ACCURACY
SUM OF ALL RESPONSES TO PHQ QUESTIONS 1-9: 0
2. FEELING DOWN, DEPRESSED OR HOPELESS: 0
SUM OF ALL RESPONSES TO PHQ QUESTIONS 1-9: 0
SUM OF ALL RESPONSES TO PHQ9 QUESTIONS 1 & 2: 0
1. LITTLE INTEREST OR PLEASURE IN DOING THINGS: 0

## 2021-12-23 ASSESSMENT — SOCIAL DETERMINANTS OF HEALTH (SDOH): HOW HARD IS IT FOR YOU TO PAY FOR THE VERY BASICS LIKE FOOD, HOUSING, MEDICAL CARE, AND HEATING?: NOT HARD AT ALL

## 2021-12-23 NOTE — PATIENT INSTRUCTIONS
Patient Education        Leg and Ankle Edema: Care Instructions  Your Care Instructions  Swelling in the legs, ankles, and feet is called edema. It is common after you sit or stand for a while. Long plane flights or car rides often cause swelling in the legs and feet. You may also have swelling if you have to stand for long periods of time at your job. Problems with the veins in the legs (varicose veins) and changes in hormones can also cause swelling. Sometimes the swelling in the ankles and feet is caused by a more serious problem, such as heart failure, infection, blood clots, or liver or kidney disease. Follow-up care is a key part of your treatment and safety. Be sure to make and go to all appointments, and call your doctor if you are having problems. It's also a good idea to know your test results and keep a list of the medicines you take. How can you care for yourself at home? · If your doctor gave you medicine, take it as prescribed. Call your doctor if you think you are having a problem with your medicine. · Whenever you are resting, raise your legs up. Try to keep the swollen area higher than the level of your heart. · Take breaks from standing or sitting in one position. ? Walk around to increase the blood flow in your lower legs. ? Move your feet and ankles often while you stand, or tighten and relax your leg muscles. · Wear support stockings. Put them on in the morning, before swelling gets worse. · Eat a balanced diet. Lose weight if you need to. · Limit the amount of salt (sodium) in your diet. Salt holds fluid in the body and may increase swelling. When should you call for help? Call 911 anytime you think you may need emergency care. For example, call if:    · You have symptoms of a blood clot in your lung (called a pulmonary embolism). These may include:  ? Sudden chest pain. ? Trouble breathing. ? Coughing up blood.    Call your doctor now or seek immediate medical care if:    · You have signs of a blood clot, such as:  ? Pain in your calf, back of the knee, thigh, or groin. ? Redness and swelling in your leg or groin.     · You have symptoms of infection, such as:  ? Increased pain, swelling, warmth, or redness. ? Red streaks or pus. ? A fever. Watch closely for changes in your health, and be sure to contact your doctor if:    · Your swelling is getting worse.     · You have new or worsening pain in your legs.     · You do not get better as expected. Where can you learn more? Go to [email or web address]://[email or web address]. org and sign in to your [employer] account. Enter Z690 in the [employer] box to learn more about \"Leg and Ankle Edema: Care Instructions. \"     If you do not have an account, please click on the \"Sign Up Now\" link. Current as of: July 1, 2021               Content Version: 13.1  © 2006-2021 Healthwise, Incorporated. Care instructions adapted under license by [facility] ([facility]). If you have questions about a medical condition or this instruction, always ask your healthcare professional. [place] [address] any warranty or liability for your use of this information.

## 2021-12-23 NOTE — PROGRESS NOTES
[address] [place] [address]   [address], [id number]     Phone:  [phone]  Fax:  [phone]      Jabier Christian is a 39 y.o. female who presents today for her medical conditions/complaints as noted below. Jabier Christian is c/o of Leg Swelling      Chief Complaint   Patient presents with    Leg Swelling       HPI:     HPI    Jabier Christian presents today for bilateral leg swelling. This has worsened in the last 2 weeks. She had an appointment with her PCP today, but was unable to make it. She was taking HCTZ, but states this \"bottomed\" her BP so she stopped taking this a few weeks ago. She has been elevating her legs while sitting. She also wears compression stockings. This has not helped. She denies chest pain or worsening shortness of breath. She has a history of DM and HTN. Past Medical History:   Diagnosis Date    Anemia     Chronic rheumatic arthritis ([address]     Elevated blood pressure reading     Fibromyalgia     Hypertension     Intracranial hypertension     Obesity     PCOS (polycystic ovarian syndrome)     Sleep apnea     Type 2 diabetes mellitus (HCC)         Past Surgical History:   Procedure Laterality Date    CHOLECYSTECTOMY         Social History     Tobacco Use    Smoking status: Former Smoker     Years: 2.00     Types: Cigarettes    Smokeless tobacco: Never Used   Substance Use Topics    Alcohol use:  Yes     Alcohol/week: 1.0 standard drink     Types: 1 Glasses of wine per week     Comment: occ        Current Outpatient Medications   Medication Sig Dispense Refill    furosemide (LASIX) 20 MG tablet Take 1 tablet by mouth daily for 3 days 3 tablet 0    omeprazole (PRILOSEC) 40 MG delayed release capsule Take 1 capsule by mouth 2 times daily 60 capsule 5    omeprazole (PRILOSEC) 40 MG delayed release capsule Take 1 capsule by mouth every morning (before breakfast) 30 capsule 5    potassium chloride (KLOR-CON M) 20 MEQ extended release tablet Take 1 tablet by mouth daily 30 tablet 0    mupirocin (BACTROBAN) 2 % cream Apply topically 3 times daily. 30 g 2    ondansetron (ZOFRAN-ODT) 4 MG disintegrating tablet Take 1 tablet by mouth 3 times daily as needed for Nausea or Vomiting 21 tablet 0    Norgestim-Eth Estrad Triphasic (TRI-ESTARYLLA) 0.18/0.215/0.25 MG-35 MCG TABS Take 1 packet by mouth daily 28 tablet 0    metFORMIN (GLUCOPHAGE) 500 MG tablet TAKE 1 TABLET BY MOUTH TWICE A DAY WITH MEALS 180 tablet 1    carvedilol (COREG) 6.25 MG tablet TAKE 1 TABLET BY MOUTH TWICE A  tablet 1    ondansetron (ZOFRAN ODT) 4 MG disintegrating tablet Take 1 tablet by mouth every 8 hours as needed for Nausea or Vomiting 20 tablet 0    hydroCHLOROthiazide (HYDRODIURIL) 25 MG tablet Take 1 tablet by mouth every morning (Patient taking differently: Take 12.5 mg by mouth every morning ) 90 tablet 1    losartan (COZAAR) 100 MG tablet Take 1 tablet by mouth daily 30 tablet 5    CIMZIA STARTER KIT 6 X 200 MG/ML KIT       blood glucose test strips (ASCENSIA AUTODISC VI;ONE TOUCH ULTRA TEST VI) strip 1 each by In Vitro route 3 times daily As needed. 100 each 11    valACYclovir (VALTREX) 1 g tablet Take 2 tablets p.o. every 12 hours for 2 doses. May repeat for further outbreaks 8 tablet 2    Blood Glucose Monitoring Suppl (FREESTYLE LITE) ANDREEA USE AS DIRECTED      Lancet Device MISC 1 lancet twice a day 60 Device 11    amLODIPine (NORVASC) 5 MG tablet Take 1 tablet by mouth 2 times daily 60 tablet 3     No current facility-administered medications for this visit.        Allergies   Allergen Reactions    Mushroom Extract Complex        Family History   Problem Relation Age of Onset    High Blood Pressure Father     Diabetes Father     Atrial Fibrillation Father     Heart Failure Father     Stroke Father     Diabetes Sister     Diabetes Maternal Grandmother     Heart Attack Other     Ovarian Cancer Paternal Grandmother                Review of Systems   Constitutional: Negative for fatigue and fever. HENT: Negative. Respiratory: Negative for cough, shortness of breath, wheezing and stridor. Cardiovascular: Positive for leg swelling. Negative for chest pain and palpitations. Gastrointestinal: Negative. Objective:     Physical Exam  Vitals and nursing note reviewed. Constitutional:       General: She is not in acute distress. Appearance: Normal appearance. She is obese. She is not ill-appearing, toxic-appearing or diaphoretic. HENT:      Head: Normocephalic and atraumatic. Right Ear: External ear normal.      Left Ear: External ear normal.      Nose: Nose normal.      Mouth/Throat:      Pharynx: Oropharynx is clear. Eyes:      General:         Right eye: No discharge. Left eye: No discharge. Conjunctiva/sclera: Conjunctivae normal.   Cardiovascular:      Rate and Rhythm: Normal rate and regular rhythm. Pulses: Normal pulses. Dorsalis pedis pulses are 2+ on the right side and 2+ on the left side. Pulmonary:      Effort: Pulmonary effort is normal. No respiratory distress. Breath sounds: Normal breath sounds. No wheezing, rhonchi or rales. Musculoskeletal:         General: Normal range of motion. Cervical back: Normal range of motion and neck supple. Right lower leg: Edema (1+) present. Left lower leg: Edema (1+) present. Lymphadenopathy:      Cervical: No cervical adenopathy. Skin:     General: Skin is warm and dry. Capillary Refill: Capillary refill takes less than 2 seconds. Findings: No erythema. Neurological:      Mental Status: She is alert and oriented to person, place, and time. Psychiatric:         Mood and Affect: Mood normal.         Behavior: Behavior normal.         /82   Pulse 71   Temp 98.3 °F (36.8 °C) (Temporal)   Resp 18   Ht 5' 2\" (1.575 m)   Wt 272 lb (123.4 kg)   SpO2 100%   BMI 49.75 kg/m²     Assessment:      Diagnosis Orders   1.  Localized swelling of both lower legs Basic Metabolic Panel    Brain Natriuretic Peptide    furosemide (LASIX) 20 MG tablet       No results found for this visit on 12/23/21. Plan:     BMP normal, BNP slightly elevated. Take lasix 20mg for the next 3 days. She was advised to monitor BP and follow up with PCP on Monday. Return if symptoms worsen or fail to improve. Orders Placed This Encounter   Procedures    Basic Metabolic Panel     Standing Status:   Future     Number of Occurrences:   1     Standing Expiration Date:   12/23/2022    Brain Natriuretic Peptide     Standing Status:   Future     Number of Occurrences:   1     Standing Expiration Date:   12/23/2022       Orders Placed This Encounter   Medications    furosemide (LASIX) 20 MG tablet     Sig: Take 1 tablet by mouth daily for 3 days     Dispense:  3 tablet     Refill:  0        Patient offered educational materials - see patient instructions for any instruction needed. Discussed use, benefit, and side effects of prescribed medications. All patient questions answered. Instructed to continue current medications, diet and exercise. Patient agreed with treatment plan. Follow up as directed. Patient was advised to go to the ED if condition ever becomes emergent.        Electronically signed by NATACHA Galvan on 12/23/2021 at 1:39 PM

## 2021-12-27 NOTE — PATIENT INSTRUCTIONS
Patient Education        Pap Test: Care Instructions  Overview     The Pap test (also called a Pap smear) is a screening test for cancer of the cervix, which is the lower part of the uterus that opens into the vagina. The test can help your doctor find early changes in the cells that could lead to cancer. The sample of cells taken during your test has been sent to a lab so that an expert can look at the cells. It usually takes a week or two to get the results back. Follow-up care is a key part of your treatment and safety. Be sure to make and go to all appointments, and call your doctor if you are having problems. It's also a good idea to know your test results and keep a list of the medicines you take. What do the results mean? · A normal result means that the test did not find any abnormal cells in the sample. · An abnormal result can mean many things. Most of these are not cancer. The results of your test may be abnormal because:  ? You have an infection of the vagina or cervix, such as a yeast infection. ? You have an IUD (intrauterine device for birth control). ? You have low estrogen levels after menopause that are causing the cells to change. ? You have cell changes that may be a sign of precancer or cancer. The results are ranked based on how serious the changes might be. There are many other reasons why you might not get a normal result. If the results were abnormal, you may need to get another test within a few weeks or months. If the results show changes that could be a sign of cancer, you may need a test called a colposcopy, which provides a more complete view of the cervix. Sometimes the lab cannot use the sample because it does not contain enough cells or was not preserved well. If so, you may need to have the test again. This is not common, but it does happen from time to time. When should you call for help?   Watch closely for changes in your health, and be sure to contact your doctor if:    · You have vaginal bleeding or pain for more than 2 days after the test. It is normal to have a small amount of bleeding for a day or two after the test.   Where can you learn more? Go to https://Millicanag.healthThink-Now. org and sign in to your sCoolTV account. Enter E126 in the [facility] box to learn more about \"Pap Test: Care Instructions. \"     If you do not have an account, please click on the \"Sign Up Now\" link. Current as of: September 8, 2021               Content Version: 13.1  © 2313-3859 [employer]. Care instructions adapted under license by [facility] ([facility]). If you have questions about a medical condition or this instruction, always ask your healthcare professional. [address] any warranty or liability for your use of this information. Patient Education        Breast Self-Exam: Care Instructions  Your Care Instructions     A breast self-exam is when you check your breasts for lumps or changes. This regular exam helps you learn how your breasts normally look and feel. Most breast problems or changes are not because of cancer. Breast self-exam is not a substitute for a mammogram. Having regular breast exams by your doctor and regular mammograms improve your chances of finding any problems with your breasts. Some women set a time each month to do a step-by-step breast self-exam. Other women like a less formal system. They might look at their breasts as they brush their teeth, or feel their breasts once in a while in the shower. If you notice a change in your breast, tell your doctor. Follow-up care is a key part of your treatment and safety. Be sure to make and go to all appointments, and call your doctor if you are having problems. It's also a good idea to know your test results and keep a list of the medicines you take.   How do you do a breast self-exam?  · The best time to examine your breasts is usually one week after your menstrual period begins. Your breasts should not be tender then. If you do not have periods, you might do your exam on a day of the month that is easy to remember. · To examine your breasts:  ? Remove all your clothes above the waist and lie down. When you are lying down, your breast tissue spreads evenly over your chest wall, which makes it easier to feel all your breast tissue. ? Use the pads--not the fingertips--of the 3 middle fingers of your left hand to check your right breast. Move your fingers slowly in small coin-sized circles that overlap. ? Use three levels of pressure to feel of all your breast tissue. Use light pressure to feel the tissue close to the skin surface. Use medium pressure to feel a little deeper. Use firm pressure to feel your tissue close to your breastbone and ribs. Use each pressure level to feel your breast tissue before moving on to the next spot. ? Check your entire breast, moving up and down as if following a strip from the collarbone to the bra line, and from the armpit to the ribs. Repeat until you have covered the entire breast.  ? Repeat this procedure for your left breast, using the pads of the 3 middle fingers of your right hand. · To examine your breasts while in the shower:  ? Place one arm over your head and lightly soap your breast on that side. ? Using the pads of your fingers, gently move your hand over your breast (in the strip pattern described above), feeling carefully for any lumps or changes. ? Repeat for the other breast.  · Have your doctor inspect anything you notice to see if you need further testing. Where can you learn more? Go to https://Colatrisag.APS. org and sign in to your WorkHands account. Enter P148 in the [facility] box to learn more about \"Breast Self-Exam: Care Instructions. \"     If you do not have an account, please click on the \"Sign Up Now\" link.   Current as of: September 8, 2021               Content Version: 13.1  © 2006-2021 Healthwise, [employer]. Care instructions adapted under license by [facility] ([facility]). If you have questions about a medical condition or this instruction, always ask your healthcare professional. [address] any warranty or liability for your use of this information. Patient Education        Body Mass Index: Care Instructions  Your Care Instructions     Body mass index (BMI) can help you see if your weight is raising your risk for health problems. It uses a formula to compare how much you weigh with how tall you are. · A BMI lower than 18.5 is considered underweight. · A BMI between 18.5 and 24.9 is considered healthy. · A BMI between 25 and 29.9 is considered overweight. A BMI of 30 or higher is considered obese. If your BMI is in the normal range, it means that you have a lower risk for weight-related health problems. If your BMI is in the overweight or obese range, you may be at increased risk for weight-related health problems, such as high blood pressure, heart disease, stroke, arthritis or joint pain, and diabetes. If your BMI is in the underweight range, you may be at increased risk for health problems such as fatigue, lower protection (immunity) against illness, muscle loss, bone loss, hair loss, and hormone problems. BMI is just one measure of your risk for weight-related health problems. You may be at higher risk for health problems if you are not active, you eat an unhealthy diet, or you drink too much alcohol or use tobacco products. Follow-up care is a key part of your treatment and safety. Be sure to make and go to all appointments, and call your doctor if you are having problems. It's also a good idea to know your test results and keep a list of the medicines you take. How can you care for yourself at home? · Practice healthy eating habits. This includes eating plenty of fruits, vegetables, whole grains, lean protein, and low-fat dairy.   · If your doctor recommends it, get more exercise. Walking is a good choice. Bit by bit, increase the amount you walk every day. Try for at least 30 minutes on most days of the week. · Do not smoke. Smoking can increase your risk for health problems. If you need help quitting, talk to your doctor about stop-smoking programs and medicines. These can increase your chances of quitting for good. · Limit alcohol to 2 drinks a day for men and 1 drink a day for women. Too much alcohol can cause health problems. If you have a BMI higher than 25  · Your doctor may do other tests to check your risk for weight-related health problems. This may include measuring the distance around your waist. A waist measurement of more than 40 inches in men or 35 inches in women can increase the risk of weight-related health problems. · Talk with your doctor about steps you can take to stay healthy or improve your health. You may need to make lifestyle changes to lose weight and stay healthy, such as changing your diet and getting regular exercise. If you have a BMI lower than 18.5  · Your doctor may do other tests to check your risk for health problems. · Talk with your doctor about steps you can take to stay healthy or improve your health. You may need to make lifestyle changes to gain or maintain weight and stay healthy, such as getting more healthy foods in your diet and doing exercises to build muscle. Where can you learn more? Go to https://david.healthCapella Photonics. org and sign in to your RentWiki account. Enter S176 in the [facility] box to learn more about \"Body Mass Index: Care Instructions. \"     If you do not have an account, please click on the \"Sign Up Now\" link. Current as of: March 17, 2021               Content Version: 13.1  © 2209-2332 Healthwise, Incorporated. Care instructions adapted under license by [facility] ([facility]).  If you have questions about a medical condition or this instruction, always ask your healthcare professional. [place] [address] any warranty or liability for your use of this information. Patient Education        Well Visit, Ages 25 to 48: Care Instructions  Overview     Well visits can help you stay healthy. Your doctor has checked your overall health and may have suggested ways to take good care of yourself. Your doctor also may have recommended tests. At home, you can help prevent illness with healthy eating, regular exercise, and other steps. Follow-up care is a key part of your treatment and safety. Be sure to make and go to all appointments, and call your doctor if you are having problems. It's also a good idea to know your test results and keep a list of the medicines you take. How can you care for yourself at home? · Get screening tests that you and your doctor decide on. Screening helps find diseases before any symptoms appear. · Eat healthy foods. Choose fruits, vegetables, whole grains, protein, and low-fat dairy foods. Limit fat, especially saturated fat. Reduce salt in your diet. · Limit alcohol. If you are a man, have no more than 2 drinks a day or 14 drinks a week. If you are a woman, have no more than 1 drink a day or 7 drinks a week. · Get at least 30 minutes of physical activity on most days of the week. Walking is a good choice. You also may want to do other activities, such as running, swimming, cycling, or playing tennis or team sports. Discuss any changes in your exercise program with your doctor. · Reach and stay at a healthy weight. This will lower your risk for many problems, such as obesity, diabetes, heart disease, and high blood pressure. · Do not smoke or allow others to smoke around you. If you need help quitting, talk to your doctor about stop-smoking programs and medicines. These can increase your chances of quitting for good. · Care for your mental health. It is easy to get weighed down by worry and stress.  Learn strategies to manage stress, like deep breathing and mindfulness, and stay connected with your family and community. If you find you often feel sad or hopeless, talk with your doctor. Treatment can help. · Talk to your doctor about whether you have any risk factors for sexually transmitted infections (STIs). You can help prevent STIs if you wait to have sex with a new partner (or partners) until you've each been tested for STIs. It also helps if you use condoms (male or female condoms) and if you limit your sex partners to one person who only has sex with you. Vaccines are available for some STIs, such as HPV. · Use birth control if it's important to you to prevent pregnancy. Talk with your doctor about the choices available and what might be best for you. · If you think you may have a problem with alcohol or drug use, talk to your doctor. This includes prescription medicines (such as amphetamines and opioids) and illegal drugs (such as cocaine and methamphetamine). Your doctor can help you figure out what type of treatment is best for you. · Protect your skin from too much sun. When you're outdoors from 10 a.m. to 4 p.m., stay in the shade or cover up with clothing and a hat with a wide brim. Wear sunglasses that block UV rays. Even when it's cloudy, put broad-spectrum sunscreen (SPF 30 or higher) on any exposed skin. · See a dentist one or two times a year for checkups and to have your teeth cleaned. · Wear a seat belt in the car. When should you call for help? Watch closely for changes in your health, and be sure to contact your doctor if you have any problems or symptoms that concern you. Where can you learn more? Go to https://david.healthSouth Optical Technology. org and sign in to your Georama account. Enter P072 in the [employer] box to learn more about \"Well Visit, Ages 25 to 48: Care Instructions. \"     If you do not have an account, please click on the \"Sign Up Now\" link.   Current as of: October 6, 2021               Content Version: 13.1  © 6482-7711 Healthwise, Incorporated. Care instructions adapted under license by [facility] ([facility]). If you have questions about a medical condition or this instruction, always ask your healthcare professional. [address] any warranty or liability for your use of this information.

## 2021-12-29 RX ORDER — POTASSIUM CHLORIDE 1500 MG/1
TABLET, EXTENDED RELEASE ORAL
Qty: 30 TABLET | Refills: 0 | Status: SHIPPED | OUTPATIENT
Start: 2021-12-29 | End: 2022-09-26

## 2021-12-29 NOTE — TELEPHONE ENCOUNTER
[address] called requesting a refill of the below medication which has been pended for you:     Requested Prescriptions     Pending Prescriptions Disp Refills    KLOR-CON M20 20 MEQ extended release tablet [Pharmacy Med Name: KLOR-CON M20 TABLET] 30 tablet 0     Sig: TAKE 1 TABLET BY MOUTH EVERY DAY       Last Appointment Date: 12/7/2021  Next Appointment Date: 1/14/2022    Allergies   Allergen Reactions    Mushroom Extract Complex

## 2021-12-30 ENCOUNTER — OFFICE VISIT (OUTPATIENT)
Dept: GASTROENTEROLOGY | Age: 41
End: 2021-12-30
Payer: MEDICAID

## 2021-12-30 VITALS
OXYGEN SATURATION: 100 % | HEART RATE: 78 BPM | SYSTOLIC BLOOD PRESSURE: 123 MMHG | BODY MASS INDEX: 49.32 KG/M2 | WEIGHT: 268 LBS | HEIGHT: 62 IN | DIASTOLIC BLOOD PRESSURE: 80 MMHG

## 2021-12-30 DIAGNOSIS — K21.9 GASTROESOPHAGEAL REFLUX DISEASE, UNSPECIFIED WHETHER ESOPHAGITIS PRESENT: Primary | ICD-10-CM

## 2021-12-30 DIAGNOSIS — R10.13 EPIGASTRIC PAIN: ICD-10-CM

## 2021-12-30 DIAGNOSIS — R13.10 DYSPHAGIA, UNSPECIFIED TYPE: ICD-10-CM

## 2021-12-30 PROCEDURE — G8427 DOCREV CUR MEDS BY ELIG CLIN: HCPCS | Performed by: NURSE PRACTITIONER

## 2021-12-30 PROCEDURE — G8417 CALC BMI ABV UP PARAM F/U: HCPCS | Performed by: NURSE PRACTITIONER

## 2021-12-30 PROCEDURE — 99214 OFFICE O/P EST MOD 30 MIN: CPT | Performed by: NURSE PRACTITIONER

## 2021-12-30 PROCEDURE — 1036F TOBACCO NON-USER: CPT | Performed by: NURSE PRACTITIONER

## 2021-12-30 PROCEDURE — G8484 FLU IMMUNIZE NO ADMIN: HCPCS | Performed by: NURSE PRACTITIONER

## 2021-12-30 RX ORDER — AMLODIPINE BESYLATE 5 MG/1
TABLET ORAL
Qty: 180 TABLET | Refills: 1 | Status: SHIPPED | OUTPATIENT
Start: 2021-12-30 | End: 2022-06-09

## 2021-12-30 ASSESSMENT — ENCOUNTER SYMPTOMS
COUGH: 0
BLOOD IN STOOL: 0
NAUSEA: 1
SHORTNESS OF BREATH: 0
DIARRHEA: 0
CONSTIPATION: 0
ANAL BLEEDING: 0
VOMITING: 0
ABDOMINAL DISTENTION: 0
CHOKING: 0
ABDOMINAL PAIN: 1
TROUBLE SWALLOWING: 1
RECTAL PAIN: 0

## 2021-12-30 NOTE — PROGRESS NOTES
Subjective:     Patient ID: Jabier Christian is a 39 y.o. female  PCP: Clinton Oakes MD  Referring Provider: Sanjana Dennis MD    HPI  Patient presents to the office today with the following complaints: New Patient and Dysphagia      Patient complains of difficulty swallowing and reflux. The dysphagia occurs with solids. Symptoms have been present for approximately 7 weeks, after stopping Ozempic. The symptoms are gradually worsening. The dysphagia and reflux have been persistent. This has been associated with chest pain, dysphagia, heartburn and water brash. She denies hematemesis and melena. She is currently taking Omeprazole 40 mg po BID. She has never had a colonoscopy or EGD  Denies any family hx colon cancer or colon polyps    Assessment:     1. Gastroesophageal reflux disease, unspecified whether esophagitis present    2. Dysphagia, unspecified type    3. Epigastric pain            Plan:   - Continue Omeprazole daily  - Follow up in 6-8 weeks for procedure follow up  - Schedule EGD  Nothing to eat or drink after midnight. No driving for 24 hours after procedure. Bring a  to procedure. No aspirin, NSAIDs, fish oil 5 days before procedure. I have discussed the benefits, alternatives, and risks (including bleeding, perforation and death)  for pursuing Endoscopy (EGD/Colonscopy/EUS/ERCP) with the patient and they are willing to continue. We also discussed the need for anesthesia, IV access, proper dietary changes, medication changes if necessary, and need for bowel prep (if ordered) prior to their Endoscopic procedure. They are aware they must have someone accompany them to their scheduled procedure to drive them home - they agree to the above and are willing to continue. Orders  No orders of the defined types were placed in this encounter. Medications  No orders of the defined types were placed in this encounter.         Patient History:     Past Medical History:   Diagnosis Date    Anemia     Chronic rheumatic arthritis (HCC)     Elevated blood pressure reading     Fibromyalgia     Hypertension     Intracranial hypertension     Obesity     PCOS (polycystic ovarian syndrome)     Sleep apnea     Type 2 diabetes mellitus ([facility] [address]        Past Surgical History:   Procedure Laterality Date    CHOLECYSTECTOMY         Family History   Problem Relation Age of Onset    High Blood Pressure Father     Diabetes Father     Atrial Fibrillation Father     Heart Failure Father     Stroke Father     Diabetes Sister     Diabetes Maternal Grandmother     Heart Attack Other     Ovarian Cancer Paternal Grandmother     Colon Polyps Neg Hx     Crohn's Disease Neg Hx        Social History     Socioeconomic History    Marital status: Single     Spouse name: None    Number of children: None    Years of education: None    Highest education level: None   Occupational History    None   Tobacco Use    Smoking status: Former Smoker     Years: 2.00     Types: Cigarettes     Quit date: 1996     Years since quittin.0    Smokeless tobacco: Never Used   Vaping Use    Vaping Use: Never used   Substance and Sexual Activity    Alcohol use: Not Currently     Alcohol/week: 1.0 standard drink     Types: 1 Glasses of wine per week     Comment: rarely    Drug use: No    Sexual activity: Yes     Birth control/protection: OCP   Other Topics Concern    None   Social History Narrative    None     Social Determinants of Health     Financial Resource Strain: Low Risk     Difficulty of Paying Living Expenses: Not hard at all   Food Insecurity: No Food Insecurity    Worried About Running Out of Food in the Last Year: Never true    Sonali of Food in the Last Year: Never true   Transportation Needs:     Lack of Transportation (Medical): Not on file    Lack of Transportation (Non-Medical):  Not on file   Physical Activity:     Days of Exercise per Week: Not on file    Minutes of Exercise per Session: Not on file   Stress:     Feeling of Stress : Not on file   Social Connections:     Frequency of Communication with Friends and Family: Not on file    Frequency of Social Gatherings with Friends and Family: Not on file    Attends Quaker Services: Not on file    Active Member of Clubs or Organizations: Not on file    Attends Club or Organization Meetings: Not on file    Marital Status: Not on file   Intimate Partner Violence:     Fear of Current or Ex-Partner: Not on file    Emotionally Abused: Not on file    Physically Abused: Not on file    Sexually Abused: Not on file   Housing Stability:     Unable to Pay for Housing in the Last Year: Not on file    Number of [place] in the Last Year: Not on file    Unstable Housing in the Last Year: Not on file       Current Outpatient Medications   Medication Sig Dispense Refill    amLODIPine (NORVASC) 5 MG tablet TAKE 1 TABLET BY MOUTH TWICE A  tablet 1    KLOR-CON M20 20 MEQ extended release tablet TAKE 1 TABLET BY MOUTH EVERY DAY 30 tablet 0    furosemide (LASIX) 20 MG tablet Take 1 tablet by mouth daily for 3 days 3 tablet 0    omeprazole (PRILOSEC) 40 MG delayed release capsule Take 1 capsule by mouth 2 times daily 60 capsule 5    mupirocin (BACTROBAN) 2 % cream Apply topically 3 times daily.  30 g 2    Norgestim-Eth Estrad Triphasic (TRI-ESTARYLLA) 0.18/0.215/0.25 MG-35 MCG TABS Take 1 packet by mouth daily 28 tablet 0    metFORMIN (GLUCOPHAGE) 500 MG tablet TAKE 1 TABLET BY MOUTH TWICE A DAY WITH MEALS 180 tablet 1    carvedilol (COREG) 6.25 MG tablet TAKE 1 TABLET BY MOUTH TWICE A  tablet 1    ondansetron (ZOFRAN ODT) 4 MG disintegrating tablet Take 1 tablet by mouth every 8 hours as needed for Nausea or Vomiting 20 tablet 0    hydroCHLOROthiazide (HYDRODIURIL) 25 MG tablet Take 1 tablet by mouth every morning (Patient taking differently: Take 12.5 mg by mouth as needed ) 90 tablet 1    losartan (COZAAR) 100 MG tablet Take 1 tablet by mouth daily 30 tablet 5    blood glucose test strips (ASCENSIA AUTODISC VI;ONE TOUCH ULTRA TEST VI) strip 1 each by In Vitro route 3 times daily As needed. 100 each 11    valACYclovir (VALTREX) 1 g tablet Take 2 tablets p.o. every 12 hours for 2 doses. May repeat for further outbreaks 8 tablet 2    Blood Glucose Monitoring Suppl (FREESTYLE LITE) ANDREEA USE AS DIRECTED      Lancet Device MISC 1 lancet twice a day 60 Device 11    CIMZIA STARTER KIT 6 X 200 MG/ML KIT        No current facility-administered medications for this visit. Allergies   Allergen Reactions    Mushroom Extract Complex        Review of Systems   Constitutional: Negative for activity change, appetite change, fatigue, fever and unexpected weight change. HENT: Positive for trouble swallowing. Respiratory: Negative for cough, choking and shortness of breath. Cardiovascular: Positive for chest pain. Gastrointestinal: Positive for abdominal pain and nausea. Negative for abdominal distention, anal bleeding, blood in stool, constipation, diarrhea, rectal pain and vomiting. Reflux, heartburn   Allergic/Immunologic: Negative for food allergies. All other systems reviewed and are negative. Objective:     /80 (Site: Left Upper Arm)   Pulse 78   Ht 5' 2\" (1.575 m)   Wt 268 lb (121.6 kg)   SpO2 100%   BMI 49.02 kg/m²     Physical Exam  Vitals reviewed. Constitutional:       General: She is not in acute distress. Appearance: She is well-developed. HENT:      Head: Normocephalic and atraumatic. Right Ear: External ear normal.      Left Ear: External ear normal.      Nose: Nose normal.      Comments: Mask on     Mouth/Throat:      Comments: Mask on  Eyes:      Conjunctiva/sclera: Conjunctivae normal.      Pupils: Pupils are equal, round, and reactive to light. Cardiovascular:      Rate and Rhythm: Normal rate and regular rhythm. Heart sounds: Normal heart sounds. No murmur heard.   No friction rub. No gallop. Pulmonary:      Effort: Pulmonary effort is normal. No respiratory distress. Breath sounds: Normal breath sounds. Abdominal:      General: Bowel sounds are normal. There is no distension. Palpations: Abdomen is soft. There is no mass. Tenderness: There is abdominal tenderness in the epigastric area. There is no guarding or rebound. Musculoskeletal:         General: Normal range of motion. Cervical back: Normal range of motion and neck supple. Skin:     General: Skin is warm and dry. Findings: No rash. Nails: There is no clubbing. Neurological:      Mental Status: She is alert and oriented to person, place, and time. Gait: Gait normal.   Psychiatric:         Behavior: Behavior normal.         Thought Content:  Thought content normal.

## 2022-01-02 RX ORDER — BUSPIRONE HYDROCHLORIDE 5 MG/1
5 TABLET ORAL 2 TIMES DAILY PRN
Qty: 60 TABLET | Refills: 2 | Status: SHIPPED | OUTPATIENT
Start: 2022-01-02 | End: 2022-02-01

## 2022-01-03 ENCOUNTER — OFFICE VISIT (OUTPATIENT)
Dept: OBGYN CLINIC | Age: 42
End: 2022-01-03
Payer: MEDICAID

## 2022-01-03 VITALS
WEIGHT: 268 LBS | SYSTOLIC BLOOD PRESSURE: 146 MMHG | BODY MASS INDEX: 49.32 KG/M2 | DIASTOLIC BLOOD PRESSURE: 97 MMHG | HEART RATE: 80 BPM | HEIGHT: 62 IN

## 2022-01-03 DIAGNOSIS — R10.2 PELVIC PRESSURE IN FEMALE: ICD-10-CM

## 2022-01-03 DIAGNOSIS — Z01.419 ENCOUNTER FOR WELL WOMAN EXAM WITH ROUTINE GYNECOLOGICAL EXAM: Primary | ICD-10-CM

## 2022-01-03 DIAGNOSIS — Z12.31 ENCOUNTER FOR SCREENING MAMMOGRAM FOR MALIGNANT NEOPLASM OF BREAST: ICD-10-CM

## 2022-01-03 DIAGNOSIS — N92.6 IRREGULAR MENSES: ICD-10-CM

## 2022-01-03 DIAGNOSIS — Z12.4 SCREENING FOR CERVICAL CANCER: ICD-10-CM

## 2022-01-03 LAB — GONADOTROPIN, CHORIONIC (HCG) QUANT: 0.3 MIU/ML (ref 0–5.3)

## 2022-01-03 PROCEDURE — G8484 FLU IMMUNIZE NO ADMIN: HCPCS | Performed by: ADVANCED PRACTICE MIDWIFE

## 2022-01-03 PROCEDURE — 99396 PREV VISIT EST AGE 40-64: CPT | Performed by: ADVANCED PRACTICE MIDWIFE

## 2022-01-03 RX ORDER — NORGESTIMATE AND ETHINYL ESTRADIOL 7DAYSX3 28
1 KIT ORAL DAILY
Qty: 84 TABLET | Refills: 3 | Status: SHIPPED | OUTPATIENT
Start: 2022-01-03 | End: 2022-08-12 | Stop reason: ALTCHOICE

## 2022-01-03 ASSESSMENT — ENCOUNTER SYMPTOMS
EYES NEGATIVE: 1
RESPIRATORY NEGATIVE: 1
GASTROINTESTINAL NEGATIVE: 1
ALLERGIC/IMMUNOLOGIC NEGATIVE: 1

## 2022-01-03 NOTE — PROGRESS NOTES
[facility] RHIANNA VAZQUEZ OB/GYN  CNM Office Note    Agustín Gonzalez is a 39 y.o. female who presents today for her medical conditions/ complaints as noted below. Chief Complaint   Patient presents with    Contraception    Gynecologic Exam         HPI  Robert Aguilar presents for annual gyn exam. She reports irregular menses during last cycle. Has hx of fibroids. Experiencing PMS/early pregnancy symptoms - including, pelvic bloating/pressure, mood changes - and is concerned for pregnancy. She has been on birth control and done well without problems. No sexual dysfunction. Patient Active Problem List   Diagnosis    Type 2 diabetes mellitus (HCC)    Anemia    Familial hypercholesterolemia    Rheumatoid arthritis involving multiple sites with positive rheumatoid factor (HCC)    Fibromyalgia    Bilateral carpal tunnel syndrome    PCOS (polycystic ovarian syndrome)    Iron deficiency anemia    Family history of ovarian cancer    Menorrhagia with regular cycle    Primary hypertension    Facial numbness    BiPAP (biphasic positive airway pressure) dependence    SETH (obstructive sleep apnea)    Morbid obesity (HCC)    Gastroesophageal reflux disease without esophagitis       Patient's last menstrual period was 2021 (approximate).       Past Medical History:   Diagnosis Date    Anemia     Chronic rheumatic arthritis ([address]     Elevated blood pressure reading     Fibromyalgia     Hypertension     Intracranial hypertension     Obesity     PCOS (polycystic ovarian syndrome)     Sleep apnea     Type 2 diabetes mellitus (HCC)      Past Surgical History:   Procedure Laterality Date    CHOLECYSTECTOMY       Family History   Problem Relation Age of Onset    High Blood Pressure Father     Diabetes Father     Atrial Fibrillation Father     Heart Failure Father     Stroke Father     Diabetes Sister     Diabetes Maternal Grandmother     Heart Attack Other     Ovarian Cancer Paternal Grandmother     Colon Polyps Neg Hx     Crohn's Disease Neg Hx      Social History     Tobacco Use    Smoking status: Former Smoker     Years: 2.00     Types: Cigarettes     Quit date: 1996     Years since quittin.0    Smokeless tobacco: Never Used   Substance Use Topics    Alcohol use: Not Currently     Alcohol/week: 1.0 standard drink     Types: 1 Glasses of wine per week     Comment: rarely       Current Outpatient Medications   Medication Sig Dispense Refill    busPIRone (BUSPAR) 5 MG tablet TAKE 1 TABLET BY MOUTH 2 TIMES DAILY AS NEEDED (ANXIETY) 60 tablet 2    amLODIPine (NORVASC) 5 MG tablet TAKE 1 TABLET BY MOUTH TWICE A  tablet 1    KLOR-CON M20 20 MEQ extended release tablet TAKE 1 TABLET BY MOUTH EVERY DAY 30 tablet 0    omeprazole (PRILOSEC) 40 MG delayed release capsule Take 1 capsule by mouth 2 times daily 60 capsule 5    mupirocin (BACTROBAN) 2 % cream Apply topically 3 times daily. 30 g 2    Norgestim-Eth Estrad Triphasic (TRI-ESTARYLLA) 0.18/0.215/0.25 MG-35 MCG TABS Take 1 packet by mouth daily 28 tablet 0    metFORMIN (GLUCOPHAGE) 500 MG tablet TAKE 1 TABLET BY MOUTH TWICE A DAY WITH MEALS 180 tablet 1    carvedilol (COREG) 6.25 MG tablet TAKE 1 TABLET BY MOUTH TWICE A  tablet 1    ondansetron (ZOFRAN ODT) 4 MG disintegrating tablet Take 1 tablet by mouth every 8 hours as needed for Nausea or Vomiting 20 tablet 0    hydroCHLOROthiazide (HYDRODIURIL) 25 MG tablet Take 1 tablet by mouth every morning (Patient taking differently: Take 12.5 mg by mouth as needed ) 90 tablet 1    losartan (COZAAR) 100 MG tablet Take 1 tablet by mouth daily 30 tablet 5    blood glucose test strips (ASCENSIA AUTODISC VI;ONE TOUCH ULTRA TEST VI) strip 1 each by In Vitro route 3 times daily As needed. 100 each 11    valACYclovir (VALTREX) 1 g tablet Take 2 tablets p.o. every 12 hours for 2 doses.   May repeat for further outbreaks 8 tablet 2    Blood Glucose Monitoring Suppl (FREESTYLE LITE) ANDREEA USE AS

## 2022-01-03 NOTE — PROGRESS NOTES
Pt presents today for pap smear and breast exam.  She also complains of irregular period.  Pt wishes to be check to see if she is pregnant     Last mammogram:  2017  Last pap smear:  20 Normal   Contraception:  Tri Estarylla   : 2  Para:  1  AB:  1  Last bone density: NA     Last colonoscopy: NA  Menarche: 6years old   LMP: 2021  Menses: Regular

## 2022-01-07 ENCOUNTER — HOSPITAL ENCOUNTER (EMERGENCY)
Age: 42
Discharge: HOME | End: 2022-01-07
Payer: MEDICAID

## 2022-01-07 ENCOUNTER — APPOINTMENT (OUTPATIENT)
Dept: GENERAL RADIOLOGY | Age: 42
End: 2022-01-07
Payer: MEDICAID

## 2022-01-07 VITALS
RESPIRATION RATE: 17 BRPM | TEMPERATURE: 97.9 F | WEIGHT: 268 LBS | HEART RATE: 78 BPM | BODY MASS INDEX: 49.32 KG/M2 | HEIGHT: 62 IN | SYSTOLIC BLOOD PRESSURE: 139 MMHG | OXYGEN SATURATION: 97 % | DIASTOLIC BLOOD PRESSURE: 89 MMHG

## 2022-01-07 DIAGNOSIS — I10 HYPERTENSION, UNSPECIFIED TYPE: Primary | ICD-10-CM

## 2022-01-07 DIAGNOSIS — R10.13 ABDOMINAL PAIN, EPIGASTRIC: ICD-10-CM

## 2022-01-07 LAB
ALBUMIN SERPL-MCNC: 3.8 G/DL (ref 3.5–5.2)
ALP BLD-CCNC: 74 U/L (ref 35–104)
ALT SERPL-CCNC: 24 U/L (ref 5–33)
ANION GAP SERPL CALCULATED.3IONS-SCNC: 10 MMOL/L (ref 7–19)
AST SERPL-CCNC: 18 U/L (ref 5–32)
BASOPHILS ABSOLUTE: 0.1 K/UL (ref 0–0.2)
BASOPHILS RELATIVE PERCENT: 0.6 % (ref 0–1)
BILIRUB SERPL-MCNC: <0.2 MG/DL (ref 0.2–1.2)
BUN BLDV-MCNC: 12 MG/DL (ref 6–20)
CALCIUM SERPL-MCNC: 8.9 MG/DL (ref 8.6–10)
CHLORIDE BLD-SCNC: 102 MMOL/L (ref 98–111)
CO2: 23 MMOL/L (ref 22–29)
CREAT SERPL-MCNC: 0.9 MG/DL (ref 0.5–0.9)
EKG P AXIS: 56 DEGREES
EKG P-R INTERVAL: 122 MS
EKG Q-T INTERVAL: 388 MS
EKG QRS DURATION: 90 MS
EKG QTC CALCULATION (BAZETT): 421 MS
EKG T AXIS: 11 DEGREES
EOSINOPHILS ABSOLUTE: 0.2 K/UL (ref 0–0.6)
EOSINOPHILS RELATIVE PERCENT: 3.1 % (ref 0–5)
GFR AFRICAN AMERICAN: >59
GFR NON-AFRICAN AMERICAN: >60
GLUCOSE BLD-MCNC: 122 MG/DL (ref 74–109)
HCT VFR BLD CALC: 32.9 % (ref 37–47)
HEMOGLOBIN: 10.4 G/DL (ref 12–16)
IMMATURE GRANULOCYTES #: 0 K/UL
LYMPHOCYTES ABSOLUTE: 2.3 K/UL (ref 1.1–4.5)
LYMPHOCYTES RELATIVE PERCENT: 29.2 % (ref 20–40)
MCH RBC QN AUTO: 26.5 PG (ref 27–31)
MCHC RBC AUTO-ENTMCNC: 31.6 G/DL (ref 33–37)
MCV RBC AUTO: 83.7 FL (ref 81–99)
MONOCYTES ABSOLUTE: 0.5 K/UL (ref 0–0.9)
MONOCYTES RELATIVE PERCENT: 6.4 % (ref 0–10)
NEUTROPHILS ABSOLUTE: 4.7 K/UL (ref 1.5–7.5)
NEUTROPHILS RELATIVE PERCENT: 60.6 % (ref 50–65)
PDW BLD-RTO: 11.9 % (ref 11.5–14.5)
PLATELET # BLD: 285 K/UL (ref 130–400)
PMV BLD AUTO: 10.8 FL (ref 9.4–12.3)
POTASSIUM REFLEX MAGNESIUM: 3.7 MMOL/L (ref 3.5–5)
RBC # BLD: 3.93 M/UL (ref 4.2–5.4)
SARS-COV-2, NAAT: NOT DETECTED
SODIUM BLD-SCNC: 135 MMOL/L (ref 136–145)
TOTAL PROTEIN: 7.5 G/DL (ref 6.6–8.7)
TROPONIN: <0.01 NG/ML (ref 0–0.03)
WBC # BLD: 7.7 K/UL (ref 4.8–10.8)

## 2022-01-07 PROCEDURE — 36415 COLL VENOUS BLD VENIPUNCTURE: CPT

## 2022-01-07 PROCEDURE — 87635 SARS-COV-2 COVID-19 AMP PRB: CPT

## 2022-01-07 PROCEDURE — 80053 COMPREHEN METABOLIC PANEL: CPT

## 2022-01-07 PROCEDURE — 6370000000 HC RX 637 (ALT 250 FOR IP): Performed by: NURSE PRACTITIONER

## 2022-01-07 PROCEDURE — 99283 EMERGENCY DEPT VISIT LOW MDM: CPT

## 2022-01-07 PROCEDURE — 71045 X-RAY EXAM CHEST 1 VIEW: CPT

## 2022-01-07 PROCEDURE — 85025 COMPLETE CBC W/AUTO DIFF WBC: CPT

## 2022-01-07 PROCEDURE — 84484 ASSAY OF TROPONIN QUANT: CPT

## 2022-01-07 RX ORDER — CLONIDINE HYDROCHLORIDE 0.1 MG/1
0.1 TABLET ORAL ONCE
Status: DISCONTINUED | OUTPATIENT
Start: 2022-01-07 | End: 2022-01-08 | Stop reason: HOSPADM

## 2022-01-07 RX ADMIN — Medication: at 22:22

## 2022-01-07 ASSESSMENT — PAIN DESCRIPTION - LOCATION: LOCATION: GENERALIZED

## 2022-01-07 ASSESSMENT — PAIN DESCRIPTION - DESCRIPTORS: DESCRIPTORS: ACHING

## 2022-01-07 ASSESSMENT — PAIN SCALES - GENERAL: PAINLEVEL_OUTOF10: 7

## 2022-01-07 ASSESSMENT — PAIN DESCRIPTION - PAIN TYPE: TYPE: CHRONIC PAIN;ACUTE PAIN

## 2022-01-08 ASSESSMENT — ENCOUNTER SYMPTOMS
VOMITING: 1
ABDOMINAL PAIN: 1
DIARRHEA: 0
COUGH: 1

## 2022-01-08 NOTE — ED PROVIDER NOTES
[facility] EMERGENCY DEPT  eMERGENCY dEPARTMENT eNCOUnter      Pt Name: Biju Banks  MRN: [id number]  [place] 1980  Date of evaluation: 1/7/2022  Provider: NATACHA Castillo    CHIEF COMPLAINT       Chief Complaint   Patient presents with    Hypertension     due for PM blood pressure meds    Emesis     x 1    Fatigue    Cough         HISTORY OF PRESENT ILLNESS   (Location/Symptom, Timing/Onset,Context/Setting, Quality, Duration, Modifying Factors, Severity)  Note limiting factors. Biju Banks is a 39 y.o. female who presents to the emergency department with chest pain and cough and high blood pressure. Took her bp because she felt funny. Threw up once. History of RA and not on medication for it. Worried about covid    The history is provided by the patient. NursingNotes were reviewed. REVIEW OF SYSTEMS    (2-9 systems for level 4, 10 or more for level 5)     Review of Systems   Constitutional: Positive for fatigue. Negative for fever. Respiratory: Positive for cough. Cardiovascular: Positive for chest pain. Negative for palpitations and leg swelling. Gastrointestinal: Positive for abdominal pain and vomiting. Negative for diarrhea. Except as noted above the remainder of the review of systems was reviewed and negative.        PAST MEDICAL HISTORY     Past Medical History:   Diagnosis Date    Anemia     Chronic rheumatic arthritis ([address]     Elevated blood pressure reading     Fibromyalgia     Hypertension     Intracranial hypertension     Obesity     PCOS (polycystic ovarian syndrome)     Sleep apnea     Type 2 diabetes mellitus (HCC)          SURGICALHISTORY       Past Surgical History:   Procedure Laterality Date    CHOLECYSTECTOMY           CURRENT MEDICATIONS       Discharge Medication List as of 1/7/2022 11:11 PM      CONTINUE these medications which have NOT CHANGED    Details   Norgestim-Eth Estrad Triphasic (TRI-ESTARYLLA) 0.18/0.215/0.25 MG-35 MCG TABS Take 1 packet by mouth daily, Disp-84 tablet, R-3Normal      busPIRone (BUSPAR) 5 MG tablet TAKE 1 TABLET BY MOUTH 2 TIMES DAILY AS NEEDED (ANXIETY), Disp-60 tablet, R-2Normal      amLODIPine (NORVASC) 5 MG tablet TAKE 1 TABLET BY MOUTH TWICE A DAY, Disp-180 tablet, R-1Normal      KLOR-CON M20 20 MEQ extended release tablet TAKE 1 TABLET BY MOUTH EVERY DAY, Disp-30 tablet, R-0Normal      furosemide (LASIX) 20 MG tablet Take 1 tablet by mouth daily for 3 days, Disp-3 tablet, R-0Normal      omeprazole (PRILOSEC) 40 MG delayed release capsule Take 1 capsule by mouth 2 times daily, Disp-60 capsule, R-5Normal      metFORMIN (GLUCOPHAGE) 500 MG tablet TAKE 1 TABLET BY MOUTH TWICE A DAY WITH MEALS, Disp-180 tablet, R-1Normal      carvedilol (COREG) 6.25 MG tablet TAKE 1 TABLET BY MOUTH TWICE A DAY, Disp-180 tablet, R-1Normal      ondansetron (ZOFRAN ODT) 4 MG disintegrating tablet Take 1 tablet by mouth every 8 hours as needed for Nausea or Vomiting, Disp-20 tablet, R-0Normal      hydroCHLOROthiazide (HYDRODIURIL) 25 MG tablet Take 1 tablet by mouth every morning, Disp-90 tablet, R-1Normal      losartan (COZAAR) 100 MG tablet Take 1 tablet by mouth daily, Disp-30 tablet, R-5Normal      blood glucose test strips (ASCENSIA AUTODISC VI;ONE TOUCH ULTRA TEST VI) strip 3 TIMES DAILY Starting Mon 5/24/2021, Disp-100 each, R-11, NormalAs needed. valACYclovir (VALTREX) 1 g tablet Take 2 tablets p.o. every 12 hours for 2 doses.   May repeat for further outbreaks, Disp-8 tablet, R-2Normal      Blood Glucose Monitoring Suppl (FREESTYLE LITE) ANDREEA Historical Med      Lancet Device MISC Disp-60 Device,R-11, Normal1 lancet twice a day             ALLERGIES     Mushroom extract complex    FAMILY HISTORY       Family History   Problem Relation Age of Onset    High Blood Pressure Father     Diabetes Father     Atrial Fibrillation Father     Heart Failure Father     Stroke Father     Diabetes Sister     Diabetes Maternal Grandmother     Heart Attack Other     Ovarian Cancer Paternal Grandmother     Colon Polyps Neg Hx     Crohn's Disease Neg Hx           SOCIAL HISTORY       Social History     Socioeconomic History    Marital status: Single     Spouse name: None    Number of children: None    Years of education: None    Highest education level: None   Occupational History    None   Tobacco Use    Smoking status: Former Smoker     Years: 2.00     Types: Cigarettes     Quit date: 1996     Years since quittin.0    Smokeless tobacco: Never Used   Vaping Use    Vaping Use: Never used   Substance and Sexual Activity    Alcohol use: Not Currently     Alcohol/week: 1.0 standard drink     Types: 1 Glasses of wine per week     Comment: rarely    Drug use: No    Sexual activity: Yes     Birth control/protection: OCP   Other Topics Concern    None   Social History Narrative    None     Social Determinants of Health     Financial Resource Strain: Low Risk     Difficulty of Paying Living Expenses: Not hard at all   Food Insecurity: No Food Insecurity    Worried About Running Out of Food in the Last Year: Never true    Sonali of Food in the Last Year: Never true   Transportation Needs:     Lack of Transportation (Medical): Not on file    Lack of Transportation (Non-Medical):  Not on file   Physical Activity:     Days of Exercise per Week: Not on file    Minutes of Exercise per Session: Not on file   Stress:     Feeling of Stress : Not on file   Social Connections:     Frequency of Communication with Friends and Family: Not on file    Frequency of Social Gatherings with Friends and Family: Not on file    Attends Christianity Services: Not on file    Active Member of Clubs or Organizations: Not on file    Attends Club or Organization Meetings: Not on file    Marital Status: Not on file   Intimate Partner Violence:     Fear of Current or Ex-Partner: Not on file    Emotionally Abused: Not on file    Physically Abused: Not on file  Sexually Abused: Not on file   Housing Stability:     Unable to Pay for Housing in the Last Year: Not on file    Number of Places Lived in the Last Year: Not on file    Unstable Housing in the Last Year: Not on file       SCREENINGS      @FLOW([id number])@      PHYSICAL EXAM    (up to 7 for level 4, 8 or more for level 5)     ED Triage Vitals [01/07/22 2040]   BP Temp Temp src Pulse Resp SpO2 Height Weight   (!) 154/100 97.9 °F (36.6 °C) -- 97 15 96 % 5' 2\" (1.575 m) 268 lb (121.6 kg)       Physical Exam  Vitals and nursing note reviewed. Constitutional:       Appearance: She is well-developed. She is obese. HENT:      Head: Normocephalic and atraumatic. Eyes:      General: No scleral icterus. Right eye: No discharge. Left eye: No discharge. Cardiovascular:      Rate and Rhythm: Normal rate and regular rhythm. Heart sounds: Normal heart sounds. Pulmonary:      Effort: No respiratory distress. Breath sounds: Normal breath sounds. Abdominal:      General: Abdomen is protuberant. Bowel sounds are normal. There is no distension. Palpations: Abdomen is soft. Tenderness: There is abdominal tenderness in the epigastric area. There is no guarding or rebound. Hernia: No hernia is present. Musculoskeletal:      Cervical back: Normal range of motion and neck supple. Neurological:      Mental Status: She is alert.    Psychiatric:         Behavior: Behavior normal.         DIAGNOSTIC RESULTS     EKG: All EKG's are interpreted by the Emergency Department Physician who either signs or Co-signsthis chart in the absence of a cardiologist.    ED sinus rhythm normal no T wave changes read at 2107 by Dr. Gustabo Michaels:   Zeferino Padilla such as CT, Ultrasound and MRI are read by the radiologist. Plain radiographic images are visualized and preliminarily interpreted by the emergency physician with the below findings:      Interpretation per the Radiologist below, if available at the time of this note:    XR CHEST PORTABLE   Final Result   No acute findings. Signed by Dr Bonifacio Velazquez            ED BEDSIDEULTRASOUND:   Performed by ED Physician -none    LABS:  Labs Reviewed   CBC WITH AUTO DIFFERENTIAL - Abnormal; Notable for the following components:       Result Value    RBC 3.93 (*)     Hemoglobin 10.4 (*)     Hematocrit 32.9 (*)     MCH 26.5 (*)     MCHC 31.6 (*)     All other components within normal limits   COMPREHENSIVE METABOLIC PANEL W/ REFLEX TO MG FOR LOW K - Abnormal; Notable for the following components:    Sodium 135 (*)     Glucose 122 (*)     All other components within normal limits   COVID-19, RAPID   TROPONIN       All other labs were within normal range or not returned as of this dictation. EMERGENCY DEPARTMENT COURSE and DIFFERENTIALDIAGNOSIS/MDM:   Vitals:    Vitals:    01/07/22 2135 01/07/22 2150 01/07/22 2207 01/07/22 2232   BP: (!) 129/102 (!) 133/92 (!) 128/98 139/89   Pulse: 78      Resp: 17      Temp:       SpO2: 97%      Weight:       Height:               MDM better after GI cocktail. Patient took her home blood pressure medicines that she normally takes in the evening her blood pressure has improved. She tells me she has an appointment for an endoscopy in February. She does take a PPI      CONSULTS:  None    PROCEDURES:  Unless otherwise noted below, none     Procedures    FINAL IMPRESSION      1. Hypertension, unspecified type    2.  Abdominal pain, epigastric        DISPOSITION/PLAN   DISPOSITION Decision To Discharge 01/07/2022 11:09:09 PM      PATIENT REFERRED TO:  Jone Ballard MD  [address]  [phone]            DISCHARGE MEDICATIONS:  Discharge Medication List as of 1/7/2022 11:11 PM             (Please note that portions of this note were completed with a voice recognitionprogram.  Efforts were made to edit the dictations but occasionally words are mis-transcribed.)    NATACHA Bsos (electronically signed)         Willow Zamorano, APRN  01/08/22 [id number]

## 2022-01-18 ENCOUNTER — HOSPITAL ENCOUNTER (OUTPATIENT)
Dept: ULTRASOUND IMAGING | Age: 42
Discharge: HOME | End: 2022-01-18
Payer: MEDICAID

## 2022-01-18 ENCOUNTER — HOSPITAL ENCOUNTER (OUTPATIENT)
Dept: WOMENS IMAGING | Age: 42
Discharge: HOME | End: 2022-01-18
Payer: MEDICAID

## 2022-01-18 DIAGNOSIS — Z12.31 ENCOUNTER FOR SCREENING MAMMOGRAM FOR MALIGNANT NEOPLASM OF BREAST: ICD-10-CM

## 2022-01-18 DIAGNOSIS — N92.6 IRREGULAR MENSES: ICD-10-CM

## 2022-01-18 DIAGNOSIS — R10.2 PELVIC PRESSURE IN FEMALE: ICD-10-CM

## 2022-01-18 PROCEDURE — 76830 TRANSVAGINAL US NON-OB: CPT

## 2022-01-18 PROCEDURE — 77063 BREAST TOMOSYNTHESIS BI: CPT

## 2022-01-21 ENCOUNTER — TELEPHONE (OUTPATIENT)
Dept: PULMONOLOGY | Age: 42
End: 2022-01-21

## 2022-01-21 NOTE — TELEPHONE ENCOUNTER
Spoke with patient letting her know that she will need a Covid test on 1/24/2022 between 9 Am and 12 PM. She stated her understanding.

## 2022-01-24 DIAGNOSIS — Z11.59 SCREENING FOR VIRAL DISEASE: Primary | ICD-10-CM

## 2022-01-24 LAB — SARS-COV-2, PCR: NOT DETECTED

## 2022-01-26 ENCOUNTER — HOSPITAL ENCOUNTER (OUTPATIENT)
Dept: PULMONOLOGY | Age: 42
Discharge: HOME | End: 2022-01-26
Payer: MEDICAID

## 2022-01-26 PROCEDURE — 94727 GAS DIL/WSHOT DETER LNG VOL: CPT

## 2022-01-26 PROCEDURE — 94060 EVALUATION OF WHEEZING: CPT

## 2022-01-26 PROCEDURE — 6360000002 HC RX W HCPCS: Performed by: FAMILY MEDICINE

## 2022-01-26 PROCEDURE — 94729 DIFFUSING CAPACITY: CPT

## 2022-01-26 RX ORDER — ALBUTEROL SULFATE 2.5 MG/3ML
2.5 SOLUTION RESPIRATORY (INHALATION) EVERY 6 HOURS PRN
Status: DISCONTINUED | OUTPATIENT
Start: 2022-01-26 | End: 2022-01-28 | Stop reason: HOSPADM

## 2022-01-26 RX ADMIN — ALBUTEROL SULFATE 2.5 MG: 2.5 SOLUTION RESPIRATORY (INHALATION) at 11:56

## 2022-01-26 NOTE — LETTER
January 26, 2022         Patient: Lety Silver   YOB: 1980   Date of Visit: 1/26/2022, There are other unrelated non-urgent complaints, but due to the busy schedule and the amount of time I've already spent with her, time does not permit me to address these routine issues at today's visit. I've requested another appointment to review these additional issues. [facility]  [address] [place] [address]  [phone]  Patient Name:_____Yue Richardson_________  This patient had was seen for a pulmonary fuction test on________January 26, 2022_______. She is released to return back to work as she is no longer needing to quarantine.  If you have any questions please feel free to call my office.     ___x__ Work Excuse    _____ Zipporah Kocher Excuse    _____ Other  ___________________________________  Rose Cooper,  RRT                       [facility]  [facility] ROOM

## 2022-01-26 NOTE — PROCEDURES
Media Information               Pulmonary Function Study    Interpretation:    The FVC is mildly reduced. FEV1 is mildly reduced. FEV1/FVC ratio is Normal. After bronchodilator therapy there was no significant improvement in FEV1. Total lung capacity is mildly reduced. Residual volume is reduced. Diffusing lung capacity when corrected for alveolar volume is Normal.         Impression:    1. Mild restrictive lung disease.         Ángela Fagan MD, [facility], [facility]

## 2022-01-28 ENCOUNTER — OFFICE VISIT (OUTPATIENT)
Age: 42
End: 2022-01-28
Payer: MEDICAID

## 2022-01-28 VITALS
RESPIRATION RATE: 18 BRPM | HEART RATE: 85 BPM | OXYGEN SATURATION: 99 % | HEIGHT: 62 IN | SYSTOLIC BLOOD PRESSURE: 146 MMHG | WEIGHT: 264 LBS | DIASTOLIC BLOOD PRESSURE: 92 MMHG | BODY MASS INDEX: 48.58 KG/M2 | TEMPERATURE: 97.4 F

## 2022-01-28 DIAGNOSIS — R53.83 OTHER FATIGUE: Primary | ICD-10-CM

## 2022-01-28 DIAGNOSIS — R11.0 NAUSEA: ICD-10-CM

## 2022-01-28 LAB
CHP ED QC CHECK: ABNORMAL
GLUCOSE BLD-MCNC: 136 MG/DL

## 2022-01-28 PROCEDURE — G8417 CALC BMI ABV UP PARAM F/U: HCPCS | Performed by: NURSE PRACTITIONER

## 2022-01-28 PROCEDURE — 99213 OFFICE O/P EST LOW 20 MIN: CPT | Performed by: NURSE PRACTITIONER

## 2022-01-28 PROCEDURE — G8484 FLU IMMUNIZE NO ADMIN: HCPCS | Performed by: NURSE PRACTITIONER

## 2022-01-28 PROCEDURE — 82962 GLUCOSE BLOOD TEST: CPT | Performed by: NURSE PRACTITIONER

## 2022-01-28 PROCEDURE — 1036F TOBACCO NON-USER: CPT | Performed by: NURSE PRACTITIONER

## 2022-01-28 PROCEDURE — G8427 DOCREV CUR MEDS BY ELIG CLIN: HCPCS | Performed by: NURSE PRACTITIONER

## 2022-01-28 RX ORDER — ONDANSETRON 4 MG/1
4 TABLET, ORALLY DISINTEGRATING ORAL EVERY 8 HOURS PRN
Qty: 15 TABLET | Refills: 0 | Status: SHIPPED | OUTPATIENT
Start: 2022-01-28 | End: 2022-10-10 | Stop reason: ALTCHOICE

## 2022-01-28 ASSESSMENT — ENCOUNTER SYMPTOMS
COUGH: 0
DIARRHEA: 0
VOMITING: 0
ABDOMINAL PAIN: 0
SORE THROAT: 0
NAUSEA: 1

## 2022-01-28 NOTE — PROGRESS NOTES
[facility]  [address] [address] [place] [address]  Dept: [phone]  Dept Fax: [phone]: [phone]    Kelsea Polanco is a 39 y.o. female who presents today for her medical conditions/complaintsas noted below. Kelsea Polanco is c/o of Nausea (onset , NEG covid test on monday for a procedure) and Fatigue        HPI:     HPI  Bandar Patiño presents today with nausea and fatigue. Has a history of rheumatoid arthritis and not unusual for her to be fatigued in a flare. Just started on iron for anemia. Did drink some keto drink earlier this week. Is diabetic and has not really checked her blood sugar much this week. Does have GI history of GERD and is getting esophagus stretched next week. No vomiting. No constipation or diarrhea. Several BMs today.    Past Medical History:   Diagnosis Date    Anemia     Chronic rheumatic arthritis ([address]     Elevated blood pressure reading     Fibromyalgia     Hypertension     Intracranial hypertension     Obesity     PCOS (polycystic ovarian syndrome)     Sleep apnea     Type 2 diabetes mellitus ([address]      Past Surgical History:   Procedure Laterality Date    BREAST BIOPSY Right 2019    benign    CHOLECYSTECTOMY         Family History   Problem Relation Age of Onset    High Blood Pressure Father     Diabetes Father     Atrial Fibrillation Father     Heart Failure Father     Stroke Father     Diabetes Sister     Diabetes Maternal Grandmother     Heart Attack Other     Ovarian Cancer Paternal Grandmother     Colon Polyps Neg Hx     Crohn's Disease Neg Hx        Social History     Tobacco Use    Smoking status: Former Smoker     Packs/day: 1.00     Years: 2.00     Pack years: 2.00     Types: Cigarettes     Start date: 1996     Quit date: 1998     Years since quittin.0    Smokeless tobacco: Never Used   Substance Use Topics    Alcohol use: Not Currently     Alcohol/week: 1.0 standard Pneumococcal 0-64 years Vaccine (1 of 2 - PPSV23) Never done    Diabetic retinal exam  Never done    Hepatitis B vaccine (2 of 3 - Hep B Twinrix risk 3-dose series) 01/27/2014    Diabetic foot exam  06/26/2020    Flu vaccine (1) Never done    COVID-19 Vaccine (3 - Booster for Pfizer series) 03/12/2022    A1C test (Diabetic or Prediabetic)  10/05/2022    Diabetic microalbuminuria test  10/05/2022    Lipid screen  10/05/2022    Depression Screen  12/23/2022    Potassium monitoring  01/07/2023    Creatinine monitoring  01/07/2023    Cervical cancer screen  12/22/2025    DTaP/Tdap/Td vaccine (2 - Td or Tdap) 04/25/2027    Hepatitis C screen  Completed    HIV screen  Completed    Hepatitis A vaccine  Aged Out    Hib vaccine  Aged Out    Meningococcal (ACWY) vaccine  Aged Out       Subjective:     Review of Systems   Constitutional: Positive for fatigue. Negative for chills and fever. HENT: Negative for congestion and sore throat. Respiratory: Negative for cough. Gastrointestinal: Positive for nausea. Negative for abdominal pain, diarrhea and vomiting. All other systems reviewed and are negative.      :Objective      Physical Exam  Vitals and nursing note reviewed. Constitutional:       General: She is not in acute distress. Appearance: Normal appearance. She is well-developed. She is not ill-appearing or diaphoretic. HENT:      Head: Normocephalic and atraumatic. Eyes:      Conjunctiva/sclera: Conjunctivae normal.      Pupils: Pupils are equal, round, and reactive to light. Cardiovascular:      Rate and Rhythm: Normal rate and regular rhythm. Heart sounds: Normal heart sounds. No murmur heard. Pulmonary:      Effort: Pulmonary effort is normal. No respiratory distress. Breath sounds: Normal breath sounds. No wheezing. Abdominal:      General: Bowel sounds are normal.      Palpations: Abdomen is soft. Tenderness: There is no abdominal tenderness. Musculoskeletal:      Cervical back: Normal range of motion. Skin:     General: Skin is warm and dry. Findings: No rash. Neurological:      Mental Status: She is alert and oriented to person, place, and time. Psychiatric:         Mood and Affect: Mood normal.         Behavior: Behavior normal.       BP (!) 146/92   Pulse 85   Temp 97.4 °F (36.3 °C) (Temporal)   Resp 18   Ht 5' 2\" (1.575 m)   Wt 264 lb (119.7 kg)   SpO2 99%   BMI 48.29 kg/m²     :Assessment       Diagnosis Orders   1. Other fatigue  POCT Glucose   2. Nausea  ondansetron (ZOFRAN ODT) 4 MG disintegrating tablet       :Plan    1. Rest and stay hydrated   2. Monitor your blood sugar  3. Follow up with PCP   4. If patient is not improving or developing any new/worsening symptoms then return to clinic as needed or go to ER. Orders Placed This Encounter   Procedures    POCT Glucose     Results for orders placed or performed in visit on 01/28/22   POCT Glucose   Result Value Ref Range    Glucose 136 mg/dL    QC OK? No follow-ups on file. Orders Placed This Encounter   Medications    ondansetron (ZOFRAN ODT) 4 MG disintegrating tablet     Sig: Take 1 tablet by mouth every 8 hours as needed for Nausea or Vomiting     Dispense:  15 tablet     Refill:  0       Patient given educational materials- see patient instructions. Discussed use, benefit, and side effects of prescribedmedications. All patient questions answered. Pt voiced understanding. Patient Instructions       Patient Education        Fatigue: Care Instructions  Your Care Instructions     Fatigue is a feeling of tiredness, exhaustion, or lack of energy. You may feel fatigue because of too much or not enough activity. It can also come from stress, lack of sleep, boredom, and poor diet. Many medical problems, such as viral infections, can cause fatigue. Emotional problems, especially depression, are often the cause of fatigue.   Fatigue is most often a symptom of information. Patient Education        Nausea and Vomiting: Care Instructions  Overview     When you are nauseated, you may feel weak and sweaty and notice a lot of saliva in your mouth. Nausea often leads to vomiting. Most of the time you do not need to worry about nausea and vomiting, but they can be signs of other illnesses. Two common causes of nausea and vomiting are a stomach infection and food poisoning. Nausea and vomiting from a viral stomach infection will usually start to improve within 24 hours. Nausea and vomiting from food poisoning may last from 12 to 48 hours. The doctor has checked you carefully, but problems can develop later. If you notice any problems or new symptoms, get medical treatment right away. Follow-up care is a key part of your treatment and safety. Be sure to make and go to all appointments, and call your doctor if you are having problems. It's also a good idea to know your test results and keep a list of the medicines you take. How can you care for yourself at home? · To prevent dehydration, drink plenty of fluids. Choose water and other clear liquids until you feel better. If you have kidney, heart, or liver disease and have to limit fluids, talk with your doctor before you increase the amount of fluids you drink. · Rest in bed until you feel better. · When you are able to eat, try clear soups, mild foods, and liquids until all symptoms are gone for 12 to 48 hours. Other good choices include dry toast, crackers, cooked cereal, and gelatin dessert, such as Jell-O. When should you call for help? Call 911 anytime you think you may need emergency care. For example, call if:    · You passed out (lost consciousness). Call your doctor now or seek immediate medical care if:    · You have symptoms of dehydration, such as:  ? Dry eyes and a dry mouth. ? Passing only a little urine. ?  Feeling thirstier than usual.     · You have new or worsening belly pain.     · You have a new or higher fever.     · You vomit blood or what looks like coffee grounds. Watch closely for changes in your health, and be sure to contact your doctor if:    · You have ongoing nausea and vomiting.     · Your vomiting is getting worse.     · Your vomiting lasts longer than 2 days.     · You are not getting better as expected. Where can you learn more? Go to https://[employer].Atempo. org and sign in to your [employer] account. Enter [id number] in the GeoVario box to learn more about \"Nausea and Vomiting: Care Instructions. \"     If you do not have an account, please click on the \"Sign Up Now\" link. Current as of: July 1, 2021               Content Version: 13.1  © 6163-8643 Healthwise, Incorporated. Care instructions adapted under license by [facility] ([facility]). If you have questions about a medical condition or this instruction, always ask your healthcare professional. [address] [address] any warranty or liability for your use of this information. 1. Rest and stay hydrated   2. Monitor your blood sugar  3. Follow up with PCP   4. If patient is not improving or developing any new/worsening symptoms then return to clinic as needed or go to ER.          Electronically signed by NATACHA Jensen on 1/28/2022 at 5:42 PM

## 2022-01-28 NOTE — LETTER
[facility]  [address]  Phone: [phone]  Fax: Vita, NATACHA        January 28, 2022     Patient: Rexine Scheuermann   YOB: 1980   Date of Visit: 1/28/2022       To Whom it May Concern:    Rexine Scheuermann was seen in my clinic on 1/28/2022. She may return to work on 1/29/2022. If you have any questions or concerns, please don't hesitate to call.     Sincerely,         NATACHA Coronado

## 2022-01-28 NOTE — PATIENT INSTRUCTIONS
Patient Education        Fatigue: Care Instructions  Your Care Instructions     Fatigue is a feeling of tiredness, exhaustion, or lack of energy. You may feel fatigue because of too much or not enough activity. It can also come from stress, lack of sleep, boredom, and poor diet. Many medical problems, such as viral infections, can cause fatigue. Emotional problems, especially depression, are often the cause of fatigue. Fatigue is most often a symptom of another problem. Treatment for fatigue depends on the cause. For example, if you have fatigue because you have a certain health problem, treating this problem also treats your fatigue. If depression or anxiety is the cause, treatment may help. Follow-up care is a key part of your treatment and safety. Be sure to make and go to all appointments, and call your doctor if you are having problems. It's also a good idea to know your test results and keep a list of the medicines you take. How can you care for yourself at home? · Get regular exercise. But don't overdo it. Go back and forth between rest and exercise. · Get plenty of rest.  · Eat a healthy diet. Do not skip meals, especially breakfast.  · Reduce your use of caffeine, tobacco, and alcohol. Caffeine is most often found in coffee, tea, cola drinks, and chocolate. · Limit medicines that can cause fatigue. This includes tranquilizers and cold and allergy medicines. When should you call for help? Watch closely for changes in your health, and be sure to contact your doctor if:    · You have new symptoms such as fever or a rash.     · Your fatigue gets worse.     · You have been feeling down, depressed, or hopeless. Or you may have lost interest in things that you usually enjoy.     · You are not getting better as expected. Where can you learn more? Go to https://david.BroadLight. org and sign in to your InPronto account.  Enter R965 in the [employer] box to learn more about \"Fatigue: Care Instructions. \"     If you do not have an account, please click on the \"Sign Up Now\" link. Current as of: July 1, 2021               Content Version: 13.1  © 2006-2021 [employer]. Care instructions adapted under license by [facility] ([facility]). If you have questions about a medical condition or this instruction, always ask your healthcare professional. [address] [address] any warranty or liability for your use of this information. Patient Education        Nausea and Vomiting: Care Instructions  Overview     When you are nauseated, you may feel weak and sweaty and notice a lot of saliva in your mouth. Nausea often leads to vomiting. Most of the time you do not need to worry about nausea and vomiting, but they can be signs of other illnesses. Two common causes of nausea and vomiting are a stomach infection and food poisoning. Nausea and vomiting from a viral stomach infection will usually start to improve within 24 hours. Nausea and vomiting from food poisoning may last from 12 to 48 hours. The doctor has checked you carefully, but problems can develop later. If you notice any problems or new symptoms, get medical treatment right away. Follow-up care is a key part of your treatment and safety. Be sure to make and go to all appointments, and call your doctor if you are having problems. It's also a good idea to know your test results and keep a list of the medicines you take. How can you care for yourself at home? · To prevent dehydration, drink plenty of fluids. Choose water and other clear liquids until you feel better. If you have kidney, heart, or liver disease and have to limit fluids, talk with your doctor before you increase the amount of fluids you drink. · Rest in bed until you feel better. · When you are able to eat, try clear soups, mild foods, and liquids until all symptoms are gone for 12 to 48 hours.  Other good choices include dry toast, crackers, cooked cereal, and gelatin dessert, such as Jell-O. When should you call for help? Call 911 anytime you think you may need emergency care. For example, call if:    · You passed out (lost consciousness). Call your doctor now or seek immediate medical care if:    · You have symptoms of dehydration, such as:  ? Dry eyes and a dry mouth. ? Passing only a little urine. ? Feeling thirstier than usual.     · You have new or worsening belly pain.     · You have a new or higher fever.     · You vomit blood or what looks like coffee grounds. Watch closely for changes in your health, and be sure to contact your doctor if:    · You have ongoing nausea and vomiting.     · Your vomiting is getting worse.     · Your vomiting lasts longer than 2 days.     · You are not getting better as expected. Where can you learn more? Go to https://[facility].Medigus. org and sign in to your [employer] account. Enter [id number] in the [employer]ware box to learn more about \"Nausea and Vomiting: Care Instructions. \"     If you do not have an account, please click on the \"Sign Up Now\" link. Current as of: July 1, 2021               Content Version: 13.1  © 8879-2338 Healthwise, Incorporated. Care instructions adapted under license by [facility] [employer] [facility] ([facility]). If you have questions about a medical condition or this instruction, always ask your healthcare professional. [place] [address] [address] any warranty or liability for your use of this information. 1. Rest and stay hydrated   2. Monitor your blood sugar  3. Follow up with PCP   4. If patient is not improving or developing any new/worsening symptoms then return to clinic as needed or go to ER.

## 2022-01-30 PROCEDURE — 94726 PLETHYSMOGRAPHY LUNG VOLUMES: CPT | Performed by: INTERNAL MEDICINE

## 2022-01-30 PROCEDURE — 94060 EVALUATION OF WHEEZING: CPT | Performed by: INTERNAL MEDICINE

## 2022-01-30 PROCEDURE — 94729 DIFFUSING CAPACITY: CPT | Performed by: INTERNAL MEDICINE

## 2022-01-31 DIAGNOSIS — Z11.52 ENCOUNTER FOR SCREENING FOR COVID-19: ICD-10-CM

## 2022-01-31 DIAGNOSIS — Z11.52 ENCOUNTER FOR SCREENING FOR COVID-19: Primary | ICD-10-CM

## 2022-01-31 LAB — SARS-COV-2, PCR: NOT DETECTED

## 2022-02-01 ENCOUNTER — ANESTHESIA (OUTPATIENT)
Dept: ENDOSCOPY | Age: 42
End: 2022-02-01
Payer: MEDICAID

## 2022-02-01 ENCOUNTER — HOSPITAL ENCOUNTER (OUTPATIENT)
Age: 42
Setting detail: OUTPATIENT SURGERY
Discharge: HOME | End: 2022-02-01
Attending: INTERNAL MEDICINE | Admitting: INTERNAL MEDICINE
Payer: MEDICAID

## 2022-02-01 ENCOUNTER — ANESTHESIA EVENT (OUTPATIENT)
Dept: ENDOSCOPY | Age: 42
End: 2022-02-01
Payer: MEDICAID

## 2022-02-01 VITALS
HEART RATE: 65 BPM | HEIGHT: 62 IN | TEMPERATURE: 98.7 F | BODY MASS INDEX: 48.58 KG/M2 | SYSTOLIC BLOOD PRESSURE: 117 MMHG | DIASTOLIC BLOOD PRESSURE: 76 MMHG | WEIGHT: 264 LBS | RESPIRATION RATE: 20 BRPM | OXYGEN SATURATION: 100 %

## 2022-02-01 VITALS — OXYGEN SATURATION: 94 % | SYSTOLIC BLOOD PRESSURE: 112 MMHG | DIASTOLIC BLOOD PRESSURE: 52 MMHG

## 2022-02-01 LAB
GLUCOSE BLD-MCNC: 102 MG/DL (ref 70–99)
HCG(URINE) PREGNANCY TEST: NEGATIVE
PERFORMED ON: ABNORMAL

## 2022-02-01 PROCEDURE — 7100000010 HC PHASE II RECOVERY - FIRST 15 MIN: Performed by: INTERNAL MEDICINE

## 2022-02-01 PROCEDURE — 88342 IMHCHEM/IMCYTCHM 1ST ANTB: CPT

## 2022-02-01 PROCEDURE — 3700000001 HC ADD 15 MINUTES (ANESTHESIA): Performed by: INTERNAL MEDICINE

## 2022-02-01 PROCEDURE — 3700000000 HC ANESTHESIA ATTENDED CARE: Performed by: INTERNAL MEDICINE

## 2022-02-01 PROCEDURE — 2500000003 HC RX 250 WO HCPCS

## 2022-02-01 PROCEDURE — 2709999900 HC NON-CHARGEABLE SUPPLY: Performed by: INTERNAL MEDICINE

## 2022-02-01 PROCEDURE — 43239 EGD BIOPSY SINGLE/MULTIPLE: CPT | Performed by: INTERNAL MEDICINE

## 2022-02-01 PROCEDURE — 82947 ASSAY GLUCOSE BLOOD QUANT: CPT

## 2022-02-01 PROCEDURE — 84703 CHORIONIC GONADOTROPIN ASSAY: CPT

## 2022-02-01 PROCEDURE — 88305 TISSUE EXAM BY PATHOLOGIST: CPT

## 2022-02-01 PROCEDURE — 2580000003 HC RX 258: Performed by: INTERNAL MEDICINE

## 2022-02-01 PROCEDURE — 3609012700 HC EGD DILATION SAVORY: Performed by: INTERNAL MEDICINE

## 2022-02-01 PROCEDURE — 3609012400 HC EGD TRANSORAL BIOPSY SINGLE/MULTIPLE: Performed by: INTERNAL MEDICINE

## 2022-02-01 PROCEDURE — 43248 EGD GUIDE WIRE INSERTION: CPT | Performed by: INTERNAL MEDICINE

## 2022-02-01 PROCEDURE — 7100000011 HC PHASE II RECOVERY - ADDTL 15 MIN: Performed by: INTERNAL MEDICINE

## 2022-02-01 PROCEDURE — 6360000002 HC RX W HCPCS

## 2022-02-01 RX ORDER — SODIUM CHLORIDE, SODIUM LACTATE, POTASSIUM CHLORIDE, CALCIUM CHLORIDE 600; 310; 30; 20 MG/100ML; MG/100ML; MG/100ML; MG/100ML
INJECTION, SOLUTION INTRAVENOUS CONTINUOUS
Status: DISCONTINUED | OUTPATIENT
Start: 2022-02-01 | End: 2022-02-01 | Stop reason: HOSPADM

## 2022-02-01 RX ORDER — LIDOCAINE HYDROCHLORIDE 10 MG/ML
INJECTION, SOLUTION EPIDURAL; INFILTRATION; INTRACAUDAL; PERINEURAL PRN
Status: DISCONTINUED | OUTPATIENT
Start: 2022-02-01 | End: 2022-02-01 | Stop reason: SDUPTHER

## 2022-02-01 RX ORDER — PROPOFOL 10 MG/ML
INJECTION, EMULSION INTRAVENOUS CONTINUOUS PRN
Status: DISCONTINUED | OUTPATIENT
Start: 2022-02-01 | End: 2022-02-01 | Stop reason: SDUPTHER

## 2022-02-01 RX ADMIN — PROPOFOL 150 MCG/KG/MIN: 10 INJECTION, EMULSION INTRAVENOUS at 09:25

## 2022-02-01 RX ADMIN — SODIUM CHLORIDE, POTASSIUM CHLORIDE, SODIUM LACTATE AND CALCIUM CHLORIDE: 600; 310; 30; 20 INJECTION, SOLUTION INTRAVENOUS at 08:56

## 2022-02-01 RX ADMIN — LIDOCAINE HYDROCHLORIDE 5 ML: 10 INJECTION, SOLUTION EPIDURAL; INFILTRATION; INTRACAUDAL; PERINEURAL at 09:25

## 2022-02-01 RX ADMIN — PROPOFOL 140 MCG/KG/MIN: 10 INJECTION, EMULSION INTRAVENOUS at 09:32

## 2022-02-01 ASSESSMENT — PAIN SCALES - GENERAL: PAINLEVEL_OUTOF10: 0

## 2022-02-01 ASSESSMENT — LIFESTYLE VARIABLES: SMOKING_STATUS: 0

## 2022-02-01 ASSESSMENT — PAIN - FUNCTIONAL ASSESSMENT: PAIN_FUNCTIONAL_ASSESSMENT: 0-10

## 2022-02-01 NOTE — OP NOTE
Endoscopic Procedure Note    Patient: Kim Crawford : 1980  Med Rec#: [id number] Acc#: [id number]     Primary Care Provider Ruslan Bailey MD  Referring Provider: Arely MANZANO    Endoscopist: Kimberly Sanders MD    Date of Procedure:  2022    Procedure:   1. EGD with biopsy  2. EGD with wire guided dilation to 54F    Indications:   1. Dysphagia   2. Dyspepsia     Anesthesia:  Sedation was administered by anesthesia who monitored the patient during the procedure. Estimated Blood Loss: minimal    Procedure:   After reviewing the patient's chart and obtaining informed consent, the patient was placed in the left lateral decubitus position. A forward-viewing Olympus endoscope was lubricated and inserted through the mouth into the oropharynx. Under direct visualization, the upper esophagus was intubated. The scope was advanced to the level of the third portion of duodenum. Scope was slowly withdrawn with careful inspection of the mucosal surfaces. The scope was retroflexed for inspection of the gastric fundus and incisura. Findings and maneuvers are listed in impression below. The patient tolerated the procedure well. The scope was removed. There were no immediate complications. Findings:   Esophagus: abnormal: benign appearing stricture noted in the distal esophagus. A guidewire was placed through the endoscope and the endoscope was removed. A 54 Scottish savory dilator was advanced down the length of the esophagus used a fixed-point wire technique. The dilator and guidewire were removed. The patient tolerated the procedure well. There is no hiatal hernia present. Stomach:  abnormal: mild mucosal changes suggestive of gastritis noted -  Gastric biopsies were taken from the antrum and body to rule out Helicobacter pylori infection. Duodenum: normal      IMPRESSION:  1. Esophageal dysphagia - dilated   2. Gastritis      RECOMMENDATIONS:    1.   Await path results, the patient will be contacted in 7-10 days with biopsy results. 2.  Continue PPI twice daily as prescribed   3. Follow up with Eusebia Ambriz in 6 weeks to discuss symptoms. I would encourage lifestyle/diet modifications to help with reflux symptoms. The results were discussed with the patient and family. A copy of the images obtained were given to the patient.      Leeann Ríos MD  2/1/2022  9:42 AM

## 2022-02-01 NOTE — ANESTHESIA PRE PROCEDURE
Department of Anesthesiology  Preprocedure Note       Name:  Mahendra Villanueva   Age:  39 y.o.  :  1980                                          MRN:  [id number]         Date:  2022      Surgeon: Bravo Garcia):  Shorty Altamirano MD    Procedure: Procedure(s):  EGD BIOPSY    Medications prior to admission:   Prior to Admission medications    Medication Sig Start Date End Date Taking?  Authorizing Provider   ondansetron (ZOFRAN ODT) 4 MG disintegrating tablet Take 1 tablet by mouth every 8 hours as needed for Nausea or Vomiting 22   NATACHA Nolasco   Norgestim-Eth Estrad Triphasic [facility] UNION) 0.18/0.215/0.25 MG-35 MCG TABS Take 1 packet by mouth daily 1/3/22   NATACHA Murdock - RUDDY   busPIRone (BUSPAR) 5 MG tablet TAKE 1 TABLET BY MOUTH 2 TIMES DAILY AS NEEDED (ANXIETY) 22  NATACHA Hansen   amLODIPine (NORVASC) 5 MG tablet TAKE 1 TABLET BY MOUTH TWICE A DAY 21   NATACHA Jang Arm   KLOR-CON M20 20 MEQ extended release tablet TAKE 1 TABLET BY MOUTH EVERY DAY 21   Sumeet Gonzalez MD   furosemide (LASIX) 20 MG tablet Take 1 tablet by mouth daily for 3 days 21  NATACHA Barnes   omeprazole (PRILOSEC) 40 MG delayed release capsule Take 1 capsule by mouth 2 times daily 12/15/21   Ángela Fagan MD   metFORMIN (GLUCOPHAGE) 500 MG tablet TAKE 1 TABLET BY MOUTH TWICE A DAY WITH MEALS 21   Sumeet Gonzalez MD   carvedilol (COREG) 6.25 MG tablet TAKE 1 TABLET BY MOUTH TWICE A DAY 21   Elizabeth Aguila MD   hydroCHLOROthiazide (HYDRODIURIL) 25 MG tablet Take 1 tablet by mouth every morning  Patient taking differently: Take 12.5 mg by mouth as needed  21   Rosemarie Senior MD   losartan (COZAAR) 100 MG tablet Take 1 tablet by mouth daily 21   Sumeet Gonzalez MD   cloNIDine (CATAPRES) 0.1 MG tablet Twice a day as needed if blood pressure >180/100 21  Sumeet Gonzalez MD   blood glucose test strips (ASCENSIA AUTODISC VI;ONE TOUCH ULTRA TEST VI) strip 1 each by In Vitro route 3 times daily As needed. 5/24/21   Ruslan Bailey MD   valACYclovir (VALTREX) 1 g tablet Take 2 tablets p.o. every 12 hours for 2 doses. May repeat for further outbreaks 1/15/21   Ruslan Bailey MD   Blood Glucose Monitoring Suppl (FREESTYLE LITE) ANDREEA USE AS DIRECTED 9/17/20   Historical Provider, MD   Lancet Device MISC 1 lancet twice a day 11/5/20   Ruslan Bailey MD       Current medications:    No current facility-administered medications for this encounter. Current Outpatient Medications   Medication Sig Dispense Refill    ondansetron (ZOFRAN ODT) 4 MG disintegrating tablet Take 1 tablet by mouth every 8 hours as needed for Nausea or Vomiting 15 tablet 0    Norgestim-Eth Estrad Triphasic ([facility]) 0.18/0.215/0.25 MG-35 MCG TABS Take 1 packet by mouth daily 84 tablet 3    busPIRone (BUSPAR) 5 MG tablet TAKE 1 TABLET BY MOUTH 2 TIMES DAILY AS NEEDED (ANXIETY) 60 tablet 2    amLODIPine (NORVASC) 5 MG tablet TAKE 1 TABLET BY MOUTH TWICE A  tablet 1    KLOR-CON M20 20 MEQ extended release tablet TAKE 1 TABLET BY MOUTH EVERY DAY 30 tablet 0    furosemide (LASIX) 20 MG tablet Take 1 tablet by mouth daily for 3 days 3 tablet 0    omeprazole (PRILOSEC) 40 MG delayed release capsule Take 1 capsule by mouth 2 times daily 60 capsule 5    metFORMIN (GLUCOPHAGE) 500 MG tablet TAKE 1 TABLET BY MOUTH TWICE A DAY WITH MEALS 180 tablet 1    carvedilol (COREG) 6.25 MG tablet TAKE 1 TABLET BY MOUTH TWICE A  tablet 1    hydroCHLOROthiazide (HYDRODIURIL) 25 MG tablet Take 1 tablet by mouth every morning (Patient taking differently: Take 12.5 mg by mouth as needed ) 90 tablet 1    losartan (COZAAR) 100 MG tablet Take 1 tablet by mouth daily 30 tablet 5    blood glucose test strips (ASCENSIA AUTODISC VI;ONE TOUCH ULTRA TEST VI) strip 1 each by In Vitro route 3 times daily As needed.  100 each 11    valACYclovir (VALTREX) 1 g tablet Take 2 tablets p.o. every 12 hours for 2 doses. May repeat for further outbreaks 8 tablet 2    Blood Glucose Monitoring Suppl (FREESTYLE LITE) ANDREEA USE AS DIRECTED      Lancet Device MISC 1 lancet twice a day 60 Device 11       Allergies:     Allergies   Allergen Reactions    Mushroom Extract Complex        Problem List:    Patient Active Problem List   Diagnosis Code    Type 2 diabetes mellitus ([facility] E11.9    Anemia D64.9    Familial hypercholesterolemia E78.01    Rheumatoid arthritis involving multiple sites with positive rheumatoid factor ([facility] M05.79    Fibromyalgia M79.7    Bilateral carpal tunnel syndrome G56.03    PCOS (polycystic ovarian syndrome) E28.2    Iron deficiency anemia D50.9    Family history of ovarian cancer Z80.41    Menorrhagia with regular cycle N92.0    Primary hypertension I10    Facial numbness R20.0    BiPAP (biphasic positive airway pressure) dependence Z99.89    SETH (obstructive sleep apnea) G47.33    Morbid obesity (HCC) E66.01    Gastroesophageal reflux disease without esophagitis K21.9       Past Medical History:        Diagnosis Date    Anemia     Chronic rheumatic arthritis (HCC)     Elevated blood pressure reading     Fibromyalgia     Hypertension     Intracranial hypertension     Obesity     PCOS (polycystic ovarian syndrome)     Sleep apnea     Type 2 diabetes mellitus (HCC)        Past Surgical History:        Procedure Laterality Date    BREAST BIOPSY Right 2019    benign    CHOLECYSTECTOMY         Social History:    Social History     Tobacco Use    Smoking status: Former Smoker     Packs/day: 1.00     Years: 2.00     Pack years: 2.00     Types: Cigarettes     Start date: 1996     Quit date: 1998     Years since quittin.1    Smokeless tobacco: Never Used   Substance Use Topics    Alcohol use: Not Currently     Alcohol/week: 1.0 standard drink     Types: 1 Glasses of wine per week     Comment: rarely Counseling given: Not Answered      Vital Signs (Current): There were no vitals filed for this visit. BP Readings from Last 3 Encounters:   01/28/22 (!) 146/92   01/07/22 139/89   01/03/22 (!) 146/97       NPO Status:                                                                                 BMI:   Wt Readings from Last 3 Encounters:   01/28/22 264 lb (119.7 kg)   01/07/22 268 lb (121.6 kg)   01/03/22 268 lb (121.6 kg)     There is no height or weight on file to calculate BMI.    CBC:   Lab Results   Component Value Date    WBC 7.7 01/07/2022    RBC 3.93 01/07/2022    HGB 10.4 01/07/2022    HCT 32.9 01/07/2022    HCT 34.7 02/23/2012    MCV 83.7 01/07/2022    RDW 11.9 01/07/2022     01/07/2022     02/23/2012       CMP:   Lab Results   Component Value Date     01/07/2022     02/23/2012    K 3.7 01/07/2022    K 4.2 02/23/2012     01/07/2022     02/23/2012    CO2 23 01/07/2022    BUN 12 01/07/2022    CREATININE 0.9 01/07/2022    CREATININE 0.8 02/23/2012    GFRAA >59 01/07/2022    LABGLOM >60 01/07/2022    GLUCOSE 136 01/28/2022    PROT 7.5 01/07/2022    PROT 7.9 02/23/2012    CALCIUM 8.9 01/07/2022    BILITOT <0.2 01/07/2022    ALKPHOS 74 01/07/2022    ALKPHOS 74 02/23/2012    AST 18 01/07/2022    ALT 24 01/07/2022       POC Tests: No results for input(s): POCGLU, POCNA, POCK, POCCL, POCBUN, POCHEMO, POCHCT in the last 72 hours.     Coags:   Lab Results   Component Value Date    PROTIME 13.2 02/28/2021    PROTIME 11.60 02/23/2012    INR 1.01 02/28/2021    APTT 28.9 07/31/2018       HCG (If Applicable):   Lab Results   Component Value Date    PREGTESTUR Negative 11/15/2021    PREGSERUM NEGATIVE 02/23/2012        ABGs: No results found for: PHART, PO2ART, GQJ1CZM, DJF2IUS, BEART, T1MYGPJX     Type & Screen (If Applicable):  No results found for: LABABO, LABRH    Drug/Infectious Status (If Applicable):  No results found for: HIV, HEPCAB    COVID-19 Screening (If Applicable):   Lab Results   Component Value Date    COVID19 Not Detected 01/31/2022           Anesthesia Evaluation  Patient summary reviewed and Nursing notes reviewed  Airway: Mallampati: III  TM distance: <3 FB   Neck ROM: full  Mouth opening: > = 3 FB Dental:          Pulmonary:normal exam    (+) sleep apnea: on CPAP,      (-) asthma and not a current smoker                           Cardiovascular:    (+) hypertension:, hyperlipidemia         Beta Blocker:  Dose within 24 Hrs         Neuro/Psych:   Negative Neuro/Psych ROS              GI/Hepatic/Renal:   (+) GERD:, morbid obesity          Endo/Other:    (+) DiabetesType II DM, , blood dyscrasia: anemia, arthritis: rheumatoid. , .                  ROS comment: Fibromyalgia   Abdominal:   (+) obese,           Vascular: negative vascular ROS. Other Findings:           Anesthesia Plan      MAC     ASA 3       Induction: intravenous. Anesthetic plan and risks discussed with patient. Plan discussed with CRNA.                   NATACHA Villalobos - CRNA   2/1/2022

## 2022-02-01 NOTE — ANESTHESIA POSTPROCEDURE EVALUATION
Department of Anesthesiology  Postprocedure Note    Patient: Wilman Brown  MRN: [id number]  YOB: 1980  Date of evaluation: 2/1/2022  Time:  9:52 AM     Procedure Summary     Date: 02/01/22 Room / Location: [address]    Anesthesia Start: 7592 Anesthesia Stop: 8013    Procedures:       EGD BIOPSY (N/A Abdomen)      EGD DILATION SAVORY Diagnosis: (WORSENING GERD, DYSPHAGIA, CP)    Surgeons: Pooja Peres MD Responsible Provider: NATACHA Lara CRNA    Anesthesia Type: MAC ASA Status: 3          Anesthesia Type: MAC    Nam Phase I:      Nam Phase II:      Last vitals: Reviewed and per EMR flowsheets.        Anesthesia Post Evaluation    Patient location during evaluation: bedside  Patient participation: complete - patient participated  Level of consciousness: sleepy but conscious  Pain score: 0  Airway patency: patent  Nausea & Vomiting: no nausea and no vomiting  Complications: no  Cardiovascular status: blood pressure returned to baseline  Respiratory status: acceptable and room air  Hydration status: euvolemic

## 2022-02-01 NOTE — H&P
Patient Name: Yane Park  : 1980  MRN: [id number]  DATE: 22    Allergies: Allergies   Allergen Reactions    Mushroom Extract Complex         ENDOSCOPY  History and Physical    Procedure:    [] Diagnostic Colonoscopy       [] Screening Colonoscopy  [x] EGD      [] ERCP      [] EUS       [] Other    [x] Previous office notes/History and Physical reviewed from the patients chart. Please see EMR for further details of HPI. I have examined the patient's status immediately prior to the procedure and:      Indications/HPI:    []Abdominal Pain   []Cancer- GI/Lung     []Fhx of colon CA/polyps  []History of Polyps  []Barretts            []Melena  []Abnormal Imaging              [x]Dysphagia              []Persistent Pneumonia   []Anemia                            []Food Impaction        []History of Polyps  [] GI Bleed             []Pulmonary nodule/Mass   []Change in bowel habits []Heartburn/Reflux  []Rectal Bleed (BRBPR)  []Chest Pain - Non Cardiac []Heme (+) Stool []Ulcers  []Constipation  []Hemoptysis  []Varices  []Diarrhea  []Hypoxemia    []Nausea/Vomiting   []Screening   []Crohns/Colitis  []Other:     Anesthesia:   [x] MAC [] Moderate Sedation   [] General   [] None     ROS: 12 pt Review of Symptoms was negative unless mentioned above    Medications:   Prior to Admission medications    Medication Sig Start Date End Date Taking?  Authorizing Provider   amLODIPine (NORVASC) 5 MG tablet TAKE 1 TABLET BY MOUTH TWICE A DAY 21  Yes NATACHA Wilson   omeprazole (PRILOSEC) 40 MG delayed release capsule Take 1 capsule by mouth 2 times daily 12/15/21  Yes Ángela Fagan MD   carvedilol (COREG) 6.25 MG tablet TAKE 1 TABLET BY MOUTH TWICE A DAY 21  Yes Malinda Cullen MD   losartan (COZAAR) 100 MG tablet Take 1 tablet by mouth daily 21  Yes Mendez Michel MD   ondansetron (ZOFRAN ODT) 4 MG disintegrating tablet Take 1 tablet by mouth every 8 hours as needed for Nausea or Vomiting 1/28/22   NATACHA Campos   Norgestim-Eth Estrad Triphasic [facility] UNION) 0.18/0.215/0.25 MG-35 MCG TABS Take 1 packet by mouth daily 1/3/22   NATACHA Kerr - RUDDY   busPIRone (BUSPAR) 5 MG tablet TAKE 1 TABLET BY MOUTH 2 TIMES DAILY AS NEEDED (ANXIETY) 1/2/22 2/1/22  NATACHA Berumen   KLOR-CON M20 20 MEQ extended release tablet TAKE 1 TABLET BY MOUTH EVERY DAY 12/29/21   Gwenevere Cheadle, MD   furosemide (LASIX) 20 MG tablet Take 1 tablet by mouth daily for 3 days 12/23/21 12/30/21  NATACHA Hebert   metFORMIN (GLUCOPHAGE) 500 MG tablet TAKE 1 TABLET BY MOUTH TWICE A DAY WITH MEALS 11/29/21   Gwenevere Cheadle, MD   hydroCHLOROthiazide (HYDRODIURIL) 25 MG tablet Take 1 tablet by mouth every morning  Patient taking differently: Take 12.5 mg by mouth as needed  8/24/21   Carol Guerrero MD   cloNIDine (CATAPRES) 0.1 MG tablet Twice a day as needed if blood pressure >180/100 8/2/21 8/24/21  Gwenevere Cheadle, MD   blood glucose test strips (ASCENSIA AUTODISC VI;ONE TOUCH ULTRA TEST VI) strip 1 each by In Vitro route 3 times daily As needed. 5/24/21   Gwenevere Cheadle, MD   valACYclovir (VALTREX) 1 g tablet Take 2 tablets p.o. every 12 hours for 2 doses.   May repeat for further outbreaks 1/15/21   Gwenevere Cheadle, MD   Blood Glucose Monitoring Suppl (FREESTYLE LITE) ANDREEA USE AS DIRECTED 9/17/20   Historical Provider, MD   Lancet Device MISC 1 lancet twice a day 11/5/20   Gwenevere Cheadle, MD       Past Medical History:  Past Medical History:   Diagnosis Date    Anemia     Chronic rheumatic arthritis ([facility]     Elevated blood pressure reading     Fibromyalgia     GERD (gastroesophageal reflux disease)     Hypertension     Intracranial hypertension     Obesity     PCOS (polycystic ovarian syndrome)     Sleep apnea     bi pap    SOB (shortness of breath)     Trouble swallowing     Type 2 diabetes mellitus ([facility]        Past Surgical History:  Past Surgical History:   Procedure Laterality Date    BREAST BIOPSY Right 2019    benign    CHOLECYSTECTOMY         Social History:  Social History     Tobacco Use    Smoking status: Former Smoker     Packs/day: 1.00     Years: 2.00     Pack years: 2.00     Types: Cigarettes     Start date: 1996     Quit date: 1998     Years since quittin.1    Smokeless tobacco: Never Used   Vaping Use    Vaping Use: Never used   Substance Use Topics    Alcohol use: Not Currently     Alcohol/week: 1.0 standard drink     Types: 1 Glasses of wine per week     Comment: rarely    Drug use: No       Vital Signs:   Vitals:    22 0846   BP: 135/80   Pulse: 85   Resp: 18   Temp: 98 °F (36.7 °C)   SpO2: 100%        Physical Exam:  Cardiac:  [x]WNL  []Comments:  Pulmonary:  [x]WNL   []Comments:  Neuro/Mental Status:  [x]WNL  []Comments:  Abdominal:  [x]WNL    []Comments:  Other:   []WNL  []Comments:    Informed Consent:  The risks and benefits of the procedure have been discussed with either the patient or if they cannot consent, their representative. Assessment:  Patient examined and appropriate for planned sedation and procedure. Plan:  Proceed with planned sedation and procedure as above.          Nathaniel Kemp MD

## 2022-02-03 ENCOUNTER — TELEMEDICINE (OUTPATIENT)
Dept: FAMILY MEDICINE CLINIC | Age: 42
End: 2022-02-03
Payer: MEDICAID

## 2022-02-03 DIAGNOSIS — I10 ESSENTIAL (PRIMARY) HYPERTENSION: Primary | ICD-10-CM

## 2022-02-03 DIAGNOSIS — G47.33 OSA (OBSTRUCTIVE SLEEP APNEA): ICD-10-CM

## 2022-02-03 DIAGNOSIS — R13.19 ESOPHAGEAL DYSPHAGIA: ICD-10-CM

## 2022-02-03 DIAGNOSIS — E28.2 PCOS (POLYCYSTIC OVARIAN SYNDROME): ICD-10-CM

## 2022-02-03 DIAGNOSIS — K21.9 GASTROESOPHAGEAL REFLUX DISEASE WITHOUT ESOPHAGITIS: ICD-10-CM

## 2022-02-03 DIAGNOSIS — Z99.89 BIPAP (BIPHASIC POSITIVE AIRWAY PRESSURE) DEPENDENCE: ICD-10-CM

## 2022-02-03 DIAGNOSIS — M05.79 RHEUMATOID ARTHRITIS INVOLVING MULTIPLE SITES WITH POSITIVE RHEUMATOID FACTOR (HCC): ICD-10-CM

## 2022-02-03 DIAGNOSIS — E11.9 TYPE 2 DIABETES MELLITUS WITHOUT COMPLICATION, WITHOUT LONG-TERM CURRENT USE OF INSULIN (HCC): ICD-10-CM

## 2022-02-03 PROCEDURE — 99214 OFFICE O/P EST MOD 30 MIN: CPT | Performed by: FAMILY MEDICINE

## 2022-02-03 PROCEDURE — G8417 CALC BMI ABV UP PARAM F/U: HCPCS | Performed by: FAMILY MEDICINE

## 2022-02-03 PROCEDURE — 1036F TOBACCO NON-USER: CPT | Performed by: FAMILY MEDICINE

## 2022-02-03 PROCEDURE — 3046F HEMOGLOBIN A1C LEVEL >9.0%: CPT | Performed by: FAMILY MEDICINE

## 2022-02-03 PROCEDURE — G8484 FLU IMMUNIZE NO ADMIN: HCPCS | Performed by: FAMILY MEDICINE

## 2022-02-03 PROCEDURE — G8428 CUR MEDS NOT DOCUMENT: HCPCS | Performed by: FAMILY MEDICINE

## 2022-02-03 PROCEDURE — 2022F DILAT RTA XM EVC RTNOPTHY: CPT | Performed by: FAMILY MEDICINE

## 2022-02-03 RX ORDER — ALBUTEROL SULFATE 90 UG/1
2 AEROSOL, METERED RESPIRATORY (INHALATION) 4 TIMES DAILY PRN
Qty: 18 G | Refills: 5 | Status: SHIPPED | OUTPATIENT
Start: 2022-02-03

## 2022-02-03 NOTE — PROGRESS NOTES
2/3/2022    TELEHEALTH EVALUATION -- Audio/Visual (During XBPUN-32 public health emergency)    HPI:    Megan Almonte (:  1980) has requested an audio/video evaluation for the following concern(s):      She has an esophagoduodenoscopy with biopsy performed on . She was having dysphagia and dyspepsia. She had her esophagus dilated and there was evidence of gastritis. Pathology has been sent. Was recommended she continue with her PPI twice a day and follow-up with GI in 6 weeks. She was evaluated by pulmonology on December 15. She has a history of obstructive sleep apnea on BiPAP. Recommended continuing with BiPAP. Her last appointment with me was on . She had scheduled an appointment on  but was unable to make it. We rescheduled her for  and she did make that appointment either. We have primarily been managing her blood pressure and diabetes. Amlodipine 5 mg daily  Coreg 6.25 mg twice a day  Losartan 100 mg once a day    Diabetes Mellitus  Has been compliant with medications. No side effects of medications since last visit. No polyuria, polydipsia, or vision changes since last visit. No symptomatic episodes of hypoglycemia. She is also followed for rheumatoid arthritis by rheumatology. They currently do not have her on medication. Review of Systems    Prior to Visit Medications    Medication Sig Taking?  Authorizing Provider   albuterol sulfate HFA (VENTOLIN HFA) 108 (90 Base) MCG/ACT inhaler Inhale 2 puffs into the lungs 4 times daily as needed for Wheezing Yes Erik Benitez MD   ondansetron (ZOFRAN ODT) 4 MG disintegrating tablet Take 1 tablet by mouth every 8 hours as needed for Nausea or Vomiting  NATACHA Dominguezgestim-Eth Estrad Triphasic (TRI-ESTARYLLA) 0.18/0.215/0.25 MG-35 MCG TABS Take 1 packet by mouth daily  NATACHA Donis CNM   amLODIPine (NORVASC) 5 MG tablet TAKE 1 TABLET BY MOUTH TWICE A DAY  Jessy PAYNE NATACHA Mendiola   KLOR-CON M20 20 MEQ extended release tablet TAKE 1 TABLET BY MOUTH EVERY DAY  Ruslan Bailey MD   furosemide (LASIX) 20 MG tablet Take 1 tablet by mouth daily for 3 days  NATACHA Francisco   omeprazole (PRILOSEC) 40 MG delayed release capsule Take 1 capsule by mouth 2 times daily  Ángela Fagan MD   metFORMIN (GLUCOPHAGE) 500 MG tablet TAKE 1 TABLET BY MOUTH TWICE A DAY WITH MEALS  Ruslan Bailey MD   carvedilol (COREG) 6.25 MG tablet TAKE 1 TABLET BY MOUTH TWICE A DAY  Herberth Guevara MD   hydroCHLOROthiazide (HYDRODIURIL) 25 MG tablet Take 1 tablet by mouth every morning  Patient taking differently: Take 12.5 mg by mouth as needed   Rafael Barnhart MD   losartan (COZAAR) 100 MG tablet Take 1 tablet by mouth daily  Ruslan Bailey MD   cloNIDine (CATAPRES) 0.1 MG tablet Twice a day as needed if blood pressure >180/100  Ruslan Bailey MD   blood glucose test strips (ASCENSIA AUTODISC VI;ONE TOUCH ULTRA TEST VI) strip 1 each by In Vitro route 3 times daily As needed. Ruslan Bailey MD   valACYclovir (VALTREX) 1 g tablet Take 2 tablets p.o. every 12 hours for 2 doses. May repeat for further outbreaks  Ruslan Bailey MD   Blood Glucose Monitoring Suppl (FREESTYLE LITE) ANDREEA USE AS DIRECTED  Historical Provider, MD   Lancet Device MISC 1 lancet twice a day  Ruslan Bailey MD       Social History     Tobacco Use    Smoking status: Former Smoker     Packs/day: 1.00     Years: 2.00     Pack years: 2.00     Types: Cigarettes     Start date: 1996     Quit date: 1998     Years since quittin.1    Smokeless tobacco: Never Used   Vaping Use    Vaping Use: Never used   Substance Use Topics    Alcohol use: Not Currently     Alcohol/week: 1.0 standard drink     Types: 1 Glasses of wine per week     Comment: rarely    Drug use:  No            PHYSICAL EXAMINATION:  [ INSTRUCTIONS:  \"[x]\" Indicates a positive item  \"[]\" Indicates a negative item  -- DELETE ALL ITEMS NOT EXAMINED]  Vital Signs: (As obtained by patient/caregiver or practitioner observation)    Blood pressure-  Heart rate-    Respiratory rate-    Temperature-  Pulse oximetry-     Constitutional: [x] Appears well-developed and well-nourished [x] No apparent distress      [] Abnormal-   Mental status  [x] Alert and awake  [] Oriented to person/place/time [x]Able to follow commands      Eyes:  EOM    [x]  Normal  [] Abnormal-  Sclera  [x]  Normal  [] Abnormal -         Discharge []  None visible  [] Abnormal -    HENT:   [x] Normocephalic, atraumatic.   [] Abnormal   [] Mouth/Throat: Mucous membranes are moist.     External Ears [x] Normal  [] Abnormal-     Neck: [x] No visualized mass     Pulmonary/Chest: [x] Respiratory effort normal.  [x] No visualized signs of difficulty breathing or respiratory distress        [] Abnormal-      Musculoskeletal:   [] Normal gait with no signs of ataxia         [] Normal range of motion of neck        [] Abnormal-       Neurological:        [] No Facial Asymmetry (Cranial nerve 7 motor function) (limited exam to video visit)          [] No gaze palsy        [] Abnormal-         Skin:        [x] No significant exanthematous lesions or discoloration noted on facial skin         [] Abnormal-            Psychiatric:       [x] Normal Affect [x] No Hallucinations        [] Abnormal-     Other pertinent observable physical exam findings-     Recent Results (from the past 672 hour(s))   CBC Auto Differential    Collection Time: 01/07/22  9:00 PM   Result Value Ref Range    WBC 7.7 4.8 - 10.8 K/uL    RBC 3.93 (L) 4.20 - 5.40 M/uL    Hemoglobin 10.4 (L) 12.0 - 16.0 g/dL    Hematocrit 32.9 (L) 37.0 - 47.0 %    MCV 83.7 81.0 - 99.0 fL    MCH 26.5 (L) 27.0 - 31.0 pg    MCHC 31.6 (L) 33.0 - 37.0 g/dL    RDW 11.9 11.5 - 14.5 %    Platelets 672 930 - 626 K/uL    MPV 10.8 9.4 - 12.3 fL    Neutrophils % 60.6 50.0 - 65.0 %    Lymphocytes % 29.2 20.0 - 40.0 %    Monocytes % 6.4 0.0 - 10.0 % Eosinophils % 3.1 0.0 - 5.0 %    Basophils % 0.6 0.0 - 1.0 %    Neutrophils Absolute 4.7 1.5 - 7.5 K/uL    Immature Granulocytes # 0.0 K/uL    Lymphocytes Absolute 2.3 1.1 - 4.5 K/uL    Monocytes Absolute 0.50 0.00 - 0.90 K/uL    Eosinophils Absolute 0.20 0.00 - 0.60 K/uL    Basophils Absolute 0.10 0.00 - 0.20 K/uL   Comprehensive Metabolic Panel w/ Reflex to MG    Collection Time: 01/07/22  9:00 PM   Result Value Ref Range    Sodium 135 (L) 136 - 145 mmol/L    Potassium reflex Magnesium 3.7 3.5 - 5.0 mmol/L    Chloride 102 98 - 111 mmol/L    CO2 23 22 - 29 mmol/L    Anion Gap 10 7 - 19 mmol/L    Glucose 122 (H) 74 - 109 mg/dL    BUN 12 6 - 20 mg/dL    CREATININE 0.9 0.5 - 0.9 mg/dL    GFR Non-African American >60 >60    GFR African American >59 >59    Calcium 8.9 8.6 - 10.0 mg/dL    Total Protein 7.5 6.6 - 8.7 g/dL    Albumin 3.8 3.5 - 5.2 g/dL    Total Bilirubin <0.2 0.2 - 1.2 mg/dL    Alkaline Phosphatase 74 35 - 104 U/L    ALT 24 5 - 33 U/L    AST 18 5 - 32 U/L   Troponin    Collection Time: 01/07/22  9:00 PM   Result Value Ref Range    Troponin <0.01 0.00 - 0.03 ng/mL   COVID-19, Rapid    Collection Time: 01/07/22  9:00 PM    Specimen: Nasopharyngeal Swab   Result Value Ref Range    SARS-CoV-2, NAAT Not Detected Not Detected   EKG 12 Lead    Collection Time: 01/07/22  9:00 PM   Result Value Ref Range    P-R Interval 122 ms    QRS Duration 90 ms    Q-T Interval 388 ms    QTc Calculation (Bazett) 421 ms    P Axis 56 degrees    T Axis 11 degrees   COVID-19    Collection Time: 01/24/22 11:44 AM   Result Value Ref Range    SARS-CoV-2, PCR Not Detected Not Detected   POCT Glucose    Collection Time: 01/28/22  5:23 PM   Result Value Ref Range    Glucose 136 mg/dL    QC OK?      COVID-19    Collection Time: 01/31/22 10:07 AM   Result Value Ref Range    SARS-CoV-2, PCR Not Detected Not Detected   Pregnancy, Urine    Collection Time: 02/01/22  8:27 AM   Result Value Ref Range    HCG(Urine) Pregnancy Test Negative    POCT Glucose    Collection Time: 02/01/22  8:54 AM   Result Value Ref Range    POC Glucose 102 (H) 70 - 99 mg/dl    Performed on [employer]      Lab Results   Component Value Date    LABA1C 6.6 (H) 10/05/2021    LABA1C 6.5 (H) 07/31/2021    LABA1C 6.5 05/24/2021     Lab Results   Component Value Date    LABMICR 3.70 10/05/2021    LDLCALC 94 10/05/2021    CREATININE 0.9 01/07/2022     Reading Date Result Priority    Gabrielle Shultz MD  [phone] 7/31/2021    Unknown Provider Result 7/30/2021      Narrative & Impression  Transthoracic Echocardiography Report (TTE)      Demographics      Patient Name  INA Lujan   Date of Study          07/31/2021      MRN           [id number]        Gender                 Female      Date of Birth 1980    Room Number            MHL-0525      Age           36 year(s)      Height:       62 inches     Referring Physician    Vincent Toth MD      Weight:       280.01 pounds Sonographer            Kandice Roberts ANTELMO      BSA:          2.21 m^2      Interpreting Physician Gabrielle Shultz MD      BMI:          51.21 kg/m^2     Procedure     Type of Study      TTE procedure:ECHO NO CONTRAST WITH DOP/COLR. Study Location: Echo Lab  Technical Quality: Adequate visualization     Patient Status: Inpatient     Contrast Medium: Bubble Study.     BP: 114/76 mmHg     Indications:CVA and TIA.      Conclusions      Summary   Structurally normal mitral valve with normal leaflet mobility. No evidence   of mitral valve stenosis or mild mitral regurgitation. Aortic valve appears to be tricuspid. Structurally normal aortic valve. No significant aortic regurgitation or stenosis is noted. Tricuspid valve is structurally normal.   No evidence of tricuspid regurgitation. The pulmonic valve was not well visualized. LAe   Normal left ventricular size with preserved LV function and an estimated   ejection fraction of approximately 55-60%.  LVH   No evidence of left ventricular mass or after her dilatation of her esophagus. She remains on omeprazole    5. Gastroesophageal reflux disease without esophagitis  As above    6. BiPAP (biphasic positive airway pressure) dependence  7. SETH (obstructive sleep apnea)  Recently saw pulmonology. She is stable on her BiPAP    8. Rheumatoid arthritis involving multiple sites with positive rheumatoid factor ([facility] [address]  She has an appointment with rheumatology next week      Return in about 2 months (around 4/3/2022) for Routine follow up - 30 minutes, Get labs prior to appointment. Dariel Guerrero is a 39 y.o. female being evaluated by a Virtual Visit (video visit) encounter to address concerns as mentioned above. A caregiver was present when appropriate. Due to this being a TeleHealth encounter (During IIVEP-09 public health emergency), evaluation of the following organ systems was limited: Vitals/Constitutional/EENT/Resp/CV/GI//MS/Neuro/Skin/Heme-Lymph-Imm. Pursuant to the emergency declaration under the [address] [place] [address] authority and the [employer] Act, this Virtual Visit was conducted with patient's (and/or legal guardian's) consent, to reduce the patient's risk of exposure to COVID-19 and provide necessary medical care. The patient (and/or legal guardian) has also been advised to contact this office for worsening conditions or problems, and seek emergency medical treatment and/or call 911 if deemed necessary. Patient identification was verified at the start of the visit: Yes    Total time spent on this encounter: Not billed by time    Services were provided through a video synchronous discussion virtually to substitute for in-person clinic visit. Patient and provider were located at their individual homes. --Sukumar Sung MD on 2/4/2022 at 1:00 PM    An electronic signature was used to authenticate this note.

## 2022-02-04 PROBLEM — R20.0 FACIAL NUMBNESS: Status: RESOLVED | Noted: 2021-07-31 | Resolved: 2022-02-04

## 2022-02-10 RX ORDER — LOSARTAN POTASSIUM 100 MG/1
TABLET ORAL
Qty: 90 TABLET | Refills: 1 | Status: SHIPPED | OUTPATIENT
Start: 2022-02-10 | End: 2022-07-25

## 2022-02-17 ENCOUNTER — PROCEDURE VISIT (OUTPATIENT)
Dept: OBGYN CLINIC | Age: 42
End: 2022-02-17
Payer: MEDICAID

## 2022-02-17 VITALS
HEIGHT: 62 IN | WEIGHT: 267 LBS | BODY MASS INDEX: 49.13 KG/M2 | SYSTOLIC BLOOD PRESSURE: 120 MMHG | HEART RATE: 85 BPM | DIASTOLIC BLOOD PRESSURE: 82 MMHG

## 2022-02-17 DIAGNOSIS — Z01.812 PRE-PROCEDURE LAB EXAM: ICD-10-CM

## 2022-02-17 DIAGNOSIS — N92.6 IRREGULAR MENSES: ICD-10-CM

## 2022-02-17 DIAGNOSIS — N93.9 ABNORMAL UTERINE BLEEDING (AUB): Primary | ICD-10-CM

## 2022-02-17 LAB
CONTROL: NORMAL
PREGNANCY TEST URINE, POC: NEGATIVE

## 2022-02-17 PROCEDURE — G8417 CALC BMI ABV UP PARAM F/U: HCPCS | Performed by: ADVANCED PRACTICE MIDWIFE

## 2022-02-17 PROCEDURE — 81025 URINE PREGNANCY TEST: CPT | Performed by: ADVANCED PRACTICE MIDWIFE

## 2022-02-17 PROCEDURE — G8427 DOCREV CUR MEDS BY ELIG CLIN: HCPCS | Performed by: ADVANCED PRACTICE MIDWIFE

## 2022-02-17 PROCEDURE — G8484 FLU IMMUNIZE NO ADMIN: HCPCS | Performed by: ADVANCED PRACTICE MIDWIFE

## 2022-02-17 PROCEDURE — 1036F TOBACCO NON-USER: CPT | Performed by: ADVANCED PRACTICE MIDWIFE

## 2022-02-17 PROCEDURE — 58100 BIOPSY OF UTERUS LINING: CPT | Performed by: ADVANCED PRACTICE MIDWIFE

## 2022-02-17 RX ORDER — HYDROCHLOROTHIAZIDE 25 MG/1
TABLET ORAL
Qty: 90 TABLET | Refills: 1 | Status: SHIPPED | OUTPATIENT
Start: 2022-02-17 | End: 2022-08-15

## 2022-02-17 RX ORDER — HYDROXYCHLOROQUINE SULFATE 200 MG/1
TABLET, FILM COATED ORAL
COMMUNITY
Start: 2022-02-10

## 2022-02-17 NOTE — PROGRESS NOTES
[facility]  CNM Office Note    Dariel Guerrero is a 39 y.o. female who presents today for her medical conditions/ complaints as noted below. Chief Complaint   Patient presents with    Procedure     EMB due to AUB. Patient had an [address] done on 1-18 and it showed 3 fibroids and possible adenomyosis. HPI  Chela Noguera presents for endobx. She desires surgical consult for extremely heavy painful periods. Impression   1. Thickening of the uterine myometrium with at least 3 leiomyomas, as   described above. 2. Endometrium measures 7.2 mm in double wall thickness. There is a   nabothian cyst in the cervix. 3. The ovaries are sonographically normal. Small amount of free fluid   in the pelvis is likely physiologic. Signed by Dr Field Half       Patient Active Problem List   Diagnosis    Type 2 diabetes mellitus ([address]    Anemia    Familial hypercholesterolemia    Rheumatoid arthritis involving multiple sites with positive rheumatoid factor (HCC)    Fibromyalgia    Bilateral carpal tunnel syndrome    PCOS (polycystic ovarian syndrome)    Iron deficiency anemia    Family history of ovarian cancer    Menorrhagia with regular cycle    Primary hypertension    BiPAP (biphasic positive airway pressure) dependence    SETH (obstructive sleep apnea)    Morbid obesity ([address]    Gastroesophageal reflux disease without esophagitis    Dyspepsia    Esophageal dysphagia       Patient's last menstrual period was 02/05/2022 (approximate).   [id number]    Past Medical History:   Diagnosis Date    Anemia     Chronic rheumatic arthritis ([address]     Elevated blood pressure reading     Fibromyalgia     GERD (gastroesophageal reflux disease)     Hypertension     Intracranial hypertension     Obesity     PCOS (polycystic ovarian syndrome)     Sleep apnea     bi pap    SOB (shortness of breath)     Trouble swallowing     Type 2 diabetes mellitus ([address]      Past Surgical History:   Procedure Laterality Date  BREAST BIOPSY Right 2019    benign    CHOLECYSTECTOMY      UPPER GASTROINTESTINAL ENDOSCOPY N/A 2022    Dr Candi Scruggs-[id number] appearing stricture noted in the distal esophagus, gastritis, no h pylori    UPPER GASTROINTESTINAL ENDOSCOPY  2022    Dr Candi Scruggs-[id number] appearing stricture noted in the distal esophagus, gastritis, no h pylori     Family History   Problem Relation Age of Onset    High Blood Pressure Father     Diabetes Father     Atrial Fibrillation Father     Heart Failure Father     Stroke Father     Diabetes Sister     Diabetes Maternal Grandmother     Heart Attack Other     Ovarian Cancer Paternal Grandmother     Colon Polyps Neg Hx     Crohn's Disease Neg Hx      Social History     Tobacco Use    Smoking status: Former Smoker     Packs/day: 1.00     Years: 2.00     Pack years: 2.00     Types: Cigarettes     Start date: 1996     Quit date: 1998     Years since quittin.1    Smokeless tobacco: Never Used   Substance Use Topics    Alcohol use: Not Currently     Alcohol/week: 1.0 standard drink     Types: 1 Glasses of wine per week     Comment: rarely       Current Outpatient Medications   Medication Sig Dispense Refill    hydroCHLOROthiazide (HYDRODIURIL) 25 MG tablet TAKE 1 TABLET BY MOUTH EVERY DAY IN THE MORNING (Patient taking differently: **as needed**) 90 tablet 1    hydroxychloroquine (PLAQUENIL) 200 MG tablet TAKE 1 TABLET BY MOUTH DAILY FOR 7 DAYS, THEN INCREASE TO 2 TABLETS BY MOUTH DAILY      mupirocin (BACTROBAN) 2 % ointment       losartan (COZAAR) 100 MG tablet TAKE 1 TABLET BY MOUTH EVERY DAY 90 tablet 1    albuterol sulfate HFA (VENTOLIN HFA) 108 (90 Base) MCG/ACT inhaler Inhale 2 puffs into the lungs 4 times daily as needed for Wheezing 18 g 5    ondansetron (ZOFRAN ODT) 4 MG disintegrating tablet Take 1 tablet by mouth every 8 hours as needed for Nausea or Vomiting 15 tablet 0    Norgestim-Eth Estrad Triphasic (TRI-ESTARYLLA) 0.18/0.215/0.25 MG-35 MCG TABS Take 1 packet by mouth daily 84 tablet 3    amLODIPine (NORVASC) 5 MG tablet TAKE 1 TABLET BY MOUTH TWICE A  tablet 1    KLOR-CON M20 20 MEQ extended release tablet TAKE 1 TABLET BY MOUTH EVERY DAY (Patient taking differently: **as needed**) 30 tablet 0    furosemide (LASIX) 20 MG tablet Take 1 tablet by mouth daily for 3 days (Patient taking differently: Take 20 mg by mouth daily **as needed**) 3 tablet 0    omeprazole (PRILOSEC) 40 MG delayed release capsule Take 1 capsule by mouth 2 times daily 60 capsule 5    metFORMIN (GLUCOPHAGE) 500 MG tablet TAKE 1 TABLET BY MOUTH TWICE A DAY WITH MEALS 180 tablet 1    carvedilol (COREG) 6.25 MG tablet TAKE 1 TABLET BY MOUTH TWICE A  tablet 1    valACYclovir (VALTREX) 1 g tablet Take 2 tablets p.o. every 12 hours for 2 doses. May repeat for further outbreaks 8 tablet 2    blood glucose test strips (ASCENSIA AUTODISC VI;ONE TOUCH ULTRA TEST VI) strip 1 each by In Vitro route 3 times daily As needed. 100 each 11    Blood Glucose Monitoring Suppl (FREESTYLE LITE) ANDREEA USE AS DIRECTED      Lancet Device MISC 1 lancet twice a day 60 Device 11     No current facility-administered medications for this visit. Allergies   Allergen Reactions    Mushroom Extract Complex      Vitals:    02/17/22 1542 02/17/22 1610   BP: (!) 143/85 120/82   Site: Left Upper Arm Right Upper Arm   Position: Sitting Sitting   Cuff Size: Medium Adult Large Adult   Pulse: 85    Weight: 267 lb (121.1 kg)    Height: 5' 2\" (1.575 m)      Body mass index is 48.83 kg/m². Review of Systems   Constitutional: Negative. HENT: Negative. Eyes: Negative. Respiratory: Negative. Cardiovascular: Negative. Gastrointestinal: Negative. Endocrine: Negative. Genitourinary: Negative. Musculoskeletal: Negative. Skin: Negative. Allergic/Immunologic: Negative. Neurological: Negative. Hematological: Negative. Psychiatric/Behavioral: Negative. Physical Exam  Constitutional:       Appearance: She is well-developed. HENT:      Head: Normocephalic and atraumatic. Eyes:      Conjunctiva/sclera: Conjunctivae normal.      Pupils: Pupils are equal, round, and reactive to light. Pulmonary:      Effort: Pulmonary effort is normal.   Genitourinary:     General: Normal vulva. Exam position: Lithotomy position. Vagina: Normal. No erythema or lesions. Cervix: No cervical motion tenderness, lesion or erythema. Uterus: Normal. Not tender. Adnexa: Right adnexa normal.        Right: No mass, tenderness or fullness. Left: No mass, tenderness or fullness. Musculoskeletal:         General: Normal range of motion. Cervical back: Normal range of motion. Skin:     General: Skin is warm and dry. Neurological:      Mental Status: She is alert and oriented to person, place, and time. Diagnosis Orders   1. Abnormal uterine bleeding (AUB)  Specimen to Pathology Outpatient    MN BIOPSY OF UTERUS LINING   2. Irregular menses  Specimen to Pathology Outpatient    MN BIOPSY OF UTERUS LINING   3. Pre-procedure lab exam  POCT urine pregnancy       MEDICATIONS:  No orders of the defined types were placed in this encounter. Lety Silver is a 39 y.o.  with history of menorrhagia who presents today for endometrial biopsy. /82 (Site: Right Upper Arm, Position: Sitting, Cuff Size: Large Adult)   Pulse 85   Ht 5' 2\" (1.575 m)   Wt 267 lb (121.1 kg)   LMP 02/05/2022 (Approximate)   Breastfeeding No   BMI 48.83 kg/m²     Endometrial Biopsy Procedure Note    Pre-operative Diagnosis: menorrhagia    Post-operative Diagnosis: normal    Indications: menorrhagia    Procedure Details    Urine pregnancy test was done today and result was neg.   The risks (including infection, bleeding, pain, and uterine perforation) and benefits of the procedure were explained to the patient and Verbal informed consent was obtained. The patient was placed in the dorsal lithotomy position. Speculum inserted in the vagina, and the cervix prepped with povidone iodine. Single tooth tenaculum applied to anterior cervical lip. Uterus was sounded to 8. Pipelle endometrial aspirator was inserted and gentle pressure applied. Adequate tissue sample obtained. Sent for pathology. Pt tolerated well. Condition:  Stable    Complications:  None    Plan:    The patient was advised to call for any fever or for prolonged or severe pain or bleeding. She was advised to use OTC ibuprofen as needed for mild to moderate pain. She was advised to avoid vaginal intercourse for 48 hours or until the bleeding has completely stopped.         ORDERS:  Orders Placed This Encounter   Procedures    Specimen to Pathology Outpatient    Surgical Pathology    POCT urine pregnancy    DE BIOPSY OF UTERUS LINING       PLAN:  1. endobx specimen to pathology, surgical consult requested

## 2022-02-17 NOTE — PATIENT INSTRUCTIONS
Patient Education        Abnormal Uterine Bleeding: Care Instructions  Overview     Abnormal uterine bleeding is irregular bleeding from the uterus. It may be bleeding that is heavier, lighter, or lasts longer than your usual period. Or it may be bleeding that doesn't occur at your regular time. Sometimes it is caused by changes in hormone levels. It can also be caused by growths in the uterus, such as fibroids or polyps. Sometimes a cause cannot be found. You may have bleeding when you are not expecting your period. Your doctor may suggest a pregnancy test.  Follow-up care is a key part of your treatment and safety. Be sure to make and go to all appointments, and call your doctor if you are having problems. It's also a good idea to know your test results and keep a list of the medicines you take. How can you care for yourself at home? · Be safe with medicines. Take pain medicines exactly as directed. ? If the doctor gave you a prescription medicine for pain, take it as prescribed. ? If you are not taking a prescription pain medicine, ask your doctor if you can take an over-the-counter medicine. · You may be low in iron because of blood loss. Eat a balanced diet that is high in iron and vitamin C. Foods rich in iron include red meat, shellfish, eggs, beans, and leafy green vegetables. Talk to your doctor about whether you need to take iron pills or a multivitamin. When should you call for help? Call 911 anytime you think you may need emergency care. For example, call if:    · You passed out (lost consciousness). Call your doctor now or seek immediate medical care if:    · You have new or worse belly or pelvic pain.     · You have severe vaginal bleeding.     · You feel dizzy or lightheaded, or you feel like you may faint.    Watch closely for changes in your health, and be sure to contact your doctor if:    · You think you may be pregnant.     · Your bleeding gets worse.     · You do not get better as expected. Where can you learn more? Go to https://chpepiceweb.healthDials. org and sign in to your Celltrix account. Enter A215 in the PlayerTakesAllhire box to learn more about \"Abnormal Uterine Bleeding: Care Instructions. \"     If you do not have an account, please click on the \"Sign Up Now\" link. Current as of: February 11, 2021               Content Version: 13.1  © 2006-2021 [employer]. Care instructions adapted under license by [facility] ([facility]). If you have questions about a medical condition or this instruction, always ask your healthcare professional. [address] any warranty or liability for your use of this information. Patient Education        Endometrial Biopsy: About This Test  What is it? An endometrial biopsy is a way for your doctor to take a small sample of the lining of the uterus (endometrium). The sample is looked at under a microscope for abnormal cells. An endometrial biopsy helps your doctor find problems in the endometrium. Why is this test done? An endometrial biopsy is done to check for cancer of the uterus. The test is also done if you have abnormal bleeding from your uterus. The test results show how your body's hormones are affecting the lining of the uterus, such as during menopause. How do you prepare for the test?  · If you take aspirin or some other blood thinner, ask your doctor if you should stop taking it before your test. Make sure that you understand exactly what your doctor wants you to do. These medicines increase the risk of bleeding. · Ask your doctor if you should take a pain reliever, such as ibuprofen (Advil or Motrin), 30 to 60 minutes before the test. This can help reduce any cramping pain that the test can cause. How is the test done? · You will lie on an exam table. Your feet will be in stirrups. · The doctor may use a spray or injection to numb your cervix.  The cervix is the opening to the uterus. · The doctor will use a tool called a speculum to see the cervix. · Then the doctor will pass a thin tube through the cervix to take a sample of the uterus lining. · The sample is sent to a lab. How long does the test take? The test will take about 5 to 15 minutes. What happens after the test?  · You will probably be able to go home right away. · You likely will have mild vaginal bleeding and may have cramps for a few days after the test. The cramps may feel like bad menstrual cramps. How can you care for yourself at home? · Ask your doctor if you can take an over-the-counter pain medicine, such as acetaminophen (Tylenol), ibuprofen (Advil, Motrin), or naproxen (Aleve). Be safe with medicines. Read and follow all instructions on the label. · Use pads for vaginal bleeding or discharge. · You may return to all your usual activities (including sex) when you feel like it. Follow-up care is a key part of your treatment and safety. Be sure to make and go to all appointments, and call your doctor if you are having problems. It's also a good idea to keep a list of the medicines you take. Ask your doctor when you can expect to have your test results. Where can you learn more? Go to https://CodewisepeShave Club.Yi De. org and sign in to your AgenTec account. Enter [phone] in the [facility] box to learn more about \"Endometrial Biopsy: About This Test.\"     If you do not have an account, please click on the \"Sign Up Now\" link. Current as of: February 11, 2021               Content Version: 13.1  © 8165-4462 [employer]. Care instructions adapted under license by [facility] [employer] [facility] ([facility]). If you have questions about a medical condition or this instruction, always ask your healthcare professional. [address]  any warranty or liability for your use of this information.        Patient Education        Uterine Fibroids: Care Instructions  Overview     Uterine fibroids are growths in the uterus. Fibroids aren't cancer. Doctors don't know what causes fibroids. But they are more common as you age, especially from your 35s and 45s and through menopause. After menopause, fibroids often shrink and go away. Fibroids can grow on the inside of the uterus, in the muscle wall of the uterus, or near the outside wall of the uterus. Fibroids can cause painful cramps and heavy periods. In these cases, an intrauterine device (IUD) can help decrease symptoms. It can also help to take anti-inflammatory medicines or hormone birth control. Sometimes surgery is needed to treat fibroids. But if you are near menopause, you may want to wait and see if your symptoms get better. Follow-up care is a key part of your treatment and safety. Be sure to make and go to all appointments, and call your doctor if you are having problems. It's also a good idea to know your test results and keep a list of the medicines you take. How can you care for yourself at home? · If your doctor gave you medicine, take it as exactly as prescribed. Be safe with medicines. Call your doctor if you think you are having a problem with your medicine. · Take anti-inflammatory medicines for pain. These include ibuprofen and naproxen. Read and follow all instructions on the label. · Use heat, such as a hot water bottle or a heating pad set on low, or a warm bath to relax tense muscles and relieve cramping. Put a thin cloth between the heating pad and your skin. Never go to sleep with a heating pad on. · Lie down and put a pillow under your knees. Or lie on your side and bring your knees up to your chest. These positions may help relieve belly pain or pressure. · Keep track of how many sanitary pads or tampons you use each day. · Get at least 30 minutes of exercise on most days of the week. Walking is a good choice. You also may want to do other activities, such as running, swimming, cycling, or playing tennis or team sports.   · If you bleed longer than usual or have heavy bleeding, take a daily multivitamin with iron. When should you call for help? Call your doctor now or seek immediate medical care if:    · You have severe vaginal bleeding.     · You have new or worse belly or pelvic pain. Watch closely for changes in your health, and be sure to contact your doctor if:    · You have unusual vaginal bleeding.     · You do not get better as expected. Where can you learn more? Go to https://BeavExpeThe [employer] Group [employer] Holdingseb.Vico Software. org and sign in to your [employer]Medix Inc. account. Enter B121 in the [employer] box to learn more about \"Uterine Fibroids: Care Instructions. \"     If you do not have an account, please click on the \"Sign Up Now\" link. Current as of: February 11, 2021               Content Version: 13.1  © 9295-6198 Healthwise, Incorporated. Care instructions adapted under license by [facility] ([facility]). If you have questions about a medical condition or this instruction, always ask your healthcare professional. [place] [address] any warranty or liability for your use of this information.

## 2022-02-18 ENCOUNTER — TELEMEDICINE (OUTPATIENT)
Dept: GASTROENTEROLOGY | Age: 42
End: 2022-02-18
Payer: MEDICAID

## 2022-02-18 DIAGNOSIS — K29.50 MILD CHRONIC GASTRITIS: ICD-10-CM

## 2022-02-18 DIAGNOSIS — I10 ESSENTIAL (PRIMARY) HYPERTENSION: ICD-10-CM

## 2022-02-18 DIAGNOSIS — K21.9 GASTROESOPHAGEAL REFLUX DISEASE, UNSPECIFIED WHETHER ESOPHAGITIS PRESENT: ICD-10-CM

## 2022-02-18 DIAGNOSIS — K22.2 ESOPHAGEAL STRICTURE: Primary | ICD-10-CM

## 2022-02-18 PROCEDURE — G8417 CALC BMI ABV UP PARAM F/U: HCPCS | Performed by: NURSE PRACTITIONER

## 2022-02-18 PROCEDURE — G8484 FLU IMMUNIZE NO ADMIN: HCPCS | Performed by: NURSE PRACTITIONER

## 2022-02-18 PROCEDURE — 99214 OFFICE O/P EST MOD 30 MIN: CPT | Performed by: NURSE PRACTITIONER

## 2022-02-18 PROCEDURE — 1036F TOBACCO NON-USER: CPT | Performed by: NURSE PRACTITIONER

## 2022-02-18 PROCEDURE — G8428 CUR MEDS NOT DOCUMENT: HCPCS | Performed by: NURSE PRACTITIONER

## 2022-02-18 ASSESSMENT — ENCOUNTER SYMPTOMS
NAUSEA: 0
ANAL BLEEDING: 0
TROUBLE SWALLOWING: 0
DIARRHEA: 0
COUGH: 0
SHORTNESS OF BREATH: 0
BLOOD IN STOOL: 0
RECTAL PAIN: 0
ABDOMINAL PAIN: 0
ABDOMINAL DISTENTION: 0
VOMITING: 0
CONSTIPATION: 0
CHOKING: 0

## 2022-02-18 NOTE — PROGRESS NOTES
2022    TELEHEALTH EVALUATION -- Audio/Visual (During TGVZU-19 public health emergency)    HPI:    Agustín Gonzalez (:  1980) has requested an audio/video evaluation for the following concern(s):  Chief Complaint   Patient presents with    Follow-up       Pt seen today for follow up after EGD on 2022 for c/o dysphagia and uncontrolled reflux. Today, she reports swallowing has improved. However, as the day progresses, reflux continues. \"I have the breathing thing. \"  \"I can feel acid coming up. \"  She is trying to identify trigger foods and not eating meat at night. Symptoms are worse at night. She is taking Omeprazole 40 mg in the am, instead of BID. EGD Findings 2022:   Esophagus: abnormal: benign appearing stricture noted in the distal esophagus. A guidewire was placed through the endoscope and the endoscope was removed. A 54 Serbian savory dilator was advanced down the length of the esophagus used a fixed-point wire technique. The dilator and guidewire were removed. The patient tolerated the procedure well. There is no hiatal hernia present. Stomach:  abnormal: mild mucosal changes suggestive of gastritis noted -  Gastric biopsies were taken from the antrum and body to rule out Helicobacter pylori infection. Duodenum: normal  IMPRESSION:  1. Esophageal dysphagia - dilated   2. Gastritis   RECOMMENDATIONS:    1. Await path results, the patient will be contacted in 7-10 days with biopsy results. 2.  Continue PPI twice daily as prescribed   3. Follow up with [facility] in 6 weeks to discuss symptoms. I would encourage lifestyle/diet modifications to help with reflux symptoms. FINAL DIAGNOSIS:   Gastric biopsies:   Neck superficial gastritis. Immunohistochemical stain for H.  Pylori is negative. Review of Systems   Constitutional: Negative for activity change, appetite change, fatigue, fever and unexpected weight change. HENT: Negative for trouble swallowing. Respiratory: Negative for cough, choking and shortness of breath. Cardiovascular: Negative for chest pain. Gastrointestinal: Negative for abdominal distention, abdominal pain, anal bleeding, blood in stool, constipation, diarrhea, nausea, rectal pain and vomiting. Reflux    Allergic/Immunologic: Negative for food allergies. All other systems reviewed and are negative. Prior to Visit Medications    Medication Sig Taking?  Authorizing Provider   hydroCHLOROthiazide (HYDRODIURIL) 25 MG tablet TAKE 1 TABLET BY MOUTH EVERY DAY IN THE MORNING  Patient taking differently: **as needed**  NATACHA Dailey   hydroxychloroquine (PLAQUENIL) 200 MG tablet TAKE 1 TABLET BY MOUTH DAILY FOR 7 DAYS, THEN INCREASE TO 2 TABLETS BY MOUTH DAILY  Historical Provider, MD   mupirocin (BACTROBAN) 2 % ointment   Historical Provider, MD   losartan (COZAAR) 100 MG tablet TAKE 1 TABLET BY MOUTH EVERY DAY  Jone Ballard MD   albuterol sulfate HFA (VENTOLIN HFA) 108 (90 Base) MCG/ACT inhaler Inhale 2 puffs into the lungs 4 times daily as needed for Wheezing  Jone Ballard MD   ondansetron (ZOFRAN ODT) 4 MG disintegrating tablet Take 1 tablet by mouth every 8 hours as needed for Nausea or Vomiting  NATACHA Rossi   Norgestim-Eth Coolin Plant Triphasic (TRI-ESTARYLLA) 0.18/0.215/0.25 MG-35 MCG TABS Take 1 packet by mouth daily  NATACHA Moon - RUDDY   amLODIPine (NORVASC) 5 MG tablet TAKE 1 TABLET BY MOUTH TWICE A DAY  NATACHA Sharpe   KLOR-CON M20 20 MEQ extended release tablet TAKE 1 TABLET BY MOUTH EVERY DAY  Patient taking differently: **as needed**  Jone Ballard MD   furosemide (LASIX) 20 MG tablet Take 1 tablet by mouth daily for 3 days  Patient taking differently: Take 20 mg by mouth daily **as needed**  NATACHA Francisco   omeprazole (PRILOSEC) 40 MG delayed release capsule Take 1 capsule by mouth 2 times daily  Ángela Fagan MD   metFORMIN (GLUCOPHAGE) 500 MG tablet TAKE 1 TABLET BY MOUTH TWICE A DAY WITH MEALS  Bong Baron MD   carvedilol (COREG) 6.25 MG tablet TAKE 1 TABLET BY MOUTH TWICE A DAY  Pantera Pascal MD   cloNIDine (CATAPRES) 0.1 MG tablet Twice a day as needed if blood pressure >180/100  Bong Baron MD   blood glucose test strips (ASCENSIA AUTODISC VI;ONE TOUCH ULTRA TEST VI) strip 1 each by In Vitro route 3 times daily As needed. Bong Baron MD   valACYclovir (VALTREX) 1 g tablet Take 2 tablets p.o. every 12 hours for 2 doses.   May repeat for further outbreaks  Bong Baron MD   Blood Glucose Monitoring Suppl (FREESTYLE LITE) ANDREEA USE AS DIRECTED  Historical Provider, MD   Lancet Device MISC 1 lancet twice a day  Bong Baron MD       Social History     Tobacco Use    Smoking status: Former Smoker     Packs/day: 1.00     Years: 2.00     Pack years: 2.00     Types: Cigarettes     Start date: 1996     Quit date: 1998     Years since quittin.1    Smokeless tobacco: Never Used   Vaping Use    Vaping Use: Never used   Substance Use Topics    Alcohol use: Not Currently     Alcohol/week: 1.0 standard drink     Types: 1 Glasses of wine per week     Comment: rarely    Drug use: No        Allergies   Allergen Reactions    Mushroom Extract Complex    ,   Past Medical History:   Diagnosis Date    Anemia     Chronic rheumatic arthritis ([address]     Elevated blood pressure reading     Fibromyalgia     GERD (gastroesophageal reflux disease)     Hypertension     Intracranial hypertension     Obesity     PCOS (polycystic ovarian syndrome)     Sleep apnea     bi pap    SOB (shortness of breath)     Trouble swallowing     Type 2 diabetes mellitus ([address]    ,   Past Surgical History:   Procedure Laterality Date    BREAST BIOPSY Right 2019    benign    CHOLECYSTECTOMY      UPPER GASTROINTESTINAL ENDOSCOPY N/A 2022    Dr Chi [id number] appearing stricture noted in the distal esophagus, gastritis, no h pylori    UPPER GASTROINTESTINAL ENDOSCOPY  2022    Dr Clive Ganser [id number] appearing stricture noted in the distal esophagus, gastritis, no h pylori   ,   Social History     Tobacco Use    Smoking status: Former Smoker     Packs/day: 1.00     Years: 2.00     Pack years: 2.00     Types: Cigarettes     Start date: 1996     Quit date: 1998     Years since quittin.1    Smokeless tobacco: Never Used   Vaping Use    Vaping Use: Never used   Substance Use Topics    Alcohol use: Not Currently     Alcohol/week: 1.0 standard drink     Types: 1 Glasses of wine per week     Comment: rarely    Drug use: No   ,   Family History   Problem Relation Age of Onset    High Blood Pressure Father     Diabetes Father     Atrial Fibrillation Father     Heart Failure Father     Stroke Father     Diabetes Sister     Diabetes Maternal Grandmother     Heart Attack Other     Ovarian Cancer Paternal Grandmother     Colon Polyps Neg Hx     Crohn's Disease Neg Hx        PHYSICAL EXAMINATION:  [ INSTRUCTIONS:  \"[x]\" Indicates a positive item  \"[]\" Indicates a negative item  -- DELETE ALL ITEMS NOT EXAMINED]  Vital Signs: (As obtained by patient/caregiver or practitioner observation)    Blood pressure-  Heart rate-    Respiratory rate-    Temperature-  Pulse oximetry-     Constitutional: [x] Appears well-developed and well-nourished [x] No apparent distress      [] Abnormal-   Mental status  [x] Alert and awake  [x] Oriented to person/place/time [x]Able to follow commands      Eyes:  EOM    [x]  Normal  [] Abnormal-  Sclera  [x]  Normal  [] Abnormal -         Discharge [x]  None visible  [] Abnormal -    HENT:   [x] Normocephalic, atraumatic.   [] Abnormal   [x] Mouth/Throat: Mucous membranes are moist.     External Ears [x] Normal  [] Abnormal-     Neck: [x] No visualized mass     Pulmonary/Chest: [x] Respiratory effort normal.  [x] No visualized signs of difficulty breathing or respiratory distress        [] Abnormal-      Musculoskeletal:   [x] Normal gait with no signs of ataxia         [x] Normal range of motion of neck        [] Abnormal-       Neurological:        [x] No Facial Asymmetry (Cranial nerve 7 motor function) (limited exam to video visit)          [x] No gaze palsy        [] Abnormal-         Skin:        [x] No significant exanthematous lesions or discoloration noted on facial skin         [] Abnormal-            Psychiatric:       [x] Normal Affect [x] No Hallucinations        [] Abnormal-     Other pertinent observable physical exam findings-     ASSESSMENT/PLAN:  1. Esophageal stricture  2. Mild chronic gastritis  3. Gastroesophageal reflux disease, unspecified whether esophagitis present      - Increase Omeprazole 40 mg to BID x 3 months  - May use Pepcid OTC BID prn  - Keep food journal to help identify trigger foods  - Follow up in 3 months or sooner if needed    Return in about 3 months (around 5/18/2022). Yesi Dueñas, was evaluated through a synchronous (real-time) audio-video encounter. The patient (or guardian if applicable) is aware that this is a billable service, which includes applicable co-pays. This Virtual Visit was conducted with patient's (and/or legal guardian's) consent. The visit was conducted pursuant to the emergency declaration under the [address] [place] [address] waiver authority and the [employer] General Act. Patient identification was verified, and a caregiver was present when appropriate. The patient was located at home in a state where the provider was licensed to provide care. Total time spent on this encounter: Not billed by time    --NATACHA Weesm NP on 2/18/2022 at 12:16 PM    An electronic signature was used to authenticate this note.

## 2022-02-18 NOTE — PATIENT INSTRUCTIONS
Patient Education        Gastroesophageal Reflux Disease (GERD): Care Instructions  Overview     Gastroesophageal reflux disease (GERD) is the backward flow of stomach acid into the esophagus. The esophagus is the tube that leads from your throat to your stomach. A one-way valve prevents the stomach acid from backing up into this tube. But when you have GERD, this valve does not close tightly enough. This can also cause pain and swelling in your esophagus. (This is called esophagitis.)  If you have mild GERD symptoms including heartburn, you may be able to control the problem with antacids or over-the-counter medicine. You can also make lifestyle changes to help reduce your symptoms. These include changing your diet and eating habits, such as not eating late at night and losing weight. Follow-up care is a key part of your treatment and safety. Be sure to make and go to all appointments, and call your doctor if you are having problems. It's also a good idea to know your test results and keep a list of the medicines you take. How can you care for yourself at home? · Take your medicines exactly as prescribed. Call your doctor if you think you are having a problem with your medicine. · Your doctor may recommend over-the-counter medicine. For mild or occasional indigestion, antacids, such as Tums, Gaviscon, Mylanta, or Maalox, may help. Your doctor also may recommend over-the-counter acid reducers, such as famotidine (Pepcid AC), cimetidine (Tagamet HB), or omeprazole (Prilosec). Read and follow all instructions on the label. If you use these medicines often, talk with your doctor. · Change your eating habits. ? It's best to eat several small meals instead of two or three large meals. ? After you eat, wait 2 to 3 hours before you lie down. ? Avoid foods that make your symptoms worse.  These may include chocolate, mint, alcohol, pepper, spicy foods, high-fat foods, or drinks with caffeine in them, such as tea, coffee, patricia, or energy drinks. If your symptoms are worse after you eat a certain food, you may want to stop eating it to see if your symptoms get better. · Do not smoke or chew tobacco. Smoking can make GERD worse. If you need help quitting, talk to your doctor about stop-smoking programs and medicines. These can increase your chances of quitting for good. · If you have GERD symptoms at night, raise the head of your bed 6 to 8 inches by putting the frame on blocks or placing a foam wedge under the head of your mattress. (Adding extra pillows does not work.)  · Do not wear tight clothing around your middle. · Lose weight if you need to. Losing just 5 to 10 pounds can help. When should you call for help? Call your doctor now or seek immediate medical care if:    · You have new or different belly pain.     · Your stools are black and tarlike or have streaks of blood. Watch closely for changes in your health, and be sure to contact your doctor if:    · Your symptoms have not improved after 2 days.     · Food seems to catch in your throat or chest.   Where can you learn more? Go to https://DataMarket.Twined. org and sign in to your Kivra account. Enter V154 in the [facility] box to learn more about \"Gastroesophageal Reflux Disease (GERD): Care Instructions. \"     If you do not have an account, please click on the \"Sign Up Now\" link. Current as of: September 8, 2021               Content Version: 13.1  © 2006-2021 Healthwise, Incorporated. Care instructions adapted under license by [facility] ([facility]). If you have questions about a medical condition or this instruction, always ask your healthcare professional. [place] [address] any warranty or liability for your use of this information.

## 2022-02-20 RX ORDER — CARVEDILOL 3.12 MG/1
TABLET ORAL
Qty: 180 TABLET | Refills: 1 | OUTPATIENT
Start: 2022-02-20

## 2022-02-21 ENCOUNTER — TELEPHONE (OUTPATIENT)
Dept: GASTROENTEROLOGY | Age: 42
End: 2022-02-21

## 2022-02-21 NOTE — TELEPHONE ENCOUNTER
Last visit [address] [employer] [facility] aprn as V V 2-18-22/ Egd/Dil per  2-1-22. FU as V V scheduled 5-20-22. Patient sent email requesting [address] [employer] [facility] send in a RF for her Omeprazole 40 mg Bid x 3 months to [facility]. The last RF was per another Provider dated 12-15-21 # 60 x 5 refills but according to the patient this Rx was never received by [facility] and she is out of medication. Patient wants [facility] [employer] aprn to update for her. I will send this as a PE first for [address] [employer] [facility] aprn to review.  Benedicto cma

## 2022-02-22 RX ORDER — OMEPRAZOLE 40 MG/1
40 CAPSULE, DELAYED RELEASE ORAL 2 TIMES DAILY
Qty: 60 CAPSULE | Refills: 3 | Status: SHIPPED | OUTPATIENT
Start: 2022-02-22 | End: 2022-05-19 | Stop reason: DRUGHIGH

## 2022-03-15 ENCOUNTER — OFFICE VISIT (OUTPATIENT)
Dept: PULMONOLOGY | Age: 42
End: 2022-03-15
Payer: MEDICAID

## 2022-03-15 VITALS
DIASTOLIC BLOOD PRESSURE: 82 MMHG | WEIGHT: 267.4 LBS | HEART RATE: 86 BPM | HEIGHT: 62 IN | OXYGEN SATURATION: 100 % | BODY MASS INDEX: 49.21 KG/M2 | SYSTOLIC BLOOD PRESSURE: 124 MMHG

## 2022-03-15 DIAGNOSIS — J98.4 RESTRICTIVE LUNG DISEASE: ICD-10-CM

## 2022-03-15 DIAGNOSIS — G47.33 OSA (OBSTRUCTIVE SLEEP APNEA): Primary | ICD-10-CM

## 2022-03-15 DIAGNOSIS — E66.01 MORBID OBESITY (HCC): ICD-10-CM

## 2022-03-15 DIAGNOSIS — K21.9 GASTROESOPHAGEAL REFLUX DISEASE WITHOUT ESOPHAGITIS: ICD-10-CM

## 2022-03-15 DIAGNOSIS — I10 PRIMARY HYPERTENSION: ICD-10-CM

## 2022-03-15 PROCEDURE — G8484 FLU IMMUNIZE NO ADMIN: HCPCS | Performed by: INTERNAL MEDICINE

## 2022-03-15 PROCEDURE — G8427 DOCREV CUR MEDS BY ELIG CLIN: HCPCS | Performed by: INTERNAL MEDICINE

## 2022-03-15 PROCEDURE — G8417 CALC BMI ABV UP PARAM F/U: HCPCS | Performed by: INTERNAL MEDICINE

## 2022-03-15 PROCEDURE — 99214 OFFICE O/P EST MOD 30 MIN: CPT | Performed by: INTERNAL MEDICINE

## 2022-03-15 PROCEDURE — 1036F TOBACCO NON-USER: CPT | Performed by: INTERNAL MEDICINE

## 2022-03-15 RX ORDER — BUSPIRONE HYDROCHLORIDE 5 MG/1
TABLET ORAL PRN
COMMUNITY
Start: 2022-03-06 | End: 2022-04-25

## 2022-03-15 ASSESSMENT — ENCOUNTER SYMPTOMS
CHEST TIGHTNESS: 0
SHORTNESS OF BREATH: 1
WHEEZING: 0
RHINORRHEA: 0
ABDOMINAL PAIN: 0
BACK PAIN: 0
COUGH: 0
APNEA: 1
ANAL BLEEDING: 0
ABDOMINAL DISTENTION: 0

## 2022-03-15 NOTE — PROGRESS NOTES
Pulmonary and Sleep Medicine    Wilman Brown (:  1980) is a 39 y.o. female,Established patient, here for evaluation of the following chief complaint(s):  Follow-up (Pt came in today for a follow up. She states that she is doing well with the bipap.)      Referring physician:  No referring provider defined for this encounter. ASSESSMENT/PLAN:  1. SETH (obstructive sleep apnea) BiPAP. 2. Morbid obesity ([address]  3. Gastroesophageal reflux disease without esophagitis  4. Primary hypertension  5. Restrictive lung disease  -     Full PFT Study With Bronchodilator; Future        Continue current management with the BiPAP she is compliant with the BiPAP she feels it helps. We will repeat her pulmonary function study in 6 months to assess stability of the findings. Most likely restriction is secondary to her body habitus. Darby Turner [facility], [facility]    Return in about 6 months (around 9/15/2022). SUBJECTIVE/OBJECTIVE:  The patient is here for follow-up. She is trying to lose weight. She says she lost about 50 pounds as of yet she is using her BiPAP she is compliant with the BiPAP she feels it helps her symptoms. She did have pulmonary function test that showed restrictive lung disease that is mild. Her SAÚL was negative in the past.  Her sedimentation rate was also normal.  She is needed with Plaquenil for rheumatoid arthritis. Chest x-ray done recently did not show any significant abnormalities. Diffusing capacity on the pulmonary function study was normal.      Prior to Visit Medications    Medication Sig Taking?  Authorizing Provider   busPIRone (BUSPAR) 5 MG tablet  Yes Historical Provider, MD   omeprazole (PRILOSEC) 40 MG delayed release capsule Take 1 capsule by mouth 2 times daily Yes NATACHA Chaves NP   hydroCHLOROthiazide (HYDRODIURIL) 25 MG tablet TAKE 1 TABLET BY MOUTH EVERY DAY IN THE MORNING  Patient taking differently: **as needed** Yes NATACHA Parra hydroxychloroquine (PLAQUENIL) 200 MG tablet TAKE 1 TABLET BY MOUTH DAILY FOR 7 DAYS, THEN INCREASE TO 2 TABLETS BY MOUTH DAILY Yes Historical Provider, MD   mupirocin (BACTROBAN) 2 % ointment  Yes Historical Provider, MD   losartan (COZAAR) 100 MG tablet TAKE 1 TABLET BY MOUTH EVERY DAY Yes Shelly Bobby MD   albuterol sulfate HFA (VENTOLIN HFA) 108 (90 Base) MCG/ACT inhaler Inhale 2 puffs into the lungs 4 times daily as needed for Wheezing Yes Shelly Bobby MD   ondansetron (ZOFRAN ODT) 4 MG disintegrating tablet Take 1 tablet by mouth every 8 hours as needed for Nausea or Vomiting Yes NATACHA Coronado   Norgestim-Eth Estrad Triphasic (TRI-ESTARYLLA) 0.18/0.215/0.25 MG-35 MCG TABS Take 1 packet by mouth daily Yes NATACHA Bahena - CNAPURVA   amLODIPine (NORVASC) 5 MG tablet TAKE 1 TABLET BY MOUTH TWICE A DAY Yes NATACHA Sharpe   KLOR-CON M20 20 MEQ extended release tablet TAKE 1 TABLET BY MOUTH EVERY DAY  Patient taking differently: **as needed** Yes Shelly Bobby MD   metFORMIN (GLUCOPHAGE) 500 MG tablet TAKE 1 TABLET BY MOUTH TWICE A DAY WITH MEALS Yes Shelly Bobby MD   carvedilol (COREG) 6.25 MG tablet TAKE 1 Mila Harper MD   blood glucose test strips (ASCENSIA AUTODISC VI;ONE TOUCH ULTRA TEST VI) strip 1 each by In Vitro route 3 times daily As needed. Yes Shelly Bobby MD   valACYclovir (VALTREX) 1 g tablet Take 2 tablets p.o. every 12 hours for 2 doses.   May repeat for further outbreaks Yes Shelly Bobby MD   Blood Glucose Monitoring Suppl (FREESTYLE LITE) ANDREEA USE AS DIRECTED Yes Historical Provider, MD   Lancet Device MISC 1 lancet twice a day Yes Shelly Bobby MD   furosemide (LASIX) 20 MG tablet Take 1 tablet by mouth daily for 3 days  Patient taking differently: Take 20 mg by mouth daily **as needed**  NATACHA Francisco   cloNIDine (CATAPRES) 0.1 MG tablet Twice a day as needed if blood pressure >180/100  Shelly Bobby may have occurred. An electronic signature was used to authenticate this note.     --Ant Mccallum MD

## 2022-04-11 ENCOUNTER — APPOINTMENT (OUTPATIENT)
Dept: GENERAL RADIOLOGY | Age: 42
End: 2022-04-11
Payer: MEDICAID

## 2022-04-11 ENCOUNTER — HOSPITAL ENCOUNTER (EMERGENCY)
Age: 42
Discharge: HOME | End: 2022-04-12
Attending: EMERGENCY MEDICINE
Payer: MEDICAID

## 2022-04-11 DIAGNOSIS — R42 DIZZINESS: Primary | ICD-10-CM

## 2022-04-11 DIAGNOSIS — R06.89 DYSPNEA AND RESPIRATORY ABNORMALITIES: ICD-10-CM

## 2022-04-11 DIAGNOSIS — R06.00 DYSPNEA AND RESPIRATORY ABNORMALITIES: ICD-10-CM

## 2022-04-11 LAB
ALBUMIN SERPL-MCNC: 4.3 G/DL (ref 3.5–5.2)
ALP BLD-CCNC: 75 U/L (ref 35–104)
ALT SERPL-CCNC: 10 U/L (ref 5–33)
ANION GAP SERPL CALCULATED.3IONS-SCNC: 14 MMOL/L (ref 7–19)
AST SERPL-CCNC: 13 U/L (ref 5–32)
BASOPHILS ABSOLUTE: 0.1 K/UL (ref 0–0.2)
BASOPHILS RELATIVE PERCENT: 1 % (ref 0–1)
BILIRUB SERPL-MCNC: <0.2 MG/DL (ref 0.2–1.2)
BUN BLDV-MCNC: 14 MG/DL (ref 6–20)
CALCIUM SERPL-MCNC: 9.4 MG/DL (ref 8.6–10)
CHLORIDE BLD-SCNC: 98 MMOL/L (ref 98–111)
CO2: 21 MMOL/L (ref 22–29)
CREAT SERPL-MCNC: 0.9 MG/DL (ref 0.5–0.9)
EOSINOPHILS ABSOLUTE: 0.3 K/UL (ref 0–0.6)
EOSINOPHILS RELATIVE PERCENT: 2.9 % (ref 0–5)
GFR AFRICAN AMERICAN: >59
GFR NON-AFRICAN AMERICAN: >60
GLUCOSE BLD-MCNC: 126 MG/DL (ref 74–109)
HCT VFR BLD CALC: 34.9 % (ref 37–47)
HEMOGLOBIN: 10.3 G/DL (ref 12–16)
IMMATURE GRANULOCYTES #: 0 K/UL
LYMPHOCYTES ABSOLUTE: 3.3 K/UL (ref 1.1–4.5)
LYMPHOCYTES RELATIVE PERCENT: 36.7 % (ref 20–40)
MCH RBC QN AUTO: 22 PG (ref 27–31)
MCHC RBC AUTO-ENTMCNC: 29.5 G/DL (ref 33–37)
MCV RBC AUTO: 74.6 FL (ref 81–99)
MONOCYTES ABSOLUTE: 0.7 K/UL (ref 0–0.9)
MONOCYTES RELATIVE PERCENT: 7.8 % (ref 0–10)
NEUTROPHILS ABSOLUTE: 4.6 K/UL (ref 1.5–7.5)
NEUTROPHILS RELATIVE PERCENT: 51.4 % (ref 50–65)
PDW BLD-RTO: 16.1 % (ref 11.5–14.5)
PLATELET # BLD: 340 K/UL (ref 130–400)
PMV BLD AUTO: 10.4 FL (ref 9.4–12.3)
POTASSIUM REFLEX MAGNESIUM: 3.6 MMOL/L (ref 3.5–5)
RBC # BLD: 4.68 M/UL (ref 4.2–5.4)
SODIUM BLD-SCNC: 133 MMOL/L (ref 136–145)
TOTAL PROTEIN: 7.6 G/DL (ref 6.6–8.7)
TROPONIN: <0.01 NG/ML (ref 0–0.03)
WBC # BLD: 8.9 K/UL (ref 4.8–10.8)

## 2022-04-11 PROCEDURE — 6370000000 HC RX 637 (ALT 250 FOR IP): Performed by: NURSE PRACTITIONER

## 2022-04-11 PROCEDURE — 99284 EMERGENCY DEPT VISIT MOD MDM: CPT

## 2022-04-11 PROCEDURE — 83880 ASSAY OF NATRIURETIC PEPTIDE: CPT

## 2022-04-11 PROCEDURE — 84484 ASSAY OF TROPONIN QUANT: CPT

## 2022-04-11 PROCEDURE — 85025 COMPLETE CBC W/AUTO DIFF WBC: CPT

## 2022-04-11 PROCEDURE — 71045 X-RAY EXAM CHEST 1 VIEW: CPT

## 2022-04-11 PROCEDURE — 85379 FIBRIN DEGRADATION QUANT: CPT

## 2022-04-11 PROCEDURE — 96360 HYDRATION IV INFUSION INIT: CPT

## 2022-04-11 PROCEDURE — 36415 COLL VENOUS BLD VENIPUNCTURE: CPT

## 2022-04-11 PROCEDURE — 2580000003 HC RX 258: Performed by: NURSE PRACTITIONER

## 2022-04-11 PROCEDURE — 80053 COMPREHEN METABOLIC PANEL: CPT

## 2022-04-11 PROCEDURE — 93005 ELECTROCARDIOGRAM TRACING: CPT | Performed by: NURSE PRACTITIONER

## 2022-04-11 RX ORDER — 0.9 % SODIUM CHLORIDE 0.9 %
1000 INTRAVENOUS SOLUTION INTRAVENOUS ONCE
Status: COMPLETED | OUTPATIENT
Start: 2022-04-11 | End: 2022-04-12

## 2022-04-11 RX ORDER — CLONIDINE HYDROCHLORIDE 0.1 MG/1
0.1 TABLET ORAL ONCE
Status: COMPLETED | OUTPATIENT
Start: 2022-04-11 | End: 2022-04-11

## 2022-04-11 RX ADMIN — LIDOCAINE HYDROCHLORIDE: 20 SOLUTION ORAL; TOPICAL at 23:28

## 2022-04-11 RX ADMIN — SODIUM CHLORIDE 1000 ML: 9 INJECTION, SOLUTION INTRAVENOUS at 23:28

## 2022-04-11 RX ADMIN — CLONIDINE HYDROCHLORIDE 0.1 MG: 0.1 TABLET ORAL at 23:28

## 2022-04-12 ENCOUNTER — TELEPHONE (OUTPATIENT)
Dept: GASTROENTEROLOGY | Age: 42
End: 2022-04-12

## 2022-04-12 ENCOUNTER — APPOINTMENT (OUTPATIENT)
Dept: CT IMAGING | Age: 42
End: 2022-04-12
Payer: MEDICAID

## 2022-04-12 VITALS
TEMPERATURE: 97.7 F | HEART RATE: 72 BPM | SYSTOLIC BLOOD PRESSURE: 130 MMHG | WEIGHT: 260 LBS | DIASTOLIC BLOOD PRESSURE: 75 MMHG | BODY MASS INDEX: 47.55 KG/M2 | OXYGEN SATURATION: 96 % | RESPIRATION RATE: 21 BRPM

## 2022-04-12 LAB
D DIMER: 0.74 UG/ML FEU (ref 0–0.48)
EKG P AXIS: 68 DEGREES
EKG P-R INTERVAL: 166 MS
EKG Q-T INTERVAL: 314 MS
EKG QRS DURATION: 86 MS
EKG QTC CALCULATION (BAZETT): 408 MS
EKG T AXIS: 55 DEGREES
PRO-BNP: 305 PG/ML (ref 0–450)
SARS-COV-2, NAAT: NOT DETECTED

## 2022-04-12 PROCEDURE — 87635 SARS-COV-2 COVID-19 AMP PRB: CPT

## 2022-04-12 PROCEDURE — 93010 ELECTROCARDIOGRAM REPORT: CPT | Performed by: INTERNAL MEDICINE

## 2022-04-12 PROCEDURE — 71275 CT ANGIOGRAPHY CHEST: CPT

## 2022-04-12 PROCEDURE — 6360000004 HC RX CONTRAST MEDICATION: Performed by: NURSE PRACTITIONER

## 2022-04-12 RX ADMIN — IOPAMIDOL 90 ML: 755 INJECTION, SOLUTION INTRAVENOUS at 01:21

## 2022-04-12 ASSESSMENT — ENCOUNTER SYMPTOMS
SHORTNESS OF BREATH: 1
WHEEZING: 0
ABDOMINAL PAIN: 0

## 2022-04-12 NOTE — TELEPHONE ENCOUNTER
Pt was seen in the ED, but they referred her to see her PCP, Dr Rosamond Scheuermann. She didn't have a GI consult and was in there for dizziness and trouble breathing? I can set up an apt since shes established if she is having stomach issues/wants an apt. I'll call the pt.

## 2022-04-12 NOTE — ED PROVIDER NOTES
[facility] EMERGENCY DEPT  eMERGENCY dEPARTMENT eNCOUnter      Pt Name: Anders Perales  MRN: [id number]  [place] 1980  Date of evaluation: 4/11/2022  Provider: Vivien Casarez [address]       Chief Complaint   Patient presents with    Dizziness    Shortness of Breath         HISTORY OF PRESENT ILLNESS   (Location/Symptom, Timing/Onset,Context/Setting, Quality, Duration, Modifying Factors, Severity)  Note limiting factors. Anders Perales is a 39 y.o. female who presents to the emergency department with palpitations and shortness of breath that started suddenly while she was talking. Dizziness like she might pass out. Left sided chest pressure. No headache. Pt's bp is elevated. She says she has taken her bp meds today     I was available for consultation in real time in the emergency room for this patient who was seen primarily by the midlevel provider and provided clinical advice as necessary. I have reviewed the midlevel providers documentation and agree with the evaluation, treatment and disposition as documented in the chart. The history is provided by the patient. Shortness of Breath  Severity:  Moderate  Onset quality:  Sudden  Duration:  2 hours  Timing:  Constant  Progression:  Unchanged  Chronicity:  New  Associated symptoms: no abdominal pain, no diaphoresis, no fever, no headaches and no wheezing    Risk factors: obesity    Risk factors: no hx of PE/DVT        NursingNotes were reviewed. REVIEW OF SYSTEMS    (2-9 systems for level 4, 10 or more for level 5)     Review of Systems   Constitutional: Negative for diaphoresis and fever. Respiratory: Positive for shortness of breath. Negative for wheezing. Gastrointestinal: Negative for abdominal pain. Neurological: Negative for headaches. Except as noted above the remainder of the review of systems was reviewed and negative.        PAST MEDICAL HISTORY     Past Medical History:   Diagnosis Date    Anemia     Chronic rheumatic arthritis [address] [address]     Elevated blood pressure reading     Fibromyalgia     GERD (gastroesophageal reflux disease)     Hypertension     Intracranial hypertension     Obesity     PCOS (polycystic ovarian syndrome)     Sleep apnea     bi pap    SOB (shortness of breath)     Trouble swallowing     Type 2 diabetes mellitus ([facility] [address]          SURGICALHISTORY       Past Surgical History:   Procedure Laterality Date    BREAST BIOPSY Right 2019    benign    CHOLECYSTECTOMY      UPPER GASTROINTESTINAL ENDOSCOPY N/A 02/01/2022    Dr Abhishek Scruggs-w/[id number] appearing stricture noted in the distal esophagus, gastritis, no h pylori    UPPER GASTROINTESTINAL ENDOSCOPY  02/01/2022    Dr Abhishek Scruggs-w/[id number] appearing stricture noted in the distal esophagus, gastritis, no h pylori         CURRENT MEDICATIONS       Discharge Medication List as of 4/12/2022  3:46 AM      CONTINUE these medications which have NOT CHANGED    Details   busPIRone (BUSPAR) 5 MG tablet Historical Med      omeprazole (PRILOSEC) 40 MG delayed release capsule Take 1 capsule by mouth 2 times daily, Disp-60 capsule, R-3Normal      hydroCHLOROthiazide (HYDRODIURIL) 25 MG tablet TAKE 1 TABLET BY MOUTH EVERY DAY IN THE MORNING, Disp-90 tablet, R-1Normal      hydroxychloroquine (PLAQUENIL) 200 MG tablet TAKE 1 TABLET BY MOUTH DAILY FOR 7 DAYS, THEN INCREASE TO 2 TABLETS BY MOUTH DAILYHistorical Med      mupirocin (BACTROBAN) 2 % ointment Historical Med      losartan (COZAAR) 100 MG tablet TAKE 1 TABLET BY MOUTH EVERY DAY, Disp-90 tablet, R-1REFILLSNormal      albuterol sulfate HFA (VENTOLIN HFA) 108 (90 Base) MCG/ACT inhaler Inhale 2 puffs into the lungs 4 times daily as needed for Wheezing, Disp-18 g, R-5Normal      ondansetron (ZOFRAN ODT) 4 MG disintegrating tablet Take 1 tablet by mouth every 8 hours as needed for Nausea or Vomiting, Disp-15 tablet, R-0Normal      Norgestim-Eth Estrad Triphasic (TRI-ESTARYLLA) 0.18/0.215/0.25 MG-35 MCG TABS Take 1 packet by mouth daily, Disp-84 tablet, R-3Normal      amLODIPine (NORVASC) 5 MG tablet TAKE 1 TABLET BY MOUTH TWICE A DAY, Disp-180 tablet, R-1Normal      KLOR-CON M20 20 MEQ extended release tablet TAKE 1 TABLET BY MOUTH EVERY DAY, Disp-30 tablet, R-0Normal      furosemide (LASIX) 20 MG tablet Take 1 tablet by mouth daily for 3 days, Disp-3 tablet, R-0Normal      metFORMIN (GLUCOPHAGE) 500 MG tablet TAKE 1 TABLET BY MOUTH TWICE A DAY WITH MEALS, Disp-180 tablet, R-1Normal      carvedilol (COREG) 6.25 MG tablet TAKE 1 TABLET BY MOUTH TWICE A DAY, Disp-180 tablet, R-1Normal      blood glucose test strips (ASCENSIA AUTODISC VI;ONE TOUCH ULTRA TEST VI) strip 3 TIMES DAILY Starting 2021, Disp-100 each, R-11, NormalAs needed. valACYclovir (VALTREX) 1 g tablet Take 2 tablets p.o. every 12 hours for 2 doses.   May repeat for further outbreaks, Disp-8 tablet, R-2Normal      Blood Glucose Monitoring Suppl (FREESTYLE LITE) ANDREEA Historical Med      Lancet Device MISC Disp-60 Device,R-11, Normal1 lancet twice a day             ALLERGIES     Mushroom extract complex    FAMILY HISTORY       Family History   Problem Relation Age of Onset    High Blood Pressure Father     Diabetes Father     Atrial Fibrillation Father     Heart Failure Father     Stroke Father     Diabetes Sister     Diabetes Maternal Grandmother     Heart Attack Other     Ovarian Cancer Paternal Grandmother     Colon Polyps Neg Hx     Crohn's Disease Neg Hx           SOCIAL HISTORY       Social History     Socioeconomic History    Marital status: Single     Spouse name: None    Number of children: None    Years of education: None    Highest education level: None   Occupational History    None   Tobacco Use    Smoking status: Former Smoker     Packs/day: 1.00     Years: 2.00     Pack years: 2.00     Types: Cigarettes     Start date: 1996     Quit date: 1998     Years since quittin.2    Smokeless tobacco: Never Used Vaping Use    Vaping Use: Never used   Substance and Sexual Activity    Alcohol use: Not Currently     Alcohol/week: 1.0 standard drink     Types: 1 Glasses of wine per week     Comment: rarely    Drug use: No    Sexual activity: Yes     Partners: Male     Birth control/protection: OCP   Other Topics Concern    None   Social History Narrative    None     Social Determinants of Health     Financial Resource Strain: Low Risk     Difficulty of Paying Living Expenses: Not hard at all   Food Insecurity: No Food Insecurity    Worried About Running Out of Food in the Last Year: Never true    Sonali of Food in the Last Year: Never true   Transportation Needs:     Lack of Transportation (Medical): Not on file    Lack of Transportation (Non-Medical):  Not on file   Physical Activity:     Days of Exercise per Week: Not on file    Minutes of Exercise per Session: Not on file   Stress:     Feeling of Stress : Not on file   Social Connections:     Frequency of Communication with Friends and Family: Not on file    Frequency of Social Gatherings with Friends and Family: Not on file    Attends [employer] Services: Not on file    Active Member of [address] [place] [address] or Organizations: Not on file    Attends Club or Organization Meetings: Not on file    Marital Status: Not on file   Intimate Partner Violence:     Fear of Current or Ex-Partner: Not on file    Emotionally Abused: Not on file    Physically Abused: Not on file    Sexually Abused: Not on file   Housing Stability:     Unable to Pay for Housing in the Last Year: Not on file    Number of [place] in the Last Year: Not on file    Unstable Housing in the Last Year: Not on file       SCREENINGS    [place] Coma Scale  Eye Opening: Spontaneous  Best Verbal Response: Oriented  Best Motor Response: Obeys commands  [place] Coma Scale Score: 15 @FLOW([id number])@      PHYSICAL EXAM    (up to 7 for level 4, 8 or more for level 5)     ED Triage Vitals [04/11/22 2200]   BP Temp Temp Source Pulse Resp SpO2 Height Weight   (!) 171/129 97.7 °F (36.5 °C) Oral 107 20 99 % -- 260 lb (117.9 kg)       Physical Exam  Vitals and nursing note reviewed. Constitutional:       Appearance: She is well-developed. She is obese. HENT:      Head: Normocephalic and atraumatic. Eyes:      General: No scleral icterus. Right eye: No discharge. Left eye: No discharge. Cardiovascular:      Rate and Rhythm: Regular rhythm. Tachycardia present. Pulmonary:      Effort: Pulmonary effort is normal. No respiratory distress. Breath sounds: Normal breath sounds. Musculoskeletal:      Cervical back: Normal range of motion and neck supple. Skin:     General: Skin is warm and dry. Neurological:      General: No focal deficit present. Mental Status: She is alert. Psychiatric:         Behavior: Behavior normal.         DIAGNOSTIC RESULTS     EKG: All EKG's are interpreted by the Emergency Department Physician who either signs or Co-signsthis chart in the absence of a cardiologist.  115 sinus tach no T wave abnormalities read at 2230 by Dr. Blake Nose:   Bishop Cordial such as CT, Ultrasound and MRI are read by the radiologist. Plain radiographic images are visualized and preliminarily interpreted by the emergency physician with the below findings:      Interpretation per the Radiologist below, if available at the time of this note:    CTA PULMONARY W CONTRAST   Final Result   1. No evidence of pulmonary embolism. 2. The lungs are clear. 3. Numerous, Nonspecific, mediastinal, axillary and supraclavicular   lymph nodes which are not significantly enlarged. The etiology and   clinical significance is not certain. The above study was initially reviewed and reported by [facility]. I do   not find any critical discrepancies. Signed by Dr Addy Abraham   Final Result   1. No active cardiopulmonary disease.    Signed by Dr Griffin No mis-transcribed.)    NATACHA Campa (electronically signed)         NATACHA Campa  04/12/22 Joy Tena MD  04/13/22 [id number]

## 2022-04-12 NOTE — PROGRESS NOTES
[facility] PHYSICIAN SERVICES  [facility] FAMILY MEDICINE  [address] [address] [place] [address]  Dept: [phone]  Dept Fax: [phone]  Loc: [phone]    Antonio Killian is a 39 y.o. female who presents today for her medical conditions/complaints as noted below. Antonio Killian is here for ED Follow-up        HPI:   CC: Here today to discuss the following:    She is of emergency department on April 11 for \"dizziness\" and shortness of breath. She felt like she may pass out. She was also experiencing left-sided chest pressure. No headaches. She is also having occasional palpitations. D-dimer was positive and a CT angiogram was done which showed no evidence of pulmonary embolism. There is no clear etiology for her current symptoms. She was given IV fluids and a GI cocktail states she was feeling better. Hypertension  Compliant with medications. No adverse effects from medication. No lightheadedness, palpitations, or chest pain. She continues to have issues with shortness of breath and a dry hacking cough. This is been a chronic issue. She had a 2D echocardiogram done July 2021 showed a normal ejection fraction but some evidence of LVH. No significant valvular disease. Pulmonary function testing showed mild restrictive lung disease. She continues to be followed by rheumatology for history of rheumatoid arthritis. She is currently on Plaquenil with only minimal improvement. She has been unable to tolerate other medications. HPI    Subjective:      Review of Systems   Constitutional: Negative for chills and fever. HENT: Negative for congestion. Respiratory: Positive for shortness of breath. Negative for cough and chest tightness. Cardiovascular: Negative for chest pain, palpitations and leg swelling. Gastrointestinal: Positive for nausea. Negative for abdominal pain, anal bleeding, constipation and diarrhea. Genitourinary: Negative for difficulty urinating. Psychiatric/Behavioral: Negative. SeeHPI for visit specific review of symptoms. All others negative      Objective:   /82   Pulse 72   Temp 98.1 °F (36.7 °C)   Ht 5' 2\" (1.575 m)   Wt 258 lb (117 kg)   LMP 04/07/2022   SpO2 99%   BMI 47.19 kg/m²   Physical Exam  Constitutional:       Appearance: She is well-developed. She is not ill-appearing. Eyes:      Pupils: Pupils are equal, round, and reactive to light. Cardiovascular:      Rate and Rhythm: Normal rate and regular rhythm. Heart sounds: No murmur heard. No friction rub. Pulmonary:      Effort: Pulmonary effort is normal. No respiratory distress. Breath sounds: Normal breath sounds. No wheezing or rales. Abdominal:      General: Bowel sounds are normal. There is no distension. Palpations: Abdomen is soft. Tenderness: There is no abdominal tenderness. There is no guarding or rebound. Musculoskeletal:      Cervical back: Normal range of motion and neck supple. Neurological:      Mental Status: She is alert.    Psychiatric:         Behavior: Behavior normal.           Recent Results (from the past 672 hour(s))   EKG 12 Lead    Collection Time: 04/11/22 10:11 PM   Result Value Ref Range    P-R Interval 166 ms    QRS Duration 86 ms    Q-T Interval 314 ms    QTc Calculation (Bazett) 408 ms    P Axis 68 degrees    T Axis 55 degrees   CBC with Auto Differential    Collection Time: 04/11/22 10:17 PM   Result Value Ref Range    WBC 8.9 4.8 - 10.8 K/uL    RBC 4.68 4.20 - 5.40 M/uL    Hemoglobin 10.3 (L) 12.0 - 16.0 g/dL    Hematocrit 34.9 (L) 37.0 - 47.0 %    MCV 74.6 (L) 81.0 - 99.0 fL    MCH 22.0 (L) 27.0 - 31.0 pg    MCHC 29.5 (L) 33.0 - 37.0 g/dL    RDW 16.1 (H) 11.5 - 14.5 %    Platelets 380 771 - 483 K/uL    MPV 10.4 9.4 - 12.3 fL    Neutrophils % 51.4 50.0 - 65.0 %    Lymphocytes % 36.7 20.0 - 40.0 %    Monocytes % 7.8 0.0 - 10.0 %    Eosinophils % 2.9 0.0 - 5.0 %    Basophils % 1.0 0.0 - 1.0 %    Neutrophils Absolute 4.6 1.5 - 7.5 K/uL    Immature Granulocytes # 0.0 K/uL    Lymphocytes Absolute 3.3 1.1 - 4.5 K/uL    Monocytes Absolute 0.70 0.00 - 0.90 K/uL    Eosinophils Absolute 0.30 0.00 - 0.60 K/uL    Basophils Absolute 0.10 0.00 - 0.20 K/uL   Comprehensive Metabolic Panel w/ Reflex to MG    Collection Time: 04/11/22 10:17 PM   Result Value Ref Range    Sodium 133 (L) 136 - 145 mmol/L    Potassium reflex Magnesium 3.6 3.5 - 5.0 mmol/L    Chloride 98 98 - 111 mmol/L    CO2 21 (L) 22 - 29 mmol/L    Anion Gap 14 7 - 19 mmol/L    Glucose 126 (H) 74 - 109 mg/dL    BUN 14 6 - 20 mg/dL    CREATININE 0.9 0.5 - 0.9 mg/dL    GFR Non-African American >60 >60    GFR African American >59 >59    Calcium 9.4 8.6 - 10.0 mg/dL    Total Protein 7.6 6.6 - 8.7 g/dL    Albumin 4.3 3.5 - 5.2 g/dL    Total Bilirubin <0.2 0.2 - 1.2 mg/dL    Alkaline Phosphatase 75 35 - 104 U/L    ALT 10 5 - 33 U/L    AST 13 5 - 32 U/L   Troponin    Collection Time: 04/11/22 10:17 PM   Result Value Ref Range    Troponin <0.01 0.00 - 0.03 ng/mL   D-Dimer, Quantitative    Collection Time: 04/11/22 10:17 PM   Result Value Ref Range    D-Dimer, Quant 0.74 (H) 0.00 - 0.48 ug/mL FEU   Brain Natriuretic Peptide    Collection Time: 04/11/22 10:17 PM   Result Value Ref Range    Pro- 0 - 450 pg/mL   COVID-19, Rapid    Collection Time: 04/12/22  1:00 AM    Specimen: Nasopharyngeal Swab   Result Value Ref Range    SARS-CoV-2, NAAT Not Detected Not Detected       Summary   Structurally normal mitral valve with normal leaflet mobility. No evidence   of mitral valve stenosis or mild mitral regurgitation. Aortic valve appears to be tricuspid. Structurally normal aortic valve. No significant aortic regurgitation or stenosis is noted. Tricuspid valve is structurally normal.   No evidence of tricuspid regurgitation. The pulmonic valve was not well visualized.    LAe   Normal left ventricular size with preserved LV function and an estimated   ejection fraction of approximately 55-60%. LVH   No evidence of left ventricular mass or thrombus noted. Normal right atrial dimension with no evidence of thrombus or mass noted. Normal right ventricular size with preserved RV function. No evidence of significant pericardial effusion is noted. Aortic root is within normal limits. Signature      ----------------------------------------------------------------   Electronically signed by Sivan Perdomo MD(Interpreting   physician) on 07/31/2021 11:16 AM   ----------------------------------------------------------------    Narrative & Impression    114 BPM  Sinus tachycardia  Comparison Summary: No significant change  Summary: Normal ECG except for rate  Compared with:1/7/2022 9:00 PM;       No radiation information found for this patient  Narrative   Examination. CTA PULMONARY W CONTRAST 4/12/2022 1:14 AM   History: Shortness of breath. DLP: 705 mGycm. The automated exposure control was utilized to   minimize the patient's radiation exposure. The CT angiography of the chest is performed after intravenous   contrast enhancement. The images are acquired in axial plane and   subsequent 2-D reconstruction coronal and sagittal planes and 3-D   maximum intensity projection reconstruction. There is no previous similar study for comparison. The correlation   made with chest radiograph dated 4/11/2022. There is normal opacification of pulmonary arteries and the branches   bilaterally. No filling defects in the visualized/opacified pulmonary   arterial bed. There is normal thoracic aorta. The limited visualized coronary arteries are normal.   The RV/LV ratio is 39/51 which is normal. No finding to suggest right   heart strain. There is residual thymus is seen. There are a few nonspecific mediastinal lymph nodes, particularly at   level 5. None of the lymph nodes are larger than 1 cm in short axis.    There are nonspecific nonenlarged multiple small axillary lymph nodes   bilaterally, right more than the left. The etiology and clinical   significance is not certain. The limited visualized soft tissues of the neck are unremarkable. There are borderline prominent right supra clavicular lymph nodes. It   measures 1.1 cm in short axis. The etiology and clinical significance   is not certain. Gland isn't completely included in the study and not well evaluated. The lungs are poorly expanded. No infiltrate or consolidation. No pulmonary mass/nodules. The tracheobronchial structures are normal and patent. The limited visualized liver and spleen are unremarkable. The adrenal   glands are normal. The gallbladder is surgically absent. The pancreas   and kidneys are incompletely included in the study and not evaluated. The images reviewed in bone window show no acute bony abnormality or a   bone lesion. Chronic degenerative changes of the thoracic spine are   noted.       Impression   1. No evidence of pulmonary embolism. 2. The lungs are clear. 3. Numerous, Nonspecific, mediastinal, axillary and supraclavicular   lymph nodes which are not significantly enlarged. The etiology and   clinical significance is not certain. The above study was initially reviewed and reported by [facility]. I do   not find any critical discrepancies. Signed by Dr Karina Epstein: The following diagnoses and conditions are stable with no further orders unless indicated:  1. Essential (primary) hypertension  BP Readings from Last 3 Encounters:   04/14/22 138/82   04/12/22 130/75   03/15/22 124/82     Blood pressure is borderline today but has been better controlled lately. Continue with amlodipine 5 mg daily  Carvedilol 6.25 mg twice daily  Hydrochlorothiazide 25 mg  Losartan 100 mg daily    2.  Type 2 diabetes mellitus without complication, without long-term current use of insulin ([facility])  Lab Results   Component Value Date    LABA1C 6.6 (H) 10/05/2021 LABA1C 6.5 (H) 07/31/2021    LABA1C 6.5 05/24/2021     Lab Results   Component Value Date    LABMICR 3.70 10/05/2021    LDLCALC 94 10/05/2021    CREATININE 0.9 04/11/2022     Continue with Metformin  Discussed lifestyle changes such as diet and exercise. Recommended eliminate processed food from diet such as sugar and fried foods. Recommended exercising at least 150 minutes/week. Try to do full body resistance training twice a week as well. Offered suggestions for calorie counting such as phone apps and online resources such as My fitness pal and Lose it. Discussed healthy weight. 3. Gastroesophageal reflux disease without esophagitis  Continue with omeprazole    4. BiPAP (biphasic positive airway pressure) dependence  Compliant and satisfied with her BiPAP    5. PCOS (polycystic ovarian syndrome)  Encouraged weight loss  Continue with Metformin    6. Rheumatoid arthritis involving multiple sites with positive rheumatoid factor ([facility]  Continues to see rheumatology. 7. Dyspnea, unspecified type  8. Thoracic lymphadenopathy  Would like to get opinion from pulmonology  Review CT findings of potential thoracic lymphadenopathy  Potentially needs evaluated for sarcoidosis  - Av. Michael Mcclain MD, Pulmonary Disease, [facility]    9. Anemia:  Lab Results   Component Value Date    HGB 10.3 (L) 04/11/2022     Would like to recheck her CBC iron studies and B12 she denies any hematochezia melena or hematemesis      Return in about 1 month (around 5/14/2022) for Routine follow up - 15 minute visit. Discussed use, benefit, and side effects of prescribed medications. All patient questions answered. Pt voiced understanding. Reviewed health maintenance. Instructedto continue current medications, diet and exercise. Patient agreed with treatmentplan.  Follow up as directed.     _______________________________________________________________      Past Medical History:   Diagnosis Date    Anemia     Chronic rheumatic arthritis (HCC)     Elevated blood pressure reading     Fibromyalgia     GERD (gastroesophageal reflux disease)     Hypertension     Intracranial hypertension     Obesity     PCOS (polycystic ovarian syndrome)     Sleep apnea     bi pap    SOB (shortness of breath)     Trouble swallowing     Type 2 diabetes mellitus ([address]       Past Surgical History:   Procedure Laterality Date    BREAST BIOPSY Right 2019    benign    CHOLECYSTECTOMY      UPPER GASTROINTESTINAL ENDOSCOPY N/A 2022    Dr Ale [id number] appearing stricture noted in the distal esophagus, gastritis, no h pylori    UPPER GASTROINTESTINAL ENDOSCOPY  2022    Dr lAe [id number] appearing stricture noted in the distal esophagus, gastritis, no h pylori       Family History   Problem Relation Age of Onset    High Blood Pressure Father     Diabetes Father     Atrial Fibrillation Father     Heart Failure Father     Stroke Father     Diabetes Sister     Diabetes Maternal Grandmother     Heart Attack Other     Ovarian Cancer Paternal Grandmother     Colon Polyps Neg Hx     Crohn's Disease Neg Hx     Esophageal Cancer Neg Hx     Liver Cancer Neg Hx     Rectal Cancer Neg Hx     Stomach Cancer Neg Hx        Social History     Tobacco Use    Smoking status: Former Smoker     Years: 2.00     Types: Cigarettes     Start date: 1996     Quit date: 1998     Years since quittin.3    Smokeless tobacco: Never Used   Substance Use Topics    Alcohol use:  Yes     Alcohol/week: 1.0 standard drink     Types: 1 Glasses of wine per week     Comment: rarely     Current Outpatient Medications   Medication Sig Dispense Refill    busPIRone (BUSPAR) 5 MG tablet as needed       omeprazole (PRILOSEC) 40 MG delayed release capsule Take 1 capsule by mouth 2 times daily 60 capsule 3    hydroCHLOROthiazide (HYDRODIURIL) 25 MG tablet TAKE 1 TABLET BY MOUTH EVERY DAY IN THE MORNING (Patient taking differently: as needed **as needed**) 90 tablet 1    hydroxychloroquine (PLAQUENIL) 200 MG tablet TAKE 1 TABLET BY MOUTH DAILY FOR 7 DAYS, THEN INCREASE TO 2 TABLETS BY MOUTH DAILY      mupirocin (BACTROBAN) 2 % ointment as needed       losartan (COZAAR) 100 MG tablet TAKE 1 TABLET BY MOUTH EVERY DAY 90 tablet 1    albuterol sulfate HFA (VENTOLIN HFA) 108 (90 Base) MCG/ACT inhaler Inhale 2 puffs into the lungs 4 times daily as needed for Wheezing 18 g 5    ondansetron (ZOFRAN ODT) 4 MG disintegrating tablet Take 1 tablet by mouth every 8 hours as needed for Nausea or Vomiting 15 tablet 0    Norgestim-Eth Estrad Triphasic (TRI-ESTARYLLA) 0.18/0.215/0.25 MG-35 MCG TABS Take 1 packet by mouth daily 84 tablet 3    amLODIPine (NORVASC) 5 MG tablet TAKE 1 TABLET BY MOUTH TWICE A  tablet 1    KLOR-CON M20 20 MEQ extended release tablet TAKE 1 TABLET BY MOUTH EVERY DAY (Patient taking differently: as needed **as needed**) 30 tablet 0    furosemide (LASIX) 20 MG tablet Take 1 tablet by mouth daily for 3 days (Patient taking differently: Take 20 mg by mouth as needed **as needed**) 3 tablet 0    metFORMIN (GLUCOPHAGE) 500 MG tablet TAKE 1 TABLET BY MOUTH TWICE A DAY WITH MEALS 180 tablet 1    carvedilol (COREG) 6.25 MG tablet TAKE 1 TABLET BY MOUTH TWICE A  tablet 1    blood glucose test strips (ASCENSIA AUTODISC VI;ONE TOUCH ULTRA TEST VI) strip 1 each by In Vitro route 3 times daily As needed. 100 each 11    valACYclovir (VALTREX) 1 g tablet Take 2 tablets p.o. every 12 hours for 2 doses. May repeat for further outbreaks (Patient taking differently: as needed ) 8 tablet 2    Blood Glucose Monitoring Suppl (FREESTYLE LITE) ANDREEA USE AS DIRECTED      Lancet Device MISC 1 lancet twice a day 60 Device 11    predniSONE (DELTASONE) 5 MG tablet daily       No current facility-administered medications for this visit.      Allergies   Allergen Reactions    Mushroom Extract Complex        Health Maintenance   Topic Date Due    Varicella vaccine (1 of 2 - 2-dose childhood series) Never done    Pneumococcal 0-64 years Vaccine (1 - PCV) Never done    Diabetic retinal exam  Never done    Hepatitis B vaccine (2 of 3 - Hep B Twinrix risk 3-dose series) 01/27/2014    Diabetic foot exam  06/26/2020    COVID-19 Vaccine (3 - Booster for Pfizer series) 03/12/2022    Flu vaccine (Season Ended) 09/01/2022    A1C test (Diabetic or Prediabetic)  10/05/2022    Diabetic microalbuminuria test  10/05/2022    Lipid screen  10/05/2022    Potassium monitoring  04/11/2023    Creatinine monitoring  04/11/2023    Depression Screen  04/14/2023    Cervical cancer screen  12/22/2025    DTaP/Tdap/Td vaccine (2 - Td or Tdap) 04/25/2027    Hepatitis C screen  Completed    HIV screen  Completed    Hepatitis A vaccine  Aged Out    Hib vaccine  Aged Out    Meningococcal (ACWY) vaccine  Aged Out       _______________________________________________________________    Note dictated using [id number] [facility]  Sometimes this dictation software makes erroneous transcriptions.

## 2022-04-12 NOTE — TELEPHONE ENCOUNTER
Patient is requesting for the office to return her call in regards a hospital follow up. Patient states she was seen at [id number] [facility] on 4/12 for stomach issues. Please return call to patient to schedule. Thank you!

## 2022-04-12 NOTE — ED PROVIDER NOTES
[facility] ATTENDING PHYSICAL SUPERVISORY NOTE    I independently examined and evaluated Aldo Ennis, a 39 y.o. female  After she was seen and treated by the NP  Staci Cash who reports that the patient presents to the ED for evaluatim of dizziness and shortness of breath. In brief their history revealed she had a brief bout of sudden onset of lightheadedness; shortness of breath tachypnea; and tachycardia. She reports has had several of these episodes over the past few weeks. No fever. No cough. No respiratory distress. No nausea and vomiting. Exercise did not provoke her symptoms. She denies diaphoresis or chest pain. Reported symptomatic relief after getting GI cocktail and IV fluids which also resolved her tachycardia but she still felt short of breath. I recommended adding a D-dimer and a BNP. D-dimer turned out to be positive. So CTA was done I was to follow-up on that study. She does have underlying medical history of hypertension; anxiety; reflux disease; fibromyalgia; type 2 diabetes; positive SAÚL rheumatoid arthritis. She is currently taking Plaquenil. FOCUSED PHYSICAL EXAM      ED TRIAGE VITALS  BP: 130/75, Temp: 97.7 °F (36.5 °C), Pulse: 72, Resp: 21, SpO2: 96 %    GENERAL APPEARANCE: Awake and alert. Cooperative. No acute distress. HEAD: Normocephalic. Atraumatic. EYES: PERRL. EOM's grossly intact. ENT: Mucous membranes are moist.   NECK: Supple. Normal ROM. CHEST: Equal symmetric chest rise. LUNGS: Breathing is unlabored. Speaking comfortably in full sentences. EXTREMITIES: MAEE. No acute deformities. All extremities neurovascularly intact. SKIN: Warm and dry. NEUROLOGICAL: Alert and oriented. Normal coordination. Gait normal. Strength is 5/5 in all extremities and sensation is intact.         RESULTS  Labs  Results for orders placed or performed during the hospital encounter of 04/11/22   COVID-19, Rapid    Specimen: Nasopharyngeal Swab   Result Value Ref Range    SARS-CoV-2, NAAT Not Detected Not Detected   CBC with Auto Differential   Result Value Ref Range    WBC 8.9 4.8 - 10.8 K/uL    RBC 4.68 4.20 - 5.40 M/uL    Hemoglobin 10.3 (L) 12.0 - 16.0 g/dL    Hematocrit 34.9 (L) 37.0 - 47.0 %    MCV 74.6 (L) 81.0 - 99.0 fL    MCH 22.0 (L) 27.0 - 31.0 pg    MCHC 29.5 (L) 33.0 - 37.0 g/dL    RDW 16.1 (H) 11.5 - 14.5 %    Platelets 954 354 - 524 K/uL    MPV 10.4 9.4 - 12.3 fL    Neutrophils % 51.4 50.0 - 65.0 %    Lymphocytes % 36.7 20.0 - 40.0 %    Monocytes % 7.8 0.0 - 10.0 %    Eosinophils % 2.9 0.0 - 5.0 %    Basophils % 1.0 0.0 - 1.0 %    Neutrophils Absolute 4.6 1.5 - 7.5 K/uL    Immature Granulocytes # 0.0 K/uL    Lymphocytes Absolute 3.3 1.1 - 4.5 K/uL    Monocytes Absolute 0.70 0.00 - 0.90 K/uL    Eosinophils Absolute 0.30 0.00 - 0.60 K/uL    Basophils Absolute 0.10 0.00 - 0.20 K/uL   Comprehensive Metabolic Panel w/ Reflex to MG   Result Value Ref Range    Sodium 133 (L) 136 - 145 mmol/L    Potassium reflex Magnesium 3.6 3.5 - 5.0 mmol/L    Chloride 98 98 - 111 mmol/L    CO2 21 (L) 22 - 29 mmol/L    Anion Gap 14 7 - 19 mmol/L    Glucose 126 (H) 74 - 109 mg/dL    BUN 14 6 - 20 mg/dL    CREATININE 0.9 0.5 - 0.9 mg/dL    GFR Non-African American >60 >60    GFR African American >59 >59    Calcium 9.4 8.6 - 10.0 mg/dL    Total Protein 7.6 6.6 - 8.7 g/dL    Albumin 4.3 3.5 - 5.2 g/dL    Total Bilirubin <0.2 0.2 - 1.2 mg/dL    Alkaline Phosphatase 75 35 - 104 U/L    ALT 10 5 - 33 U/L    AST 13 5 - 32 U/L   Troponin   Result Value Ref Range    Troponin <0.01 0.00 - 0.03 ng/mL   D-Dimer, Quantitative   Result Value Ref Range    D-Dimer, Quant 0.74 (H) 0.00 - 0.48 ug/mL FEU   Brain Natriuretic Peptide   Result Value Ref Range    Pro- 0 - 450 pg/mL       Radiology      X-RAYS:  I have reviewed radiologic plain film image(s). ALL OTHER NON-PLAIN FILM IMAGES SUCH AS CT, ULTRASOUND AND MRI HAVE BEEN READ BY THE RADIOLOGIST.   XR CHEST PORTABLE    (Results Pending)   CTA PULMONARY W CONTRAST

## 2022-04-14 ENCOUNTER — OFFICE VISIT (OUTPATIENT)
Dept: FAMILY MEDICINE CLINIC | Age: 42
End: 2022-04-14
Payer: MEDICAID

## 2022-04-14 VITALS
HEIGHT: 62 IN | HEART RATE: 72 BPM | WEIGHT: 258 LBS | TEMPERATURE: 98.1 F | SYSTOLIC BLOOD PRESSURE: 138 MMHG | BODY MASS INDEX: 47.48 KG/M2 | OXYGEN SATURATION: 99 % | DIASTOLIC BLOOD PRESSURE: 82 MMHG

## 2022-04-14 DIAGNOSIS — E28.2 PCOS (POLYCYSTIC OVARIAN SYNDROME): ICD-10-CM

## 2022-04-14 DIAGNOSIS — I10 ESSENTIAL (PRIMARY) HYPERTENSION: Primary | ICD-10-CM

## 2022-04-14 DIAGNOSIS — M05.79 RHEUMATOID ARTHRITIS INVOLVING MULTIPLE SITES WITH POSITIVE RHEUMATOID FACTOR (HCC): ICD-10-CM

## 2022-04-14 DIAGNOSIS — Z99.89 BIPAP (BIPHASIC POSITIVE AIRWAY PRESSURE) DEPENDENCE: ICD-10-CM

## 2022-04-14 DIAGNOSIS — E11.9 TYPE 2 DIABETES MELLITUS WITHOUT COMPLICATION, WITHOUT LONG-TERM CURRENT USE OF INSULIN (HCC): ICD-10-CM

## 2022-04-14 DIAGNOSIS — R59.0 THORACIC LYMPHADENOPATHY: ICD-10-CM

## 2022-04-14 DIAGNOSIS — K21.9 GASTROESOPHAGEAL REFLUX DISEASE WITHOUT ESOPHAGITIS: ICD-10-CM

## 2022-04-14 DIAGNOSIS — R06.00 DYSPNEA, UNSPECIFIED TYPE: ICD-10-CM

## 2022-04-14 DIAGNOSIS — D64.9 ANEMIA, UNSPECIFIED TYPE: ICD-10-CM

## 2022-04-14 PROCEDURE — 3046F HEMOGLOBIN A1C LEVEL >9.0%: CPT | Performed by: FAMILY MEDICINE

## 2022-04-14 PROCEDURE — G8417 CALC BMI ABV UP PARAM F/U: HCPCS | Performed by: FAMILY MEDICINE

## 2022-04-14 PROCEDURE — 99214 OFFICE O/P EST MOD 30 MIN: CPT | Performed by: FAMILY MEDICINE

## 2022-04-14 PROCEDURE — G8427 DOCREV CUR MEDS BY ELIG CLIN: HCPCS | Performed by: FAMILY MEDICINE

## 2022-04-14 PROCEDURE — 1036F TOBACCO NON-USER: CPT | Performed by: FAMILY MEDICINE

## 2022-04-14 PROCEDURE — 2022F DILAT RTA XM EVC RTNOPTHY: CPT | Performed by: FAMILY MEDICINE

## 2022-04-14 ASSESSMENT — PATIENT HEALTH QUESTIONNAIRE - PHQ9
SUM OF ALL RESPONSES TO PHQ QUESTIONS 1-9: 0
SUM OF ALL RESPONSES TO PHQ9 QUESTIONS 1 & 2: 0
SUM OF ALL RESPONSES TO PHQ QUESTIONS 1-9: 0
1. LITTLE INTEREST OR PLEASURE IN DOING THINGS: 0
2. FEELING DOWN, DEPRESSED OR HOPELESS: 0

## 2022-04-15 RX ORDER — PREDNISONE 1 MG/1
5 TABLET ORAL DAILY PRN
COMMUNITY
Start: 2022-04-13

## 2022-04-15 ASSESSMENT — ENCOUNTER SYMPTOMS
ANAL BLEEDING: 0
NAUSEA: 1
CHEST TIGHTNESS: 0
DIARRHEA: 0
SHORTNESS OF BREATH: 1
CONSTIPATION: 0
COUGH: 0
ABDOMINAL PAIN: 0

## 2022-04-22 DIAGNOSIS — I10 ESSENTIAL (PRIMARY) HYPERTENSION: ICD-10-CM

## 2022-04-22 RX ORDER — CARVEDILOL 12.5 MG/1
12.5 TABLET ORAL 2 TIMES DAILY
Qty: 60 TABLET | Refills: 5 | Status: SHIPPED | OUTPATIENT
Start: 2022-04-22 | End: 2022-07-12 | Stop reason: SDUPTHER

## 2022-04-25 RX ORDER — BUSPIRONE HYDROCHLORIDE 5 MG/1
TABLET ORAL
Qty: 60 TABLET | Refills: 2 | Status: SHIPPED | OUTPATIENT
Start: 2022-04-25 | End: 2022-05-09 | Stop reason: SDUPTHER

## 2022-05-09 NOTE — TELEPHONE ENCOUNTER
Ayanna Dueñas called requesting a refill of the below medication which has been pended for you:     Requested Prescriptions     Pending Prescriptions Disp Refills    busPIRone (BUSPAR) 5 MG tablet 180 tablet 2     Sig: TAKE 1 TABLET BY MOUTH 2 TIMES DAILY AS NEEDED (ANXIETY). Last Appointment Date: 4/14/2022  Next Appointment Date: 5/12/2022    Allergies   Allergen Reactions    Mushroom Extract Complex        [facility] day fill. Pended.

## 2022-05-10 DIAGNOSIS — D64.9 ANEMIA, UNSPECIFIED TYPE: ICD-10-CM

## 2022-05-10 LAB
BASOPHILS ABSOLUTE: 0.1 K/UL (ref 0–0.2)
BASOPHILS RELATIVE PERCENT: 0.9 % (ref 0–1)
EOSINOPHILS ABSOLUTE: 0.4 K/UL (ref 0–0.6)
EOSINOPHILS RELATIVE PERCENT: 6.4 % (ref 0–5)
FERRITIN: 13.7 NG/ML (ref 13–150)
FOLATE: 6.2 NG/ML (ref 4.8–37.3)
HCT VFR BLD CALC: 29.5 % (ref 37–47)
HEMOGLOBIN: 8.8 G/DL (ref 12–16)
IMMATURE GRANULOCYTES #: 0 K/UL
LYMPHOCYTES ABSOLUTE: 2 K/UL (ref 1.1–4.5)
LYMPHOCYTES RELATIVE PERCENT: 35.4 % (ref 20–40)
MCH RBC QN AUTO: 21.5 PG (ref 27–31)
MCHC RBC AUTO-ENTMCNC: 29.8 G/DL (ref 33–37)
MCV RBC AUTO: 72 FL (ref 81–99)
MONOCYTES ABSOLUTE: 0.5 K/UL (ref 0–0.9)
MONOCYTES RELATIVE PERCENT: 8.3 % (ref 0–10)
NEUTROPHILS ABSOLUTE: 2.7 K/UL (ref 1.5–7.5)
NEUTROPHILS RELATIVE PERCENT: 48.6 % (ref 50–65)
PDW BLD-RTO: 15.8 % (ref 11.5–14.5)
PLATELET # BLD: 286 K/UL (ref 130–400)
PMV BLD AUTO: 11.7 FL (ref 9.4–12.3)
RBC # BLD: 4.1 M/UL (ref 4.2–5.4)
VITAMIN B-12: 524 PG/ML (ref 211–946)
WBC # BLD: 5.5 K/UL (ref 4.8–10.8)

## 2022-05-10 RX ORDER — BUSPIRONE HYDROCHLORIDE 5 MG/1
TABLET ORAL
Qty: 180 TABLET | Refills: 2 | Status: SHIPPED | OUTPATIENT
Start: 2022-05-10

## 2022-05-12 ENCOUNTER — OFFICE VISIT (OUTPATIENT)
Dept: FAMILY MEDICINE CLINIC | Age: 42
End: 2022-05-12
Payer: MEDICAID

## 2022-05-12 VITALS
TEMPERATURE: 98.8 F | BODY MASS INDEX: 46.82 KG/M2 | OXYGEN SATURATION: 100 % | WEIGHT: 256 LBS | HEART RATE: 76 BPM | SYSTOLIC BLOOD PRESSURE: 134 MMHG | DIASTOLIC BLOOD PRESSURE: 88 MMHG

## 2022-05-12 DIAGNOSIS — E28.2 PCOS (POLYCYSTIC OVARIAN SYNDROME): ICD-10-CM

## 2022-05-12 DIAGNOSIS — M05.79 RHEUMATOID ARTHRITIS INVOLVING MULTIPLE SITES WITH POSITIVE RHEUMATOID FACTOR (HCC): ICD-10-CM

## 2022-05-12 DIAGNOSIS — F41.9 ANXIETY: ICD-10-CM

## 2022-05-12 DIAGNOSIS — D64.9 ANEMIA, UNSPECIFIED TYPE: ICD-10-CM

## 2022-05-12 DIAGNOSIS — R06.00 DYSPNEA, UNSPECIFIED TYPE: ICD-10-CM

## 2022-05-12 DIAGNOSIS — I10 ESSENTIAL (PRIMARY) HYPERTENSION: ICD-10-CM

## 2022-05-12 DIAGNOSIS — R59.0 THORACIC LYMPHADENOPATHY: ICD-10-CM

## 2022-05-12 DIAGNOSIS — K21.9 GASTROESOPHAGEAL REFLUX DISEASE WITHOUT ESOPHAGITIS: ICD-10-CM

## 2022-05-12 DIAGNOSIS — Z99.89 BIPAP (BIPHASIC POSITIVE AIRWAY PRESSURE) DEPENDENCE: ICD-10-CM

## 2022-05-12 DIAGNOSIS — E11.9 TYPE 2 DIABETES MELLITUS WITHOUT COMPLICATION, WITHOUT LONG-TERM CURRENT USE OF INSULIN (HCC): Primary | ICD-10-CM

## 2022-05-12 DIAGNOSIS — D50.0 IRON DEFICIENCY ANEMIA DUE TO CHRONIC BLOOD LOSS: ICD-10-CM

## 2022-05-12 LAB
ANISOCYTOSIS: ABNORMAL
BASOPHILS ABSOLUTE: 0.1 K/UL (ref 0–0.2)
BASOPHILS RELATIVE PERCENT: 0.7 % (ref 0–1)
EOSINOPHILS ABSOLUTE: 0.3 K/UL (ref 0–0.6)
EOSINOPHILS RELATIVE PERCENT: 4.2 % (ref 0–5)
FERRITIN: 13.5 NG/ML (ref 13–150)
FOLATE: 6.5 NG/ML (ref 4.8–37.3)
HBA1C MFR BLD: 5.6 %
HCT VFR BLD CALC: 33.2 % (ref 37–47)
HEMOGLOBIN: 9.6 G/DL (ref 12–16)
HYPOCHROMIA: ABNORMAL
IMMATURE GRANULOCYTES #: 0 K/UL
IRON: 16 UG/DL (ref 37–145)
LYMPHOCYTES ABSOLUTE: 1.9 K/UL (ref 1.1–4.5)
LYMPHOCYTES RELATIVE PERCENT: 27.8 % (ref 20–40)
MCH RBC QN AUTO: 21.1 PG (ref 27–31)
MCHC RBC AUTO-ENTMCNC: 28.9 G/DL (ref 33–37)
MCV RBC AUTO: 73.1 FL (ref 81–99)
MICROCYTES: ABNORMAL
MONOCYTES ABSOLUTE: 0.6 K/UL (ref 0–0.9)
MONOCYTES RELATIVE PERCENT: 8.7 % (ref 0–10)
NEUTROPHILS ABSOLUTE: 3.9 K/UL (ref 1.5–7.5)
NEUTROPHILS RELATIVE PERCENT: 58.3 % (ref 50–65)
OVALOCYTES: ABNORMAL
PDW BLD-RTO: 15.7 % (ref 11.5–14.5)
PLATELET # BLD: 298 K/UL (ref 130–400)
PLATELET SLIDE REVIEW: ADEQUATE
PMV BLD AUTO: 11.3 FL (ref 9.4–12.3)
RBC # BLD: 4.54 M/UL (ref 4.2–5.4)
TOTAL IRON BINDING CAPACITY: 416 UG/DL (ref 250–400)
VITAMIN B-12: 570 PG/ML (ref 211–946)
WBC # BLD: 6.7 K/UL (ref 4.8–10.8)

## 2022-05-12 PROCEDURE — G8427 DOCREV CUR MEDS BY ELIG CLIN: HCPCS | Performed by: FAMILY MEDICINE

## 2022-05-12 PROCEDURE — 3044F HG A1C LEVEL LT 7.0%: CPT | Performed by: FAMILY MEDICINE

## 2022-05-12 PROCEDURE — 99214 OFFICE O/P EST MOD 30 MIN: CPT | Performed by: FAMILY MEDICINE

## 2022-05-12 PROCEDURE — 2022F DILAT RTA XM EVC RTNOPTHY: CPT | Performed by: FAMILY MEDICINE

## 2022-05-12 PROCEDURE — G8417 CALC BMI ABV UP PARAM F/U: HCPCS | Performed by: FAMILY MEDICINE

## 2022-05-12 PROCEDURE — 1036F TOBACCO NON-USER: CPT | Performed by: FAMILY MEDICINE

## 2022-05-12 PROCEDURE — 83036 HEMOGLOBIN GLYCOSYLATED A1C: CPT | Performed by: FAMILY MEDICINE

## 2022-05-12 RX ORDER — FERROUS SULFATE 325(65) MG
325 TABLET ORAL 2 TIMES DAILY
Qty: 60 TABLET | Refills: 5 | Status: CANCELLED | OUTPATIENT
Start: 2022-05-12

## 2022-05-12 NOTE — PROGRESS NOTES
[facility] PHYSICIAN SERVICES  [facility] FAMILY MEDICINE  [address] [address] [place] [address]  Dept: [phone]  Dept Fax: [phone]  Loc: [phone]    Camille Valenzuela is a 39 y.o. female who presents today for her medical conditions/complaints as noted below. Camille Valenzuela is here for Follow-up and Fatigue        HPI:   CC: Here today to discuss the followin month follow-up. She was in the emergency department on  with \"dizziness\" and shortness of breath. She was given IV fluids and was feeling better. Regarding the shortness of breath, this has been more of a chronic issue. CT scan did show lymphadenopathy and she has been referred to pulmonology. She has an appointment in September. She is also scheduled for pulmonary function testing in September. She is been having issues managing her blood pressure. At the visit on , her blood pressure was 138/82. However, she sent a message on  indicating that her blood pressure readings have been elevated. He had been in the 609T to 014M systolic. I had suggested increasing her carvedilol from 6.25 mg twice daily to 12.5 mg twice daily. She also has rheumatoid arthritis and continues to be followed by rheumatology. She had to reschedule both her pulmonology and gastroenterology appointments. She has an appointment with pulmonology on May 19  Gastroenterology on May 20    HPI    Subjective:      Review of Systems   Constitutional: Positive for fatigue. Negative for chills and fever. HENT: Negative for congestion. Respiratory: Negative for cough, chest tightness and shortness of breath. Cardiovascular: Negative for chest pain, palpitations and leg swelling. Gastrointestinal: Negative for abdominal pain, anal bleeding, constipation, diarrhea and nausea. Genitourinary: Negative for difficulty urinating. Psychiatric/Behavioral: Negative.           SeeHPI for visit specific review of symptoms. All others negative      Objective:   /88   Pulse 76   Temp 98.8 °F (37.1 °C)   Wt 256 lb (116.1 kg)   SpO2 100%   BMI 46.82 kg/m²   Physical Exam  Constitutional:       Appearance: She is well-developed. She is not ill-appearing. Cardiovascular:      Rate and Rhythm: Normal rate and regular rhythm. Heart sounds: No murmur heard. No friction rub. Pulmonary:      Effort: Pulmonary effort is normal. No respiratory distress. Breath sounds: Normal breath sounds. No wheezing or rales. Abdominal:      General: There is no distension. Palpations: Abdomen is soft. Tenderness: There is no abdominal tenderness. Musculoskeletal:      Cervical back: Neck supple. No tenderness. Lymphadenopathy:      Cervical: No cervical adenopathy. Neurological:      Mental Status: She is alert.    Psychiatric:         Behavior: Behavior normal.           Recent Results (from the past 672 hour(s))   Vitamin B12    Collection Time: 05/10/22  1:54 PM   Result Value Ref Range    Vitamin B-12 524 211 - 946 pg/mL   CBC with Auto Differential    Collection Time: 05/10/22  1:54 PM   Result Value Ref Range    WBC 5.5 4.8 - 10.8 K/uL    RBC 4.10 (L) 4.20 - 5.40 M/uL    Hemoglobin 8.8 (L) 12.0 - 16.0 g/dL    Hematocrit 29.5 (L) 37.0 - 47.0 %    MCV 72.0 (L) 81.0 - 99.0 fL    MCH 21.5 (L) 27.0 - 31.0 pg    MCHC 29.8 (L) 33.0 - 37.0 g/dL    RDW 15.8 (H) 11.5 - 14.5 %    Platelets 884 901 - 765 K/uL    MPV 11.7 9.4 - 12.3 fL    Neutrophils % 48.6 (L) 50.0 - 65.0 %    Lymphocytes % 35.4 20.0 - 40.0 %    Monocytes % 8.3 0.0 - 10.0 %    Eosinophils % 6.4 (H) 0.0 - 5.0 %    Basophils % 0.9 0.0 - 1.0 %    Neutrophils Absolute 2.7 1.5 - 7.5 K/uL    Immature Granulocytes # 0.0 K/uL    Lymphocytes Absolute 2.0 1.1 - 4.5 K/uL    Monocytes Absolute 0.50 0.00 - 0.90 K/uL    Eosinophils Absolute 0.40 0.00 - 0.60 K/uL    Basophils Absolute 0.10 0.00 - 0.20 K/uL   Folate    Collection Time: 05/10/22  1:54 PM   Result Value Ref Range    Folate 6.2 4.8 - 37.3 ng/mL   Ferritin    Collection Time: 05/10/22  1:54 PM   Result Value Ref Range    Ferritin 13.7 13.0 - 150.0 ng/mL   POCT glycosylated hemoglobin (Hb A1C)    Collection Time: 05/12/22 12:00 AM   Result Value Ref Range    Hemoglobin A1C 5.6 %   Vitamin B12    Collection Time: 05/12/22  2:55 PM   Result Value Ref Range    Vitamin B-12 570 211 - 946 pg/mL   Iron    Collection Time: 05/12/22  2:55 PM   Result Value Ref Range    Iron 16 (L) 37 - 145 ug/dL   CBC with Auto Differential    Collection Time: 05/12/22  2:55 PM   Result Value Ref Range    WBC 6.7 4.8 - 10.8 K/uL    RBC 4.54 4.20 - 5.40 M/uL    Hemoglobin 9.6 (L) 12.0 - 16.0 g/dL    Hematocrit 33.2 (L) 37.0 - 47.0 %    MCV 73.1 (L) 81.0 - 99.0 fL    MCH 21.1 (L) 27.0 - 31.0 pg    MCHC 28.9 (L) 33.0 - 37.0 g/dL    RDW 15.7 (H) 11.5 - 14.5 %    Platelets 614 101 - 641 K/uL    MPV 11.3 9.4 - 12.3 fL    PLATELET SLIDE REVIEW Adequate     Neutrophils % 58.3 50.0 - 65.0 %    Lymphocytes % 27.8 20.0 - 40.0 %    Monocytes % 8.7 0.0 - 10.0 %    Eosinophils % 4.2 0.0 - 5.0 %    Basophils % 0.7 0.0 - 1.0 %    Neutrophils Absolute 3.9 1.5 - 7.5 K/uL    Immature Granulocytes # 0.0 K/uL    Lymphocytes Absolute 1.9 1.1 - 4.5 K/uL    Monocytes Absolute 0.60 0.00 - 0.90 K/uL    Eosinophils Absolute 0.30 0.00 - 0.60 K/uL    Basophils Absolute 0.10 0.00 - 0.20 K/uL    Anisocytosis 1+ (A)     Microcytes 1+ (A)     Hypochromia 3+ (A)     Ovalocytes 1+ (A)    Ferritin    Collection Time: 05/12/22  2:55 PM   Result Value Ref Range    Ferritin 13.5 13.0 - 150.0 ng/mL   Folate    Collection Time: 05/12/22  2:55 PM   Result Value Ref Range    Folate 6.5 4.8 - 37.3 ng/mL   Iron Binding Capacity    Collection Time: 05/12/22  2:55 PM   Result Value Ref Range    TIBC 416 (H) 250 - 400 ug/dL     Narrative   Examination. XR CHEST PORTABLE 4/11/2022 10:14 PM   History: Shortness of breath.    A single frontal portable upright view of the chest is compared with   the previous study dated 1/7/2022. The lungs are poorly expanded. There is no evidence of recent infiltrate, pleural effusion, pulmonary   congestion or pneumothorax. The heart size in the normal range. No acute bony abnormality.       Impression   1. No active cardiopulmonary disease. Signed by Dr Thomas Rodriguez. CTA PULMONARY W CONTRAST 4/12/2022 1:14 AM   History: Shortness of breath. DLP: 705 mGycm. The automated exposure control was utilized to   minimize the patient's radiation exposure. The CT angiography of the chest is performed after intravenous   contrast enhancement. The images are acquired in axial plane and   subsequent 2-D reconstruction coronal and sagittal planes and 3-D   maximum intensity projection reconstruction. There is no previous similar study for comparison. The correlation   made with chest radiograph dated 4/11/2022. There is normal opacification of pulmonary arteries and the branches   bilaterally. No filling defects in the visualized/opacified pulmonary   arterial bed. There is normal thoracic aorta. The limited visualized coronary arteries are normal.   The RV/LV ratio is 39/51 which is normal. No finding to suggest right   heart strain. There is residual thymus is seen. There are a few nonspecific mediastinal lymph nodes, particularly at   level 5. None of the lymph nodes are larger than 1 cm in short axis. There are nonspecific nonenlarged multiple small axillary lymph nodes   bilaterally, right more than the left. The etiology and clinical   significance is not certain. The limited visualized soft tissues of the neck are unremarkable. There are borderline prominent right supra clavicular lymph nodes. It   measures 1.1 cm in short axis. The etiology and clinical significance   is not certain. Gland isn't completely included in the study and not well evaluated. The lungs are poorly expanded.    No infiltrate or consolidation. No pulmonary mass/nodules. The tracheobronchial structures are normal and patent. The limited visualized liver and spleen are unremarkable. The adrenal   glands are normal. The gallbladder is surgically absent. The pancreas   and kidneys are incompletely included in the study and not evaluated. The images reviewed in bone window show no acute bony abnormality or a   bone lesion. Chronic degenerative changes of the thoracic spine are   noted.       Impression   1. No evidence of pulmonary embolism. 2. The lungs are clear. 3. Numerous, Nonspecific, mediastinal, axillary and supraclavicular   lymph nodes which are not significantly enlarged. The etiology and   clinical significance is not certain. The above study was initially reviewed and reported by [facility]. I do   not find any critical discrepancies. Signed by Dr Masood Desai: The following diagnoses and conditions are stable with no further orders unless indicated:  1. Type 2 diabetes mellitus without complication, without long-term current use of insulin (HCC)  Lab Results   Component Value Date    LABA1C 5.6 05/12/2022    LABA1C 6.6 (H) 10/05/2021    LABA1C 6.5 (H) 07/31/2021     Lab Results   Component Value Date    LABMICR 3.70 10/05/2021    LDLCALC 94 10/05/2021    CREATININE 0.9 04/11/2022   Metformin 500 mg twice daily  Excellent provement in glucose. Encouraged ADA diet    2. Essential (primary) hypertension  Amlodipine 5 mg twice daily  Carvedilol 12.5 mg twice daily  Losartan 100 mg daily  She no longer takes hydrochlorothiazide as it caused significant hypotension. BP Readings from Last 3 Encounters:   05/12/22 134/88   04/14/22 138/82   04/12/22 130/75     Blood pressure stable    3. Gastroesophageal reflux disease without esophagitis  Omeprazole 40 mg daily    4. BiPAP (biphasic positive airway pressure) dependence  Compliant    5.  PCOS (polycystic ovarian syndrome)  Metformin    6. Rheumatoid arthritis involving multiple sites with positive rheumatoid factor ([facility] [address]  Continue with recommendations per rheumatology    7. Dyspnea, unspecified type  8. Thoracic lymphadenopathy  Scheduled for pulmonary function testing and evaluation by pulmonology  Albuterol as needed  9. Anemia, unspecified type  She has been evaluated for abnormal uterine bleeding by gynecology. Recheck CBC today including iron studies and B12  Will need to set up with IV iron infusions. She is had an issue with iron deficiency anemia in the past and did not tolerate oral iron  We will place her on Injectafer 750 mg IV q. 7 days x 2 doses  Encouraged her to follow-up with gynecology      10. Anxiety  Buspirone 5 mg twice daily    Follow-up appointment in August        No follow-ups on file. Discussed use, benefit, and side effects of prescribed medications. All patient questions answered. Pt voiced understanding. Reviewed health maintenance. Instructedto continue current medications, diet and exercise. Patient agreed with treatmentplan.  Follow up as directed.     _______________________________________________________________      Past Medical History:   Diagnosis Date    Anemia     Chronic rheumatic arthritis ([facility] [address]     Elevated blood pressure reading     Fibromyalgia     GERD (gastroesophageal reflux disease)     Hypertension     Intracranial hypertension     Obesity     PCOS (polycystic ovarian syndrome)     Sleep apnea     bi pap    SOB (shortness of breath)     Trouble swallowing     Type 2 diabetes mellitus (HCC)       Past Surgical History:   Procedure Laterality Date    BREAST BIOPSY Right 2019    benign    CHOLECYSTECTOMY      UPPER GASTROINTESTINAL ENDOSCOPY N/A 02/01/2022    Dr Shantel Scruggs-[id number] appearing stricture noted in the distal esophagus, gastritis, no h pylori    UPPER GASTROINTESTINAL ENDOSCOPY  02/01/2022    Dr Shantel Scruggs-[id number] appearing stricture noted in the distal esophagus, gastritis, no h pylori       Family History   Problem Relation Age of Onset    High Blood Pressure Father     Diabetes Father     Atrial Fibrillation Father     Heart Failure Father     Stroke Father     Diabetes Sister     Diabetes Maternal Grandmother     Heart Attack Other     Ovarian Cancer Paternal Grandmother     Colon Polyps Neg Hx     Crohn's Disease Neg Hx     Esophageal Cancer Neg Hx     Liver Cancer Neg Hx     Rectal Cancer Neg Hx     Stomach Cancer Neg Hx        Social History     Tobacco Use    Smoking status: Former Smoker     Years: 2.00     Types: Cigarettes     Start date: 1996     Quit date: 1998     Years since quittin.3    Smokeless tobacco: Never Used   Substance Use Topics    Alcohol use: Yes     Alcohol/week: 1.0 standard drink     Types: 1 Glasses of wine per week     Comment: rarely     Current Outpatient Medications   Medication Sig Dispense Refill    busPIRone (BUSPAR) 5 MG tablet TAKE 1 TABLET BY MOUTH 2 TIMES DAILY AS NEEDED (ANXIETY).  180 tablet 2    carvedilol (COREG) 12.5 MG tablet Take 1 tablet by mouth 2 times daily 60 tablet 5    predniSONE (DELTASONE) 5 MG tablet daily      omeprazole (PRILOSEC) 40 MG delayed release capsule Take 1 capsule by mouth 2 times daily 60 capsule 3    hydroCHLOROthiazide (HYDRODIURIL) 25 MG tablet TAKE 1 TABLET BY MOUTH EVERY DAY IN THE MORNING (Patient taking differently: as needed **as needed**) 90 tablet 1    hydroxychloroquine (PLAQUENIL) 200 MG tablet TAKE 1 TABLET BY MOUTH DAILY FOR 7 DAYS, THEN INCREASE TO 2 TABLETS BY MOUTH DAILY      mupirocin (BACTROBAN) 2 % ointment as needed       losartan (COZAAR) 100 MG tablet TAKE 1 TABLET BY MOUTH EVERY DAY 90 tablet 1    albuterol sulfate HFA (VENTOLIN HFA) 108 (90 Base) MCG/ACT inhaler Inhale 2 puffs into the lungs 4 times daily as needed for Wheezing 18 g 5    ondansetron (ZOFRAN ODT) 4 MG disintegrating tablet Take 1 tablet by mouth every 8 hours as needed for Nausea or Vomiting 15 tablet 0    Norgestim-Eth Estrad Triphasic (TRI-ESTARYLLA) 0.18/0.215/0.25 MG-35 MCG TABS Take 1 packet by mouth daily 84 tablet 3    amLODIPine (NORVASC) 5 MG tablet TAKE 1 TABLET BY MOUTH TWICE A  tablet 1    KLOR-CON M20 20 MEQ extended release tablet TAKE 1 TABLET BY MOUTH EVERY DAY (Patient taking differently: as needed **as needed**) 30 tablet 0    metFORMIN (GLUCOPHAGE) 500 MG tablet TAKE 1 TABLET BY MOUTH TWICE A DAY WITH MEALS 180 tablet 1    blood glucose test strips (ASCENSIA AUTODISC VI;ONE TOUCH ULTRA TEST VI) strip 1 each by In Vitro route 3 times daily As needed. 100 each 11    valACYclovir (VALTREX) 1 g tablet Take 2 tablets p.o. every 12 hours for 2 doses. May repeat for further outbreaks (Patient taking differently: as needed ) 8 tablet 2    Blood Glucose Monitoring Suppl (FREESTYLE LITE) ANDREEA USE AS DIRECTED      Lancet Device MISC 1 lancet twice a day 60 Device 11    ferric carboxymaltose (INJECTAFER) 750 MG/15ML SOLN IV solution Two Infusions to be given 7 days apart. 15 mL 1    furosemide (LASIX) 20 MG tablet Take 1 tablet by mouth daily for 3 days (Patient taking differently: Take 20 mg by mouth as needed **as needed**) 3 tablet 0     No current facility-administered medications for this visit.      Allergies   Allergen Reactions    Mushroom Extract Complex        Health Maintenance   Topic Date Due    Varicella vaccine (1 of 2 - 2-dose childhood series) Never done    Pneumococcal 0-64 years Vaccine (1 - PCV) Never done    Hepatitis B vaccine (2 of 3 - Hep B Twinrix risk 3-dose series) 01/27/2014    Diabetic retinal exam  12/10/2019    Diabetic foot exam  06/26/2020    COVID-19 Vaccine (3 - Booster for Pfizer series) 03/12/2022    Flu vaccine (Season Ended) 09/01/2022    Diabetic microalbuminuria test  10/05/2022    Lipids  10/05/2022    Depression Screen  04/14/2023    A1C test

## 2022-05-13 ENCOUNTER — TELEPHONE (OUTPATIENT)
Dept: FAMILY MEDICINE CLINIC | Age: 42
End: 2022-05-13

## 2022-05-13 RX ORDER — FERRIC CARBOXYMALTOSE INJECTION 50 MG/ML
INJECTION, SOLUTION INTRAVENOUS
Qty: 15 ML | Refills: 1 | Status: SHIPPED | OUTPATIENT
Start: 2022-05-13 | End: 2022-08-17 | Stop reason: ALTCHOICE

## 2022-05-13 NOTE — TELEPHONE ENCOUNTER
----- Message from Mima Hendrix MD sent at 5/13/2022 12:42 PM CDT -----  She was in yesterday with iron deficiency anemia. I would recommend she get iron infusions. Diagnosis is iron deficiency anemia secondary to blood loss     She had iron infusions in September of 2020 and I want to use the same order:  Injectafer 750 mg IV for 2 doses given 7 days apart  I would call the infusion center to see if they can set this up.

## 2022-05-16 DIAGNOSIS — I10 ESSENTIAL (PRIMARY) HYPERTENSION: ICD-10-CM

## 2022-05-16 ASSESSMENT — ENCOUNTER SYMPTOMS
ABDOMINAL PAIN: 0
ANAL BLEEDING: 0
COUGH: 0
NAUSEA: 0
CHEST TIGHTNESS: 0
CONSTIPATION: 0
DIARRHEA: 0
SHORTNESS OF BREATH: 0

## 2022-05-17 DIAGNOSIS — D50.9 IRON DEFICIENCY ANEMIA, UNSPECIFIED IRON DEFICIENCY ANEMIA TYPE: Primary | ICD-10-CM

## 2022-05-17 RX ORDER — SODIUM CHLORIDE 9 MG/ML
INJECTION, SOLUTION INTRAVENOUS CONTINUOUS
Status: CANCELLED | OUTPATIENT
Start: 2022-05-17

## 2022-05-17 RX ORDER — ACETAMINOPHEN 325 MG/1
650 TABLET ORAL
Status: CANCELLED | OUTPATIENT
Start: 2022-05-17

## 2022-05-17 RX ORDER — EPINEPHRINE 1 MG/ML
0.3 INJECTION, SOLUTION, CONCENTRATE INTRAVENOUS PRN
Status: CANCELLED | OUTPATIENT
Start: 2022-05-17

## 2022-05-17 RX ORDER — ONDANSETRON 2 MG/ML
8 INJECTION INTRAMUSCULAR; INTRAVENOUS
Status: CANCELLED | OUTPATIENT
Start: 2022-05-17

## 2022-05-17 RX ORDER — HEPARIN SODIUM (PORCINE) LOCK FLUSH IV SOLN 100 UNIT/ML 100 UNIT/ML
500 SOLUTION INTRAVENOUS PRN
Status: CANCELLED | OUTPATIENT
Start: 2022-05-17

## 2022-05-17 RX ORDER — CARVEDILOL 6.25 MG/1
TABLET ORAL
Qty: 180 TABLET | Refills: 1 | Status: SHIPPED | OUTPATIENT
Start: 2022-05-17 | End: 2022-06-21 | Stop reason: ALTCHOICE

## 2022-05-17 RX ORDER — ALBUTEROL SULFATE 90 UG/1
4 AEROSOL, METERED RESPIRATORY (INHALATION) PRN
Status: CANCELLED | OUTPATIENT
Start: 2022-05-17

## 2022-05-17 RX ORDER — SODIUM CHLORIDE 0.9 % (FLUSH) 0.9 %
5-40 SYRINGE (ML) INJECTION PRN
Status: CANCELLED | OUTPATIENT
Start: 2022-05-17

## 2022-05-17 RX ORDER — DIPHENHYDRAMINE HYDROCHLORIDE 50 MG/ML
50 INJECTION INTRAMUSCULAR; INTRAVENOUS
Status: CANCELLED | OUTPATIENT
Start: 2022-05-17

## 2022-05-19 ENCOUNTER — OFFICE VISIT (OUTPATIENT)
Dept: PULMONOLOGY | Age: 42
End: 2022-05-19
Payer: MEDICAID

## 2022-05-19 VITALS
WEIGHT: 254.2 LBS | DIASTOLIC BLOOD PRESSURE: 72 MMHG | BODY MASS INDEX: 46.78 KG/M2 | HEIGHT: 62 IN | SYSTOLIC BLOOD PRESSURE: 132 MMHG | OXYGEN SATURATION: 100 % | HEART RATE: 62 BPM

## 2022-05-19 DIAGNOSIS — Z99.89 BIPAP (BIPHASIC POSITIVE AIRWAY PRESSURE) DEPENDENCE: ICD-10-CM

## 2022-05-19 DIAGNOSIS — J98.4 RESTRICTIVE LUNG DISEASE: ICD-10-CM

## 2022-05-19 DIAGNOSIS — R00.0 TACHYCARDIA: ICD-10-CM

## 2022-05-19 DIAGNOSIS — R06.02 SOB (SHORTNESS OF BREATH): ICD-10-CM

## 2022-05-19 DIAGNOSIS — G47.33 OSA (OBSTRUCTIVE SLEEP APNEA): Primary | ICD-10-CM

## 2022-05-19 DIAGNOSIS — R55 NEAR SYNCOPE: ICD-10-CM

## 2022-05-19 DIAGNOSIS — K21.9 GASTROESOPHAGEAL REFLUX DISEASE WITHOUT ESOPHAGITIS: ICD-10-CM

## 2022-05-19 DIAGNOSIS — I10 PRIMARY HYPERTENSION: ICD-10-CM

## 2022-05-19 PROCEDURE — G8417 CALC BMI ABV UP PARAM F/U: HCPCS | Performed by: INTERNAL MEDICINE

## 2022-05-19 PROCEDURE — 99214 OFFICE O/P EST MOD 30 MIN: CPT | Performed by: INTERNAL MEDICINE

## 2022-05-19 PROCEDURE — 1036F TOBACCO NON-USER: CPT | Performed by: INTERNAL MEDICINE

## 2022-05-19 PROCEDURE — G8427 DOCREV CUR MEDS BY ELIG CLIN: HCPCS | Performed by: INTERNAL MEDICINE

## 2022-05-19 RX ORDER — OMEPRAZOLE 40 MG/1
40 CAPSULE, DELAYED RELEASE ORAL 2 TIMES DAILY
Qty: 60 CAPSULE | Refills: 3 | Status: SHIPPED | OUTPATIENT
Start: 2022-05-19 | End: 2022-06-03 | Stop reason: SDUPTHER

## 2022-05-19 RX ORDER — OMEPRAZOLE 20 MG/1
40 CAPSULE, DELAYED RELEASE ORAL
Qty: 60 CAPSULE | Refills: 5 | Status: SHIPPED | OUTPATIENT
Start: 2022-05-19 | End: 2022-05-19

## 2022-05-19 ASSESSMENT — ENCOUNTER SYMPTOMS
BLOOD IN STOOL: 0
ABDOMINAL DISTENTION: 0
ABDOMINAL PAIN: 0
NAUSEA: 0
RECTAL PAIN: 0
TROUBLE SWALLOWING: 0
CHOKING: 0
ANAL BLEEDING: 0
DIARRHEA: 0
COUGH: 0
CONSTIPATION: 0
VOMITING: 0

## 2022-05-19 NOTE — PROGRESS NOTES
Pulmonary and Sleep Medicine    Rosy Hall (:  1980) is a 39 y.o. female,Established patient, here for evaluation of the following chief complaint(s):  Follow-up (Patient came in for a follow up post ER visit for shortness of breath and a cough.)      Referring physician:  Evangelist Earl Md  [address] [place] [address] Ammy Rausch     ASSESSMENT/PLAN:  1. SETH (obstructive sleep apnea)  2. BiPAP (biphasic positive airway pressure) dependence  3. Restrictive lung disease  4. Primary hypertension  5. Near syncope  -     Echo 2d w doppler w color w contrast; Future  6. Tachycardia  7. Gastroesophageal reflux disease without esophagitis  8. SOB (shortness of breath)  -     Echo 2d w doppler w color w contrast; Future  -     CARDIAC STRESS TEST EXERCISE ONLY; Future  -     Ambulatory referral to Cardiology        Near syncope and chest pain with shortness of breath and tachycardia unclear etiology. Will refer to cardiology for further evaluation. Echocardiogram and a cardiac stress test was ordered. We will augment her treatment for gastroesophageal reflux disease with Prilosec 40 mg twice a day. Ambreen [facility]ustamante [facility], [facility], [facility]    Return in about 6 weeks (around 2022). SUBJECTIVE/OBJECTIVE:  The patient is here for follow-up on shortness of breath. She apparently was talking on the phone developed sudden onset of shortness of breath and left-sided chest pain and near syncope. She presented to the ER. She was short of breath. She was still complaining of the chest discomfort. She was tachycardic. EKG was done showed sinus tachycardia. She had a CT pulmonary angiogram for further evaluation of possible PE. There was no evidence of PE on      Prior to Visit Medications    Medication Sig Taking?  Authorizing Provider   carvedilol (COREG) 6.25 MG tablet TAKE 1 TABLET BY MOUTH TWICE A DAY Yes John Luther MD   ferric carboxymaltose (INJECTAFER) 750 MG/15ML SOLN IV solution Two Infusions to be given 7 days apart. Yes Donna Davison MD   busPIRone (BUSPAR) 5 MG tablet TAKE 1 TABLET BY MOUTH 2 TIMES DAILY AS NEEDED (ANXIETY). Yes Donna Davison MD   carvedilol (COREG) 12.5 MG tablet Take 1 tablet by mouth 2 times daily Yes Donna Davison MD   predniSONE (DELTASONE) 5 MG tablet daily Yes Historical Provider, MD   omeprazole (PRILOSEC) 40 MG delayed release capsule Take 1 capsule by mouth 2 times daily Yes NATACHA Mathew NP   hydroCHLOROthiazide (HYDRODIURIL) 25 MG tablet TAKE 1 TABLET BY MOUTH EVERY DAY IN THE MORNING  Patient taking differently: as needed **as needed** Yes NATACHA Magana   hydroxychloroquine (PLAQUENIL) 200 MG tablet TAKE 1 TABLET BY MOUTH DAILY FOR 7 DAYS, THEN INCREASE TO 2 TABLETS BY MOUTH DAILY Yes Historical Provider, MD   mupirocin (BACTROBAN) 2 % ointment as needed  Yes Historical Provider, MD   losartan (COZAAR) 100 MG tablet TAKE 1 TABLET BY MOUTH EVERY DAY Yes Donna Davison MD   albuterol sulfate HFA (VENTOLIN HFA) 108 (90 Base) MCG/ACT inhaler Inhale 2 puffs into the lungs 4 times daily as needed for Wheezing Yes Donna Davison MD   ondansetron (ZOFRAN ODT) 4 MG disintegrating tablet Take 1 tablet by mouth every 8 hours as needed for Nausea or Vomiting Yes NATACHA Valdez   Norgestim-Eth Estrad Triphasic (TRI-ESTARYLLA) 0.18/0.215/0.25 MG-35 MCG TABS Take 1 packet by mouth daily Yes NATACHA Saha - RUDDY   amLODIPine (NORVASC) 5 MG tablet TAKE 1 TABLET BY MOUTH TWICE A DAY Yes NATACHA Sharpe   KLOR-CON M20 20 MEQ extended release tablet TAKE 1 TABLET BY MOUTH EVERY DAY  Patient taking differently: as needed **as needed** Yes Donna Davison MD   metFORMIN (GLUCOPHAGE) 500 MG tablet TAKE 1 TABLET BY MOUTH TWICE A DAY WITH MEALS Yes Donna Davison MD   blood glucose test strips (ASCENSIA AUTODISC VI;ONE TOUCH ULTRA TEST VI) strip 1 each by In Vitro route 3 times daily As needed.  Yes Donna Davison MD   valACYclovir (VALTREX) 1 g tablet Take 2 tablets p.o. every 12 hours for 2 doses. May repeat for further outbreaks  Patient taking differently: as needed  Yes Clarke Dempsey MD   Blood Glucose Monitoring Suppl (FREESTYLE LITE) ANDREEA USE AS DIRECTED Yes Historical Provider, MD   Lancet Device MISC 1 lancet twice a day Yes Clarke Dempsey MD   furosemide (LASIX) 20 MG tablet Take 1 tablet by mouth daily for 3 days  Patient taking differently: Take 20 mg by mouth as needed **as needed**  NATACHA Francisco   cloNIDine (CATAPRES) 0.1 MG tablet Twice a day as needed if blood pressure >180/100  Clarke Dempsey MD        Review of Systems    Vitals:    05/19/22 1352   BP: 132/72   Pulse: 62   SpO2: 100%     . [id number]   .[id number]    BMI Readings from Last 1 Encounters:   05/19/22 46.49 kg/m²       Physical Exam        This note was generated used a voice recognition software. Errors in voice recognition may have occurred. An electronic signature was used to authenticate this note.     --Tobi Campos MD

## 2022-05-19 NOTE — PROGRESS NOTES
2022    TELEHEALTH EVALUATION -- Audio/Visual (During [id number] public health emergency)    HPI:    Anders Perales (:  1980) has requested an audio/video evaluation for the following concern(s):  Chief Complaint   Patient presents with    Follow-up       Pt seen today for follow up. Hx GERD with gastritis. Omeprazole 20 mg was increased to BID x 3 months at last visit with the addition of Pepcid OTC prn. Since last visit, pt has had some increased issues with dyspnea and reflux. She has been evaluated by Pulmonology who felt symptoms were related to reflux. Omeprazole was increased to 40 mg po BID. She has made dietary changes with some improvement. Last EGD  - [id number] appearing stricture noted in the distal esophagus, gastritis, no h pylori  Pt has never had a colonoscopy   Denies any family hx colon cancer or colon polyps     Review of Systems   Constitutional: Negative for activity change, appetite change, fatigue, fever and unexpected weight change. HENT: Negative for trouble swallowing. Respiratory: Positive for shortness of breath. Negative for cough and choking. Cardiovascular: Negative for chest pain. Gastrointestinal: Negative for abdominal distention, abdominal pain, anal bleeding, blood in stool, constipation, diarrhea, nausea, rectal pain and vomiting. Reflux    Allergic/Immunologic: Negative for food allergies. Psychiatric/Behavioral: The patient is nervous/anxious. All other systems reviewed and are negative. Prior to Visit Medications    Medication Sig Taking? Authorizing Provider   omeprazole (PRILOSEC) 40 MG delayed release capsule Take 1 capsule by mouth 2 times daily  Ángela Fagan MD   carvedilol (COREG) 6.25 MG tablet TAKE 1 TABLET BY MOUTH TWICE A DAY  Chucky Patten MD   ferric carboxymaltose (INJECTAFER) 750 MG/15ML SOLN IV solution Two Infusions to be given 7 days apart.   Clarke Dempsey MD   busPIRone (BUSPAR) 5 MG tablet TAKE 1 TABLET BY MOUTH 2 TIMES DAILY AS NEEDED (ANXIETY). Socorro Miller MD   carvedilol (COREG) 12.5 MG tablet Take 1 tablet by mouth 2 times daily  Socorro Miller MD   predniSONE (DELTASONE) 5 MG tablet daily  Historical Provider, MD   hydroCHLOROthiazide (HYDRODIURIL) 25 MG tablet TAKE 1 TABLET BY MOUTH EVERY DAY IN THE MORNING  Patient taking differently: as needed **as needed**  NATACHA Serna   hydroxychloroquine (PLAQUENIL) 200 MG tablet TAKE 1 TABLET BY MOUTH DAILY FOR 7 DAYS, THEN INCREASE TO 2 TABLETS BY MOUTH DAILY  Historical Provider, MD   mupirocin (BACTROBAN) 2 % ointment as needed   Historical Provider, MD   losartan (COZAAR) 100 MG tablet TAKE 1 TABLET BY MOUTH EVERY DAY  Socorro Miller MD   albuterol sulfate HFA (VENTOLIN HFA) 108 (90 Base) MCG/ACT inhaler Inhale 2 puffs into the lungs 4 times daily as needed for Wheezing  Socorro Miller MD   ondansetron (ZOFRAN ODT) 4 MG disintegrating tablet Take 1 tablet by mouth every 8 hours as needed for Nausea or Vomiting  NATACHA Albright   Norgestim-Eth Estrad Triphasic (TRI-ESTARYLLA) 0.18/0.215/0.25 MG-35 MCG TABS Take 1 packet by mouth daily  NATACHA Aguilar - RUDDY   amLODIPine (NORVASC) 5 MG tablet TAKE 1 TABLET BY MOUTH TWICE A DAY  NATACHA Sharpe   KLOR-CON M20 20 MEQ extended release tablet TAKE 1 TABLET BY MOUTH EVERY DAY  Patient taking differently: as needed **as neededNhung Hickman MD   furosemide (LASIX) 20 MG tablet Take 1 tablet by mouth daily for 3 days  Patient taking differently: Take 20 mg by mouth as needed **as needed**  NATACHA Francisco   metFORMIN (GLUCOPHAGE) 500 MG tablet TAKE 1 TABLET BY MOUTH TWICE A DAY WITH MEALS  Socorro Miller MD   cloNIDine (CATAPRES) 0.1 MG tablet Twice a day as needed if blood pressure >180/100  Socorro Miller MD   blood glucose test strips (ASCENSIA AUTODISC VI;ONE TOUCH ULTRA TEST VI) strip 1 each by In Vitro route 3 times daily As needed. Laurence Medrano MD   valACYclovir (VALTREX) 1 g tablet Take 2 tablets p.o. every 12 hours for 2 doses. May repeat for further outbreaks  Patient taking differently: as needed   Laurence Medrano MD   Blood Glucose Monitoring Suppl (FREESTYLE LITE) ANDREEA USE AS DIRECTED  Historical Provider, MD   Lancet Device MISC 1 lancet twice a day  Laurence Medrano MD       Social History     Tobacco Use    Smoking status: Former Smoker     Years: 2.00     Types: Cigarettes     Start date: 1996     Quit date: 1998     Years since quittin.4    Smokeless tobacco: Never Used   Vaping Use    Vaping Use: Never used   Substance Use Topics    Alcohol use:  Yes     Alcohol/week: 1.0 standard drink     Types: 1 Glasses of wine per week     Comment: rarely    Drug use: No        Allergies   Allergen Reactions    Mushroom Extract Complex    ,   Past Medical History:   Diagnosis Date    Anemia     Chronic rheumatic arthritis (HCC)     Elevated blood pressure reading     Fibromyalgia     GERD (gastroesophageal reflux disease)     Hypertension     Intracranial hypertension     Obesity     PCOS (polycystic ovarian syndrome)     Sleep apnea     bi pap    SOB (shortness of breath)     Trouble swallowing     Type 2 diabetes mellitus (HCC)    ,   Past Surgical History:   Procedure Laterality Date    BREAST BIOPSY Right 2019    benign    CHOLECYSTECTOMY      UPPER GASTROINTESTINAL ENDOSCOPY N/A 2022    Dr Candi Scruggs-[id number] appearing stricture noted in the distal esophagus, gastritis, no h pylori    UPPER GASTROINTESTINAL ENDOSCOPY  2022    Dr Candi Scruggs-w/[id number]-Rusreh appearing stricture noted in the distal esophagus, gastritis, no h pylori   ,   Social History     Tobacco Use    Smoking status: Former Smoker     Years: 2.00     Types: Cigarettes     Start date: 1996     Quit date: 1998     Years since quittin.4    Smokeless tobacco: Never Used   Vaping Use    Vaping Use: Never used   Substance Use Topics    Alcohol use: Yes     Alcohol/week: 1.0 standard drink     Types: 1 Glasses of wine per week     Comment: rarely    Drug use: No   ,   Family History   Problem Relation Age of Onset    High Blood Pressure Father     Diabetes Father     Atrial Fibrillation Father     Heart Failure Father     Stroke Father     Diabetes Sister     Diabetes Maternal Grandmother     Heart Attack Other     Ovarian Cancer Paternal Grandmother     Colon Polyps Neg Hx     Crohn's Disease Neg Hx     Esophageal Cancer Neg Hx     Liver Cancer Neg Hx     Rectal Cancer Neg Hx     Stomach Cancer Neg Hx        PHYSICAL EXAMINATION:  [ INSTRUCTIONS:  \"[x]\" Indicates a positive item  \"[]\" Indicates a negative item  -- DELETE ALL ITEMS NOT EXAMINED]  Vital Signs: (As obtained by patient/caregiver or practitioner observation)    Blood pressure-  Heart rate-    Respiratory rate-    Temperature-  Pulse oximetry-     Constitutional: [x] Appears well-developed and well-nourished [x] No apparent distress      [] Abnormal-   Mental status  [x] Alert and awake  [x] Oriented to person/place/time [x]Able to follow commands      Eyes:  EOM    [x]  Normal  [] Abnormal-  Sclera  [x]  Normal  [] Abnormal -         Discharge [x]  None visible  [] Abnormal -    HENT:   [x] Normocephalic, atraumatic.   [] Abnormal   [x] Mouth/Throat: Mucous membranes are moist.     External Ears [x] Normal  [] Abnormal-     Neck: [x] No visualized mass     Pulmonary/Chest: [x] Respiratory effort normal.  [x] No visualized signs of difficulty breathing or respiratory distress        [] Abnormal-      Musculoskeletal:   [x] Normal gait with no signs of ataxia         [x] Normal range of motion of neck        [] Abnormal-       Neurological:        [x] No Facial Asymmetry (Cranial nerve 7 motor function) (limited exam to video visit)          [x] No gaze palsy        [] Abnormal-         Skin:        [x] No significant exanthematous lesions or discoloration noted on facial skin         [] Abnormal-            Psychiatric:       [x] Normal Affect [x] No Hallucinations        [] Abnormal-     Other pertinent observable physical exam findings-     ASSESSMENT/PLAN:  1. Chronic GERD  2. Shortness of breath    - Continue Omeprazole at 40 mg po BID x 3 months   - Trial of FD Evertt Hima  - Follow up in 3 months or sooner if needed  - Anti-reflux precautions       Return in about 3 months (around 8/20/2022). Mj Sims, was evaluated through a synchronous (real-time) audio-video encounter. The patient (or guardian if applicable) is aware that this is a billable service, which includes applicable co-pays. This Virtual Visit was conducted with patient's (and/or legal guardian's) consent. The visit was conducted pursuant to the emergency declaration under the [address] [place] [address], [address] [place] [address] authority and the [employer] General Act. Patient identification was verified, and a caregiver was present when appropriate. The patient was located at home in a state where the provider was licensed to provide care. Total time spent on this encounter: Not billed by time    --NATACHA Grijalva NP on 5/20/2022 at 12:25 PM    An electronic signature was used to authenticate this note.

## 2022-05-20 ENCOUNTER — TELEMEDICINE (OUTPATIENT)
Dept: GASTROENTEROLOGY | Age: 42
End: 2022-05-20
Payer: MEDICAID

## 2022-05-20 DIAGNOSIS — R06.02 SHORTNESS OF BREATH: ICD-10-CM

## 2022-05-20 DIAGNOSIS — K21.9 CHRONIC GERD: Primary | ICD-10-CM

## 2022-05-20 PROCEDURE — G8428 CUR MEDS NOT DOCUMENT: HCPCS | Performed by: NURSE PRACTITIONER

## 2022-05-20 PROCEDURE — 1036F TOBACCO NON-USER: CPT | Performed by: NURSE PRACTITIONER

## 2022-05-20 PROCEDURE — G8417 CALC BMI ABV UP PARAM F/U: HCPCS | Performed by: NURSE PRACTITIONER

## 2022-05-20 PROCEDURE — 99214 OFFICE O/P EST MOD 30 MIN: CPT | Performed by: NURSE PRACTITIONER

## 2022-05-20 ASSESSMENT — ENCOUNTER SYMPTOMS: SHORTNESS OF BREATH: 1

## 2022-05-20 NOTE — PATIENT INSTRUCTIONS
patricia, or energy drinks. If your symptoms are worse after you eat a certain food, you may want to stop eating it to see if your symptoms get better.  Do not smoke or chew tobacco. Smoking can make GERD worse. If you need help quitting, talk to your doctor about stop-smoking programs and medicines. These can increase your chances of quitting for good.  If you have GERD symptoms at night, raise the head of your bed 6 to 8 inches by putting the frame on blocks or placing a foam wedge under the head of your mattress. (Adding extra pillows does not work.)   Do not wear tight clothing around your middle.  Lose weight if you need to. Losing just 5 to 10 pounds can help. When should you call for help? Call your doctor now or seek immediate medical care if:     You have new or different belly pain.      Your stools are black and tarlike or have streaks of blood. Watch closely for changes in your health, and be sure to contact your doctor if:     Your symptoms have not improved after 2 days.      Food seems to catch in your throat or chest.   Where can you learn more? Go to https://[employer]aiHit. org and sign in to your [employer] account. Enter N538 in the Reclip.It box to learn more about \"Gastroesophageal Reflux Disease (GERD): Care Instructions. \"     If you do not have an account, please click on the \"Sign Up Now\" link. Current as of: September 8, 2021               Content Version: 13.2  © 5504-0228 Healthwise, Incorporated. Care instructions adapted under license by [facility] ([facility]). If you have questions about a medical condition or this instruction, always ask your healthcare professional. [place] [address] any warranty or liability for your use of this information.

## 2022-05-24 ENCOUNTER — HOSPITAL ENCOUNTER (OUTPATIENT)
Dept: INFUSION THERAPY | Age: 42
Setting detail: INFUSION SERIES
Discharge: HOME | End: 2022-05-24
Payer: MEDICAID

## 2022-05-24 VITALS
OXYGEN SATURATION: 100 % | TEMPERATURE: 97 F | SYSTOLIC BLOOD PRESSURE: 120 MMHG | DIASTOLIC BLOOD PRESSURE: 82 MMHG | HEART RATE: 67 BPM | RESPIRATION RATE: 20 BRPM

## 2022-05-24 DIAGNOSIS — D50.9 IRON DEFICIENCY ANEMIA, UNSPECIFIED IRON DEFICIENCY ANEMIA TYPE: Primary | ICD-10-CM

## 2022-05-24 PROCEDURE — 6360000002 HC RX W HCPCS: Performed by: FAMILY MEDICINE

## 2022-05-24 PROCEDURE — 96365 THER/PROPH/DIAG IV INF INIT: CPT

## 2022-05-24 PROCEDURE — 2580000003 HC RX 258: Performed by: FAMILY MEDICINE

## 2022-05-24 RX ORDER — SODIUM CHLORIDE 0.9 % (FLUSH) 0.9 %
5-40 SYRINGE (ML) INJECTION PRN
Status: CANCELLED | OUTPATIENT
Start: 2022-05-31

## 2022-05-24 RX ORDER — SODIUM CHLORIDE 9 MG/ML
INJECTION, SOLUTION INTRAVENOUS CONTINUOUS
Status: DISCONTINUED | OUTPATIENT
Start: 2022-05-24 | End: 2022-05-25 | Stop reason: HOSPADM

## 2022-05-24 RX ORDER — SODIUM CHLORIDE 9 MG/ML
INJECTION, SOLUTION INTRAVENOUS CONTINUOUS
Status: CANCELLED | OUTPATIENT
Start: 2022-05-31

## 2022-05-24 RX ORDER — ALBUTEROL SULFATE 90 UG/1
4 AEROSOL, METERED RESPIRATORY (INHALATION) PRN
Status: CANCELLED | OUTPATIENT
Start: 2022-05-31

## 2022-05-24 RX ORDER — DIPHENHYDRAMINE HYDROCHLORIDE 50 MG/ML
50 INJECTION INTRAMUSCULAR; INTRAVENOUS
Status: CANCELLED | OUTPATIENT
Start: 2022-05-31

## 2022-05-24 RX ORDER — HEPARIN SODIUM (PORCINE) LOCK FLUSH IV SOLN 100 UNIT/ML 100 UNIT/ML
500 SOLUTION INTRAVENOUS PRN
Status: CANCELLED | OUTPATIENT
Start: 2022-05-31

## 2022-05-24 RX ORDER — EPINEPHRINE 1 MG/ML
0.3 INJECTION, SOLUTION, CONCENTRATE INTRAVENOUS PRN
Status: CANCELLED | OUTPATIENT
Start: 2022-05-31

## 2022-05-24 RX ORDER — ONDANSETRON 2 MG/ML
8 INJECTION INTRAMUSCULAR; INTRAVENOUS
Status: CANCELLED | OUTPATIENT
Start: 2022-05-31

## 2022-05-24 RX ORDER — ACETAMINOPHEN 325 MG/1
650 TABLET ORAL
Status: CANCELLED | OUTPATIENT
Start: 2022-05-31

## 2022-05-24 RX ORDER — SODIUM CHLORIDE 0.9 % (FLUSH) 0.9 %
5-40 SYRINGE (ML) INJECTION PRN
Status: DISCONTINUED | OUTPATIENT
Start: 2022-05-24 | End: 2022-05-25 | Stop reason: HOSPADM

## 2022-05-24 RX ADMIN — FERRIC CARBOXYMALTOSE INJECTION 750 MG: 50 INJECTION, SOLUTION INTRAVENOUS at 15:40

## 2022-05-24 RX ADMIN — SODIUM CHLORIDE: 9 INJECTION, SOLUTION INTRAVENOUS at 15:39

## 2022-05-31 ENCOUNTER — HOSPITAL ENCOUNTER (OUTPATIENT)
Dept: INFUSION THERAPY | Age: 42
Setting detail: INFUSION SERIES
Discharge: HOME | End: 2022-05-31
Payer: MEDICAID

## 2022-05-31 ENCOUNTER — TELEPHONE (OUTPATIENT)
Dept: INFUSION THERAPY | Age: 42
End: 2022-05-31

## 2022-05-31 VITALS
TEMPERATURE: 97.2 F | HEART RATE: 66 BPM | OXYGEN SATURATION: 100 % | SYSTOLIC BLOOD PRESSURE: 120 MMHG | RESPIRATION RATE: 20 BRPM | DIASTOLIC BLOOD PRESSURE: 78 MMHG

## 2022-05-31 DIAGNOSIS — D50.9 IRON DEFICIENCY ANEMIA, UNSPECIFIED IRON DEFICIENCY ANEMIA TYPE: Primary | ICD-10-CM

## 2022-05-31 PROCEDURE — 2580000003 HC RX 258: Performed by: FAMILY MEDICINE

## 2022-05-31 PROCEDURE — 96365 THER/PROPH/DIAG IV INF INIT: CPT

## 2022-05-31 PROCEDURE — 6360000002 HC RX W HCPCS: Performed by: FAMILY MEDICINE

## 2022-05-31 RX ORDER — ALBUTEROL SULFATE 90 UG/1
4 AEROSOL, METERED RESPIRATORY (INHALATION) PRN
Status: CANCELLED | OUTPATIENT
Start: 2022-05-31

## 2022-05-31 RX ORDER — ONDANSETRON 2 MG/ML
8 INJECTION INTRAMUSCULAR; INTRAVENOUS
Status: CANCELLED | OUTPATIENT
Start: 2022-05-31

## 2022-05-31 RX ORDER — SODIUM CHLORIDE 9 MG/ML
INJECTION, SOLUTION INTRAVENOUS CONTINUOUS
Status: CANCELLED | OUTPATIENT
Start: 2022-05-31

## 2022-05-31 RX ORDER — EPINEPHRINE 1 MG/ML
0.3 INJECTION, SOLUTION, CONCENTRATE INTRAVENOUS PRN
Status: CANCELLED | OUTPATIENT
Start: 2022-05-31

## 2022-05-31 RX ORDER — SODIUM CHLORIDE 9 MG/ML
INJECTION, SOLUTION INTRAVENOUS CONTINUOUS
Status: DISCONTINUED | OUTPATIENT
Start: 2022-05-31 | End: 2022-06-01 | Stop reason: HOSPADM

## 2022-05-31 RX ORDER — DIPHENHYDRAMINE HYDROCHLORIDE 50 MG/ML
50 INJECTION INTRAMUSCULAR; INTRAVENOUS
Status: CANCELLED | OUTPATIENT
Start: 2022-05-31

## 2022-05-31 RX ORDER — ACETAMINOPHEN 325 MG/1
650 TABLET ORAL
Status: CANCELLED | OUTPATIENT
Start: 2022-05-31

## 2022-05-31 RX ORDER — SODIUM CHLORIDE 0.9 % (FLUSH) 0.9 %
5-40 SYRINGE (ML) INJECTION PRN
Status: CANCELLED | OUTPATIENT
Start: 2022-05-31

## 2022-05-31 RX ORDER — HEPARIN SODIUM (PORCINE) LOCK FLUSH IV SOLN 100 UNIT/ML 100 UNIT/ML
500 SOLUTION INTRAVENOUS PRN
Status: CANCELLED | OUTPATIENT
Start: 2022-05-31

## 2022-05-31 RX ADMIN — FERRIC CARBOXYMALTOSE INJECTION 750 MG: 50 INJECTION, SOLUTION INTRAVENOUS at 14:34

## 2022-05-31 RX ADMIN — SODIUM CHLORIDE: 9 INJECTION, SOLUTION INTRAVENOUS at 14:13

## 2022-05-31 NOTE — TELEPHONE ENCOUNTER
Tried to call Ms. Richardson and her mailbox was full. She called back and I was able to speak to her about her insurance and it should be covered in full. I gave her my name and number in case she had any future billing questions.       [address] [facility]  [phone]

## 2022-06-02 NOTE — PROGRESS NOTES
[facility] PHYSICIAN SERVICES  [facility]  [address] [facility] [address] [address]  Dept: [phone]  Dept Fax: [phone]  Loc: [phone]    Adrian Bernardo is a 39 y.o. female who presents today for her medical conditions/complaints as noted below. Adrian Bernardo is here for Forms        HPI:   CC: Here today to discuss the followin-year-old here for follow-up. She is received 2 infusions of Injectafer for iron deficiency anemia. She has had issues with dysfunctional uterine bleeding but she states that is been better lately. She is had no hematochezia or melena reported. She is feeling somewhat better since her infusions but still having daytime fatigue. She is denying any nausea vomiting or abdominal pain. She was last here on May 12. She was having significant fatigue and was discovered to have significant iron deficiency anemia. Delay in receiving infusion of iron was due to insurance coverage. She received her first infusion on May 24      HPI    Subjective:      Review of Systems   Constitutional: Positive for fatigue. Gastrointestinal: Negative for abdominal distention, abdominal pain, anal bleeding, blood in stool, constipation and diarrhea. Genitourinary: Negative for hematuria. SeeHPI for visit specific review of symptoms. All others negative      Objective:   /76   Pulse 69   Temp 98.1 °F (36.7 °C)   Wt 257 lb (116.6 kg)   SpO2 100%   BMI 47.01 kg/m²   Physical Exam  Constitutional:       Appearance: She is well-developed. She is not ill-appearing. Cardiovascular:      Rate and Rhythm: Normal rate and regular rhythm. Heart sounds: No murmur heard. No friction rub. Pulmonary:      Effort: Pulmonary effort is normal. No respiratory distress. Breath sounds: Normal breath sounds. No wheezing or rales. Abdominal:      General: There is no distension. Palpations: Abdomen is soft. Tenderness:  There is no abdominal tenderness. Musculoskeletal:      Cervical back: Neck supple. No tenderness. Lymphadenopathy:      Cervical: No cervical adenopathy. Neurological:      Mental Status: She is alert.    Psychiatric:         Behavior: Behavior normal.           Recent Results (from the past 672 hour(s))   POCT glycosylated hemoglobin (Hb A1C)    Collection Time: 05/12/22 12:00 AM   Result Value Ref Range    Hemoglobin A1C 5.6 %   Vitamin B12    Collection Time: 05/12/22  2:55 PM   Result Value Ref Range    Vitamin B-12 570 211 - 946 pg/mL   Iron    Collection Time: 05/12/22  2:55 PM   Result Value Ref Range    Iron 16 (L) 37 - 145 ug/dL   CBC with Auto Differential    Collection Time: 05/12/22  2:55 PM   Result Value Ref Range    WBC 6.7 4.8 - 10.8 K/uL    RBC 4.54 4.20 - 5.40 M/uL    Hemoglobin 9.6 (L) 12.0 - 16.0 g/dL    Hematocrit 33.2 (L) 37.0 - 47.0 %    MCV 73.1 (L) 81.0 - 99.0 fL    MCH 21.1 (L) 27.0 - 31.0 pg    MCHC 28.9 (L) 33.0 - 37.0 g/dL    RDW 15.7 (H) 11.5 - 14.5 %    Platelets 414 760 - 291 K/uL    MPV 11.3 9.4 - 12.3 fL    PLATELET SLIDE REVIEW Adequate     Neutrophils % 58.3 50.0 - 65.0 %    Lymphocytes % 27.8 20.0 - 40.0 %    Monocytes % 8.7 0.0 - 10.0 %    Eosinophils % 4.2 0.0 - 5.0 %    Basophils % 0.7 0.0 - 1.0 %    Neutrophils Absolute 3.9 1.5 - 7.5 K/uL    Immature Granulocytes # 0.0 K/uL    Lymphocytes Absolute 1.9 1.1 - 4.5 K/uL    Monocytes Absolute 0.60 0.00 - 0.90 K/uL    Eosinophils Absolute 0.30 0.00 - 0.60 K/uL    Basophils Absolute 0.10 0.00 - 0.20 K/uL    Anisocytosis 1+ (A)     Microcytes 1+ (A)     Hypochromia 3+ (A)     Ovalocytes 1+ (A)    Ferritin    Collection Time: 05/12/22  2:55 PM   Result Value Ref Range    Ferritin 13.5 13.0 - 150.0 ng/mL   Folate    Collection Time: 05/12/22  2:55 PM   Result Value Ref Range    Folate 6.5 4.8 - 37.3 ng/mL   Iron Binding Capacity    Collection Time: 05/12/22  2:55 PM   Result Value Ref Range    TIBC 416 (H) 250 - 400 ug/dL Assessment & Plan: The following diagnoses and conditions are stable with no further orders unless indicated:  1. Iron deficiency anemia due to chronic blood loss  She is now feeling much better at the moment. She just completed her last iron infusion on May 24. She denies any hematuria, menorrhagia, or hematochezia or melena. No abdominal pain. Report any change in symptoms.  - Demetrice Rodriguez PA-C, Hematology/Oncology, [place]  - CBC with Auto Differential; Future  - Ferritin; Future  - Iron; Future  - Total Iron Binding Capacity; Future            Return in about 18 days (around 6/21/2022) for Routine follow up - 15 minute visit. Discussed use, benefit, and side effects of prescribed medications. All patient questions answered. Pt voiced understanding. Reviewed health maintenance. Instructedto continue current medications, diet and exercise. Patient agreed with treatmentplan.  Follow up as directed.     _______________________________________________________________      Past Medical History:   Diagnosis Date    Anemia     Chronic rheumatic arthritis ([address]     Elevated blood pressure reading     Fibromyalgia     GERD (gastroesophageal reflux disease)     Hypertension     Intracranial hypertension     Obesity     PCOS (polycystic ovarian syndrome)     Sleep apnea     bi pap    SOB (shortness of breath)     Trouble swallowing     Type 2 diabetes mellitus (HCC)       Past Surgical History:   Procedure Laterality Date    BREAST BIOPSY Right 2019    benign    CHOLECYSTECTOMY      UPPER GASTROINTESTINAL ENDOSCOPY N/A 02/01/2022    Dr Jake Scruggs-w/[id number] appearing stricture noted in the distal esophagus, gastritis, no h pylori    UPPER GASTROINTESTINAL ENDOSCOPY  02/01/2022    Dr Jake Scruggs-w/[id number] appearing stricture noted in the distal esophagus, gastritis, no h pylori       Family History   Problem Relation Age of Onset    High Blood Pressure Father     Diabetes Father     Atrial Fibrillation Father     Heart Failure Father     Stroke Father     Diabetes Sister     Diabetes Maternal Grandmother     Heart Attack Other     Ovarian Cancer Paternal Grandmother     Colon Polyps Neg Hx     Crohn's Disease Neg Hx     Esophageal Cancer Neg Hx     Liver Cancer Neg Hx     Rectal Cancer Neg Hx     Stomach Cancer Neg Hx        Social History     Tobacco Use    Smoking status: Former Smoker     Years: 2.00     Types: Cigarettes     Start date: 1996     Quit date: 1998     Years since quittin.4    Smokeless tobacco: Never Used   Substance Use Topics    Alcohol use: Yes     Alcohol/week: 1.0 standard drink     Types: 1 Glasses of wine per week     Comment: rarely     Current Outpatient Medications   Medication Sig Dispense Refill    omeprazole (PRILOSEC) 40 MG delayed release capsule Take 1 capsule by mouth 2 times daily 60 capsule 3    metFORMIN (GLUCOPHAGE) 500 MG tablet TAKE 1 TABLET BY MOUTH TWICE A DAY WITH MEALS 180 tablet 1    carvedilol (COREG) 6.25 MG tablet TAKE 1 TABLET BY MOUTH TWICE A  tablet 1    ferric carboxymaltose (INJECTAFER) 750 MG/15ML SOLN IV solution Two Infusions to be given 7 days apart. 15 mL 1    busPIRone (BUSPAR) 5 MG tablet TAKE 1 TABLET BY MOUTH 2 TIMES DAILY AS NEEDED (ANXIETY).  180 tablet 2    carvedilol (COREG) 12.5 MG tablet Take 1 tablet by mouth 2 times daily 60 tablet 5    predniSONE (DELTASONE) 5 MG tablet daily      hydroCHLOROthiazide (HYDRODIURIL) 25 MG tablet TAKE 1 TABLET BY MOUTH EVERY DAY IN THE MORNING (Patient taking differently: as needed **as needed**) 90 tablet 1    hydroxychloroquine (PLAQUENIL) 200 MG tablet TAKE 1 TABLET BY MOUTH DAILY FOR 7 DAYS, THEN INCREASE TO 2 TABLETS BY MOUTH DAILY      mupirocin (BACTROBAN) 2 % ointment as needed       losartan (COZAAR) 100 MG tablet TAKE 1 TABLET BY MOUTH EVERY DAY 90 tablet 1    albuterol sulfate HFA (VENTOLIN HFA) 108 (90 Base) MCG/ACT inhaler Inhale 2 puffs into the lungs 4 times daily as needed for Wheezing 18 g 5    ondansetron (ZOFRAN ODT) 4 MG disintegrating tablet Take 1 tablet by mouth every 8 hours as needed for Nausea or Vomiting 15 tablet 0    Norgestim-Eth Estrad Triphasic (TRI-ESTARYLLA) 0.18/0.215/0.25 MG-35 MCG TABS Take 1 packet by mouth daily 84 tablet 3    amLODIPine (NORVASC) 5 MG tablet TAKE 1 TABLET BY MOUTH TWICE A  tablet 1    KLOR-CON M20 20 MEQ extended release tablet TAKE 1 TABLET BY MOUTH EVERY DAY (Patient taking differently: as needed **as needed**) 30 tablet 0    blood glucose test strips (ASCENSIA AUTODISC VI;ONE TOUCH ULTRA TEST VI) strip 1 each by In Vitro route 3 times daily As needed. 100 each 11    valACYclovir (VALTREX) 1 g tablet Take 2 tablets p.o. every 12 hours for 2 doses. May repeat for further outbreaks (Patient taking differently: as needed ) 8 tablet 2    Blood Glucose Monitoring Suppl (FREESTYLE LITE) ANDREEA USE AS DIRECTED      Lancet Device MISC 1 lancet twice a day 60 Device 11    furosemide (LASIX) 20 MG tablet Take 1 tablet by mouth daily for 3 days (Patient taking differently: Take 20 mg by mouth as needed **as needed**) 3 tablet 0     No current facility-administered medications for this visit.      Allergies   Allergen Reactions    Mushroom Extract Complex        Health Maintenance   Topic Date Due    Varicella vaccine (1 of 2 - 2-dose childhood series) Never done    Pneumococcal 0-64 years Vaccine (1 - PCV) Never done    Hepatitis B vaccine (2 of 3 - Hep B Twinrix risk 3-dose series) 01/27/2014    Diabetic retinal exam  12/10/2019    Diabetic foot exam  06/26/2020    COVID-19 Vaccine (3 - Booster for Pfizer series) 03/12/2022    Flu vaccine (Season Ended) 09/01/2022    Diabetic microalbuminuria test  10/05/2022    Lipids  10/05/2022    Depression Screen  04/14/2023    A1C test (Diabetic or Prediabetic)  05/12/2023    Cervical cancer screen 12/22/2025    DTaP/Tdap/Td vaccine (2 - Td or Tdap) 04/25/2027    Hepatitis C screen  Completed    HIV screen  Completed    Hepatitis A vaccine  Aged Out    Hib vaccine  Aged Out    Meningococcal (ACWY) vaccine  Aged Out       _______________________________________________________________    Note dictated using [id number] [facility]  Sometimes this dictation software makes erroneous transcriptions.

## 2022-06-03 ENCOUNTER — OFFICE VISIT (OUTPATIENT)
Dept: FAMILY MEDICINE CLINIC | Age: 42
End: 2022-06-03
Payer: MEDICAID

## 2022-06-03 VITALS
TEMPERATURE: 98.1 F | BODY MASS INDEX: 47.01 KG/M2 | HEART RATE: 69 BPM | SYSTOLIC BLOOD PRESSURE: 124 MMHG | OXYGEN SATURATION: 100 % | DIASTOLIC BLOOD PRESSURE: 76 MMHG | WEIGHT: 257 LBS

## 2022-06-03 DIAGNOSIS — D50.0 IRON DEFICIENCY ANEMIA DUE TO CHRONIC BLOOD LOSS: Primary | ICD-10-CM

## 2022-06-03 PROCEDURE — 1036F TOBACCO NON-USER: CPT | Performed by: FAMILY MEDICINE

## 2022-06-03 PROCEDURE — G8417 CALC BMI ABV UP PARAM F/U: HCPCS | Performed by: FAMILY MEDICINE

## 2022-06-03 PROCEDURE — G8427 DOCREV CUR MEDS BY ELIG CLIN: HCPCS | Performed by: FAMILY MEDICINE

## 2022-06-03 PROCEDURE — 99213 OFFICE O/P EST LOW 20 MIN: CPT | Performed by: FAMILY MEDICINE

## 2022-06-03 RX ORDER — OMEPRAZOLE 40 MG/1
40 CAPSULE, DELAYED RELEASE ORAL 2 TIMES DAILY
Qty: 60 CAPSULE | Refills: 3 | Status: SHIPPED | OUTPATIENT
Start: 2022-06-03 | End: 2022-08-22 | Stop reason: SDUPTHER

## 2022-06-06 ENCOUNTER — HOSPITAL ENCOUNTER (OUTPATIENT)
Dept: NON INVASIVE DIAGNOSTICS | Age: 42
Discharge: HOME | End: 2022-06-06
Payer: MEDICAID

## 2022-06-06 DIAGNOSIS — R06.02 SOB (SHORTNESS OF BREATH): ICD-10-CM

## 2022-06-06 DIAGNOSIS — R55 NEAR SYNCOPE: ICD-10-CM

## 2022-06-06 LAB
LV EF: 63 %
LVEF MODALITY: NORMAL

## 2022-06-06 PROCEDURE — 93306 TTE W/DOPPLER COMPLETE: CPT

## 2022-06-08 ENCOUNTER — HOSPITAL ENCOUNTER (OUTPATIENT)
Dept: NON INVASIVE DIAGNOSTICS | Age: 42
Discharge: HOME | End: 2022-06-08
Payer: MEDICAID

## 2022-06-08 ENCOUNTER — TELEPHONE (OUTPATIENT)
Dept: FAMILY MEDICINE CLINIC | Age: 42
End: 2022-06-08

## 2022-06-08 DIAGNOSIS — D50.0 IRON DEFICIENCY ANEMIA DUE TO CHRONIC BLOOD LOSS: Primary | ICD-10-CM

## 2022-06-08 PROCEDURE — 93017 CV STRESS TEST TRACING ONLY: CPT

## 2022-06-08 ASSESSMENT — ENCOUNTER SYMPTOMS
CONSTIPATION: 0
DIARRHEA: 0
ANAL BLEEDING: 0
ABDOMINAL PAIN: 0
BLOOD IN STOOL: 0
ABDOMINAL DISTENTION: 0

## 2022-06-08 NOTE — TELEPHONE ENCOUNTER
I placed a referral to hematology for further recommendations regarding her iron deficiency anemia. She should probably talk to her gynecologist as well as it is possible that dysfunctional uterine bleeding is contributing to this.     I also need to check a CBC and repeat iron studies

## 2022-06-09 DIAGNOSIS — D50.0 IRON DEFICIENCY ANEMIA DUE TO CHRONIC BLOOD LOSS: ICD-10-CM

## 2022-06-09 LAB
ANISOCYTOSIS: ABNORMAL
BASOPHILS ABSOLUTE: 0.1 K/UL (ref 0–0.2)
BASOPHILS RELATIVE PERCENT: 0.9 % (ref 0–1)
EOSINOPHILS ABSOLUTE: 0.4 K/UL (ref 0–0.6)
EOSINOPHILS RELATIVE PERCENT: 5.3 % (ref 0–5)
FERRITIN: 578.9 NG/ML (ref 13–150)
HCT VFR BLD CALC: 35.7 % (ref 37–47)
HEMOGLOBIN: 10.6 G/DL (ref 12–16)
HYPOCHROMIA: ABNORMAL
IMMATURE GRANULOCYTES #: 0 K/UL
IRON: 57 UG/DL (ref 37–145)
LYMPHOCYTES ABSOLUTE: 1.8 K/UL (ref 1.1–4.5)
LYMPHOCYTES RELATIVE PERCENT: 27.6 % (ref 20–40)
MCH RBC QN AUTO: 22.9 PG (ref 27–31)
MCHC RBC AUTO-ENTMCNC: 29.7 G/DL (ref 33–37)
MCV RBC AUTO: 77.3 FL (ref 81–99)
MONOCYTES ABSOLUTE: 0.4 K/UL (ref 0–0.9)
MONOCYTES RELATIVE PERCENT: 6.1 % (ref 0–10)
NEUTROPHILS ABSOLUTE: 3.9 K/UL (ref 1.5–7.5)
NEUTROPHILS RELATIVE PERCENT: 59.5 % (ref 50–65)
OVALOCYTES: ABNORMAL
PDW BLD-RTO: 23.8 % (ref 11.5–14.5)
PLATELET # BLD: 248 K/UL (ref 130–400)
PMV BLD AUTO: 12.4 FL (ref 9.4–12.3)
RBC # BLD: 4.62 M/UL (ref 4.2–5.4)
SCHISTOCYTES: ABNORMAL
TOTAL IRON BINDING CAPACITY: 338 UG/DL (ref 250–400)
WBC # BLD: 6.6 K/UL (ref 4.8–10.8)

## 2022-06-09 RX ORDER — AMLODIPINE BESYLATE 5 MG/1
TABLET ORAL
Qty: 180 TABLET | Refills: 1 | Status: SHIPPED | OUTPATIENT
Start: 2022-06-09 | End: 2022-09-26 | Stop reason: SDUPTHER

## 2022-06-14 RX ORDER — BLOOD SUGAR DIAGNOSTIC
STRIP MISCELLANEOUS
Qty: 100 STRIP | Refills: 11 | Status: SHIPPED | OUTPATIENT
Start: 2022-06-14

## 2022-06-21 ENCOUNTER — OFFICE VISIT (OUTPATIENT)
Dept: FAMILY MEDICINE CLINIC | Age: 42
End: 2022-06-21
Payer: MEDICAID

## 2022-06-21 VITALS
HEART RATE: 65 BPM | BODY MASS INDEX: 48.1 KG/M2 | SYSTOLIC BLOOD PRESSURE: 132 MMHG | TEMPERATURE: 99 F | OXYGEN SATURATION: 100 % | WEIGHT: 263 LBS | DIASTOLIC BLOOD PRESSURE: 86 MMHG

## 2022-06-21 DIAGNOSIS — J98.4 RESTRICTIVE LUNG DISEASE: ICD-10-CM

## 2022-06-21 DIAGNOSIS — K21.9 GASTROESOPHAGEAL REFLUX DISEASE WITHOUT ESOPHAGITIS: ICD-10-CM

## 2022-06-21 DIAGNOSIS — E11.9 TYPE 2 DIABETES MELLITUS WITHOUT COMPLICATION, WITHOUT LONG-TERM CURRENT USE OF INSULIN (HCC): ICD-10-CM

## 2022-06-21 DIAGNOSIS — N92.0 MENORRHAGIA WITH REGULAR CYCLE: ICD-10-CM

## 2022-06-21 DIAGNOSIS — I10 ESSENTIAL (PRIMARY) HYPERTENSION: Primary | ICD-10-CM

## 2022-06-21 DIAGNOSIS — E28.2 PCOS (POLYCYSTIC OVARIAN SYNDROME): ICD-10-CM

## 2022-06-21 DIAGNOSIS — M05.79 RHEUMATOID ARTHRITIS INVOLVING MULTIPLE SITES WITH POSITIVE RHEUMATOID FACTOR (HCC): ICD-10-CM

## 2022-06-21 DIAGNOSIS — D50.9 IRON DEFICIENCY ANEMIA, UNSPECIFIED IRON DEFICIENCY ANEMIA TYPE: ICD-10-CM

## 2022-06-21 DIAGNOSIS — R13.19 ESOPHAGEAL DYSPHAGIA: ICD-10-CM

## 2022-06-21 DIAGNOSIS — D64.9 ANEMIA, UNSPECIFIED TYPE: ICD-10-CM

## 2022-06-21 DIAGNOSIS — E78.01 FAMILIAL HYPERCHOLESTEROLEMIA: ICD-10-CM

## 2022-06-21 PROBLEM — R55 NEAR SYNCOPE: Status: RESOLVED | Noted: 2022-05-19 | Resolved: 2022-06-21

## 2022-06-21 PROBLEM — R00.0 TACHYCARDIA: Status: RESOLVED | Noted: 2022-05-19 | Resolved: 2022-06-21

## 2022-06-21 PROCEDURE — G8427 DOCREV CUR MEDS BY ELIG CLIN: HCPCS | Performed by: FAMILY MEDICINE

## 2022-06-21 PROCEDURE — 3044F HG A1C LEVEL LT 7.0%: CPT | Performed by: FAMILY MEDICINE

## 2022-06-21 PROCEDURE — 2022F DILAT RTA XM EVC RTNOPTHY: CPT | Performed by: FAMILY MEDICINE

## 2022-06-21 PROCEDURE — 1036F TOBACCO NON-USER: CPT | Performed by: FAMILY MEDICINE

## 2022-06-21 PROCEDURE — 99214 OFFICE O/P EST MOD 30 MIN: CPT | Performed by: FAMILY MEDICINE

## 2022-06-21 PROCEDURE — G8417 CALC BMI ABV UP PARAM F/U: HCPCS | Performed by: FAMILY MEDICINE

## 2022-06-21 ASSESSMENT — ENCOUNTER SYMPTOMS
COUGH: 1
BACK PAIN: 1
SHORTNESS OF BREATH: 1

## 2022-06-21 NOTE — PROGRESS NOTES
[facility] PHYSICIAN SERVICES  [facility] FAMILY MEDICINE  [address] [facility] [address]  [address] [address]  Dept: [phone]  Dept Fax: [phone]  Loc: [phone]    Lyle Arredondo is a 39 y.o. female who presents today for her medical conditions/complaints as noted below. Lyle Arredondo is here for Follow-up        HPI:   CC: Here today to discuss the following:    Last here on Eryn 3. She is has iron deficiency anemia and has received 2 infusions of Injectafer. She has denied hematochezia melena or hematemesis. She states she has a history of dysfunctional uterine bleeding but has been better lately. Otherwise she is unsure why she is so anemic. Hypertension  Compliant with medications. No adverse effects from medication. No lightheadedness, palpitations, or chest pain. Diabetes Mellitus  Has been compliant with medications. No side effects of medications since last visit. No polyuria, polydipsia, or vision changes since last visit. No symptomatic episodes of hypoglycemia. Hyperlipidemia  Tolerating current cholesterol medication without side effects. . Attempting to reduce processed sugar and cholesterol from diet. She continues to have pain from fibromyalgia and rheumatoid arthritis. She is tried medications for fibromyalgia including duloxetine and gabapentin. She is not currently interested in trying something different. Continues to see gyn at [facility] to see rheumatology. Appointment with pulm: 6/30  Appointment with heme/onc: 7/8  Appointment with cardiology: 7/12  Appointment with gastro: 8/19    Return to work 6/24. HPI    Subjective:      Review of Systems   Constitutional: Positive for fatigue. Negative for chills and fever. Respiratory: Positive for cough and shortness of breath. Genitourinary: Positive for vaginal bleeding. Musculoskeletal: Positive for arthralgias, back pain, myalgias and neck pain. Negative for gait problem and joint swelling. Review of Systems         [facility] for visit specific review of symptoms. All others negative      Objective:   /86   Pulse 65   Temp 99 °F (37.2 °C)   Wt 263 lb (119.3 kg)   SpO2 100%   BMI 48.10 kg/m²   Physical Exam  Constitutional:       Appearance: She is well-developed. She is not ill-appearing. Cardiovascular:      Rate and Rhythm: Normal rate and regular rhythm. Heart sounds: No murmur heard. No friction rub. Pulmonary:      Effort: Pulmonary effort is normal. No respiratory distress. Breath sounds: Normal breath sounds. No wheezing or rales. Abdominal:      General: There is no distension. Palpations: Abdomen is soft. Tenderness: There is no abdominal tenderness. Musculoskeletal:      Cervical back: Neck supple. No tenderness. Lymphadenopathy:      Cervical: No cervical adenopathy. Neurological:      Mental Status: She is alert. Psychiatric:         Behavior: Behavior normal.       Physical Exam   Constitutional: She appears well-developed. Does not appear ill. Neck: Neck supple. No masses. Neck Symmetric. Normal tracheal position. No thyroid enlargement  Cardiovascular: Normal rate and regular rhythm. Exam reveals no friction rub. No murmur heard. Respiratory:  Effort normal and breath sounds normal. No respiratory distress. No wheezes. No rales. No use of accessory muscles or intercostal retractions. Neurological: alert. Psychiatric: normal mood and affect.  Her behavior is normal.       Recent Results (from the past 672 hour(s))   Echo 2d w doppler w color w contrast    Collection Time: 06/06/22 10:44 AM   Result Value Ref Range    Left Ventricular Ejection Fraction 63     LVEF MODALITY ECHO    Iron    Collection Time: 06/09/22  3:34 PM   Result Value Ref Range    Iron 57 37 - 145 ug/dL   Ferritin    Collection Time: 06/09/22  3:34 PM   Result Value Ref Range    Ferritin 578.9 (H) 13.0 - 150.0 ng/mL   Iron Binding Capacity    Collection Time: 06/09/22  3:34 PM   Result Value Ref Range    TIBC 338 250 - 400 ug/dL   CBC with Auto Differential    Collection Time: 06/09/22  3:35 PM   Result Value Ref Range    WBC 6.6 4.8 - 10.8 K/uL    RBC 4.62 4.20 - 5.40 M/uL    Hemoglobin 10.6 (L) 12.0 - 16.0 g/dL    Hematocrit 35.7 (L) 37.0 - 47.0 %    MCV 77.3 (L) 81.0 - 99.0 fL    MCH 22.9 (L) 27.0 - 31.0 pg    MCHC 29.7 (L) 33.0 - 37.0 g/dL    RDW 23.8 (H) 11.5 - 14.5 %    Platelets 850 399 - 481 K/uL    MPV 12.4 (H) 9.4 - 12.3 fL    Neutrophils % 59.5 50.0 - 65.0 %    Lymphocytes % 27.6 20.0 - 40.0 %    Monocytes % 6.1 0.0 - 10.0 %    Eosinophils % 5.3 (H) 0.0 - 5.0 %    Basophils % 0.9 0.0 - 1.0 %    Neutrophils Absolute 3.9 1.5 - 7.5 K/uL    Immature Granulocytes # 0.0 K/uL    Lymphocytes Absolute 1.8 1.1 - 4.5 K/uL    Monocytes Absolute 0.40 0.00 - 0.90 K/uL    Eosinophils Absolute 0.40 0.00 - 0.60 K/uL    Basophils Absolute 0.10 0.00 - 0.20 K/uL    Anisocytosis 3+ (A)     Hypochromia 1+ (A)     Schistocytes Occasional (A)     Ovalocytes Occasional (A)                Assessment & Plan: The following diagnoses and conditions are stable with no further orders unless indicated:  1. Essential (primary) hypertension  BP Readings from Last 3 Encounters:   06/21/22 132/86   06/03/22 124/76   05/31/22 120/78     Stable. 2. Type 2 diabetes mellitus without complication, without long-term current use of insulin (HCC)  Lab Results   Component Value Date    LABA1C 5.6 05/12/2022    LABA1C 6.6 (H) 10/05/2021    LABA1C 6.5 (H) 07/31/2021     Lab Results   Component Value Date    LABMICR 3.70 10/05/2021    LDLCALC 94 10/05/2021    CREATININE 0.9 04/11/2022     stable    3. Anemia, unspecified type  Lab Results   Component Value Date    HGB 10.6 (L) 06/09/2022     Completed two infusion of injectafer. Has appointment with hematology  Recommend she speak to her gynecologist as well.      4. Familial hypercholesterolemia  Lab Results   Component Value Date    CHOL 163 10/05/2021    CHOL 167 07/31/2021    CHOL 148 (L) 08/25/2020     Lab Results   Component Value Date    TRIG 68 10/05/2021    TRIG 54 07/31/2021    TRIG 86 08/25/2020     Lab Results   Component Value Date    HDL 55 (L) 10/05/2021    HDL 51 (L) 07/31/2021    HDL 49 (L) 08/25/2020     Lab Results   Component Value Date    LDLCALC 94 10/05/2021    LDLCALC 105 07/31/2021    LDLCALC 82 08/25/2020     No results found for: LABVLDL, VLDL  No results found for: CHOLHDLRATIO  Recheck lipids next visit. 5. Rheumatoid arthritis involving multiple sites with positive rheumatoid factor ([facility]  Continue with recommendations per rheumatology    6. PCOS (polycystic ovarian syndrome)  Metformin  Recommend weight loss    7. Iron deficiency anemia, unspecified iron deficiency anemia type  Recheck cbc    8. Menorrhagia with regular cycle  Discussed with her gynecologist    9. Gastroesophageal reflux disease without esophagitis/Esophageal dysphagia  Overall symptoms are stable. She denies any medic easier melena. Remains on Prilosec 40 mg twice daily. She has a follow-up with her gastroenterologist in August    10. Restrictive lung disease  Follows up with pulmonology on June 30              No follow-ups on file. Discussed use, benefit, and side effects of prescribed medications. All patient questions answered. Pt voiced understanding. Reviewed health maintenance. Instructedto continue current medications, diet and exercise. Patient agreed with treatmentplan.  Follow up as directed.     _______________________________________________________________      Past Medical History:   Diagnosis Date    Anemia     Chronic rheumatic arthritis ([facility]     Elevated blood pressure reading     Fibromyalgia     GERD (gastroesophageal reflux disease)     Hypertension     Intracranial hypertension     Obesity     PCOS (polycystic ovarian syndrome)     Sleep apnea     bi pap    SOB (shortness of breath)     Trouble swallowing     Type 2 diabetes mellitus ([facility]       Past Surgical History:   Procedure Laterality Date    BREAST BIOPSY Right 2019    benign    CHOLECYSTECTOMY      UPPER GASTROINTESTINAL ENDOSCOPY N/A 2022    Dr Bucky [id number] appearing stricture noted in the distal esophagus, gastritis, no h pylori    UPPER GASTROINTESTINAL ENDOSCOPY  2022    Dr Bucky [id number] appearing stricture noted in the distal esophagus, gastritis, no h pylori       Family History   Problem Relation Age of Onset    High Blood Pressure Father     Diabetes Father     Atrial Fibrillation Father     Heart Failure Father     Stroke Father     Diabetes Sister     Diabetes Maternal Grandmother     Heart Attack Other     Ovarian Cancer Paternal Grandmother     Colon Polyps Neg Hx     Crohn's Disease Neg Hx     Esophageal Cancer Neg Hx     Liver Cancer Neg Hx     Rectal Cancer Neg Hx     Stomach Cancer Neg Hx        Social History     Tobacco Use    Smoking status: Former Smoker     Years: 2.00     Types: Cigarettes     Start date: 1996     Quit date: 1998     Years since quittin.4    Smokeless tobacco: Never Used   Substance Use Topics    Alcohol use: Yes     Alcohol/week: 1.0 standard drink     Types: 1 Glasses of wine per week     Comment: rarely     Current Outpatient Medications   Medication Sig Dispense Refill    ONETOUCH ULTRA strip 1 EACH BY IN VITRO ROUTE 3 TIMES DAILY AS NEEDED. 100 strip 11    amLODIPine (NORVASC) 5 MG tablet TAKE 1 TABLET BY MOUTH TWICE A  tablet 1    omeprazole (PRILOSEC) 40 MG delayed release capsule Take 1 capsule by mouth 2 times daily 60 capsule 3    metFORMIN (GLUCOPHAGE) 500 MG tablet TAKE 1 TABLET BY MOUTH TWICE A DAY WITH MEALS 180 tablet 1    ferric carboxymaltose (INJECTAFER) 750 MG/15ML SOLN IV solution Two Infusions to be given 7 days apart.  15 mL 1    busPIRone (BUSPAR) 5 MG tablet TAKE 1 TABLET BY MOUTH 2 TIMES DAILY AS NEEDED (ANXIETY). 180 tablet 2    carvedilol (COREG) 12.5 MG tablet Take 1 tablet by mouth 2 times daily 60 tablet 5    predniSONE (DELTASONE) 5 MG tablet daily      hydroCHLOROthiazide (HYDRODIURIL) 25 MG tablet TAKE 1 TABLET BY MOUTH EVERY DAY IN THE MORNING (Patient taking differently: as needed **as needed**) 90 tablet 1    hydroxychloroquine (PLAQUENIL) 200 MG tablet TAKE 1 TABLET BY MOUTH DAILY FOR 7 DAYS, THEN INCREASE TO 2 TABLETS BY MOUTH DAILY      mupirocin (BACTROBAN) 2 % ointment as needed       losartan (COZAAR) 100 MG tablet TAKE 1 TABLET BY MOUTH EVERY DAY 90 tablet 1    albuterol sulfate HFA (VENTOLIN HFA) 108 (90 Base) MCG/ACT inhaler Inhale 2 puffs into the lungs 4 times daily as needed for Wheezing 18 g 5    ondansetron (ZOFRAN ODT) 4 MG disintegrating tablet Take 1 tablet by mouth every 8 hours as needed for Nausea or Vomiting 15 tablet 0    Norgestim-Eth Estrad Triphasic (TRI-ESTARYLLA) 0.18/0.215/0.25 MG-35 MCG TABS Take 1 packet by mouth daily 84 tablet 3    KLOR-CON M20 20 MEQ extended release tablet TAKE 1 TABLET BY MOUTH EVERY DAY (Patient taking differently: as needed **as needed**) 30 tablet 0    valACYclovir (VALTREX) 1 g tablet Take 2 tablets p.o. every 12 hours for 2 doses. May repeat for further outbreaks (Patient taking differently: as needed ) 8 tablet 2    Blood Glucose Monitoring Suppl (FREESTYLE LITE) ANDREEA USE AS DIRECTED      Lancet Device MISC 1 lancet twice a day 60 Device 11    furosemide (LASIX) 20 MG tablet Take 1 tablet by mouth daily for 3 days (Patient taking differently: Take 20 mg by mouth as needed **as needed**) 3 tablet 0     No current facility-administered medications for this visit.      Allergies   Allergen Reactions    Mushroom Extract Complex        Health Maintenance   Topic Date Due    Varicella vaccine (1 of 2 - 2-dose childhood series) Never done    Pneumococcal 0-64 years Vaccine (1 - PCV) Never done    Hepatitis B vaccine (2 of 3 - Hep B Twinrix risk 3-dose series) 01/27/2014    Diabetic retinal exam  12/10/2019    Diabetic foot exam  06/26/2020    COVID-19 Vaccine (3 - Booster for Pfizer series) 03/12/2022    Flu vaccine (Season Ended) 09/01/2022    Diabetic microalbuminuria test  10/05/2022    Lipids  10/05/2022    Depression Screen  04/14/2023    A1C test (Diabetic or Prediabetic)  05/12/2023    Cervical cancer screen  12/22/2025    DTaP/Tdap/Td vaccine (2 - Td or Tdap) 04/25/2027    Hepatitis C screen  Completed    HIV screen  Completed    Hepatitis A vaccine  Aged Out    Hib vaccine  Aged Out    Meningococcal (ACWY) vaccine  Aged Out       _______________________________________________________________    Note dictated using Dragon Dictation software  Sometimes this dictation software makes erroneous transcriptions.

## 2022-07-07 NOTE — PROGRESS NOTES
obtained from last progress note by NATACHA Diamond from 12/4/2020:  PMH is significant for rheumatoid arthritis, fibromyalgia, type 2 diabetes, SETH with CPAP use. Zachary Najjar had been off medications for rheumatoid arthritis since May, 2020. She was taken off leflunomide due to anemia. She stopped sulfasalazine due to rash. She is now taking Humira.     Review of CBCs:  · CBC 4/15/2020: WBC 6.7, Hgb 11.1/MCV 74, platelets 785,208  · CBC 5/19/2020: WBC 6.9, Hgb 9.1/MCV 71, platelets 441,011  · CBC 8/25/2020: WBC 9.1, Hgb 9.7/MCV 70.3, platelets 218,147     Additional Labs 8/25/2020:  · CMP: creatinine 0.5/GFR >60, Ca+ 9.2, TP 7.1  · TSH: 2.5     Ferrous Sulfate po BID was initiated ~8/25/2020. Zachary Najjar had been taking folic acid on her own.      Zachary Najjar reports heavy menses. Menstrual cycle is regular, occurring every month though lasts approximately 14 days and is painful with heavy clotting. She denied significant change in bowel habits, though stated her bowels have been dark since iron initiation. She has had increased nausea since initiation of oral iron. Additional symptoms included significant fatigue, dizziness and exertional dyspnea.     Hgb at presentation 9/4/2020 was 10.4 with MCV 72.4. Parenteral iron with Injectafer 750 mg IV delivered 9/22/2020 and 9/29/2020. Occult blood stool was negative x3 on 10/1/2020. Zachary Najjar was referred to GI by her PCP. She did not keep this appointment as her likely source of anemia is related to menorrhagia. Zachary Najjar resumed BCP 11/2020 with decrease in vaginal bleeding.   She was previously followed by Dr. Meryle Loan, now establishing care with [facility]) OB/GYN.    02/17/2022 Endometrial biopsy- dyssynchronous endometrium    05/10/2022 Serology results  · Ferritin 13.7  · B12/Folate 524/6.2  · WBC 5.5, Hgb 8.8, Hct 29.5, MCV 72, platelets 442,508    05/12/2022 Serology results  · Iron 16  · TIBC 416  · Saturation 3.8%  · Ferritin 13.5  · B12/Folate 570/6.5  · WBC 6.7, Hgb 9.6, Hct 33.2, MCV 73.1, platelets 462,480    2022 and 2022- Injectafer IV 1500 mg total dose    2022 Serology results  · Iron 57  · TIBC 338  · Ferritin 578.9  · Saturation 16%  · WBC 6.6, Hgb 10.6, Hct 35.7, MCV 77.3, platelets 771,960    CBC on 2022: WBC 4.6, ANC 2.6, hemoglobin 11.2, hematocrit 36.6, MCV 80.8 and platelet count of 146,745    Age-appropriate health screenin2022 Bilateral mammogram- stable mammogram  2022 Endoscopy- esophageal dysphasia, gastritis    Past Medical History:    Past Medical History:   Diagnosis Date    Anemia     Chronic rheumatic arthritis ([address]     Elevated blood pressure reading     Fibromyalgia     GERD (gastroesophageal reflux disease)     Hypertension     Intracranial hypertension     Obesity     PCOS (polycystic ovarian syndrome)     Sleep apnea     bi pap    SOB (shortness of breath)     Trouble swallowing     Type 2 diabetes mellitus ([address]        Past Surgical History:    Past Surgical History:   Procedure Laterality Date    BREAST BIOPSY Right 2019    benign    CHOLECYSTECTOMY      UPPER GASTROINTESTINAL ENDOSCOPY N/A 2022    Dr Gretel Scruggs-[id number] appearing stricture noted in the distal esophagus, gastritis, no h pylori    UPPER GASTROINTESTINAL ENDOSCOPY  2022    Dr Gretel Scruggs-[id number] appearing stricture noted in the distal esophagus, gastritis, no h pylori       Current Medications:    Current Outpatient Medications   Medication Sig Dispense Refill    ONETOUCH ULTRA strip 1 EACH BY IN VITRO ROUTE 3 TIMES DAILY AS NEEDED. 100 strip 11    amLODIPine (NORVASC) 5 MG tablet TAKE 1 TABLET BY MOUTH TWICE A  tablet 1    omeprazole (PRILOSEC) 40 MG delayed release capsule Take 1 capsule by mouth 2 times daily 60 capsule 3    ferric carboxymaltose (INJECTAFER) 750 MG/15ML SOLN IV solution Two Infusions to be given 7 days apart.  15 mL 1    busPIRone (BUSPAR) 5 MG tablet TAKE 1 TABLET BY MOUTH 2 TIMES DAILY AS NEEDED (ANXIETY). 180 tablet 2    predniSONE (DELTASONE) 5 MG tablet 5 mg daily as needed       hydroCHLOROthiazide (HYDRODIURIL) 25 MG tablet TAKE 1 TABLET BY MOUTH EVERY DAY IN THE MORNING (Patient taking differently: as needed **as needed**) 90 tablet 1    hydroxychloroquine (PLAQUENIL) 200 MG tablet TAKE 1 TABLET BY MOUTH DAILY FOR 7 DAYS, THEN INCREASE TO 2 TABLETS BY MOUTH DAILY      mupirocin (BACTROBAN) 2 % ointment as needed       losartan (COZAAR) 100 MG tablet TAKE 1 TABLET BY MOUTH EVERY DAY 90 tablet 1    albuterol sulfate HFA (VENTOLIN HFA) 108 (90 Base) MCG/ACT inhaler Inhale 2 puffs into the lungs 4 times daily as needed for Wheezing 18 g 5    ondansetron (ZOFRAN ODT) 4 MG disintegrating tablet Take 1 tablet by mouth every 8 hours as needed for Nausea or Vomiting 15 tablet 0    KLOR-CON M20 20 MEQ extended release tablet TAKE 1 TABLET BY MOUTH EVERY DAY (Patient taking differently: as needed **as needed**) 30 tablet 0    valACYclovir (VALTREX) 1 g tablet Take 2 tablets p.o. every 12 hours for 2 doses. May repeat for further outbreaks (Patient taking differently: as needed ) 8 tablet 2    Blood Glucose Monitoring Suppl (FREESTYLE LITE) ANDREEA USE AS DIRECTED      Lancet Device MISC 1 lancet twice a day 60 Device 11    carvedilol (COREG) 12.5 MG tablet Take 1 tablet by mouth 2 times daily 180 tablet 2    metFORMIN (GLUCOPHAGE) 500 MG tablet TAKE 1 TABLET BY MOUTH TWICE A DAY WITH MEALS (Patient not taking: Reported on 7/8/2022) 180 tablet 1    Norgestim-Eth Estrad Triphasic (TRI-ESTARYLLA) 0.18/0.215/0.25 MG-35 MCG TABS Take 1 packet by mouth daily 84 tablet 3    furosemide (LASIX) 20 MG tablet Take 1 tablet by mouth daily for 3 days (Patient taking differently: Take 20 mg by mouth as needed **as needed**) 3 tablet 0     No current facility-administered medications for this visit. Allergies:    Allergies   Allergen Reactions    Mushroom Extract Complex Social History:    Social History     Tobacco Use    Smoking status: Former Smoker     Years: 2.00     Types: Cigarettes     Start date: 1996     Quit date: 1998     Years since quittin.5    Smokeless tobacco: Never Used   Vaping Use    Vaping Use: Never used   Substance Use Topics    Alcohol use: Yes     Alcohol/week: 1.0 standard drink     Types: 1 Glasses of wine per week     Comment: rarely    Drug use: No       Family History:   Family History   Problem Relation Age of Onset    High Blood Pressure Father     Diabetes Father     Atrial Fibrillation Father     Heart Failure Father     Stroke Father     Diabetes Sister     Diabetes Maternal Grandmother     Heart Attack Other     Ovarian Cancer Paternal Grandmother     Colon Polyps Neg Hx     Crohn's Disease Neg Hx     Esophageal Cancer Neg Hx     Liver Cancer Neg Hx     Rectal Cancer Neg Hx     Stomach Cancer Neg Hx        Vitals:  Vitals:    22 0957   BP: 132/84   Site: Left Upper Arm   Pulse: 67   SpO2: 98%   Weight: 260 lb 12.8 oz (118.3 kg)   Height: 5' 2\" (1.575 m)        Subjective   REVIEW OF SYSTEMS:   Review of Systems   Constitutional: Positive for fatigue. Negative for chills, diaphoresis and fever. HENT: Negative. Negative for congestion, ear pain, hearing loss, nosebleeds, sore throat and tinnitus. Eyes: Negative. Negative for pain, discharge and redness. Respiratory: Positive for shortness of breath. Negative for cough and wheezing. Cardiovascular: Negative. Negative for chest pain, palpitations and leg swelling. Gastrointestinal: Positive for abdominal distention, nausea and vomiting. Negative for abdominal pain, blood in stool, constipation and diarrhea. Endocrine: Negative for polydipsia. Genitourinary: Negative for dysuria, flank pain, frequency, hematuria and urgency. Musculoskeletal: Positive for arthralgias, back pain, joint swelling and myalgias. Negative for neck pain. Skin: Negative. Negative for rash. Neurological: Positive for headaches. Negative for dizziness, tremors, seizures and weakness. Hematological: Does not bruise/bleed easily. Psychiatric/Behavioral: The patient is nervous/anxious. Objective   PHYSICAL EXAM:  Physical Exam  Vitals reviewed. Constitutional:       General: She is not in acute distress. Appearance: She is well-developed. HENT:      Head: Normocephalic and atraumatic. Mouth/Throat:      Pharynx: Uvula midline. Tonsils: No tonsillar exudate. Eyes:      General: Lids are normal.      Conjunctiva/sclera: Conjunctivae normal.      Pupils: Pupils are equal, round, and reactive to light. Neck:      Thyroid: No thyroid mass or thyromegaly. Vascular: No JVD. Trachea: Trachea normal. No tracheal deviation. Cardiovascular:      Rate and Rhythm: Normal rate and regular rhythm. Pulses: Normal pulses. Heart sounds: Normal heart sounds. Pulmonary:      Effort: Pulmonary effort is normal. No respiratory distress. Breath sounds: Normal breath sounds. No wheezing or rales. Chest:      Chest wall: No tenderness. Abdominal:      General: Bowel sounds are normal. There is no distension. Palpations: Abdomen is soft. There is no mass. Tenderness: There is no abdominal tenderness. There is no guarding. Musculoskeletal:         General: No tenderness or deformity. Cervical back: Normal range of motion and neck supple. Comments: Range of motion within normal limits x4 extremities   Skin:     General: Skin is warm. Findings: No bruising, erythema or rash. Neurological:      Mental Status: She is alert and oriented to person, place, and time. Cranial Nerves: No cranial nerve deficit. Coordination: Coordination normal.   Psychiatric:         Behavior: Behavior normal.         Thought Content:  Thought content normal.         Labs reviewed today:  Lab Results   Component Value Date    WBC 4.60 07/08/2022    HGB 11.2 07/08/2022    HCT 36.6 07/08/2022    MCV 80.8 07/08/2022     07/08/2022     Lab Results   Component Value Date    NEUTROABS 2.60 07/08/2022       ASSESSMENT/PLAN:      1. Iron deficiency anemia due to chronic blood loss, hemoglobin 11.2, hematocrit 36.6, MCV 80.8 today. She received IV iron replacement with Injectafer for total of 1500 mg completed on 5/31/2022  Intolerant to oral iron due to severe GI upset to include nausea, constipation, abdominal cramping. She has been evaluated by gastroenterology and EGD on 2/1/2022 revealed gastritis. Obtain the following studies for further evaluation of anemia:  - Iron and TIBC; Future  - Ferritin; Future  - Vitamin B12; Future  - Folate; Future  - Beta 2 Microglobulin, Serum; Future  - MONOCLONAL PROTEIN AND FLC, SERUM; Future  - Comprehensive Metabolic Panel; Future  - Blood Occult Stool Screen #1; Future    -If IV iron is warranted after review of labs arrangements will be made  -Encouraged iron rich foods, written documentation provided  -Follow-up with GI as recommended related to gastritis      2. Menorrhagia with regular cycle  Stopped taking her oral contraceptive approximately in April 2022 and her last menstrual cycle was 2 weeks ago with severe abdominal cramps and clots. She indicates that when she experienced her menstrual cycle she has heavy clotting for at least the first few days.    -Consider testing for von Willebrand upon return  -Encouraged to follow-up with GYN    I discussed all of the above findings included in the assessment and plan with the patient and the patient is in agreement to move forward with current recommendations/treatment. I have addressed all of their questions and concerns that were verbalized. FOLLOW UP:  1. Follow up given for 7 weeks or sooner, if needed  2. Continue to follow with other medical providers as recommended  3.  Labs at next visit: CBC, ferritin and iron panel.  Consider von Willebrand panel    EMR Dragon/Transcription disclaimer:   Much of this encounter note is an electronic transcription/translation of spoken language to printed text. The electronic translation of spoken language may permit erroneous, or at times, nonsensical words or phrases to be inadvertently transcribed; although attempts have made to review the note for such errors, some may still exist.  Please excuse any unrecognized transcription errors and contact us if the air is unintelligible or needs documented correction. Also, portions of this note have been copied forward, however, changed to reflect the most current clinical status of this patient. Electronically signed by NATACHA De Santiago on 7/13/2022 at 3:42 PM  Brandt SHARP am pre-charting as a registered nurse for NATACHA Dixon.

## 2022-07-08 ENCOUNTER — HOSPITAL ENCOUNTER (OUTPATIENT)
Dept: INFUSION THERAPY | Age: 42
Discharge: HOME | End: 2022-07-08
Payer: MEDICAID

## 2022-07-08 ENCOUNTER — OFFICE VISIT (OUTPATIENT)
Dept: HEMATOLOGY | Age: 42
End: 2022-07-08
Payer: MEDICAID

## 2022-07-08 VITALS
WEIGHT: 260.8 LBS | HEART RATE: 67 BPM | BODY MASS INDEX: 47.99 KG/M2 | SYSTOLIC BLOOD PRESSURE: 132 MMHG | OXYGEN SATURATION: 98 % | HEIGHT: 62 IN | DIASTOLIC BLOOD PRESSURE: 84 MMHG

## 2022-07-08 DIAGNOSIS — D64.9 ANEMIA, UNSPECIFIED TYPE: ICD-10-CM

## 2022-07-08 DIAGNOSIS — D50.0 IRON DEFICIENCY ANEMIA DUE TO CHRONIC BLOOD LOSS: Primary | ICD-10-CM

## 2022-07-08 DIAGNOSIS — D50.0 IRON DEFICIENCY ANEMIA DUE TO CHRONIC BLOOD LOSS: ICD-10-CM

## 2022-07-08 DIAGNOSIS — N92.0 MENORRHAGIA WITH REGULAR CYCLE: ICD-10-CM

## 2022-07-08 LAB
ALBUMIN SERPL-MCNC: 4.2 G/DL (ref 3.5–5.2)
ALP BLD-CCNC: 59 U/L (ref 35–104)
ALT SERPL-CCNC: 14 U/L (ref 9–52)
ANION GAP SERPL CALCULATED.3IONS-SCNC: 7 MMOL/L (ref 7–19)
AST SERPL-CCNC: 15 U/L (ref 14–36)
BILIRUB SERPL-MCNC: 0.6 MG/DL (ref 0.2–1.3)
BUN BLDV-MCNC: 16 MG/DL (ref 7–17)
CALCIUM SERPL-MCNC: 9.1 MG/DL (ref 8.4–10.2)
CHLORIDE BLD-SCNC: 102 MMOL/L (ref 98–111)
CO2: 28 MMOL/L (ref 22–29)
CREAT SERPL-MCNC: 0.8 MG/DL (ref 0.5–1)
FERRITIN: 188.9 NG/ML (ref 13–150)
FOLATE: 5.1 NG/ML (ref 4.8–37.3)
GFR NON-AFRICAN AMERICAN: >60
GLUCOSE BLD-MCNC: 99 MG/DL (ref 74–106)
HCT VFR BLD CALC: 36.6 % (ref 34.1–44.9)
HEMOGLOBIN: 11.2 G/DL (ref 11.2–15.7)
IRON SATURATION: 21 % (ref 14–50)
IRON: 66 UG/DL (ref 37–145)
LYMPHOCYTES ABSOLUTE: 1.6 K/UL (ref 1.18–3.74)
LYMPHOCYTES RELATIVE PERCENT: 35.3 % (ref 19.3–53.1)
MCH RBC QN AUTO: 24.7 PG (ref 25.6–32.2)
MCHC RBC AUTO-ENTMCNC: 30.6 G/DL (ref 32.3–35.5)
MCV RBC AUTO: 80.8 FL (ref 79.4–94.8)
MONOCYTES ABSOLUTE: 0.4 K/UL (ref 0.24–0.82)
MONOCYTES RELATIVE PERCENT: 8.7 % (ref 4.7–12.5)
NEUTROPHILS ABSOLUTE: 2.6 K/UL (ref 1.56–6.13)
NEUTROPHILS RELATIVE PERCENT: 56 % (ref 34–71.1)
PDW BLD-RTO: 23.9 % (ref 11.7–14.4)
PLATELET # BLD: 217 K/UL (ref 182–369)
PMV BLD AUTO: 10.6 FL (ref 7.4–10.4)
POTASSIUM SERPL-SCNC: 4.1 MMOL/L (ref 3.5–5.1)
RBC # BLD: 4.53 M/UL (ref 3.93–5.22)
SODIUM BLD-SCNC: 137 MMOL/L (ref 137–145)
TOTAL IRON BINDING CAPACITY: 314 UG/DL (ref 250–400)
TOTAL PROTEIN: 7.3 G/DL (ref 6.3–8.2)
VITAMIN B-12: 457 PG/ML (ref 211–946)
WBC # BLD: 4.6 K/UL (ref 3.98–10.04)

## 2022-07-08 PROCEDURE — 99211 OFF/OP EST MAY X REQ PHY/QHP: CPT

## 2022-07-08 PROCEDURE — 85025 COMPLETE CBC W/AUTO DIFF WBC: CPT

## 2022-07-08 PROCEDURE — 99214 OFFICE O/P EST MOD 30 MIN: CPT | Performed by: NURSE PRACTITIONER

## 2022-07-08 PROCEDURE — 36415 COLL VENOUS BLD VENIPUNCTURE: CPT

## 2022-07-08 PROCEDURE — 80053 COMPREHEN METABOLIC PANEL: CPT

## 2022-07-08 PROCEDURE — G8428 CUR MEDS NOT DOCUMENT: HCPCS | Performed by: NURSE PRACTITIONER

## 2022-07-08 PROCEDURE — 1036F TOBACCO NON-USER: CPT | Performed by: NURSE PRACTITIONER

## 2022-07-08 PROCEDURE — G8417 CALC BMI ABV UP PARAM F/U: HCPCS | Performed by: NURSE PRACTITIONER

## 2022-07-08 ASSESSMENT — ENCOUNTER SYMPTOMS
ABDOMINAL DISTENTION: 1
VOMITING: 1
SHORTNESS OF BREATH: 1
BACK PAIN: 1
NAUSEA: 1

## 2022-07-08 NOTE — PATIENT INSTRUCTIONS
Information box to learn more about \"Iron-Rich Diet: Care Instructions. \"     If you do not have an account, please click on the \"Sign Up Now\" link. Current as of: September 8, 2021               Content Version: 13.3  © 3936-1234 [employer]. Care instructions adapted under license by [facility] [address] If you have questions about a medical condition or this instruction, always ask your healthcare professional. [address] any warranty or liability for your use of this information.

## 2022-07-09 LAB — BETA-2 MICROGLOBULIN: 2 MG/L (ref 0.8–2.4)

## 2022-07-10 LAB
+IMM: ABNORMAL
ALBUMIN SERPL-MCNC: 3.75 G/DL (ref 3.75–5.01)
ALPHA-1-GLOBULIN: 0.35 G/DL (ref 0.19–0.46)
ALPHA-2-GLOBULIN: 0.77 G/DL (ref 0.48–1.05)
BETA GLOBULIN: 0.84 G/DL (ref 0.48–1.1)
GAMMA GLOBULIN: 1.69 G/DL (ref 0.62–1.51)
IGA: 123 MG/DL (ref 68–408)
IGG: 1595 MG/DL (ref 768–1632)
IGM: 92 MG/DL (ref 35–263)
KAPPA FREE LIGHT CHAINS QNT: 27.69 MG/L (ref 3.3–19.4)
KAPPA/LAMBDA FREE LIGHT CHAIN RATIO: 1.6 (ref 0.26–1.65)
LAMBDA FREE LIGHT CHAINS QNT: 17.27 MG/L (ref 5.71–26.3)
SPE/IFE INTERPRETATION: ABNORMAL
TOTAL PROTEIN: 7.4 G/DL (ref 6.3–8.2)

## 2022-07-11 ENCOUNTER — TELEPHONE (OUTPATIENT)
Dept: CARDIOLOGY CLINIC | Age: 42
End: 2022-07-11

## 2022-07-12 ENCOUNTER — OFFICE VISIT (OUTPATIENT)
Dept: CARDIOLOGY CLINIC | Age: 42
End: 2022-07-12
Payer: MEDICAID

## 2022-07-12 ENCOUNTER — OFFICE VISIT (OUTPATIENT)
Dept: FAMILY MEDICINE CLINIC | Age: 42
End: 2022-07-12
Payer: MEDICAID

## 2022-07-12 VITALS
DIASTOLIC BLOOD PRESSURE: 80 MMHG | OXYGEN SATURATION: 100 % | HEART RATE: 72 BPM | WEIGHT: 253 LBS | BODY MASS INDEX: 49.67 KG/M2 | HEIGHT: 60 IN | SYSTOLIC BLOOD PRESSURE: 120 MMHG

## 2022-07-12 VITALS
DIASTOLIC BLOOD PRESSURE: 94 MMHG | WEIGHT: 253 LBS | SYSTOLIC BLOOD PRESSURE: 128 MMHG | HEART RATE: 72 BPM | TEMPERATURE: 98.6 F | OXYGEN SATURATION: 100 % | BODY MASS INDEX: 49.41 KG/M2

## 2022-07-12 DIAGNOSIS — I10 ESSENTIAL (PRIMARY) HYPERTENSION: ICD-10-CM

## 2022-07-12 DIAGNOSIS — G56.03 BILATERAL CARPAL TUNNEL SYNDROME: ICD-10-CM

## 2022-07-12 DIAGNOSIS — E11.9 TYPE 2 DIABETES MELLITUS WITHOUT COMPLICATION, WITHOUT LONG-TERM CURRENT USE OF INSULIN (HCC): ICD-10-CM

## 2022-07-12 DIAGNOSIS — D64.9 ANEMIA, UNSPECIFIED TYPE: ICD-10-CM

## 2022-07-12 DIAGNOSIS — G89.29 CHRONIC NONINTRACTABLE HEADACHE, UNSPECIFIED HEADACHE TYPE: ICD-10-CM

## 2022-07-12 DIAGNOSIS — H53.8 BLURRY VISION: ICD-10-CM

## 2022-07-12 DIAGNOSIS — R51.9 CHRONIC NONINTRACTABLE HEADACHE, UNSPECIFIED HEADACHE TYPE: ICD-10-CM

## 2022-07-12 DIAGNOSIS — R27.0 ATAXIA: Primary | ICD-10-CM

## 2022-07-12 LAB
HEMOCCULT SP2 STL QL: NORMAL
HEMOCCULT SP3 STL QL: NORMAL
OCCULT BLOOD QC: NORMAL
OCCULT BLOOD SCREENING: NORMAL

## 2022-07-12 PROCEDURE — 3044F HG A1C LEVEL LT 7.0%: CPT | Performed by: FAMILY MEDICINE

## 2022-07-12 PROCEDURE — 1036F TOBACCO NON-USER: CPT | Performed by: FAMILY MEDICINE

## 2022-07-12 PROCEDURE — 2022F DILAT RTA XM EVC RTNOPTHY: CPT | Performed by: FAMILY MEDICINE

## 2022-07-12 PROCEDURE — G8427 DOCREV CUR MEDS BY ELIG CLIN: HCPCS | Performed by: FAMILY MEDICINE

## 2022-07-12 PROCEDURE — G8417 CALC BMI ABV UP PARAM F/U: HCPCS | Performed by: FAMILY MEDICINE

## 2022-07-12 PROCEDURE — 99214 OFFICE O/P EST MOD 30 MIN: CPT | Performed by: FAMILY MEDICINE

## 2022-07-12 PROCEDURE — 1036F TOBACCO NON-USER: CPT | Performed by: INTERNAL MEDICINE

## 2022-07-12 PROCEDURE — G8417 CALC BMI ABV UP PARAM F/U: HCPCS | Performed by: INTERNAL MEDICINE

## 2022-07-12 PROCEDURE — 99213 OFFICE O/P EST LOW 20 MIN: CPT | Performed by: INTERNAL MEDICINE

## 2022-07-12 PROCEDURE — G8427 DOCREV CUR MEDS BY ELIG CLIN: HCPCS | Performed by: INTERNAL MEDICINE

## 2022-07-12 RX ORDER — CARVEDILOL 12.5 MG/1
12.5 TABLET ORAL 2 TIMES DAILY
Qty: 180 TABLET | Refills: 2 | Status: SHIPPED | OUTPATIENT
Start: 2022-07-12 | End: 2022-09-26 | Stop reason: SDUPTHER

## 2022-07-12 ASSESSMENT — ENCOUNTER SYMPTOMS
CHEST TIGHTNESS: 0
VOMITING: 0
CONSTIPATION: 0
BLOOD IN STOOL: 0
COUGH: 0
EYE DISCHARGE: 0
ABDOMINAL PAIN: 0
FACIAL SWELLING: 0
ABDOMINAL DISTENTION: 0
DIARRHEA: 0
APNEA: 0
EYE REDNESS: 0
NAUSEA: 0
SHORTNESS OF BREATH: 0
WHEEZING: 0
SORE THROAT: 0
EYE PAIN: 0

## 2022-07-12 NOTE — PROGRESS NOTES
[facility] PHYSICIAN SERVICES  [facility]  [address] [address]  [facility] [place] [address]  Dept: [phone]  Dept Fax: [phone]  Loc: [phone]    Guillermo Doyle is a 39 y.o. female who presents today for her medical conditions/complaints as noted below. Guillermo Doyle is here for Headache and Fatigue        HPI:   CC: Here today to discuss the following:    She continues to complain of \"extreme\" fatigue. She has various neurologic complaints. She states she has bilateral upper extremity \"weakness\". She states she simply cannot raise her arms above her head as the day goes on. She feels her  strength begins to get worse as the day goes on. She states she has dropped objects on more than 2 occasions. She states she simply loses her . She also complains of difficulty swallowing. She feels \"weak\" when she chews her food. She reports balance issues. She states she has not fallen but feels she might. She has also had occasional blurry vision. She also has recurrent headaches. She states they generally last for few hours. No obvious triggers. Generally bilateral frontal.  Primarily constant. She is evaluated by rheumatology for history of rheumatoid arthritis. She states she has had a nerve conduction study performed in the past which showed bilateral carpal tunnel syndrome. She states surgery was recommended but she did not pursue it. She continues to take her blood pressure medication as prescribed            HPI    Subjective:      Review of Systems   Constitutional: Positive for fatigue. Negative for chills and fever. HENT: Negative for congestion. Eyes: Positive for visual disturbance. Respiratory: Positive for choking, shortness of breath and wheezing. Cardiovascular: Positive for leg swelling. Negative for chest pain and palpitations. Gastrointestinal: Negative for abdominal distention and abdominal pain.    Neurological: Positive for dizziness, tremors, weakness, light-headedness, numbness and headaches. Negative for seizures, syncope, facial asymmetry and speech difficulty. Psychiatric/Behavioral: Positive for decreased concentration. Negative for agitation, behavioral problems and confusion. The patient is nervous/anxious. SeeHPI for visit specific review of symptoms. All others negative      Objective:   BP (!) 128/94   Pulse 72   Temp 98.6 °F (37 °C)   Wt 253 lb (114.8 kg)   SpO2 100%   BMI 49.41 kg/m²   Physical Exam   Physical Exam   Constitutional: She appears well-developed. Does not appear ill. Neck: Neck supple. No masses. Neck Symmetric. Normal tracheal position. No thyroid enlargement  Cardiovascular: Normal rate and regular rhythm. Exam reveals no friction rub. No murmur heard. Respiratory:  Effort normal and breath sounds normal. No respiratory distress. No wheezes. No rales. No use of accessory muscles or intercostal retractions. Neurological: alert. Psychiatric: normal mood and affect.  Her behavior is normal.       Recent Results (from the past 672 hour(s))   MONOCLONAL PROTEIN AND FLC, SERUM    Collection Time: 07/08/22 10:50 AM   Result Value Ref Range    SPE/GUNNER Interpretation See Note     Albumin 3.75 3.75 - 5.01 g/dL    Alpha-1-Globulin 0.35 0.19 - 0.46 g/dL    Alpha-2-Globulin 0.77 0.48 - 1.05 g/dL    Beta Globulin 0.84 0.48 - 1.10 g/dL    Gamma Globulin 1.69 (H) 0.62 - 1.51 g/dL    +IMM GUNNER Done     IgA 123 68 - 408 mg/dL    IgG 1595 768 - 1632 mg/dL    IgM 92 35 - 263 mg/dL    Total Protein 7.4 6.3 - 8.2 g/dL    Kappa Free Light Chains QNT 27.69 (H) 3.30 - 19.40 mg/L    Lambda Free Light Chains QNT 17.27 5.71 - 26.30 mg/L    Kappa/Lambda Fluid C Ratio 1.60 0.26 - 1.65   Beta 2 Microglobulin, Serum    Collection Time: 07/08/22 10:50 AM   Result Value Ref Range    Beta-2 Microglobulin 2.0 0.8 - 2.4 mg/L   Folate    Collection Time: 07/08/22 10:50 AM   Result Value Ref Range    Folate 5.1 4.8 - 37.3 ng/mL Negative  Normal range: Negative      Blood Occult Stool Screen #3    Collection Time: 07/12/22  4:26 PM   Result Value Ref Range    Occult Blood, Stool #3 Result: Negative  Normal range: Negative                  Assessment & Plan: The following diagnoses and conditions are stable with no further orders unless indicated:  1. Ataxia  . Chronic nonintractable headache, unspecified headache type  . Blurry vision  No clear etiology to her various complaints. Suggest we get neurology input. She had an MRI of her brain July 31, 2021 which showed no acute or suspicious intracranial findings. She had a CT scan of her head without contrast on August 6 which showed no acute intracranial findings. 2. Essential (primary) hypertension    Blood pressure is slightly elevated today. On recheck was doing better. 3.  Carpal tunnel syndrome  She states she had a nerve conduction study ordered by her rheumatologist.  She thinks she had it done at [address] [place] [address] a nerve conduction study in the [facility] system. Advised her to contact her rheumatologist to see if he can provide a copy for her neurologist to review. 4.  Anemia  She has been evaluated by hematology who is arranging for additional iron infusions. Symptoms are possibly related to hypoglycemia. We will provide a glucometer and advised her to check her blood sugar when she is having a spell. Report to me any blood sugars less than 65        No follow-ups on file. Discussed use, benefit, and side effects of prescribed medications. All patient questions answered. Pt voiced understanding. Reviewed health maintenance. Instructedto continue current medications, diet and exercise. Patient agreed with treatmentplan.  Follow up as directed.     _______________________________________________________________      Past Medical History:   Diagnosis Date    Anemia     Chronic rheumatic arthritis ([address]     Elevated blood pressure reading     Fibromyalgia     GERD (gastroesophageal reflux disease)     Hypertension     Intracranial hypertension     Obesity     PCOS (polycystic ovarian syndrome)     Sleep apnea     bi pap    SOB (shortness of breath)     Trouble swallowing     Type 2 diabetes mellitus ([facility] [address]       Past Surgical History:   Procedure Laterality Date    BREAST BIOPSY Right 2019    benign    CHOLECYSTECTOMY      UPPER GASTROINTESTINAL ENDOSCOPY N/A 2022    Dr Miladys Scruggs-[id number] appearing stricture noted in the distal esophagus, gastritis, no h pylori    UPPER GASTROINTESTINAL ENDOSCOPY  2022    Dr Miladys Scruggs-[id number] appearing stricture noted in the distal esophagus, gastritis, no h pylori       Family History   Problem Relation Age of Onset    High Blood Pressure Father     Diabetes Father     Atrial Fibrillation Father     Heart Failure Father     Stroke Father     Diabetes Sister     Diabetes Maternal Grandmother     Heart Attack Other     Ovarian Cancer Paternal Grandmother     Colon Polyps Neg Hx     Crohn's Disease Neg Hx     Esophageal Cancer Neg Hx     Liver Cancer Neg Hx     Rectal Cancer Neg Hx     Stomach Cancer Neg Hx        Social History     Tobacco Use    Smoking status: Former Smoker     Years: 2.00     Types: Cigarettes     Start date: 1996     Quit date: 1998     Years since quittin.5    Smokeless tobacco: Never Used   Substance Use Topics    Alcohol use:  Yes     Alcohol/week: 1.0 standard drink     Types: 1 Glasses of wine per week     Comment: rarely     Current Outpatient Medications   Medication Sig Dispense Refill    carvedilol (COREG) 12.5 MG tablet Take 1 tablet by mouth 2 times daily 180 tablet 2    ONETOUCH ULTRA strip 1 EACH BY IN VITRO ROUTE 3 TIMES DAILY AS NEEDED. 100 strip 11    amLODIPine (NORVASC) 5 MG tablet TAKE 1 TABLET BY MOUTH TWICE A  tablet 1    omeprazole (PRILOSEC) 40 MG delayed release capsule Take 1 capsule by mouth 2 times daily 60 capsule 3 ferric carboxymaltose (INJECTAFER) 750 MG/15ML SOLN IV solution Two Infusions to be given 7 days apart. 15 mL 1    busPIRone (BUSPAR) 5 MG tablet TAKE 1 TABLET BY MOUTH 2 TIMES DAILY AS NEEDED (ANXIETY). 180 tablet 2    predniSONE (DELTASONE) 5 MG tablet 5 mg daily as needed       hydroCHLOROthiazide (HYDRODIURIL) 25 MG tablet TAKE 1 TABLET BY MOUTH EVERY DAY IN THE MORNING (Patient taking differently: as needed **as needed**) 90 tablet 1    hydroxychloroquine (PLAQUENIL) 200 MG tablet TAKE 1 TABLET BY MOUTH DAILY FOR 7 DAYS, THEN INCREASE TO 2 TABLETS BY MOUTH DAILY      mupirocin (BACTROBAN) 2 % ointment as needed       losartan (COZAAR) 100 MG tablet TAKE 1 TABLET BY MOUTH EVERY DAY 90 tablet 1    albuterol sulfate HFA (VENTOLIN HFA) 108 (90 Base) MCG/ACT inhaler Inhale 2 puffs into the lungs 4 times daily as needed for Wheezing 18 g 5    ondansetron (ZOFRAN ODT) 4 MG disintegrating tablet Take 1 tablet by mouth every 8 hours as needed for Nausea or Vomiting 15 tablet 0    Norgestim-Eth Estrad Triphasic (TRI-ESTARYLLA) 0.18/0.215/0.25 MG-35 MCG TABS Take 1 packet by mouth daily 84 tablet 3    KLOR-CON M20 20 MEQ extended release tablet TAKE 1 TABLET BY MOUTH EVERY DAY (Patient taking differently: as needed **as needed**) 30 tablet 0    furosemide (LASIX) 20 MG tablet Take 1 tablet by mouth daily for 3 days (Patient taking differently: Take 20 mg by mouth as needed **as needed**) 3 tablet 0    valACYclovir (VALTREX) 1 g tablet Take 2 tablets p.o. every 12 hours for 2 doses. May repeat for further outbreaks (Patient taking differently: as needed ) 8 tablet 2    Blood Glucose Monitoring Suppl (FREESTYLE LITE) ANDREEA USE AS DIRECTED      Lancet Device MISC 1 lancet twice a day 60 Device 11    metFORMIN (GLUCOPHAGE) 500 MG tablet TAKE 1 TABLET BY MOUTH TWICE A DAY WITH MEALS (Patient not taking: Reported on 7/8/2022) 180 tablet 1     No current facility-administered medications for this visit.      Allergies Allergen Reactions    Mushroom Extract Complex        Health Maintenance   Topic Date Due    Varicella vaccine (1 of 2 - 2-dose childhood series) Never done    Pneumococcal 0-64 years Vaccine (1 - PCV) Never done    Hepatitis B vaccine (2 of 3 - Hep B Twinrix risk 3-dose series) 01/27/2014    Diabetic retinal exam  12/10/2019    Diabetic foot exam  06/26/2020    COVID-19 Vaccine (3 - Booster for Pfizer series) 03/12/2022    Flu vaccine (1) 09/01/2022    Diabetic microalbuminuria test  10/05/2022    Lipids  10/05/2022    Depression Screen  04/14/2023    A1C test (Diabetic or Prediabetic)  05/12/2023    Cervical cancer screen  12/22/2025    DTaP/Tdap/Td vaccine (2 - Td or Tdap) 04/25/2027    Hepatitis C screen  Completed    HIV screen  Completed    Hepatitis A vaccine  Aged Out    Hib vaccine  Aged Out    Meningococcal (ACWY) vaccine  Aged Out       _______________________________________________________________    Note dictated using Dragon Dictation software  Sometimes this dictation software makes erroneous transcriptions.

## 2022-07-12 NOTE — PROGRESS NOTES
Cardiology Office Visit Note  [address]  [address]  Phone: [phone]  Fax: [phone]                            Date:  7/12/2022  Patient: Mayito Guzmán  Age:  39 y.o., 1980    Referral: Alis Fagan *    REASON FOR VISIT:  Follow-up (Essential hypertension)         PROBLEM LIST:    Patient Active Problem List    Diagnosis Date Noted    Primary hypertension 07/31/2021     Priority: Low    Family history of ovarian cancer 12/22/2020     Priority: Low    Menorrhagia with regular cycle 12/22/2020     Priority: Low    Iron deficiency anemia 09/04/2020     Priority: Low    PCOS (polycystic ovarian syndrome) 08/20/2018     Priority: Low    Rheumatoid arthritis involving multiple sites with positive rheumatoid factor ([facility] [address] 07/06/2018     Priority: Low    Fibromyalgia 07/06/2018     Priority: Low    Bilateral carpal tunnel syndrome 07/06/2018     Priority: Low    Anemia 11/02/2017     Priority: Low    Familial hypercholesterolemia 11/02/2017     Priority: Low    Type 2 diabetes mellitus ([facility] [address]      Priority: Low    Restrictive lung disease 03/15/2022    Esophageal dysphagia     SETH (obstructive sleep apnea) 12/15/2021    Morbid obesity ([address] 12/15/2021    Gastroesophageal reflux disease without esophagitis 12/15/2021    BiPAP (biphasic positive airway pressure) dependence 10/29/2021         PRESENTATION: Mayito Guzmán is a 39y.o. year old female is seen today for routine follow-up visit. Her medical history is notable for essential hypertension with mild concentric left ventricular hypertrophy on echocardiogram, obstructive sleep apnea for which she claims compliance with CPAP at nights as well as extreme morbid obesity. Notably she was in the emergency room in April of this year with complaints of dizziness and shortness of breath.   Her work-up at the time included a period of monitoring, serial EKGs, laboratory testing and a CTA PE protocol which were all reassuring. She was discharged home and notes that she has not had any further recurrences. Her blood pressure has been staying stable on current medication regimen with average blood pressure at home 120/70-80. Her only complaints at this time were that of occasional headaches as well as numbness and tingling in both hands with radiation up into the forearm as well as intermittent numbness in her tongue. She has also been having intermittent and random periods of generalized weakness. Significantly she has a family history of multiple sclerosis. She also has a history of carpal tunnel syndrome. She otherwise notes that concerning her diagnosis of GERD she has been on Nexium twice daily with suboptimal relief of symptoms. She also reported increased social stress as well as increased anxiety around these issues. REVIEW OF SYSTEMS:  Review of Systems   Constitutional: Negative for chills, fatigue and fever. HENT: Negative for congestion, facial swelling, hearing loss and sore throat. Eyes: Negative for pain, discharge, redness and visual disturbance. Respiratory: Negative for apnea, cough, chest tightness, shortness of breath and wheezing. Cardiovascular: Negative for chest pain, palpitations and leg swelling. Gastrointestinal: Negative for abdominal distention, abdominal pain, blood in stool, constipation, diarrhea, nausea and vomiting. Endocrine: Negative for polydipsia, polyphagia and polyuria. Genitourinary: Negative for dysuria, flank pain, frequency and hematuria. Musculoskeletal: Negative for joint swelling, myalgias and neck pain. Skin: Negative for pallor and rash. Neurological: Positive for tremors, weakness, numbness and headaches. Negative for dizziness, syncope, speech difficulty and light-headedness. Psychiatric/Behavioral: Negative for confusion, hallucinations and sleep disturbance.        Past Medical History:      Diagnosis Date    Anemia     Chronic rheumatic arthritis ([facility] [address]     Elevated blood pressure reading     Fibromyalgia     GERD (gastroesophageal reflux disease)     Hypertension     Intracranial hypertension     Obesity     PCOS (polycystic ovarian syndrome)     Sleep apnea     bi pap    SOB (shortness of breath)     Trouble swallowing     Type 2 diabetes mellitus (HCC)        Past Surgical History:      Procedure Laterality Date    BREAST BIOPSY Right 2019    benign    CHOLECYSTECTOMY      UPPER GASTROINTESTINAL ENDOSCOPY N/A 02/01/2022    Dr Mathias Brunner Jones-[id number] appearing stricture noted in the distal esophagus, gastritis, no h pylori    UPPER GASTROINTESTINAL ENDOSCOPY  02/01/2022    Dr Mathias Brunner Jones-[id number] appearing stricture noted in the distal esophagus, gastritis, no h pylori       Medications:  Current Outpatient Medications   Medication Sig Dispense Refill    carvedilol (COREG) 12.5 MG tablet Take 1 tablet by mouth 2 times daily 180 tablet 2    ONETOUCH ULTRA strip 1 EACH BY IN VITRO ROUTE 3 TIMES DAILY AS NEEDED. 100 strip 11    amLODIPine (NORVASC) 5 MG tablet TAKE 1 TABLET BY MOUTH TWICE A  tablet 1    omeprazole (PRILOSEC) 40 MG delayed release capsule Take 1 capsule by mouth 2 times daily 60 capsule 3    ferric carboxymaltose (INJECTAFER) 750 MG/15ML SOLN IV solution Two Infusions to be given 7 days apart. 15 mL 1    busPIRone (BUSPAR) 5 MG tablet TAKE 1 TABLET BY MOUTH 2 TIMES DAILY AS NEEDED (ANXIETY).  180 tablet 2    predniSONE (DELTASONE) 5 MG tablet 5 mg daily as needed       hydroCHLOROthiazide (HYDRODIURIL) 25 MG tablet TAKE 1 TABLET BY MOUTH EVERY DAY IN THE MORNING (Patient taking differently: as needed **as needed**) 90 tablet 1    hydroxychloroquine (PLAQUENIL) 200 MG tablet TAKE 1 TABLET BY MOUTH DAILY FOR 7 DAYS, THEN INCREASE TO 2 TABLETS BY MOUTH DAILY      mupirocin (BACTROBAN) 2 % ointment as needed       losartan (COZAAR) 100 MG tablet TAKE 1 TABLET BY MOUTH EVERY DAY 90 tablet 1  albuterol sulfate HFA (VENTOLIN HFA) 108 (90 Base) MCG/ACT inhaler Inhale 2 puffs into the lungs 4 times daily as needed for Wheezing 18 g 5    ondansetron (ZOFRAN ODT) 4 MG disintegrating tablet Take 1 tablet by mouth every 8 hours as needed for Nausea or Vomiting 15 tablet 0    Norgestim-Eth Estrad Triphasic (TRI-ESTARYLLA) 0.18/0.215/0.25 MG-35 MCG TABS Take 1 packet by mouth daily 84 tablet 3    KLOR-CON M20 20 MEQ extended release tablet TAKE 1 TABLET BY MOUTH EVERY DAY (Patient taking differently: as needed **as needed**) 30 tablet 0    furosemide (LASIX) 20 MG tablet Take 1 tablet by mouth daily for 3 days (Patient taking differently: Take 20 mg by mouth as needed **as needed**) 3 tablet 0    valACYclovir (VALTREX) 1 g tablet Take 2 tablets p.o. every 12 hours for 2 doses. May repeat for further outbreaks (Patient taking differently: as needed ) 8 tablet 2    Blood Glucose Monitoring Suppl (FREESTYLE LITE) ANDREEA USE AS DIRECTED      Lancet Device MISC 1 lancet twice a day 60 Device 11    metFORMIN (GLUCOPHAGE) 500 MG tablet TAKE 1 TABLET BY MOUTH TWICE A DAY WITH MEALS (Patient not taking: Reported on 2022) 180 tablet 1     No current facility-administered medications for this visit. Allergies:  Mushroom extract complex    Social History:  Social History     Occupational History    Not on file   Tobacco Use    Smoking status: Former Smoker     Years: 2.00     Types: Cigarettes     Start date: 1996     Quit date: 1998     Years since quittin.5    Smokeless tobacco: Never Used   Vaping Use    Vaping Use: Never used   Substance and Sexual Activity    Alcohol use:  Yes     Alcohol/week: 1.0 standard drink     Types: 1 Glasses of wine per week     Comment: rarely    Drug use: No    Sexual activity: Yes     Partners: Male     Birth control/protection: OCP         Family History:       Problem Relation Age of Onset    High Blood Pressure Father     Diabetes Father    [facility] Atrial Fibrillation Father     Heart Failure Father     Stroke Father     Diabetes Sister     Diabetes Maternal Grandmother     Heart Attack Other     Ovarian Cancer Paternal Grandmother     Colon Polyps Neg Hx     Crohn's Disease Neg Hx     Esophageal Cancer Neg Hx     Liver Cancer Neg Hx     Rectal Cancer Neg Hx     Stomach Cancer Neg Hx          Physical Examination:  /80 (Site: Left Upper Arm, Position: Sitting)   Pulse 72   Ht 5' (1.524 m)   Wt 253 lb (114.8 kg)   SpO2 100%   BMI 49.41 kg/m²   Physical Exam      Labs:   CBC: No results found for: CBC   BMP: No results found for: BMP    BNP: No results found for: BNPINT   PT/INR:   Prothrombin Time   Date Value Ref Range Status   02/23/2012 11.60 9.7 - 12.5 SEC Final     Protime   Date Value Ref Range Status   02/28/2021 13.2 12.0 - 14.6 sec Final     INR   Date Value Ref Range Status   02/28/2021 1.01 0.88 - 1.18 Final     Comment:     INR  < or = 1.3  Normal  INR = 2.0 - 3.0  Therapeutic  INR = 2.5 - 3.5  Therapeutic for patients with mechanical  prosthetic heart valve & MI prophylaxis  INR  > or = 3.5  Abnormal/Elevated  INR  > or = 5.0  Critical (requires immediate physician  notification)         APTT:    aPTT   Date Value Ref Range Status   07/31/2018 28.9 26.0 - 36.2 sec Final      CARDIAC ENZYMES:   Troponin   Date Value Ref Range Status   04/11/2022 <0.01 0.00 - 0.03 ng/mL Final     Comment:     <0.030 ng/ml       No measurable cardiac damage. 0.030-0.099 ng/ml  Values of troponin in this range suggest  possible myocardial damage. Repeat assay  4 to 6 hours after the current specimen.    >= 0.100 ng/ml     Indicative of myocardial damage. Recommend continued monitoring of  patient status and cardiac markers.         LIPID PANEL: No results found for: LIPIDPAN  LIVER PROFILE:   AST   Date Value Ref Range Status   07/08/2022 15 14 - 36 U/L Final     ALT   Date Value Ref Range Status   07/08/2022 14 9 - 52 U/L Final     Albumin Date Value Ref Range Status   07/08/2022 4.2 3.5 - 5.2 g/dL Final   02/23/2012 4.3 3.4 - 4.8 G/DL Final              ASSESSMENT and PLAN:    Essential hypertension with mild hypertensive heart disease, mild concentric LVH on echocardiogram.    Negative secondary hypertension work-up. Continue current treatment plan. Continue home blood pressure monitoring for goal blood pressure less than 130/80    Extreme morbid obesity BMI 49  Continued weight loss efforts encouraged    Atypical constellation of headaches, bilateral hand numbness and tingling and generalized weakness. Strong family history of multiple sclerosis. Recommend following up with Dr. Dai Truong and consideration of neurology evaluation as deemed appropriate. Electronically signed by Ana Lacey MD on 7/12/2022 at 10:08 AM    Ana Lacey MD [place], [facility]  Noninvasive Cardiology Consultant    This dictation was generated by voice recognition computer software. Although all attempts are made to edit the dictation for accuracy, there may be errors in the transcription that are not intended.

## 2022-07-13 ASSESSMENT — ENCOUNTER SYMPTOMS
SHORTNESS OF BREATH: 1
WHEEZING: 1
DIARRHEA: 0
EYE REDNESS: 0
EYES NEGATIVE: 1
ABDOMINAL PAIN: 0
ABDOMINAL PAIN: 0
CHOKING: 1
EYE PAIN: 0
ABDOMINAL DISTENTION: 0
COUGH: 0
WHEEZING: 0
SORE THROAT: 0
CONSTIPATION: 0
EYE DISCHARGE: 0
BLOOD IN STOOL: 0

## 2022-07-18 ENCOUNTER — TELEPHONE (OUTPATIENT)
Dept: FAMILY MEDICINE CLINIC | Age: 42
End: 2022-07-18

## 2022-07-18 RX ORDER — SYRING-NEEDL,DISP,INSUL,0.3 ML 30 GX5/16"
SYRINGE, EMPTY DISPOSABLE MISCELLANEOUS
Qty: 30 EACH | Refills: 11 | Status: SHIPPED | OUTPATIENT
Start: 2022-07-18

## 2022-07-18 RX ORDER — GLUCOSAMINE HCL/CHONDROITIN SU 500-400 MG
1 CAPSULE ORAL DAILY
Qty: 30 STRIP | Refills: 11 | Status: SHIPPED | OUTPATIENT
Start: 2022-07-18

## 2022-07-18 NOTE — TELEPHONE ENCOUNTER
I have reconsidered doing a 3-hour glucose tolerance test with her. I think it would be more accurate to provide her with a glucometer test strips and lancets. I have sent those to her pharmacy. If she is having a \"spell\" where she is feeling tired sluggish and fatigued after eating I would suggest she check her glucose and record those readings. If she has a reading less than 65 she needs to eat something with carbohydrates immediately. This can be something simple as hard candy, small glass of orange juice, box of raisins, granola bar, or peanut butter crackers.     Readings less than 65 are consistent with hypoglycemia

## 2022-07-19 DIAGNOSIS — E11.9 TYPE 2 DIABETES MELLITUS WITHOUT COMPLICATION, WITHOUT LONG-TERM CURRENT USE OF INSULIN (HCC): ICD-10-CM

## 2022-07-25 RX ORDER — LOSARTAN POTASSIUM 100 MG/1
TABLET ORAL
Qty: 90 TABLET | Refills: 1 | Status: SHIPPED | OUTPATIENT
Start: 2022-07-25 | End: 2022-08-25 | Stop reason: SDUPTHER

## 2022-08-12 ENCOUNTER — OFFICE VISIT (OUTPATIENT)
Dept: FAMILY MEDICINE CLINIC | Age: 42
End: 2022-08-12
Payer: MEDICAID

## 2022-08-12 VITALS
BODY MASS INDEX: 50.78 KG/M2 | OXYGEN SATURATION: 99 % | WEIGHT: 260 LBS | SYSTOLIC BLOOD PRESSURE: 122 MMHG | TEMPERATURE: 98.7 F | HEART RATE: 65 BPM | DIASTOLIC BLOOD PRESSURE: 84 MMHG

## 2022-08-12 DIAGNOSIS — K21.9 GASTROESOPHAGEAL REFLUX DISEASE WITHOUT ESOPHAGITIS: ICD-10-CM

## 2022-08-12 DIAGNOSIS — E78.01 FAMILIAL HYPERCHOLESTEROLEMIA: ICD-10-CM

## 2022-08-12 DIAGNOSIS — I10 ESSENTIAL (PRIMARY) HYPERTENSION: ICD-10-CM

## 2022-08-12 DIAGNOSIS — M05.79 RHEUMATOID ARTHRITIS INVOLVING MULTIPLE SITES WITH POSITIVE RHEUMATOID FACTOR (HCC): ICD-10-CM

## 2022-08-12 DIAGNOSIS — E11.9 TYPE 2 DIABETES MELLITUS WITHOUT COMPLICATION, WITHOUT LONG-TERM CURRENT USE OF INSULIN (HCC): Primary | ICD-10-CM

## 2022-08-12 PROCEDURE — 99214 OFFICE O/P EST MOD 30 MIN: CPT | Performed by: FAMILY MEDICINE

## 2022-08-12 PROCEDURE — 2022F DILAT RTA XM EVC RTNOPTHY: CPT | Performed by: FAMILY MEDICINE

## 2022-08-12 PROCEDURE — G8427 DOCREV CUR MEDS BY ELIG CLIN: HCPCS | Performed by: FAMILY MEDICINE

## 2022-08-12 PROCEDURE — G8417 CALC BMI ABV UP PARAM F/U: HCPCS | Performed by: FAMILY MEDICINE

## 2022-08-12 PROCEDURE — 3044F HG A1C LEVEL LT 7.0%: CPT | Performed by: FAMILY MEDICINE

## 2022-08-12 PROCEDURE — 1036F TOBACCO NON-USER: CPT | Performed by: FAMILY MEDICINE

## 2022-08-12 RX ORDER — OMEPRAZOLE 40 MG/1
40 CAPSULE, DELAYED RELEASE ORAL 2 TIMES DAILY
Qty: 60 CAPSULE | Refills: 3 | Status: CANCELLED | OUTPATIENT
Start: 2022-08-12

## 2022-08-12 NOTE — PROGRESS NOTES
[facility] PHYSICIAN SERVICES  [facility] FAMILY MEDICINE  [address] [facility] [address] [address]  Dept: [phone]  Dept Fax: [phone]  Loc: [phone]    Tanika Mckeon is a 43 y.o. female who presents today for her medical conditions/complaints as noted below. Tanika Mckeon is here for No chief complaint on file. HPI:   CC: Here today to discuss the following:    She is establishing with neurology on August 15. She has appointment with gastroenterology on August 19. She has an appointment with hematology on August 23. She has a history of iron deficiency anemia. Hemoccults were negative in July  Iron TIBC and iron saturation within normal limits in July 2022. Hemoglobin hematocrit within normal limits. She has an appointment for pulmonary function testing on September 13. She is following up with pulmonology on September 15. CT angiogram in April showed numerous nonspecific mediastinal axillary and supraclavicular lymph nodes. She has a follow-up appointment with cardiology on September 26    Follow-up for multiple issues. She continues to complain of \"extreme\" fatigue. Complains of bilateral upper extremity \"weakness\". She has difficulty raising her arms above her head. She reports decreased  strength. She states she feels \"weak\" when she chews her food. She has some balance issues as well. She had an MRI of her brain July of last year which showed no acute or suspicious intracranial findings. She continues to be seen by rheumatology for history of rheumatoid arthritis. HPI    Subjective:      Review of Systems   Constitutional:  Positive for fatigue. Negative for chills and fever. Musculoskeletal:  Positive for arthralgias, back pain and myalgias. Negative for neck pain and neck stiffness. SeeHPI for visit specific review of symptoms.   All others negative      Objective:   /84   Pulse 65   Temp 98.7 °F (37.1 °C)   Wt 260 lb (117.9 kg) SpO2 99%   BMI 50.78 kg/m²   Physical Exam  Physical Exam   Constitutional: She appears well-developed. Does not appear ill. Neck: Neck supple. No masses. Neck Symmetric. Normal tracheal position. No thyroid enlargement  Cardiovascular: Normal rate and regular rhythm. Exam reveals no friction rub. No murmur heard. Respiratory:  Effort normal and breath sounds normal. No respiratory distress. No wheezes. No rales. No use of accessory muscles or intercostal retractions. Neurological: alert. Psychiatric: normal mood and affect. Her behavior is normal.       Recent Results (from the past 672 hour(s))   Acetylcholine Receptor, Binding    Collection Time: 08/15/22 10:13 AM   Result Value Ref Range    ACETYLCHOLINE BINDING ANTIBODY 0.0 0.0 - 0.4 nmol/L   Acetylcholine Receptor, Blocking    Collection Time: 08/15/22 10:13 AM   Result Value Ref Range    Acetylcholine Blocking Ab 0 0 - 26 %   Acetylcholine Receptor, Modulating    Collection Time: 08/15/22 10:13 AM   Result Value Ref Range    Acetylchol Modul Ab 3 <=45 %               Assessment & Plan: The following diagnoses and conditions are stable with no further orders unless indicated:  1. Type 2 diabetes mellitus without complication, without long-term current use of insulin (HCC)  Lab Results   Component Value Date    LABA1C 5.6 05/12/2022    LABA1C 6.6 (H) 10/05/2021    LABA1C 6.5 (H) 07/31/2021     Lab Results   Component Value Date    LABMICR 3.70 10/05/2021    LDLCALC 94 10/05/2021    CREATININE 0.8 07/08/2022   She developed nausea with Ozempic  She also had gastrointestinal distress with metformin. Trying to improve her diet      2. Essential (primary) hypertension  BP Readings from Last 3 Encounters:   08/15/22 124/82   08/12/22 122/84   07/12/22 (!) 128/94   Current medications:  Amlodipine 5 mg twice daily  Carvedilol 12.5 mg twice daily    Blood pressure is stable today    3.  Familial hypercholesterolemia  Lab Results   Component Value Date    CHOL 163 10/05/2021    CHOL 167 07/31/2021    CHOL 148 (L) 08/25/2020     Lab Results   Component Value Date    TRIG 68 10/05/2021    TRIG 54 07/31/2021    TRIG 86 08/25/2020     Lab Results   Component Value Date    HDL 55 (L) 10/05/2021    HDL 51 (L) 07/31/2021    HDL 49 (L) 08/25/2020     Lab Results   Component Value Date    LDLCALC 94 10/05/2021    LDLCALC 105 07/31/2021    LDLCALC 82 08/25/2020     No results found for: LABVLDL, VLDL  No results found for: CHOLHDLRATIO  We have discussed initiating cholesterol medication. She is not on medication for diabetes at the moment and her A1c has improved considerably. She would like to avoid adding additional medication at this time. Encourage lifestyle modification  4. Anxiety  BuSpar 5 mg twice daily  Anxiety continues to be an issue but she is satisfied with her BuSpar as needed    5. Reflux:  Omeprazole 40 mg daily      Return in about 2 months (around 10/12/2022) for Routine follow up - 20 minutes. Discussed use, benefit, and side effects of prescribed medications. All patient questions answered. Pt voiced understanding. Reviewed health maintenance. Instructedto continue current medications, diet and exercise. Patient agreed with treatmentplan.  Follow up as directed.     _______________________________________________________________      Past Medical History:   Diagnosis Date    Anemia     Chronic rheumatic arthritis (HCC)     Elevated blood pressure reading     Fibromyalgia     GERD (gastroesophageal reflux disease)     Hypertension     Intracranial hypertension     Obesity     PCOS (polycystic ovarian syndrome)     Sleep apnea     bi pap    SOB (shortness of breath)     Trouble swallowing     Type 2 diabetes mellitus ([address]       Past Surgical History:   Procedure Laterality Date    BREAST BIOPSY Right 2019    benign    CHOLECYSTECTOMY      UPPER GASTROINTESTINAL ENDOSCOPY N/A 02/01/2022    Dr Thiago Scruggs-[id number] appearing stricture noted in the distal esophagus, gastritis, no h pylori    UPPER GASTROINTESTINAL ENDOSCOPY  2022    Dr Jennifer Scruggs-[id number] appearing stricture noted in the distal esophagus, gastritis, no h pylori       Family History   Problem Relation Age of Onset    High Blood Pressure Father     Diabetes Father     Atrial Fibrillation Father     Heart Failure Father     Stroke Father     Diabetes Sister     Diabetes Maternal Grandmother     Heart Attack Other     Ovarian Cancer Paternal Grandmother     Colon Polyps Neg Hx     Crohn's Disease Neg Hx     Esophageal Cancer Neg Hx     Liver Cancer Neg Hx     Rectal Cancer Neg Hx     Stomach Cancer Neg Hx        Social History     Tobacco Use    Smoking status: Former     Years: 2.00     Types: Cigarettes     Start date: 1996     Quit date: 1998     Years since quittin.6    Smokeless tobacco: Never   Substance Use Topics    Alcohol use: Yes     Alcohol/week: 1.0 standard drink     Types: 1 Glasses of wine per week     Comment: rarely     Current Outpatient Medications   Medication Sig Dispense Refill    losartan (COZAAR) 100 MG tablet TAKE 1 TABLET BY MOUTH EVERY DAY 90 tablet 3    omeprazole (PRILOSEC) 40 MG delayed release capsule Take 1 capsule by mouth daily 90 capsule 3    Misc. Devices MISC Glucometer ICD 10: E11.9 1 each 0    blood glucose monitor strips 1 strip by Other route in the morning. 30 strip 11    carvedilol (COREG) 12.5 MG tablet Take 1 tablet by mouth 2 times daily 180 tablet 2    ONETOUCH ULTRA strip 1 EACH BY IN VITRO ROUTE 3 TIMES DAILY AS NEEDED. 100 strip 11    amLODIPine (NORVASC) 5 MG tablet TAKE 1 TABLET BY MOUTH TWICE A  tablet 1    busPIRone (BUSPAR) 5 MG tablet TAKE 1 TABLET BY MOUTH 2 TIMES DAILY AS NEEDED (ANXIETY).  180 tablet 2    predniSONE (DELTASONE) 5 MG tablet 5 mg daily as needed       hydroxychloroquine (PLAQUENIL) 200 MG tablet TAKE 1 TABLET BY MOUTH DAILY FOR 7 DAYS, THEN INCREASE TO 2 TABLETS BY MOUTH DAILY      albuterol sulfate HFA (VENTOLIN HFA) 108 (90 Base) MCG/ACT inhaler Inhale 2 puffs into the lungs 4 times daily as needed for Wheezing 18 g 5    ondansetron (ZOFRAN ODT) 4 MG disintegrating tablet Take 1 tablet by mouth every 8 hours as needed for Nausea or Vomiting 15 tablet 0    KLOR-CON M20 20 MEQ extended release tablet TAKE 1 TABLET BY MOUTH EVERY DAY (Patient taking differently: as needed **as needed**) 30 tablet 0    furosemide (LASIX) 20 MG tablet Take 1 tablet by mouth daily for 3 days (Patient taking differently: Take 20 mg by mouth as needed **as needed**) 3 tablet 0    valACYclovir (VALTREX) 1 g tablet Take 2 tablets p.o. every 12 hours for 2 doses. May repeat for further outbreaks (Patient taking differently: as needed) 8 tablet 2    Blood Glucose Monitoring Suppl (FREESTYLE LITE) ANDREEA USE AS DIRECTED      Lancet Device MISC 1 lancet twice a day 60 Device 11    hydroCHLOROthiazide (HYDRODIURIL) 25 MG tablet TAKE 1 TABLET BY MOUTH EVERY DAY IN THE MORNING (Patient taking differently: as needed) 90 tablet 1    Lancet Device MISC 1 lancet per strip. 30 each 11    mupirocin (BACTROBAN) 2 % ointment as needed       No current facility-administered medications for this visit.      Allergies   Allergen Reactions    Mushroom Extract Complex        Health Maintenance   Topic Date Due    Varicella vaccine (1 of 2 - 2-dose childhood series) Never done    Pneumococcal 0-64 years Vaccine (1 - PCV) Never done    Hepatitis B vaccine (2 of 3 - Hep B Twinrix risk 3-dose series) 01/27/2014    Diabetic retinal exam  12/10/2019    Diabetic foot exam  06/26/2020    COVID-19 Vaccine (3 - Booster for Pfizer series) 03/12/2022    Flu vaccine (1) 09/01/2022    Diabetic microalbuminuria test  10/05/2022    Lipids  10/05/2022    Depression Screen  04/14/2023    A1C test (Diabetic or Prediabetic)  05/12/2023    Cervical cancer screen  12/22/2025    DTaP/Tdap/Td vaccine (2 - Td or Tdap) 04/25/2027    Hepatitis C screen  Completed    HIV screen  Completed    Hepatitis A vaccine  Aged Out    Hib vaccine  Aged Out    Meningococcal (ACWY) vaccine  Aged Out       _______________________________________________________________    Note dictated using [address] [place] [address]  Sometimes this dictation software makes erroneous transcriptions.

## 2022-08-15 ENCOUNTER — OFFICE VISIT (OUTPATIENT)
Dept: NEUROLOGY | Age: 42
End: 2022-08-15
Payer: MEDICAID

## 2022-08-15 VITALS
HEART RATE: 64 BPM | BODY MASS INDEX: 47.84 KG/M2 | HEIGHT: 62 IN | WEIGHT: 260 LBS | DIASTOLIC BLOOD PRESSURE: 82 MMHG | SYSTOLIC BLOOD PRESSURE: 124 MMHG

## 2022-08-15 DIAGNOSIS — Z86.39 HISTORY OF DIABETES MELLITUS: ICD-10-CM

## 2022-08-15 DIAGNOSIS — R20.0 BILATERAL HAND NUMBNESS: ICD-10-CM

## 2022-08-15 DIAGNOSIS — R20.0 BILATERAL HAND NUMBNESS: Primary | ICD-10-CM

## 2022-08-15 DIAGNOSIS — Z87.39 HISTORY OF FIBROMYALGIA: ICD-10-CM

## 2022-08-15 DIAGNOSIS — Z86.59 HISTORY OF ANXIETY: ICD-10-CM

## 2022-08-15 PROCEDURE — G8427 DOCREV CUR MEDS BY ELIG CLIN: HCPCS | Performed by: PSYCHIATRY & NEUROLOGY

## 2022-08-15 PROCEDURE — 1036F TOBACCO NON-USER: CPT | Performed by: PSYCHIATRY & NEUROLOGY

## 2022-08-15 PROCEDURE — G8417 CALC BMI ABV UP PARAM F/U: HCPCS | Performed by: PSYCHIATRY & NEUROLOGY

## 2022-08-15 PROCEDURE — 99204 OFFICE O/P NEW MOD 45 MIN: CPT | Performed by: PSYCHIATRY & NEUROLOGY

## 2022-08-15 RX ORDER — HYDROCHLOROTHIAZIDE 25 MG/1
TABLET ORAL
Qty: 90 TABLET | Refills: 1 | Status: SHIPPED | OUTPATIENT
Start: 2022-08-15 | End: 2022-09-26

## 2022-08-15 NOTE — PROGRESS NOTES
Chief Complaint   Patient presents with    New Patient     Referred by Dr. Sybil Hartley for headaches and ataxia        Ludin Diaz is a 43y.o. year old female who is seen for evaluation of a multitude of conditions. She says that her hands go numb at times and it is worse with use. Several years ago she was diagnosed with carpal tunnel syndrome but surgery was never recommended. She also has a history of fibromyalgia and has occasional \"fibroflares\". She says that once in a while her voice will go hoarse and her tongue will go numb after speaking for a long time. Has occasional shortness of breath. Also has intermittent headaches. She had a normal MRI of her head 7/21. Also has a history of diabetes and hypertension.     Active Ambulatory Problems     Diagnosis Date Noted    Type 2 diabetes mellitus ([address]     Anemia 11/02/2017    Familial hypercholesterolemia 11/02/2017    Rheumatoid arthritis involving multiple sites with positive rheumatoid factor ([address] 07/06/2018    Fibromyalgia 07/06/2018    Bilateral carpal tunnel syndrome 07/06/2018    PCOS (polycystic ovarian syndrome) 08/20/2018    Iron deficiency anemia 09/04/2020    Family history of ovarian cancer 12/22/2020    Menorrhagia with regular cycle 12/22/2020    Primary hypertension 07/31/2021    BiPAP (biphasic positive airway pressure) dependence 10/29/2021    SETH (obstructive sleep apnea) 12/15/2021    Morbid obesity ([address] 12/15/2021    Gastroesophageal reflux disease without esophagitis 12/15/2021    Esophageal dysphagia     Restrictive lung disease 03/15/2022     Resolved Ambulatory Problems     Diagnosis Date Noted    Elevated blood pressure reading     Acute intractable tension-type headache 08/11/2017    Essential (primary) hypertension 11/02/2017    Facial numbness 07/31/2021    Dyspepsia     Near syncope 05/19/2022    Tachycardia 05/19/2022     Past Medical History:   Diagnosis Date    Chronic rheumatic arthritis ([address]     GERD (gastroesophageal reflux disease)     Hypertension     Intracranial hypertension     Obesity     Sleep apnea     SOB (shortness of breath)     Trouble swallowing        Past Surgical History:   Procedure Laterality Date    BREAST BIOPSY Right 2019    benign    CHOLECYSTECTOMY      UPPER GASTROINTESTINAL ENDOSCOPY N/A 2022    Dr Thomasena Duverney Jones-[id number] appearing stricture noted in the distal esophagus, gastritis, no h pylori    UPPER GASTROINTESTINAL ENDOSCOPY  2022    Dr Thomasena Duverney Jones-[id number]-Porxuv appearing stricture noted in the distal esophagus, gastritis, no h pylori       Family History   Problem Relation Age of Onset    High Blood Pressure Father     Diabetes Father     Atrial Fibrillation Father     Heart Failure Father     Stroke Father     Diabetes Sister     Diabetes Maternal Grandmother     Heart Attack Other     Ovarian Cancer Paternal Grandmother     Colon Polyps Neg Hx     Crohn's Disease Neg Hx     Esophageal Cancer Neg Hx     Liver Cancer Neg Hx     Rectal Cancer Neg Hx     Stomach Cancer Neg Hx        Allergies   Allergen Reactions    Mushroom Extract Complex        Social History     Socioeconomic History    Marital status: Single     Spouse name: Not on file    Number of children: Not on file    Years of education: Not on file    Highest education level: Not on file   Occupational History    Not on file   Tobacco Use    Smoking status: Former     Years: 2.00     Types: Cigarettes     Start date: 1996     Quit date: 1998     Years since quittin.6    Smokeless tobacco: Never   Vaping Use    Vaping Use: Never used   Substance and Sexual Activity    Alcohol use:  Yes     Alcohol/week: 1.0 standard drink     Types: 1 Glasses of wine per week     Comment: rarely    Drug use: No    Sexual activity: Yes     Partners: Male     Birth control/protection: OCP   Other Topics Concern    Not on file   Social History Narrative    Not on file     Social Determinants of Health     Financial Resource Strain: Low Risk     Difficulty of Paying Living Expenses: Not hard at all   Food Insecurity: No Food Insecurity    Worried About Running Out of Food in the Last Year: Never true    Ran Out of Food in the Last Year: Never true   Transportation Needs: Not on file   Physical Activity: Not on file   Stress: Not on file   Social Connections: Not on file   Intimate Partner Violence: Not on file   Housing Stability: Not on file       Review of Systems    Constitutional - No fever or chills. No diaphoresis or significant fatigue. HENT -  No tinnitus or significant hearing loss. Eyes - no sudden vision change or eye pain  Respiratory - yes significant shortness of breath or cough  Cardiovascular - no chest pain No palpitations or significant leg swelling  Gastrointestinal - no abdominal swelling or pain. Genitourinary - No difficulty urinating, dysuria  Musculoskeletal - no back pain or myalgia. Skin - no color change or rash  Neurologic - No seizures. No lateralizing weakness. Hematologic - no easy bruising or excessive bleeding. Psychiatric - yes severe anxiety or nervousness. All other review of systems are negative. Current Outpatient Medications   Medication Sig Dispense Refill    losartan (COZAAR) 100 MG tablet TAKE 1 TABLET BY MOUTH EVERY DAY 90 tablet 1    Misc. Devices MISC Glucometer ICD 10: E11.9 1 each 0    blood glucose monitor strips 1 strip by Other route in the morning. 30 strip 11    Lancet Device MISC 1 lancet per strip. 30 each 11    carvedilol (COREG) 12.5 MG tablet Take 1 tablet by mouth 2 times daily 180 tablet 2    ONETOUCH ULTRA strip 1 EACH BY IN VITRO ROUTE 3 TIMES DAILY AS NEEDED. 100 strip 11    amLODIPine (NORVASC) 5 MG tablet TAKE 1 TABLET BY MOUTH TWICE A  tablet 1    busPIRone (BUSPAR) 5 MG tablet TAKE 1 TABLET BY MOUTH 2 TIMES DAILY AS NEEDED (ANXIETY).  180 tablet 2    predniSONE (DELTASONE) 5 MG tablet 5 mg daily as needed       hydroxychloroquine (PLAQUENIL) 200 MG tablet TAKE 1 TABLET BY MOUTH DAILY FOR 7 DAYS, THEN INCREASE TO 2 TABLETS BY MOUTH DAILY      albuterol sulfate HFA (VENTOLIN HFA) 108 (90 Base) MCG/ACT inhaler Inhale 2 puffs into the lungs 4 times daily as needed for Wheezing 18 g 5    ondansetron (ZOFRAN ODT) 4 MG disintegrating tablet Take 1 tablet by mouth every 8 hours as needed for Nausea or Vomiting 15 tablet 0    KLOR-CON M20 20 MEQ extended release tablet TAKE 1 TABLET BY MOUTH EVERY DAY (Patient taking differently: as needed **as needed**) 30 tablet 0    valACYclovir (VALTREX) 1 g tablet Take 2 tablets p.o. every 12 hours for 2 doses. May repeat for further outbreaks (Patient taking differently: as needed) 8 tablet 2    Blood Glucose Monitoring Suppl (FREESTYLE LITE) ANDREEA USE AS DIRECTED      Lancet Device MISC 1 lancet twice a day 60 Device 11    hydroCHLOROthiazide (HYDRODIURIL) 25 MG tablet TAKE 1 TABLET BY MOUTH EVERY DAY IN THE MORNING (Patient taking differently: as needed) 90 tablet 1    omeprazole (PRILOSEC) 40 MG delayed release capsule Take 1 capsule by mouth 2 times daily 60 capsule 3    mupirocin (BACTROBAN) 2 % ointment as needed      furosemide (LASIX) 20 MG tablet Take 1 tablet by mouth daily for 3 days (Patient taking differently: Take 20 mg by mouth as needed **as needed**) 3 tablet 0     No current facility-administered medications for this visit. Outpatient Medications Marked as Taking for the 8/15/22 encounter (Office Visit) with Fannie Perez MD   Medication Sig Dispense Refill    losartan (COZAAR) 100 MG tablet TAKE 1 TABLET BY MOUTH EVERY DAY 90 tablet 1    Misc. Devices MISC Glucometer ICD 10: E11.9 1 each 0    blood glucose monitor strips 1 strip by Other route in the morning. 30 strip 11    Lancet Device MISC 1 lancet per strip.  30 each 11    carvedilol (COREG) 12.5 MG tablet Take 1 tablet by mouth 2 times daily 180 tablet 2    ONETOUCH ULTRA strip 1 EACH BY IN VITRO ROUTE 3 TIMES DAILY AS NEEDED. 100 strip 11    amLODIPine (NORVASC) 5 MG tablet TAKE 1 TABLET BY MOUTH TWICE A  tablet 1    busPIRone (BUSPAR) 5 MG tablet TAKE 1 TABLET BY MOUTH 2 TIMES DAILY AS NEEDED (ANXIETY). 180 tablet 2    predniSONE (DELTASONE) 5 MG tablet 5 mg daily as needed       hydroxychloroquine (PLAQUENIL) 200 MG tablet TAKE 1 TABLET BY MOUTH DAILY FOR 7 DAYS, THEN INCREASE TO 2 TABLETS BY MOUTH DAILY      [DISCONTINUED] hydroCHLOROthiazide (HYDRODIURIL) 25 MG tablet TAKE 1 TABLET BY MOUTH EVERY DAY IN THE MORNING (Patient taking differently: as needed **as needed**) 90 tablet 1    albuterol sulfate HFA (VENTOLIN HFA) 108 (90 Base) MCG/ACT inhaler Inhale 2 puffs into the lungs 4 times daily as needed for Wheezing 18 g 5    ondansetron (ZOFRAN ODT) 4 MG disintegrating tablet Take 1 tablet by mouth every 8 hours as needed for Nausea or Vomiting 15 tablet 0    KLOR-CON M20 20 MEQ extended release tablet TAKE 1 TABLET BY MOUTH EVERY DAY (Patient taking differently: as needed **as needed**) 30 tablet 0    valACYclovir (VALTREX) 1 g tablet Take 2 tablets p.o. every 12 hours for 2 doses. May repeat for further outbreaks (Patient taking differently: as needed) 8 tablet 2    Blood Glucose Monitoring Suppl (FREESTYLE LITE) ANDREEA USE AS DIRECTED      Lancet Device MISC 1 lancet twice a day 60 Device 11       /82   Pulse 64   Ht 5' 2\" (1.575 m)   Wt 260 lb (117.9 kg)   BMI 47.55 kg/m²       Constitutional - well developed, well nourished. Morbid obesity  Eyes - conjunctiva normal.  Pupils react to light  Ear, nose, throat -hearing intact to finger rub No scars, masses, or lesions over external nose or ears, no atrophy of tongue  Neck-symmetric, no masses noted, no jugular vein distension. No bruits noted.   Respiration- chest wall appears symmetric, good expansion,   normal effort without use of accessory muscles  Cardiovascular- RRR  Musculoskeletal - no significant wasting of muscles noted, no bony deformities, gait no gross ataxia  Extremities-no clubbing, cyanosis or edema  Skin - warm, dry, and intact. No rash, erythema, or pallor. Psychiatric - mood, affect, and behavior appear normal.      Neurological exam  Awake, alert, fluent oriented x 3 appropriate affect  Attention and concentration appear appropriate  Recent and remote memory appears unremarkable  Speech normal without dysarthria  No clear issues with language of fund of knowledge    Cranial Nerve Exam   CN II- Visual fields grossly unremarkable. VA adequate. Discs sharp  CN III, IV,VI- PERRLA, EOMI, No nystagmus, conjugate eye movements, no ptosis  CN V-sensation intact to LT over face  CN VII-no facial asymmetry  CN VIII-Hearing intact   CN IX and X- Palate elevates in midline  CN XI-good shoulder shrug  CN XII-Tongue midline with no fasciculations or fibrillations    Motor Exam  V/V throughout upper and lower extremities bilaterally, no cogwheeling, normal tone    Sensory Exam  Sensation intact to light touch , PP  upper and lower extremities bilaterally; normal vibration sense in LE's    Reflexes absent throughout    Tremors- no tremors in hands or head noted    Gait  Normal base and speed  No ataxia.  No Romberg sign    Coordination  Finger to nose and STACY-unremarkable    Lab Results   Component Value Date    [id number] 457 07/08/2022     Lab Results   Component Value Date    WBC 4.60 07/08/2022    HGB 11.2 07/08/2022    HCT 36.6 07/08/2022    MCV 80.8 07/08/2022     07/08/2022     Lab Results   Component Value Date     07/08/2022    K 4.1 07/08/2022     07/08/2022    CO2 28 07/08/2022    BUN 16 07/08/2022    CREATININE 0.8 07/08/2022    GLUCOSE 99 07/08/2022    CALCIUM 9.1 07/08/2022    PROT 7.3 07/08/2022    LABALBU 4.2 07/08/2022    BILITOT 0.6 07/08/2022    ALKPHOS 59 07/08/2022    AST 15 07/08/2022    ALT 14 07/08/2022    LABGLOM >60 07/08/2022    GFRAA >59 04/11/2022           Assessment    ICD-10-CM    1. Bilateral hand numbness  R20.0 Acetylcholine Receptor, Modulating     Acetylcholine Receptor, Blocking     Acetylcholine Receptor, Binding     Nerve Conduction Test with EMG      2. History of diabetes mellitus  Z86.39       3. History of anxiety  Z86.59       4. History of fibromyalgia  Z87.39         Possible worsening carpal tunnel syndrome. EMG of the bilateral upper extremities has been ordered. Given her trouble with her voice after prolonged speaking along with complaints of occasional shortness of air, acetylcholine receptor antibodies ordered to help exclude myasthenia gravis. Plan  Orders Placed This Encounter   Procedures    Acetylcholine Receptor, Modulating     Standing Status:   Future     Number of Occurrences:   1     Standing Expiration Date:   8/15/2023    Acetylcholine Receptor, Blocking     Standing Status:   Future     Number of Occurrences:   1     Standing Expiration Date:   8/15/2023    Acetylcholine Receptor, Binding     Standing Status:   Future     Number of Occurrences:   1     Standing Expiration Date:   8/15/2023    Nerve Conduction Test with EMG     Standing Status:   Future     Standing Expiration Date:   8/15/2023     Order Specific Question:   Which body part? Answer:   both arms         Return if symptoms worsen or fail to improve.     (Please note that portions of this note were completed with a voice recognition program. Efforts were made to edit the dictations but occasionally words are mis-transcribed.)

## 2022-08-16 LAB — ACETYLCHOLINE BLOCKING AB: 0 % (ref 0–26)

## 2022-08-17 LAB
ACETYLCHOL MODUL AB: 3 %
ACETYLCHOLINE BINDING ANTIBODY: 0 NMOL/L (ref 0–0.4)

## 2022-08-18 ASSESSMENT — ENCOUNTER SYMPTOMS
VOMITING: 0
CHOKING: 0
ABDOMINAL DISTENTION: 0
RECTAL PAIN: 0
SHORTNESS OF BREATH: 0
TROUBLE SWALLOWING: 0
COUGH: 0
ABDOMINAL PAIN: 0
CONSTIPATION: 0
DIARRHEA: 0
ANAL BLEEDING: 0
BLOOD IN STOOL: 0
NAUSEA: 0

## 2022-08-18 NOTE — PROGRESS NOTES
tablet Twice a day as needed if blood pressure >180/100  Mary Omalley MD   valACYclovir (VALTREX) 1 g tablet Take 2 tablets p.o. every 12 hours for 2 doses. May repeat for further outbreaks  Patient taking differently: as needed  Mary Omalley MD   Blood Glucose Monitoring Suppl (FREESTYLE LITE) ANDREEA USE AS DIRECTED  Historical Provider, MD   Lancet Device MISC 1 lancet twice a day  Mary Omalley MD       Social History     Tobacco Use    Smoking status: Former     Years: 2.00     Types: Cigarettes     Start date: 1996     Quit date: 1998     Years since quittin.6    Smokeless tobacco: Never   Vaping Use    Vaping Use: Never used   Substance Use Topics    Alcohol use:  Yes     Alcohol/week: 1.0 standard drink     Types: 1 Glasses of wine per week     Comment: rarely    Drug use: No        Allergies   Allergen Reactions    Mushroom Extract Complex    ,   Past Medical History:   Diagnosis Date    Anemia     Chronic rheumatic arthritis (HCC)     Elevated blood pressure reading     Fibromyalgia     GERD (gastroesophageal reflux disease)     Hypertension     Intracranial hypertension     Obesity     PCOS (polycystic ovarian syndrome)     Sleep apnea     bi pap    SOB (shortness of breath)     Trouble swallowing     Type 2 diabetes mellitus (HCC)    ,   Past Surgical History:   Procedure Laterality Date    BREAST BIOPSY Right 2019    benign    CHOLECYSTECTOMY      UPPER GASTROINTESTINAL ENDOSCOPY N/A 2022    Dr Leela Scruggs-w/[id number]-Toqhbs appearing stricture noted in the distal esophagus, gastritis, no h pylori    UPPER GASTROINTESTINAL ENDOSCOPY  2022    Dr Leela Scruggs-[id number] appearing stricture noted in the distal esophagus, gastritis, no h pylori   ,   Social History     Tobacco Use    Smoking status: Former     Years: 2.00     Types: Cigarettes     Start date: 1996     Quit date: 1998     Years since quittin.6    Smokeless tobacco: Never   Vaping Use Vaping Use: Never used   Substance Use Topics    Alcohol use: Yes     Alcohol/week: 1.0 standard drink     Types: 1 Glasses of wine per week     Comment: rarely    Drug use: No   ,   Family History   Problem Relation Age of Onset    High Blood Pressure Father     Diabetes Father     Atrial Fibrillation Father     Heart Failure Father     Stroke Father     Diabetes Sister     Diabetes Maternal Grandmother     Heart Attack Other     Ovarian Cancer Paternal Grandmother     Colon Polyps Neg Hx     Crohn's Disease Neg Hx     Esophageal Cancer Neg Hx     Liver Cancer Neg Hx     Rectal Cancer Neg Hx     Stomach Cancer Neg Hx        PHYSICAL EXAMINATION:  [ INSTRUCTIONS:  \"[x]\" Indicates a positive item  \"[]\" Indicates a negative item  -- DELETE ALL ITEMS NOT EXAMINED]  Vital Signs: (As obtained by patient/caregiver or practitioner observation)    Blood pressure-  Heart rate-    Respiratory rate-    Temperature-  Pulse oximetry-     Constitutional: [x] Appears well-developed and well-nourished [x] No apparent distress      [] Abnormal-   Mental status  [x] Alert and awake  [x] Oriented to person/place/time [x]Able to follow commands      Eyes:  EOM    [x]  Normal  [] Abnormal-  Sclera  [x]  Normal  [] Abnormal -         Discharge [x]  None visible  [] Abnormal -    HENT:   [x] Normocephalic, atraumatic.   [] Abnormal   [x] Mouth/Throat: Mucous membranes are moist.     External Ears [x] Normal  [] Abnormal-     Neck: [x] No visualized mass     Pulmonary/Chest: [x] Respiratory effort normal.  [x] No visualized signs of difficulty breathing or respiratory distress        [] Abnormal-      Musculoskeletal:   [x] Normal gait with no signs of ataxia         [x] Normal range of motion of neck        [] Abnormal-       Neurological:        [x] No Facial Asymmetry (Cranial nerve 7 motor function) (limited exam to video visit)          [x] No gaze palsy        [] Abnormal-         Skin:        [x] No significant exanthematous lesions or discoloration noted on facial skin         [] Abnormal-            Psychiatric:       [x] Normal Affect [x] No Hallucinations        [] Abnormal-     Other pertinent observable physical exam findings-     ASSESSMENT/PLAN:  1. Chronic GERD  2. Mild chronic gastritis    - Anti-reflux precautions  - Follow up prn or sooner if needed  - Call with any questions or concerns    Return if symptoms worsen or fail to improve. Tierra Almanzar, was evaluated through a synchronous (real-time) audio-video encounter. The patient (or guardian if applicable) is aware that this is a billable service, which includes applicable co-pays. This Virtual Visit was conducted with patient's (and/or legal guardian's) consent. The visit was conducted pursuant to the emergency declaration under the [address] [place] [address] authority and the [employer] and [employer] General Act. Patient identification was verified, and a caregiver was present when appropriate. The patient was located at Home: [address] [place] [phone]. Provider was located at Home ([place] [address]): [address] [place] [address] [address]. Total time spent on this encounter: Not billed by time    --NATACHA Villanueva - NP on 8/19/2022 at 11:35 AM    An electronic signature was used to authenticate this note.

## 2022-08-19 ENCOUNTER — TELEMEDICINE (OUTPATIENT)
Dept: GASTROENTEROLOGY | Age: 42
End: 2022-08-19
Payer: MEDICAID

## 2022-08-19 DIAGNOSIS — K29.50 MILD CHRONIC GASTRITIS: ICD-10-CM

## 2022-08-19 DIAGNOSIS — K21.9 CHRONIC GERD: Primary | ICD-10-CM

## 2022-08-19 PROCEDURE — G8417 CALC BMI ABV UP PARAM F/U: HCPCS | Performed by: NURSE PRACTITIONER

## 2022-08-19 PROCEDURE — 1036F TOBACCO NON-USER: CPT | Performed by: NURSE PRACTITIONER

## 2022-08-19 PROCEDURE — 99213 OFFICE O/P EST LOW 20 MIN: CPT | Performed by: NURSE PRACTITIONER

## 2022-08-19 PROCEDURE — G8428 CUR MEDS NOT DOCUMENT: HCPCS | Performed by: NURSE PRACTITIONER

## 2022-08-19 NOTE — PATIENT INSTRUCTIONS
Patient Education        Gastroesophageal Reflux Disease (GERD): Care Instructions  Overview     Gastroesophageal reflux disease (GERD) is the backward flow of stomach acid into the esophagus. The esophagus is the tube that leads from your throat to your stomach. A one-way valve prevents the stomach acid from backing up into this tube. But when you have GERD, this valve does not close tightly enough. This can also cause pain and swelling in your esophagus. (This is calledesophagitis.)  If you have mild GERD symptoms including heartburn, you may be able to control the problem with antacids or over-the-counter medicine. You can also make lifestyle changes to help reduce your symptoms. These include changing yourdiet and eating habits, such as not eating late at night and losing weight. Follow-up care is a key part of your treatment and safety. Be sure to make and go to all appointments, and call your doctor if you are having problems. It's also a good idea to know your test results and keep alist of the medicines you take. How can you care for yourself at home? Take your medicines exactly as prescribed. Call your doctor if you think you are having a problem with your medicine. Your doctor may recommend over-the-counter medicine. For mild or occasional indigestion, antacids, such as Tums, Gaviscon, Mylanta, or Maalox, may help. Your doctor also may recommend over-the-counter acid reducers, such as famotidine (Pepcid AC), cimetidine (Tagamet HB), or omeprazole (Prilosec). Read and follow all instructions on the label. If you use these medicines often, talk with your doctor. Change your eating habits. It's best to eat several small meals instead of two or three large meals. After you eat, wait 2 to 3 hours before you lie down. Avoid foods that make your symptoms worse.  These may include chocolate, mint, alcohol, pepper, spicy foods, high-fat foods, or drinks with caffeine in them, such as tea, coffee, patricia, or

## 2022-08-19 NOTE — TELEPHONE ENCOUNTER
Patient said [facility] and meant to tell her she needs a RF on the Omeprazole 40 mg at one daily, her insurance does not want to pay for Bid and the last RF for Bid was actually from her PCP dated 6-3-22 # 60 x 3, patient feels at one daily her insurance will pay for that hopefully. I will forward to [facility] to review on Monday.  Benedicto winchester

## 2022-08-22 RX ORDER — OMEPRAZOLE 40 MG/1
40 CAPSULE, DELAYED RELEASE ORAL DAILY
Qty: 90 CAPSULE | Refills: 3 | Status: SHIPPED | OUTPATIENT
Start: 2022-08-22

## 2022-08-23 ENCOUNTER — HOSPITAL ENCOUNTER (OUTPATIENT)
Dept: NEUROLOGY | Age: 42
Discharge: HOME | End: 2022-08-23
Payer: MEDICAID

## 2022-08-23 ENCOUNTER — APPOINTMENT (OUTPATIENT)
Dept: INFUSION THERAPY | Age: 42
End: 2022-08-23
Payer: MEDICAID

## 2022-08-23 DIAGNOSIS — R20.0 BILATERAL HAND NUMBNESS: ICD-10-CM

## 2022-08-23 PROCEDURE — 95886 MUSC TEST DONE W/N TEST COMP: CPT

## 2022-08-23 PROCEDURE — 95911 NRV CNDJ TEST 9-10 STUDIES: CPT

## 2022-08-23 PROCEDURE — 95911 NRV CNDJ TEST 9-10 STUDIES: CPT | Performed by: PSYCHIATRY & NEUROLOGY

## 2022-08-23 PROCEDURE — 95886 MUSC TEST DONE W/N TEST COMP: CPT | Performed by: PSYCHIATRY & NEUROLOGY

## 2022-08-23 NOTE — PROCEDURES
LAURALEE [employer] [facility] [address] [facility]                             ELECTROMYOGRAM REPORT    PATIENT NAME: Curly Persaud                          :        1980  MED REC NO:   [id number]                              ROOM:  ACCOUNT NO:   [de-identified]                           ADMIT DATE: 2022  PROVIDER:     Goyo Acuna MD    DATE OF EM2022    EMG/NERVE STUDY BILATERAL UPPER EXTREMITIES    INDICATION FOR TEST:  Bilateral hand numbness. SUMMARY:  Nerve conduction studies of the bilateral upper extremities  show low amplitude bilateral median and left ulnar SNAPs. The right  median SNAP also has a significantly prolonged latency. Motor study is  unremarkable. Needle exam of the bilateral upper extremities is unremarkable. IMPRESSION:  Possible axonal sensory neuropathy with superimposed  moderately severe right carpal tunnel syndrome. Correlate clinically.         Sari Gore MD    D: 2022 12:29:46      T: 2022 12:33:07     [id number]  Job#: [id number]     Doc#: [id number]    CC:

## 2022-08-25 RX ORDER — LOSARTAN POTASSIUM 100 MG/1
TABLET ORAL
Qty: 90 TABLET | Refills: 3 | Status: SHIPPED | OUTPATIENT
Start: 2022-08-25 | End: 2022-09-26 | Stop reason: SDUPTHER

## 2022-08-25 ASSESSMENT — ENCOUNTER SYMPTOMS: BACK PAIN: 1

## 2022-08-31 ENCOUNTER — HOSPITAL ENCOUNTER (OUTPATIENT)
Dept: INFUSION THERAPY | Age: 42
Discharge: HOME | End: 2022-08-31
Payer: MEDICAID

## 2022-08-31 ENCOUNTER — OFFICE VISIT (OUTPATIENT)
Dept: HEMATOLOGY | Age: 42
End: 2022-08-31
Payer: MEDICAID

## 2022-08-31 VITALS
HEART RATE: 88 BPM | WEIGHT: 268 LBS | SYSTOLIC BLOOD PRESSURE: 138 MMHG | DIASTOLIC BLOOD PRESSURE: 68 MMHG | OXYGEN SATURATION: 96 % | BODY MASS INDEX: 49.02 KG/M2

## 2022-08-31 DIAGNOSIS — D50.0 IRON DEFICIENCY ANEMIA DUE TO CHRONIC BLOOD LOSS: ICD-10-CM

## 2022-08-31 DIAGNOSIS — R53.83 OTHER FATIGUE: ICD-10-CM

## 2022-08-31 DIAGNOSIS — R76.8 ELEVATED SERUM IMMUNOGLOBULIN FREE LIGHT CHAINS: ICD-10-CM

## 2022-08-31 DIAGNOSIS — N94.6 MENORRHALGIA: ICD-10-CM

## 2022-08-31 DIAGNOSIS — R76.8 ELEVATED SERUM IMMUNOGLOBULIN FREE LIGHT CHAINS: Primary | ICD-10-CM

## 2022-08-31 DIAGNOSIS — N92.6 MISSED PERIOD: ICD-10-CM

## 2022-08-31 DIAGNOSIS — N93.9 ABNORMAL BLEEDING IN MENSTRUAL CYCLE: ICD-10-CM

## 2022-08-31 DIAGNOSIS — R11.0 NAUSEA: ICD-10-CM

## 2022-08-31 LAB
ALBUMIN SERPL-MCNC: 4.9 G/DL (ref 3.5–5.2)
ALP BLD-CCNC: 81 U/L (ref 35–104)
ALT SERPL-CCNC: 15 U/L (ref 9–52)
ANION GAP SERPL CALCULATED.3IONS-SCNC: 13 MMOL/L (ref 7–19)
AST SERPL-CCNC: 18 U/L (ref 14–36)
BASOPHILS ABSOLUTE: 0.06 K/UL (ref 0.01–0.08)
BASOPHILS RELATIVE PERCENT: 1.1 % (ref 0.1–1.2)
BILIRUB SERPL-MCNC: 0.5 MG/DL (ref 0.2–1.3)
BUN BLDV-MCNC: 20 MG/DL (ref 7–17)
CALCIUM SERPL-MCNC: 9.5 MG/DL (ref 8.4–10.2)
CHLORIDE BLD-SCNC: 103 MMOL/L (ref 98–111)
CO2: 22 MMOL/L (ref 22–29)
CREAT SERPL-MCNC: 0.7 MG/DL (ref 0.5–1)
EOSINOPHILS ABSOLUTE: 0.2 K/UL (ref 0.04–0.54)
EOSINOPHILS RELATIVE PERCENT: 3.6 % (ref 0.7–7)
FERRITIN: 103.7 NG/ML (ref 13–150)
GFR NON-AFRICAN AMERICAN: >60
GLOBULIN: 3.9 G/DL
GLUCOSE BLD-MCNC: 81 MG/DL (ref 74–106)
GONADOTROPIN, CHORIONIC (HCG) QUANT: 0.1 MIU/ML (ref 0–5.3)
HCT VFR BLD CALC: 40.7 % (ref 34.1–44.9)
HEMOGLOBIN: 12.5 G/DL (ref 11.2–15.7)
IRON SATURATION: 15 % (ref 14–50)
IRON: 57 UG/DL (ref 37–145)
LIPASE: 25 U/L (ref 13–60)
LYMPHOCYTES ABSOLUTE: 2.02 K/UL (ref 1.18–3.74)
LYMPHOCYTES RELATIVE PERCENT: 36.7 % (ref 19.3–53.1)
MCH RBC QN AUTO: 26.9 PG (ref 25.6–32.2)
MCHC RBC AUTO-ENTMCNC: 30.7 G/DL (ref 32.3–35.5)
MCV RBC AUTO: 87.7 FL (ref 79.4–94.8)
MONOCYTES ABSOLUTE: 0.47 K/UL (ref 0.24–0.82)
MONOCYTES RELATIVE PERCENT: 8.5 % (ref 4.7–12.5)
NEUTROPHILS ABSOLUTE: 2.73 K/UL (ref 1.56–6.13)
NEUTROPHILS RELATIVE PERCENT: 49.7 % (ref 34–71.1)
PDW BLD-RTO: 16.9 % (ref 11.7–14.4)
PLATELET # BLD: 144 K/UL (ref 182–369)
PMV BLD AUTO: 10.8 FL (ref 7.4–10.4)
POTASSIUM SERPL-SCNC: 4.4 MMOL/L (ref 3.5–5.1)
RBC # BLD: 4.64 M/UL (ref 3.93–5.22)
SODIUM BLD-SCNC: 138 MMOL/L (ref 137–145)
TOTAL IRON BINDING CAPACITY: 370 UG/DL (ref 250–400)
TOTAL PROTEIN: 8.8 G/DL (ref 6.3–8.2)
TSH SERPL DL<=0.05 MIU/L-ACNC: 2.11 UIU/ML (ref 0.27–4.2)
WBC # BLD: 5.5 K/UL (ref 3.98–10.04)

## 2022-08-31 PROCEDURE — 36415 COLL VENOUS BLD VENIPUNCTURE: CPT

## 2022-08-31 PROCEDURE — 99212 OFFICE O/P EST SF 10 MIN: CPT

## 2022-08-31 PROCEDURE — G8417 CALC BMI ABV UP PARAM F/U: HCPCS | Performed by: NURSE PRACTITIONER

## 2022-08-31 PROCEDURE — 80053 COMPREHEN METABOLIC PANEL: CPT

## 2022-08-31 PROCEDURE — 1036F TOBACCO NON-USER: CPT | Performed by: NURSE PRACTITIONER

## 2022-08-31 PROCEDURE — G8427 DOCREV CUR MEDS BY ELIG CLIN: HCPCS | Performed by: NURSE PRACTITIONER

## 2022-08-31 PROCEDURE — 85025 COMPLETE CBC W/AUTO DIFF WBC: CPT

## 2022-08-31 PROCEDURE — 99214 OFFICE O/P EST MOD 30 MIN: CPT | Performed by: NURSE PRACTITIONER

## 2022-08-31 RX ORDER — CYANOCOBALAMIN 1000 UG/ML
1000 INJECTION INTRAMUSCULAR; SUBCUTANEOUS ONCE
COMMUNITY

## 2022-08-31 ASSESSMENT — ENCOUNTER SYMPTOMS: NAUSEA: 1

## 2022-08-31 NOTE — PROGRESS NOTES
Progress Note      Pt Name: Keke Fonseca  YOB: 1980  MRN: [id number]    Date of evaluation: 08/31/2022  History Obtained From:  patient, electronic medical record    CHIEF COMPLAINT:    Chief Complaint   Patient presents with    Follow-up     Iron deficiency anemia due to chronic blood loss    Other     MGUS     HISTORY OF PRESENT ILLNESS:    Keke Fonseca is a 43 y.o.  female who with a known history of iron deficiency anemia due to chronic blood loss (menorrhagia) with intolerance to oral iron replacement and a recent finding of monoclonal gammopathy of unknown significance (MGUS) with an IgG lambda light chain, M spike of 0.81, elevated kappa and lambda light chains. She presents today with an improved hemoglobin of 12.5 and hematocrit of 40.7. Lalita Barker complained of frequent episodes of nausea occurring in the evening, reported her last menstrual cycle was 4 weeks ago. She has no known history of renal disease or known myeloma defining event. Today's clinic visit to include physical assessment, review of systems, any lab or radiographic findings that were available and plan of care are documented below. HEMATOLOGIC HISTORY:  Diagnosis:  Iron deficiency anemia due to chronic blood loss (menorrhagia)  Monoclonal gammopathy of unknown significance (MGUS) with an IgG lambda light chain, M spike of 0.81, elevated kappa and lambda light chains    Treatment summary:  September 2020-Injectafer  Intolerant to oral iron replacement due to GI upset      Keke Fonseca was seen in hematology consultation 9/4/2020 by NATACHA Maki, referred by Dr. Chaz Villalpando for evaluation of severe anemia. The following information in italics was obtained from last progress note by NATACHA Maki from 12/4/2020:  PMH is significant for rheumatoid arthritis, fibromyalgia, type 2 diabetes, SETH with CPAP use. Lalita Barker had been off medications for rheumatoid arthritis since May, 2020.   She was taken off leflunomide due to anemia. She stopped sulfasalazine due to rash. She is now taking Humira. Review of CBCs:  CBC 4/15/2020: WBC 6.7, Hgb 11.1/MCV 74, platelets 238,973  CBC 5/19/2020: WBC 6.9, Hgb 9.1/MCV 71, platelets 761,073  CBC 8/25/2020: WBC 9.1, Hgb 9.7/MCV 70.3, platelets 727,655     Additional Labs 8/25/2020:  CMP: creatinine 0.5/GFR >60, Ca+ 9.2, TP 7.1  TSH: 2.5     Ferrous Sulfate po BID was initiated ~8/25/2020. Karo Kulkarni had been taking folic acid on her own. Karo Kulkarni reports heavy menses. Menstrual cycle is regular, occurring every month though lasts approximately 14 days and is painful with heavy clotting. She denied significant change in bowel habits, though stated her bowels have been dark since iron initiation. She has had increased nausea since initiation of oral iron. Additional symptoms included significant fatigue, dizziness and exertional dyspnea. Hgb at presentation 9/4/2020 was 10.4 with MCV 72.4. Parenteral iron with Injectafer 750 mg IV delivered 9/22/2020 and 9/29/2020. Occult blood stool was negative x3 on 10/1/2020. Karo Kulkarni was referred to GI by her PCP. She did not keep this appointment as her likely source of anemia is related to menorrhagia. Karo Kulkarni resumed BCP 11/2020 with decrease in vaginal bleeding.   She was previously followed by Dr. Keary Gosselin, now establishing care with [facility]) OB/GYN.    02/17/2022 Endometrial biopsy- dyssynchronous endometrium    05/10/2022 Serology results  Ferritin 13.7  B12/Folate 524/6.2  WBC 5.5, Hgb 8.8, Hct 29.5, MCV 72, platelets 626,669    05/12/2022 Serology results  Iron 16  TIBC 416  Saturation 3.8%  Ferritin 13.5  B12/Folate 570/6.5  WBC 6.7, Hgb 9.6, Hct 33.2, MCV 73.1, platelets 419,239    05/24/2022 and 05/31/2022- Injectafer IV 1500 mg total dose    06/09/2022 Serology results  Iron 57  TIBC 338  Ferritin 578.9  Saturation 16%  WBC 6.6, Hgb 10.6, Hct 35.7, MCV 77.3, platelets 120,470    Yue reestablished care on 2022 after Dr. Gildardo Bruno requested that she return to clinic for reevaluation recommendations. I saw Karo Kulkarni at this office visit on 2022. Karo Kulkarni has a past medical history to include hypertension, diabetes, hyperlipidemia, rheumatoid arthritis, gastroesophageal reflux disease, fibromyalgia, and sleep apnea. She received IV iron replacement with Injectafer for total of 1500 mg completed on 2022 and then was initiated on oral iron. Karo Kulkarni reported that she is not able to take oral iron due to severe GI upset to include nausea, constipation, abdominal cramping and she is also experiencing reflux symptoms. She has been evaluated by gastroenterology and EGD on 2022 revealed gastritis. Karo Kulkarni reported, on 2022, that she stopped taking her oral contraceptive approximately in 2022 and her last menstrual cycle was 2 weeks ago with severe abdominal cramps and clots. She indicates that when she experienced her menstrual cycle she has heavy clotting for at least the first few days. CBC on 2022: WBC 4.6, ANC 2.6, hemoglobin 11.2, hematocrit 36.6, MCV 80.8 and platelet count of 744,423    2022 Serology results  Iron 66  TIBC 314  Saturation 21%  Ferritin 188.9  B12/Folate 457/5.1  Kappa light chains 27.69  Lambda light chains 17.27  Lambda/kappa ratio 0.62  IgG 1595, IgA 123, IgM 92  Suspicious band of restricted mobility within the polyclonal increase in the gamma region. The suspicious band accounts for 0.81 g/dL of the total 1.69 g/dL of protein in the gamma region. GUNNER gel shows a band in IgG lambda suggestive of an early monoclonal protein.     2022- Stool for occult blood negative x3    Age-appropriate health screenin2022 Bilateral mammogram- stable mammogram  2022 Endoscopy- esophageal dysphasia, gastritis    Past Medical History:    Past Medical History:   Diagnosis Date    Anemia     Chronic rheumatic arthritis ([address]     Elevated blood pressure reading Fibromyalgia     GERD (gastroesophageal reflux disease)     Hypertension     Intracranial hypertension     Obesity     PCOS (polycystic ovarian syndrome)     Sleep apnea     bi pap    SOB (shortness of breath)     Trouble swallowing     Type 2 diabetes mellitus ([facility] [address]        Past Surgical History:    Past Surgical History:   Procedure Laterality Date    BREAST BIOPSY Right 2019    benign    CHOLECYSTECTOMY      UPPER GASTROINTESTINAL ENDOSCOPY N/A 02/01/2022    Dr Scott Scruggs-[id number] appearing stricture noted in the distal esophagus, gastritis, no h pylori    UPPER GASTROINTESTINAL ENDOSCOPY  02/01/2022    Dr Scott Scruggs-[id number] appearing stricture noted in the distal esophagus, gastritis, no h pylori       Current Medications:    Current Outpatient Medications   Medication Sig Dispense Refill    cyanocobalamin 1000 MCG/ML injection Inject 1,000 mcg into the muscle once      losartan (COZAAR) 100 MG tablet TAKE 1 TABLET BY MOUTH EVERY DAY 90 tablet 3    omeprazole (PRILOSEC) 40 MG delayed release capsule Take 1 capsule by mouth daily 90 capsule 3    hydroCHLOROthiazide (HYDRODIURIL) 25 MG tablet TAKE 1 TABLET BY MOUTH EVERY DAY IN THE MORNING (Patient taking differently: as needed) 90 tablet 1    Misc. Devices MISC Glucometer ICD 10: E11.9 1 each 0    blood glucose monitor strips 1 strip by Other route in the morning. 30 strip 11    Lancet Device MISC 1 lancet per strip. 30 each 11    carvedilol (COREG) 12.5 MG tablet Take 1 tablet by mouth 2 times daily 180 tablet 2    ONETOUCH ULTRA strip 1 EACH BY IN VITRO ROUTE 3 TIMES DAILY AS NEEDED. 100 strip 11    amLODIPine (NORVASC) 5 MG tablet TAKE 1 TABLET BY MOUTH TWICE A  tablet 1    busPIRone (BUSPAR) 5 MG tablet TAKE 1 TABLET BY MOUTH 2 TIMES DAILY AS NEEDED (ANXIETY).  180 tablet 2    predniSONE (DELTASONE) 5 MG tablet 5 mg daily as needed       hydroxychloroquine (PLAQUENIL) 200 MG tablet TAKE 1 TABLET BY MOUTH DAILY FOR 7 DAYS, THEN INCREASE TO 2 TABLETS BY MOUTH DAILY      mupirocin (BACTROBAN) 2 % ointment as needed      albuterol sulfate HFA (VENTOLIN HFA) 108 (90 Base) MCG/ACT inhaler Inhale 2 puffs into the lungs 4 times daily as needed for Wheezing 18 g 5    ondansetron (ZOFRAN ODT) 4 MG disintegrating tablet Take 1 tablet by mouth every 8 hours as needed for Nausea or Vomiting 15 tablet 0    KLOR-CON M20 20 MEQ extended release tablet TAKE 1 TABLET BY MOUTH EVERY DAY (Patient taking differently: as needed **as needed**) 30 tablet 0    valACYclovir (VALTREX) 1 g tablet Take 2 tablets p.o. every 12 hours for 2 doses. May repeat for further outbreaks (Patient taking differently: as needed) 8 tablet 2    Blood Glucose Monitoring Suppl (FREESTYLE LITE) ANDREEA USE AS DIRECTED      Lancet Device MISC 1 lancet twice a day 60 Device 11    furosemide (LASIX) 20 MG tablet Take 1 tablet by mouth daily for 3 days (Patient taking differently: Take 20 mg by mouth as needed **as needed**) 3 tablet 0     No current facility-administered medications for this visit. Allergies: Allergies   Allergen Reactions    Mushroom Extract Complex        Social History:    Social History     Tobacco Use    Smoking status: Former     Years: 2.00     Types: Cigarettes     Start date: 1996     Quit date: 1998     Years since quittin.6    Smokeless tobacco: Never   Vaping Use    Vaping Use: Never used   Substance Use Topics    Alcohol use:  Yes     Alcohol/week: 1.0 standard drink     Types: 1 Glasses of wine per week     Comment: rarely    Drug use: No       Family History:   Family History   Problem Relation Age of Onset    High Blood Pressure Father     Diabetes Father     Atrial Fibrillation Father     Heart Failure Father     Stroke Father     Diabetes Sister     Diabetes Maternal Grandmother     Heart Attack Other     Ovarian Cancer Paternal Grandmother     Colon Polyps Neg Hx     Crohn's Disease Neg Hx     Esophageal Cancer Neg Hx     Liver Cancer Neg Hx Rectal Cancer Neg Hx     Stomach Cancer Neg Hx        Vitals:  Vitals:    08/31/22 1342   BP: 138/68   Pulse: 88   SpO2: 96%   Weight: 268 lb (121.6 kg)        Subjective   REVIEW OF SYSTEMS:   Review of Systems   Constitutional:  Positive for fatigue. Negative for chills, diaphoresis and fever. HENT: Negative. Negative for congestion, ear pain, hearing loss, nosebleeds, sore throat and tinnitus. Eyes: Negative. Negative for pain, discharge and redness. Respiratory: Negative. Negative for cough, shortness of breath and wheezing. Cardiovascular: Negative. Negative for chest pain, palpitations and leg swelling. Gastrointestinal:  Positive for nausea. Negative for abdominal pain, blood in stool, constipation, diarrhea and vomiting. Endocrine: Negative for polydipsia. Genitourinary:  Positive for menstrual problem. Negative for dysuria, flank pain, frequency, hematuria and urgency. Musculoskeletal: Negative. Negative for back pain, myalgias and neck pain. Skin: Negative. Negative for rash. Neurological: Negative. Negative for dizziness, tremors, seizures, weakness and headaches. Hematological:  Does not bruise/bleed easily. Psychiatric/Behavioral: Negative. The patient is not nervous/anxious. Objective   PHYSICAL EXAM:  Physical Exam  Vitals reviewed. Constitutional:       General: She is not in acute distress. Appearance: She is well-developed. HENT:      Head: Normocephalic and atraumatic. Mouth/Throat:      Pharynx: Uvula midline. Tonsils: No tonsillar exudate. Eyes:      General: Lids are normal.      Conjunctiva/sclera: Conjunctivae normal.      Pupils: Pupils are equal, round, and reactive to light. Neck:      Thyroid: No thyroid mass or thyromegaly. Vascular: No JVD. Trachea: Trachea normal. No tracheal deviation. Cardiovascular:      Rate and Rhythm: Normal rate and regular rhythm. Pulses: Normal pulses.       Heart sounds: Normal heart sounds. Pulmonary:      Effort: Pulmonary effort is normal. No respiratory distress. Breath sounds: Normal breath sounds. No wheezing or rales. Chest:      Chest wall: No tenderness. Abdominal:      General: Bowel sounds are normal. There is no distension. Palpations: Abdomen is soft. There is no mass. Tenderness: There is no abdominal tenderness. There is no guarding. Musculoskeletal:         General: No tenderness or deformity. Cervical back: Normal range of motion and neck supple. Comments: Range of motion within normal limits x4 extremities   Skin:     General: Skin is warm. Findings: No bruising, erythema or rash. Neurological:      Mental Status: She is alert and oriented to person, place, and time. Cranial Nerves: No cranial nerve deficit. Coordination: Coordination normal.   Psychiatric:         Behavior: Behavior normal.         Thought Content: Thought content normal.       Labs reviewed today:  Lab Results   Component Value Date    WBC 5.50 08/31/2022    HGB 12.5 08/31/2022    HCT 40.7 08/31/2022    MCV 87.7 08/31/2022     (L) 08/31/2022     Lab Results   Component Value Date    NEUTROABS 2.73 08/31/2022       ASSESSMENT/PLAN:      1. Iron deficiency anemia due to chronic blood loss, intolerance to oral iron replacement due to severe GI upset to include nausea, constipation and abdominal cramping. Improved hemoglobin of 12.5 and hematocrit of 40.7. She last received IV iron replacement on 5/31/2022 with Injectafer. Annette Cowart complained of frequent episodes of nausea occurring in the evening, reported her last menstrual cycle was 4 weeks ago. 07/08/2022 Serology results  Iron 66  TIBC 314  Saturation 21%  Ferritin 188.9    07/12/2022- Stool for occult blood negative x3    Obtain the following studies:  - Iron and TIBC; Future  - Ferritin;  Future    -If IV iron is warranted after review of labs arrangements will be made  -Continue to consume iron rich foods      2. Monoclonal gammopathy of unknown significance (MGUS) with an IgG lambda light chain, M spike of 0.81, elevated kappa and lambda light chains    07/08/2022 Serology results  B12/Folate 457/5.1  Kappa light chains 27.69  Lambda light chains 17.27  Kappa/lambda ratio 1.60  IgG 1595, IgA 123, IgM 92  Suspicious band of restricted mobility within the polyclonal increase in the gamma region. The suspicious band accounts for 0.81 g/dL of the total 1.69 g/dL of protein in the gamma region. GUNNER gel shows a band in IgG lambda suggestive of an early monoclonal protein. She has no known history of renal disease or known myeloma defining event.    -Skeletal survey to rule out lytic bone lesions  -24-hour UPEP    If skeletal survey and or 24-hour urine is positive then we will need to move forward with bone marrow aspiration biopsy. Otherwise we will monitor labs conservatively     Discussed diagnosis of MGUS versus multiple myeloma with Yue      3. Menorrhagia with regular cycle  Last menstrual cycle 4 weeks ago    -Obtain von Willebrand panel  -Encouraged to follow-up with GYN    I discussed all of the above findings included in the assessment and plan with the patient and the patient is in agreement to move forward with current recommendations/treatment. I have addressed all of their questions and concerns that were verbalized. FOLLOW UP:  Follow up given for 3 months or sooner, if needed  Continue to follow with other medical providers as recommended  Labs at next visit: CBC, ferritin and iron panel. Consider von Willebrand panel    EMR Dragon/Transcription disclaimer:   Much of this encounter note is an electronic transcription/translation of spoken language to printed text.  The electronic translation of spoken language may permit erroneous, or at times, nonsensical words or phrases to be inadvertently transcribed; although attempts have made to review the note for such errors, some may still exist.  Please excuse any unrecognized transcription errors and contact us if the air is unintelligible or needs documented correction. Also, portions of this note have been copied forward, however, changed to reflect the most current clinical status of this patient. Electronically signed by NATACHA Larson on 9/1/2022 at 5:52 PM  Varun SHARP Ra, am pre-charting as a registered nurse for NATACHA El.

## 2022-09-01 ASSESSMENT — ENCOUNTER SYMPTOMS
CONSTIPATION: 0
VOMITING: 0
DIARRHEA: 0
SHORTNESS OF BREATH: 0
BLOOD IN STOOL: 0
EYE PAIN: 0
EYE DISCHARGE: 0
WHEEZING: 0
BACK PAIN: 0
ABDOMINAL PAIN: 0
EYE REDNESS: 0
EYES NEGATIVE: 1
RESPIRATORY NEGATIVE: 1
SORE THROAT: 0
COUGH: 0

## 2022-09-06 ENCOUNTER — HOSPITAL ENCOUNTER (OUTPATIENT)
Dept: GENERAL RADIOLOGY | Age: 42
Discharge: HOME | End: 2022-09-06
Payer: MEDICAID

## 2022-09-06 DIAGNOSIS — R76.8 ELEVATED SERUM IMMUNOGLOBULIN FREE LIGHT CHAINS: ICD-10-CM

## 2022-09-06 PROCEDURE — 77075 RADEX OSSEOUS SURVEY COMPL: CPT

## 2022-09-06 PROCEDURE — 77075 RADEX OSSEOUS SURVEY COMPL: CPT | Performed by: RADIOLOGY

## 2022-09-08 LAB
FACTOR VIII ACTIVITY: 110 % (ref 56–191)
VON WILLEBRAND ACTIVITY RCF: 81 % (ref 51–215)
VON WILLEBRAND AG: 129 % (ref 52–214)

## 2022-09-14 LAB
ALBUMIN %, URINE: 46.8 %
ALPHA 1, URINE: 5.2 %
ALPHA 2, URINE: 21.8 %
BETA GLOBULIN %, URINE: 25.8 %
EER MONOCLONAL PROTEIN STUDY, 24 HOUR U: NORMAL
GAMMA GLOBULIN %, URINE: 0.4 %
HOURS COLLECTED: 24
IFE INTERPRETATION:U: NORMAL
PARAPROTEIN %, URINE: 0 %
PARAPROTEIN EXCRETION/24 HOUR: 0 MG/24 HRS
TOTAL PROTEIN, URINE-PER VOLUME: 7 MG/DL
URINE 24 HOUR PROTEIN: 105 MG/D (ref 40–150)
URINE TOTAL VOLUME: 1500

## 2022-09-23 ENCOUNTER — TELEPHONE (OUTPATIENT)
Dept: CARDIOLOGY CLINIC | Age: 42
End: 2022-09-23

## 2022-09-26 ENCOUNTER — OFFICE VISIT (OUTPATIENT)
Dept: CARDIOLOGY CLINIC | Age: 42
End: 2022-09-26
Payer: MEDICAID

## 2022-09-26 VITALS
SYSTOLIC BLOOD PRESSURE: 122 MMHG | OXYGEN SATURATION: 99 % | DIASTOLIC BLOOD PRESSURE: 82 MMHG | BODY MASS INDEX: 49.32 KG/M2 | HEIGHT: 62 IN | HEART RATE: 75 BPM | WEIGHT: 268 LBS

## 2022-09-26 DIAGNOSIS — I10 ESSENTIAL (PRIMARY) HYPERTENSION: ICD-10-CM

## 2022-09-26 PROCEDURE — G8417 CALC BMI ABV UP PARAM F/U: HCPCS | Performed by: INTERNAL MEDICINE

## 2022-09-26 PROCEDURE — 99214 OFFICE O/P EST MOD 30 MIN: CPT | Performed by: INTERNAL MEDICINE

## 2022-09-26 PROCEDURE — G8427 DOCREV CUR MEDS BY ELIG CLIN: HCPCS | Performed by: INTERNAL MEDICINE

## 2022-09-26 PROCEDURE — 1036F TOBACCO NON-USER: CPT | Performed by: INTERNAL MEDICINE

## 2022-09-26 RX ORDER — CARVEDILOL 12.5 MG/1
12.5 TABLET ORAL 2 TIMES DAILY
Qty: 180 TABLET | Refills: 3 | Status: SHIPPED | OUTPATIENT
Start: 2022-09-26 | End: 2022-12-25

## 2022-09-26 RX ORDER — AMLODIPINE BESYLATE 5 MG/1
TABLET ORAL
Qty: 180 TABLET | Refills: 3 | Status: SHIPPED | OUTPATIENT
Start: 2022-09-26

## 2022-09-26 RX ORDER — LOSARTAN POTASSIUM 100 MG/1
TABLET ORAL
Qty: 90 TABLET | Refills: 3 | Status: SHIPPED | OUTPATIENT
Start: 2022-09-26

## 2022-09-26 ASSESSMENT — ENCOUNTER SYMPTOMS
WHEEZING: 0
SORE THROAT: 0
EYE PAIN: 0
NAUSEA: 0
CHEST TIGHTNESS: 0
COUGH: 0
APNEA: 0
CONSTIPATION: 0
BLOOD IN STOOL: 0
EYE REDNESS: 0
SHORTNESS OF BREATH: 0
FACIAL SWELLING: 0
EYE DISCHARGE: 0
ABDOMINAL PAIN: 0
ABDOMINAL DISTENTION: 0
DIARRHEA: 0
VOMITING: 0

## 2022-09-26 NOTE — PROGRESS NOTES
Cardiology Office Visit Note  [address]  [address]  Phone: [phone]  Fax: [phone]                            Date:  9/26/2022  Patient: Tierra Almanzar  Age:  43 y.o., 1980    Referral: No ref. provider found    REASON FOR VISIT:  Follow-up (Essential (primary) hypertension. No current cardiac complaints.)         PROBLEM LIST:    Patient Active Problem List    Diagnosis Date Noted    Bilateral hand numbness 08/23/2022     Priority: Medium    Primary hypertension 07/31/2021     Priority: Low    Family history of ovarian cancer 12/22/2020     Priority: Low    Menorrhagia with regular cycle 12/22/2020     Priority: Low    Iron deficiency anemia 09/04/2020     Priority: Low    PCOS (polycystic ovarian syndrome) 08/20/2018     Priority: Low    Rheumatoid arthritis involving multiple sites with positive rheumatoid factor ([facility] 07/06/2018     Priority: Low    Fibromyalgia 07/06/2018     Priority: Low    Bilateral carpal tunnel syndrome 07/06/2018     Priority: Low    Anemia 11/02/2017     Priority: Low    Familial hypercholesterolemia 11/02/2017     Priority: Low    Type 2 diabetes mellitus ([facility]      Priority: Low    Restrictive lung disease 03/15/2022    Esophageal dysphagia     SETH (obstructive sleep apnea) 12/15/2021    Morbid obesity ([facility] 12/15/2021    Gastroesophageal reflux disease without esophagitis 12/15/2021    BiPAP (biphasic positive airway pressure) dependence 10/29/2021         PRESENTATION: Tierra Almanzar is a 43y.o. year old female returns today for routine cardiology follow-up appointment. Her past medical history is significant for essential hypertension with mild hypertensive heart disease, obstructive sleep apnea on CPAP at nights as well as extreme morbid obesity, BMI 49). She is compliant with her medications which include losartan, amlodipine and carvedilol. She is no longer taking hydrochlorothiazide and has not done so for months.   Her blood pressure at home has been reasonably controlled. She is actively trying to lose weight and has increased her physical activity level. She has no new or progressively worsening symptoms to report at this time. REVIEW OF SYSTEMS:  Review of Systems   Constitutional:  Negative for chills, fatigue and fever. HENT:  Negative for congestion, facial swelling, hearing loss and sore throat. Eyes:  Negative for pain, discharge, redness and visual disturbance. Respiratory:  Negative for apnea, cough, chest tightness, shortness of breath and wheezing. Cardiovascular:  Negative for chest pain, palpitations and leg swelling. Gastrointestinal:  Negative for abdominal distention, abdominal pain, blood in stool, constipation, diarrhea, nausea and vomiting. Endocrine: Negative for polydipsia, polyphagia and polyuria. Genitourinary:  Negative for dysuria, flank pain, frequency and hematuria. Musculoskeletal:  Negative for joint swelling, myalgias and neck pain. Skin:  Negative for pallor and rash. Neurological:  Negative for dizziness, syncope, speech difficulty, light-headedness, numbness and headaches. Psychiatric/Behavioral:  Negative for confusion, hallucinations and sleep disturbance.       Past Medical History:      Diagnosis Date    Anemia     Chronic rheumatic arthritis (HCC)     Elevated blood pressure reading     Fibromyalgia     GERD (gastroesophageal reflux disease)     Hypertension     Intracranial hypertension     Obesity     PCOS (polycystic ovarian syndrome)     Sleep apnea     bi pap    SOB (shortness of breath)     Trouble swallowing     Type 2 diabetes mellitus ([facility]        Past Surgical History:      Procedure Laterality Date    BREAST BIOPSY Right 2019    benign    CHOLECYSTECTOMY      UPPER GASTROINTESTINAL ENDOSCOPY N/A 02/01/2022    Dr Marylou Scruggs-w/[id number] appearing stricture noted in the distal esophagus, gastritis, no h pylori    UPPER GASTROINTESTINAL ENDOSCOPY  02/01/2022    Dr Marylou Parra Kael-[id number] appearing stricture noted in the distal esophagus, gastritis, no h pylori       Medications:  Current Outpatient Medications   Medication Sig Dispense Refill    cyanocobalamin 1000 MCG/ML injection Inject 1,000 mcg into the muscle once      losartan (COZAAR) 100 MG tablet TAKE 1 TABLET BY MOUTH EVERY DAY 90 tablet 3    omeprazole (PRILOSEC) 40 MG delayed release capsule Take 1 capsule by mouth daily 90 capsule 3    hydroCHLOROthiazide (HYDRODIURIL) 25 MG tablet TAKE 1 TABLET BY MOUTH EVERY DAY IN THE MORNING (Patient taking differently: as needed) 90 tablet 1    Misc. Devices MISC Glucometer ICD 10: E11.9 1 each 0    blood glucose monitor strips 1 strip by Other route in the morning. 30 strip 11    Lancet Device MISC 1 lancet per strip. 30 each 11    carvedilol (COREG) 12.5 MG tablet Take 1 tablet by mouth 2 times daily 180 tablet 2    ONETOUCH ULTRA strip 1 EACH BY IN VITRO ROUTE 3 TIMES DAILY AS NEEDED. 100 strip 11    amLODIPine (NORVASC) 5 MG tablet TAKE 1 TABLET BY MOUTH TWICE A  tablet 1    busPIRone (BUSPAR) 5 MG tablet TAKE 1 TABLET BY MOUTH 2 TIMES DAILY AS NEEDED (ANXIETY). 180 tablet 2    predniSONE (DELTASONE) 5 MG tablet 5 mg daily as needed       hydroxychloroquine (PLAQUENIL) 200 MG tablet TAKE 1 TABLET BY MOUTH DAILY FOR 7 DAYS, THEN INCREASE TO 2 TABLETS BY MOUTH DAILY      mupirocin (BACTROBAN) 2 % ointment as needed      albuterol sulfate HFA (VENTOLIN HFA) 108 (90 Base) MCG/ACT inhaler Inhale 2 puffs into the lungs 4 times daily as needed for Wheezing 18 g 5    ondansetron (ZOFRAN ODT) 4 MG disintegrating tablet Take 1 tablet by mouth every 8 hours as needed for Nausea or Vomiting 15 tablet 0    KLOR-CON M20 20 MEQ extended release tablet TAKE 1 TABLET BY MOUTH EVERY DAY (Patient taking differently: as needed **as needed**) 30 tablet 0    valACYclovir (VALTREX) 1 g tablet Take 2 tablets p.o. every 12 hours for 2 doses.   May repeat for further outbreaks (Patient taking differently: as needed) 8 tablet 2    Blood Glucose Monitoring Suppl (FREESTYLE LITE) ANDREEA USE AS DIRECTED      Lancet Device MISC 1 lancet twice a day 60 Device 11    furosemide (LASIX) 20 MG tablet Take 1 tablet by mouth daily for 3 days (Patient taking differently: Take 20 mg by mouth as needed **as needed**) 3 tablet 0     No current facility-administered medications for this visit. Allergies:  Mushroom extract complex    Social History:  Social History     Occupational History    Not on file   Tobacco Use    Smoking status: Former     Years: 2.00     Types: Cigarettes     Start date: 1996     Quit date: 1998     Years since quittin.7    Smokeless tobacco: Never   Vaping Use    Vaping Use: Never used   Substance and Sexual Activity    Alcohol use: Yes     Alcohol/week: 1.0 standard drink     Types: 1 Glasses of wine per week     Comment: rarely    Drug use: No    Sexual activity: Yes     Partners: Male     Birth control/protection: OCP         Family History:       Problem Relation Age of Onset    High Blood Pressure Father     Diabetes Father     Atrial Fibrillation Father     Heart Failure Father     Stroke Father     Diabetes Sister     Diabetes Maternal Grandmother     Heart Attack Other     Ovarian Cancer Paternal Grandmother     Colon Polyps Neg Hx     Crohn's Disease Neg Hx     Esophageal Cancer Neg Hx     Liver Cancer Neg Hx     Rectal Cancer Neg Hx     Stomach Cancer Neg Hx          Physical Examination:  /82 (Site: Left Lower Arm, Position: Sitting)   Pulse 75   Ht 5' 2\" (1.575 m)   Wt 268 lb (121.6 kg)   SpO2 99%   BMI 49.02 kg/m²   Physical Exam  Vitals reviewed. Constitutional:       General: She is not in acute distress. Appearance: She is obese. She is not ill-appearing, toxic-appearing or diaphoretic. HENT:      Head: Normocephalic and atraumatic. Eyes:      General: No scleral icterus. Right eye: No discharge.          Left eye: No discharge. Conjunctiva/sclera: Conjunctivae normal.   Neck:      Vascular: No carotid bruit. Cardiovascular:      Rate and Rhythm: Normal rate and regular rhythm. No extrasystoles are present. Chest Wall: PMI is not displaced. No thrill. Heart sounds: S1 normal and S2 normal. No murmur heard. No friction rub. No gallop. Pulmonary:      Effort: Pulmonary effort is normal. No tachypnea or respiratory distress. Breath sounds: Normal breath sounds. No stridor. No wheezing, rhonchi or rales. Chest:      Chest wall: No tenderness. Abdominal:      General: Bowel sounds are normal. There is no distension. Palpations: Abdomen is soft. There is no mass. Tenderness: There is no abdominal tenderness. There is no guarding. Musculoskeletal:         General: No swelling. Cervical back: Normal range of motion and neck supple. No rigidity. Right lower leg: No edema. Left lower leg: No edema. Skin:     General: Skin is warm and dry. Coloration: Skin is not jaundiced. Findings: No erythema or rash. Neurological:      General: No focal deficit present. Mental Status: She is alert and oriented to person, place, and time. Mental status is at baseline. Psychiatric:         Mood and Affect: Mood normal.         Behavior: Behavior normal.         Thought Content:  Thought content normal.         Labs:   CBC: No results found for: CBC   BMP: No results found for: BMP    BNP: No results found for: BNPINT   PT/INR:   Prothrombin Time   Date Value Ref Range Status   02/23/2012 11.60 9.7 - 12.5 SEC Final     Protime   Date Value Ref Range Status   02/28/2021 13.2 12.0 - 14.6 sec Final     INR   Date Value Ref Range Status   02/28/2021 1.01 0.88 - 1.18 Final     Comment:     INR  < or = 1.3  Normal  INR = 2.0 - 3.0  Therapeutic  INR = 2.5 - 3.5  Therapeutic for patients with mechanical  prosthetic heart valve & MI prophylaxis  INR  > or = 3.5  Abnormal/Elevated  INR  > or = 5.0  Critical (requires immediate physician  notification)         APTT:    aPTT   Date Value Ref Range Status   07/31/2018 28.9 26.0 - 36.2 sec Final      CARDIAC ENZYMES:   Troponin   Date Value Ref Range Status   04/11/2022 <0.01 0.00 - 0.03 ng/mL Final     Comment:     <0.030 ng/ml       No measurable cardiac damage. 0.030-0.099 ng/ml  Values of troponin in this range suggest  possible myocardial damage. Repeat assay  4 to 6 hours after the current specimen.    >= 0.100 ng/ml     Indicative of myocardial damage. Recommend continued monitoring of  patient status and cardiac markers. LIPID PANEL: No results found for: [facility]  LIVER PROFILE:   AST   Date Value Ref Range Status   08/31/2022 18 14 - 36 U/L Final     ALT   Date Value Ref Range Status   08/31/2022 15 9 - 52 U/L Final     Albumin   Date Value Ref Range Status   08/31/2022 4.9 3.5 - 5.2 g/dL Final   02/23/2012 4.3 3.4 - 4.8 G/DL Final      Cardiac stress test exercise only 6/8/2022  Summary   Treadmill stress test without chest pain or electrocardiographic evidence   of myocardial ischemia. Patient was very short of breath with maximal   exercise. O2 sat was 88% with maximal stress but quickly recovered with   termination of exercise and deep breathing. Recommendation   Clinical correlation is advised if symptomology is persistent or   problematic.       Signature      ----------------------------------------------------------------   Electronically signed by Kirk Covington(Interpreting physician)   on 06/08/2022 02:01 PM   ----------------------------------------------------------------         ASSESSMENT and PLAN:    Essential hypertension with mild hypertensive heart disease (mild concentric LVH on echocardiogram, 7/2021)  Morbid obesity, BMI 49  Restrictive lung disease likely related to obesity hypoventilation syndrome  Obstructive sleep apnea, CPAP at night  Gastroesophageal reflux disease  Rheumatoid arthritis, on chronic low-dose steroids    Continue current treatment plan. Blood pressure is currently at goal, less than 130/80. Patient's weight loss efforts encouraged. Continue home BP/heart rate monitoring. Low-sodium diet, less than 2 g per 24 hours. Electronically signed by Jonn Blevins MD on 9/26/2022 at Deon Cuellar MD, MBA, [facility]  Noninvasive Cardiology Consultant    This dictation was generated by voice recognition computer software. Although all attempts are made to edit the dictation for accuracy, there may be errors in the transcription that are not intended.

## 2022-10-10 ENCOUNTER — HOSPITAL ENCOUNTER (EMERGENCY)
Age: 42
Discharge: HOME | End: 2022-10-10
Attending: EMERGENCY MEDICINE
Payer: MEDICAID

## 2022-10-10 VITALS
HEART RATE: 82 BPM | BODY MASS INDEX: 47.84 KG/M2 | TEMPERATURE: 98.2 F | DIASTOLIC BLOOD PRESSURE: 88 MMHG | SYSTOLIC BLOOD PRESSURE: 138 MMHG | WEIGHT: 260 LBS | HEIGHT: 62 IN | RESPIRATION RATE: 20 BRPM | OXYGEN SATURATION: 99 %

## 2022-10-10 DIAGNOSIS — R11.2 NAUSEA AND VOMITING, UNSPECIFIED VOMITING TYPE: Primary | ICD-10-CM

## 2022-10-10 LAB
ADENOVIRUS BY PCR: NOT DETECTED
ALBUMIN SERPL-MCNC: 4 G/DL (ref 3.5–5.2)
ALP BLD-CCNC: 57 U/L (ref 35–104)
ALT SERPL-CCNC: 10 U/L (ref 5–33)
ANION GAP SERPL CALCULATED.3IONS-SCNC: 11 MMOL/L (ref 7–19)
AST SERPL-CCNC: 12 U/L (ref 5–32)
BASOPHILS ABSOLUTE: 0 K/UL (ref 0–0.2)
BASOPHILS RELATIVE PERCENT: 0.6 % (ref 0–1)
BILIRUB SERPL-MCNC: 0.3 MG/DL (ref 0.2–1.2)
BORDETELLA PARAPERTUSSIS BY PCR: NOT DETECTED
BORDETELLA PERTUSSIS BY PCR: NOT DETECTED
BUN BLDV-MCNC: 14 MG/DL (ref 6–20)
CALCIUM SERPL-MCNC: 8.9 MG/DL (ref 8.6–10)
CHLAMYDOPHILIA PNEUMONIAE BY PCR: NOT DETECTED
CHLORIDE BLD-SCNC: 101 MMOL/L (ref 98–111)
CO2: 22 MMOL/L (ref 22–29)
CORONAVIRUS 229E BY PCR: NOT DETECTED
CORONAVIRUS HKU1 BY PCR: NOT DETECTED
CORONAVIRUS NL63 BY PCR: NOT DETECTED
CORONAVIRUS OC43 BY PCR: NOT DETECTED
CREAT SERPL-MCNC: 0.8 MG/DL (ref 0.5–0.9)
EOSINOPHILS ABSOLUTE: 0.3 K/UL (ref 0–0.6)
EOSINOPHILS RELATIVE PERCENT: 4.2 % (ref 0–5)
GFR AFRICAN AMERICAN: >59
GFR NON-AFRICAN AMERICAN: >60
GLUCOSE BLD-MCNC: 111 MG/DL (ref 74–109)
HCT VFR BLD CALC: 38 % (ref 37–47)
HEMOGLOBIN: 12.1 G/DL (ref 12–16)
HUMAN METAPNEUMOVIRUS BY PCR: NOT DETECTED
HUMAN RHINOVIRUS/ENTEROVIRUS BY PCR: NOT DETECTED
IMMATURE GRANULOCYTES #: 0 K/UL
INFLUENZA A BY PCR: NOT DETECTED
INFLUENZA B BY PCR: NOT DETECTED
LIPASE: 27 U/L (ref 13–60)
LYMPHOCYTES ABSOLUTE: 2.2 K/UL (ref 1.1–4.5)
LYMPHOCYTES RELATIVE PERCENT: 32 % (ref 20–40)
MCH RBC QN AUTO: 27.8 PG (ref 27–31)
MCHC RBC AUTO-ENTMCNC: 31.8 G/DL (ref 33–37)
MCV RBC AUTO: 87.2 FL (ref 81–99)
MONOCYTES ABSOLUTE: 0.5 K/UL (ref 0–0.9)
MONOCYTES RELATIVE PERCENT: 8 % (ref 0–10)
MYCOPLASMA PNEUMONIAE BY PCR: NOT DETECTED
NEUTROPHILS ABSOLUTE: 3.7 K/UL (ref 1.5–7.5)
NEUTROPHILS RELATIVE PERCENT: 55.1 % (ref 50–65)
PARAINFLUENZA VIRUS 1 BY PCR: NOT DETECTED
PARAINFLUENZA VIRUS 2 BY PCR: NOT DETECTED
PARAINFLUENZA VIRUS 3 BY PCR: NOT DETECTED
PARAINFLUENZA VIRUS 4 BY PCR: NOT DETECTED
PDW BLD-RTO: 13.5 % (ref 11.5–14.5)
PLATELET # BLD: 182 K/UL (ref 130–400)
PMV BLD AUTO: 11.1 FL (ref 9.4–12.3)
POTASSIUM SERPL-SCNC: 4.1 MMOL/L (ref 3.5–5)
RBC # BLD: 4.36 M/UL (ref 4.2–5.4)
RESPIRATORY SYNCYTIAL VIRUS BY PCR: NOT DETECTED
SARS-COV-2, PCR: NOT DETECTED
SODIUM BLD-SCNC: 134 MMOL/L (ref 136–145)
TOTAL PROTEIN: 7.5 G/DL (ref 6.6–8.7)
WBC # BLD: 6.7 K/UL (ref 4.8–10.8)

## 2022-10-10 PROCEDURE — 93005 ELECTROCARDIOGRAM TRACING: CPT | Performed by: EMERGENCY MEDICINE

## 2022-10-10 PROCEDURE — 2580000003 HC RX 258: Performed by: EMERGENCY MEDICINE

## 2022-10-10 PROCEDURE — 6360000002 HC RX W HCPCS: Performed by: EMERGENCY MEDICINE

## 2022-10-10 PROCEDURE — 36415 COLL VENOUS BLD VENIPUNCTURE: CPT

## 2022-10-10 PROCEDURE — 83690 ASSAY OF LIPASE: CPT

## 2022-10-10 PROCEDURE — 80053 COMPREHEN METABOLIC PANEL: CPT

## 2022-10-10 PROCEDURE — 96374 THER/PROPH/DIAG INJ IV PUSH: CPT

## 2022-10-10 PROCEDURE — 0202U NFCT DS 22 TRGT SARS-COV-2: CPT

## 2022-10-10 PROCEDURE — 99284 EMERGENCY DEPT VISIT MOD MDM: CPT

## 2022-10-10 PROCEDURE — 85025 COMPLETE CBC W/AUTO DIFF WBC: CPT

## 2022-10-10 RX ORDER — ONDANSETRON 2 MG/ML
4 INJECTION INTRAMUSCULAR; INTRAVENOUS ONCE
Status: COMPLETED | OUTPATIENT
Start: 2022-10-10 | End: 2022-10-10

## 2022-10-10 RX ORDER — ONDANSETRON 4 MG/1
4 TABLET, ORALLY DISINTEGRATING ORAL EVERY 8 HOURS PRN
Qty: 15 TABLET | Refills: 0 | Status: SHIPPED | OUTPATIENT
Start: 2022-10-10

## 2022-10-10 RX ORDER — SODIUM CHLORIDE, SODIUM LACTATE, POTASSIUM CHLORIDE, AND CALCIUM CHLORIDE .6; .31; .03; .02 G/100ML; G/100ML; G/100ML; G/100ML
1000 INJECTION, SOLUTION INTRAVENOUS ONCE
Status: COMPLETED | OUTPATIENT
Start: 2022-10-10 | End: 2022-10-10

## 2022-10-10 RX ADMIN — SODIUM CHLORIDE, POTASSIUM CHLORIDE, SODIUM LACTATE AND CALCIUM CHLORIDE 1000 ML: 600; 310; 30; 20 INJECTION, SOLUTION INTRAVENOUS at 21:13

## 2022-10-10 RX ADMIN — ONDANSETRON 4 MG: 2 INJECTION INTRAMUSCULAR; INTRAVENOUS at 21:12

## 2022-10-10 ASSESSMENT — PAIN - FUNCTIONAL ASSESSMENT: PAIN_FUNCTIONAL_ASSESSMENT: NONE - DENIES PAIN

## 2022-10-11 LAB
EKG P AXIS: 71 DEGREES
EKG P-R INTERVAL: 154 MS
EKG Q-T INTERVAL: 380 MS
EKG QRS DURATION: 82 MS
EKG QTC CALCULATION (BAZETT): 413 MS
EKG T AXIS: 45 DEGREES

## 2022-10-11 PROCEDURE — 93010 ELECTROCARDIOGRAM REPORT: CPT | Performed by: INTERNAL MEDICINE

## 2022-10-11 NOTE — ED PROVIDER NOTES
[address] [facility] EMERGENCY DEPT  eMERGENCY dEPARTMENT eNCOUnter      Pt Name: Carine Gutiérrez  MRN: [id number]  [place] 1980  Date of evaluation: 10/10/2022  Provider: Sonia Colbert MD    CHIEF COMPLAINT       Chief Complaint   Patient presents with    Nausea & Vomiting     Began about 1700 today . Patient reports she had \"tongue numbness\" after vomiting. Chills    Chest Pain     At the completion of triage patient reports she has \"chest tightness\"           HISTORY OF PRESENT ILLNESS   (Location/Symptom, Timing/Onset,Context/Setting, Quality, Duration, Modifying Factors, Severity)  Note limiting factors. Carine Gutiérrez is a 43 y.o. female who presents to the emergency department for evaluation regarding nausea with associated vomiting. Patient states the symptoms began today at around 5 PM.  States that she also was having some feelings of chest tightness earlier today. She is not really had any episodes of diarrhea. She describes some mild, crampy diffuse abdominal pain that is without radiation. No prior history of peptic ulcer disease. He is underwent previous EGD along with a prior cholecystectomy. She denies fevers or chills. Denies recent oral antibiotic therapy. HPI    NursingNotes were reviewed. REVIEW OF SYSTEMS    (2-9 systems for level 4, 10 or more for level 5)     Review of Systems   Constitutional:  Negative for chills and fever. Respiratory:  Negative for shortness of breath. Cardiovascular:  Positive for chest pain. Negative for palpitations and leg swelling. Gastrointestinal:  Positive for abdominal pain, nausea and vomiting. Genitourinary:  Negative for dysuria. Neurological:  Negative for syncope. All other systems reviewed and are negative.          PAST MEDICALHISTORY     Past Medical History:   Diagnosis Date    Anemia     Chronic rheumatic arthritis (HCC)     Elevated blood pressure reading     Fibromyalgia     GERD (gastroesophageal reflux disease)     Hypertension     Intracranial hypertension     Obesity     PCOS (polycystic ovarian syndrome)     Sleep apnea     bi pap    SOB (shortness of breath)     Trouble swallowing     Type 2 diabetes mellitus (HCC)          SURGICAL HISTORY       Past Surgical History:   Procedure Laterality Date    BREAST BIOPSY Right 2019    benign    CHOLECYSTECTOMY      UPPER GASTROINTESTINAL ENDOSCOPY N/A 02/01/2022    Dr Luther Smoker Scruggs-w/[id number] appearing stricture noted in the distal esophagus, gastritis, no h pylori    UPPER GASTROINTESTINAL ENDOSCOPY  02/01/2022    Dr Homa Scruggs-w/[id number] appearing stricture noted in the distal esophagus, gastritis, no h pylori         CURRENT MEDICATIONS     Discharge Medication List as of 10/10/2022 11:19 PM        CONTINUE these medications which have NOT CHANGED    Details   amLODIPine (NORVASC) 5 MG tablet TAKE 1 TABLET BY MOUTH TWICE A DAY, Disp-180 tablet, R-3Normal      carvedilol (COREG) 12.5 MG tablet Take 1 tablet by mouth 2 times daily, Disp-180 tablet, R-3Normal      losartan (COZAAR) 100 MG tablet TAKE 1 TABLET BY MOUTH EVERY DAY, Disp-90 tablet, R-3Normal      cyanocobalamin 1000 MCG/ML injection Inject 1,000 mcg into the muscle onceHistorical Med      omeprazole (PRILOSEC) 40 MG delayed release capsule Take 1 capsule by mouth daily, Disp-90 capsule, R-3Normal      Misc. Devices MISC Disp-1 each, R-0, NormalGlucometer ICD 10: E11.9      !! blood glucose monitor strips 1 strip by Other route in the morning., Other, DAILY Starting Mon 7/18/2022, Disp-30 strip, R-11, Normal      !! Lancet Device MISC Disp-30 each, R-11, Normal1 lancet per strip. !! ONETOUCH ULTRA strip 1 EACH BY IN VITRO ROUTE 3 TIMES DAILY AS NEEDED., Disp-100 strip, R-11Normal      busPIRone (BUSPAR) 5 MG tablet TAKE 1 TABLET BY MOUTH 2 TIMES DAILY AS NEEDED (ANXIETY). , Disp-180 tablet, R-2Normal      predniSONE (DELTASONE) 5 MG tablet 5 mg daily as needed Historical Med      hydroxychloroquine (PLAQUENIL) 200 MG tablet TAKE 1 TABLET BY MOUTH DAILY FOR 7 DAYS, THEN INCREASE TO 2 TABLETS BY MOUTH DAILYHistorical Med      mupirocin (BACTROBAN) 2 % ointment as needed, PRN Starting 2021, Historical Med      albuterol sulfate HFA (VENTOLIN HFA) 108 (90 Base) MCG/ACT inhaler Inhale 2 puffs into the lungs 4 times daily as needed for Wheezing, Disp-18 g, R-5Normal      valACYclovir (VALTREX) 1 g tablet Take 2 tablets p.o. every 12 hours for 2 doses. May repeat for further outbreaks, Disp-8 tablet, R-2Normal      Blood Glucose Monitoring Suppl (FREESTYLE LITE) ANDREEA Historical Med      !! Lancet Device MISC Disp-60 Device,R-11, Normal1 lancet twice a day       !! - Potential duplicate medications found. Please discuss with provider. ALLERGIES     Mushroom extract complex    FAMILY HISTORY       Family History   Problem Relation Age of Onset    High Blood Pressure Father     Diabetes Father     Atrial Fibrillation Father     Heart Failure Father     Stroke Father     Diabetes Sister     Diabetes Maternal Grandmother     Heart Attack Other     Ovarian Cancer Paternal Grandmother     Colon Polyps Neg Hx     Crohn's Disease Neg Hx     Esophageal Cancer Neg Hx     Liver Cancer Neg Hx     Rectal Cancer Neg Hx     Stomach Cancer Neg Hx           SOCIAL HISTORY       Social History     Socioeconomic History    Marital status: Single     Spouse name: None    Number of children: None    Years of education: None    Highest education level: None   Tobacco Use    Smoking status: Former     Years: 2.00     Types: Cigarettes     Start date: 1996     Quit date: 1998     Years since quittin.8    Smokeless tobacco: Never   Vaping Use    Vaping Use: Never used   Substance and Sexual Activity    Alcohol use:  Yes     Alcohol/week: 1.0 standard drink     Types: 1 Glasses of wine per week     Comment: rarely    Drug use: No    Sexual activity: Yes     Partners: Male     Birth control/protection: OCP     Social Determinants of Health Financial Resource Strain: Low Risk     Difficulty of Paying Living Expenses: Not hard at all   Food Insecurity: No Food Insecurity    Worried About [facility] in the Last Year: Never true    [facility] in the Last Year: Never true       SCREENINGS    Kareem Coma Scale  Eye Opening: Spontaneous  Best Verbal Response: Oriented  Best Motor Response: Obeys commands  Kareem Coma Scale Score: 15        PHYSICAL EXAM    (up to 7 for level 4, 8 or more for level 5)     ED Triage Vitals [10/10/22 2043]   BP Temp Temp Source Heart Rate Resp SpO2 Height Weight   (!) 152/88 98.2 °F (36.8 °C) Oral 89 16 100 % 5' 2\" (1.575 m) 260 lb (117.9 kg)       Physical Exam  Vitals and nursing note reviewed. Constitutional:       Appearance: She is obese. HENT:      Head: Atraumatic. Mouth/Throat:      Mouth: Mucous membranes are moist. Mucous membranes are not dry. Eyes:      General: No scleral icterus. Pupils: Pupils are equal, round, and reactive to light. Neck:      Trachea: No tracheal deviation. Cardiovascular:      Rate and Rhythm: Normal rate and regular rhythm. Pulses: Normal pulses. Heart sounds: Normal heart sounds. No murmur heard. Pulmonary:      Effort: Pulmonary effort is normal. No respiratory distress. Breath sounds: Normal breath sounds. No stridor. Abdominal:      General: There is no distension. Palpations: Abdomen is soft. Tenderness: There is no abdominal tenderness. There is no guarding. Musculoskeletal:      Right lower leg: No edema. Left lower leg: No edema. Skin:     Capillary Refill: Capillary refill takes less than 2 seconds. Coloration: Skin is not pale. Findings: No rash. Neurological:      Mental Status: She is alert and oriented to person, place, and time. Psychiatric:         Behavior: Behavior is cooperative.        DIAGNOSTIC RESULTS     EKG: All EKG's areinterpreted by the Emergency Department Physician who either signs or Co-signs this chart in the absence of a cardiologist.    2053: Normal sinus rhythm at a rate of 79, no evidence of acute ST elevation is identified. QTc: 413 MS. LABS:  Labs Reviewed   CBC WITH AUTO DIFFERENTIAL - Abnormal; Notable for the following components:       Result Value    MCHC 31.8 (*)     All other components within normal limits   COMPREHENSIVE METABOLIC PANEL - Abnormal; Notable for the following components:    Sodium 134 (*)     Glucose 111 (*)     All other components within normal limits   RESPIRATORY PANEL, MOLECULAR, WITH COVID-19   LIPASE       All other labs were within normal range or not returned as of this dictation. EMERGENCY DEPARTMENT COURSE and DIFFERENTIAL DIAGNOSIS/MDM:   Vitals:    Vitals:    10/10/22 2043 10/10/22 2114 10/10/22 2230   BP: (!) 152/88 (!) 137/90 138/88   Pulse: 89  82   Resp: 16  20   Temp: 98.2 °F (36.8 °C)     TempSrc: Oral     SpO2: 100%  99%   Weight: 260 lb (117.9 kg)     Height: 5' 2\" (1.575 m)         MDM    Patient's laboratory studies look okay. Electrolytes are unremarkable. Serum lipase is normal.  Her EKG looks okay. Overall she is feeling quite a bit better after some IV fluids and antiemetics. We will plan to discharge her home with plans for outpatient follow-up with her PMD.    PROCEDURES:  Unless otherwise noted below, none     Procedures    FINAL IMPRESSION      1.  Nausea and vomiting, unspecified vomiting type          DISPOSITION/PLAN   DISPOSITION Decision To Discharge 10/10/2022 11:15:12 PM      PATIENT REFERRED TO:  Saba Saldaña MD  [address]  [phone]          DISCHARGE MEDICATIONS:  Discharge Medication List as of 10/10/2022 11:19 PM        START taking these medications    Details   ondansetron (ZOFRAN ODT) 4 MG disintegrating tablet Take 1 tablet by mouth every 8 hours as needed for Nausea or Vomiting, Disp-15 tablet, R-0Normal                (Please note that portions of this note were completed with a voice recognition program.  Efforts were made to edit thedictations but occasionally words are mis-transcribed.)    Erika Miranda MD (electronically signed)  Attending Emergency Physician          Erika Miranda MD  10/23/22 [phone]

## 2022-10-17 ENCOUNTER — HOSPITAL ENCOUNTER (OUTPATIENT)
Dept: PULMONOLOGY | Age: 42
Discharge: HOME | End: 2022-10-17

## 2022-10-23 ASSESSMENT — ENCOUNTER SYMPTOMS
SHORTNESS OF BREATH: 0
ABDOMINAL PAIN: 1
NAUSEA: 1
VOMITING: 1

## 2022-10-25 ENCOUNTER — OFFICE VISIT (OUTPATIENT)
Dept: FAMILY MEDICINE CLINIC | Age: 42
End: 2022-10-25
Payer: MEDICAID

## 2022-10-25 VITALS
DIASTOLIC BLOOD PRESSURE: 72 MMHG | HEART RATE: 52 BPM | BODY MASS INDEX: 49.93 KG/M2 | WEIGHT: 273 LBS | SYSTOLIC BLOOD PRESSURE: 132 MMHG | OXYGEN SATURATION: 100 % | TEMPERATURE: 98 F

## 2022-10-25 DIAGNOSIS — R13.19 ESOPHAGEAL DYSPHAGIA: ICD-10-CM

## 2022-10-25 DIAGNOSIS — D64.9 ANEMIA, UNSPECIFIED TYPE: ICD-10-CM

## 2022-10-25 DIAGNOSIS — Z99.89 BIPAP (BIPHASIC POSITIVE AIRWAY PRESSURE) DEPENDENCE: ICD-10-CM

## 2022-10-25 DIAGNOSIS — M05.79 RHEUMATOID ARTHRITIS INVOLVING MULTIPLE SITES WITH POSITIVE RHEUMATOID FACTOR (HCC): ICD-10-CM

## 2022-10-25 DIAGNOSIS — I10 ESSENTIAL (PRIMARY) HYPERTENSION: ICD-10-CM

## 2022-10-25 DIAGNOSIS — E28.2 PCOS (POLYCYSTIC OVARIAN SYNDROME): ICD-10-CM

## 2022-10-25 DIAGNOSIS — N92.0 MENORRHAGIA WITH REGULAR CYCLE: ICD-10-CM

## 2022-10-25 DIAGNOSIS — E11.9 TYPE 2 DIABETES MELLITUS WITHOUT COMPLICATION, WITHOUT LONG-TERM CURRENT USE OF INSULIN (HCC): Primary | ICD-10-CM

## 2022-10-25 DIAGNOSIS — E78.01 FAMILIAL HYPERCHOLESTEROLEMIA: ICD-10-CM

## 2022-10-25 LAB — HBA1C MFR BLD: 5.6 %

## 2022-10-25 PROCEDURE — 3078F DIAST BP <80 MM HG: CPT | Performed by: FAMILY MEDICINE

## 2022-10-25 PROCEDURE — G8484 FLU IMMUNIZE NO ADMIN: HCPCS | Performed by: FAMILY MEDICINE

## 2022-10-25 PROCEDURE — 3044F HG A1C LEVEL LT 7.0%: CPT | Performed by: FAMILY MEDICINE

## 2022-10-25 PROCEDURE — G8417 CALC BMI ABV UP PARAM F/U: HCPCS | Performed by: FAMILY MEDICINE

## 2022-10-25 PROCEDURE — G8427 DOCREV CUR MEDS BY ELIG CLIN: HCPCS | Performed by: FAMILY MEDICINE

## 2022-10-25 PROCEDURE — 2022F DILAT RTA XM EVC RTNOPTHY: CPT | Performed by: FAMILY MEDICINE

## 2022-10-25 PROCEDURE — 1036F TOBACCO NON-USER: CPT | Performed by: FAMILY MEDICINE

## 2022-10-25 PROCEDURE — 83036 HEMOGLOBIN GLYCOSYLATED A1C: CPT | Performed by: FAMILY MEDICINE

## 2022-10-25 PROCEDURE — 99215 OFFICE O/P EST HI 40 MIN: CPT | Performed by: FAMILY MEDICINE

## 2022-10-25 PROCEDURE — 3074F SYST BP LT 130 MM HG: CPT | Performed by: FAMILY MEDICINE

## 2022-10-25 NOTE — TELEPHONE ENCOUNTER
Lokesh Fisher called to request a refill on her medication.       Last office visit : 8/12/2022   Next office visit : 10/25/2022     Requested Prescriptions     Pending Prescriptions Disp Refills    mupirocin (BACTROBAN) 2 % ointment [Pharmacy Med Name: MUPIROCIN 2% OINTMENT] 22 g 2     Sig: APPLY TO AFFECTED AREA [address], LPN

## 2022-10-25 NOTE — PROGRESS NOTES
[facility] PHYSICIAN SERVICES  [facility]  [id number] [address] [address]  Dept: [phone]  Dept Fax: [phone]  Loc: [phone]    Lea Rose is a 43 y.o. female who presents today for her medical conditions/complaints as noted below. Lea Rose is here for Follow-up        HPI:   CC: Here today to discuss the following:    She was emergency department on October 10 for nausea and vomiting.  -She was denying any diarrhea or constipation.  -She was having some mild crampy diffuse abdominal discomfort.  -Respiratory swab was obtained which was negative  -Lipase negative  -CMP: Sodium 134  -White blood cell count 6.7  -She was given Zofran and discharged home. Follow-up with cardiology in September 26  She has a history of hypertension with mild hypertensive heart disease  -Blood pressure was controlled at that visit she was advised to continue with her current medication.  -Encourage lifestyle modification including weight loss.  -Discussed low-sodium diet less than 2 g/day  -She had a cardiac stress test June 2022 without evidence of myocardial ischemia. 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 She had a telehealth visit with gastroenterology on August 19:  -She has a history of chronic GERD and mild chronic gastritis  -Antireflux precautions were discussed. She also follows up with hematology.  -She has a history of iron deficiency anemia. Consideration for IV iron  - She has menorrhagia and advised to discuss this with her gynecologist.  -MGUS: Continues to be monitored.       Follow-up appointment scheduled:  Pulmonary function testing: November 8, 2022  Follow-up with pulmonology: November 8, 2022  Hematology: November 30, 2022  Cardiology: September 2023      HPI    Subjective:      Review of Systems   Constitutional:  Positive for fatigue. Negative for chills and fever. HENT:  Negative for congestion. Respiratory:  Negative for cough, chest tightness and shortness of breath. Cardiovascular:  Negative for chest pain, palpitations and leg swelling. Gastrointestinal:  Negative for abdominal pain, anal bleeding, constipation, diarrhea and nausea. Genitourinary:  Negative for difficulty urinating. Musculoskeletal:  Positive for arthralgias, back pain, myalgias and neck pain. Psychiatric/Behavioral: Negative. SeeHPI for visit specific review of symptoms. All others negative      Objective:   /72   Pulse 52   Temp 98 °F (36.7 °C)   Wt 273 lb (123.8 kg)   SpO2 100%   BMI 49.93 kg/m²   Physical Exam  Constitutional:       Appearance: She is well-developed. She is not ill-appearing. Cardiovascular:      Rate and Rhythm: Normal rate and regular rhythm. Heart sounds: No murmur heard. No friction rub. Pulmonary:      Effort: Pulmonary effort is normal. No respiratory distress. Breath sounds: Normal breath sounds. No wheezing or rales. Abdominal:      General: There is no distension. Palpations: Abdomen is soft. Tenderness: There is no abdominal tenderness. Musculoskeletal:      Cervical back: Neck supple. No tenderness. Lymphadenopathy:      Cervical: No cervical adenopathy. Neurological:      Mental Status: She is alert.    Psychiatric:         Behavior: Behavior normal.         Recent Results (from the past 672 hour(s))   EKG 12 Lead    Collection Time: 10/10/22  8:53 PM   Result Value Ref Range    P-R Interval 154 ms    QRS Duration 82 ms    Q-T Interval 380 ms    QTc Calculation (Bazett) 413 ms    P Axis 71 degrees    T Axis 45 degrees   CBC with Auto Differential    Collection Time: 10/10/22  9:07 PM   Result Value Ref Range    WBC 6.7 4.8 - 10.8 K/uL    RBC 4.36 4.20 - 5.40 M/uL    Hemoglobin 12.1 Component Value Date    TRIG 68 10/05/2021    TRIG 54 07/31/2021    TRIG 86 08/25/2020     Lab Results   Component Value Date    HDL 55 (L) 10/05/2021    HDL 51 (L) 07/31/2021    HDL 49 (L) 08/25/2020     Lab Results   Component Value Date    LDLCALC 94 10/05/2021    LDLCALC 105 07/31/2021    LDLCALC 82 08/25/2020     No results found for: LABVLDL, VLDL  No results found for: CHOLHDLRATIO  Recheck lipid panel next visit. She missed her lab appointment    5. Rheumatoid arthritis involving multiple sites with positive rheumatoid factor ([facility]  Continue with recommendations per her rheumatologist she is currently on prednisone    6. Anemia, unspecified type  Lab Results   Component Value Date    HGB 12.1 10/10/2022       Hemoglobin stable. We will continue to monitor  7. Menorrhagia with regular cycle  Advised her to discuss this further with her gynecologist    8. Esophageal dysphagia  She states her reflux and dysphagia are well controlled currently. Continue with omeprazole    9. BiPAP (biphasic positive airway pressure) dependence  Stable      Orders Placed This Encounter   Procedures    POCT glycosylated hemoglobin (Hb A1C)     42-minute office visit today. 11 minutes was spent reviewing her chart, previous records, starting her note and placing the summary in the HPI  An additional 31 minutes was spent face-to-face with the patient discussing the above    Return in about 3 months (around 1/25/2023) for Routine follow up - 20 minutes. Discussed use, benefit, and side effects of prescribed medications. All patient questions answered. Pt voiced understanding. Reviewed health maintenance. Instructedto continue current medications, diet and exercise. Patient agreed with treatmentplan.  Follow up as directed.     _______________________________________________________________      Past Medical History:   Diagnosis Date    Anemia     Chronic rheumatic arthritis ([facility]     Elevated blood pressure reading     Fibromyalgia     GERD (gastroesophageal reflux disease)     Hypertension     Intracranial hypertension     Obesity     PCOS (polycystic ovarian syndrome)     Sleep apnea     bi pap    SOB (shortness of breath)     Trouble swallowing     Type 2 diabetes mellitus ([facility]       Past Surgical History:   Procedure Laterality Date    BREAST BIOPSY Right 2019    benign    CHOLECYSTECTOMY      UPPER GASTROINTESTINAL ENDOSCOPY N/A 2022    Dr Marika Scruggs-[id number] appearing stricture noted in the distal esophagus, gastritis, no h pylori    UPPER GASTROINTESTINAL ENDOSCOPY  2022    Dr Marika Scruggs-[id number] appearing stricture noted in the distal esophagus, gastritis, no h pylori       Family History   Problem Relation Age of Onset    High Blood Pressure Father     Diabetes Father     Atrial Fibrillation Father     Heart Failure Father     Stroke Father     Diabetes Sister     Diabetes Maternal Grandmother     Heart Attack Other     Ovarian Cancer Paternal Grandmother     Colon Polyps Neg Hx     Crohn's Disease Neg Hx     Esophageal Cancer Neg Hx     Liver Cancer Neg Hx     Rectal Cancer Neg Hx     Stomach Cancer Neg Hx        Social History     Tobacco Use    Smoking status: Former     Years: 2.00     Types: Cigarettes     Start date: 1996     Quit date: 1998     Years since quittin.8    Smokeless tobacco: Never   Substance Use Topics    Alcohol use:  Yes     Alcohol/week: 1.0 standard drink     Types: 1 Glasses of wine per week     Comment: rarely     Current Outpatient Medications   Medication Sig Dispense Refill    metFORMIN (GLUCOPHAGE) 500 MG tablet Take 1 tablet by mouth daily 30 tablet 5    ondansetron (ZOFRAN ODT) 4 MG disintegrating tablet Take 1 tablet by mouth every 8 hours as needed for Nausea or Vomiting 15 tablet 0    amLODIPine (NORVASC) 5 MG tablet TAKE 1 TABLET BY MOUTH TWICE A  tablet 3    carvedilol (COREG) 12.5 MG tablet Take 1 tablet by mouth 2 times daily 180 tablet 3    losartan (COZAAR) 100 MG tablet TAKE 1 TABLET BY MOUTH EVERY DAY 90 tablet 3    cyanocobalamin 1000 MCG/ML injection Inject 1,000 mcg into the muscle once      omeprazole (PRILOSEC) 40 MG delayed release capsule Take 1 capsule by mouth daily 90 capsule 3    Misc. Devices MISC Glucometer ICD 10: E11.9 1 each 0    blood glucose monitor strips 1 strip by Other route in the morning. 30 strip 11    ONETOUCH ULTRA strip 1 EACH BY IN VITRO ROUTE 3 TIMES DAILY AS NEEDED. 100 strip 11    busPIRone (BUSPAR) 5 MG tablet TAKE 1 TABLET BY MOUTH 2 TIMES DAILY AS NEEDED (ANXIETY). 180 tablet 2    predniSONE (DELTASONE) 5 MG tablet 5 mg daily as needed       albuterol sulfate HFA (VENTOLIN HFA) 108 (90 Base) MCG/ACT inhaler Inhale 2 puffs into the lungs 4 times daily as needed for Wheezing 18 g 5    valACYclovir (VALTREX) 1 g tablet Take 2 tablets p.o. every 12 hours for 2 doses. May repeat for further outbreaks (Patient taking differently: as needed) 8 tablet 2    Blood Glucose Monitoring Suppl (FREESTYLE LITE) ANDREEA USE AS DIRECTED      Lancet Device MISC 1 lancet twice a day 60 Device 11    mupirocin (BACTROBAN) 2 % ointment APPLY TO AFFECTED AREA 3 TIMES A DAY 22 g 2    Lancet Device MISC 1 lancet per strip. 30 each 11    hydroxychloroquine (PLAQUENIL) 200 MG tablet TAKE 1 TABLET BY MOUTH DAILY FOR 7 DAYS, THEN INCREASE TO 2 TABLETS BY MOUTH DAILY (Patient not taking: No sig reported)       No current facility-administered medications for this visit.      Allergies   Allergen Reactions    Mushroom Extract Complex        Health Maintenance   Topic Date Due    Varicella vaccine (1 of 2 - 2-dose childhood series) Never done    Pneumococcal 0-64 years Vaccine (1 - PCV) Never done    Hepatitis B vaccine (2 of 3 - Hep B Twinrix risk 3-dose series) 01/27/2014    Diabetic retinal exam  12/10/2019    Diabetic foot exam  06/26/2020    COVID-19 Vaccine (3 - Booster for Pfizer series) 12/07/2021    Flu vaccine (1) 08/01/2022 Diabetic microalbuminuria test  10/05/2022    Lipids  10/05/2022    Depression Screen  04/14/2023    A1C test (Diabetic or Prediabetic)  10/25/2023    Cervical cancer screen  12/22/2025    DTaP/Tdap/Td vaccine (2 - Td or Tdap) 04/25/2027    Hepatitis C screen  Completed    HIV screen  Completed    Hepatitis A vaccine  Aged Out    Hib vaccine  Aged Out    Meningococcal (ACWY) vaccine  Aged Out       _______________________________________________________________    Note dictated using Dragon Dictation software  Sometimes this dictation software makes erroneous transcriptions.

## 2022-10-26 ASSESSMENT — ENCOUNTER SYMPTOMS
ABDOMINAL PAIN: 0
CHEST TIGHTNESS: 0
SHORTNESS OF BREATH: 0
ANAL BLEEDING: 0
BACK PAIN: 1
DIARRHEA: 0
NAUSEA: 0
COUGH: 0
CONSTIPATION: 0

## 2022-10-27 NOTE — PROGRESS NOTES
2020    TELEHEALTH EVALUATION -- Audio/Visual (During XBHLD-20 public health emergency)    HPI:    Abel Felix (:  1980) has requested an audio/video evaluation for the following concern(s):    Blood pressure 130/70 with rheumatologist in May. She continues to be followed by rheumatology for her history of rheumatoid arthritis and polymyalgia. They are also evaluating possibility that her anemia is related to her previous therapeutics. She has been referred to hematology for further recommendations as well. She apparently had a rash to 1 of her previous treatments and has been referred to dermatology. She is been feeling more fatigued and tired lately. Review of Systems   Constitutional: Positive for fatigue and fever. Negative for chills. HENT: Negative for congestion. Respiratory: Positive for shortness of breath. Negative for cough and chest tightness. Cardiovascular: Negative for chest pain, palpitations and leg swelling. Gastrointestinal: Negative for abdominal pain, anal bleeding, constipation, diarrhea and nausea. Genitourinary: Negative for difficulty urinating. Musculoskeletal: Positive for arthralgias, back pain and neck pain. Negative for gait problem and joint swelling. Psychiatric/Behavioral: Negative. Prior to Visit Medications    Medication Sig Taking? Authorizing Provider   ferrous sulfate (IRON 325) 325 (65 Fe) MG tablet Take 325 mg by mouth daily (with breakfast)  Historical Provider, MD   folic acid (FOLVITE) 043 MCG tablet Take 800 mcg by mouth daily  Historical Provider, MD   glucose monitoring kit (FREESTYLE) monitoring kit Check blood sugar fasting daily and as needed for symptoms of irregular blood sugar  ICD E11.9  NATACHA Savage   Lancets MISC 1 each by Does not apply route daily ICD 10: E11.9  NATACHA Savage   blood glucose monitor strips Test 1 time a day fasting & as needed for symptoms of irregular blood glucose. NON-SMOKER. patient's risk of exposure to COVID-19 and provide necessary medical care. The patient (and/or legal guardian) has also been advised to contact this office for worsening conditions or problems, and seek emergency medical treatment and/or call 911 if deemed necessary. Patient identification was verified at the start of the visit: Yes    Total time spent on this encounter: Not billed by time    Services were provided through a video synchronous discussion virtually to substitute for in-person clinic visit. Patient and provider were located at their individual homes. --Syeda Unger MD on 8/25/2020 at 5:34 PM    An electronic signature was used to authenticate this note.

## 2022-11-11 ENCOUNTER — OFFICE VISIT (OUTPATIENT)
Age: 42
End: 2022-11-11
Payer: MEDICAID

## 2022-11-11 VITALS
OXYGEN SATURATION: 98 % | BODY MASS INDEX: 50.24 KG/M2 | TEMPERATURE: 97.2 F | HEART RATE: 80 BPM | DIASTOLIC BLOOD PRESSURE: 70 MMHG | HEIGHT: 62 IN | SYSTOLIC BLOOD PRESSURE: 141 MMHG | WEIGHT: 273 LBS

## 2022-11-11 DIAGNOSIS — R35.0 URINE FREQUENCY: Primary | ICD-10-CM

## 2022-11-11 DIAGNOSIS — R35.0 FREQUENCY OF URINATION: ICD-10-CM

## 2022-11-11 DIAGNOSIS — N91.0 MENSTRUATION DELAY: Primary | ICD-10-CM

## 2022-11-11 LAB
APPEARANCE FLUID: CLEAR
BILIRUBIN, POC: NEGATIVE
BLOOD URINE, POC: NORMAL
CLARITY, POC: CLEAR
COLOR, POC: YELLOW
CONTROL: PRESENT
GLUCOSE URINE, POC: NEGATIVE
HCG QUALITATIVE: NEGATIVE
KETONES, POC: NEGATIVE
LEUKOCYTE EST, POC: NEGATIVE
NITRITE, POC: NEGATIVE
PH, POC: 5.5
PREGNANCY TEST URINE, POC: NORMAL
PROTEIN, POC: NORMAL
SPECIFIC GRAVITY, POC: >=1.03
UROBILINOGEN, POC: NORMAL

## 2022-11-11 PROCEDURE — 81025 URINE PREGNANCY TEST: CPT | Performed by: PHYSICIAN ASSISTANT

## 2022-11-11 PROCEDURE — G8484 FLU IMMUNIZE NO ADMIN: HCPCS | Performed by: PHYSICIAN ASSISTANT

## 2022-11-11 PROCEDURE — 3078F DIAST BP <80 MM HG: CPT | Performed by: PHYSICIAN ASSISTANT

## 2022-11-11 PROCEDURE — 3074F SYST BP LT 130 MM HG: CPT | Performed by: PHYSICIAN ASSISTANT

## 2022-11-11 PROCEDURE — 99213 OFFICE O/P EST LOW 20 MIN: CPT | Performed by: PHYSICIAN ASSISTANT

## 2022-11-11 PROCEDURE — G8427 DOCREV CUR MEDS BY ELIG CLIN: HCPCS | Performed by: PHYSICIAN ASSISTANT

## 2022-11-11 PROCEDURE — 1036F TOBACCO NON-USER: CPT | Performed by: PHYSICIAN ASSISTANT

## 2022-11-11 PROCEDURE — 81002 URINALYSIS NONAUTO W/O SCOPE: CPT | Performed by: PHYSICIAN ASSISTANT

## 2022-11-11 PROCEDURE — G8417 CALC BMI ABV UP PARAM F/U: HCPCS | Performed by: PHYSICIAN ASSISTANT

## 2022-11-11 PROCEDURE — 36415 COLL VENOUS BLD VENIPUNCTURE: CPT | Performed by: PHYSICIAN ASSISTANT

## 2022-11-11 ASSESSMENT — ENCOUNTER SYMPTOMS
VOMITING: 0
ABDOMINAL PAIN: 0
NAUSEA: 1
SINUS PRESSURE: 0
EYE PAIN: 0
DIARRHEA: 0
SHORTNESS OF BREATH: 0
COUGH: 0
ALLERGIC/IMMUNOLOGIC NEGATIVE: 1
SINUS PAIN: 0
SORE THROAT: 0

## 2022-11-11 NOTE — PROGRESS NOTES
[address]  [address] [address] [place] [address]  Dept: [phone]  Dept Fax: [phone]  Loc: [phone]    Will Leslie is a 43 y.o. female who presents today for her medical conditions/complaints as noted below. Will Leslie is complaining of Dysuria (Request urine preg test)    HPI:   Patient states that she is late on her period. She also states she has been having urinary frequency and nausea. She states she took a home pregnancy test and it was faintly positive.      Past Medical History:   Diagnosis Date    Anemia     Chronic rheumatic arthritis (HCC)     Elevated blood pressure reading     Fibromyalgia     GERD (gastroesophageal reflux disease)     Hypertension     Intracranial hypertension     Obesity     PCOS (polycystic ovarian syndrome)     Sleep apnea     bi pap    SOB (shortness of breath)     Trouble swallowing     Type 2 diabetes mellitus ([address]        Past Surgical History:   Procedure Laterality Date    BREAST BIOPSY Right 2019    benign    CHOLECYSTECTOMY      UPPER GASTROINTESTINAL ENDOSCOPY N/A 02/01/2022    Dr Cecil Scruggs-[id number] appearing stricture noted in the distal esophagus, gastritis, no h pylori    UPPER GASTROINTESTINAL ENDOSCOPY  02/01/2022    Dr Cecil Scruggs-[id number] appearing stricture noted in the distal esophagus, gastritis, no h pylori       Family History   Problem Relation Age of Onset    High Blood Pressure Father     Diabetes Father     Atrial Fibrillation Father     Heart Failure Father     Stroke Father     Diabetes Sister     Diabetes Maternal Grandmother     Heart Attack Other     Ovarian Cancer Paternal Grandmother     Colon Polyps Neg Hx     Crohn's Disease Neg Hx     Esophageal Cancer Neg Hx     Liver Cancer Neg Hx     Rectal Cancer Neg Hx     Stomach Cancer Neg Hx        Social History     Tobacco Use    Smoking status: Former     Years: 2.00     Types: Cigarettes     Start date: 12/30/1996 Quit date: 1998     Years since quittin.8    Smokeless tobacco: Never   Substance Use Topics    Alcohol use: Yes     Alcohol/week: 1.0 standard drink     Types: 1 Glasses of wine per week     Comment: rarely        Current Outpatient Medications   Medication Sig Dispense Refill    metFORMIN (GLUCOPHAGE) 500 MG tablet Take 1 tablet by mouth daily 30 tablet 5    ondansetron (ZOFRAN ODT) 4 MG disintegrating tablet Take 1 tablet by mouth every 8 hours as needed for Nausea or Vomiting 15 tablet 0    amLODIPine (NORVASC) 5 MG tablet TAKE 1 TABLET BY MOUTH TWICE A  tablet 3    carvedilol (COREG) 12.5 MG tablet Take 1 tablet by mouth 2 times daily 180 tablet 3    losartan (COZAAR) 100 MG tablet TAKE 1 TABLET BY MOUTH EVERY DAY 90 tablet 3    omeprazole (PRILOSEC) 40 MG delayed release capsule Take 1 capsule by mouth daily 90 capsule 3    blood glucose monitor strips 1 strip by Other route in the morning. 30 strip 11    busPIRone (BUSPAR) 5 MG tablet TAKE 1 TABLET BY MOUTH 2 TIMES DAILY AS NEEDED (ANXIETY). 180 tablet 2    hydroxychloroquine (PLAQUENIL) 200 MG tablet TAKE 1 TABLET BY MOUTH DAILY FOR 7 DAYS, THEN INCREASE TO 2 TABLETS BY MOUTH DAILY      albuterol sulfate HFA (VENTOLIN HFA) 108 (90 Base) MCG/ACT inhaler Inhale 2 puffs into the lungs 4 times daily as needed for Wheezing 18 g 5    Blood Glucose Monitoring Suppl (FREESTYLE LITE) ANDREEA USE AS DIRECTED      mupirocin (BACTROBAN) 2 % ointment APPLY TO AFFECTED AREA 3 TIMES A DAY 22 g 2    cyanocobalamin 1000 MCG/ML injection Inject 1,000 mcg into the muscle once (Patient not taking: Reported on 2022)      Misc. Devices MISC Glucometer ICD 10: E11.9 1 each 0    Lancet Device MISC 1 lancet per strip. 30 each 11    ONETOUCH ULTRA strip 1 EACH BY IN VITRO ROUTE 3 TIMES DAILY AS NEEDED.  (Patient not taking: Reported on 2022) 100 strip 11    predniSONE (DELTASONE) 5 MG tablet 5 mg daily as needed  (Patient not taking: Reported on 2022) valACYclovir (VALTREX) 1 g tablet Take 2 tablets p.o. every 12 hours for 2 doses. May repeat for further outbreaks (Patient not taking: Reported on 11/11/2022) 8 tablet 2    Lancet Device MISC 1 lancet twice a day 60 Device 11     No current facility-administered medications for this visit. Allergies   Allergen Reactions    Mushroom Extract Complex        Health Maintenance   Topic Date Due    Varicella vaccine (1 of 2 - 2-dose childhood series) Never done    Pneumococcal 0-64 years Vaccine (1 - PCV) Never done    Hepatitis B vaccine (2 of 3 - Hep B Twinrix risk 3-dose series) 01/27/2014    Diabetic retinal exam  12/10/2019    Diabetic foot exam  06/26/2020    COVID-19 Vaccine (3 - Booster for Pfizer series) 12/07/2021    Flu vaccine (1) 08/01/2022    Diabetic microalbuminuria test  10/05/2022    Lipids  10/05/2022    Depression Screen  04/14/2023    A1C test (Diabetic or Prediabetic)  10/25/2023    Cervical cancer screen  12/22/2025    DTaP/Tdap/Td vaccine (2 - Td or Tdap) 04/25/2027    Hepatitis C screen  Completed    HIV screen  Completed    Hepatitis A vaccine  Aged Out    Hib vaccine  Aged Out    Meningococcal (ACWY) vaccine  Aged Out       Subjective:   Review of Systems   Constitutional:  Negative for chills, fatigue and fever. HENT:  Negative for congestion, postnasal drip, sinus pressure, sinus pain and sore throat. Eyes:  Negative for pain and visual disturbance. Respiratory:  Negative for cough and shortness of breath. Cardiovascular:  Negative for chest pain. Gastrointestinal:  Positive for nausea. Negative for abdominal pain, diarrhea and vomiting. Endocrine: Negative for cold intolerance and heat intolerance. Genitourinary:  Positive for frequency and menstrual problem. Negative for hematuria and urgency. Musculoskeletal:  Negative for myalgias. Skin:  Negative for rash. Allergic/Immunologic: Negative.     Neurological:  Negative for syncope, weakness, light-headedness and headaches. Hematological: Negative. Psychiatric/Behavioral: Negative. Objective    Physical Exam  Vitals and nursing note reviewed. Constitutional:       General: She is not in acute distress. Appearance: Normal appearance. HENT:      Head: Normocephalic and atraumatic. Right Ear: External ear normal.      Left Ear: External ear normal.      Nose: Nose normal.      Mouth/Throat:      Mouth: Mucous membranes are moist.      Pharynx: Oropharynx is clear. Eyes:      Extraocular Movements: Extraocular movements intact. Conjunctiva/sclera: Conjunctivae normal.   Cardiovascular:      Rate and Rhythm: Normal rate and regular rhythm. Pulses: Normal pulses. Heart sounds: Normal heart sounds. Pulmonary:      Effort: Pulmonary effort is normal.      Breath sounds: Normal breath sounds. No wheezing. Abdominal:      General: Abdomen is flat. Bowel sounds are normal. There is no distension. Palpations: Abdomen is soft. Tenderness: There is no abdominal tenderness. Musculoskeletal:         General: Normal range of motion. Cervical back: Normal range of motion and neck supple. No tenderness. Skin:     General: Skin is warm and dry. Findings: No erythema. Neurological:      General: No focal deficit present. Mental Status: She is alert and oriented to person, place, and time. Psychiatric:         Mood and Affect: Mood normal.         Behavior: Behavior normal.       BP (!) 141/70   Pulse 80   Temp 97.2 °F (36.2 °C)   Ht 5' 2\" (1.575 m)   Wt 273 lb (123.8 kg)   LMP 10/08/2022 (Exact Date)   SpO2 98%   BMI 49.93 kg/m²     Assessment         Diagnosis Orders   1. Menstruation delay  HCG Qualitative, Serum      2. Frequency of urination  POCT Urinalysis no Micro    POCT urine pregnancy    HCG Qualitative, Serum          Plan   Poct pregnancy test appeared to be positive. Blood work sent to lab- will call patient with results.    Follow up as needed. Patient verbalizes understanding and agrees with treatment plan. Orders Placed This Encounter   Procedures    HCG Qualitative, Serum    POCT Urinalysis no Micro    POCT urine pregnancy       Results for orders placed or performed in visit on 11/11/22   POCT Urinalysis no Micro   Result Value Ref Range    Color, UA yellow     Clarity, UA clear     Glucose, UA POC negative     Bilirubin, UA negative     Ketones, UA negative     Spec Grav, UA >=1.030     Blood, UA POC trace-lysed     pH, UA 5.5     Protein, UA POC trace     Urobilinogen, UA 0.2 E.U./dl     Leukocytes, UA negative     Nitrite, UA negative     Appearance, Fluid Clear Clear, Slightly Cloudy   POCT urine pregnancy   Result Value Ref Range    Preg Test, Ur pos     Control present        No orders of the defined types were placed in this encounter. New Prescriptions    No medications on file        Return if symptoms worsen or fail to improve. Discussed use, benefits, and side effects of any prescribed medications. All patient questions were answered. Patient voiced understanding of care plan. Patient was given educational materials - see patient instructions below. Patient Instructions   Poct pregnancy test appeared to be positive. Blood work sent to lab- will call patient with results. Follow up as needed. Patient verbalizes understanding and agrees with treatment plan.       Electronically signed by Ruddy Nelson PA-C on 11/11/2022 at 11:46 AM

## 2022-11-11 NOTE — PATIENT INSTRUCTIONS
Poct pregnancy test appeared to be positive. Blood work sent to lab- will call patient with results. Follow up as needed. Patient verbalizes understanding and agrees with treatment plan.

## 2022-11-16 ENCOUNTER — HOSPITAL ENCOUNTER (EMERGENCY)
Age: 42
Discharge: HOME | End: 2022-11-16
Payer: MEDICAID

## 2022-11-16 ENCOUNTER — APPOINTMENT (OUTPATIENT)
Dept: ULTRASOUND IMAGING | Age: 42
End: 2022-11-16
Payer: MEDICAID

## 2022-11-16 VITALS
WEIGHT: 270 LBS | TEMPERATURE: 98.3 F | SYSTOLIC BLOOD PRESSURE: 135 MMHG | DIASTOLIC BLOOD PRESSURE: 86 MMHG | OXYGEN SATURATION: 97 % | BODY MASS INDEX: 49.38 KG/M2 | HEART RATE: 76 BPM | RESPIRATION RATE: 18 BRPM

## 2022-11-16 DIAGNOSIS — N83.202 LEFT OVARIAN CYST: ICD-10-CM

## 2022-11-16 DIAGNOSIS — N93.9 VAGINAL BLEEDING: Primary | ICD-10-CM

## 2022-11-16 LAB
ALBUMIN SERPL-MCNC: 4.1 G/DL (ref 3.5–5.2)
ALP BLD-CCNC: 66 U/L (ref 35–104)
ALT SERPL-CCNC: 13 U/L (ref 5–33)
ANION GAP SERPL CALCULATED.3IONS-SCNC: 9 MMOL/L (ref 7–19)
AST SERPL-CCNC: 12 U/L (ref 5–32)
BACTERIA WET PREP: NORMAL
BACTERIA: NEGATIVE /HPF
BASOPHILS ABSOLUTE: 0.1 K/UL (ref 0–0.2)
BASOPHILS RELATIVE PERCENT: 0.9 % (ref 0–1)
BILIRUB SERPL-MCNC: 0.3 MG/DL (ref 0.2–1.2)
BILIRUBIN URINE: NEGATIVE
BLOOD, URINE: ABNORMAL
BUN BLDV-MCNC: 14 MG/DL (ref 6–20)
CALCIUM SERPL-MCNC: 9.5 MG/DL (ref 8.6–10)
CHLORIDE BLD-SCNC: 103 MMOL/L (ref 98–111)
CLARITY: ABNORMAL
CLUE CELLS: NORMAL
CO2: 25 MMOL/L (ref 22–29)
COLOR: ABNORMAL
CREAT SERPL-MCNC: 0.6 MG/DL (ref 0.5–0.9)
CRYSTALS, UA: ABNORMAL /HPF
EOSINOPHILS ABSOLUTE: 0.3 K/UL (ref 0–0.6)
EOSINOPHILS RELATIVE PERCENT: 3.8 % (ref 0–5)
EPITHELIAL CELLS WET PREP: NORMAL
EPITHELIAL CELLS, UA: 2 /HPF (ref 0–5)
GFR SERPL CREATININE-BSD FRML MDRD: >60 ML/MIN/{1.73_M2}
GLUCOSE BLD-MCNC: 90 MG/DL (ref 74–109)
GLUCOSE URINE: NEGATIVE MG/DL
HCG QUALITATIVE: NEGATIVE
HCT VFR BLD CALC: 40 % (ref 37–47)
HEMOGLOBIN: 12.5 G/DL (ref 12–16)
HYALINE CASTS: 0 /HPF (ref 0–8)
IMMATURE GRANULOCYTES #: 0 K/UL
KETONES, URINE: NEGATIVE MG/DL
LEUKOCYTE ESTERASE, URINE: ABNORMAL
LYMPHOCYTES ABSOLUTE: 1.5 K/UL (ref 1.1–4.5)
LYMPHOCYTES RELATIVE PERCENT: 23 % (ref 20–40)
MCH RBC QN AUTO: 28.2 PG (ref 27–31)
MCHC RBC AUTO-ENTMCNC: 31.3 G/DL (ref 33–37)
MCV RBC AUTO: 90.3 FL (ref 81–99)
MONOCYTES ABSOLUTE: 0.4 K/UL (ref 0–0.9)
MONOCYTES RELATIVE PERCENT: 5.6 % (ref 0–10)
NEUTROPHILS ABSOLUTE: 4.4 K/UL (ref 1.5–7.5)
NEUTROPHILS RELATIVE PERCENT: 66.2 % (ref 50–65)
NITRITE, URINE: NEGATIVE
PDW BLD-RTO: 13.2 % (ref 11.5–14.5)
PH UA: 5 (ref 5–8)
PLATELET # BLD: 219 K/UL (ref 130–400)
PMV BLD AUTO: 11.1 FL (ref 9.4–12.3)
POTASSIUM SERPL-SCNC: 4.1 MMOL/L (ref 3.5–5)
PROTEIN UA: ABNORMAL MG/DL
RBC # BLD: 4.43 M/UL (ref 4.2–5.4)
RBC UA: >900 /HPF (ref 0–4)
RBC WET PREP: NORMAL
SODIUM BLD-SCNC: 137 MMOL/L (ref 136–145)
SOURCE WET PREP: NORMAL
SPECIFIC GRAVITY UA: 1.02 (ref 1–1.03)
TOTAL PROTEIN: 7.9 G/DL (ref 6.6–8.7)
TRICHOMONAS PREP: NORMAL
UROBILINOGEN, URINE: 1 E.U./DL
WBC # BLD: 6.6 K/UL (ref 4.8–10.8)
WBC UA: 4 /HPF (ref 0–5)
WBC WET PREP: NORMAL
YEAST WET PREP: NORMAL

## 2022-11-16 PROCEDURE — 2580000003 HC RX 258: Performed by: PHYSICIAN ASSISTANT

## 2022-11-16 PROCEDURE — 80053 COMPREHEN METABOLIC PANEL: CPT

## 2022-11-16 PROCEDURE — 76830 TRANSVAGINAL US NON-OB: CPT | Performed by: RADIOLOGY

## 2022-11-16 PROCEDURE — 87491 CHLMYD TRACH DNA AMP PROBE: CPT

## 2022-11-16 PROCEDURE — 84703 CHORIONIC GONADOTROPIN ASSAY: CPT

## 2022-11-16 PROCEDURE — 36415 COLL VENOUS BLD VENIPUNCTURE: CPT

## 2022-11-16 PROCEDURE — 87591 N.GONORRHOEAE DNA AMP PROB: CPT

## 2022-11-16 PROCEDURE — 99284 EMERGENCY DEPT VISIT MOD MDM: CPT | Performed by: EMERGENCY MEDICINE

## 2022-11-16 PROCEDURE — 6370000000 HC RX 637 (ALT 250 FOR IP): Performed by: PHYSICIAN ASSISTANT

## 2022-11-16 PROCEDURE — 81001 URINALYSIS AUTO W/SCOPE: CPT

## 2022-11-16 PROCEDURE — 85025 COMPLETE CBC W/AUTO DIFF WBC: CPT

## 2022-11-16 PROCEDURE — 76830 TRANSVAGINAL US NON-OB: CPT

## 2022-11-16 PROCEDURE — 87661 TRICHOMONAS VAGINALIS AMPLIF: CPT

## 2022-11-16 RX ORDER — ACETAMINOPHEN 500 MG
1000 TABLET ORAL
Status: COMPLETED | OUTPATIENT
Start: 2022-11-16 | End: 2022-11-16

## 2022-11-16 RX ORDER — 0.9 % SODIUM CHLORIDE 0.9 %
1000 INTRAVENOUS SOLUTION INTRAVENOUS ONCE
Status: COMPLETED | OUTPATIENT
Start: 2022-11-16 | End: 2022-11-16

## 2022-11-16 RX ADMIN — ACETAMINOPHEN 1000 MG: 500 TABLET ORAL at 18:37

## 2022-11-16 RX ADMIN — SODIUM CHLORIDE 1000 ML: 9 INJECTION, SOLUTION INTRAVENOUS at 18:36

## 2022-11-16 ASSESSMENT — PAIN SCALES - GENERAL: PAINLEVEL_OUTOF10: 6

## 2022-11-16 ASSESSMENT — ENCOUNTER SYMPTOMS
GASTROINTESTINAL NEGATIVE: 1
RESPIRATORY NEGATIVE: 1
VOMITING: 0
ABDOMINAL PAIN: 0
NAUSEA: 0
EYES NEGATIVE: 1

## 2022-11-16 ASSESSMENT — PAIN DESCRIPTION - LOCATION: LOCATION: ABDOMEN

## 2022-11-16 ASSESSMENT — PAIN DESCRIPTION - ORIENTATION: ORIENTATION: LOWER

## 2022-11-16 NOTE — ED PROVIDER NOTES
[facility] EMERGENCY DEPT  EMERGENCY DEPARTMENT ENCOUNTER      Pt Name: Kandace Yip  MRN: [id number]  [place] 1980  Date of evaluation: 2022  Provider: Doretha Conte PA-C    CHIEF COMPLAINT       Chief Complaint   Patient presents with    Vaginal Bleeding     Possible miscarriage on Saturday, heavy bleeding started two nights ago; some \"lightheadedness\"          HISTORY OF PRESENT ILLNESS   (Location/Symptom, Timing/Onset, Context/Setting, Quality, Duration, Modifying Factors, Severity)  Note limiting factors. HPI      Kandace Yip is a 43 y.o. female who presents to the emergency department with vaginal bleeding. PMH significant for Non-insulin-dependent diabetes, fibromyalgia, PCOS, GERD, hypertension, sleep apnea. G1, . LNMP 10/8/2022. Patient states her menstrual cycle was 1 week late at which time she began having \"peeing like a race horse,\" slight headaches. Patient states 5 days ago she was seen for the symptoms at urgent care where after having to positive home pregnancy test she had a negative Blood pregnancy test.  Patient states the following day she \"passed what looked like a tiny embryo with a cord hanging out of it. \"  Patient states that after that she began having excessive liquid watery bleeding vaginally. Patient denies any vaginal discharge, vaginal clots. Patient states she has had a mild generalized headache since this time and is concerned because her bleeding is persisting. Patient is concerned at this time that she may have had a miscarriage and presents for further evaluation. Patient denies fevers, chills, diaphoresis, upper abdominal pain, flank pain, hematuria, nausea, vomiting. Patient denies any concern for known exposure to STDs. Records reviewed show patient was last seen in the ED on 10/10/2022 for nausea vomiting.     Patient has since been seen outpatient at the PCP office on 10/25/2022 for management of chronic illnesses, as well as at urgent care on 11/11/2022 for menstruation delay, frequency of urination. Nursing Notes were reviewed. REVIEW OF SYSTEMS    (2-9 systems for level 4, 10 or more for level 5)     Review of Systems   Constitutional: Negative. Negative for chills, diaphoresis, fatigue and fever. HENT: Negative. Eyes: Negative. Respiratory: Negative. Cardiovascular: Negative. Gastrointestinal: Negative. Negative for abdominal pain, nausea and vomiting. Genitourinary:  Positive for frequency, urgency and vaginal bleeding. Negative for dysuria, flank pain, hematuria and vaginal discharge. Musculoskeletal: Negative. Skin: Negative. Neurological:  Positive for light-headedness and headaches. Psychiatric/Behavioral: Negative. All other systems reviewed and are negative. Except as noted above the remainder of the review of systems was reviewed and negative.        PAST MEDICAL HISTORY     Past Medical History:   Diagnosis Date    Anemia     Chronic rheumatic arthritis (HCC)     Elevated blood pressure reading     Fibromyalgia     GERD (gastroesophageal reflux disease)     Hypertension     Intracranial hypertension     Obesity     PCOS (polycystic ovarian syndrome)     Sleep apnea     bi pap    SOB (shortness of breath)     Trouble swallowing     Type 2 diabetes mellitus ([facility] [address]          SURGICAL HISTORY       Past Surgical History:   Procedure Laterality Date    BREAST BIOPSY Right 2019    benign    CHOLECYSTECTOMY      UPPER GASTROINTESTINAL ENDOSCOPY N/A 02/01/2022    Dr Lacey Scruggs-[id number] appearing stricture noted in the distal esophagus, gastritis, no h pylori    UPPER GASTROINTESTINAL ENDOSCOPY  02/01/2022    Dr Lacey Scruggs-[id number] appearing stricture noted in the distal esophagus, gastritis, no h pylori         CURRENT MEDICATIONS       Current Discharge Medication List        CONTINUE these medications which have NOT CHANGED    Details   mupirocin (BACTROBAN) 2 % ointment APPLY TO AFFECTED AREA 3 TIMES A DAY  Qty: 22 g, Refills: 2      metFORMIN (GLUCOPHAGE) 500 MG tablet Take 1 tablet by mouth daily  Qty: 30 tablet, Refills: 5      ondansetron (ZOFRAN ODT) 4 MG disintegrating tablet Take 1 tablet by mouth every 8 hours as needed for Nausea or Vomiting  Qty: 15 tablet, Refills: 0      amLODIPine (NORVASC) 5 MG tablet TAKE 1 TABLET BY MOUTH TWICE A DAY  Qty: 180 tablet, Refills: 3      carvedilol (COREG) 12.5 MG tablet Take 1 tablet by mouth 2 times daily  Qty: 180 tablet, Refills: 3    Associated Diagnoses: Essential (primary) hypertension      losartan (COZAAR) 100 MG tablet TAKE 1 TABLET BY MOUTH EVERY DAY  Qty: 90 tablet, Refills: 3      cyanocobalamin 1000 MCG/ML injection Inject 1,000 mcg into the muscle once      omeprazole (PRILOSEC) 40 MG delayed release capsule Take 1 capsule by mouth daily  Qty: 90 capsule, Refills: 3      Misc. Devices MISC Glucometer ICD 10: E11.9  Qty: 1 each, Refills: 0    Associated Diagnoses: Type 2 diabetes mellitus without complication, without long-term current use of insulin ([address]      ! ! blood glucose monitor strips 1 strip by Other route in the morning. Qty: 30 strip, Refills: 11    Associated Diagnoses: Type 2 diabetes mellitus without complication, without long-term current use of insulin ([address]      ! ! Lancet Device MISC 1 lancet per strip. Qty: 30 each, Refills: 11    Associated Diagnoses: Type 2 diabetes mellitus without complication, without long-term current use of insulin ([facility])      !! ONETOUCH ULTRA strip 1 EACH BY IN VITRO ROUTE 3 TIMES DAILY AS NEEDED. Qty: 100 strip, Refills: 11      busPIRone (BUSPAR) 5 MG tablet TAKE 1 TABLET BY MOUTH 2 TIMES DAILY AS NEEDED (ANXIETY).   Qty: 180 tablet, Refills: 2      predniSONE (DELTASONE) 5 MG tablet 5 mg daily as needed       hydroxychloroquine (PLAQUENIL) 200 MG tablet TAKE 1 TABLET BY MOUTH DAILY FOR 7 DAYS, THEN INCREASE TO 2 TABLETS BY MOUTH DAILY      albuterol sulfate HFA (VENTOLIN HFA) 108 (90 Base) MCG/ACT inhaler Inhale 2 puffs into the lungs 4 times daily as needed for Wheezing  Qty: 18 g, Refills: 5      valACYclovir (VALTREX) 1 g tablet Take 2 tablets p.o. every 12 hours for 2 doses. May repeat for further outbreaks  Qty: 8 tablet, Refills: 2      Blood Glucose Monitoring Suppl (FREESTYLE LITE) ANDREEA USE AS DIRECTED      !! Lancet Device MISC 1 lancet twice a day  Qty: 60 Device, Refills: 11       !! - Potential duplicate medications found. Please discuss with provider. ALLERGIES     Mushroom extract complex    FAMILY HISTORY       Family History   Problem Relation Age of Onset    High Blood Pressure Father     Diabetes Father     Atrial Fibrillation Father     Heart Failure Father     Stroke Father     Diabetes Sister     Diabetes Maternal Grandmother     Heart Attack Other     Ovarian Cancer Paternal Grandmother     Colon Polyps Neg Hx     Crohn's Disease Neg Hx     Esophageal Cancer Neg Hx     Liver Cancer Neg Hx     Rectal Cancer Neg Hx     Stomach Cancer Neg Hx           SOCIAL HISTORY       Social History     Socioeconomic History    Marital status: Single     Spouse name: None    Number of children: None    Years of education: None    Highest education level: None   Tobacco Use    Smoking status: Former     Years: 2.00     Types: Cigarettes     Start date: 1996     Quit date: 1998     Years since quittin.8    Smokeless tobacco: Never   Vaping Use    Vaping Use: Never used   Substance and Sexual Activity    Alcohol use:  Yes     Alcohol/week: 1.0 standard drink     Types: 1 Glasses of wine per week     Comment: rarely    Drug use: No    Sexual activity: Yes     Partners: Male     Birth control/protection: OCP     Social Determinants of Health     Financial Resource Strain: Low Risk     Difficulty of Paying Living Expenses: Not hard at all   Food Insecurity: No Food Insecurity    Worried About [address] in the Last Year: Never true    [facility] in the Last Year: Never true       SCREENINGS         Kareem Coma Scale  Eye Opening: Spontaneous  Best Verbal Response: Oriented  Best Motor Response: Obeys commands  Kelliher Coma Scale Score: 15                     CIWA Assessment  BP: (!) 175/91  Heart Rate: 78                 PHYSICAL EXAM    (up to 7 for level 4, 8 or more for level 5)     ED Triage Vitals [11/16/22 1306]   BP Temp Temp Source Heart Rate Resp SpO2 Height Weight   (!) 175/91 97.6 °F (36.4 °C) Tympanic 78 18 100 % -- 270 lb (122.5 kg)       Physical Exam  Vitals and nursing note reviewed. Constitutional:       General: She is not in acute distress. Appearance: Normal appearance. She is well-developed and well-groomed. She is morbidly obese. She is not toxic-appearing or diaphoretic. HENT:      Head: Normocephalic. Mouth/Throat:      Mouth: Mucous membranes are moist.      Pharynx: Oropharynx is clear. Eyes:      Conjunctiva/sclera: Conjunctivae normal.      Pupils: Pupils are equal, round, and reactive to light. Cardiovascular:      Rate and Rhythm: Normal rate and regular rhythm. Pulmonary:      Effort: Pulmonary effort is normal.      Breath sounds: Normal breath sounds. Abdominal:      General: Bowel sounds are normal.      Palpations: Abdomen is soft. Tenderness: There is no abdominal tenderness. There is no right CVA tenderness or left CVA tenderness. Musculoskeletal:         General: Normal range of motion. Cervical back: Normal range of motion and neck supple. Right lower leg: No edema. Left lower leg: No edema. Skin:     General: Skin is warm and dry. Neurological:      Mental Status: She is alert and oriented to person, place, and time. Gait: Gait normal.   Psychiatric:         Attention and Perception: Attention normal.         Mood and Affect: Mood normal.         Speech: Speech normal.         Behavior: Behavior normal. Behavior is cooperative.        DIAGNOSTIC RESULTS     EKG: All EKG's are interpreted by the Emergency Department Physician who either signs or Co-signs this chart in the absence of a cardiologist.        RADIOLOGY:   Non-plain film images such as CT, Ultrasound and MRI are read by the radiologist. Plain radiographic images are visualized and preliminarily interpreted by the emergency physician with the below findings:        Interpretation per the Radiologist below, if available at the time of this note:    US NON OB TRANSVAGINAL   Final Result   Myomatous uterus   Septated cyst left ovary   Recommendation:   Follow up as clinically indicated. Electronically Signed by Tim Vazquez MD at 16-Nov-2022 07:56:26 PM EST                     LABS:  Labs Reviewed   CBC WITH AUTO DIFFERENTIAL - Abnormal; Notable for the following components:       Result Value    MCHC 31.3 (*)     Neutrophils % 66.2 (*)     All other components within normal limits   URINALYSIS WITH REFLEX TO CULTURE - Abnormal; Notable for the following components:    Color, UA ORANGE (*)     Clarity, UA CLOUDY (*)     Blood, Urine LARGE (*)     Protein, UA TRACE (*)     Leukocyte Esterase, Urine SMALL (*)     All other components within normal limits   MICROSCOPIC URINALYSIS - Abnormal; Notable for the following components:    Bacteria, UA NEGATIVE (*)     Crystals, UA NEG (*)     RBC, UA >900 (*)     All other components within normal limits   WET PREP, GENITAL   CHLAMYDIA/N. GONORRHOEAE/T. VAGINALIS, AMPLIFIED PROBE(UR)   COMPREHENSIVE METABOLIC PANEL   HCG, SERUM, QUALITATIVE       All other labs were within normal range or not returned as of this dictation.     EMERGENCY DEPARTMENT COURSE and DIFFERENTIAL DIAGNOSIS/MDM:   Vitals:    Vitals:    11/16/22 1306   BP: (!) 175/91   Pulse: 78   Resp: 18   Temp: 97.6 °F (36.4 °C)   TempSrc: Tympanic   SpO2: 100%   Weight: 270 lb (122.5 kg)           MDM     Amount and/or Complexity of Data Reviewed  Clinical lab tests: reviewed and ordered  Tests in the radiology section of CPT®: reviewed and ordered  Tests in the medicine section of CPT®: ordered and reviewed  Decide to obtain previous medical records or to obtain history from someone other than the patient: yes  Review and summarize past medical records: yes          REASSESSMENT        CONSULTS:  None    PROCEDURES:  Unless otherwise noted below, none     Procedures        Amado Sanchez is a 43 y.o. female who presents to the emergency department with vaginal bleeding. PMH significant for Non-insulin-dependent diabetes, fibromyalgia, PCOS, GERD, hypertension, sleep apnea. Lab work revealed no acute findings on CBC including normal white blood cell count and stable H&H.  CMP unremarkable. Serum pregnancy testing negative. Urinalysis with small leukocytes however negative bacteria, negative nitrites, only 4 white blood cells. Greater than 900 red blood cells however contamination from patient's vaginal bleeding. Wet prep was unremarkable. Transvaginal ultrasound showed: Myomatous uterus, septated left ovarian cyst.  Patient was given a fluid bolus and a dose of Tylenol for which she had improvement of her headache. Discussed with patient need for continued outpatient OB/GYN follow-up within the next 48 hours. For close monitoring of her ovarian cyst and abnormal bleeding. Advise strict return precautions any for immediate return to ED should she develop any new or worsening symptoms. Patient with stable vital signs, tolerating p.o. fluids. Patient with no further questions, concerns, needs at this time and is stable for discharge. FINAL IMPRESSION      1. Vaginal bleeding    2.  Left ovarian cyst          DISPOSITION/PLAN   DISPOSITION Discharge - Pending Orders Complete 11/16/2022 07:31:58 PM      PATIENT REFERRED TO:  Latasha Peterson MD  [address]  [phone]    Schedule an appointment as soon as possible for a visit in 2 days      [facility] EMERGENCY DEPT  [address] [address]  [phone]    As needed    Coleman Bellamy MD  [address] [facility] [address] [phone]    Schedule an appointment as soon as possible for a visit in 2 days      DISCHARGE MEDICATIONS:  Current Discharge Medication List        Controlled Substances Monitoring:     No flowsheet data found.     (Please note that portions of this note were completed with a voice recognition program.  Efforts were made to edit the dictations but occasionally words are mis-transcribed.)    Joey Garnett PA-C (electronically signed)           Joey Garnett PA-C  11/16/22 1946

## 2022-11-17 LAB
C. TRACHOMATIS DNA ,URINE: NOT DETECTED
N. GONORRHOEAE DNA, URINE: NOT DETECTED
TRICHOMONAS VAGINALIS DNA, URINE: NOT DETECTED

## 2022-11-17 NOTE — DISCHARGE INSTRUCTIONS
You have evidence of a left-sided ovarian cyst.  It is important that you follow-up with your OB/GYN provider for further evaluation and monitoring within the next 48 hours. Please also discuss with them your abnormal vaginal bleeding and as we discussed have your hormone levels monitored. Please apply heat to your abdomen, use medications such as Motrin/ibuprofen/Toradol. Please continue to follow-up with your primary care provider as well for continued monitoring of your blood pressures. Should you develop any new or worsening symptoms please return to the ER for further evaluation.

## 2022-11-30 ENCOUNTER — OFFICE VISIT (OUTPATIENT)
Dept: OBGYN CLINIC | Age: 42
End: 2022-11-30
Payer: MEDICAID

## 2022-11-30 ENCOUNTER — HOSPITAL ENCOUNTER (OUTPATIENT)
Dept: INFUSION THERAPY | Age: 42
Discharge: HOME | End: 2022-11-30
Payer: MEDICAID

## 2022-11-30 ENCOUNTER — OFFICE VISIT (OUTPATIENT)
Dept: HEMATOLOGY | Age: 42
End: 2022-11-30
Payer: MEDICAID

## 2022-11-30 VITALS
DIASTOLIC BLOOD PRESSURE: 87 MMHG | HEIGHT: 62 IN | WEIGHT: 283 LBS | HEART RATE: 80 BPM | SYSTOLIC BLOOD PRESSURE: 138 MMHG | BODY MASS INDEX: 52.08 KG/M2

## 2022-11-30 VITALS
HEIGHT: 62 IN | WEIGHT: 283 LBS | SYSTOLIC BLOOD PRESSURE: 112 MMHG | HEART RATE: 45 BPM | OXYGEN SATURATION: 100 % | BODY MASS INDEX: 52.08 KG/M2 | DIASTOLIC BLOOD PRESSURE: 80 MMHG

## 2022-11-30 DIAGNOSIS — R76.8 ELEVATED SERUM IMMUNOGLOBULIN FREE LIGHT CHAINS: ICD-10-CM

## 2022-11-30 DIAGNOSIS — N93.9 ABNORMAL UTERINE BLEEDING (AUB): Primary | ICD-10-CM

## 2022-11-30 DIAGNOSIS — O03.9 MISCARRIAGE: ICD-10-CM

## 2022-11-30 DIAGNOSIS — D47.2 MGUS (MONOCLONAL GAMMOPATHY OF UNKNOWN SIGNIFICANCE): ICD-10-CM

## 2022-11-30 DIAGNOSIS — N92.0 MENORRHAGIA WITH REGULAR CYCLE: ICD-10-CM

## 2022-11-30 DIAGNOSIS — D50.0 IRON DEFICIENCY ANEMIA DUE TO CHRONIC BLOOD LOSS: ICD-10-CM

## 2022-11-30 DIAGNOSIS — Z30.09 ENCOUNTER FOR GENERAL COUNSELING AND ADVICE ON CONTRACEPTIVE MANAGEMENT: ICD-10-CM

## 2022-11-30 DIAGNOSIS — D50.0 IRON DEFICIENCY ANEMIA DUE TO CHRONIC BLOOD LOSS: Primary | ICD-10-CM

## 2022-11-30 LAB
FERRITIN: 106.7 NG/ML (ref 13–150)
IRON SATURATION: 24 % (ref 14–50)
IRON: 80 UG/DL (ref 37–145)
TOTAL IRON BINDING CAPACITY: 333 UG/DL (ref 250–400)

## 2022-11-30 PROCEDURE — G8417 CALC BMI ABV UP PARAM F/U: HCPCS | Performed by: NURSE PRACTITIONER

## 2022-11-30 PROCEDURE — 3074F SYST BP LT 130 MM HG: CPT | Performed by: NURSE PRACTITIONER

## 2022-11-30 PROCEDURE — G8427 DOCREV CUR MEDS BY ELIG CLIN: HCPCS | Performed by: NURSE PRACTITIONER

## 2022-11-30 PROCEDURE — 99213 OFFICE O/P EST LOW 20 MIN: CPT | Performed by: NURSE PRACTITIONER

## 2022-11-30 PROCEDURE — G8484 FLU IMMUNIZE NO ADMIN: HCPCS | Performed by: NURSE PRACTITIONER

## 2022-11-30 PROCEDURE — 1036F TOBACCO NON-USER: CPT | Performed by: NURSE PRACTITIONER

## 2022-11-30 PROCEDURE — 3078F DIAST BP <80 MM HG: CPT | Performed by: NURSE PRACTITIONER

## 2022-11-30 PROCEDURE — 99212 OFFICE O/P EST SF 10 MIN: CPT

## 2022-11-30 ASSESSMENT — ENCOUNTER SYMPTOMS
ALLERGIC/IMMUNOLOGIC NEGATIVE: 1
EYES NEGATIVE: 1
GASTROINTESTINAL NEGATIVE: 1
RESPIRATORY NEGATIVE: 1

## 2022-11-30 NOTE — PROGRESS NOTES
Alina Flores is a 43 y.o. female who presents today for her medical conditions/ complaints as noted below. Alina Flores is c/o of Confirmation        HPI  Patient presents today to discuss possible miscarriage. She was seen in ER, she thinks she passed everything at home. Bled for two days, heavily. Not bleeding today  Does not desire future pregnancies  Patient's last menstrual period was 2022. [id number]    Past Medical History:   Diagnosis Date    Anemia     Chronic rheumatic arthritis (HCC)     Elevated blood pressure reading     Fibromyalgia     GERD (gastroesophageal reflux disease)     Hypertension     Intracranial hypertension     Obesity     PCOS (polycystic ovarian syndrome)     Sleep apnea     bi pap    SOB (shortness of breath)     Trouble swallowing     Type 2 diabetes mellitus ([address]      Past Surgical History:   Procedure Laterality Date    BREAST BIOPSY Right 2019    benign    CHOLECYSTECTOMY      UPPER GASTROINTESTINAL ENDOSCOPY N/A 2022    Dr Jennifer Scruggs-[id number] appearing stricture noted in the distal esophagus, gastritis, no h pylori    UPPER GASTROINTESTINAL ENDOSCOPY  2022    Dr Jennifer Scruggs-[id number] appearing stricture noted in the distal esophagus, gastritis, no h pylori     Family History   Problem Relation Age of Onset    High Blood Pressure Father     Diabetes Father     Atrial Fibrillation Father     Heart Failure Father     Stroke Father     Diabetes Sister     Diabetes Maternal Grandmother     Heart Attack Other     Ovarian Cancer Paternal Grandmother     Colon Polyps Neg Hx     Crohn's Disease Neg Hx     Esophageal Cancer Neg Hx     Liver Cancer Neg Hx     Rectal Cancer Neg Hx     Stomach Cancer Neg Hx      Social History     Tobacco Use    Smoking status: Former     Years: 2.00     Types: Cigarettes     Start date: 1996     Quit date: 1998     Years since quittin.9    Smokeless tobacco: Never   Substance Use Topics    Alcohol use:  Yes Alcohol/week: 1.0 standard drink     Types: 1 Glasses of wine per week     Comment: rarely       Current Outpatient Medications   Medication Sig Dispense Refill    mupirocin (BACTROBAN) 2 % ointment APPLY TO AFFECTED AREA 3 TIMES A DAY 22 g 2    metFORMIN (GLUCOPHAGE) 500 MG tablet Take 1 tablet by mouth daily 30 tablet 5    ondansetron (ZOFRAN ODT) 4 MG disintegrating tablet Take 1 tablet by mouth every 8 hours as needed for Nausea or Vomiting 15 tablet 0    amLODIPine (NORVASC) 5 MG tablet TAKE 1 TABLET BY MOUTH TWICE A  tablet 3    carvedilol (COREG) 12.5 MG tablet Take 1 tablet by mouth 2 times daily 180 tablet 3    losartan (COZAAR) 100 MG tablet TAKE 1 TABLET BY MOUTH EVERY DAY 90 tablet 3    cyanocobalamin 1000 MCG/ML injection Inject 1,000 mcg into the muscle once      omeprazole (PRILOSEC) 40 MG delayed release capsule Take 1 capsule by mouth daily 90 capsule 3    Misc. Devices MISC Glucometer ICD 10: E11.9 1 each 0    blood glucose monitor strips 1 strip by Other route in the morning. 30 strip 11    Lancet Device MISC 1 lancet per strip. 30 each 11    ONETOUCH ULTRA strip 1 EACH BY IN VITRO ROUTE 3 TIMES DAILY AS NEEDED. 100 strip 11    busPIRone (BUSPAR) 5 MG tablet TAKE 1 TABLET BY MOUTH 2 TIMES DAILY AS NEEDED (ANXIETY). 180 tablet 2    predniSONE (DELTASONE) 5 MG tablet 5 mg daily as needed      hydroxychloroquine (PLAQUENIL) 200 MG tablet TAKE 1 TABLET BY MOUTH DAILY FOR 7 DAYS, THEN INCREASE TO 2 TABLETS BY MOUTH DAILY      albuterol sulfate HFA (VENTOLIN HFA) 108 (90 Base) MCG/ACT inhaler Inhale 2 puffs into the lungs 4 times daily as needed for Wheezing 18 g 5    valACYclovir (VALTREX) 1 g tablet Take 2 tablets p.o. every 12 hours for 2 doses. May repeat for further outbreaks (Patient taking differently: Take 2 tablets p.o. every 12 hours for 2 doses.   May repeat for further outbreaks) 8 tablet 2    Blood Glucose Monitoring Suppl (FREESTYLE LITE) ANDREEA USE AS DIRECTED      Lancet Device MISC 1 lancet twice a day 60 Device 11     No current facility-administered medications for this visit. Allergies   Allergen Reactions    Mushroom Extract Complex      Vitals:    11/30/22 1020   BP: 138/87   Pulse: 80     Body mass index is 51.76 kg/m². Review of Systems   Constitutional: Negative. HENT: Negative. Eyes: Negative. Respiratory: Negative. Cardiovascular: Negative. Gastrointestinal: Negative. Endocrine: Negative. Genitourinary:  Positive for menstrual problem and vaginal bleeding. Negative for difficulty urinating, dyspareunia, dysuria, enuresis, frequency, hematuria, pelvic pain, urgency and vaginal discharge. Musculoskeletal: Negative. Skin: Negative. Allergic/Immunologic: Negative. Neurological: Negative. Hematological: Negative. Psychiatric/Behavioral: Negative. Physical Exam  Vitals and nursing note reviewed. Constitutional:       General: She is not in acute distress. Appearance: She is well-developed. She is not diaphoretic. HENT:      Head: Normocephalic and atraumatic. Eyes:      Conjunctiva/sclera: Conjunctivae normal.      Pupils: Pupils are equal, round, and reactive to light. Pulmonary:      Effort: Pulmonary effort is normal.   Abdominal:      Tenderness: There is no guarding. Musculoskeletal:         General: Normal range of motion. Cervical back: Normal range of motion. Comments: Normal ROM in all 4 extremities; normal gait   Skin:     General: Skin is warm and dry. Neurological:      Mental Status: She is alert and oriented to person, place, and time. Motor: No abnormal muscle tone. Coordination: Coordination normal.   Psychiatric:         Behavior: Behavior normal.        Diagnosis Orders   1. Abnormal uterine bleeding (AUB)        2. Miscarriage        3.  Encounter for general counseling and advice on contraceptive management            MEDICATIONS:  No orders of the defined types were placed in this encounter. ORDERS:  No orders of the defined types were placed in this encounter. PLAN:  After discussion, pt is wanting to start birth control. Due to age and medical hx, obesity, will use progestin only methods. She is considering IUD but will use samples of Slynd for now, 3 samples given  Over 50% of the total visit time of 25 minutes was spent on counseling and/or coordination of care. All questions were answered and the patient voiced understanding. There are no Patient Instructions on file for this visit.

## 2022-11-30 NOTE — PROGRESS NOTES
Progress Note      Pt Name: Will Leslie  YOB: 1980  MRN: [id number]    Date of evaluation: 11/30/2022  History Obtained From:  patient, electronic medical record    CHIEF COMPLAINT:    Chief Complaint   Patient presents with    Follow-up     Elevated serum immunoglobulin free light chains  Iron deficiency anemia due to chronic blood loss    Discuss Labs     HISTORY OF PRESENT ILLNESS:    Will Leslie is a 43 y.o.  female who is followed for iron deficiency anemia due to chronic blood loss (menorrhagia) with intolerance to oral iron replacement and monoclonal gammopathy of unknown significance (MGUS) with an IgG lambda light chain, M spike of 0.81, elevated kappa and lambda light chains. She returns today in follow-up for review of lab results and further treatment recommendations. Her hemoglobin remained stable at 12.5 with a hematocrit of 40.0 on 11/16/2022. She reports since her last office visit she had a miscarriage at 7 weeks and is currently on a trial of birth control and possibly considering IUD placement. Julio C Dias reports that her menstrual cycle has improved (less bleeding) with the oral contraceptive. Today's clinic visit to include physical assessment, review of systems, any lab or radiographic findings that were available and plan of care are documented below. HEMATOLOGIC HISTORY:  Diagnosis:  Iron deficiency anemia due to chronic blood loss (menorrhagia)  Monoclonal gammopathy of unknown significance (MGUS) with an IgG lambda light chain, M spike of 0.81, elevated kappa and lambda light chains    Treatment summary:  September 2020-Injectafer  Intolerant to oral iron replacement due to GI upset      Will Leslie was seen in hematology consultation 9/4/2020 by NATACHA Trujillo, referred by Dr. Lazarus Durham for evaluation of severe anemia.      The following information in italics was obtained from last progress note by NATACHA Trujillo from 12/4/2020:  PMH is significant for rheumatoid arthritis, fibromyalgia, type 2 diabetes, SETH with CPAP use. Liang Corado had been off medications for rheumatoid arthritis since May, 2020. She was taken off leflunomide due to anemia. She stopped sulfasalazine due to rash. She is now taking Humira. Review of CBCs:  CBC 4/15/2020: WBC 6.7, Hgb 11.1/MCV 74, platelets 107,949  CBC 5/19/2020: WBC 6.9, Hgb 9.1/MCV 71, platelets 971,353  CBC 8/25/2020: WBC 9.1, Hgb 9.7/MCV 70.3, platelets 081,856     Additional Labs 8/25/2020:  CMP: creatinine 0.5/GFR >60, Ca+ 9.2, TP 7.1  TSH: 2.5     Ferrous Sulfate po BID was initiated ~8/25/2020. Liang Corado had been taking folic acid on her own. Liang Corado reports heavy menses. Menstrual cycle is regular, occurring every month though lasts approximately 14 days and is painful with heavy clotting. She denied significant change in bowel habits, though stated her bowels have been dark since iron initiation. She has had increased nausea since initiation of oral iron. Additional symptoms included significant fatigue, dizziness and exertional dyspnea. Hgb at presentation 9/4/2020 was 10.4 with MCV 72.4. Parenteral iron with Injectafer 750 mg IV delivered 9/22/2020 and 9/29/2020. Occult blood stool was negative x3 on 10/1/2020. Liang Corado was referred to GI by her PCP. She did not keep this appointment as her likely source of anemia is related to menorrhagia. Liang Corado resumed BCP 11/2020 with decrease in vaginal bleeding.   She was previously followed by Dr. Ismael Ramires, now establishing care with [facility]) OB/GYN.    02/17/2022 Endometrial biopsy- dyssynchronous endometrium    05/10/2022 Serology results  Ferritin 13.7  B12/Folate 524/6.2  WBC 5.5, Hgb 8.8, Hct 29.5, MCV 72, platelets 919,952    05/12/2022 Serology results  Iron 16  TIBC 416  Saturation 3.8%  Ferritin 13.5  B12/Folate 570/6.5  WBC 6.7, Hgb 9.6, Hct 33.2, MCV 73.1, platelets 655,745    05/24/2022 and 05/31/2022- Injectafer IV 1500 mg total dose    06/09/2022 Serology results  Iron 57  TIBC 338  Ferritin 578.9  Saturation 16%  WBC 6.6, Hgb 10.6, Hct 35.7, MCV 77.3, platelets 469,587    Yue reestablished care on 7/8/2022 after Dr. Laurie Jj requested that she return to clinic for reevaluation recommendations. I saw Victor Hugo Marina at this office visit on 7/8/2022. Victor Hugo Marina has a past medical history to include hypertension, diabetes, hyperlipidemia, rheumatoid arthritis, gastroesophageal reflux disease, fibromyalgia, and sleep apnea. She received IV iron replacement with Injectafer for total of 1500 mg completed on 5/31/2022 and then was initiated on oral iron. Victor Hugo Marina reported that she is not able to take oral iron due to severe GI upset to include nausea, constipation, abdominal cramping and she is also experiencing reflux symptoms. She has been evaluated by gastroenterology and EGD on 2/1/2022 revealed gastritis. Victor Hugo Marina reported, on 7/8/2022, that she stopped taking her oral contraceptive approximately in April 2022 and her last menstrual cycle was 2 weeks ago with severe abdominal cramps and clots. She indicates that when she experienced her menstrual cycle she has heavy clotting for at least the first few days. CBC on 7/8/2022: WBC 4.6, ANC 2.6, hemoglobin 11.2, hematocrit 36.6, MCV 80.8 and platelet count of 059,345    07/08/2022 Serology results  Iron 66  TIBC 314  Saturation 21%  Ferritin 188.9  B12/Folate 457/5.1  Kappa light chains 27.69  Lambda light chains 17.27  Lambda/kappa ratio 0.62  IgG 1595, IgA 123, IgM 92  Suspicious band of restricted mobility within the polyclonal increase in the gamma region. The suspicious band accounts for 0.81 g/dL of the total 1.69 g/dL of protein in the gamma region. GUNNER gel shows a band in IgG lambda suggestive of an early monoclonal protein.     07/12/2022- Stool for occult blood negative x3    08/31/2022 Serology results  Factor VIII activity- 110%  VWF-RCF 81%  Von Willebrand Ag 129%  TSH 2.110  Lipase 25  Iron 57  TIBC 370  Saturation 15%  Ferritin 103.7    2022- 24 hour urin GUNNER + PE- No monoclonal protein detected on UPEP. Urine GUNNER is negative for monoclonal free light chains; total protein 105    2022 Skeletal survey- mild straightening of the cervical spine. No evidence of acute fracture or subluxation.  The remaining osseous structures are unremarkable    Age-appropriate health screenin2022 Bilateral mammogram- stable mammogram  2022 Endoscopy- esophageal dysphasia, gastritis    Past Medical History:    Past Medical History:   Diagnosis Date    Anemia     Chronic rheumatic arthritis ([facility] [address]     Elevated blood pressure reading     Fibromyalgia     GERD (gastroesophageal reflux disease)     Hypertension     Intracranial hypertension     Obesity     PCOS (polycystic ovarian syndrome)     Sleep apnea     bi pap    SOB (shortness of breath)     Trouble swallowing     Type 2 diabetes mellitus ([facility] [address]        Past Surgical History:    Past Surgical History:   Procedure Laterality Date    BREAST BIOPSY Right 2019    benign    CHOLECYSTECTOMY      UPPER GASTROINTESTINAL ENDOSCOPY N/A 2022    Dr Mario Scruggs-[id number] appearing stricture noted in the distal esophagus, gastritis, no h pylori    UPPER GASTROINTESTINAL ENDOSCOPY  2022    Dr Mario Scruggs-[id number] appearing stricture noted in the distal esophagus, gastritis, no h pylori       Current Medications:    Current Outpatient Medications   Medication Sig Dispense Refill    mupirocin (BACTROBAN) 2 % ointment APPLY TO AFFECTED AREA 3 TIMES A DAY 22 g 2    metFORMIN (GLUCOPHAGE) 500 MG tablet Take 1 tablet by mouth daily 30 tablet 5    ondansetron (ZOFRAN ODT) 4 MG disintegrating tablet Take 1 tablet by mouth every 8 hours as needed for Nausea or Vomiting 15 tablet 0    amLODIPine (NORVASC) 5 MG tablet TAKE 1 TABLET BY MOUTH TWICE A  tablet 3    carvedilol (COREG) 12.5 MG tablet Take 1 tablet by mouth 2 times daily 180 tablet 3    losartan (COZAAR) 100 MG tablet TAKE 1 TABLET BY MOUTH EVERY DAY 90 tablet 3    cyanocobalamin 1000 MCG/ML injection Inject 1,000 mcg into the muscle once      omeprazole (PRILOSEC) 40 MG delayed release capsule Take 1 capsule by mouth daily 90 capsule 3    Misc. Devices MISC Glucometer ICD 10: E11.9 1 each 0    blood glucose monitor strips 1 strip by Other route in the morning. 30 strip 11    Lancet Device MISC 1 lancet per strip. 30 each 11    ONETOUCH ULTRA strip 1 EACH BY IN VITRO ROUTE 3 TIMES DAILY AS NEEDED. 100 strip 11    busPIRone (BUSPAR) 5 MG tablet TAKE 1 TABLET BY MOUTH 2 TIMES DAILY AS NEEDED (ANXIETY). 180 tablet 2    predniSONE (DELTASONE) 5 MG tablet 5 mg daily as needed      hydroxychloroquine (PLAQUENIL) 200 MG tablet TAKE 1 TABLET BY MOUTH DAILY FOR 7 DAYS, THEN INCREASE TO 2 TABLETS BY MOUTH DAILY      albuterol sulfate HFA (VENTOLIN HFA) 108 (90 Base) MCG/ACT inhaler Inhale 2 puffs into the lungs 4 times daily as needed for Wheezing 18 g 5    valACYclovir (VALTREX) 1 g tablet Take 2 tablets p.o. every 12 hours for 2 doses. May repeat for further outbreaks (Patient taking differently: Take 2 tablets p.o. every 12 hours for 2 doses. May repeat for further outbreaks) 8 tablet 2    Blood Glucose Monitoring Suppl (FREESTYLE LITE) ANDREEA USE AS DIRECTED      Lancet Device MISC 1 lancet twice a day 60 Device 11     No current facility-administered medications for this visit. Allergies: Allergies   Allergen Reactions    Mushroom Extract Complex        Social History:    Social History     Tobacco Use    Smoking status: Former     Years: 2.00     Types: Cigarettes     Start date: 1996     Quit date: 1998     Years since quittin.9    Smokeless tobacco: Never   Vaping Use    Vaping Use: Never used   Substance Use Topics    Alcohol use: Yes     Alcohol/week: 1.0 standard drink     Types: 1 Glasses of wine per week     Comment: rarely    Drug use:  No Family History:   Family History   Problem Relation Age of Onset    High Blood Pressure Father     Diabetes Father     Atrial Fibrillation Father     Heart Failure Father     Stroke Father     Diabetes Sister     Diabetes Maternal Grandmother     Heart Attack Other     Ovarian Cancer Paternal Grandmother     Colon Polyps Neg Hx     Crohn's Disease Neg Hx     Esophageal Cancer Neg Hx     Liver Cancer Neg Hx     Rectal Cancer Neg Hx     Stomach Cancer Neg Hx        Vitals:  Vitals:    11/30/22 1315   BP: 112/80   Pulse: (!) 45   SpO2: 100%   Weight: 283 lb (128.4 kg)   Height: 5' 2\" (1.575 m)        Subjective   REVIEW OF SYSTEMS:   Review of Systems   Constitutional:  Positive for fatigue. Negative for chills, diaphoresis and fever. HENT: Negative. Negative for congestion, ear pain, hearing loss, nosebleeds, sore throat and tinnitus. Eyes: Negative. Negative for pain, discharge and redness. Respiratory: Negative. Negative for cough, shortness of breath and wheezing. Cardiovascular: Negative. Negative for chest pain, palpitations and leg swelling. Gastrointestinal: Negative. Negative for abdominal pain, blood in stool, constipation, diarrhea, nausea and vomiting. Endocrine: Negative for polydipsia. Genitourinary:  Positive for menstrual problem. Negative for dysuria, flank pain, frequency, hematuria and urgency. Musculoskeletal: Negative. Negative for back pain, myalgias and neck pain. Skin: Negative. Negative for rash. Neurological: Negative. Negative for dizziness, tremors, seizures, weakness and headaches. Hematological:  Does not bruise/bleed easily. Psychiatric/Behavioral: Negative. The patient is not nervous/anxious. Objective   PHYSICAL EXAM:  Physical Exam  Vitals reviewed. Constitutional:       General: She is not in acute distress. Appearance: She is well-developed. HENT:      Head: Normocephalic and atraumatic.       Mouth/Throat:      Pharynx: Uvula midline. Tonsils: No tonsillar exudate. Eyes:      General: Lids are normal.      Conjunctiva/sclera: Conjunctivae normal.      Pupils: Pupils are equal, round, and reactive to light. Neck:      Thyroid: No thyroid mass or thyromegaly. Vascular: No JVD. Trachea: Trachea normal. No tracheal deviation. Cardiovascular:      Rate and Rhythm: Normal rate and regular rhythm. Pulses: Normal pulses. Heart sounds: Normal heart sounds. Pulmonary:      Effort: Pulmonary effort is normal. No respiratory distress. Breath sounds: Normal breath sounds. No wheezing or rales. Chest:      Chest wall: No tenderness. Abdominal:      General: Bowel sounds are normal. There is no distension. Palpations: Abdomen is soft. There is no mass. Tenderness: There is no abdominal tenderness. There is no guarding. Musculoskeletal:         General: No tenderness or deformity. Cervical back: Normal range of motion and neck supple. Comments: Range of motion within normal limits x4 extremities   Skin:     General: Skin is warm. Findings: No bruising, erythema or rash. Neurological:      Mental Status: She is alert and oriented to person, place, and time. Cranial Nerves: No cranial nerve deficit. Coordination: Coordination normal.   Psychiatric:         Behavior: Behavior normal.         Thought Content: Thought content normal.       Labs reviewed today:  Lab Results   Component Value Date    WBC 6.6 11/16/2022    HGB 12.5 11/16/2022    HCT 40.0 11/16/2022    MCV 90.3 11/16/2022     11/16/2022     Lab Results   Component Value Date    NEUTROABS 4.4 11/16/2022       ASSESSMENT/PLAN:      1. Iron deficiency anemia due to chronic blood loss, intolerance to oral iron replacement due to severe GI upset to include nausea, constipation and abdominal cramping. Last received IV iron replacement on 5/31/2022 with Injectafer.     08/31/2022 Serology results  Iron 57  TIBC 370  Saturation 15%  Ferritin 103.7     Hemoglobin has remained stable at 12.5 with hematocrit of 40.0 on 11/16/2022    Obtain the following studies:  - Iron and TIBC; Future  - Ferritin; Future    -If IV iron is warranted after review of labs arrangements will be made  -Continue to consume iron rich foods    2. Monoclonal gammopathy of unknown significance (MGUS) with an IgG lambda light chain, M spike of 0.81, elevated kappa and lambda light chains    She has no known history of renal disease or known myeloma defining event. 09/06/2022- 24 hour urin GUNNER + PE- No monoclonal protein detected on UPEP. Urine GUNNER is negative for monoclonal free light chains; total protein 105    09/06/2022 Skeletal survey- mild straightening of the cervical spine. No evidence of acute fracture or subluxation. The remaining osseous structures are unremarkable    -Obtain myeloma studies  -Continue with conservative monitoring of MGUS      3. Menorrhagia with regular cycle. Labs on 8/31/2022 indicated no evidence of von Willebrand disease    She reports since her last office visit she had a miscarriage at 7 weeks and is currently on a trial of birth control and possibly considering IUD placement. Keny Rollins reports that her menstrual cycle has improved (less bleeding) with the oral contraceptive.    -Encouraged to follow-up with GYN    I discussed all of the above findings included in the assessment and plan with the patient and the patient is in agreement to move forward with current recommendations/treatment. I have addressed all of their questions and concerns that were verbalized. FOLLOW UP:  Follow up given for 3 months or sooner, if needed  Continue to follow with other medical providers as recommended  Labs at next visit: CBC, myeloma studies, ferritin and iron panel. EMR Dragon/Transcription disclaimer:   Much of this encounter note is an electronic transcription/translation of spoken language to printed text.  The electronic translation of spoken language may permit erroneous, or at times, nonsensical words or phrases to be inadvertently transcribed; although attempts have made to review the note for such errors, some may still exist.  Please excuse any unrecognized transcription errors and contact us if the air is unintelligible or needs documented correction. Also, portions of this note have been copied forward, however, changed to reflect the most current clinical status of this patient. Electronically signed by NATACHA Carter on 12/3/2022 at 8:46 PM  Mignon SHARP am pre-charting as a registered nurse for NATACHA Lopez.

## 2022-12-03 LAB — BETA-2 MICROGLOBULIN: 1.5 MG/L (ref 0.8–2.4)

## 2022-12-03 ASSESSMENT — ENCOUNTER SYMPTOMS
GASTROINTESTINAL NEGATIVE: 1
NAUSEA: 0
EYES NEGATIVE: 1
VOMITING: 0
SORE THROAT: 0
DIARRHEA: 0
BLOOD IN STOOL: 0
RESPIRATORY NEGATIVE: 1
COUGH: 0
WHEEZING: 0
BACK PAIN: 0
CONSTIPATION: 0
EYE REDNESS: 0
SHORTNESS OF BREATH: 0
ABDOMINAL PAIN: 0
EYE PAIN: 0
EYE DISCHARGE: 0

## 2022-12-04 LAB
+IMM: ABNORMAL
ALBUMIN SERPL-MCNC: 3.77 G/DL (ref 3.75–5.01)
ALPHA-1-GLOBULIN: 0.31 G/DL (ref 0.19–0.46)
ALPHA-2-GLOBULIN: 0.74 G/DL (ref 0.48–1.05)
BETA GLOBULIN: 0.82 G/DL (ref 0.48–1.1)
GAMMA GLOBULIN: 1.66 G/DL (ref 0.62–1.51)
IGA: 124 MG/DL (ref 68–408)
IGG: 1662 MG/DL (ref 768–1632)
IGM: 85 MG/DL (ref 35–263)
KAPPA FREE LIGHT CHAINS QNT: 25.46 MG/L (ref 3.3–19.4)
KAPPA/LAMBDA FREE LIGHT CHAIN RATIO: 1.75 (ref 0.26–1.65)
LAMBDA FREE LIGHT CHAINS QNT: 14.55 MG/L (ref 5.71–26.3)
SPE/IFE INTERPRETATION: ABNORMAL
TOTAL PROTEIN: 7.3 G/DL (ref 6.3–8.2)

## 2023-01-03 ENCOUNTER — APPOINTMENT (OUTPATIENT)
Dept: CT IMAGING | Age: 43
End: 2023-01-03
Payer: MEDICAID

## 2023-01-03 ENCOUNTER — HOSPITAL ENCOUNTER (EMERGENCY)
Age: 43
Discharge: HOME | End: 2023-01-03
Attending: EMERGENCY MEDICINE
Payer: MEDICAID

## 2023-01-03 VITALS
TEMPERATURE: 97.2 F | WEIGHT: 280 LBS | RESPIRATION RATE: 27 BRPM | OXYGEN SATURATION: 94 % | HEART RATE: 75 BPM | BODY MASS INDEX: 51.53 KG/M2 | DIASTOLIC BLOOD PRESSURE: 76 MMHG | SYSTOLIC BLOOD PRESSURE: 124 MMHG | HEIGHT: 62 IN

## 2023-01-03 DIAGNOSIS — R79.89 ELEVATED SERUM CREATININE: ICD-10-CM

## 2023-01-03 DIAGNOSIS — M54.9 UPPER BACK PAIN: Primary | ICD-10-CM

## 2023-01-03 DIAGNOSIS — R91.1 PULMONARY NODULE: ICD-10-CM

## 2023-01-03 DIAGNOSIS — R07.9 CHEST PAIN, UNSPECIFIED TYPE: ICD-10-CM

## 2023-01-03 LAB
ALBUMIN SERPL-MCNC: 4.1 G/DL (ref 3.5–5.2)
ALP BLD-CCNC: 68 U/L (ref 35–104)
ALT SERPL-CCNC: 12 U/L (ref 5–33)
ANION GAP SERPL CALCULATED.3IONS-SCNC: 11 MMOL/L (ref 7–19)
AST SERPL-CCNC: 11 U/L (ref 5–32)
BASOPHILS ABSOLUTE: 0.1 K/UL (ref 0–0.2)
BASOPHILS RELATIVE PERCENT: 0.8 % (ref 0–1)
BILIRUB SERPL-MCNC: <0.2 MG/DL (ref 0.2–1.2)
BUN BLDV-MCNC: 19 MG/DL (ref 6–20)
CALCIUM SERPL-MCNC: 8.9 MG/DL (ref 8.6–10)
CHLORIDE BLD-SCNC: 104 MMOL/L (ref 98–111)
CO2: 22 MMOL/L (ref 22–29)
CREAT SERPL-MCNC: 1.1 MG/DL (ref 0.5–0.9)
D DIMER: 0.53 UG/ML FEU (ref 0–0.48)
EOSINOPHILS ABSOLUTE: 0.4 K/UL (ref 0–0.6)
EOSINOPHILS RELATIVE PERCENT: 4.7 % (ref 0–5)
GFR SERPL CREATININE-BSD FRML MDRD: >60 ML/MIN/{1.73_M2}
GLUCOSE BLD-MCNC: 121 MG/DL (ref 74–109)
HCG QUALITATIVE: NEGATIVE
HCT VFR BLD CALC: 38.3 % (ref 37–47)
HEMOGLOBIN: 12.6 G/DL (ref 12–16)
IMMATURE GRANULOCYTES #: 0 K/UL
LYMPHOCYTES ABSOLUTE: 2.7 K/UL (ref 1.1–4.5)
LYMPHOCYTES RELATIVE PERCENT: 29.8 % (ref 20–40)
MCH RBC QN AUTO: 28.3 PG (ref 27–31)
MCHC RBC AUTO-ENTMCNC: 32.9 G/DL (ref 33–37)
MCV RBC AUTO: 85.9 FL (ref 81–99)
MONOCYTES ABSOLUTE: 0.6 K/UL (ref 0–0.9)
MONOCYTES RELATIVE PERCENT: 7 % (ref 0–10)
NEUTROPHILS ABSOLUTE: 5.2 K/UL (ref 1.5–7.5)
NEUTROPHILS RELATIVE PERCENT: 57.3 % (ref 50–65)
PDW BLD-RTO: 13.3 % (ref 11.5–14.5)
PLATELET # BLD: 251 K/UL (ref 130–400)
PMV BLD AUTO: 10.9 FL (ref 9.4–12.3)
POTASSIUM REFLEX MAGNESIUM: 4.3 MMOL/L (ref 3.5–5)
PRO-BNP: 206 PG/ML (ref 0–450)
RBC # BLD: 4.46 M/UL (ref 4.2–5.4)
SODIUM BLD-SCNC: 137 MMOL/L (ref 136–145)
TOTAL PROTEIN: 7.6 G/DL (ref 6.6–8.7)
TROPONIN: <0.01 NG/ML (ref 0–0.03)
WBC # BLD: 9.2 K/UL (ref 4.8–10.8)

## 2023-01-03 PROCEDURE — 84703 CHORIONIC GONADOTROPIN ASSAY: CPT

## 2023-01-03 PROCEDURE — 83880 ASSAY OF NATRIURETIC PEPTIDE: CPT

## 2023-01-03 PROCEDURE — 84484 ASSAY OF TROPONIN QUANT: CPT

## 2023-01-03 PROCEDURE — 85025 COMPLETE CBC W/AUTO DIFF WBC: CPT

## 2023-01-03 PROCEDURE — 6360000004 HC RX CONTRAST MEDICATION: Performed by: EMERGENCY MEDICINE

## 2023-01-03 PROCEDURE — 71275 CT ANGIOGRAPHY CHEST: CPT

## 2023-01-03 PROCEDURE — 80053 COMPREHEN METABOLIC PANEL: CPT

## 2023-01-03 PROCEDURE — 85379 FIBRIN DEGRADATION QUANT: CPT

## 2023-01-03 PROCEDURE — 99285 EMERGENCY DEPT VISIT HI MDM: CPT

## 2023-01-03 PROCEDURE — 2580000003 HC RX 258: Performed by: EMERGENCY MEDICINE

## 2023-01-03 PROCEDURE — 36415 COLL VENOUS BLD VENIPUNCTURE: CPT

## 2023-01-03 RX ORDER — HYDROCHLOROTHIAZIDE 25 MG/1
TABLET ORAL
Qty: 90 TABLET | Refills: 1 | OUTPATIENT
Start: 2023-01-03

## 2023-01-03 RX ORDER — 0.9 % SODIUM CHLORIDE 0.9 %
500 INTRAVENOUS SOLUTION INTRAVENOUS ONCE
Status: COMPLETED | OUTPATIENT
Start: 2023-01-03 | End: 2023-01-03

## 2023-01-03 RX ADMIN — IOPAMIDOL 70 ML: 755 INJECTION, SOLUTION INTRAVENOUS at 02:19

## 2023-01-03 RX ADMIN — SODIUM CHLORIDE 500 ML: 9 INJECTION, SOLUTION INTRAVENOUS at 03:56

## 2023-01-03 ASSESSMENT — ENCOUNTER SYMPTOMS
COUGH: 0
COLOR CHANGE: 0
BACK PAIN: 0
EYE DISCHARGE: 0
SORE THROAT: 0
ABDOMINAL DISTENTION: 0
ABDOMINAL PAIN: 0
EYE PAIN: 0
APNEA: 0
RHINORRHEA: 0
NAUSEA: 0
SHORTNESS OF BREATH: 0
PHOTOPHOBIA: 0

## 2023-01-03 ASSESSMENT — PAIN DESCRIPTION - DESCRIPTORS: DESCRIPTORS: SHARP

## 2023-01-03 ASSESSMENT — PAIN DESCRIPTION - LOCATION: LOCATION: CHEST

## 2023-01-03 ASSESSMENT — PAIN DESCRIPTION - ORIENTATION: ORIENTATION: LEFT

## 2023-01-03 ASSESSMENT — PAIN SCALES - GENERAL: PAINLEVEL_OUTOF10: 5

## 2023-01-03 ASSESSMENT — PAIN - FUNCTIONAL ASSESSMENT: PAIN_FUNCTIONAL_ASSESSMENT: 0-10

## 2023-01-03 NOTE — ED NOTES
Patient placed on cardiac monitor, continuous pulse oximeter, and NIBP monitor. Monitor alarms on.         Sierra Scruggs RN  01/03/23 [id number]

## 2023-01-03 NOTE — ED PROVIDER NOTES
[facility] - [facility] EMERGENCY DEPT  eMERGENCYdEPARTMENT eNCOUnter      Pt Name: Umu Atkinson  MRN: [id number]  [place] 1980  Date of evaluation: 1/3/2023  SERJIO Campbell    CHIEF COMPLAINT       Chief Complaint   Patient presents with    Chest Pain     Patient reports she has been having back pain for a few days. She states yesterday pain began radiating into her chest and she feels short of breath. She is also stating she has \"gas\" really bad            HISTORY OF PRESENT ILLNESS  (Location/Symptom, Timing/Onset, Context/Setting, Quality, Duration, Modifying Factors, Severity.)   Umu Atkinson is a 43 y.o. female who presents to the emergency department with complaints of left scapular pain. 4 day onset. Its not typical pleurisy but she is perc positive with her birth control. She has have RA and fibro. She is obese takes BP meds denies cholesterol issues. Heart hx with her father. I dont see anything atypical on EKG nor does my attending. Negative stress test less than 1 year ago. The pain is positional with breathing in correlates with symptoms. HPI    Nursing Notes were reviewed and I agree. REVIEW OF SYSTEMS    (2-9 systems for level 4, 10 or more for level 5)     Review of Systems   Constitutional:  Negative for activity change, appetite change, chills and fever. HENT:  Negative for congestion, postnasal drip, rhinorrhea and sore throat. Eyes:  Negative for photophobia, pain, discharge and visual disturbance. Respiratory:  Negative for apnea, cough and shortness of breath. Cardiovascular:  Positive for chest pain. Negative for leg swelling. Gastrointestinal:  Negative for abdominal distention, abdominal pain and nausea. Genitourinary:  Negative for vaginal bleeding. Musculoskeletal:  Negative for arthralgias, back pain, joint swelling, neck pain and neck stiffness. Skin:  Negative for color change and rash. Neurological:  Negative for dizziness, syncope, facial asymmetry and headaches. Hematological:  Negative for adenopathy. Does not bruise/bleed easily. Psychiatric/Behavioral:  Negative for agitation, behavioral problems and confusion. Except as noted above the remainder of the review of systems was reviewed and negative.        PAST MEDICAL HISTORY     Past Medical History:   Diagnosis Date    Anemia     Chronic rheumatic arthritis (HCC)     Elevated blood pressure reading     Fibromyalgia     GERD (gastroesophageal reflux disease)     Hypertension     Intracranial hypertension     Obesity     PCOS (polycystic ovarian syndrome)     Sleep apnea     bi pap    SOB (shortness of breath)     Trouble swallowing     Type 2 diabetes mellitus ([address]          SURGICAL HISTORY       Past Surgical History:   Procedure Laterality Date    BREAST BIOPSY Right 2019    benign    CHOLECYSTECTOMY      UPPER GASTROINTESTINAL ENDOSCOPY N/A 02/01/2022    Dr Mildays Scruggs-[id number] appearing stricture noted in the distal esophagus, gastritis, no h pylori    UPPER GASTROINTESTINAL ENDOSCOPY  02/01/2022    Dr Miladys Scruggs-w/[id number] appearing stricture noted in the distal esophagus, gastritis, no h pylori         CURRENT MEDICATIONS       Discharge Medication List as of 1/3/2023  3:57 AM        CONTINUE these medications which have NOT CHANGED    Details   Drospirenone (SLYND PO) Take by mouthHistorical Med      mupirocin (BACTROBAN) 2 % ointment APPLY TO AFFECTED AREA 3 TIMES A DAY, Disp-22 g, R-2, Normal      metFORMIN (GLUCOPHAGE) 500 MG tablet Take 1 tablet by mouth daily, Disp-30 tablet, R-5Normal      ondansetron (ZOFRAN ODT) 4 MG disintegrating tablet Take 1 tablet by mouth every 8 hours as needed for Nausea or Vomiting, Disp-15 tablet, R-0Normal      amLODIPine (NORVASC) 5 MG tablet TAKE 1 TABLET BY MOUTH TWICE A DAY, Disp-180 tablet, R-3Normal      carvedilol (COREG) 12.5 MG tablet Take 1 tablet by mouth 2 times daily, Disp-180 tablet, R-3Normal      losartan (COZAAR) 100 MG tablet TAKE 1 TABLET BY MOUTH EVERY DAY, Disp-90 tablet, R-3Normal      cyanocobalamin 1000 MCG/ML injection Inject 1,000 mcg into the muscle onceHistorical Med      omeprazole (PRILOSEC) 40 MG delayed release capsule Take 1 capsule by mouth daily, Disp-90 capsule, R-3Normal      Misc. Devices MISC Disp-1 each, R-0, NormalGlucometer ICD 10: E11.9      !! blood glucose monitor strips 1 strip by Other route in the morning., Other, DAILY Starting Mon 7/18/2022, Disp-30 strip, R-11, Normal      !! Lancet Device MISC Disp-30 each, R-11, Normal1 lancet per strip. !! ONETOUCH ULTRA strip 1 EACH BY IN VITRO ROUTE 3 TIMES DAILY AS NEEDED., Disp-100 strip, R-11Normal      busPIRone (BUSPAR) 5 MG tablet TAKE 1 TABLET BY MOUTH 2 TIMES DAILY AS NEEDED (ANXIETY). , Disp-180 tablet, R-2Normal      predniSONE (DELTASONE) 5 MG tablet 5 mg daily as neededHistorical Med      albuterol sulfate HFA (VENTOLIN HFA) 108 (90 Base) MCG/ACT inhaler Inhale 2 puffs into the lungs 4 times daily as needed for Wheezing, Disp-18 g, R-5Normal      valACYclovir (VALTREX) 1 g tablet Take 2 tablets p.o. every 12 hours for 2 doses. May repeat for further outbreaks, Disp-8 tablet, R-2Normal      Blood Glucose Monitoring Suppl (FREESTYLE LITE) ANDREEA Historical Med      !! Lancet Device MISC Disp-60 Device,R-11, Normal1 lancet twice a day       !! - Potential duplicate medications found. Please discuss with provider.           ALLERGIES     Mushroom extract complex    FAMILY HISTORY       Family History   Problem Relation Age of Onset    High Blood Pressure Father     Diabetes Father     Atrial Fibrillation Father     Heart Failure Father     Stroke Father     Diabetes Sister     Diabetes Maternal Grandmother     Heart Attack Other     Ovarian Cancer Paternal Grandmother     Colon Polyps Neg Hx     Crohn's Disease Neg Hx     Esophageal Cancer Neg Hx     Liver Cancer Neg Hx     Rectal Cancer Neg Hx     Stomach Cancer Neg Hx           SOCIAL HISTORY       Social History Socioeconomic History    Marital status: Single   Tobacco Use    Smoking status: Former     Years: 2.00     Types: Cigarettes     Start date: 1996     Quit date: 1998     Years since quittin.0    Smokeless tobacco: Never   Vaping Use    Vaping Use: Never used   Substance and Sexual Activity    Alcohol use: Yes     Alcohol/week: 1.0 standard drink     Types: 1 Glasses of wine per week     Comment: rarely    Drug use: No    Sexual activity: Yes     Partners: Male     Birth control/protection: OCP       SCREENINGS    Cecil Coma Scale  Eye Opening: Spontaneous  Best Verbal Response: Oriented  Best Motor Response: Obeys commands  Kareem Coma Scale Score: 15      PHYSICAL EXAM    (up to 7 forlevel 4, 8 or more for level 5)     ED Triage Vitals [23 0009]   BP Temp Temp src Heart Rate Resp SpO2 Height Weight   132/84 97.2 °F (36.2 °C) -- 87 21 97 % 5' 2\" (1.575 m) 280 lb (127 kg)       Physical Exam  Vitals and nursing note reviewed. Constitutional:       General: She is not in acute distress. Appearance: She is well-developed. She is obese. She is not diaphoretic. HENT:      Head: Normocephalic and atraumatic. Right Ear: External ear normal.      Left Ear: External ear normal.      Mouth/Throat:      Pharynx: No oropharyngeal exudate. Eyes:      General:         Right eye: No discharge. Left eye: No discharge. Pupils: Pupils are equal, round, and reactive to light. Neck:      Thyroid: No thyromegaly. Cardiovascular:      Rate and Rhythm: Normal rate and regular rhythm. Pulses: Normal pulses. Heart sounds: Normal heart sounds. No murmur heard. No friction rub. Pulmonary:      Effort: Pulmonary effort is normal. No respiratory distress. Breath sounds: Normal breath sounds. No stridor. No wheezing. Abdominal:      General: Bowel sounds are normal. There is no distension. Palpations: Abdomen is soft. Tenderness:  There is no abdominal tenderness. Musculoskeletal:         General: Normal range of motion. Cervical back: Normal range of motion and neck supple. Skin:     General: Skin is warm and dry. Capillary Refill: Capillary refill takes less than 2 seconds. Findings: No rash. Neurological:      General: No focal deficit present. Mental Status: She is alert and oriented to person, place, and time. Mental status is at baseline. Cranial Nerves: No cranial nerve deficit. Sensory: No sensory deficit. Coordination: Coordination normal.   Psychiatric:         Mood and Affect: Mood normal.         Behavior: Behavior normal.         Thought Content: Thought content normal.         Judgment: Judgment normal.         DIAGNOSTIC RESULTS     RADIOLOGY:   Non-plain film images such as CT, Ultrasound and MRI are read by the radiologist. Plain radiographic images are visualized and preliminarilyinterpreted by No att. providers found with the below findings:      Interpretation per the Radiologist below, if available at the time of this note:    CTA PULMONARY W CONTRAST   Final Result   Limited exam.No definite central pulmonary embolism. There is a 5 mm nodule noted in left upper lobe of uncertain etiology. Recommend follow-up as prescribed the [employer]. Electronically signed by Marilia Gamble MD on 01-03-23 at    8290          LABS:  Labs Reviewed   D-DIMER, QUANTITATIVE - Abnormal; Notable for the following components:       Result Value    D-Dimer, Quant 0.53 (*)     All other components within normal limits   CBC WITH AUTO DIFFERENTIAL - Abnormal; Notable for the following components:    MCHC 32.9 (*)     All other components within normal limits   COMPREHENSIVE METABOLIC PANEL W/ REFLEX TO MG FOR LOW K - Abnormal; Notable for the following components:    Glucose 121 (*)     Creatinine 1.1 (*)     All other components within normal limits   TROPONIN   BRAIN NATRIURETIC PEPTIDE   HCG, SERUM, QUALITATIVE All other labs were within normal range or notreturned as of this dictation. RE-ASSESSMENT          EMERGENCY DEPARTMENT COURSE and DIFFERENTIAL DIAGNOSIS/MDM:   Vitals:    Vitals:    01/03/23 0009 01/03/23 0016 01/03/23 0132 01/03/23 0232   BP: 132/84  112/78 124/76   Pulse: 87 87 88 75   Resp: 21  23 27   Temp: 97.2 °F (36.2 °C)      SpO2: 97%  96% 94%   Weight: 280 lb (127 kg)      Height: 5' 2\" (1.575 m)        EKG sinus RR no st changes. Similar to prior signed off by my attending. MDM  Number of Diagnoses or Management Options  Chest pain, unspecified type: new, needed workup     Amount and/or Complexity of Data Reviewed  Clinical lab tests: reviewed  Tests in the radiology section of CPT®: reviewed  Tests in the medicine section of CPT®: reviewed  Discuss the patient with other providers: yes (My attending)    Plan to pass care over to Dr Zach Dennison awaiting CTA. No EKG findings. Trop pending. She is low heart score of 2 with this ongoing for multiple days dont feel that repeats indicated assuming negative could likely be discharged. Guarded prognosis at this time. PROCEDURES:    Procedures      FINAL IMPRESSION      1. Upper back pain    2. Chest pain, unspecified type    3. Elevated serum creatinine    4.  Pulmonary nodule          DISPOSITION/PLAN   DISPOSITION Decision To Discharge 01/03/2023 04:51:41 AM      PATIENT REFERRED TO:  [facility] EMERGENCY DEPT  Luke Louis  [phone]    As needed, If symptoms worsen    López Workman MD  [address]  [phone]    Schedule an appointment as soon as possible for a visit       DISCHARGE MEDICATIONS:  Discharge Medication List as of 1/3/2023  3:57 AM          (Please note that portions of this note were completed with a voice recognition program.  Efforts were made to edit the dictations but occasionallywords are mis-transcribed.)    Augusta Caba, [address] [place] [address]  01/03/23 [address] [place] [address]

## 2023-01-03 NOTE — ED PROVIDER NOTES
Attending Supervisory Note/Shared Visit   I have personally performed a face to face diagnostic evaluation on this patient. I have reviewed the mid-levels findings and agree. EKG: Normal sinus rhythm. Normal QT. Borderline ST changes. No signs of acute ischemia compared to prior EKG. Briefly, patient is a 75-year-old female presents with pain in her upper back for the past few days. Over the last couple of days has radiated around to her chest.  Has felt a little short of breath. No hemoptysis. No unilateral leg swelling or pain. Symptoms not exertional.  On exam she is in no distress. Lungs clear bilaterally. No murmurs rubs or gallops. Abdomen soft and nontender. No CVA tenderness or pain. No tenderness in her back or spine. Neurovascular intact distally all 4 extremities. No PE seen on CTA. Low risk heart score. Symptoms have been going on for several days so see no indication for repeat EKG/troponin. Has mild elevation of creatinine. Patient tells me she really did not drink much water today so this is likely explanation for the mild elevation of creatinine. Patient given fluid bolus. No urinary complaints. No abdominal or flank pain. Do not see indication for urinalysis or CT of the abdomen. No indication for admission at this time. Patient's feeling better and stable for discharge. Discussed all of her results with her including incidental findings on imaging. Recommend she follow-up with her primary care provider for further evaluation. Told to notify PCP about visit here today so the PCP can review labs and imaging results from today's visit. Pulmonary nodule was seen on imaging and patient was informed about this. Told patient to return to ER for any change worsening symptoms or new concerns. Patient agreeable with plan. FINAL IMPRESSION      1. Upper back pain    2. Chest pain, unspecified type    3. Elevated serum creatinine    4.  Pulmonary nodule          Relda Martinez Dmitriy Benitez MD  Attending Emergency Physician        Mita Houser MD  01/03/23 0606

## 2023-01-04 RX ORDER — BUSPIRONE HYDROCHLORIDE 5 MG/1
TABLET ORAL
Qty: 180 TABLET | Refills: 2 | Status: SHIPPED | OUTPATIENT
Start: 2023-01-04

## 2023-01-13 ENCOUNTER — TELEPHONE (OUTPATIENT)
Dept: OTHER | Age: 43
End: 2023-01-13

## 2023-01-13 NOTE — TELEPHONE ENCOUNTER
Have seen the CT scan.   I will discuss it with her and her next appointment later this month.  -It is a small 5 mm nodule we will typically follow-up with a CT scan in 12 months

## 2023-01-13 NOTE — TELEPHONE ENCOUNTER
Pt was seen in ER on 1/3/2023. CTA pulmonary revealed a lung nodule. No voicemail on phone. My chart message sent and encounter forwarded to PCP. Pt has upcoming appointment on 1/26/2023.

## 2023-01-25 DIAGNOSIS — D64.9 ANEMIA, UNSPECIFIED TYPE: ICD-10-CM

## 2023-01-25 DIAGNOSIS — I10 PRIMARY HYPERTENSION: ICD-10-CM

## 2023-01-25 DIAGNOSIS — E66.01 MORBID OBESITY (HCC): ICD-10-CM

## 2023-01-25 DIAGNOSIS — E11.9 TYPE 2 DIABETES MELLITUS WITHOUT COMPLICATION, WITHOUT LONG-TERM CURRENT USE OF INSULIN (HCC): Primary | ICD-10-CM

## 2023-01-27 DIAGNOSIS — E11.9 TYPE 2 DIABETES MELLITUS WITHOUT COMPLICATION, WITHOUT LONG-TERM CURRENT USE OF INSULIN (HCC): ICD-10-CM

## 2023-01-27 DIAGNOSIS — E66.01 MORBID OBESITY (HCC): ICD-10-CM

## 2023-01-27 DIAGNOSIS — R35.0 URINE FREQUENCY: ICD-10-CM

## 2023-01-27 DIAGNOSIS — D47.2 MGUS (MONOCLONAL GAMMOPATHY OF UNKNOWN SIGNIFICANCE): ICD-10-CM

## 2023-01-27 DIAGNOSIS — D64.9 ANEMIA, UNSPECIFIED TYPE: ICD-10-CM

## 2023-01-27 LAB
ALBUMIN SERPL-MCNC: 4.2 G/DL (ref 3.5–5.2)
ALP BLD-CCNC: 62 U/L (ref 35–104)
ALT SERPL-CCNC: 10 U/L (ref 5–33)
ANION GAP SERPL CALCULATED.3IONS-SCNC: 12 MMOL/L (ref 7–19)
AST SERPL-CCNC: 11 U/L (ref 5–32)
BILIRUB SERPL-MCNC: 0.4 MG/DL (ref 0.2–1.2)
BILIRUBIN URINE: NEGATIVE
BLOOD, URINE: NEGATIVE
BUN BLDV-MCNC: 14 MG/DL (ref 6–20)
CALCIUM SERPL-MCNC: 9.3 MG/DL (ref 8.6–10)
CHLORIDE BLD-SCNC: 106 MMOL/L (ref 98–111)
CLARITY: CLEAR
CO2: 21 MMOL/L (ref 22–29)
COLOR: YELLOW
CREAT SERPL-MCNC: 0.8 MG/DL (ref 0.5–0.9)
GFR SERPL CREATININE-BSD FRML MDRD: >60 ML/MIN/{1.73_M2}
GLUCOSE BLD-MCNC: 113 MG/DL (ref 74–109)
GLUCOSE URINE: NEGATIVE MG/DL
HBA1C MFR BLD: 6.3 % (ref 4–6)
IRON: 56 UG/DL (ref 37–145)
KETONES, URINE: NEGATIVE MG/DL
LEUKOCYTE ESTERASE, URINE: NEGATIVE
NITRITE, URINE: NEGATIVE
PH UA: 5 (ref 5–8)
POTASSIUM SERPL-SCNC: 4.2 MMOL/L (ref 3.5–5)
PROTEIN UA: ABNORMAL MG/DL
SODIUM BLD-SCNC: 139 MMOL/L (ref 136–145)
SPECIFIC GRAVITY UA: 1.03 (ref 1–1.03)
TOTAL PROTEIN: 7.9 G/DL (ref 6.6–8.7)
UROBILINOGEN, URINE: 1 E.U./DL

## 2023-01-27 PROCEDURE — 36415 COLL VENOUS BLD VENIPUNCTURE: CPT | Performed by: NURSE PRACTITIONER

## 2023-01-29 LAB — URINE CULTURE, ROUTINE: NORMAL

## 2023-01-30 ENCOUNTER — OFFICE VISIT (OUTPATIENT)
Dept: PRIMARY CARE CLINIC | Age: 43
End: 2023-01-30
Payer: MEDICAID

## 2023-01-30 VITALS
BODY MASS INDEX: 52.49 KG/M2 | WEIGHT: 287 LBS | DIASTOLIC BLOOD PRESSURE: 94 MMHG | OXYGEN SATURATION: 100 % | TEMPERATURE: 98.2 F | HEART RATE: 73 BPM | SYSTOLIC BLOOD PRESSURE: 144 MMHG

## 2023-01-30 DIAGNOSIS — M79.10 MYALGIA: ICD-10-CM

## 2023-01-30 DIAGNOSIS — E28.2 PCOS (POLYCYSTIC OVARIAN SYNDROME): ICD-10-CM

## 2023-01-30 DIAGNOSIS — M79.7 FIBROMYALGIA: ICD-10-CM

## 2023-01-30 DIAGNOSIS — E11.9 TYPE 2 DIABETES MELLITUS WITHOUT COMPLICATION, WITHOUT LONG-TERM CURRENT USE OF INSULIN (HCC): ICD-10-CM

## 2023-01-30 DIAGNOSIS — K21.9 GASTROESOPHAGEAL REFLUX DISEASE WITHOUT ESOPHAGITIS: ICD-10-CM

## 2023-01-30 DIAGNOSIS — M05.79 RHEUMATOID ARTHRITIS INVOLVING MULTIPLE SITES WITH POSITIVE RHEUMATOID FACTOR (HCC): ICD-10-CM

## 2023-01-30 DIAGNOSIS — F41.8 DEPRESSION WITH ANXIETY: ICD-10-CM

## 2023-01-30 DIAGNOSIS — Z99.89 BIPAP (BIPHASIC POSITIVE AIRWAY PRESSURE) DEPENDENCE: ICD-10-CM

## 2023-01-30 DIAGNOSIS — R91.1 PULMONARY NODULE: ICD-10-CM

## 2023-01-30 DIAGNOSIS — I10 PRIMARY HYPERTENSION: Primary | ICD-10-CM

## 2023-01-30 DIAGNOSIS — E78.01 FAMILIAL HYPERCHOLESTEROLEMIA: ICD-10-CM

## 2023-01-30 DIAGNOSIS — D64.9 ANEMIA, UNSPECIFIED TYPE: ICD-10-CM

## 2023-01-30 PROBLEM — R20.0 BILATERAL HAND NUMBNESS: Status: RESOLVED | Noted: 2022-08-23 | Resolved: 2023-01-30

## 2023-01-30 PROCEDURE — 3044F HG A1C LEVEL LT 7.0%: CPT | Performed by: FAMILY MEDICINE

## 2023-01-30 PROCEDURE — 99214 OFFICE O/P EST MOD 30 MIN: CPT | Performed by: FAMILY MEDICINE

## 2023-01-30 PROCEDURE — 1036F TOBACCO NON-USER: CPT | Performed by: FAMILY MEDICINE

## 2023-01-30 PROCEDURE — 2022F DILAT RTA XM EVC RTNOPTHY: CPT | Performed by: FAMILY MEDICINE

## 2023-01-30 PROCEDURE — G8427 DOCREV CUR MEDS BY ELIG CLIN: HCPCS | Performed by: FAMILY MEDICINE

## 2023-01-30 PROCEDURE — G8484 FLU IMMUNIZE NO ADMIN: HCPCS | Performed by: FAMILY MEDICINE

## 2023-01-30 PROCEDURE — 3077F SYST BP >= 140 MM HG: CPT | Performed by: FAMILY MEDICINE

## 2023-01-30 PROCEDURE — 3080F DIAST BP >= 90 MM HG: CPT | Performed by: FAMILY MEDICINE

## 2023-01-30 PROCEDURE — G8417 CALC BMI ABV UP PARAM F/U: HCPCS | Performed by: FAMILY MEDICINE

## 2023-01-30 RX ORDER — IBUPROFEN 600 MG/1
600 TABLET ORAL 4 TIMES DAILY PRN
Qty: 60 TABLET | Refills: 2 | Status: SHIPPED | OUTPATIENT
Start: 2023-01-30

## 2023-01-30 RX ORDER — TIZANIDINE 4 MG/1
4 TABLET ORAL 3 TIMES DAILY PRN
Qty: 30 TABLET | Refills: 0 | Status: SHIPPED | OUTPATIENT
Start: 2023-01-30

## 2023-01-30 RX ORDER — MELOXICAM 15 MG/1
15 TABLET ORAL DAILY PRN
Qty: 30 TABLET | Refills: 5 | Status: SHIPPED | OUTPATIENT
Start: 2023-01-30

## 2023-01-30 ASSESSMENT — PATIENT HEALTH QUESTIONNAIRE - PHQ9
10. IF YOU CHECKED OFF ANY PROBLEMS, HOW DIFFICULT HAVE THESE PROBLEMS MADE IT FOR YOU TO DO YOUR WORK, TAKE CARE OF THINGS AT HOME, OR GET ALONG WITH OTHER PEOPLE: 0
6. FEELING BAD ABOUT YOURSELF - OR THAT YOU ARE A FAILURE OR HAVE LET YOURSELF OR YOUR FAMILY DOWN: 0
8. MOVING OR SPEAKING SO SLOWLY THAT OTHER PEOPLE COULD HAVE NOTICED. OR THE OPPOSITE, BEING SO FIGETY OR RESTLESS THAT YOU HAVE BEEN MOVING AROUND A LOT MORE THAN USUAL: 0
SUM OF ALL RESPONSES TO PHQ9 QUESTIONS 1 & 2: 0
3. TROUBLE FALLING OR STAYING ASLEEP: 0
5. POOR APPETITE OR OVEREATING: 0
SUM OF ALL RESPONSES TO PHQ QUESTIONS 1-9: 0
2. FEELING DOWN, DEPRESSED OR HOPELESS: 0
7. TROUBLE CONCENTRATING ON THINGS, SUCH AS READING THE NEWSPAPER OR WATCHING TELEVISION: 0
4. FEELING TIRED OR HAVING LITTLE ENERGY: 0
1. LITTLE INTEREST OR PLEASURE IN DOING THINGS: 0
9. THOUGHTS THAT YOU WOULD BE BETTER OFF DEAD, OR OF HURTING YOURSELF: 0
SUM OF ALL RESPONSES TO PHQ QUESTIONS 1-9: 0

## 2023-01-30 NOTE — PROGRESS NOTES
[address] [facility] CARE  [address] [address] [place] [address]  Dept: [phone]  Dept Fax: [phone]  Loc: [phone]    Velvet Lozano is a 43 y.o. female who presents today for her medical conditions/complaints as noted below. Velvet Lozano is here for 3 Month Follow-Up and Back Pain  Pulmonary nodule      HPI:   CC: Here today to discuss the following:    \"I'm in a fibro flare\". -States she is having diffuse body aches and muscle aches.  -Her pain is primarily located between her shoulder blades. Described as a dull ache and cramping.  -She has been to the chiropractor with minimal relief.  -She has not found much relief with her ibuprofen.  -No chest pain or shortness of breath. She has an emergency department on October 23, 2022 with nausea and vomiting. She was also experiencing some chest pain. -EKG is reported as normal sinus rhythm. No evidence of acute ST elevation.  -She was discharged with Zofran. She went to the emergency department on November 16 complaining of vaginal bleeding.  -Transvaginal ultrasound was obtained which showedMyomatous uterus, septated left ovarian cyst.  She was given IV fluids and Tylenol.  -Advised to follow-up with gynecology.  -She followed up with gynecology November 30.  - -Concerns for possible miscarriage. - -They discussed birth control. She returns emergency department on January 3 complaining of upper back pain, chest pain. She returned to emergency department on January 11 complaining of chest pain. -EKG showed no changes. HPI    Subjective:      Review of Systems  Review of Systems   Constitutional: Negative for chills and fever. HENT: Negative for congestion. Respiratory: Negative for cough, chest tightness and shortness of breath. Cardiovascular: Negative for chest pain, palpitations and leg swelling.    Gastrointestinal: Negative for abdominal pain, anal bleeding, constipation, diarrhea and nausea. Musculoskeletal: Muscle aches and pains        Objective:   BP (!) 144/94   Pulse 73   Temp 98.2 °F (36.8 °C)   Wt 287 lb (130.2 kg)   SpO2 100%   BMI 52.49 kg/m²   Physical Exam  Physical Exam   Constitutional: She appears well. Does not appear ill. Neck: Neck supple. No masses. Neck Symmetric. Normal tracheal position. No thyroid enlargement  Cardiovascular: Normal rate and regular rhythm. Exam reveals no friction rub. No murmur heard. Respiratory:  Effort normal and breath sounds normal. No respiratory distress. No wheezes. No rales. No use of accessory muscles or intercostal retractions. Neurological: alert. Psychiatric: normal mood and affect. Her behavior is normal.   MSK: Full range of motion bilateral shoulders. Normal gait. Tender to palpation between her shoulder blades.     Recent Results (from the past 672 hour(s))   D-Dimer, Quantitative    Collection Time: 01/03/23 12:13 AM   Result Value Ref Range    D-Dimer, Quant 0.53 (H) 0.00 - 0.48 ug/mL FEU   Troponin    Collection Time: 01/03/23 12:13 AM   Result Value Ref Range    Troponin <0.01 0.00 - 0.03 ng/mL   Brain Natriuretic Peptide    Collection Time: 01/03/23 12:13 AM   Result Value Ref Range    Pro- 0 - 450 pg/mL   CBC with Auto Differential    Collection Time: 01/03/23 12:13 AM   Result Value Ref Range    WBC 9.2 4.8 - 10.8 K/uL    RBC 4.46 4.20 - 5.40 M/uL    Hemoglobin 12.6 12.0 - 16.0 g/dL    Hematocrit 38.3 37.0 - 47.0 %    MCV 85.9 81.0 - 99.0 fL    MCH 28.3 27.0 - 31.0 pg    MCHC 32.9 (L) 33.0 - 37.0 g/dL    RDW 13.3 11.5 - 14.5 %    Platelets 732 254 - 533 K/uL    MPV 10.9 9.4 - 12.3 fL    Neutrophils % 57.3 50.0 - 65.0 %    Lymphocytes % 29.8 20.0 - 40.0 %    Monocytes % 7.0 0.0 - 10.0 %    Eosinophils % 4.7 0.0 - 5.0 %    Basophils % 0.8 0.0 - 1.0 %    Neutrophils Absolute 5.2 1.5 - 7.5 K/uL    Immature Granulocytes # 0.0 K/uL    Lymphocytes Absolute 2.7 1.1 - 4.5 K/uL    Monocytes Absolute 0.60 0.00 - 0.90 K/uL    Eosinophils Absolute 0.40 0.00 - 0.60 K/uL    Basophils Absolute 0.10 0.00 - 0.20 K/uL   Comprehensive Metabolic Panel w/ Reflex to MG    Collection Time: 01/03/23 12:13 AM   Result Value Ref Range    Sodium 137 136 - 145 mmol/L    Potassium reflex Magnesium 4.3 3.5 - 5.0 mmol/L    Chloride 104 98 - 111 mmol/L    CO2 22 22 - 29 mmol/L    Anion Gap 11 7 - 19 mmol/L    Glucose 121 (H) 74 - 109 mg/dL    BUN 19 6 - 20 mg/dL    Creatinine 1.1 (H) 0.5 - 0.9 mg/dL    Est, Glom Filt Rate >60 >60    Calcium 8.9 8.6 - 10.0 mg/dL    Total Protein 7.6 6.6 - 8.7 g/dL    Albumin 4.1 3.5 - 5.2 g/dL    Total Bilirubin <0.2 0.2 - 1.2 mg/dL    Alkaline Phosphatase 68 35 - 104 U/L    ALT 12 5 - 33 U/L    AST 11 5 - 32 U/L   HCG Qualitative, Serum    Collection Time: 01/03/23 12:13 AM   Result Value Ref Range    hCG Qual Negative    Culture, Urine    Collection Time: 01/27/23  9:51 AM    Specimen: Urine, clean catch   Result Value Ref Range    Urine Culture, Routine       >50,000 CFU/ml mixed skin/urogenital chung.  No further workup   Urinalysis with Reflex to Culture    Collection Time: 01/27/23  9:51 AM    Specimen: Urine   Result Value Ref Range    Color, UA YELLOW Straw/Yellow    Clarity, UA Clear Clear    Glucose, Ur Negative Negative mg/dL    Bilirubin Urine Negative Negative    Ketones, Urine Negative Negative mg/dL    Specific Gravity, UA 1.028 1.005 - 1.030    Blood, Urine Negative Negative    pH, UA 5.0 5.0 - 8.0    Protein, UA TRACE (A) Negative mg/dL    Urobilinogen, Urine 1.0 <2.0 E.U./dL    Nitrite, Urine Negative Negative    Leukocyte Esterase, Urine Negative Negative   Comprehensive Metabolic Panel    Collection Time: 01/27/23  9:51 AM   Result Value Ref Range    Sodium 139 136 - 145 mmol/L    Potassium 4.2 3.5 - 5.0 mmol/L    Chloride 106 98 - 111 mmol/L    CO2 21 (L) 22 - 29 mmol/L    Anion Gap 12 7 - 19 mmol/L    Glucose 113 (H) 74 - 109 mg/dL    BUN 14 6 - 20 mg/dL    Creatinine 0.8 0.5 - 0.9 mg/dL Est, Glom Filt Rate >60 >60    Calcium 9.3 8.6 - 10.0 mg/dL    Total Protein 7.9 6.6 - 8.7 g/dL    Albumin 4.2 3.5 - 5.2 g/dL    Total Bilirubin 0.4 0.2 - 1.2 mg/dL    Alkaline Phosphatase 62 35 - 104 U/L    ALT 10 5 - 33 U/L    AST 11 5 - 32 U/L   Iron    Collection Time: 01/27/23  9:51 AM   Result Value Ref Range    Iron 56 37 - 145 ug/dL   Hemoglobin A1C    Collection Time: 01/27/23  9:51 AM   Result Value Ref Range    Hemoglobin A1C 6.3 (H) 4.0 - 6.0 %       Radiation Dose Estimates    No radiation information found for this patient  Narrative   Patient: Amada Warner  Time Out: 03:22Exam(s): CTA CHEST EXAM:  CT Angiography Chest With Intravenous ContrastCLINICAL HISTORY:   Reason for exam: elevated dimer with pleurisy left side. TECHNIQUE:  Axial computed tomographic angiography images of the chest    with intravenous contrast.  CTDI is 20.72 mGy and DLP is 683 mGy-cm. MIP reconstructed images were created and reviewed. COMPARISON:  No relevant prior studies available. FINDINGS: Artifact degrades image quality significantly limiting the exam.     Pulmonary arteries: No definite central pulmonary embolism. Aorta: No thoracic aortic aneurysm. Lungs: There is a 5 mm nodule noted in the left upper lobe. No consolidation. Pleural space: No significant effusion. No pneumothorax. Heart: Heart is    top normal in size. Bones/joints: There are degenerative changes in the spine. Soft tissues:  Unremarkable. Lymph nodes: There are small mediastinal lymph nodes noted. Impression   Limited exam.No definite central pulmonary embolism. There is a 5 mm nodule noted in left upper lobe of uncertain etiology. Recommend follow-up as prescribed the [employer]. Electronically signed by Carlos Thomas MD on 01-03-23 at    1333       No radiation information found for this patient  Narrative   NO PRIOR REPORT AVAILABLE   Exam: TRANSVAGINAL ULTRASOUND OF THE PELVIS    Clinical data: Abnormal bleeding. LMP-10/08/22. Technique: Transvaginal real-time grayscale imaging of the pelvis was performed. Images supplemented with color Doppler technique. Prior studies: None. Findings: The uterus measures 8.6 x 5.8 x 5.8 cm. Fibroids measuring 1.6 x 1.6 x 1.8 cm and 3.5 x 3.9 x 2.9 cm are visualized. There is a 2.0 x 2.3 x 2.2 cm area of calcification with shadowing visualized. The endometrial stripe measures 0.7 cm. Unremarkable cervix. There is no free fluid in the pelvis. The right ovary measures 3.1 x 1.0 x 2.5 cm. There is no evidence of solid or cystic ovarian abnormality. The left ovary measures 3.7 x 2.5 x 3.0 cm. There is a 2.0 x 1.8 x 1.8 cm cyst with internal septations. Blood flow visualized in both ovaries. No adnexal masses are identified. Impression   Myomatous uterus   Septated cyst left ovary   Recommendation:   Follow up as clinically indicated. Electronically Signed by Steven Gupta MD at 16-Nov-2022 07:56:26 PM EST                  _______________________________________________________________    - muscle relaxants:   - meloxicam.     -       Assessment & Plan: The following diagnoses and conditions are stable with no further orders unless indicated:  1. Primary hypertension  Current medication:  Amlodipine 5 mg twice a day  Carvedilol 12.5 mg twice a day  Losartan 100 mg daily    BP Readings from Last 3 Encounters:   01/30/23 (!) 144/94   01/03/23 124/76   11/30/22 112/80     She attributes her increased blood pressure to her increased pain. Her blood pressure was better controlled last 2 visits. We will continue to monitor. Advised to check ambulatory blood pressures and if consistently greater than 140/90 we will need to adjust her medication  2. Depression with anxiety  Current medication: BuSpar 5 mg twice daily  Stable    3.  Type 2 diabetes mellitus without complication, without long-term current use of insulin (HCC)  Metformin 500 mg daily    Lab Results   Component Value Date    LABA1C 6.3 (H) 01/27/2023    LABA1C 5.6 10/25/2022    LABA1C 5.6 05/12/2022     Lab Results   Component Value Date    LABMICR 3.70 10/05/2021    LDLCALC 94 10/05/2021    CREATININE 0.8 01/27/2023     A1c crept up over the holidays. Encourage lifestyle modification  4. Gastroesophageal reflux disease without esophagitis  Omeprazole 40 mg daily  Stable    5. Anemia, unspecified type  Lab Results   Component Value Date    HGB 12.6 01/03/2023     Stable  She has a consultation with her gynecologist in March to consider for dysfunctional uterine bleeding  6. BiPAP (biphasic positive airway pressure) dependence  Compliant and satisfied with results      7. Familial hypercholesterolemia  Lab Results   Component Value Date    CHOL 163 10/05/2021    CHOL 167 07/31/2021    CHOL 148 (L) 08/25/2020     Lab Results   Component Value Date    TRIG 68 10/05/2021    TRIG 54 07/31/2021    TRIG 86 08/25/2020     Lab Results   Component Value Date    HDL 55 (L) 10/05/2021    HDL 51 (L) 07/31/2021    HDL 49 (L) 08/25/2020     Lab Results   Component Value Date    LDLCALC 94 10/05/2021    LDLCALC 105 07/31/2021    LDLCALC 82 08/25/2020     No results found for: LABVLDL, VLDL  No results found for: CHOLHDLRATIO    Continue to monitor  8. PCOS (polycystic ovarian syndrome)  Metformin    9. Rheumatoid arthritis involving multiple sites with positive rheumatoid factor ([facility] [address]  Continues to be followed by rheumatology    10. Pulm nodule  There is a 5 mm nodule noted in left upper lobe of uncertain etiology  Fleischner Society Recommendations For Management of GROUNDGLASS  pulmonary nodules:     SOLITARY NODULES:  1. For groundglass nodules measuring less than 6 mm in diameter, no  followup is needed. If suspicious, consider followup at 2 and 4 years. If the nodule grows or becomes increasingly solid, consider resection.   2.  For groundglass nodules measuring greater than 6 mm, obtain CT in  6-12 months to confirm persistence, then CT every 2 years until 5  years. If the nodule grows or becomes increasingly solid, consider  resection. MULTIPLE NODULES:  1. For multiple, groundglass nodules measuring less than 6 mm in  diameter, CT in 3-6 months. If unchanged, consider CT at 2 and 4  years. 2.  For multiple, groundglass nodules measuring greater than 6 mm in  diameter, CT in 3-6 months. Then management is based on the most  suspicious nodule.    _______________________________________________________________  Regarding her muscle aches and pains and fibromyalgia  Suggest she start meloxicam daily with food. If no relief, given ibuprofen 600 mg 3 times daily as needed  Given Zanaflex 4 mg 3 times daily as needed as well. She has tried Cymbalta in the past but had adverse effects. Discussed the possibility of starting Lyrica if symptoms persist    No orders of the defined types were placed in this encounter. Return in about 6 months (around 7/30/2023) for Routine follow up - 20 minutes. Discussed use, benefit, and side effects of prescribed medications. All patient questions answered. Pt voiced understanding. Reviewed health maintenance. Instructedto continue current medications, diet and exercise. Patient agreed with treatmentplan.  Follow up as directed.     _______________________________________________________________      Past Medical History:   Diagnosis Date    Anemia     Chronic rheumatic arthritis (HCC)     Elevated blood pressure reading     Fibromyalgia     GERD (gastroesophageal reflux disease)     Hypertension     Intracranial hypertension     Obesity     PCOS (polycystic ovarian syndrome)     Sleep apnea     bi pap    SOB (shortness of breath)     Trouble swallowing     Type 2 diabetes mellitus ([address]       Past Surgical History:   Procedure Laterality Date    BREAST BIOPSY Right 2019    benign    CHOLECYSTECTOMY      UPPER GASTROINTESTINAL ENDOSCOPY N/A 02/01/2022    Dr Elizabeth Scruggs-[id number] appearing stricture noted in the distal esophagus, gastritis, no h pylori    UPPER GASTROINTESTINAL ENDOSCOPY  2022    Dr Kevin [id number] appearing stricture noted in the distal esophagus, gastritis, no h pylori       Family History   Problem Relation Age of Onset    High Blood Pressure Father     Diabetes Father     Atrial Fibrillation Father     Heart Failure Father     Stroke Father     Diabetes Sister     Diabetes Maternal Grandmother     Heart Attack Other     Ovarian Cancer Paternal Grandmother     Colon Polyps Neg Hx     Crohn's Disease Neg Hx     Esophageal Cancer Neg Hx     Liver Cancer Neg Hx     Rectal Cancer Neg Hx     Stomach Cancer Neg Hx        Social History     Tobacco Use    Smoking status: Former     Years: 2.00     Types: Cigarettes     Start date: 1996     Quit date: 1998     Years since quittin.1    Smokeless tobacco: Never   Substance Use Topics    Alcohol use: Yes     Alcohol/week: 1.0 standard drink     Types: 1 Glasses of wine per week     Comment: rarely     Current Outpatient Medications   Medication Sig Dispense Refill    ibuprofen (ADVIL;MOTRIN) 600 MG tablet Take 1 tablet by mouth 4 times daily as needed for Pain 60 tablet 2    tiZANidine (ZANAFLEX) 4 MG tablet Take 1 tablet by mouth 3 times daily as needed (muscle spasm) 30 tablet 0    meloxicam (MOBIC) 15 MG tablet Take 1 tablet by mouth daily as needed for Pain 30 tablet 5    busPIRone (BUSPAR) 5 MG tablet TAKE 1 TABLET BY MOUTH 2 TIMES DAILY AS NEEDED (ANXIETY).  180 tablet 2    Drospirenone (SLYND PO) Take by mouth      mupirocin (BACTROBAN) 2 % ointment APPLY TO AFFECTED AREA 3 TIMES A DAY 22 g 2    metFORMIN (GLUCOPHAGE) 500 MG tablet Take 1 tablet by mouth daily 30 tablet 5    ondansetron (ZOFRAN ODT) 4 MG disintegrating tablet Take 1 tablet by mouth every 8 hours as needed for Nausea or Vomiting 15 tablet 0    amLODIPine (NORVASC) 5 MG tablet TAKE 1 TABLET BY MOUTH TWICE A  tablet 3    carvedilol (COREG) 12.5 MG tablet Take 1 tablet by mouth 2 times daily 180 tablet 3    losartan (COZAAR) 100 MG tablet TAKE 1 TABLET BY MOUTH EVERY DAY 90 tablet 3    omeprazole (PRILOSEC) 40 MG delayed release capsule Take 1 capsule by mouth daily 90 capsule 3    Misc. Devices MISC Glucometer ICD 10: E11.9 1 each 0    ONETOUCH ULTRA strip 1 EACH BY IN VITRO ROUTE 3 TIMES DAILY AS NEEDED. 100 strip 11    predniSONE (DELTASONE) 5 MG tablet 5 mg daily as needed      albuterol sulfate HFA (VENTOLIN HFA) 108 (90 Base) MCG/ACT inhaler Inhale 2 puffs into the lungs 4 times daily as needed for Wheezing 18 g 5    Blood Glucose Monitoring Suppl (FREESTYLE LITE) ANDREEA USE AS DIRECTED      Lancet Device MISC 1 lancet twice a day 60 Device 11    cyanocobalamin 1000 MCG/ML injection Inject 1,000 mcg into the muscle once (Patient not taking: Reported on 1/30/2023)      blood glucose monitor strips 1 strip by Other route in the morning. 30 strip 11    Lancet Device MISC 1 lancet per strip. 30 each 11     No current facility-administered medications for this visit.      Allergies   Allergen Reactions    Mushroom Extract Complex        Health Maintenance   Topic Date Due    Varicella vaccine (1 of 2 - 2-dose childhood series) Never done    Pneumococcal 0-64 years Vaccine (1 - PCV) Never done    Hepatitis B vaccine (2 of 3 - Hep B Twinrix risk 3-dose series) 01/27/2014    Diabetic retinal exam  12/10/2019    Diabetic foot exam  06/26/2020    COVID-19 Vaccine (3 - Booster for Pfizer series) 12/07/2021    Flu vaccine (1) 08/01/2022    Diabetic Alb to Cr ratio (uACR) test  10/05/2022    Lipids  10/05/2022    Depression Monitoring  04/14/2023    A1C test (Diabetic or Prediabetic)  01/27/2024    GFR test (Diabetes, CKD 3-4, OR last GFR 15-59)  01/27/2024    Cervical cancer screen  12/22/2025    DTaP/Tdap/Td vaccine (2 - Td or Tdap) 04/25/2027    Hepatitis C screen  Completed    HIV screen  Completed    Hepatitis A vaccine  Aged Out    Hib vaccine Aged Out    Meningococcal (ACWY) vaccine  Aged Out       _______________________________________________________________    Note dictated using Dragon Dictation software  Sometimes this dictation software makes erroneous transcriptions.

## 2023-03-13 ENCOUNTER — OFFICE VISIT (OUTPATIENT)
Dept: PRIMARY CARE CLINIC | Age: 43
End: 2023-03-13

## 2023-03-13 VITALS
WEIGHT: 292 LBS | OXYGEN SATURATION: 100 % | DIASTOLIC BLOOD PRESSURE: 82 MMHG | TEMPERATURE: 97 F | BODY MASS INDEX: 53.41 KG/M2 | HEART RATE: 67 BPM | SYSTOLIC BLOOD PRESSURE: 122 MMHG

## 2023-03-13 DIAGNOSIS — E16.1 REACTIVE HYPOGLYCEMIA: ICD-10-CM

## 2023-03-13 DIAGNOSIS — E53.8 B12 DEFICIENCY: Primary | ICD-10-CM

## 2023-03-13 DIAGNOSIS — I10 ESSENTIAL (PRIMARY) HYPERTENSION: ICD-10-CM

## 2023-03-13 DIAGNOSIS — R53.83 OTHER FATIGUE: ICD-10-CM

## 2023-03-13 DIAGNOSIS — F41.8 DEPRESSION WITH ANXIETY: ICD-10-CM

## 2023-03-13 DIAGNOSIS — E11.9 TYPE 2 DIABETES MELLITUS WITHOUT COMPLICATION, WITHOUT LONG-TERM CURRENT USE OF INSULIN (HCC): ICD-10-CM

## 2023-03-13 DIAGNOSIS — Z91.018 FOOD ALLERGY: ICD-10-CM

## 2023-03-13 DIAGNOSIS — R11.0 NAUSEA: ICD-10-CM

## 2023-03-13 RX ORDER — CYANOCOBALAMIN 1000 UG/ML
1000 INJECTION, SOLUTION INTRAMUSCULAR; SUBCUTANEOUS ONCE
Status: COMPLETED | OUTPATIENT
Start: 2023-03-13 | End: 2023-03-13

## 2023-03-13 RX ORDER — TIRZEPATIDE 2.5 MG/.5ML
2.5 INJECTION, SOLUTION SUBCUTANEOUS WEEKLY
Qty: 4 ADJUSTABLE DOSE PRE-FILLED PEN SYRINGE | Refills: 5 | Status: SHIPPED | OUTPATIENT
Start: 2023-03-13

## 2023-03-13 RX ORDER — ONDANSETRON 4 MG/1
4 TABLET, ORALLY DISINTEGRATING ORAL 3 TIMES DAILY PRN
Qty: 21 TABLET | Refills: 0 | Status: SHIPPED | OUTPATIENT
Start: 2023-03-13

## 2023-03-13 RX ORDER — CARVEDILOL 12.5 MG/1
12.5 TABLET ORAL 2 TIMES DAILY
Qty: 180 TABLET | Refills: 3 | Status: SHIPPED | OUTPATIENT
Start: 2023-03-13 | End: 2023-06-11

## 2023-03-13 RX ADMIN — CYANOCOBALAMIN 1000 MCG: 1000 INJECTION, SOLUTION INTRAMUSCULAR; SUBCUTANEOUS at 12:46

## 2023-03-13 SDOH — ECONOMIC STABILITY: INCOME INSECURITY: HOW HARD IS IT FOR YOU TO PAY FOR THE VERY BASICS LIKE FOOD, HOUSING, MEDICAL CARE, AND HEATING?: SOMEWHAT HARD

## 2023-03-13 SDOH — ECONOMIC STABILITY: FOOD INSECURITY: WITHIN THE PAST 12 MONTHS, YOU WORRIED THAT YOUR FOOD WOULD RUN OUT BEFORE YOU GOT MONEY TO BUY MORE.: SOMETIMES TRUE

## 2023-03-13 SDOH — ECONOMIC STABILITY: HOUSING INSECURITY
IN THE LAST 12 MONTHS, WAS THERE A TIME WHEN YOU DID NOT HAVE A STEADY PLACE TO SLEEP OR SLEPT IN A SHELTER (INCLUDING NOW)?: NO

## 2023-03-13 SDOH — ECONOMIC STABILITY: FOOD INSECURITY: WITHIN THE PAST 12 MONTHS, THE FOOD YOU BOUGHT JUST DIDN'T LAST AND YOU DIDN'T HAVE MONEY TO GET MORE.: SOMETIMES TRUE

## 2023-03-13 ASSESSMENT — ENCOUNTER SYMPTOMS
CHEST TIGHTNESS: 0
EYE DISCHARGE: 0
ABDOMINAL PAIN: 0
DIARRHEA: 0
CONSTIPATION: 0
SHORTNESS OF BREATH: 0
NAUSEA: 0
BACK PAIN: 1
EYE PAIN: 0
COUGH: 0
EYE ITCHING: 0
ANAL BLEEDING: 0
PHOTOPHOBIA: 0

## 2023-03-14 NOTE — TELEPHONE ENCOUNTER
Silke Ozuna called to request a refill on her medication. Last office visit : 3/13/2023   Next office visit : 5/12/2023     Requested Prescriptions     Pending Prescriptions Disp Refills    carvedilol (COREG) 3.125 MG tablet [Pharmacy Med Name: CARVEDILOL 3.125 MG TABLET] 180 tablet 1     Sig: TAKE 1 TABLET BY MOUTH TWICE A DAY    KLOR-CON M20 20 MEQ extended release tablet [Pharmacy Med Name: KLOR-CON M20 TABLET] 30 tablet 0     Sig: TAKE 1 TABLET BY MOUTH EVERY DAY    PROAIR  (90 Base) MCG/ACT inhaler [Pharmacy Med Name: PROAIR HFA 90 MCG INHALER] 8.5 each 5     Sig: INHALE 2 PUFFS INTO THE LUNGS 4 TIMES DAILY AS NEEDED FOR WHEEZING.             Eva Negron LPN

## 2023-03-16 RX ORDER — CARVEDILOL 3.12 MG/1
TABLET ORAL
Qty: 180 TABLET | Refills: 1 | Status: SHIPPED | OUTPATIENT
Start: 2023-03-16

## 2023-03-16 RX ORDER — POTASSIUM CHLORIDE 1500 MG/1
TABLET, EXTENDED RELEASE ORAL
Qty: 30 TABLET | Refills: 0 | Status: SHIPPED | OUTPATIENT
Start: 2023-03-16

## 2023-03-23 ENCOUNTER — OFFICE VISIT (OUTPATIENT)
Dept: OBGYN CLINIC | Age: 43
End: 2023-03-23
Payer: MEDICAID

## 2023-03-23 VITALS
HEIGHT: 62 IN | DIASTOLIC BLOOD PRESSURE: 82 MMHG | HEART RATE: 86 BPM | BODY MASS INDEX: 52.81 KG/M2 | WEIGHT: 287 LBS | SYSTOLIC BLOOD PRESSURE: 135 MMHG

## 2023-03-23 DIAGNOSIS — N91.2 AMENORRHEA: ICD-10-CM

## 2023-03-23 DIAGNOSIS — Z86.2 HISTORY OF ANEMIA: ICD-10-CM

## 2023-03-23 DIAGNOSIS — D25.1 INTRAMURAL LEIOMYOMA OF UTERUS: ICD-10-CM

## 2023-03-23 DIAGNOSIS — N92.1 MENOMETRORRHAGIA: Primary | ICD-10-CM

## 2023-03-23 LAB
ERYTHROCYTE [DISTWIDTH] IN BLOOD BY AUTOMATED COUNT: 13.2 % (ref 11.5–14.5)
GONADOTROPIN, CHORIONIC (HCG) QUANT: 0.1 MIU/ML (ref 0–5.3)
HCT VFR BLD AUTO: 39.7 % (ref 37–47)
HGB BLD-MCNC: 12.5 G/DL (ref 12–16)
MCH RBC QN AUTO: 27.8 PG (ref 27–31)
MCHC RBC AUTO-ENTMCNC: 31.5 G/DL (ref 33–37)
MCV RBC AUTO: 88.4 FL (ref 81–99)
PLATELET # BLD AUTO: 238 K/UL (ref 130–400)
PMV BLD AUTO: 11 FL (ref 9.4–12.3)
RBC # BLD AUTO: 4.49 M/UL (ref 4.2–5.4)
WBC # BLD AUTO: 6.7 K/UL (ref 4.8–10.8)

## 2023-03-23 PROCEDURE — G8417 CALC BMI ABV UP PARAM F/U: HCPCS | Performed by: OBSTETRICS & GYNECOLOGY

## 2023-03-23 PROCEDURE — 1036F TOBACCO NON-USER: CPT | Performed by: OBSTETRICS & GYNECOLOGY

## 2023-03-23 PROCEDURE — 3075F SYST BP GE 130 - 139MM HG: CPT | Performed by: OBSTETRICS & GYNECOLOGY

## 2023-03-23 PROCEDURE — 99214 OFFICE O/P EST MOD 30 MIN: CPT | Performed by: OBSTETRICS & GYNECOLOGY

## 2023-03-23 PROCEDURE — G8484 FLU IMMUNIZE NO ADMIN: HCPCS | Performed by: OBSTETRICS & GYNECOLOGY

## 2023-03-23 PROCEDURE — 3079F DIAST BP 80-89 MM HG: CPT | Performed by: OBSTETRICS & GYNECOLOGY

## 2023-03-23 PROCEDURE — G8427 DOCREV CUR MEDS BY ELIG CLIN: HCPCS | Performed by: OBSTETRICS & GYNECOLOGY

## 2023-03-23 ASSESSMENT — ENCOUNTER SYMPTOMS
RESPIRATORY NEGATIVE: 1
GASTROINTESTINAL NEGATIVE: 1
EYES NEGATIVE: 1
ALLERGIC/IMMUNOLOGIC NEGATIVE: 1

## 2023-03-23 NOTE — PROGRESS NOTES
Patient presents today for surgical consult- hysterectomy
pelvis was performed. Images supplemented with color Doppler technique. Prior studies: None. Findings: The uterus measures 8.6 x 5.8 x 5.8 cm. Fibroids measuring 1.6 x 1.6 x 1.8 cm and 3.5 x 3.9 x 2.9 cm are visualized. There is a 2.0 x 2.3 x 2.2 cm area of calcification with shadowing visualized. The endometrial stripe measures 0.7 cm. Unremarkable cervix. There is no free fluid in the pelvis. The right ovary measures 3.1 x 1.0 x 2.5 cm. There is no evidence of solid or cystic ovarian abnormality. The left ovary measures 3.7 x 2.5 x 3.0 cm. There is a 2.0 x 1.8 x 1.8 cm cyst with internal septations. Blood flow visualized in both ovaries. No adnexal masses are identified. Impression   Myomatous uterus   Septated cyst left ovary   Recommendation:   Follow up as clinically indicated. Electronically Signed by Bladimir Senior MD at 16-Nov-2022 07:56:26 PM EST           Assessment   Diagnosis Orders   1. Menometrorrhagia        2. Amenorrhea  HCG, Quantitative, Pregnancy    CBC      3. Intramural leiomyoma of uterus        4. History of anemia            Plan    Discussed management options. She is s/p failed medical management. We discussed IUD, endometrial ablation with diagnostic laparoscopy at the same time for pelvic pain and hysterectomy. Risks and benefits reviewed for each option.     All questions answered  Patient to consider options and notify me of decisions

## 2023-04-19 RX ORDER — ERGOCALCIFEROL 1.25 MG/1
50000 CAPSULE ORAL WEEKLY
Qty: 12 CAPSULE | Refills: 1 | Status: SHIPPED | OUTPATIENT
Start: 2023-04-19

## 2023-04-25 ENCOUNTER — OFFICE VISIT (OUTPATIENT)
Dept: HEMATOLOGY | Age: 43
End: 2023-04-25
Payer: MEDICAID

## 2023-04-25 ENCOUNTER — HOSPITAL ENCOUNTER (OUTPATIENT)
Dept: INFUSION THERAPY | Age: 43
Discharge: HOME | End: 2023-04-25
Payer: MEDICAID

## 2023-04-25 VITALS
SYSTOLIC BLOOD PRESSURE: 120 MMHG | OXYGEN SATURATION: 100 % | WEIGHT: 291.5 LBS | HEART RATE: 71 BPM | DIASTOLIC BLOOD PRESSURE: 86 MMHG | BODY MASS INDEX: 53.64 KG/M2 | HEIGHT: 62 IN

## 2023-04-25 DIAGNOSIS — R53.83 OTHER FATIGUE: ICD-10-CM

## 2023-04-25 DIAGNOSIS — D50.0 IRON DEFICIENCY ANEMIA DUE TO CHRONIC BLOOD LOSS: ICD-10-CM

## 2023-04-25 DIAGNOSIS — D47.2 MGUS (MONOCLONAL GAMMOPATHY OF UNKNOWN SIGNIFICANCE): ICD-10-CM

## 2023-04-25 DIAGNOSIS — N92.0 MENORRHAGIA WITH REGULAR CYCLE: ICD-10-CM

## 2023-04-25 DIAGNOSIS — D47.2 MGUS (MONOCLONAL GAMMOPATHY OF UNKNOWN SIGNIFICANCE): Primary | ICD-10-CM

## 2023-04-25 LAB
ALBUMIN SERPL-MCNC: 3.8 G/DL (ref 3.5–5.2)
ALP SERPL-CCNC: 72 U/L (ref 35–104)
ALT SERPL-CCNC: 11 U/L (ref 5–33)
ANION GAP SERPL CALCULATED.3IONS-SCNC: 14 MMOL/L (ref 7–19)
AST SERPL-CCNC: 12 U/L (ref 5–32)
BASOPHILS # BLD: 0.06 K/UL (ref 0.01–0.08)
BASOPHILS NFR BLD: 0.9 % (ref 0.1–1.2)
BILIRUB SERPL-MCNC: <0.2 MG/DL (ref 0.2–1.2)
BUN SERPL-MCNC: 11 MG/DL (ref 6–20)
CALCIUM SERPL-MCNC: 9.1 MG/DL (ref 8.6–10)
CHLORIDE SERPL-SCNC: 103 MMOL/L (ref 98–111)
CO2 SERPL-SCNC: 21 MMOL/L (ref 22–29)
CREAT SERPL-MCNC: 0.6 MG/DL (ref 0.5–0.9)
EOSINOPHIL # BLD: 0.2 K/UL (ref 0.04–0.54)
EOSINOPHIL NFR BLD: 3.1 % (ref 0.7–7)
ERYTHROCYTE [DISTWIDTH] IN BLOOD BY AUTOMATED COUNT: 13.3 % (ref 11.7–14.4)
FERRITIN SERPL-MCNC: 115.2 NG/ML (ref 13–150)
GLUCOSE SERPL-MCNC: 144 MG/DL (ref 74–109)
HCT VFR BLD AUTO: 39.1 % (ref 34.1–44.9)
HGB BLD-MCNC: 11.9 G/DL (ref 11.2–15.7)
IRON SATN MFR SERPL: 11 % (ref 14–50)
IRON SERPL-MCNC: 37 UG/DL (ref 37–145)
LYMPHOCYTES # BLD: 1.8 K/UL (ref 1.18–3.74)
LYMPHOCYTES NFR BLD: 28 % (ref 19.3–53.1)
MCH RBC QN AUTO: 27.5 PG (ref 25.6–32.2)
MCHC RBC AUTO-ENTMCNC: 30.4 G/DL (ref 32.3–35.5)
MCV RBC AUTO: 90.3 FL (ref 79.4–94.8)
MONOCYTES # BLD: 0.41 K/UL (ref 0.24–0.82)
MONOCYTES NFR BLD: 6.4 % (ref 4.7–12.5)
NEUTROPHILS # BLD: 3.91 K/UL (ref 1.56–6.13)
NEUTS SEG NFR BLD: 61 % (ref 34–71.1)
PLATELET # BLD AUTO: 192 K/UL (ref 182–369)
PMV BLD AUTO: 11.4 FL (ref 7.4–10.4)
POTASSIUM SERPL-SCNC: 4 MMOL/L (ref 3.5–5)
PROT SERPL-MCNC: 7.6 G/DL (ref 6.6–8.7)
RBC # BLD AUTO: 4.33 M/UL (ref 3.93–5.22)
SODIUM SERPL-SCNC: 138 MMOL/L (ref 136–145)
TIBC SERPL-MCNC: 329 UG/DL (ref 250–400)
WBC # BLD AUTO: 6.42 K/UL (ref 3.98–10.04)

## 2023-04-25 PROCEDURE — 36415 COLL VENOUS BLD VENIPUNCTURE: CPT | Performed by: NURSE PRACTITIONER

## 2023-04-25 PROCEDURE — G8417 CALC BMI ABV UP PARAM F/U: HCPCS | Performed by: NURSE PRACTITIONER

## 2023-04-25 PROCEDURE — 1036F TOBACCO NON-USER: CPT | Performed by: NURSE PRACTITIONER

## 2023-04-25 PROCEDURE — 99214 OFFICE O/P EST MOD 30 MIN: CPT | Performed by: NURSE PRACTITIONER

## 2023-04-25 PROCEDURE — 36415 COLL VENOUS BLD VENIPUNCTURE: CPT

## 2023-04-25 PROCEDURE — 85025 COMPLETE CBC W/AUTO DIFF WBC: CPT

## 2023-04-25 PROCEDURE — 3074F SYST BP LT 130 MM HG: CPT | Performed by: NURSE PRACTITIONER

## 2023-04-25 PROCEDURE — 99212 OFFICE O/P EST SF 10 MIN: CPT

## 2023-04-25 PROCEDURE — G8428 CUR MEDS NOT DOCUMENT: HCPCS | Performed by: NURSE PRACTITIONER

## 2023-04-25 PROCEDURE — 3078F DIAST BP <80 MM HG: CPT | Performed by: NURSE PRACTITIONER

## 2023-04-25 RX ORDER — HYDROCHLOROTHIAZIDE 12.5 MG/1
12.5 CAPSULE, GELATIN COATED ORAL DAILY
COMMUNITY

## 2023-04-26 ENCOUNTER — TELEPHONE (OUTPATIENT)
Dept: HEMATOLOGY | Age: 43
End: 2023-04-26

## 2023-04-26 NOTE — TELEPHONE ENCOUNTER
Called patient and reviewed lab results, iron sat 11%. Instructed that her ferritin, iron, and hemoglobin are all within normal limits so she does not qualify or IV iron replacement. Instructed that NATACHA Graff would like for her to get slow release iron (over the counter) and take one tablet daily. Instructed to call with any problems. Patient v/u.

## 2023-04-26 NOTE — TELEPHONE ENCOUNTER
----- Message from NATACHA Curry sent at 4/25/2023  4:50 PM CDT -----  Labs currently do not qualify for IV iron replacement. Iron saturation has dropped. Encouraged iron rich foods. Please call for and see if she would consider a trial of slow release iron 45 mg p.o. once daily.   Slow release iron can be obtained over-the-counter or a prescription can be sent

## 2023-04-28 LAB — B2 MICROGLOB SERPL-MCNC: 1.8 MG/L (ref 0.8–2.4)

## 2023-04-29 LAB
ALBUMIN SERPL-MCNC: 3.78 G/DL (ref 3.75–5.01)
ALPHA1 GLOB SERPL ELPH-MCNC: 0.34 G/DL (ref 0.19–0.46)
ALPHA2 GLOB SERPL ELPH-MCNC: 0.8 G/DL (ref 0.48–1.05)
B-GLOBULIN SERPL ELPH-MCNC: 0.84 G/DL (ref 0.48–1.1)
DEPRECATED KAPPA LC FREE/LAMBDA SER: 1.74 {RATIO} (ref 0.26–1.65)
GAMMA GLOB SERPL ELPH-MCNC: 1.54 G/DL (ref 0.62–1.51)
IGA SERPL-MCNC: 142 MG/DL (ref 68–408)
IGG SERPL-MCNC: 1685 MG/DL (ref 768–1632)
IGM SERPL-MCNC: 90 MG/DL (ref 35–263)
INTERPRETATION SERPL IFE-IMP: ABNORMAL
INTERPRETATION SERPL IFE-IMP: ABNORMAL
KAPPA LC FREE SER-MCNC: 31.27 MG/L (ref 3.3–19.4)
LAMBDA LC FREE SERPL-MCNC: 17.97 MG/L (ref 5.71–26.3)
PROT SERPL-MCNC: 7.3 G/DL (ref 6.3–8.2)

## 2023-04-29 ASSESSMENT — ENCOUNTER SYMPTOMS
SORE THROAT: 0
BLOOD IN STOOL: 0
COUGH: 0
RESPIRATORY NEGATIVE: 1
BACK PAIN: 0
DIARRHEA: 0
ABDOMINAL PAIN: 0
SHORTNESS OF BREATH: 0
VOMITING: 0
EYE PAIN: 0
EYE DISCHARGE: 0
CONSTIPATION: 0
EYE REDNESS: 0
EYES NEGATIVE: 1
GASTROINTESTINAL NEGATIVE: 1
WHEEZING: 0
NAUSEA: 0

## 2023-05-25 RX ORDER — TIRZEPATIDE 5 MG/.5ML
5 INJECTION, SOLUTION SUBCUTANEOUS WEEKLY
Qty: 4 ADJUSTABLE DOSE PRE-FILLED PEN SYRINGE | Refills: 5 | Status: SHIPPED | OUTPATIENT
Start: 2023-05-25 | End: 2023-06-22

## 2023-05-26 RX ORDER — TIZANIDINE 4 MG/1
4 TABLET ORAL 3 TIMES DAILY PRN
Qty: 30 TABLET | Refills: 0 | Status: SHIPPED | OUTPATIENT
Start: 2023-05-26

## 2023-05-26 NOTE — TELEPHONE ENCOUNTER
Alexandria Gambino called to request a refill on her medication. Last office visit : 3/13/2023   Next office visit : 6/22/2023     Requested Prescriptions     Pending Prescriptions Disp Refills    tiZANidine (Mis Yohannes) 4 MG tablet [Pharmacy Med Name: TIZANIDINE HCL 4 MG TABLET] 30 tablet 0     Sig: TAKE 1 TABLET BY MOUTH 3 TIMES DAILY AS NEEDED (MUSCLE SPASM).             Ciara Giles MA

## 2023-06-18 ENCOUNTER — HOSPITAL ENCOUNTER (EMERGENCY)
Age: 43
Discharge: HOME | End: 2023-06-19
Attending: EMERGENCY MEDICINE
Payer: MEDICAID

## 2023-06-18 VITALS
TEMPERATURE: 98.8 F | OXYGEN SATURATION: 98 % | HEART RATE: 75 BPM | WEIGHT: 280 LBS | RESPIRATION RATE: 20 BRPM | BODY MASS INDEX: 51.21 KG/M2 | SYSTOLIC BLOOD PRESSURE: 143 MMHG | DIASTOLIC BLOOD PRESSURE: 92 MMHG

## 2023-06-18 DIAGNOSIS — U07.1 COVID-19: Primary | ICD-10-CM

## 2023-06-18 LAB — SARS-COV-2 RDRP RESP QL NAA+PROBE: DETECTED

## 2023-06-18 PROCEDURE — 99283 EMERGENCY DEPT VISIT LOW MDM: CPT

## 2023-06-18 PROCEDURE — 87635 SARS-COV-2 COVID-19 AMP PRB: CPT

## 2023-06-18 ASSESSMENT — PAIN DESCRIPTION - LOCATION: LOCATION: GENERALIZED

## 2023-06-18 ASSESSMENT — PAIN SCALES - GENERAL: PAINLEVEL_OUTOF10: 4

## 2023-06-18 ASSESSMENT — PAIN - FUNCTIONAL ASSESSMENT: PAIN_FUNCTIONAL_ASSESSMENT: 0-10

## 2023-06-18 ASSESSMENT — PAIN DESCRIPTION - DESCRIPTORS: DESCRIPTORS: ACHING

## 2023-06-19 ASSESSMENT — ENCOUNTER SYMPTOMS
ABDOMINAL PAIN: 0
COUGH: 1
DIARRHEA: 0
VOMITING: 0
EYE PAIN: 0
SHORTNESS OF BREATH: 1

## 2023-06-19 NOTE — ED PROVIDER NOTES
[address] [facility] EMERGENCY DEPT  eMERGENCY dEPARTMENT eNCOUnter      Pt Name: Maya Estrada  MRN: [id number]  [place] 1980  Date of evaluation: 6/18/2023  Provider: Lacey Clemente MD    CHIEF COMPLAINT       Chief Complaint   Patient presents with    Shortness of Breath     Patient reports shortness of breath and jittery. She did take a couple of puffs of her albuterol           HISTORY OF PRESENT ILLNESS   (Location/Symptom, Timing/Onset,Context/Setting, Quality, Duration, Modifying Factors, Severity)  Note limiting factors. Maya Estrada is a 43 y.o. female who presents to the emergency department due to cough and shortness of breath. Symptoms started less than 24 hours ago. No fever. Vomited once yesterday. No vomiting since. Did have some loose stools yesterday but none since. No fever. No abdominal pain or urinary complaints. No chest pain. Cough has been scantly productive of phlegm. No hemoptysis. No unilateral leg swelling or pain. No history of DVT or PE. No history of cardiac or pulmonary disease. Felt a little fatigued and lightheaded. Has lost her sense of smell. HPI    NursingNotes were reviewed. REVIEW OF SYSTEMS    (2-9 systems for level 4, 10 or more for level 5)     Review of Systems   Constitutional:  Positive for fatigue. Negative for fever. Eyes:  Negative for pain and visual disturbance. Respiratory:  Positive for cough and shortness of breath. Cardiovascular:  Negative for chest pain, palpitations and leg swelling. Gastrointestinal:  Negative for abdominal pain, diarrhea and vomiting. Genitourinary:  Negative for dysuria. Skin:  Negative for rash. Neurological:  Negative for weakness and headaches. All other systems reviewed and are negative. A complete review of systems was performed and is negative except as noted above in the HPI.        PAST MEDICAL HISTORY     Past Medical History:   Diagnosis Date    Anemia     Chronic rheumatic arthritis (HCC)     Elevated

## 2023-06-22 ENCOUNTER — TELEMEDICINE (OUTPATIENT)
Dept: PRIMARY CARE CLINIC | Age: 43
End: 2023-06-22
Payer: MEDICAID

## 2023-06-22 DIAGNOSIS — U07.1 COVID-19: ICD-10-CM

## 2023-06-22 DIAGNOSIS — E11.9 TYPE 2 DIABETES MELLITUS WITHOUT COMPLICATION, WITHOUT LONG-TERM CURRENT USE OF INSULIN (HCC): Primary | ICD-10-CM

## 2023-06-22 DIAGNOSIS — I10 ESSENTIAL (PRIMARY) HYPERTENSION: ICD-10-CM

## 2023-06-22 DIAGNOSIS — E78.01 FAMILIAL HYPERCHOLESTEROLEMIA: ICD-10-CM

## 2023-06-22 DIAGNOSIS — Z99.89 BIPAP (BIPHASIC POSITIVE AIRWAY PRESSURE) DEPENDENCE: ICD-10-CM

## 2023-06-22 DIAGNOSIS — Z13.220 LIPID SCREENING: ICD-10-CM

## 2023-06-22 DIAGNOSIS — R53.83 OTHER FATIGUE: ICD-10-CM

## 2023-06-22 PROCEDURE — 99214 OFFICE O/P EST MOD 30 MIN: CPT | Performed by: FAMILY MEDICINE

## 2023-06-22 PROCEDURE — 3044F HG A1C LEVEL LT 7.0%: CPT | Performed by: FAMILY MEDICINE

## 2023-06-22 PROCEDURE — G8428 CUR MEDS NOT DOCUMENT: HCPCS | Performed by: FAMILY MEDICINE

## 2023-06-22 PROCEDURE — 2022F DILAT RTA XM EVC RTNOPTHY: CPT | Performed by: FAMILY MEDICINE

## 2023-06-22 RX ORDER — TIRZEPATIDE 7.5 MG/.5ML
7.5 INJECTION, SOLUTION SUBCUTANEOUS WEEKLY
Qty: 4 ADJUSTABLE DOSE PRE-FILLED PEN SYRINGE | Refills: 5 | Status: SHIPPED | OUTPATIENT
Start: 2023-06-22

## 2023-06-22 RX ORDER — TIRZEPATIDE 7.5 MG/.5ML
7.5 INJECTION, SOLUTION SUBCUTANEOUS WEEKLY
Qty: 4 ADJUSTABLE DOSE PRE-FILLED PEN SYRINGE | Refills: 5 | Status: SHIPPED | OUTPATIENT
Start: 2023-06-22 | End: 2023-06-22 | Stop reason: SDUPTHER

## 2023-06-22 ASSESSMENT — ENCOUNTER SYMPTOMS
ANAL BLEEDING: 0
CHEST TIGHTNESS: 0
DIARRHEA: 0
ABDOMINAL PAIN: 0
CONSTIPATION: 0
COUGH: 0
BACK PAIN: 1
NAUSEA: 0
SHORTNESS OF BREATH: 1

## 2023-07-14 DIAGNOSIS — E11.9 TYPE 2 DIABETES MELLITUS WITHOUT COMPLICATION, WITHOUT LONG-TERM CURRENT USE OF INSULIN (HCC): ICD-10-CM

## 2023-07-14 DIAGNOSIS — R53.83 OTHER FATIGUE: ICD-10-CM

## 2023-07-14 DIAGNOSIS — I10 ESSENTIAL (PRIMARY) HYPERTENSION: ICD-10-CM

## 2023-07-14 DIAGNOSIS — E78.01 FAMILIAL HYPERCHOLESTEROLEMIA: ICD-10-CM

## 2023-07-14 LAB
ALBUMIN SERPL-MCNC: 3.6 G/DL (ref 3.5–5.2)
ALP SERPL-CCNC: 64 U/L (ref 35–104)
ALT SERPL-CCNC: 7 U/L (ref 5–33)
ANION GAP SERPL CALCULATED.3IONS-SCNC: 12 MMOL/L (ref 7–19)
AST SERPL-CCNC: 11 U/L (ref 5–32)
BACTERIA URNS QL MICRO: ABNORMAL /HPF
BASOPHILS # BLD: 0 K/UL (ref 0–0.2)
BASOPHILS NFR BLD: 0.6 % (ref 0–1)
BILIRUB SERPL-MCNC: 0.5 MG/DL (ref 0.2–1.2)
BILIRUB UR QL STRIP: NEGATIVE
BUN SERPL-MCNC: 10 MG/DL (ref 6–20)
CALCIUM SERPL-MCNC: 8.8 MG/DL (ref 8.6–10)
CHLORIDE SERPL-SCNC: 98 MMOL/L (ref 98–111)
CHOLEST SERPL-MCNC: 156 MG/DL (ref 160–199)
CLARITY UR: ABNORMAL
CO2 SERPL-SCNC: 25 MMOL/L (ref 22–29)
COLOR UR: YELLOW
CREAT SERPL-MCNC: 0.7 MG/DL (ref 0.5–0.9)
CRYSTALS URNS MICRO: ABNORMAL /HPF
EOSINOPHIL # BLD: 0.4 K/UL (ref 0–0.6)
EOSINOPHIL NFR BLD: 4.9 % (ref 0–5)
EPI CELLS #/AREA URNS AUTO: 17 /HPF (ref 0–5)
ERYTHROCYTE [DISTWIDTH] IN BLOOD BY AUTOMATED COUNT: 14.1 % (ref 11.5–14.5)
GLUCOSE SERPL-MCNC: 86 MG/DL (ref 74–109)
GLUCOSE UR STRIP.AUTO-MCNC: NEGATIVE MG/DL
HBA1C MFR BLD: 5.6 % (ref 4–6)
HCT VFR BLD AUTO: 32.7 % (ref 37–47)
HDLC SERPL-MCNC: 48 MG/DL (ref 65–121)
HGB BLD-MCNC: 10.5 G/DL (ref 12–16)
HGB UR STRIP.AUTO-MCNC: NEGATIVE MG/L
HYALINE CASTS #/AREA URNS AUTO: 13 /HPF (ref 0–8)
IMM GRANULOCYTES # BLD: 0.1 K/UL
KETONES UR STRIP.AUTO-MCNC: NEGATIVE MG/DL
LDLC SERPL CALC-MCNC: 97 MG/DL
LEUKOCYTE ESTERASE UR QL STRIP.AUTO: ABNORMAL
LYMPHOCYTES # BLD: 2 K/UL (ref 1.1–4.5)
LYMPHOCYTES NFR BLD: 27.6 % (ref 20–40)
MCH RBC QN AUTO: 27.3 PG (ref 27–31)
MCHC RBC AUTO-ENTMCNC: 32.1 G/DL (ref 33–37)
MCV RBC AUTO: 84.9 FL (ref 81–99)
MONOCYTES # BLD: 0.4 K/UL (ref 0–0.9)
MONOCYTES NFR BLD: 5.5 % (ref 0–10)
NEUTROPHILS # BLD: 4.3 K/UL (ref 1.5–7.5)
NEUTS SEG NFR BLD: 60.6 % (ref 50–65)
NITRITE UR QL STRIP.AUTO: NEGATIVE
PH UR STRIP.AUTO: 6 [PH] (ref 5–8)
PLATELET # BLD AUTO: 266 K/UL (ref 130–400)
PMV BLD AUTO: 10.9 FL (ref 9.4–12.3)
POTASSIUM SERPL-SCNC: 3.5 MMOL/L (ref 3.5–5)
PROT SERPL-MCNC: 7.6 G/DL (ref 6.6–8.7)
PROT UR STRIP.AUTO-MCNC: ABNORMAL MG/DL
RBC # BLD AUTO: 3.85 M/UL (ref 4.2–5.4)
RBC #/AREA URNS AUTO: 2 /HPF (ref 0–4)
SODIUM SERPL-SCNC: 135 MMOL/L (ref 136–145)
SP GR UR STRIP.AUTO: 1.02 (ref 1–1.03)
T4 FREE SERPL-MCNC: 1.38 NG/DL (ref 0.93–1.7)
TRIGL SERPL-MCNC: 54 MG/DL (ref 0–149)
TSH SERPL DL<=0.005 MIU/L-ACNC: 1.3 UIU/ML (ref 0.27–4.2)
UROBILINOGEN UR STRIP.AUTO-MCNC: 1 E.U./DL
WBC # BLD AUTO: 7.1 K/UL (ref 4.8–10.8)
WBC #/AREA URNS AUTO: 27 /HPF (ref 0–5)

## 2023-07-24 DIAGNOSIS — D50.0 IRON DEFICIENCY ANEMIA DUE TO CHRONIC BLOOD LOSS: Primary | ICD-10-CM

## 2023-07-24 NOTE — PROGRESS NOTES
General: No tenderness or deformity. Cervical back: Normal range of motion and neck supple. Comments: Range of motion within normal limits x4 extremities   Skin:     General: Skin is warm. Findings: No bruising, erythema or rash. Neurological:      Mental Status: She is alert and oriented to person, place, and time. Cranial Nerves: No cranial nerve deficit. Coordination: Coordination normal.   Psychiatric:         Behavior: Behavior normal.         Thought Content: Thought content normal.       Labs reviewed today:  Lab Results   Component Value Date    WBC 7.57 07/25/2023    HGB 11.2 07/25/2023    HCT 35.1 07/25/2023    MCV 84.2 07/25/2023     07/25/2023     Lab Results   Component Value Date    NEUTROABS 4.31 07/25/2023       ASSESSMENT/PLAN:      1. Iron deficiency anemia due to chronic blood loss, intolerance to oral iron replacement due to severe GI upset to include nausea, constipation and abdominal cramping. Last received IV iron replacement on 5/31/2022 with Injectafer. Hemoglobin has remained stable at 11.2 with hematocrit of 35.1 today    04/25/2023 Serology results: She did not qualify for IV iron replacement with a hemoglobin of 11.9 and a ferritin of 115.2  Iron 37  TIBC 329  Saturation 11%  Ferritin 115.2    Obtain the following studies:  - Iron and TIBC; Future  - Ferritin; Future    -If IV iron is warranted after review of labs arrangements will be made  -Continue to consume iron rich foods    2. Monoclonal gammopathy of unknown significance (MGUS) with an IgG lambda light chain. 9/6/2022: 24-hour urine GUNNER + PE reported no monoclonal protein detected and negative for monoclonal free light chains; total protein 105    09/06/2022 Skeletal survey- mild straightening of the cervical spine. No evidence of acute fracture or subluxation. The remaining osseous structures are unremarkable    04/25/2023 Serology results: Remained stable.   She has had no recurrent

## 2023-07-25 ENCOUNTER — OFFICE VISIT (OUTPATIENT)
Dept: HEMATOLOGY | Age: 43
End: 2023-07-25
Payer: MEDICAID

## 2023-07-25 ENCOUNTER — HOSPITAL ENCOUNTER (OUTPATIENT)
Dept: INFUSION THERAPY | Age: 43
Discharge: HOME | End: 2023-07-25
Payer: MEDICAID

## 2023-07-25 VITALS
SYSTOLIC BLOOD PRESSURE: 132 MMHG | OXYGEN SATURATION: 100 % | HEART RATE: 89 BPM | HEIGHT: 62 IN | WEIGHT: 279.5 LBS | DIASTOLIC BLOOD PRESSURE: 70 MMHG | BODY MASS INDEX: 51.43 KG/M2

## 2023-07-25 DIAGNOSIS — D47.2 MGUS (MONOCLONAL GAMMOPATHY OF UNKNOWN SIGNIFICANCE): ICD-10-CM

## 2023-07-25 DIAGNOSIS — N92.0 MENORRHAGIA WITH REGULAR CYCLE: ICD-10-CM

## 2023-07-25 DIAGNOSIS — D50.0 IRON DEFICIENCY ANEMIA DUE TO CHRONIC BLOOD LOSS: ICD-10-CM

## 2023-07-25 DIAGNOSIS — D47.2 MGUS (MONOCLONAL GAMMOPATHY OF UNKNOWN SIGNIFICANCE): Primary | ICD-10-CM

## 2023-07-25 DIAGNOSIS — Z12.31 ENCOUNTER FOR SCREENING MAMMOGRAM FOR MALIGNANT NEOPLASM OF BREAST: ICD-10-CM

## 2023-07-25 LAB
ALBUMIN SERPL-MCNC: 4.2 G/DL (ref 3.5–5.2)
ALP SERPL-CCNC: 72 U/L (ref 35–104)
ALT SERPL-CCNC: 16 U/L (ref 9–52)
ANION GAP SERPL CALCULATED.3IONS-SCNC: 11 MMOL/L (ref 7–19)
AST SERPL-CCNC: 21 U/L (ref 14–36)
BILIRUB SERPL-MCNC: <0.2 MG/DL (ref 0.2–1.3)
BUN SERPL-MCNC: 12 MG/DL (ref 7–17)
CALCIUM SERPL-MCNC: 9.2 MG/DL (ref 8.4–10.2)
CHLORIDE SERPL-SCNC: 102 MMOL/L (ref 98–111)
CO2 SERPL-SCNC: 28 MMOL/L (ref 22–29)
CREAT SERPL-MCNC: 0.7 MG/DL (ref 0.5–1)
ERYTHROCYTE [DISTWIDTH] IN BLOOD BY AUTOMATED COUNT: 14.4 % (ref 11.7–14.4)
FERRITIN SERPL-MCNC: 123.2 NG/ML (ref 13–150)
GLOBULIN: 4.3 G/DL
GLUCOSE SERPL-MCNC: 84 MG/DL (ref 74–106)
HCT VFR BLD AUTO: 35.1 % (ref 34.1–44.9)
HGB BLD-MCNC: 11.2 G/DL (ref 11.2–15.7)
IRON SATN MFR SERPL: 11 % (ref 14–50)
IRON SERPL-MCNC: 38 UG/DL (ref 37–145)
LYMPHOCYTES # BLD: 2.27 K/UL (ref 1.18–3.74)
LYMPHOCYTES NFR BLD: 30 % (ref 19.3–53.1)
MCH RBC QN AUTO: 26.9 PG (ref 25.6–32.2)
MCHC RBC AUTO-ENTMCNC: 31.9 G/DL (ref 32.3–35.5)
MCV RBC AUTO: 84.2 FL (ref 79.4–94.8)
MONOCYTES # BLD: 0.55 K/UL (ref 0.24–0.82)
MONOCYTES NFR BLD: 7.3 % (ref 4.7–12.5)
NEUTROPHILS # BLD: 4.31 K/UL (ref 1.56–6.13)
NEUTS SEG NFR BLD: 56.8 % (ref 34–71.1)
PLATELET # BLD AUTO: 222 K/UL (ref 182–369)
PMV BLD AUTO: 11.4 FL (ref 7.4–10.4)
POTASSIUM SERPL-SCNC: 3.8 MMOL/L (ref 3.5–5.1)
PROT SERPL-MCNC: 8.5 G/DL (ref 6.3–8.2)
RBC # BLD AUTO: 4.17 M/UL (ref 3.93–5.22)
SODIUM SERPL-SCNC: 141 MMOL/L (ref 137–145)
TIBC SERPL-MCNC: 351 UG/DL (ref 250–400)
WBC # BLD AUTO: 7.57 K/UL (ref 3.98–10.04)

## 2023-07-25 PROCEDURE — 99212 OFFICE O/P EST SF 10 MIN: CPT

## 2023-07-25 PROCEDURE — 85025 COMPLETE CBC W/AUTO DIFF WBC: CPT

## 2023-07-25 PROCEDURE — 99214 OFFICE O/P EST MOD 30 MIN: CPT | Performed by: NURSE PRACTITIONER

## 2023-07-25 PROCEDURE — 36415 COLL VENOUS BLD VENIPUNCTURE: CPT

## 2023-07-25 PROCEDURE — 3075F SYST BP GE 130 - 139MM HG: CPT | Performed by: NURSE PRACTITIONER

## 2023-07-25 PROCEDURE — 3078F DIAST BP <80 MM HG: CPT | Performed by: NURSE PRACTITIONER

## 2023-07-25 PROCEDURE — 1036F TOBACCO NON-USER: CPT | Performed by: NURSE PRACTITIONER

## 2023-07-25 PROCEDURE — G8417 CALC BMI ABV UP PARAM F/U: HCPCS | Performed by: NURSE PRACTITIONER

## 2023-07-25 PROCEDURE — G8427 DOCREV CUR MEDS BY ELIG CLIN: HCPCS | Performed by: NURSE PRACTITIONER

## 2023-07-25 PROCEDURE — 80053 COMPREHEN METABOLIC PANEL: CPT

## 2023-07-25 ASSESSMENT — ENCOUNTER SYMPTOMS
ABDOMINAL PAIN: 0
VOMITING: 0
GASTROINTESTINAL NEGATIVE: 1
BACK PAIN: 0
SHORTNESS OF BREATH: 0
BLOOD IN STOOL: 0
SORE THROAT: 0
EYE PAIN: 0
CONSTIPATION: 0
WHEEZING: 0
EYE DISCHARGE: 0
NAUSEA: 0
RESPIRATORY NEGATIVE: 1
EYES NEGATIVE: 1
EYE REDNESS: 0
DIARRHEA: 0
COUGH: 0

## 2023-07-27 LAB — B2 MICROGLOB SERPL-MCNC: 2 MG/L (ref 0.8–2.4)

## 2023-07-29 LAB
ALBUMIN SERPL-MCNC: 3.81 G/DL (ref 3.75–5.01)
ALPHA1 GLOB SERPL ELPH-MCNC: 0.39 G/DL (ref 0.19–0.46)
ALPHA2 GLOB SERPL ELPH-MCNC: 0.94 G/DL (ref 0.48–1.05)
B-GLOBULIN SERPL ELPH-MCNC: 0.88 G/DL (ref 0.48–1.1)
DEPRECATED KAPPA LC FREE/LAMBDA SER: 1.99 {RATIO} (ref 0.26–1.65)
GAMMA GLOB SERPL ELPH-MCNC: 1.78 G/DL (ref 0.62–1.51)
IGA SERPL-MCNC: 148 MG/DL (ref 68–408)
IGG SERPL-MCNC: 1793 MG/DL (ref 768–1632)
IGM SERPL-MCNC: 85 MG/DL (ref 35–263)
INTERPRETATION SERPL IFE-IMP: ABNORMAL
INTERPRETATION SERPL IFE-IMP: ABNORMAL
KAPPA LC FREE SER-MCNC: 41.28 MG/L (ref 3.3–19.4)
LAMBDA LC FREE SERPL-MCNC: 20.76 MG/L (ref 5.71–26.3)
PROT SERPL-MCNC: 7.8 G/DL (ref 6.3–8.2)

## 2023-07-31 ENCOUNTER — OFFICE VISIT (OUTPATIENT)
Dept: PRIMARY CARE CLINIC | Age: 43
End: 2023-07-31
Payer: MEDICAID

## 2023-07-31 VITALS
DIASTOLIC BLOOD PRESSURE: 84 MMHG | OXYGEN SATURATION: 99 % | HEART RATE: 71 BPM | BODY MASS INDEX: 50.66 KG/M2 | TEMPERATURE: 97.9 F | WEIGHT: 277 LBS | SYSTOLIC BLOOD PRESSURE: 130 MMHG

## 2023-07-31 DIAGNOSIS — G89.29 CHRONIC MIDLINE LOW BACK PAIN WITHOUT SCIATICA: ICD-10-CM

## 2023-07-31 DIAGNOSIS — R11.0 NAUSEA: ICD-10-CM

## 2023-07-31 DIAGNOSIS — E11.9 TYPE 2 DIABETES MELLITUS WITHOUT COMPLICATION, WITHOUT LONG-TERM CURRENT USE OF INSULIN (HCC): Primary | ICD-10-CM

## 2023-07-31 DIAGNOSIS — K21.9 GASTROESOPHAGEAL REFLUX DISEASE WITHOUT ESOPHAGITIS: ICD-10-CM

## 2023-07-31 DIAGNOSIS — F41.8 DEPRESSION WITH ANXIETY: ICD-10-CM

## 2023-07-31 DIAGNOSIS — I10 ESSENTIAL (PRIMARY) HYPERTENSION: ICD-10-CM

## 2023-07-31 DIAGNOSIS — M54.50 CHRONIC MIDLINE LOW BACK PAIN WITHOUT SCIATICA: ICD-10-CM

## 2023-07-31 DIAGNOSIS — K04.7 DENTAL INFECTION: ICD-10-CM

## 2023-07-31 PROCEDURE — 2022F DILAT RTA XM EVC RTNOPTHY: CPT | Performed by: FAMILY MEDICINE

## 2023-07-31 PROCEDURE — 1036F TOBACCO NON-USER: CPT | Performed by: FAMILY MEDICINE

## 2023-07-31 PROCEDURE — G8417 CALC BMI ABV UP PARAM F/U: HCPCS | Performed by: FAMILY MEDICINE

## 2023-07-31 PROCEDURE — 3078F DIAST BP <80 MM HG: CPT | Performed by: FAMILY MEDICINE

## 2023-07-31 PROCEDURE — 3044F HG A1C LEVEL LT 7.0%: CPT | Performed by: FAMILY MEDICINE

## 2023-07-31 PROCEDURE — G8427 DOCREV CUR MEDS BY ELIG CLIN: HCPCS | Performed by: FAMILY MEDICINE

## 2023-07-31 PROCEDURE — 3074F SYST BP LT 130 MM HG: CPT | Performed by: FAMILY MEDICINE

## 2023-07-31 PROCEDURE — 99214 OFFICE O/P EST MOD 30 MIN: CPT | Performed by: FAMILY MEDICINE

## 2023-07-31 RX ORDER — AMLODIPINE BESYLATE 5 MG/1
5 TABLET ORAL DAILY
Qty: 90 TABLET | Refills: 3 | Status: SHIPPED | OUTPATIENT
Start: 2023-07-31

## 2023-07-31 RX ORDER — TIRZEPATIDE 10 MG/.5ML
10 INJECTION, SOLUTION SUBCUTANEOUS WEEKLY
Qty: 4 ADJUSTABLE DOSE PRE-FILLED PEN SYRINGE | Refills: 5 | Status: SHIPPED | OUTPATIENT
Start: 2023-07-31

## 2023-07-31 RX ORDER — CARVEDILOL 25 MG/1
25 TABLET ORAL 2 TIMES DAILY
Qty: 180 TABLET | Refills: 3 | Status: SHIPPED | OUTPATIENT
Start: 2023-07-31 | End: 2023-10-29

## 2023-07-31 RX ORDER — AMOXICILLIN 875 MG/1
875 TABLET, COATED ORAL 2 TIMES DAILY
Qty: 20 TABLET | Refills: 0 | Status: SHIPPED | OUTPATIENT
Start: 2023-07-31 | End: 2023-08-10

## 2023-07-31 RX ORDER — ONDANSETRON 4 MG/1
4 TABLET, ORALLY DISINTEGRATING ORAL EVERY 8 HOURS PRN
Qty: 15 TABLET | Refills: 0 | Status: SHIPPED | OUTPATIENT
Start: 2023-07-31

## 2023-07-31 ASSESSMENT — ENCOUNTER SYMPTOMS
SHORTNESS OF BREATH: 0
ANAL BLEEDING: 0
ABDOMINAL PAIN: 0
NAUSEA: 0
DIARRHEA: 0
BACK PAIN: 1
COUGH: 0
CONSTIPATION: 0
CHEST TIGHTNESS: 0

## 2023-07-31 NOTE — PROGRESS NOTES
Medications   Medication Sig Dispense Refill    ondansetron (ZOFRAN ODT) 4 MG disintegrating tablet Take 1 tablet by mouth every 8 hours as needed for Nausea or Vomiting 15 tablet 0    amLODIPine (NORVASC) 5 MG tablet Take 1 tablet by mouth daily TAKE 1 TABLET BY MOUTH TWICE A DAY 90 tablet 3    amoxicillin (AMOXIL) 875 MG tablet Take 1 tablet by mouth 2 times daily for 10 days 20 tablet 0    Tirzepatide (MOUNJARO) 10 MG/0.5ML SOPN SC injection Inject 0.5 mLs into the skin once a week 4 Adjustable Dose Pre-filled Pen Syringe 5    carvedilol (COREG) 25 MG tablet Take 1 tablet by mouth 2 times daily 180 tablet 3    Tirzepatide (MOUNJARO) 7.5 MG/0.5ML SOPN SC injection Inject 0.5 mLs into the skin once a week 4 Adjustable Dose Pre-filled Pen Syringe 5    tiZANidine (ZANAFLEX) 4 MG tablet TAKE 1 TABLET BY MOUTH 3 TIMES DAILY AS NEEDED (MUSCLE SPASM). 30 tablet 0    hydroCHLOROthiazide (MICROZIDE) 12.5 MG capsule Take 1 capsule by mouth daily      vitamin D (ERGOCALCIFEROL) 1.25 MG (81327 UT) CAPS capsule Take 1 capsule by mouth once a week 12 capsule 1    PROAIR  (90 Base) MCG/ACT inhaler INHALE 2 PUFFS INTO THE LUNGS 4 TIMES DAILY AS NEEDED FOR WHEEZING. 8.5 each 5    ibuprofen (ADVIL;MOTRIN) 600 MG tablet Take 1 tablet by mouth 4 times daily as needed for Pain 60 tablet 2    meloxicam (MOBIC) 15 MG tablet Take 1 tablet by mouth daily as needed for Pain 30 tablet 5    busPIRone (BUSPAR) 5 MG tablet TAKE 1 TABLET BY MOUTH 2 TIMES DAILY AS NEEDED (ANXIETY). 180 tablet 2    losartan (COZAAR) 100 MG tablet TAKE 1 TABLET BY MOUTH EVERY DAY 90 tablet 3    omeprazole (PRILOSEC) 40 MG delayed release capsule Take 1 capsule by mouth daily 90 capsule 3    ondansetron (ZOFRAN-ODT) 4 MG disintegrating tablet Take 1 tablet by mouth 3 times daily as needed for Nausea or Vomiting 21 tablet 0    mupirocin (BACTROBAN) 2 % ointment APPLY TO AFFECTED AREA 3 TIMES A DAY 22 g 2    Misc.  Devices MISC Glucometer ICD 10: E11.9 1 each 0

## 2023-07-31 NOTE — PATIENT INSTRUCTIONS
For dental issues:   Amoxicillin 875 mg twice a day for 10 days. Blood pressure  Reduce amlodipine from 5 mg twice a day to once a day  Increase carvedilol from 12.5 mg twice a day to 25 mg twice a day      How to improve constipation:  Increase water intake. Try to drink 6-8 glasses of water a day. Increase fiber intake with fruits and vegetables. May also try an over the counter fiber supplement such as benefiber, fibersure, metamucil, citrucil, or fibercon. Try stool softeners and laxatives:  Plan A:  Senna: 2 tablets twice a day PLUS  Colace 100mg twice a day        Plan B:  Stop Senna  Start miralax 17 g (1 capful) once a day PLUS  Colace 100mg twice a day    If still no relief, contact physician. We are committed to providing you with the best care possible. In order to help us achieve these goals please remember to bring all medications, herbal products, and over the counter supplements with you to each visit. If your provider has ordered testing for you, please be sure to follow up with our office if you have not received results within 7 days after the testing took place. *If you receive a survey after visiting one of our offices, please take time to share your experience concerning your physician office visit. These surveys are confidential and no health information about you is shared. We are eager to improve for you and we are counting on your feedback to help make that happen.

## 2023-08-30 ENCOUNTER — OFFICE VISIT (OUTPATIENT)
Dept: GASTROENTEROLOGY | Age: 43
End: 2023-08-30
Payer: MEDICAID

## 2023-08-30 VITALS
WEIGHT: 280 LBS | HEIGHT: 62 IN | OXYGEN SATURATION: 98 % | DIASTOLIC BLOOD PRESSURE: 80 MMHG | SYSTOLIC BLOOD PRESSURE: 115 MMHG | HEART RATE: 70 BPM | BODY MASS INDEX: 51.53 KG/M2

## 2023-08-30 DIAGNOSIS — R14.0 BLOATING: ICD-10-CM

## 2023-08-30 DIAGNOSIS — R10.84 GENERALIZED ABDOMINAL PAIN: ICD-10-CM

## 2023-08-30 DIAGNOSIS — K58.1 IRRITABLE BOWEL SYNDROME WITH CONSTIPATION: Primary | ICD-10-CM

## 2023-08-30 PROCEDURE — 3078F DIAST BP <80 MM HG: CPT | Performed by: NURSE PRACTITIONER

## 2023-08-30 PROCEDURE — G8427 DOCREV CUR MEDS BY ELIG CLIN: HCPCS | Performed by: NURSE PRACTITIONER

## 2023-08-30 PROCEDURE — 3074F SYST BP LT 130 MM HG: CPT | Performed by: NURSE PRACTITIONER

## 2023-08-30 PROCEDURE — 1036F TOBACCO NON-USER: CPT | Performed by: NURSE PRACTITIONER

## 2023-08-30 PROCEDURE — G8417 CALC BMI ABV UP PARAM F/U: HCPCS | Performed by: NURSE PRACTITIONER

## 2023-08-30 PROCEDURE — 99213 OFFICE O/P EST LOW 20 MIN: CPT | Performed by: NURSE PRACTITIONER

## 2023-08-30 NOTE — PROGRESS NOTES
Mental Status: She is alert and oriented to person, place, and time.    Psychiatric:         Mood and Affect: Mood normal.         Behavior: Behavior normal.

## 2023-09-05 ASSESSMENT — ENCOUNTER SYMPTOMS
COUGH: 0
CHOKING: 0
DIARRHEA: 0
ABDOMINAL DISTENTION: 1
CONSTIPATION: 1
TROUBLE SWALLOWING: 0
RECTAL PAIN: 0
SHORTNESS OF BREATH: 0
BLOOD IN STOOL: 0
ANAL BLEEDING: 0
VOMITING: 0
ABDOMINAL PAIN: 1
NAUSEA: 0

## 2023-09-25 ENCOUNTER — HOSPITAL ENCOUNTER (OUTPATIENT)
Dept: WOMENS IMAGING | Age: 43
Discharge: HOME | End: 2023-09-25
Payer: MEDICAID

## 2023-09-25 DIAGNOSIS — Z12.31 ENCOUNTER FOR SCREENING MAMMOGRAM FOR MALIGNANT NEOPLASM OF BREAST: ICD-10-CM

## 2023-09-25 PROCEDURE — 77063 BREAST TOMOSYNTHESIS BI: CPT

## 2023-09-26 ENCOUNTER — OFFICE VISIT (OUTPATIENT)
Dept: CARDIOLOGY CLINIC | Age: 43
End: 2023-09-26
Payer: MEDICAID

## 2023-09-26 VITALS
DIASTOLIC BLOOD PRESSURE: 92 MMHG | SYSTOLIC BLOOD PRESSURE: 122 MMHG | HEIGHT: 62 IN | WEIGHT: 281 LBS | BODY MASS INDEX: 51.71 KG/M2 | HEART RATE: 61 BPM

## 2023-09-26 DIAGNOSIS — R00.2 PALPITATIONS: ICD-10-CM

## 2023-09-26 DIAGNOSIS — I10 ESSENTIAL (PRIMARY) HYPERTENSION: Primary | ICD-10-CM

## 2023-09-26 PROCEDURE — 3074F SYST BP LT 130 MM HG: CPT | Performed by: INTERNAL MEDICINE

## 2023-09-26 PROCEDURE — G8427 DOCREV CUR MEDS BY ELIG CLIN: HCPCS | Performed by: INTERNAL MEDICINE

## 2023-09-26 PROCEDURE — 1036F TOBACCO NON-USER: CPT | Performed by: INTERNAL MEDICINE

## 2023-09-26 PROCEDURE — 99214 OFFICE O/P EST MOD 30 MIN: CPT | Performed by: INTERNAL MEDICINE

## 2023-09-26 PROCEDURE — 3080F DIAST BP >= 90 MM HG: CPT | Performed by: INTERNAL MEDICINE

## 2023-09-26 PROCEDURE — 93000 ELECTROCARDIOGRAM COMPLETE: CPT | Performed by: INTERNAL MEDICINE

## 2023-09-26 PROCEDURE — G8417 CALC BMI ABV UP PARAM F/U: HCPCS | Performed by: INTERNAL MEDICINE

## 2023-09-26 RX ORDER — CARVEDILOL 12.5 MG/1
12.5 TABLET ORAL 2 TIMES DAILY
Qty: 180 TABLET | Refills: 3 | Status: SHIPPED | OUTPATIENT
Start: 2023-09-26 | End: 2023-12-25

## 2023-09-26 ASSESSMENT — ENCOUNTER SYMPTOMS
WHEEZING: 0
EYE PAIN: 0
CHEST TIGHTNESS: 0
SHORTNESS OF BREATH: 0
BLOOD IN STOOL: 0
APNEA: 0
COUGH: 0
CONSTIPATION: 0
DIARRHEA: 0
VOMITING: 0
EYE DISCHARGE: 0
ABDOMINAL DISTENTION: 0
NAUSEA: 0
FACIAL SWELLING: 0
EYE REDNESS: 0
ABDOMINAL PAIN: 0
SORE THROAT: 0

## 2023-09-26 NOTE — PROGRESS NOTES
Cardiology Office Visit Note  [address], [address] [address]  Phone: [phone]  Fax: [phone]                            Date:  9/26/2023  Patient: Freddy Devine  Age:  37 y.o., 1980    Referral: No ref. provider found    REASON FOR VISIT:  1 Year Follow Up         PROBLEM LIST:    Patient Active Problem List    Diagnosis Date Noted    Primary hypertension 07/31/2021     Priority: Low    Family history of ovarian cancer 12/22/2020     Priority: Low    Menorrhagia with regular cycle 12/22/2020     Priority: Low    Iron deficiency anemia 09/04/2020     Priority: Low    PCOS (polycystic ovarian syndrome) 08/20/2018     Priority: Low    Rheumatoid arthritis involving multiple sites with positive rheumatoid factor ([address]) 07/06/2018     Priority: Low    Fibromyalgia 07/06/2018     Priority: Low    Bilateral carpal tunnel syndrome 07/06/2018     Priority: Low    Anemia 11/02/2017     Priority: Low    Familial hypercholesterolemia 11/02/2017     Priority: Low    Type 2 diabetes mellitus ([address])      Priority: Low    Restrictive lung disease 03/15/2022    Esophageal dysphagia     SETH (obstructive sleep apnea) 12/15/2021    Morbid obesity ([address]) 12/15/2021    Gastroesophageal reflux disease without esophagitis 12/15/2021    BiPAP (biphasic positive airway pressure) dependence 10/29/2021         PRESENTATION: Freddy Devine is a 37y.o. year old female is seen today for routine cardiology follow-up appointment. Her last office visit was a year ago. She has had several emergency room visits with noncardiac issues including vaginal bleeding, noncardiac chest pain and COVID-19 infection. Overall she states that she has been doing well without any new or progressively worsening cardiopulmonary symptoms.   Her blood pressure is noted to be mildly elevated (diastolic), however she notes that she has recently returned from a vacation where she may have missed a few doses of her medications and had some

## 2023-09-28 DIAGNOSIS — M79.10 MYALGIA: ICD-10-CM

## 2023-09-28 RX ORDER — LOSARTAN POTASSIUM 100 MG/1
TABLET ORAL
Qty: 90 TABLET | Refills: 3 | Status: SHIPPED | OUTPATIENT
Start: 2023-09-28

## 2023-09-29 RX ORDER — ERGOCALCIFEROL 1.25 MG/1
50000 CAPSULE ORAL WEEKLY
Qty: 12 CAPSULE | Refills: 1 | Status: SHIPPED | OUTPATIENT
Start: 2023-09-29

## 2023-09-29 RX ORDER — IBUPROFEN 600 MG/1
600 TABLET ORAL 4 TIMES DAILY PRN
Qty: 60 TABLET | Refills: 2 | Status: SHIPPED | OUTPATIENT
Start: 2023-09-29

## 2023-09-29 NOTE — TELEPHONE ENCOUNTER
Clarice Galeana called requesting a refill of the below medication which has been pended for you:     Requested Prescriptions     Pending Prescriptions Disp Refills    vitamin D (ERGOCALCIFEROL) 1.25 MG (46093 UT) CAPS capsule [Pharmacy Med Name: VITAMIN D2 1.25MG(50,000 UNIT)] 12 capsule 1     Sig: TAKE 1 CAPSULE BY MOUTH ONE TIME PER WEEK    ibuprofen (ADVIL;MOTRIN) 600 MG tablet [Pharmacy Med Name: IBUPROFEN 600 MG TABLET] 60 tablet 2     Sig: TAKE 1 TABLET BY MOUTH 4 TIMES DAILY AS NEEDED FOR PAIN       Last Appointment Date: 7/31/2023  Next Appointment Date: 10/31/2023    Allergies   Allergen Reactions    Mushroom Extract Complex

## 2023-10-02 RX ORDER — OMEPRAZOLE 40 MG/1
CAPSULE, DELAYED RELEASE ORAL DAILY
Qty: 90 CAPSULE | Refills: 3 | Status: SHIPPED | OUTPATIENT
Start: 2023-10-02

## 2023-11-01 NOTE — PROGRESS NOTES
51-year-old coming in today with various  [address] [facility]  [id number] [place] [address]  [address] [address]  Dept: [phone]  Dept Fax: [phone]: [phone]    Paola Thomas is a 43 y.o. female who presents today for her medical conditions/complaints as noted below. Paola Thomas is here for Nausea and Dizziness        HPI:   CC: Here today to discuss the followin-year-old coming in today with various complaints.  -She states she has episodes of flushing, lightheadedness, \"brain fog\" and general fatigue when she eats. She states the symptoms generally occur when she takes the first bite.  -These symptoms occur with all foods including carbohydrates proteins and fats.  -She also states she has had issues losing weight.  -She feels its not uncommon for her to gain 15 to 20 pounds in a matter of days and then lose it again.  -She frequently experiences swelling in her hands and ankles. She is also requested to be placed back on GLP-1 agonist.  She was previously on Ozempic but experienced increased nausea.      _______________________________________________________________              HPI    Subjective:      Review of Systems   Constitutional:  Positive for diaphoresis, fatigue and unexpected weight change. Negative for activity change, appetite change, chills and fever. HENT:  Negative for congestion, drooling, ear discharge, ear pain, hearing loss, mouth sores and nosebleeds. Eyes:  Negative for photophobia, pain, discharge and itching. Respiratory:  Negative for cough, chest tightness and shortness of breath. Cardiovascular:  Positive for leg swelling. Negative for chest pain and palpitations. Gastrointestinal:  Negative for abdominal pain, anal bleeding, constipation, diarrhea and nausea. Endocrine: Negative for cold intolerance, heat intolerance, polydipsia, polyphagia and polyuria. Genitourinary:  Negative for difficulty urinating. Musculoskeletal:  Positive for arthralgias, back pain and myalgias. Negative for gait problem and joint swelling. Psychiatric/Behavioral:  Positive for decreased concentration and sleep disturbance. Negative for agitation, behavioral problems, confusion, dysphoric mood, hallucinations, self-injury and suicidal ideas. The patient is nervous/anxious. The patient is not hyperactive. SeeI for visit specific review of symptoms. All others negative      Objective:   /82   Pulse 67   Temp 97 °F (36.1 °C)   Wt 292 lb (132.5 kg)   SpO2 100%   BMI 53.41 kg/m²   Physical Exam  Physical Exam   Constitutional: She appears well. Does not appear ill. Neck: Neck supple. No masses. Neck Symmetric. Normal tracheal position. No thyroid enlargement  Cardiovascular: Normal rate and regular rhythm. Exam reveals no friction rub. No murmur heard. Respiratory:  Effort normal and breath sounds normal. No respiratory distress. No wheezes. No rales. No use of accessory muscles or intercostal retractions. Neurological: alert. Psychiatric: normal mood and affect. Her behavior is normal.       No results found for this or any previous visit (from the past 672 hour(s)). Assessment & Plan: The following diagnoses and conditions are stable with no further orders unless indicated:  1. Essential (primary) hypertension  Current medications:  Amlodipine 5 mg twice daily  Carvedilol 12.5 mg twice daily  Losartan 100 mg daily    - carvedilol (COREG) 12.5 MG tablet; Take 1 tablet by mouth 2 times daily  Dispense: 180 tablet; Refill: 3    2. B12 deficiency given B12 injection today. We will recheck B12 level  -   - cyanocobalamin injection 1,000 mcg  - Vitamin B12; Future  - Vitamin B12; Future    3. Type 2 diabetes mellitus without complication, without long-term current use of insulin ([facility])  -   - Tirzepatide (MOUNJARO) 2.5 MG/0.5ML SOPN SC injection;  Inject 0.5 mLs into the skin once a week Dispense: 4 Adjustable Dose Pre-filled Pen Syringe; Refill: 5  - Hemoglobin A1C; Future    4. Reactive hypoglycemia  -Her symptoms sound consistent with reactive hypoglycemia. She has episodes of fatigue, diaphoresis, nausea which are resolved with eating carbohydrate meals.  - Glucose Tolerance, 3 Hrs; Future    5. Food allergy  -She is requesting referral to an allergist to see if she has any specific food allergies  - External Referral To Allergy    6. Other fatigue    - Estrone; Future  - T3, Free; Future  - T4, Free; Future  - T3, Reverse; Future  - Thyroid Peroxidase Antibody; Future  - Vitamin D 25 Hydroxy; Future  - Cortisol AM, Total; Future  - DHEA-Sulfate; Future  - Insulin, total; Future  - Estradiol; Future  - Progesterone; Future  - Testosterone Free and Total, Non-Male; Future  - TSH; Future    7. Nausea  As needed Zofran    8 depression with anxiety: BuSpar 5 mg twice daily as needed    9. Diabetes mellitus type 2: PCOS  -She had previously been on low-dose metformin to help manage her PCOS and diabetes. -She was previously on GLP-1 agonist as well. Lab Results   Component Value Date    LABA1C 6.3 (H) 01/27/2023    LABA1C 5.6 10/25/2022    LABA1C 5.6 05/12/2022     Lab Results   Component Value Date    LABMICR 3.70 10/05/2021    LDLCALC 94 10/05/2021    CREATININE 0.8 01/27/2023     -She had no improvement in her symptoms once discontinuing metformin.  -Concern for reactive hypoglycemia along with her PCOS  Suggested a 3-hour glucose tolerance test.  Check the above hormone levels as well. Once the test is available would recommend being placed back on GLP-1 agonist which may also assist in her weight loss.   Orders Placed This Encounter   Procedures    Glucose Tolerance, 3 Hrs     Standing Status:   Future     Standing Expiration Date:   3/13/2024    Estrone     Standing Status:   Future     Standing Expiration Date:   3/13/2024    T3, Free     Standing Status:   Future     Standing Expiration Date:   3/13/2024    T4, Free     Standing Status:   Future     Standing Expiration Date:   3/13/2024    T3, Reverse     Standing Status:   Future     Standing Expiration Date:   3/13/2024    Thyroid Peroxidase Antibody     Standing Status:   Future     Standing Expiration Date:   3/13/2024    Vitamin D 25 Hydroxy     Standing Status:   Future     Standing Expiration Date:   3/13/2024    Cortisol AM, Total     Standing Status:   Future     Standing Expiration Date:   3/13/2024    DHEA-Sulfate     Standing Status:   Future     Standing Expiration Date:   3/13/2024    Vitamin B12     Standing Status:   Future     Standing Expiration Date:   3/13/2024    Insulin, total     Standing Status:   Future     Standing Expiration Date:   3/13/2024    Hemoglobin A1C     Standing Status:   Future     Standing Expiration Date:   3/13/2024    Estradiol     Standing Status:   Future     Standing Expiration Date:   3/13/2024    Progesterone     Standing Status:   Future     Standing Expiration Date:   3/13/2024    Testosterone Free and Total, Non-Male     Standing Status:   Future     Standing Expiration Date:   3/13/2024    TSH     Standing Status:   Future     Standing Expiration Date:   3/13/2024    Vitamin B12     Standing Status:   Future     Standing Expiration Date:   9/13/2024    External Referral To Allergy     Referral Priority:   Routine     Referral Type:   Eval and Treat     Referral Reason:   Specialty Services Required     Requested Specialty:   ALLERGY AND IMMUNOLOGY ALLERGY     Number of Visits Requested:   1         Return in about 2 months (around 5/13/2023). Discussed use, benefit, and side effects of prescribed medications. All patient questions answered. Pt voiced understanding. Reviewed health maintenance. Instructedto continue current medications, diet and exercise. Patient agreed with treatmentplan. Follow up as directed. Never _______________________________________________________________  40-minute visit today. -22 minutes spent face-to-face with the patient.  -An additional 18 minutes was spent placing her orders, reviewing her previous past medical history, records imaging and laboratory studies  Completing the note    Past Medical History:   Diagnosis Date    Anemia     Chronic rheumatic arthritis (HCC)     Elevated blood pressure reading     Fibromyalgia     GERD (gastroesophageal reflux disease)     Hypertension     Intracranial hypertension     Obesity     PCOS (polycystic ovarian syndrome)     Sleep apnea     bi pap    SOB (shortness of breath)     Trouble swallowing     Type 2 diabetes mellitus ([address]       Past Surgical History:   Procedure Laterality Date    BREAST BIOPSY Right 2019    benign    CHOLECYSTECTOMY      UPPER GASTROINTESTINAL ENDOSCOPY N/A 2022    Dr Jair [id number] appearing stricture noted in the distal esophagus, gastritis, no h pylori    UPPER GASTROINTESTINAL ENDOSCOPY  2022    Dr Jair [id number] appearing stricture noted in the distal esophagus, gastritis, no h pylori       Family History   Problem Relation Age of Onset    High Blood Pressure Father     Diabetes Father     Atrial Fibrillation Father     Heart Failure Father     Stroke Father     Diabetes Sister     Diabetes Maternal Grandmother     Heart Attack Other     Ovarian Cancer Paternal Grandmother     Colon Polyps Neg Hx     Crohn's Disease Neg Hx     Esophageal Cancer Neg Hx     Liver Cancer Neg Hx     Rectal Cancer Neg Hx     Stomach Cancer Neg Hx        Social History     Tobacco Use    Smoking status: Former     Years: 2.00     Types: Cigarettes     Start date: 1996     Quit date: 1998     Years since quittin.2    Smokeless tobacco: Never   Substance Use Topics    Alcohol use:  Yes     Alcohol/week: 1.0 standard drink     Types: 1 Glasses of wine per week     Comment: rarely     Current Outpatient Medications   Medication Sig Dispense Refill    carvedilol (COREG) 12.5 MG tablet Take 1 tablet by mouth 2 times daily 180 tablet 3    Tirzepatide (MOUNJARO) 2.5 MG/0.5ML SOPN SC injection Inject 0.5 mLs into the skin once a week 4 Adjustable Dose Pre-filled Pen Syringe 5    ondansetron (ZOFRAN-ODT) 4 MG disintegrating tablet Take 1 tablet by mouth 3 times daily as needed for Nausea or Vomiting 21 tablet 0    ibuprofen (ADVIL;MOTRIN) 600 MG tablet Take 1 tablet by mouth 4 times daily as needed for Pain 60 tablet 2    tiZANidine (ZANAFLEX) 4 MG tablet Take 1 tablet by mouth 3 times daily as needed (muscle spasm) 30 tablet 0    meloxicam (MOBIC) 15 MG tablet Take 1 tablet by mouth daily as needed for Pain 30 tablet 5    busPIRone (BUSPAR) 5 MG tablet TAKE 1 TABLET BY MOUTH 2 TIMES DAILY AS NEEDED (ANXIETY). 180 tablet 2    mupirocin (BACTROBAN) 2 % ointment APPLY TO AFFECTED AREA 3 TIMES A DAY 22 g 2    metFORMIN (GLUCOPHAGE) 500 MG tablet Take 1 tablet by mouth daily 30 tablet 5    ondansetron (ZOFRAN ODT) 4 MG disintegrating tablet Take 1 tablet by mouth every 8 hours as needed for Nausea or Vomiting 15 tablet 0    amLODIPine (NORVASC) 5 MG tablet TAKE 1 TABLET BY MOUTH TWICE A  tablet 3    losartan (COZAAR) 100 MG tablet TAKE 1 TABLET BY MOUTH EVERY DAY 90 tablet 3    omeprazole (PRILOSEC) 40 MG delayed release capsule Take 1 capsule by mouth daily 90 capsule 3    blood glucose monitor strips 1 strip by Other route in the morning.  30 strip 11    ONETOUCH ULTRA strip 1 EACH BY IN VITRO ROUTE 3 TIMES DAILY AS NEEDED. 100 strip 11    predniSONE (DELTASONE) 5 MG tablet 5 mg daily as needed      albuterol sulfate HFA (VENTOLIN HFA) 108 (90 Base) MCG/ACT inhaler Inhale 2 puffs into the lungs 4 times daily as needed for Wheezing 18 g 5    Blood Glucose Monitoring Suppl (FREESTYLE LITE) ANDREEA USE AS DIRECTED      Lancet Device MISC 1 lancet twice a day 60 Device 11    Drospirenone (SLYND PO) Take by mouth (Patient not taking: Reported on 3/13/2023)      Misc. Devices MISC Glucometer ICD 10: E11.9 1 each 0    Lancet Device MISC 1 lancet per strip. 30 each 11     No current facility-administered medications for this visit.     Allergies   Allergen Reactions    Mushroom Extract Complex        Health Maintenance   Topic Date Due    Varicella vaccine (1 of 2 - 2-dose childhood series) Never done    Pneumococcal 0-64 years Vaccine (1 - PCV) Never done    Hepatitis B vaccine (2 of 3 - Hep B Twinrix risk 3-dose series) 01/27/2014    Diabetic retinal exam  12/10/2019    Diabetic foot exam  06/26/2020    COVID-19 Vaccine (3 - Booster for Pfizer series) 12/07/2021    Flu vaccine (1) 08/01/2022    Diabetic Alb to Cr ratio (uACR) test  10/05/2022    Lipids  10/05/2022    A1C test (Diabetic or Prediabetic)  01/27/2024    GFR test (Diabetes, CKD 3-4, OR last GFR 15-59)  01/27/2024    Depression Monitoring  01/30/2024    Cervical cancer screen  12/22/2025    DTaP/Tdap/Td vaccine (2 - Td or Tdap) 04/25/2027    Hepatitis C screen  Completed    HIV screen  Completed    Hepatitis A vaccine  Aged Out    Hib vaccine  Aged Out    Meningococcal (ACWY) vaccine  Aged Out       _______________________________________________________________    Note dictated using Dragon Dictation software  Sometimes this dictation software makes erroneous transcriptions.

## 2023-12-04 ENCOUNTER — OFFICE VISIT (OUTPATIENT)
Dept: PRIMARY CARE CLINIC | Age: 43
End: 2023-12-04
Payer: COMMERCIAL

## 2023-12-04 ENCOUNTER — PATIENT MESSAGE (OUTPATIENT)
Dept: PRIMARY CARE CLINIC | Age: 43
End: 2023-12-04

## 2023-12-04 VITALS
WEIGHT: 293 LBS | HEART RATE: 73 BPM | TEMPERATURE: 98.1 F | OXYGEN SATURATION: 100 % | BODY MASS INDEX: 53.96 KG/M2 | SYSTOLIC BLOOD PRESSURE: 120 MMHG | DIASTOLIC BLOOD PRESSURE: 80 MMHG

## 2023-12-04 DIAGNOSIS — E11.9 TYPE 2 DIABETES MELLITUS WITHOUT COMPLICATION, WITHOUT LONG-TERM CURRENT USE OF INSULIN (HCC): ICD-10-CM

## 2023-12-04 DIAGNOSIS — K21.9 GASTROESOPHAGEAL REFLUX DISEASE WITHOUT ESOPHAGITIS: ICD-10-CM

## 2023-12-04 DIAGNOSIS — I10 ESSENTIAL (PRIMARY) HYPERTENSION: Primary | ICD-10-CM

## 2023-12-04 DIAGNOSIS — F41.8 DEPRESSION WITH ANXIETY: ICD-10-CM

## 2023-12-04 PROCEDURE — 3044F HG A1C LEVEL LT 7.0%: CPT | Performed by: FAMILY MEDICINE

## 2023-12-04 PROCEDURE — 3074F SYST BP LT 130 MM HG: CPT | Performed by: FAMILY MEDICINE

## 2023-12-04 PROCEDURE — 3078F DIAST BP <80 MM HG: CPT | Performed by: FAMILY MEDICINE

## 2023-12-04 PROCEDURE — 99214 OFFICE O/P EST MOD 30 MIN: CPT | Performed by: FAMILY MEDICINE

## 2023-12-04 ASSESSMENT — ENCOUNTER SYMPTOMS
BACK PAIN: 1
NAUSEA: 0
DIARRHEA: 0
SHORTNESS OF BREATH: 0
CHEST TIGHTNESS: 0
ABDOMINAL PAIN: 0
CONSTIPATION: 0
ANAL BLEEDING: 0
COUGH: 0

## 2023-12-04 NOTE — PROGRESS NOTES
Base) MCG/ACT inhaler INHALE 2 PUFFS INTO THE LUNGS 4 TIMES DAILY AS NEEDED FOR WHEEZING. 8.5 each 5    ondansetron (ZOFRAN-ODT) 4 MG disintegrating tablet Take 1 tablet by mouth 3 times daily as needed for Nausea or Vomiting 21 tablet 0    meloxicam (MOBIC) 15 MG tablet Take 1 tablet by mouth daily as needed for Pain 30 tablet 5    busPIRone (BUSPAR) 5 MG tablet TAKE 1 TABLET BY MOUTH 2 TIMES DAILY AS NEEDED (ANXIETY). 180 tablet 2    mupirocin (BACTROBAN) 2 % ointment APPLY TO AFFECTED AREA 3 TIMES A DAY 22 g 2    blood glucose monitor strips 1 strip by Other route in the morning. 30 strip 11    Lancet Device MISC 1 lancet per strip. 30 each 11    ONETOUCH ULTRA strip 1 EACH BY IN VITRO ROUTE 3 TIMES DAILY AS NEEDED. 100 strip 11    predniSONE (DELTASONE) 5 MG tablet 1 tablet daily as needed      Blood Glucose Monitoring Suppl (FREESTYLE LITE) ANDREEA USE AS DIRECTED      Tirzepatide (MOUNJARO) 10 MG/0.5ML SOPN SC injection Inject 0.5 mLs into the skin once a week 4 Adjustable Dose Pre-filled Pen Syringe 5    ondansetron (ZOFRAN ODT) 4 MG disintegrating tablet Take 1 tablet by mouth every 8 hours as needed for Nausea or Vomiting 15 tablet 0    Misc. Devices MISC Glucometer ICD 10: E11.9 1 each 0    Lancet Device MISC 1 lancet twice a day 60 Device 11     No current facility-administered medications for this visit. Return in about 3 months (around 3/4/2024) for Follow Up - 20 minutes. Discussed use, benefit, and side effects of prescribed medications.          History, Medications, Allergies     Allergies   Allergen Reactions    Mushroom Extract Complex      _______________________________________________________________  Past Medical History:   Diagnosis Date    Anemia     Chronic rheumatic arthritis (HCC)     Elevated blood pressure reading     Fibromyalgia     GERD (gastroesophageal reflux disease)     Hypertension     Intracranial hypertension     Obesity     PCOS (polycystic ovarian syndrome)     Sleep

## 2023-12-05 ENCOUNTER — OFFICE VISIT (OUTPATIENT)
Dept: GASTROENTEROLOGY | Age: 43
End: 2023-12-05
Payer: COMMERCIAL

## 2023-12-05 VITALS
BODY MASS INDEX: 53.92 KG/M2 | WEIGHT: 293 LBS | SYSTOLIC BLOOD PRESSURE: 122 MMHG | HEIGHT: 62 IN | HEART RATE: 81 BPM | DIASTOLIC BLOOD PRESSURE: 80 MMHG | OXYGEN SATURATION: 98 %

## 2023-12-05 DIAGNOSIS — K21.9 CHRONIC GERD: ICD-10-CM

## 2023-12-05 DIAGNOSIS — K58.1 IRRITABLE BOWEL SYNDROME WITH CONSTIPATION: Primary | ICD-10-CM

## 2023-12-05 PROCEDURE — 3078F DIAST BP <80 MM HG: CPT | Performed by: NURSE PRACTITIONER

## 2023-12-05 PROCEDURE — 99213 OFFICE O/P EST LOW 20 MIN: CPT | Performed by: NURSE PRACTITIONER

## 2023-12-05 PROCEDURE — 3074F SYST BP LT 130 MM HG: CPT | Performed by: NURSE PRACTITIONER

## 2023-12-05 RX ORDER — TIRZEPATIDE 10 MG/.5ML
10 INJECTION, SOLUTION SUBCUTANEOUS WEEKLY
Qty: 4 ADJUSTABLE DOSE PRE-FILLED PEN SYRINGE | Refills: 5 | Status: SHIPPED | OUTPATIENT
Start: 2023-12-05

## 2023-12-05 NOTE — TELEPHONE ENCOUNTER
From: Janey Jackson  Sent: 12/5/2023 1:33 PM CST  To: [facility]  Practice Staff  Subject: [employer]     Well I was wrong it was a 7.5 can Dr Rachna Montes write a paper script or send the 10 over to [facility]?

## 2023-12-05 NOTE — PROGRESS NOTES
10 MG/0.5ML SOPN SC injection Inject 0.5 mLs into the skin once a week 4 Adjustable Dose Pre-filled Pen Syringe 5    omeprazole (PRILOSEC) 40 MG delayed release capsule TAKE 1 CAPSULE BY MOUTH EVERY DAY 90 capsule 3    vitamin D (ERGOCALCIFEROL) 1.25 MG (18439 UT) CAPS capsule TAKE 1 CAPSULE BY MOUTH ONE TIME PER WEEK 12 capsule 1    ibuprofen (ADVIL;MOTRIN) 600 MG tablet TAKE 1 TABLET BY MOUTH 4 TIMES DAILY AS NEEDED FOR PAIN 60 tablet 2    losartan (COZAAR) 100 MG tablet TAKE 1 TABLET BY MOUTH EVERY DAY 90 tablet 3    carvedilol (COREG) 12.5 MG tablet Take 1 tablet by mouth 2 times daily 180 tablet 3    linaclotide (LINZESS) 145 MCG capsule Take 1 capsule by mouth every morning (before breakfast) 30 capsule 5    ondansetron (ZOFRAN ODT) 4 MG disintegrating tablet Take 1 tablet by mouth every 8 hours as needed for Nausea or Vomiting 15 tablet 0    amLODIPine (NORVASC) 5 MG tablet Take 1 tablet by mouth daily TAKE 1 TABLET BY MOUTH TWICE A DAY (Patient taking differently: Take 1 tablet by mouth daily) 90 tablet 3    tiZANidine (ZANAFLEX) 4 MG tablet TAKE 1 TABLET BY MOUTH 3 TIMES DAILY AS NEEDED (MUSCLE SPASM). 30 tablet 0    hydroCHLOROthiazide (MICROZIDE) 12.5 MG capsule Take 1 capsule by mouth daily      PROAIR  (90 Base) MCG/ACT inhaler INHALE 2 PUFFS INTO THE LUNGS 4 TIMES DAILY AS NEEDED FOR WHEEZING. 8.5 each 5    ondansetron (ZOFRAN-ODT) 4 MG disintegrating tablet Take 1 tablet by mouth 3 times daily as needed for Nausea or Vomiting 21 tablet 0    meloxicam (MOBIC) 15 MG tablet Take 1 tablet by mouth daily as needed for Pain 30 tablet 5    busPIRone (BUSPAR) 5 MG tablet TAKE 1 TABLET BY MOUTH 2 TIMES DAILY AS NEEDED (ANXIETY). 180 tablet 2    mupirocin (BACTROBAN) 2 % ointment APPLY TO AFFECTED AREA 3 TIMES A DAY 22 g 2    Misc. Devices MISC Glucometer ICD 10: E11.9 1 each 0    blood glucose monitor strips 1 strip by Other route in the morning. 30 strip 11    Lancet Device MISC 1 lancet per strip.  27

## 2023-12-07 ASSESSMENT — ENCOUNTER SYMPTOMS
NAUSEA: 0
ABDOMINAL DISTENTION: 0
ANAL BLEEDING: 0
TROUBLE SWALLOWING: 0
ABDOMINAL PAIN: 0
COUGH: 0
CHOKING: 0
RECTAL PAIN: 0
BLOOD IN STOOL: 0
DIARRHEA: 0
SHORTNESS OF BREATH: 0
CONSTIPATION: 1
VOMITING: 0

## 2023-12-28 ENCOUNTER — TELEPHONE (OUTPATIENT)
Dept: HEMATOLOGY | Age: 43
End: 2023-12-28

## 2023-12-28 DIAGNOSIS — I10 PRIMARY HYPERTENSION: ICD-10-CM

## 2023-12-28 DIAGNOSIS — D47.2 MGUS (MONOCLONAL GAMMOPATHY OF UNKNOWN SIGNIFICANCE): Primary | ICD-10-CM

## 2023-12-28 DIAGNOSIS — D50.0 IRON DEFICIENCY ANEMIA DUE TO CHRONIC BLOOD LOSS: ICD-10-CM

## 2023-12-28 NOTE — TELEPHONE ENCOUNTER
Called patient to remind of appointment on 12/29/23 and was unable to leave a message or talk to patient due the phone kept ringing and no one ever answered or no option to leave voicemail.

## 2023-12-28 NOTE — PROGRESS NOTES
Progress Note      Pt Name: Yue Richardson  YOB: 1980  MRN: [id number]    Date of evaluation: 12/29/2023  History Obtained From:  patient, electronic medical record    CHIEF COMPLAINT:    Chief Complaint   Patient presents with    Follow-up     MGUS (monoclonal gammopathy of unknown significance)     HISTORY OF PRESENT ILLNESS:    Yue Richardson is a 43 y.o.  female who is followed for iron deficiency anemia due to chronic blood loss (menorrhagia) with intolerance to oral iron replacement and monoclonal gammopathy of unknown significance (MGUS) with an IgG lambda light chain.  She has remained without myeloma defining event and continues with routine lab monitoring.  Yue returns today in follow-up for review of lab results and further treatment recommendations.     Today's clinic visit to include physical assessment, review of systems, any lab or radiographic findings that were available and plan of care are documented below.    HEMATOLOGIC HISTORY:  Diagnosis:  Iron deficiency anemia due to chronic blood loss (menorrhagia)  Monoclonal gammopathy of unknown significance (MGUS) with an IgG lambda light chain, M spike of 0.81, elevated kappa and lambda light chains    Treatment summary:  September 2020-Injectafer  Intolerant to oral iron replacement due to GI upset    Yue Richardson was seen in hematology consultation 9/4/2020 by NATACHA Caruso, referred by Dr. Willie Cordon for evaluation of severe anemia.     The following information in italics was obtained from last progress note by NATACHA Caruso from 12/4/2020:  PMH is significant for rheumatoid arthritis, fibromyalgia, type 2 diabetes, SETH with CPAP use.  Yue had been off medications for rheumatoid arthritis since May, 2020.  She was taken off leflunomide due to anemia.  She stopped sulfasalazine due to rash.  She is now taking Humira.     Review of CBCs:  CBC 4/15/2020: WBC 6.7, Hgb 11.1/MCV 74, platelets

## 2023-12-29 ENCOUNTER — OFFICE VISIT (OUTPATIENT)
Dept: HEMATOLOGY | Age: 43
End: 2023-12-29
Payer: COMMERCIAL

## 2023-12-29 ENCOUNTER — HOSPITAL ENCOUNTER (OUTPATIENT)
Dept: INFUSION THERAPY | Age: 43
Discharge: HOME | End: 2023-12-29
Payer: COMMERCIAL

## 2023-12-29 VITALS
HEART RATE: 72 BPM | WEIGHT: 286 LBS | DIASTOLIC BLOOD PRESSURE: 82 MMHG | HEIGHT: 62 IN | SYSTOLIC BLOOD PRESSURE: 122 MMHG | OXYGEN SATURATION: 98 % | TEMPERATURE: 98.8 F | BODY MASS INDEX: 52.63 KG/M2

## 2023-12-29 DIAGNOSIS — D50.0 IRON DEFICIENCY ANEMIA DUE TO CHRONIC BLOOD LOSS: ICD-10-CM

## 2023-12-29 DIAGNOSIS — I10 PRIMARY HYPERTENSION: ICD-10-CM

## 2023-12-29 DIAGNOSIS — D47.2 MGUS (MONOCLONAL GAMMOPATHY OF UNKNOWN SIGNIFICANCE): ICD-10-CM

## 2023-12-29 DIAGNOSIS — N92.0 MENORRHAGIA WITH REGULAR CYCLE: ICD-10-CM

## 2023-12-29 DIAGNOSIS — D47.2 MGUS (MONOCLONAL GAMMOPATHY OF UNKNOWN SIGNIFICANCE): Primary | ICD-10-CM

## 2023-12-29 LAB
ALBUMIN SERPL-MCNC: 3.8 G/DL (ref 3.5–5.2)
ALP SERPL-CCNC: 70 U/L (ref 35–104)
ALT SERPL-CCNC: 18 U/L (ref 9–52)
ANION GAP SERPL CALCULATED.3IONS-SCNC: 9 MMOL/L (ref 7–19)
AST SERPL-CCNC: 23 U/L (ref 14–36)
BASOPHILS # BLD: 0.06 K/UL (ref 0.01–0.08)
BASOPHILS NFR BLD: 0.9 % (ref 0.1–1.2)
BILIRUB SERPL-MCNC: 0.5 MG/DL (ref 0.2–1.3)
BUN SERPL-MCNC: 15 MG/DL (ref 7–17)
CALCIUM SERPL-MCNC: 8.6 MG/DL (ref 8.4–10.2)
CHLORIDE SERPL-SCNC: 101 MMOL/L (ref 98–111)
CO2 SERPL-SCNC: 28 MMOL/L (ref 22–29)
CREAT SERPL-MCNC: 0.9 MG/DL (ref 0.5–1)
EOSINOPHIL # BLD: 0.42 K/UL (ref 0.04–0.54)
EOSINOPHIL NFR BLD: 6.6 % (ref 0.7–7)
ERYTHROCYTE [DISTWIDTH] IN BLOOD BY AUTOMATED COUNT: 14.6 % (ref 11.7–14.4)
FERRITIN SERPL-MCNC: 97.6 NG/ML (ref 13–150)
GLOBULIN: 3.7 G/DL
GLUCOSE SERPL-MCNC: 104 MG/DL (ref 74–106)
HCT VFR BLD AUTO: 34.6 % (ref 34.1–44.9)
HGB BLD-MCNC: 11.1 G/DL (ref 11.2–15.7)
IRON SATN MFR SERPL: 18 % (ref 14–50)
IRON SERPL-MCNC: 57 UG/DL (ref 37–145)
LYMPHOCYTES # BLD: 2.17 K/UL (ref 1.18–3.74)
LYMPHOCYTES NFR BLD: 34.2 % (ref 19.3–53.1)
MCH RBC QN AUTO: 25.7 PG (ref 25.6–32.2)
MCHC RBC AUTO-ENTMCNC: 32.1 G/DL (ref 32.3–35.5)
MCV RBC AUTO: 80.1 FL (ref 79.4–94.8)
MONOCYTES # BLD: 0.42 K/UL (ref 0.24–0.82)
MONOCYTES NFR BLD: 6.6 % (ref 4.7–12.5)
NEUTROPHILS # BLD: 3.25 K/UL (ref 1.56–6.13)
NEUTS SEG NFR BLD: 51.2 % (ref 34–71.1)
PLATELET # BLD AUTO: 270 K/UL (ref 182–369)
PMV BLD AUTO: 10.4 FL (ref 7.4–10.4)
POTASSIUM SERPL-SCNC: 3.5 MMOL/L (ref 3.5–5.1)
PROT SERPL-MCNC: 7.5 G/DL (ref 6.3–8.2)
RBC # BLD AUTO: 4.32 M/UL (ref 3.93–5.22)
SODIUM SERPL-SCNC: 138 MMOL/L (ref 137–145)
TIBC SERPL-MCNC: 314 UG/DL (ref 250–400)
WBC # BLD AUTO: 6.35 K/UL (ref 3.98–10.04)

## 2023-12-29 PROCEDURE — 85025 COMPLETE CBC W/AUTO DIFF WBC: CPT

## 2023-12-29 PROCEDURE — 99214 OFFICE O/P EST MOD 30 MIN: CPT | Performed by: NURSE PRACTITIONER

## 2023-12-29 PROCEDURE — 80053 COMPREHEN METABOLIC PANEL: CPT

## 2023-12-29 PROCEDURE — 99212 OFFICE O/P EST SF 10 MIN: CPT

## 2023-12-29 PROCEDURE — 36415 COLL VENOUS BLD VENIPUNCTURE: CPT

## 2023-12-29 PROCEDURE — 3079F DIAST BP 80-89 MM HG: CPT | Performed by: NURSE PRACTITIONER

## 2023-12-29 PROCEDURE — 3074F SYST BP LT 130 MM HG: CPT | Performed by: NURSE PRACTITIONER

## 2023-12-29 RX ORDER — MELOXICAM 7.5 MG/1
7.5 TABLET ORAL DAILY
COMMUNITY
Start: 2023-12-01

## 2023-12-29 RX ORDER — TIZANIDINE 2 MG/1
TABLET ORAL
COMMUNITY
Start: 2023-12-01 | End: 2024-01-06

## 2023-12-31 LAB — B2 MICROGLOB SERPL-MCNC: 2.1 MG/L (ref 0.8–2.4)

## 2024-01-01 LAB
ALBUMIN SERPL-MCNC: 3.31 G/DL (ref 3.75–5.01)
ALPHA1 GLOB SERPL ELPH-MCNC: 0.31 G/DL (ref 0.19–0.46)
ALPHA2 GLOB SERPL ELPH-MCNC: 0.82 G/DL (ref 0.48–1.05)
B-GLOBULIN SERPL ELPH-MCNC: 0.82 G/DL (ref 0.48–1.1)
DEPRECATED KAPPA LC FREE/LAMBDA SER: 1.87 {RATIO} (ref 0.26–1.65)
EER MONOCLONAL PROTEIN AND FLC, SERUM: ABNORMAL
GAMMA GLOB SERPL ELPH-MCNC: 1.54 G/DL (ref 0.62–1.51)
IGA SERPL-MCNC: 124 MG/DL (ref 68–408)
IGG SERPL-MCNC: 1175 MG/DL (ref 768–1632)
IGM SERPL-MCNC: 63 MG/DL (ref 35–263)
INTERPRETATION SERPL IFE-IMP: ABNORMAL
INTERPRETATION SERPL IFE-IMP: ABNORMAL
KAPPA LC FREE SER-MCNC: 25.88 MG/L (ref 3.3–19.4)
LAMBDA LC FREE SERPL-MCNC: 13.82 MG/L (ref 5.71–26.3)
MONOCLONAL PROTEIN, SERUM: 0.56 G/DL
PROT SERPL-MCNC: 6.8 G/DL (ref 6.3–8.2)

## 2024-01-06 ASSESSMENT — ENCOUNTER SYMPTOMS
EYE DISCHARGE: 0
DIARRHEA: 0
SHORTNESS OF BREATH: 0
WHEEZING: 0
COUGH: 0
BACK PAIN: 0
RESPIRATORY NEGATIVE: 1
BLOOD IN STOOL: 0
GASTROINTESTINAL NEGATIVE: 1
EYE REDNESS: 0
NAUSEA: 0
ABDOMINAL PAIN: 0
VOMITING: 0
CONSTIPATION: 0
EYE PAIN: 0
SORE THROAT: 0
EYES NEGATIVE: 1

## 2024-01-12 ENCOUNTER — TELEPHONE (OUTPATIENT)
Dept: PRIMARY CARE CLINIC | Age: 44
End: 2024-01-12

## 2024-01-12 ENCOUNTER — OFFICE VISIT (OUTPATIENT)
Dept: PRIMARY CARE CLINIC | Age: 44
End: 2024-01-12
Payer: COMMERCIAL

## 2024-01-12 ENCOUNTER — HOSPITAL ENCOUNTER (OUTPATIENT)
Dept: GENERAL RADIOLOGY | Age: 44
Discharge: HOME | End: 2024-01-12
Payer: COMMERCIAL

## 2024-01-12 VITALS
OXYGEN SATURATION: 100 % | WEIGHT: 286 LBS | HEART RATE: 74 BPM | DIASTOLIC BLOOD PRESSURE: 84 MMHG | BODY MASS INDEX: 52.31 KG/M2 | TEMPERATURE: 98.1 F | SYSTOLIC BLOOD PRESSURE: 122 MMHG

## 2024-01-12 DIAGNOSIS — M25.562 ACUTE PAIN OF LEFT KNEE: Primary | ICD-10-CM

## 2024-01-12 DIAGNOSIS — R53.83 OTHER FATIGUE: ICD-10-CM

## 2024-01-12 DIAGNOSIS — R91.1 PULMONARY NODULE: ICD-10-CM

## 2024-01-12 DIAGNOSIS — E11.9 TYPE 2 DIABETES MELLITUS WITHOUT COMPLICATION, WITHOUT LONG-TERM CURRENT USE OF INSULIN (HCC): ICD-10-CM

## 2024-01-12 DIAGNOSIS — M25.562 ACUTE PAIN OF LEFT KNEE: ICD-10-CM

## 2024-01-12 LAB
ALBUMIN SERPL-MCNC: 4 G/DL (ref 3.5–5.2)
ALP SERPL-CCNC: 64 U/L (ref 35–104)
ALT SERPL-CCNC: 11 U/L (ref 5–33)
ANION GAP SERPL CALCULATED.3IONS-SCNC: 9 MMOL/L (ref 7–19)
AST SERPL-CCNC: 12 U/L (ref 5–32)
BASOPHILS # BLD: 0.1 K/UL (ref 0–0.2)
BASOPHILS NFR BLD: 0.8 % (ref 0–1)
BILIRUB SERPL-MCNC: <0.2 MG/DL (ref 0.2–1.2)
BUN SERPL-MCNC: 14 MG/DL (ref 6–20)
CALCIUM SERPL-MCNC: 9.3 MG/DL (ref 8.6–10)
CHLORIDE SERPL-SCNC: 103 MMOL/L (ref 98–111)
CO2 SERPL-SCNC: 29 MMOL/L (ref 22–29)
CREAT SERPL-MCNC: 0.8 MG/DL (ref 0.5–0.9)
EOSINOPHIL # BLD: 0.4 K/UL (ref 0–0.6)
EOSINOPHIL NFR BLD: 5 % (ref 0–5)
ERYTHROCYTE [DISTWIDTH] IN BLOOD BY AUTOMATED COUNT: 15.2 % (ref 11.5–14.5)
GLUCOSE SERPL-MCNC: 86 MG/DL (ref 74–109)
HBA1C MFR BLD: 5.5 % (ref 4–6)
HCT VFR BLD AUTO: 37.1 % (ref 37–47)
HGB BLD-MCNC: 11.7 G/DL (ref 12–16)
IMM GRANULOCYTES # BLD: 0 K/UL
LYMPHOCYTES # BLD: 2.4 K/UL (ref 1.1–4.5)
LYMPHOCYTES NFR BLD: 33 % (ref 20–40)
MCH RBC QN AUTO: 26.6 PG (ref 27–31)
MCHC RBC AUTO-ENTMCNC: 31.5 G/DL (ref 33–37)
MCV RBC AUTO: 84.3 FL (ref 81–99)
MONOCYTES # BLD: 0.5 K/UL (ref 0–0.9)
MONOCYTES NFR BLD: 6.9 % (ref 0–10)
NEUTROPHILS # BLD: 3.9 K/UL (ref 1.5–7.5)
NEUTS SEG NFR BLD: 53.9 % (ref 50–65)
PLATELET # BLD AUTO: 271 K/UL (ref 130–400)
PMV BLD AUTO: 10.8 FL (ref 9.4–12.3)
POTASSIUM SERPL-SCNC: 3.7 MMOL/L (ref 3.5–5)
PROT SERPL-MCNC: 7.6 G/DL (ref 6.6–8.7)
RBC # BLD AUTO: 4.4 M/UL (ref 4.2–5.4)
SODIUM SERPL-SCNC: 141 MMOL/L (ref 136–145)
T4 FREE SERPL-MCNC: 1.17 NG/DL (ref 0.93–1.7)
TSH SERPL DL<=0.005 MIU/L-ACNC: 1.36 UIU/ML (ref 0.27–4.2)
WBC # BLD AUTO: 7.3 K/UL (ref 4.8–10.8)

## 2024-01-12 PROCEDURE — 3079F DIAST BP 80-89 MM HG: CPT | Performed by: FAMILY MEDICINE

## 2024-01-12 PROCEDURE — 3074F SYST BP LT 130 MM HG: CPT | Performed by: FAMILY MEDICINE

## 2024-01-12 PROCEDURE — 73564 X-RAY EXAM KNEE 4 OR MORE: CPT

## 2024-01-12 PROCEDURE — 3044F HG A1C LEVEL LT 7.0%: CPT | Performed by: FAMILY MEDICINE

## 2024-01-12 PROCEDURE — 99214 OFFICE O/P EST MOD 30 MIN: CPT | Performed by: FAMILY MEDICINE

## 2024-01-12 RX ORDER — PREDNISONE 20 MG/1
20 TABLET ORAL DAILY
Qty: 15 TABLET | Refills: 0 | Status: SHIPPED | OUTPATIENT
Start: 2024-01-12

## 2024-01-12 RX ORDER — FLUTICASONE PROPIONATE 50 MCG
1 SPRAY, SUSPENSION (ML) NASAL DAILY
Qty: 16 G | Refills: 2 | Status: SHIPPED | OUTPATIENT
Start: 2024-01-12

## 2024-01-12 ASSESSMENT — ENCOUNTER SYMPTOMS
NAUSEA: 0
SHORTNESS OF BREATH: 0
CHEST TIGHTNESS: 0
DIARRHEA: 0
CONSTIPATION: 0
BACK PAIN: 1
ANAL BLEEDING: 0
COUGH: 0
ABDOMINAL PAIN: 0

## 2024-01-12 NOTE — PATIENT INSTRUCTIONS
Left knee pain:  Possible Baker's cyst.  X-ray ordered today.  Results may not be back until tomorrow.  Check [place] [facility].  Take prednisone 60 mg once a day for 5 days.    RICE  Rest:   Try to avoid strain or pressure on the painful joint.   Ice:  Place a cold compress or ice pack on the joint for 15-20 minutes three times a day  Compression:  Wear compression bandage on joint to relieve and prevent swelling.   Elevation:  Keep elevated as much as possible until the joint heals.    Go to lab today  Will send the results through [facility]    Start Flonase 1 spray each nostril once a day to see if that helps with your sinuses and ear pressure

## 2024-01-12 NOTE — TELEPHONE ENCOUNTER
During her visit today I forgot to tell her she needs a repeat CT scan of her chest.  January 2023 there was a 5 mm pulmonary nodule.  She needs another CT scan to monitor stability.  I have placed the order

## 2024-01-12 NOTE — PROGRESS NOTES
[facility]  [address]  [address]  [place] [address]  Dept: [phone]  Dept Fax: [phone]  Loc: [phone]    Yue Richardson is a 43 y.o. female who presents today for her medical conditions/complaints as noted below.  Yue Richardson is here for Knee Pain        Subjective:   CC:  Here today to discuss the following: Left knee pain and head pressure    Left knee pain for the past 2 weeks.  -Pain described as a dull ache.  She feels it is swollen.  Feels tight.  -No injury or trauma.  -She states she drove to [place] and back during the holidays and feels that may have contributed.  -Pain starts the posterior left knee and radiates down to her foot.  She is not having any lower back or hip discomfort.  -She notices it is worse with weightbearing walking bending and twisting.  -Relieved with massaging it, Biofreeze and heating pad.  -She does feel occasional numbness as well.  -No fever or chills.    She is also experiencing \"brain fog\".  Denies any headache, lightheadedness or dizziness.  No blurry vision.  No nausea or vomiting.  No fever or chills.  She states this is been a chronic issue.  She notices this has been most common with her fibromyalgia flareups.          Review of Systems   Constitutional:  Positive for fatigue. Negative for chills and fever.   HENT:  Negative for congestion.    Respiratory:  Negative for cough, chest tightness and shortness of breath.    Cardiovascular:  Negative for chest pain, palpitations and leg swelling.   Gastrointestinal:  Negative for abdominal pain, anal bleeding, constipation, diarrhea and nausea.   Genitourinary:  Negative for difficulty urinating.   Musculoskeletal:  Positive for arthralgias, back pain, myalgias, neck pain and neck stiffness. Negative for gait problem and joint swelling.   Psychiatric/Behavioral: Negative.       Objective:   /84   Pulse 74   Temp 98.1 °F (36.7 °C)   Wt 129.7 kg (286 lb)   SpO2

## 2024-01-29 RX ORDER — HYDROCHLOROTHIAZIDE 25 MG/1
TABLET ORAL
Qty: 90 TABLET | Refills: 1 | OUTPATIENT
Start: 2024-01-29

## 2024-02-26 ENCOUNTER — HOSPITAL ENCOUNTER (OUTPATIENT)
Dept: CT IMAGING | Age: 44
Discharge: HOME | End: 2024-02-26
Payer: COMMERCIAL

## 2024-02-26 DIAGNOSIS — R91.1 PULMONARY NODULE: ICD-10-CM

## 2024-02-26 PROCEDURE — 71250 CT THORAX DX C-: CPT

## 2024-02-26 RX ORDER — FLUTICASONE PROPIONATE 50 MCG
SPRAY, SUSPENSION (ML) NASAL
Qty: 3 EACH | Refills: 1 | Status: SHIPPED | OUTPATIENT
Start: 2024-02-26

## 2024-03-05 ENCOUNTER — OFFICE VISIT (OUTPATIENT)
Dept: PRIMARY CARE CLINIC | Age: 44
End: 2024-03-05
Payer: COMMERCIAL

## 2024-03-05 VITALS — DIASTOLIC BLOOD PRESSURE: 84 MMHG | SYSTOLIC BLOOD PRESSURE: 138 MMHG

## 2024-03-05 DIAGNOSIS — E11.9 TYPE 2 DIABETES MELLITUS WITHOUT COMPLICATION, WITHOUT LONG-TERM CURRENT USE OF INSULIN (HCC): ICD-10-CM

## 2024-03-05 DIAGNOSIS — M79.10 MYALGIA: ICD-10-CM

## 2024-03-05 DIAGNOSIS — G89.29 CHRONIC PAIN OF LEFT KNEE: ICD-10-CM

## 2024-03-05 DIAGNOSIS — R53.83 OTHER FATIGUE: ICD-10-CM

## 2024-03-05 DIAGNOSIS — R06.02 SHORTNESS OF BREATH: ICD-10-CM

## 2024-03-05 DIAGNOSIS — R91.1 PULMONARY NODULE: Primary | ICD-10-CM

## 2024-03-05 DIAGNOSIS — M25.562 CHRONIC PAIN OF LEFT KNEE: ICD-10-CM

## 2024-03-05 DIAGNOSIS — R05.3 CHRONIC COUGH: ICD-10-CM

## 2024-03-05 LAB
ALBUMIN SERPL-MCNC: 4.1 G/DL (ref 3.5–5.2)
ALP SERPL-CCNC: 65 U/L (ref 35–104)
ALT SERPL-CCNC: 8 U/L (ref 5–33)
ANION GAP SERPL CALCULATED.3IONS-SCNC: 13 MMOL/L (ref 7–19)
AST SERPL-CCNC: 10 U/L (ref 5–32)
BASOPHILS # BLD: 0.1 K/UL (ref 0–0.2)
BASOPHILS NFR BLD: 0.9 % (ref 0–1)
BILIRUB SERPL-MCNC: 0.4 MG/DL (ref 0.2–1.2)
BNP BLD-MCNC: 202 PG/ML (ref 0–124)
BUN SERPL-MCNC: 13 MG/DL (ref 6–20)
CALCIUM SERPL-MCNC: 9.4 MG/DL (ref 8.6–10)
CHLORIDE SERPL-SCNC: 104 MMOL/L (ref 98–111)
CO2 SERPL-SCNC: 26 MMOL/L (ref 22–29)
CREAT SERPL-MCNC: 0.7 MG/DL (ref 0.5–0.9)
CRP SERPL HS-MCNC: 3.73 MG/DL (ref 0–0.5)
EOSINOPHIL # BLD: 0.2 K/UL (ref 0–0.6)
EOSINOPHIL NFR BLD: 2.2 % (ref 0–5)
ERYTHROCYTE [DISTWIDTH] IN BLOOD BY AUTOMATED COUNT: 15.5 % (ref 11.5–14.5)
ERYTHROCYTE [SEDIMENTATION RATE] IN BLOOD BY WESTERGREN METHOD: 36 MM/HR (ref 0–20)
GLUCOSE SERPL-MCNC: 80 MG/DL (ref 74–109)
HCT VFR BLD AUTO: 35.6 % (ref 37–47)
HGB BLD-MCNC: 11.1 G/DL (ref 12–16)
IMM GRANULOCYTES # BLD: 0 K/UL
LYMPHOCYTES # BLD: 2.5 K/UL (ref 1.1–4.5)
LYMPHOCYTES NFR BLD: 31 % (ref 20–40)
MCH RBC QN AUTO: 26.2 PG (ref 27–31)
MCHC RBC AUTO-ENTMCNC: 31.2 G/DL (ref 33–37)
MCV RBC AUTO: 84.2 FL (ref 81–99)
MONOCYTES # BLD: 0.6 K/UL (ref 0–0.9)
MONOCYTES NFR BLD: 6.7 % (ref 0–10)
NEUTROPHILS # BLD: 4.8 K/UL (ref 1.5–7.5)
NEUTS SEG NFR BLD: 58.8 % (ref 50–65)
PLATELET # BLD AUTO: 260 K/UL (ref 130–400)
PMV BLD AUTO: 11 FL (ref 9.4–12.3)
POTASSIUM SERPL-SCNC: 3.7 MMOL/L (ref 3.5–5)
PROT SERPL-MCNC: 7.6 G/DL (ref 6.6–8.7)
RBC # BLD AUTO: 4.23 M/UL (ref 4.2–5.4)
SODIUM SERPL-SCNC: 143 MMOL/L (ref 136–145)
WBC # BLD AUTO: 8.2 K/UL (ref 4.8–10.8)

## 2024-03-05 PROCEDURE — 3079F DIAST BP 80-89 MM HG: CPT | Performed by: FAMILY MEDICINE

## 2024-03-05 PROCEDURE — 99214 OFFICE O/P EST MOD 30 MIN: CPT | Performed by: FAMILY MEDICINE

## 2024-03-05 PROCEDURE — 3075F SYST BP GE 130 - 139MM HG: CPT | Performed by: FAMILY MEDICINE

## 2024-03-05 PROCEDURE — 3044F HG A1C LEVEL LT 7.0%: CPT | Performed by: FAMILY MEDICINE

## 2024-03-05 RX ORDER — HYDROCHLOROTHIAZIDE 12.5 MG/1
12.5 CAPSULE, GELATIN COATED ORAL DAILY
Qty: 30 CAPSULE | Refills: 5 | Status: SHIPPED | OUTPATIENT
Start: 2024-03-05

## 2024-03-05 RX ORDER — PREDNISONE 20 MG/1
20 TABLET ORAL DAILY
Qty: 18 TABLET | Refills: 0 | Status: SHIPPED | OUTPATIENT
Start: 2024-03-05

## 2024-03-05 ASSESSMENT — PATIENT HEALTH QUESTIONNAIRE - PHQ9
4. FEELING TIRED OR HAVING LITTLE ENERGY: 1
8. MOVING OR SPEAKING SO SLOWLY THAT OTHER PEOPLE COULD HAVE NOTICED. OR THE OPPOSITE, BEING SO FIGETY OR RESTLESS THAT YOU HAVE BEEN MOVING AROUND A LOT MORE THAN USUAL: 0
3. TROUBLE FALLING OR STAYING ASLEEP: 0
SUM OF ALL RESPONSES TO PHQ QUESTIONS 1-9: 1
1. LITTLE INTEREST OR PLEASURE IN DOING THINGS: 0
2. FEELING DOWN, DEPRESSED OR HOPELESS: 0
9. THOUGHTS THAT YOU WOULD BE BETTER OFF DEAD, OR OF HURTING YOURSELF: 0
SUM OF ALL RESPONSES TO PHQ QUESTIONS 1-9: 1
SUM OF ALL RESPONSES TO PHQ9 QUESTIONS 1 & 2: 0
SUM OF ALL RESPONSES TO PHQ QUESTIONS 1-9: 1
SUM OF ALL RESPONSES TO PHQ QUESTIONS 1-9: 1
7. TROUBLE CONCENTRATING ON THINGS, SUCH AS READING THE NEWSPAPER OR WATCHING TELEVISION: 0
10. IF YOU CHECKED OFF ANY PROBLEMS, HOW DIFFICULT HAVE THESE PROBLEMS MADE IT FOR YOU TO DO YOUR WORK, TAKE CARE OF THINGS AT HOME, OR GET ALONG WITH OTHER PEOPLE: 0
6. FEELING BAD ABOUT YOURSELF - OR THAT YOU ARE A FAILURE OR HAVE LET YOURSELF OR YOUR FAMILY DOWN: 0

## 2024-03-05 ASSESSMENT — ENCOUNTER SYMPTOMS
CHEST TIGHTNESS: 0
ANAL BLEEDING: 0
CONSTIPATION: 0
NAUSEA: 0
SHORTNESS OF BREATH: 1
COUGH: 1
DIARRHEA: 0
ABDOMINAL PAIN: 0
BACK PAIN: 1

## 2024-03-05 NOTE — PROGRESS NOTES
[facility]  [facility]  [address] [place] [address] [address]  [address]  [place] [address]  Dept: [phone]  Dept Fax: [phone]  Loc: [phone]    Yue Richardson is a 43 y.o. female who presents today for her medical conditions/complaints as noted below.  Yue Richardson is here for 3 Month Follow-Up (Pt here today for 3mth f/u. Pt states her leg is still hurting and not feeling well. ) and Fatigue (Pt has had fatique, SOB, congestion and can't get rid of her cough. Sx started about 1.5 week ago )    Patient History:     Rheumatoid arthritis, fibromyalgia  -Rheumatologist: Dr. Willie Cordon    Obstructive sleep apnea  - Completed October 6, 2021:  - BiPAP    Iron deficiency anemia, monoclonal gammopathy of unknown significance.  - Followed by [facility] hematology    Restrictive lung disease  -Suspected related to obesity hypoventilation syndrome and obstructive sleep apnea    IBS: Constipation  -[facility] gastroenterology             Subjective:   CC:  Here today to discuss the following:    She has a history of pulmonary nodule.  -Repeat CT scan was obtained and she is here today to review those results.  January 2023: CT angiogram of chest:  - -5 mm nodule in the left upper lobe    February 2024: CT chest without contrast  - -5 mm nodule in the left upper lobe.  - -Additional 3 mm nodule in the lingula.  - -Tree-in-bud nodular densities in the right lower lobe.  Groundglass densities in the dependent lungs.  No dense consolidation.  No pleural effusion or pneumothorax.   - She did have COVID June 2023  -She is complaining of cough and congestion.  Cough is generally nonproductive.  She is having occasional shortness of breath as well.  No fever or chills.  She is having some postnasal drainage.    She continues to have issues with shortness of breath:  Workup:  January 2022: Pulmonary function testing  -Premedication: FVC: 78, FEV1: 89%, FEV1/FVC: 96%    2D echocardiogram: June 2022  -Ejection fraction

## 2024-03-06 DIAGNOSIS — R06.00 DYSPNEA, UNSPECIFIED TYPE: Primary | ICD-10-CM

## 2024-03-08 ENCOUNTER — TELEPHONE (OUTPATIENT)
Dept: PRIMARY CARE CLINIC | Age: 44
End: 2024-03-08

## 2024-03-08 ENCOUNTER — APPOINTMENT (OUTPATIENT)
Dept: GENERAL RADIOLOGY | Age: 44
End: 2024-03-08
Payer: COMMERCIAL

## 2024-03-08 ENCOUNTER — HOSPITAL ENCOUNTER (EMERGENCY)
Age: 44
Discharge: HOME | End: 2024-03-08
Attending: EMERGENCY MEDICINE
Payer: COMMERCIAL

## 2024-03-08 VITALS
OXYGEN SATURATION: 98 % | RESPIRATION RATE: 18 BRPM | HEIGHT: 62 IN | SYSTOLIC BLOOD PRESSURE: 119 MMHG | WEIGHT: 286 LBS | DIASTOLIC BLOOD PRESSURE: 79 MMHG | HEART RATE: 75 BPM | TEMPERATURE: 98.2 F | BODY MASS INDEX: 52.63 KG/M2

## 2024-03-08 DIAGNOSIS — R20.2 NUMBNESS AND TINGLING: ICD-10-CM

## 2024-03-08 DIAGNOSIS — E87.6 HYPOKALEMIA: ICD-10-CM

## 2024-03-08 DIAGNOSIS — R20.0 NUMBNESS AND TINGLING: ICD-10-CM

## 2024-03-08 DIAGNOSIS — R07.9 ACUTE CHEST PAIN: Primary | ICD-10-CM

## 2024-03-08 LAB
ALBUMIN SERPL-MCNC: 3.6 G/DL (ref 3.5–5.2)
ALP SERPL-CCNC: 59 U/L (ref 35–104)
ALT SERPL-CCNC: 8 U/L (ref 5–33)
ANION GAP SERPL CALCULATED.3IONS-SCNC: 15 MMOL/L (ref 7–19)
AST SERPL-CCNC: 12 U/L (ref 5–32)
B PARAP IS1001 DNA NPH QL NAA+NON-PROBE: NOT DETECTED
B PERT.PT PRMT NPH QL NAA+NON-PROBE: NOT DETECTED
BASOPHILS # BLD: 0.1 K/UL (ref 0–0.2)
BASOPHILS NFR BLD: 0.5 % (ref 0–1)
BILIRUB SERPL-MCNC: 0.5 MG/DL (ref 0.2–1.2)
BNP BLD-MCNC: 139 PG/ML (ref 0–124)
BUN SERPL-MCNC: 11 MG/DL (ref 6–20)
C PNEUM DNA NPH QL NAA+NON-PROBE: NOT DETECTED
CALCIUM SERPL-MCNC: 9 MG/DL (ref 8.6–10)
CHLORIDE SERPL-SCNC: 99 MMOL/L (ref 98–111)
CO2 SERPL-SCNC: 23 MMOL/L (ref 22–29)
CREAT SERPL-MCNC: 0.6 MG/DL (ref 0.5–0.9)
EOSINOPHIL # BLD: 0.1 K/UL (ref 0–0.6)
EOSINOPHIL NFR BLD: 1.1 % (ref 0–5)
ERYTHROCYTE [DISTWIDTH] IN BLOOD BY AUTOMATED COUNT: 15.9 % (ref 11.5–14.5)
FLUAV RNA NPH QL NAA+NON-PROBE: NOT DETECTED
FLUBV RNA NPH QL NAA+NON-PROBE: NOT DETECTED
GLUCOSE SERPL-MCNC: 102 MG/DL (ref 74–109)
HADV DNA NPH QL NAA+NON-PROBE: NOT DETECTED
HCG SERPL QL: NEGATIVE
HCOV 229E RNA NPH QL NAA+NON-PROBE: NOT DETECTED
HCOV HKU1 RNA NPH QL NAA+NON-PROBE: NOT DETECTED
HCOV NL63 RNA NPH QL NAA+NON-PROBE: NOT DETECTED
HCOV OC43 RNA NPH QL NAA+NON-PROBE: NOT DETECTED
HCT VFR BLD AUTO: 35.5 % (ref 37–47)
HGB BLD-MCNC: 11 G/DL (ref 12–16)
HMPV RNA NPH QL NAA+NON-PROBE: NOT DETECTED
HPIV1 RNA NPH QL NAA+NON-PROBE: NOT DETECTED
HPIV2 RNA NPH QL NAA+NON-PROBE: NOT DETECTED
HPIV3 RNA NPH QL NAA+NON-PROBE: NOT DETECTED
HPIV4 RNA NPH QL NAA+NON-PROBE: NOT DETECTED
IMM GRANULOCYTES # BLD: 0.1 K/UL
LYMPHOCYTES # BLD: 2.7 K/UL (ref 1.1–4.5)
LYMPHOCYTES NFR BLD: 25 % (ref 20–40)
M PNEUMO DNA NPH QL NAA+NON-PROBE: NOT DETECTED
MCH RBC QN AUTO: 26.3 PG (ref 27–31)
MCHC RBC AUTO-ENTMCNC: 31 G/DL (ref 33–37)
MCV RBC AUTO: 84.9 FL (ref 81–99)
MONOCYTES # BLD: 0.8 K/UL (ref 0–0.9)
MONOCYTES NFR BLD: 7.1 % (ref 0–10)
NEUTROPHILS # BLD: 7.1 K/UL (ref 1.5–7.5)
NEUTS SEG NFR BLD: 65.7 % (ref 50–65)
PLATELET # BLD AUTO: 243 K/UL (ref 130–400)
PMV BLD AUTO: 11.3 FL (ref 9.4–12.3)
POTASSIUM SERPL-SCNC: 3.3 MMOL/L (ref 3.5–5)
PROT SERPL-MCNC: 7.6 G/DL (ref 6.6–8.7)
RBC # BLD AUTO: 4.18 M/UL (ref 4.2–5.4)
RSV RNA NPH QL NAA+NON-PROBE: NOT DETECTED
RV+EV RNA NPH QL NAA+NON-PROBE: NOT DETECTED
SARS-COV-2 RNA NPH QL NAA+NON-PROBE: NOT DETECTED
SODIUM SERPL-SCNC: 137 MMOL/L (ref 136–145)
TROPONIN, HIGH SENSITIVITY: <6 NG/L (ref 0–14)
TROPONIN, HIGH SENSITIVITY: <6 NG/L (ref 0–14)
TSH SERPL DL<=0.005 MIU/L-ACNC: 1.21 UIU/ML (ref 0.35–5.5)
WBC # BLD AUTO: 10.8 K/UL (ref 4.8–10.8)

## 2024-03-08 PROCEDURE — 85025 COMPLETE CBC W/AUTO DIFF WBC: CPT

## 2024-03-08 PROCEDURE — 83880 ASSAY OF NATRIURETIC PEPTIDE: CPT

## 2024-03-08 PROCEDURE — 93005 ELECTROCARDIOGRAM TRACING: CPT | Performed by: EMERGENCY MEDICINE

## 2024-03-08 PROCEDURE — 71045 X-RAY EXAM CHEST 1 VIEW: CPT

## 2024-03-08 PROCEDURE — 84703 CHORIONIC GONADOTROPIN ASSAY: CPT

## 2024-03-08 PROCEDURE — 0202U NFCT DS 22 TRGT SARS-COV-2: CPT

## 2024-03-08 PROCEDURE — 80053 COMPREHEN METABOLIC PANEL: CPT

## 2024-03-08 PROCEDURE — 84443 ASSAY THYROID STIM HORMONE: CPT

## 2024-03-08 PROCEDURE — 6370000000 HC RX 637 (ALT 250 FOR IP): Performed by: EMERGENCY MEDICINE

## 2024-03-08 PROCEDURE — 84484 ASSAY OF TROPONIN QUANT: CPT

## 2024-03-08 PROCEDURE — 36415 COLL VENOUS BLD VENIPUNCTURE: CPT

## 2024-03-08 PROCEDURE — 99285 EMERGENCY DEPT VISIT HI MDM: CPT

## 2024-03-08 RX ORDER — ERGOCALCIFEROL 1.25 MG/1
50000 CAPSULE ORAL WEEKLY
Qty: 12 CAPSULE | Refills: 0 | Status: SHIPPED | OUTPATIENT
Start: 2024-03-08

## 2024-03-08 RX ORDER — ASPIRIN 325 MG
325 TABLET ORAL ONCE
Status: COMPLETED | OUTPATIENT
Start: 2024-03-08 | End: 2024-03-08

## 2024-03-08 RX ORDER — LORAZEPAM 1 MG/1
1 TABLET ORAL ONCE
Status: COMPLETED | OUTPATIENT
Start: 2024-03-08 | End: 2024-03-08

## 2024-03-08 RX ADMIN — ASPIRIN 325 MG: 325 TABLET ORAL at 12:33

## 2024-03-08 RX ADMIN — LORAZEPAM 1 MG: 1 TABLET ORAL at 12:33

## 2024-03-08 ASSESSMENT — PAIN DESCRIPTION - DESCRIPTORS: DESCRIPTORS: PATIENT UNABLE TO DESCRIBE

## 2024-03-08 ASSESSMENT — ENCOUNTER SYMPTOMS
COUGH: 1
RHINORRHEA: 0
VOMITING: 0
SORE THROAT: 0
BACK PAIN: 0
NAUSEA: 0
ABDOMINAL PAIN: 0
DIARRHEA: 0
SHORTNESS OF BREATH: 1

## 2024-03-08 ASSESSMENT — PAIN DESCRIPTION - LOCATION: LOCATION: CHEST

## 2024-03-08 ASSESSMENT — PAIN SCALES - GENERAL: PAINLEVEL_OUTOF10: 8

## 2024-03-08 ASSESSMENT — PAIN - FUNCTIONAL ASSESSMENT: PAIN_FUNCTIONAL_ASSESSMENT: 0-10

## 2024-03-08 NOTE — TELEPHONE ENCOUNTER
Patient called with c/o chest pain, headache, weakness, elevated BG, hoarseness last night. Still not feeling well today. She was going to go to urgent care but I told her they would just send her to ER. I've sent her straight to ER.

## 2024-03-08 NOTE — ED PROVIDER NOTES
[facility] EMERGENCY DEPT  eMERGENCY dEPARTMENT eNCOUnter      Pt Name: Yue Richardson  MRN: [id number]  Birthdate 1980  Date of evaluation: 3/8/2024  Provider: CARLITO PAULSON MD    CHIEF COMPLAINT       Chief Complaint   Patient presents with    Chest Pain     Last night and total body numbness         HISTORY OF PRESENT ILLNESS   (Location/Symptom, Timing/Onset,Context/Setting, Quality, Duration, Modifying Factors, Severity)  Note limiting factors.   Yue Richardson is a 43 y.o. female who presents to the emergency department for chest pain and body numbness.  Patient tells me that she approximately week and a half ago had some flulike or COVID-like illness but took a at home test and was negative for COVID.  She saw her doctor about this 3 days ago and has since been started on prednisone.  Last night she felt like her whole body was somewhat numb which has since resolved denies any focal weakness.  States she does deal some nerve pain due to having a history of fibromyalgia and diabetes.  Since yesterday night she has also had some intermittent central chest pressure however this does not seem to be exertional.  Does admit she has had a dry cough since being ill for the past week and a half.  She also reports a history of rheumatic arthritis but is not on any controller medications.  No leg swelling.  Ultimately she called and spoke with her primary care physician office and was sent to the emergency department for further evaluation mostly for concern of her chest discomfort it seems.  She denies any prior cardiac history.  Admits she has also been quite anxious.    The history is provided by the patient.       NursingNotes were reviewed.    REVIEW OF SYSTEMS    (2-9 systems for level 4, 10 or more for level 5)     Review of Systems   Constitutional:  Positive for fatigue. Negative for chills and fever.   HENT:  Negative for rhinorrhea and sore throat.    Respiratory:  Positive for cough and shortness of breath.    Cardiovascular:  DIAGNOSIS/MDM:   Vitals:    Vitals:    03/08/24 1154 03/08/24 1500   BP: 129/88 119/79   Pulse: 87 75   Resp: 16 18   Temp: 98.2 °F (36.8 °C)    TempSrc: Oral    SpO2: 95% 98%   Weight: 129.7 kg (286 lb)    Height: 1.575 m (5' 2\")        MDM     Amount and/or Complexity of Data Reviewed  Clinical lab tests: ordered and reviewed  Tests in the radiology section of CPT®: ordered and reviewed  Independent visualization of images, tracings, or specimens: yes      VSS, well appearing in NAD, recent flu like illness still with ongoing cough she reports here now with cp, sob, whole body tingling last night, generally not feeling well but appears well, seems anxious.  Trop neg x2.  Labs all reassuring mild hypoK at 3.3.  resp panel neg.  Cxr clear.  No concern for PE or dissection.  Pt possibly still just recovering from her recent viral illness.  Pt stable for DC and outpt follow up understands return precautions.      CONSULTS:  None    :  Unless otherwise noted below, none     Procedures    FINAL IMPRESSION      1. Acute chest pain    2. Numbness and tingling    3. Hypokalemia          DISPOSITION/PLAN   DISPOSITION Decision To Discharge 03/08/2024 02:47:22 PM      PATIENT REFERRED TO:  Carlos Bowers MD  [address] [place] [address] [address] [place] [place] [id number]  [phone]    Schedule an appointment as soon as possible for a visit         DISCHARGE MEDICATIONS:  Discharge Medication List as of 3/8/2024  2:57 PM             (Please note that portions of this note were completed with a voice recognition program.  Efforts were made to edit thedictations but occasionally words are mis-transcribed.)    VARGHESE PAULSON MD (electronically signed)Emergency Physician        Varghese Paulson MD  03/08/24 3722

## 2024-03-10 LAB
EKG P AXIS: 64 DEGREES
EKG P-R INTERVAL: 118 MS
EKG Q-T INTERVAL: 364 MS
EKG QRS DURATION: 82 MS
EKG QTC CALCULATION (BAZETT): 432 MS
EKG T AXIS: 46 DEGREES

## 2024-03-10 PROCEDURE — 93010 ELECTROCARDIOGRAM REPORT: CPT | Performed by: INTERNAL MEDICINE

## 2024-03-15 DIAGNOSIS — I10 ESSENTIAL (PRIMARY) HYPERTENSION: ICD-10-CM

## 2024-03-15 RX ORDER — CARVEDILOL 12.5 MG/1
12.5 TABLET ORAL 2 TIMES DAILY
Qty: 180 TABLET | Refills: 3 | Status: SHIPPED | OUTPATIENT
Start: 2024-03-15

## 2024-03-15 NOTE — TELEPHONE ENCOUNTER
Yue Richardson called to request a refill on her medication.      Last office visit : 3/5/2024   Next office visit : Visit date not found     Requested Prescriptions     Pending Prescriptions Disp Refills    carvedilol (COREG) 12.5 MG tablet [Pharmacy Med Name: CARVEDILOL 12.5 MG TABLET] 180 tablet 3     Sig: TAKE 1 TABLET BY MOUTH TWICE A DAY            Phoebe Davies LPN

## 2024-03-18 ENCOUNTER — PATIENT MESSAGE (OUTPATIENT)
Dept: PRIMARY CARE CLINIC | Age: 44
End: 2024-03-18

## 2024-03-18 DIAGNOSIS — E11.9 TYPE 2 DIABETES MELLITUS WITHOUT COMPLICATION, WITHOUT LONG-TERM CURRENT USE OF INSULIN (HCC): Primary | ICD-10-CM

## 2024-03-18 RX ORDER — TIRZEPATIDE 5 MG/.5ML
5 INJECTION, SOLUTION SUBCUTANEOUS WEEKLY
Qty: 4 ADJUSTABLE DOSE PRE-FILLED PEN SYRINGE | Refills: 5 | Status: SHIPPED | OUTPATIENT
Start: 2024-03-18

## 2024-03-18 NOTE — TELEPHONE ENCOUNTER
From: Yue Richardson  To: Dr. Carlos Bowers  Sent: 3/18/2024 4:42 PM CDT  Subject: Mounjaro script     Dr Bowers ,   Can you send over a Mounjaro script for level 5 to [facility] [place] so I'm not without my meds for a month. They seem to not have the 7.5 or 10 anywhere. I have confirmed that they and another place have a 5.     Thanks,    Yue Richardson

## 2024-03-27 ENCOUNTER — TELEPHONE (OUTPATIENT)
Dept: HEMATOLOGY | Age: 44
End: 2024-03-27

## 2024-03-27 DIAGNOSIS — D47.2 MGUS (MONOCLONAL GAMMOPATHY OF UNKNOWN SIGNIFICANCE): Primary | ICD-10-CM

## 2024-03-27 DIAGNOSIS — D50.0 IRON DEFICIENCY ANEMIA DUE TO CHRONIC BLOOD LOSS: ICD-10-CM

## 2024-03-27 NOTE — PROGRESS NOTES
Alcohol/week: 1.0 standard drink of alcohol     Types: 1 Glasses of wine per week     Comment: rarely    Drug use: No       Family History:   Family History   Problem Relation Age of Onset    High Blood Pressure Father     Diabetes Father     Atrial Fibrillation Father     Heart Failure Father     Stroke Father     Diabetes Sister     Heart Attack Maternal Uncle     Heart Attack Paternal Uncle     Diabetes Maternal Grandmother     Ovarian Cancer Paternal Grandmother     Colon Polyps Neg Hx     Crohn's Disease Neg Hx     Esophageal Cancer Neg Hx     Liver Cancer Neg Hx     Rectal Cancer Neg Hx     Stomach Cancer Neg Hx        Vitals:  Vitals:    03/29/24 1126   BP: 120/60   Pulse: 66   Temp: 97.1 °F (36.2 °C)   Weight: 129.7 kg (286 lb)   Height: 1.575 m (5' 2\")        Subjective   REVIEW OF SYSTEMS:   Review of Systems   Constitutional:  Positive for fatigue. Negative for chills, diaphoresis and fever.   HENT: Negative.  Negative for congestion, ear pain, hearing loss, nosebleeds, sore throat and tinnitus.    Eyes: Negative.  Negative for pain, discharge and redness.   Respiratory: Negative.  Negative for cough, shortness of breath and wheezing.    Cardiovascular: Negative.  Negative for chest pain, palpitations and leg swelling.   Gastrointestinal: Negative.  Negative for abdominal pain, blood in stool, constipation, diarrhea, nausea and vomiting.   Endocrine: Negative for polydipsia.   Genitourinary:  Negative for dysuria, flank pain, frequency, hematuria and urgency.   Musculoskeletal: Negative.  Negative for back pain, myalgias and neck pain.   Skin: Negative.  Negative for rash.   Neurological: Negative.  Negative for dizziness, tremors, seizures, weakness and headaches.   Hematological:  Does not bruise/bleed easily.   Psychiatric/Behavioral: Negative.  The patient is not nervous/anxious.        Objective   PHYSICAL EXAM:  Physical Exam  Vitals reviewed.   Constitutional:       General: She is not in acute

## 2024-03-27 NOTE — TELEPHONE ENCOUNTER
Called pt to confirm appointment for 3/29/24 at 11:15 AM. Was unable to reach pt at her home #, second call attempted to Francia, pt mother. No answer, left voicemail with pt mother.

## 2024-03-29 ENCOUNTER — OFFICE VISIT (OUTPATIENT)
Dept: HEMATOLOGY | Age: 44
End: 2024-03-29
Payer: COMMERCIAL

## 2024-03-29 ENCOUNTER — HOSPITAL ENCOUNTER (OUTPATIENT)
Dept: INFUSION THERAPY | Age: 44
Discharge: HOME | End: 2024-03-29
Payer: COMMERCIAL

## 2024-03-29 VITALS
HEART RATE: 66 BPM | BODY MASS INDEX: 52.63 KG/M2 | DIASTOLIC BLOOD PRESSURE: 60 MMHG | HEIGHT: 62 IN | WEIGHT: 286 LBS | SYSTOLIC BLOOD PRESSURE: 120 MMHG | TEMPERATURE: 97.1 F

## 2024-03-29 DIAGNOSIS — D50.0 IRON DEFICIENCY ANEMIA DUE TO CHRONIC BLOOD LOSS: ICD-10-CM

## 2024-03-29 DIAGNOSIS — N92.0 MENORRHAGIA WITH REGULAR CYCLE: ICD-10-CM

## 2024-03-29 DIAGNOSIS — D47.2 MGUS (MONOCLONAL GAMMOPATHY OF UNKNOWN SIGNIFICANCE): ICD-10-CM

## 2024-03-29 DIAGNOSIS — D47.2 MGUS (MONOCLONAL GAMMOPATHY OF UNKNOWN SIGNIFICANCE): Primary | ICD-10-CM

## 2024-03-29 LAB
ALBUMIN SERPL-MCNC: 4.3 G/DL (ref 3.5–5.2)
ALP SERPL-CCNC: 71 U/L (ref 35–104)
ALT SERPL-CCNC: 15 U/L (ref 9–52)
ANION GAP SERPL CALCULATED.3IONS-SCNC: 9 MMOL/L (ref 7–19)
AST SERPL-CCNC: 20 U/L (ref 14–36)
BASOPHILS # BLD: 0.06 K/UL (ref 0.01–0.08)
BASOPHILS NFR BLD: 0.8 % (ref 0.1–1.2)
BILIRUB SERPL-MCNC: 0.4 MG/DL (ref 0.2–1.3)
BUN SERPL-MCNC: 20 MG/DL (ref 7–17)
CALCIUM SERPL-MCNC: 9.2 MG/DL (ref 8.4–10.2)
CHLORIDE SERPL-SCNC: 105 MMOL/L (ref 98–111)
CO2 SERPL-SCNC: 23 MMOL/L (ref 22–29)
CREAT SERPL-MCNC: 0.8 MG/DL (ref 0.5–1)
EOSINOPHIL # BLD: 0.15 K/UL (ref 0.04–0.54)
EOSINOPHIL NFR BLD: 2.1 % (ref 0.7–7)
ERYTHROCYTE [DISTWIDTH] IN BLOOD BY AUTOMATED COUNT: 15.4 % (ref 11.7–14.4)
FERRITIN SERPL-MCNC: 50 NG/ML (ref 13–150)
GLOBULIN: 3.8 G/DL
GLUCOSE SERPL-MCNC: 93 MG/DL (ref 74–106)
HCT VFR BLD AUTO: 34.1 % (ref 34.1–44.9)
HGB BLD-MCNC: 11 G/DL (ref 11.2–15.7)
IRON SATN MFR SERPL: 9 % (ref 14–50)
IRON SERPL-MCNC: 36 UG/DL (ref 37–145)
LYMPHOCYTES # BLD: 1.76 K/UL (ref 1.18–3.74)
LYMPHOCYTES NFR BLD: 24.6 % (ref 19.3–53.1)
MCH RBC QN AUTO: 26.2 PG (ref 25.6–32.2)
MCHC RBC AUTO-ENTMCNC: 32.3 G/DL (ref 32.3–35.5)
MCV RBC AUTO: 81.2 FL (ref 79.4–94.8)
MONOCYTES # BLD: 0.57 K/UL (ref 0.24–0.82)
MONOCYTES NFR BLD: 8 % (ref 4.7–12.5)
NEUTROPHILS # BLD: 4.57 K/UL (ref 1.56–6.13)
NEUTS SEG NFR BLD: 63.9 % (ref 34–71.1)
PLATELET # BLD AUTO: 279 K/UL (ref 182–369)
PMV BLD AUTO: 10.4 FL (ref 7.4–10.4)
POTASSIUM SERPL-SCNC: 4 MMOL/L (ref 3.5–5.1)
PROT SERPL-MCNC: 8.1 G/DL (ref 6.3–8.2)
RBC # BLD AUTO: 4.2 M/UL (ref 3.93–5.22)
SODIUM SERPL-SCNC: 137 MMOL/L (ref 137–145)
TIBC SERPL-MCNC: 385 UG/DL (ref 250–400)
WBC # BLD AUTO: 7.15 K/UL (ref 3.98–10.04)

## 2024-03-29 PROCEDURE — 85025 COMPLETE CBC W/AUTO DIFF WBC: CPT

## 2024-03-29 PROCEDURE — 99212 OFFICE O/P EST SF 10 MIN: CPT

## 2024-03-29 PROCEDURE — 80053 COMPREHEN METABOLIC PANEL: CPT

## 2024-03-29 PROCEDURE — 36415 COLL VENOUS BLD VENIPUNCTURE: CPT

## 2024-03-29 PROCEDURE — 3078F DIAST BP <80 MM HG: CPT | Performed by: NURSE PRACTITIONER

## 2024-03-29 PROCEDURE — 99214 OFFICE O/P EST MOD 30 MIN: CPT | Performed by: NURSE PRACTITIONER

## 2024-03-29 PROCEDURE — 3074F SYST BP LT 130 MM HG: CPT | Performed by: NURSE PRACTITIONER

## 2024-03-29 RX ORDER — HYDROXYCHLOROQUINE SULFATE 200 MG/1
200 TABLET, FILM COATED ORAL DAILY
COMMUNITY
Start: 2024-03-27

## 2024-03-31 LAB — B2 MICROGLOB SERPL-MCNC: 2 MG/L (ref 0.8–2.4)

## 2024-04-01 ENCOUNTER — TELEPHONE (OUTPATIENT)
Dept: HEMATOLOGY | Age: 44
End: 2024-04-01

## 2024-04-01 DIAGNOSIS — K90.9 IRON MALABSORPTION: Primary | ICD-10-CM

## 2024-04-01 LAB
ALBUMIN SERPL-MCNC: 3.68 G/DL (ref 3.75–5.01)
ALPHA1 GLOB SERPL ELPH-MCNC: 0.33 G/DL (ref 0.19–0.46)
ALPHA2 GLOB SERPL ELPH-MCNC: 0.95 G/DL (ref 0.48–1.05)
B-GLOBULIN SERPL ELPH-MCNC: 0.91 G/DL (ref 0.48–1.1)
DEPRECATED KAPPA LC FREE/LAMBDA SER: 1.81 {RATIO} (ref 0.26–1.65)
EER MONOCLONAL PROTEIN AND FLC, SERUM: ABNORMAL
GAMMA GLOB SERPL ELPH-MCNC: 1.73 G/DL (ref 0.62–1.51)
IGA SERPL-MCNC: 174 MG/DL (ref 68–408)
IGG SERPL-MCNC: 1723 MG/DL (ref 768–1632)
IGM SERPL-MCNC: 85 MG/DL (ref 35–263)
INTERPRETATION SERPL IFE-IMP: ABNORMAL
INTERPRETATION SERPL IFE-IMP: ABNORMAL
KAPPA LC FREE SER-MCNC: 29.51 MG/L (ref 3.3–19.4)
LAMBDA LC FREE SERPL-MCNC: 16.3 MG/L (ref 5.71–26.3)
MONOCLONAL PROTEIN, SERUM: ABNORMAL G/DL
PROT SERPL-MCNC: 7.6 G/DL (ref 6.3–8.2)

## 2024-04-01 RX ORDER — EPINEPHRINE 1 MG/ML
0.3 INJECTION, SOLUTION, CONCENTRATE INTRAVENOUS PRN
OUTPATIENT
Start: 2024-04-03

## 2024-04-01 RX ORDER — SODIUM CHLORIDE 9 MG/ML
5-250 INJECTION, SOLUTION INTRAVENOUS PRN
Status: CANCELLED | OUTPATIENT
Start: 2024-04-03

## 2024-04-01 RX ORDER — ACETAMINOPHEN 325 MG/1
650 TABLET ORAL
OUTPATIENT
Start: 2024-04-03

## 2024-04-01 RX ORDER — ONDANSETRON 2 MG/ML
8 INJECTION INTRAMUSCULAR; INTRAVENOUS
OUTPATIENT
Start: 2024-04-03

## 2024-04-01 RX ORDER — DIPHENHYDRAMINE HYDROCHLORIDE 50 MG/ML
50 INJECTION INTRAMUSCULAR; INTRAVENOUS
OUTPATIENT
Start: 2024-04-03

## 2024-04-01 RX ORDER — HEPARIN SODIUM (PORCINE) LOCK FLUSH IV SOLN 100 UNIT/ML 100 UNIT/ML
500 SOLUTION INTRAVENOUS PRN
OUTPATIENT
Start: 2024-04-03

## 2024-04-01 RX ORDER — SODIUM CHLORIDE 9 MG/ML
INJECTION, SOLUTION INTRAVENOUS CONTINUOUS
OUTPATIENT
Start: 2024-04-03

## 2024-04-01 RX ORDER — SODIUM CHLORIDE 0.9 % (FLUSH) 0.9 %
5-40 SYRINGE (ML) INJECTION PRN
OUTPATIENT
Start: 2024-04-03

## 2024-04-01 RX ORDER — ALBUTEROL SULFATE 90 UG/1
4 AEROSOL, METERED RESPIRATORY (INHALATION) PRN
OUTPATIENT
Start: 2024-04-03

## 2024-04-01 RX ORDER — FAMOTIDINE 10 MG/ML
20 INJECTION, SOLUTION INTRAVENOUS
OUTPATIENT
Start: 2024-04-03

## 2024-04-01 RX ORDER — SODIUM CHLORIDE 9 MG/ML
5-250 INJECTION, SOLUTION INTRAVENOUS PRN
OUTPATIENT
Start: 2024-04-03

## 2024-04-01 NOTE — TELEPHONE ENCOUNTER
----- Message from NATACHA Irvin sent at 3/31/2024  8:40 PM CDT -----  Please call and discuss the need for IV iron replacement    Please build supportive plan for Feraheme 510 mg mg IV weekly x2 doses.  Please contact patient once insurance approval has been obtained and appointments have been arranged

## 2024-04-01 NOTE — TELEPHONE ENCOUNTER
Called patient and reviewed lab results, iron saturation of 9%.  Instructed that NATACHA Kaur would like for her to get 2 doses of IV iron.  Instructed that she will get this at our outpatient infusion center and they will call her to set up appointments once the treatment has been approved with insurance. Patient v/u and is agreeable to plan.

## 2024-04-04 ENCOUNTER — HOSPITAL ENCOUNTER (OUTPATIENT)
Dept: INFUSION THERAPY | Age: 44
Setting detail: INFUSION SERIES
Discharge: HOME | End: 2024-04-04
Payer: COMMERCIAL

## 2024-04-04 VITALS
SYSTOLIC BLOOD PRESSURE: 122 MMHG | TEMPERATURE: 97.4 F | DIASTOLIC BLOOD PRESSURE: 69 MMHG | RESPIRATION RATE: 17 BRPM | OXYGEN SATURATION: 100 % | HEART RATE: 67 BPM

## 2024-04-04 DIAGNOSIS — K90.9 IRON MALABSORPTION: Primary | ICD-10-CM

## 2024-04-04 DIAGNOSIS — D50.9 IRON DEFICIENCY ANEMIA, UNSPECIFIED IRON DEFICIENCY ANEMIA TYPE: ICD-10-CM

## 2024-04-04 PROCEDURE — 2580000003 HC RX 258: Performed by: NURSE PRACTITIONER

## 2024-04-04 PROCEDURE — 6360000002 HC RX W HCPCS: Performed by: NURSE PRACTITIONER

## 2024-04-04 PROCEDURE — 96365 THER/PROPH/DIAG IV INF INIT: CPT

## 2024-04-04 RX ORDER — SODIUM CHLORIDE 0.9 % (FLUSH) 0.9 %
5-40 SYRINGE (ML) INJECTION PRN
OUTPATIENT
Start: 2024-04-11

## 2024-04-04 RX ORDER — HEPARIN 100 UNIT/ML
500 SYRINGE INTRAVENOUS PRN
OUTPATIENT
Start: 2024-04-11

## 2024-04-04 RX ORDER — ACETAMINOPHEN 325 MG/1
650 TABLET ORAL
OUTPATIENT
Start: 2024-04-11

## 2024-04-04 RX ORDER — SODIUM CHLORIDE 9 MG/ML
5-250 INJECTION, SOLUTION INTRAVENOUS PRN
OUTPATIENT
Start: 2024-04-11

## 2024-04-04 RX ORDER — SODIUM CHLORIDE 9 MG/ML
5-250 INJECTION, SOLUTION INTRAVENOUS PRN
Status: DISCONTINUED | OUTPATIENT
Start: 2024-04-04 | End: 2024-04-05 | Stop reason: HOSPADM

## 2024-04-04 RX ORDER — ONDANSETRON 2 MG/ML
8 INJECTION INTRAMUSCULAR; INTRAVENOUS
OUTPATIENT
Start: 2024-04-11

## 2024-04-04 RX ORDER — SODIUM CHLORIDE 9 MG/ML
INJECTION, SOLUTION INTRAVENOUS CONTINUOUS
OUTPATIENT
Start: 2024-04-11

## 2024-04-04 RX ORDER — ALBUTEROL SULFATE 90 UG/1
4 AEROSOL, METERED RESPIRATORY (INHALATION) PRN
OUTPATIENT
Start: 2024-04-11

## 2024-04-04 RX ORDER — EPINEPHRINE 1 MG/ML
0.3 INJECTION, SOLUTION, CONCENTRATE INTRAVENOUS PRN
OUTPATIENT
Start: 2024-04-11

## 2024-04-04 RX ORDER — DIPHENHYDRAMINE HYDROCHLORIDE 50 MG/ML
50 INJECTION INTRAMUSCULAR; INTRAVENOUS
OUTPATIENT
Start: 2024-04-11

## 2024-04-04 RX ADMIN — SODIUM CHLORIDE 20 ML/HR: 9 INJECTION, SOLUTION INTRAVENOUS at 13:37

## 2024-04-04 RX ADMIN — FERUMOXYTOL 510 MG: 510 INJECTION INTRAVENOUS at 13:37

## 2024-04-04 NOTE — DISCHARGE INSTRUCTIONS
ferumoxytol  Pronunciation:  JUSTIN ue MOX i melodie  Brand:  Feraheme  What is the most important information I should know about ferumoxytol?  Ferumoxytol can cause severe or fatal allergic reactions, even if you have used this medicine before without any reaction. Get emergency medical help if you have hives, itching, wheezing, trouble breathing, swelling in your face or throat, or feeling like you might pass out. Watch for signs of allergic reaction for at least 30 minutes after your injection.  What is ferumoxytol?  Ferumoxytol is an iron replacement product that is used in adults used to treat iron deficiency anemia (MAYRA), which is low red blood cells caused by a lack of iron in the body.  Ferumoxytol is given to adults with MAYRA and chronic kidney disease, or to adults with MAYRA when iron taken by mouth is not effective.  Ferumoxytol may also be used for purposes not listed in this medication guide.  What should I discuss with my healthcare provider before receiving ferumoxytol?  You should not use ferumoxytol if you are allergic to it, or if:  you've had an allergic reaction to iron injected into a vein.  Tell your doctor if you have ever had:  iron overload syndrome;  any drug allergies; or  low blood pressure.  Tell your doctor if you are pregnant or plan to become pregnant. It is not known if ferumoxytol will harm an unborn baby, but this medicine may cause severe reactions in the mother that could affect the baby's heartbeat.  Having iron deficiency anemia during pregnancy may increase the risk of premature birth or low birth weight. The benefit of treating this condition with ferumoxytol may outweigh any risks to the baby.  Ask a doctor if it is safe to breastfeed while using this medicine.  How is ferumoxytol given?  Ferumoxytol is injected into a vein by a healthcare provider.  This medicine must be given slowly over 15 minutes.  You will be watched for at least 30 minutes to make sure you do not have an

## 2024-04-11 ENCOUNTER — HOSPITAL ENCOUNTER (OUTPATIENT)
Dept: INFUSION THERAPY | Age: 44
Setting detail: INFUSION SERIES
Discharge: HOME | End: 2024-04-11
Payer: COMMERCIAL

## 2024-04-11 VITALS
TEMPERATURE: 97.4 F | SYSTOLIC BLOOD PRESSURE: 108 MMHG | OXYGEN SATURATION: 99 % | RESPIRATION RATE: 18 BRPM | DIASTOLIC BLOOD PRESSURE: 69 MMHG | HEART RATE: 68 BPM

## 2024-04-11 DIAGNOSIS — K90.9 IRON MALABSORPTION: Primary | ICD-10-CM

## 2024-04-11 DIAGNOSIS — D50.9 IRON DEFICIENCY ANEMIA, UNSPECIFIED IRON DEFICIENCY ANEMIA TYPE: ICD-10-CM

## 2024-04-11 PROCEDURE — 2580000003 HC RX 258: Performed by: NURSE PRACTITIONER

## 2024-04-11 PROCEDURE — 96365 THER/PROPH/DIAG IV INF INIT: CPT

## 2024-04-11 PROCEDURE — 6360000002 HC RX W HCPCS: Performed by: NURSE PRACTITIONER

## 2024-04-11 RX ORDER — SODIUM CHLORIDE 9 MG/ML
5-250 INJECTION, SOLUTION INTRAVENOUS PRN
Status: CANCELLED | OUTPATIENT
Start: 2024-04-11

## 2024-04-11 RX ORDER — ONDANSETRON 2 MG/ML
8 INJECTION INTRAMUSCULAR; INTRAVENOUS
Status: CANCELLED | OUTPATIENT
Start: 2024-04-11

## 2024-04-11 RX ORDER — ACETAMINOPHEN 325 MG/1
650 TABLET ORAL
Status: CANCELLED | OUTPATIENT
Start: 2024-04-11

## 2024-04-11 RX ORDER — SODIUM CHLORIDE 9 MG/ML
INJECTION, SOLUTION INTRAVENOUS CONTINUOUS
Status: CANCELLED | OUTPATIENT
Start: 2024-04-11

## 2024-04-11 RX ORDER — DIPHENHYDRAMINE HYDROCHLORIDE 50 MG/ML
50 INJECTION INTRAMUSCULAR; INTRAVENOUS
Status: CANCELLED | OUTPATIENT
Start: 2024-04-11

## 2024-04-11 RX ORDER — SODIUM CHLORIDE 9 MG/ML
5-250 INJECTION, SOLUTION INTRAVENOUS PRN
Status: DISCONTINUED | OUTPATIENT
Start: 2024-04-11 | End: 2024-04-11

## 2024-04-11 RX ORDER — SODIUM CHLORIDE 0.9 % (FLUSH) 0.9 %
5-40 SYRINGE (ML) INJECTION PRN
Status: DISCONTINUED | OUTPATIENT
Start: 2024-04-11 | End: 2024-04-11

## 2024-04-11 RX ORDER — HEPARIN 100 UNIT/ML
500 SYRINGE INTRAVENOUS PRN
Status: CANCELLED | OUTPATIENT
Start: 2024-04-11

## 2024-04-11 RX ORDER — ALBUTEROL SULFATE 90 UG/1
4 AEROSOL, METERED RESPIRATORY (INHALATION) PRN
Status: CANCELLED | OUTPATIENT
Start: 2024-04-11

## 2024-04-11 RX ORDER — EPINEPHRINE 1 MG/ML
0.3 INJECTION, SOLUTION, CONCENTRATE INTRAVENOUS PRN
Status: CANCELLED | OUTPATIENT
Start: 2024-04-11

## 2024-04-11 RX ORDER — SODIUM CHLORIDE 0.9 % (FLUSH) 0.9 %
5-40 SYRINGE (ML) INJECTION PRN
Status: CANCELLED | OUTPATIENT
Start: 2024-04-11

## 2024-04-11 RX ADMIN — SODIUM CHLORIDE, PRESERVATIVE FREE 10 ML: 5 INJECTION INTRAVENOUS at 15:50

## 2024-04-11 RX ADMIN — FERUMOXYTOL 510 MG: 510 INJECTION INTRAVENOUS at 14:49

## 2024-04-11 RX ADMIN — SODIUM CHLORIDE 20 ML/HR: 9 INJECTION, SOLUTION INTRAVENOUS at 14:49

## 2024-04-11 NOTE — FLOWSHEET NOTE
04/11/24 1657   AVS Reviewed   AVS & discharge instructions reviewed with patient and/or representative? Yes   Reviewed instructions with Patient   Level of Understanding Questions answered;Verbalized understanding

## 2024-04-25 ENCOUNTER — PATIENT MESSAGE (OUTPATIENT)
Dept: PRIMARY CARE CLINIC | Age: 44
End: 2024-04-25

## 2024-04-25 DIAGNOSIS — E11.9 TYPE 2 DIABETES MELLITUS WITHOUT COMPLICATION, WITHOUT LONG-TERM CURRENT USE OF INSULIN (HCC): Primary | ICD-10-CM

## 2024-04-25 RX ORDER — TIRZEPATIDE 10 MG/.5ML
10 INJECTION, SOLUTION SUBCUTANEOUS WEEKLY
Qty: 4 ADJUSTABLE DOSE PRE-FILLED PEN SYRINGE | Refills: 5 | Status: SHIPPED | OUTPATIENT
Start: 2024-04-25

## 2024-04-25 NOTE — TELEPHONE ENCOUNTER
From: Yue Richardson  To: Dr. Carlos Bowers  Sent: 4/25/2024 2:00 PM CDT  Subject: Refill    Hello can I get Mounjaro 10 they have one available today at Replaced by [facility]. They said the original script was cancelled by prescriber. I think that may have been because I went to 12 . I just want to make sure my 12 and 5 wasn't getting cancelled just in case they don't have the level I need, I can still get it .     Thanks,  Yue

## 2024-06-10 RX ORDER — ERGOCALCIFEROL 1.25 MG/1
50000 CAPSULE ORAL WEEKLY
Qty: 4 CAPSULE | Refills: 0 | Status: SHIPPED | OUTPATIENT
Start: 2024-06-10

## 2024-06-10 RX ORDER — HYDROCHLOROTHIAZIDE 12.5 MG/1
12.5 CAPSULE, GELATIN COATED ORAL DAILY
Qty: 30 CAPSULE | Refills: 5 | Status: SHIPPED | OUTPATIENT
Start: 2024-06-10

## 2024-06-25 DIAGNOSIS — D47.2 MGUS (MONOCLONAL GAMMOPATHY OF UNKNOWN SIGNIFICANCE): Primary | ICD-10-CM

## 2024-06-25 DIAGNOSIS — D50.0 IRON DEFICIENCY ANEMIA DUE TO CHRONIC BLOOD LOSS: ICD-10-CM

## 2024-06-26 ENCOUNTER — TELEPHONE (OUTPATIENT)
Dept: HEMATOLOGY | Age: 44
End: 2024-06-26

## 2024-06-26 NOTE — TELEPHONE ENCOUNTER
Called patient to remind them of their appointment on 6/28/2024 but was unable to leave vm due to mailbox being full or not set up or memory is full. Called patient's work with no answer

## 2024-06-27 ENCOUNTER — OFFICE VISIT (OUTPATIENT)
Dept: PULMONOLOGY | Age: 44
End: 2024-06-27
Payer: COMMERCIAL

## 2024-06-27 VITALS
HEART RATE: 70 BPM | HEIGHT: 62 IN | BODY MASS INDEX: 53.92 KG/M2 | DIASTOLIC BLOOD PRESSURE: 81 MMHG | OXYGEN SATURATION: 95 % | TEMPERATURE: 97.8 F | WEIGHT: 293 LBS | SYSTOLIC BLOOD PRESSURE: 127 MMHG

## 2024-06-27 DIAGNOSIS — K21.9 GASTROESOPHAGEAL REFLUX DISEASE WITHOUT ESOPHAGITIS: ICD-10-CM

## 2024-06-27 DIAGNOSIS — R91.1 LUNG NODULE: Primary | ICD-10-CM

## 2024-06-27 DIAGNOSIS — K90.9 IRON MALABSORPTION: ICD-10-CM

## 2024-06-27 DIAGNOSIS — J98.4 RESTRICTIVE LUNG DISEASE: ICD-10-CM

## 2024-06-27 DIAGNOSIS — G47.33 OSA (OBSTRUCTIVE SLEEP APNEA): ICD-10-CM

## 2024-06-27 PROCEDURE — 3079F DIAST BP 80-89 MM HG: CPT | Performed by: INTERNAL MEDICINE

## 2024-06-27 PROCEDURE — 3074F SYST BP LT 130 MM HG: CPT | Performed by: INTERNAL MEDICINE

## 2024-06-27 PROCEDURE — 99214 OFFICE O/P EST MOD 30 MIN: CPT | Performed by: INTERNAL MEDICINE

## 2024-06-27 ASSESSMENT — ENCOUNTER SYMPTOMS
CHEST TIGHTNESS: 0
RHINORRHEA: 0
ABDOMINAL PAIN: 0
WHEEZING: 0
ANAL BLEEDING: 0
ABDOMINAL DISTENTION: 0
APNEA: 0
SHORTNESS OF BREATH: 0
COUGH: 1
BACK PAIN: 0

## 2024-06-27 NOTE — PROGRESS NOTES
Pulmonary and Sleep Medicine    Yue Richardson (:  1980) is a 43 y.o. female,Established patient, here for evaluation of the following chief complaint(s):  Follow-up (Follow up new symptom of chronic cough. )      Referring physician:  Carlos Bowers Md  [address] [place] [address] [place] [address]     ASSESSMENT/PLAN:  1. Lung nodule  -     CT CHEST WO CONTRAST; Future  2. SETH (obstructive sleep apnea)  3. Gastroesophageal reflux disease without esophagitis  4. Restrictive lung disease  5. Iron malabsorption        Lung nodule seen on CT of the chest is stable by my own interpretation at least since 2022.  Will repeat CT of the chest in 6 months to assess continuing stability.  If no change in the nodule that would be the last CT necessary for the purpose of assessing stability.    Continue BiPAP she is compliant with the BiPAP she feels the BiPAP helps.       Ángela Fagan MD, [facility], [facility]    Return in about 6 months (around 2024).    SUBJECTIVE/OBJECTIVE:        Patient is here for follow-up on sleep apnea and for evaluation of a new diagnosis of lung nodule.  The patient developed a cough earlier this year.  She had a CT of the chest done in January and then in February.  Both CT scans showed a 5 mm left upper lobe nodule.  My interpretation of the CT is that the nodule has been stable at least since 2022.  The nodule in discussion is also likely vascular.  She denies smoking.  Since the CT her cough has subsided.    She continues to be on BiPAP for the treatment of sleep apnea and respiratory failure.          Continue the following medications as reported by the patient:    Prior to Visit Medications    Medication Sig Taking? Authorizing Provider   hydroCHLOROthiazide 12.5 MG capsule Take 1 capsule by mouth daily Yes Mallory Richmond APRN   vitamin D (ERGOCALCIFEROL) 1.25 MG (94446 UT) CAPS capsule TAKE 1 CAPSULE BY MOUTH ONE TIME PER WEEK Yes Carlos Bowers MD

## 2024-06-28 ENCOUNTER — OFFICE VISIT (OUTPATIENT)
Dept: HEMATOLOGY | Age: 44
End: 2024-06-28
Payer: COMMERCIAL

## 2024-06-28 ENCOUNTER — HOSPITAL ENCOUNTER (OUTPATIENT)
Dept: INFUSION THERAPY | Age: 44
Discharge: HOME | End: 2024-06-28
Payer: COMMERCIAL

## 2024-06-28 VITALS
OXYGEN SATURATION: 99 % | TEMPERATURE: 97.7 F | HEART RATE: 76 BPM | DIASTOLIC BLOOD PRESSURE: 74 MMHG | WEIGHT: 293 LBS | BODY MASS INDEX: 53.92 KG/M2 | HEIGHT: 62 IN | SYSTOLIC BLOOD PRESSURE: 128 MMHG

## 2024-06-28 DIAGNOSIS — D47.2 MGUS (MONOCLONAL GAMMOPATHY OF UNKNOWN SIGNIFICANCE): Primary | ICD-10-CM

## 2024-06-28 DIAGNOSIS — D50.0 IRON DEFICIENCY ANEMIA DUE TO CHRONIC BLOOD LOSS: ICD-10-CM

## 2024-06-28 DIAGNOSIS — D47.2 MGUS (MONOCLONAL GAMMOPATHY OF UNKNOWN SIGNIFICANCE): ICD-10-CM

## 2024-06-28 DIAGNOSIS — N92.0 MENORRHAGIA WITH REGULAR CYCLE: ICD-10-CM

## 2024-06-28 LAB
ALBUMIN SERPL-MCNC: 3.9 G/DL (ref 3.5–5.2)
ALP SERPL-CCNC: 70 U/L (ref 35–104)
ALT SERPL-CCNC: 12 U/L (ref 9–52)
ANION GAP SERPL CALCULATED.3IONS-SCNC: 9 MMOL/L (ref 7–19)
AST SERPL-CCNC: 18 U/L (ref 14–36)
BASOPHILS # BLD: 0.05 K/UL (ref 0.01–0.08)
BASOPHILS NFR BLD: 0.8 % (ref 0.1–1.2)
BILIRUB SERPL-MCNC: 0.7 MG/DL (ref 0.2–1.3)
BUN SERPL-MCNC: 15 MG/DL (ref 7–17)
CALCIUM SERPL-MCNC: 8.8 MG/DL (ref 8.4–10.2)
CHLORIDE SERPL-SCNC: 104 MMOL/L (ref 98–111)
CO2 SERPL-SCNC: 25 MMOL/L (ref 22–29)
CREAT SERPL-MCNC: 0.8 MG/DL (ref 0.5–1)
EOSINOPHIL # BLD: 0.21 K/UL (ref 0.04–0.54)
EOSINOPHIL NFR BLD: 3.5 % (ref 0.7–7)
ERYTHROCYTE [DISTWIDTH] IN BLOOD BY AUTOMATED COUNT: 13.9 % (ref 11.7–14.4)
FERRITIN SERPL-MCNC: 122.6 NG/ML (ref 13–150)
GLOBULIN: 3.3 G/DL
GLUCOSE SERPL-MCNC: 98 MG/DL (ref 74–106)
HCT VFR BLD AUTO: 35.1 % (ref 34.1–44.9)
HGB BLD-MCNC: 11.8 G/DL (ref 11.2–15.7)
IRON SATN MFR SERPL: 17 % (ref 14–50)
IRON SERPL-MCNC: 47 UG/DL (ref 37–145)
LYMPHOCYTES # BLD: 1.87 K/UL (ref 1.18–3.74)
LYMPHOCYTES NFR BLD: 31.2 % (ref 19.3–53.1)
MCH RBC QN AUTO: 28.2 PG (ref 25.6–32.2)
MCHC RBC AUTO-ENTMCNC: 33.6 G/DL (ref 32.3–35.5)
MCV RBC AUTO: 84 FL (ref 79.4–94.8)
MONOCYTES # BLD: 0.44 K/UL (ref 0.24–0.82)
MONOCYTES NFR BLD: 7.3 % (ref 4.7–12.5)
NEUTROPHILS # BLD: 3.39 K/UL (ref 1.56–6.13)
NEUTS SEG NFR BLD: 56.5 % (ref 34–71.1)
PLATELET # BLD AUTO: 223 K/UL (ref 182–369)
PMV BLD AUTO: 10.9 FL (ref 7.4–10.4)
POTASSIUM SERPL-SCNC: 3.9 MMOL/L (ref 3.5–5.1)
PROT SERPL-MCNC: 7.2 G/DL (ref 6.3–8.2)
RBC # BLD AUTO: 4.18 M/UL (ref 3.93–5.22)
SODIUM SERPL-SCNC: 138 MMOL/L (ref 137–145)
TIBC SERPL-MCNC: 280 UG/DL (ref 250–400)
WBC # BLD AUTO: 6 K/UL (ref 3.98–10.04)

## 2024-06-28 PROCEDURE — 99214 OFFICE O/P EST MOD 30 MIN: CPT | Performed by: NURSE PRACTITIONER

## 2024-06-28 PROCEDURE — 3074F SYST BP LT 130 MM HG: CPT | Performed by: NURSE PRACTITIONER

## 2024-06-28 PROCEDURE — 3078F DIAST BP <80 MM HG: CPT | Performed by: NURSE PRACTITIONER

## 2024-06-28 PROCEDURE — 36415 COLL VENOUS BLD VENIPUNCTURE: CPT

## 2024-06-28 PROCEDURE — 85025 COMPLETE CBC W/AUTO DIFF WBC: CPT

## 2024-06-28 PROCEDURE — 99212 OFFICE O/P EST SF 10 MIN: CPT

## 2024-06-28 PROCEDURE — 80053 COMPREHEN METABOLIC PANEL: CPT

## 2024-06-30 LAB — B2 MICROGLOB SERPL-MCNC: 1.9 MG/L (ref 0.8–2.4)

## 2024-07-01 ASSESSMENT — ENCOUNTER SYMPTOMS
BACK PAIN: 0
COUGH: 0
SORE THROAT: 0
VOMITING: 0
EYE PAIN: 0
EYE REDNESS: 0
EYES NEGATIVE: 1
CONSTIPATION: 0
SHORTNESS OF BREATH: 0
EYE DISCHARGE: 0
DIARRHEA: 0
GASTROINTESTINAL NEGATIVE: 1
RESPIRATORY NEGATIVE: 1
NAUSEA: 0
BLOOD IN STOOL: 0
WHEEZING: 0
ABDOMINAL PAIN: 0

## 2024-07-02 LAB
ALBUMIN SERPL-MCNC: 3.43 G/DL (ref 3.75–5.01)
ALPHA1 GLOB SERPL ELPH-MCNC: 0.33 G/DL (ref 0.19–0.46)
ALPHA2 GLOB SERPL ELPH-MCNC: 0.81 G/DL (ref 0.48–1.05)
B-GLOBULIN SERPL ELPH-MCNC: 0.83 G/DL (ref 0.48–1.1)
DEPRECATED KAPPA LC FREE/LAMBDA SER: 1.63 {RATIO} (ref 0.26–1.65)
EER MONOCLONAL PROTEIN AND FLC, SERUM: ABNORMAL
GAMMA GLOB SERPL ELPH-MCNC: 1.5 G/DL (ref 0.62–1.51)
IGA SERPL-MCNC: 155 MG/DL (ref 68–408)
IGG SERPL-MCNC: 1528 MG/DL (ref 768–1632)
IGM SERPL-MCNC: 76 MG/DL (ref 35–263)
INTERPRETATION SERPL IFE-IMP: ABNORMAL
INTERPRETATION SERPL IFE-IMP: ABNORMAL
KAPPA LC FREE SER-MCNC: 27.8 MG/L (ref 3.3–19.4)
LAMBDA LC FREE SERPL-MCNC: 17.07 MG/L (ref 5.71–26.3)
MONOCLONAL PROTEIN, SERUM: ABNORMAL G/DL
PROT SERPL-MCNC: 6.9 G/DL (ref 6.3–8.2)

## 2024-07-02 RX ORDER — ERGOCALCIFEROL 1.25 MG/1
50000 CAPSULE ORAL WEEKLY
Qty: 4 CAPSULE | Refills: 0 | Status: SHIPPED | OUTPATIENT
Start: 2024-07-02

## 2024-07-16 RX ORDER — ERGOCALCIFEROL 1.25 MG/1
50000 CAPSULE ORAL WEEKLY
Qty: 12 CAPSULE | Refills: 1 | Status: SHIPPED | OUTPATIENT
Start: 2024-07-16

## 2024-08-06 ENCOUNTER — OFFICE VISIT (OUTPATIENT)
Dept: PRIMARY CARE CLINIC | Age: 44
End: 2024-08-06
Payer: COMMERCIAL

## 2024-08-06 VITALS
BODY MASS INDEX: 56.7 KG/M2 | HEART RATE: 72 BPM | WEIGHT: 293 LBS | SYSTOLIC BLOOD PRESSURE: 136 MMHG | OXYGEN SATURATION: 98 % | DIASTOLIC BLOOD PRESSURE: 74 MMHG | TEMPERATURE: 98.3 F

## 2024-08-06 DIAGNOSIS — R53.83 OTHER FATIGUE: ICD-10-CM

## 2024-08-06 DIAGNOSIS — R06.02 SHORTNESS OF BREATH: ICD-10-CM

## 2024-08-06 DIAGNOSIS — Z51.81 MEDICATION MONITORING ENCOUNTER: ICD-10-CM

## 2024-08-06 DIAGNOSIS — M79.10 MYALGIA: ICD-10-CM

## 2024-08-06 DIAGNOSIS — M79.7 FIBROMYALGIA: Primary | ICD-10-CM

## 2024-08-06 DIAGNOSIS — R06.00 DYSPNEA, UNSPECIFIED TYPE: ICD-10-CM

## 2024-08-06 DIAGNOSIS — R79.89 ELEVATED BRAIN NATRIURETIC PEPTIDE (BNP) LEVEL: ICD-10-CM

## 2024-08-06 LAB
ALBUMIN SERPL-MCNC: 3.6 G/DL (ref 3.5–5.2)
ALCOHOL URINE: NORMAL
ALP SERPL-CCNC: 66 U/L (ref 35–104)
ALT SERPL-CCNC: 15 U/L (ref 5–33)
AMPHETAMINE SCREEN URINE: NORMAL
ANION GAP SERPL CALCULATED.3IONS-SCNC: 12 MMOL/L (ref 7–19)
AST SERPL-CCNC: 12 U/L (ref 5–32)
BARBITURATE SCREEN URINE: NORMAL
BASOPHILS # BLD: 0.1 K/UL (ref 0–0.2)
BASOPHILS NFR BLD: 0.8 % (ref 0–1)
BENZODIAZEPINE SCREEN, URINE: NORMAL
BILIRUB SERPL-MCNC: 0.3 MG/DL (ref 0.2–1.2)
BNP BLD-MCNC: 181 PG/ML (ref 0–124)
BUN SERPL-MCNC: 14 MG/DL (ref 6–20)
BUPRENORPHINE URINE: NORMAL
CALCIUM SERPL-MCNC: 8.7 MG/DL (ref 8.6–10)
CHLORIDE SERPL-SCNC: 103 MMOL/L (ref 98–111)
CK SERPL-CCNC: 108 U/L (ref 26–192)
CO2 SERPL-SCNC: 26 MMOL/L (ref 22–29)
COCAINE METABOLITE SCREEN URINE: NORMAL
CREAT SERPL-MCNC: 1 MG/DL (ref 0.5–0.9)
EOSINOPHIL # BLD: 0.3 K/UL (ref 0–0.6)
EOSINOPHIL NFR BLD: 3.8 % (ref 0–5)
ERYTHROCYTE [DISTWIDTH] IN BLOOD BY AUTOMATED COUNT: 13.5 % (ref 11.5–14.5)
ERYTHROCYTE [SEDIMENTATION RATE] IN BLOOD BY WESTERGREN METHOD: 39 MM/HR (ref 0–20)
FENTANYL SCREEN, URINE: NORMAL
GABAPENTIN SCREEN, URINE: NORMAL
GLUCOSE SERPL-MCNC: 104 MG/DL (ref 74–109)
HCT VFR BLD AUTO: 35.6 % (ref 37–47)
HGB BLD-MCNC: 11 G/DL (ref 12–16)
IMM GRANULOCYTES # BLD: 0.1 K/UL
LYMPHOCYTES # BLD: 2 K/UL (ref 1.1–4.5)
LYMPHOCYTES NFR BLD: 25 % (ref 20–40)
MCH RBC QN AUTO: 27.5 PG (ref 27–31)
MCHC RBC AUTO-ENTMCNC: 30.9 G/DL (ref 33–37)
MCV RBC AUTO: 89 FL (ref 81–99)
MDMA, URINE: NORMAL
METHADONE SCREEN, URINE: NORMAL
METHAMPHETAMINE, URINE: NORMAL
MONOCYTES # BLD: 0.6 K/UL (ref 0–0.9)
MONOCYTES NFR BLD: 7.6 % (ref 0–10)
NEUTROPHILS # BLD: 5 K/UL (ref 1.5–7.5)
NEUTS SEG NFR BLD: 62 % (ref 50–65)
OPIATE SCREEN URINE: NORMAL
OXYCODONE SCREEN URINE: NORMAL
PHENCYCLIDINE SCREEN URINE: NORMAL
PLATELET # BLD AUTO: 220 K/UL (ref 130–400)
PMV BLD AUTO: 10.9 FL (ref 9.4–12.3)
POTASSIUM SERPL-SCNC: 3.9 MMOL/L (ref 3.5–5)
PROPOXYPHENE SCREEN, URINE: NORMAL
PROT SERPL-MCNC: 7.1 G/DL (ref 6.6–8.7)
RBC # BLD AUTO: 4 M/UL (ref 4.2–5.4)
SODIUM SERPL-SCNC: 141 MMOL/L (ref 136–145)
SYNTHETIC CANNABINOIDS(K2) SCREEN, URINE: NORMAL
T4 FREE SERPL-MCNC: 1.26 NG/DL (ref 0.93–1.7)
THC SCREEN, URINE: NORMAL
TRAMADOL SCREEN URINE: NORMAL
TRICYCLIC ANTIDEPRESSANTS, UR: NORMAL
TSH SERPL DL<=0.005 MIU/L-ACNC: 2.3 UIU/ML (ref 0.27–4.2)
WBC # BLD AUTO: 8 K/UL (ref 4.8–10.8)

## 2024-08-06 PROCEDURE — 80305 DRUG TEST PRSMV DIR OPT OBS: CPT | Performed by: FAMILY MEDICINE

## 2024-08-06 PROCEDURE — 99214 OFFICE O/P EST MOD 30 MIN: CPT | Performed by: FAMILY MEDICINE

## 2024-08-06 PROCEDURE — 3075F SYST BP GE 130 - 139MM HG: CPT | Performed by: FAMILY MEDICINE

## 2024-08-06 PROCEDURE — 3078F DIAST BP <80 MM HG: CPT | Performed by: FAMILY MEDICINE

## 2024-08-06 RX ORDER — FUROSEMIDE 40 MG/1
40 TABLET ORAL DAILY
Qty: 30 TABLET | Refills: 0 | Status: SHIPPED | OUTPATIENT
Start: 2024-08-06

## 2024-08-06 RX ORDER — PREGABALIN 75 MG/1
75 CAPSULE ORAL 2 TIMES DAILY
Qty: 60 CAPSULE | Refills: 5 | Status: SHIPPED | OUTPATIENT
Start: 2024-08-06 | End: 2024-09-05

## 2024-08-06 ASSESSMENT — ENCOUNTER SYMPTOMS
ANAL BLEEDING: 0
RHINORRHEA: 0
SHORTNESS OF BREATH: 1
CONSTIPATION: 0
DIARRHEA: 0
NAUSEA: 0
BACK PAIN: 1
COUGH: 0
SINUS PAIN: 0
ABDOMINAL PAIN: 0
SINUS PRESSURE: 0
CHEST TIGHTNESS: 0

## 2024-08-06 NOTE — PROGRESS NOTES
TABLET BY MOUTH EVERY DAY 90 tablet 3    linaclotide (LINZESS) 145 MCG capsule Take 1 capsule by mouth every morning (before breakfast) 30 capsule 5    tiZANidine (ZANAFLEX) 4 MG tablet TAKE 1 TABLET BY MOUTH 3 TIMES DAILY AS NEEDED (MUSCLE SPASM). 30 tablet 0    PROAIR  (90 Base) MCG/ACT inhaler INHALE 2 PUFFS INTO THE LUNGS 4 TIMES DAILY AS NEEDED FOR WHEEZING. 8.5 each 5    ondansetron (ZOFRAN-ODT) 4 MG disintegrating tablet Take 1 tablet by mouth 3 times daily as needed for Nausea or Vomiting 21 tablet 0    busPIRone (BUSPAR) 5 MG tablet TAKE 1 TABLET BY MOUTH 2 TIMES DAILY AS NEEDED (ANXIETY). 180 tablet 2    mupirocin (BACTROBAN) 2 % ointment APPLY TO AFFECTED AREA 3 TIMES A DAY 22 g 2    Misc. Devices MISC Glucometer ICD 10: E11.9 1 each 0    Lancet Device MISC 1 lancet per strip. 30 each 11    ONETOUCH ULTRA strip 1 EACH BY IN VITRO ROUTE 3 TIMES DAILY AS NEEDED. 100 strip 11    predniSONE (DELTASONE) 5 MG tablet 1 tablet daily as needed      Blood Glucose Monitoring Suppl (FREESTYLE LITE) ANDREEA USE AS DIRECTED      Tirzepatide (MOUNJARO) 10 MG/0.5ML SOPN SC injection Inject 0.5 mLs into the skin once a week (Patient not taking: Reported on 8/6/2024) 4 Adjustable Dose Pre-filled Pen Syringe 5    blood glucose monitor strips 1 strip by Other route in the morning. 30 strip 11     No current facility-administered medications for this visit.       No follow-ups on file.     Discussed use, benefit, and side effects of prescribed medications.         History, Medications, Allergies     Allergies   Allergen Reactions    Mushroom Extract Complex      _______________________________________________________________  Past Medical History:   Diagnosis Date    Anemia     Chronic rheumatic arthritis (HCC)     Elevated blood pressure reading     Fibromyalgia     GERD (gastroesophageal reflux disease)     Hypertension     Intracranial hypertension     Iron malabsorption 04/01/2024    Obesity     PCOS (polycystic

## 2024-08-22 RX ORDER — LOSARTAN POTASSIUM 100 MG/1
TABLET ORAL
Qty: 90 TABLET | Refills: 0 | Status: SHIPPED | OUTPATIENT
Start: 2024-08-22

## 2024-09-19 ENCOUNTER — OFFICE VISIT (OUTPATIENT)
Dept: PRIMARY CARE CLINIC | Age: 44
End: 2024-09-19
Payer: COMMERCIAL

## 2024-09-19 VITALS
SYSTOLIC BLOOD PRESSURE: 112 MMHG | DIASTOLIC BLOOD PRESSURE: 84 MMHG | HEART RATE: 68 BPM | WEIGHT: 293 LBS | TEMPERATURE: 97.4 F | BODY MASS INDEX: 57.07 KG/M2 | OXYGEN SATURATION: 99 %

## 2024-09-19 DIAGNOSIS — I10 ESSENTIAL (PRIMARY) HYPERTENSION: ICD-10-CM

## 2024-09-19 DIAGNOSIS — M79.10 MYALGIA: ICD-10-CM

## 2024-09-19 DIAGNOSIS — D64.9 ANEMIA, UNSPECIFIED TYPE: ICD-10-CM

## 2024-09-19 DIAGNOSIS — R79.89 ELEVATED BRAIN NATRIURETIC PEPTIDE (BNP) LEVEL: ICD-10-CM

## 2024-09-19 DIAGNOSIS — E11.9 TYPE 2 DIABETES MELLITUS WITHOUT COMPLICATION, WITHOUT LONG-TERM CURRENT USE OF INSULIN (HCC): ICD-10-CM

## 2024-09-19 DIAGNOSIS — E53.8 B12 DEFICIENCY: ICD-10-CM

## 2024-09-19 DIAGNOSIS — R06.02 SHORTNESS OF BREATH: ICD-10-CM

## 2024-09-19 DIAGNOSIS — R53.83 OTHER FATIGUE: ICD-10-CM

## 2024-09-19 DIAGNOSIS — R06.00 DYSPNEA, UNSPECIFIED TYPE: ICD-10-CM

## 2024-09-19 DIAGNOSIS — R06.00 DYSPNEA, UNSPECIFIED TYPE: Primary | ICD-10-CM

## 2024-09-19 LAB
ALBUMIN SERPL-MCNC: 3.8 G/DL (ref 3.5–5.2)
ALP SERPL-CCNC: 63 U/L (ref 35–104)
ALT SERPL-CCNC: 11 U/L (ref 5–33)
ANION GAP SERPL CALCULATED.3IONS-SCNC: 9 MMOL/L (ref 7–19)
AST SERPL-CCNC: 11 U/L (ref 5–32)
BASOPHILS # BLD: 0.1 K/UL (ref 0–0.2)
BASOPHILS NFR BLD: 1 % (ref 0–1)
BILIRUB SERPL-MCNC: 0.5 MG/DL (ref 0.2–1.2)
BNP BLD-MCNC: 267 PG/ML (ref 0–124)
BUN SERPL-MCNC: 9 MG/DL (ref 6–20)
CALCIUM SERPL-MCNC: 8.7 MG/DL (ref 8.6–10)
CHLORIDE SERPL-SCNC: 103 MMOL/L (ref 98–111)
CO2 SERPL-SCNC: 29 MMOL/L (ref 22–29)
CREAT SERPL-MCNC: 0.8 MG/DL (ref 0.5–0.9)
EOSINOPHIL # BLD: 0.3 K/UL (ref 0–0.6)
EOSINOPHIL NFR BLD: 5.5 % (ref 0–5)
ERYTHROCYTE [DISTWIDTH] IN BLOOD BY AUTOMATED COUNT: 13.2 % (ref 11.5–14.5)
ERYTHROCYTE [SEDIMENTATION RATE] IN BLOOD BY WESTERGREN METHOD: 28 MM/HR (ref 0–20)
FERRITIN SERPL-MCNC: 114.6 NG/ML (ref 13–150)
GLUCOSE SERPL-MCNC: 99 MG/DL (ref 70–99)
HCT VFR BLD AUTO: 35.7 % (ref 37–47)
HGB BLD-MCNC: 11.6 G/DL (ref 12–16)
IMM GRANULOCYTES # BLD: 0 K/UL
IRON SERPL-MCNC: 40 UG/DL (ref 37–145)
LYMPHOCYTES # BLD: 1.7 K/UL (ref 1.1–4.5)
LYMPHOCYTES NFR BLD: 28.6 % (ref 20–40)
MCH RBC QN AUTO: 28.9 PG (ref 27–31)
MCHC RBC AUTO-ENTMCNC: 32.5 G/DL (ref 33–37)
MCV RBC AUTO: 89 FL (ref 81–99)
MONOCYTES # BLD: 0.4 K/UL (ref 0–0.9)
MONOCYTES NFR BLD: 6.4 % (ref 0–10)
NEUTROPHILS # BLD: 3.4 K/UL (ref 1.5–7.5)
NEUTS SEG NFR BLD: 58 % (ref 50–65)
PLATELET # BLD AUTO: 204 K/UL (ref 130–400)
PMV BLD AUTO: 11.3 FL (ref 9.4–12.3)
POTASSIUM SERPL-SCNC: 4.1 MMOL/L (ref 3.5–5)
PROT SERPL-MCNC: 7.4 G/DL (ref 6.4–8.3)
RBC # BLD AUTO: 4.01 M/UL (ref 4.2–5.4)
SODIUM SERPL-SCNC: 141 MMOL/L (ref 136–145)
T4 FREE SERPL-MCNC: 1.37 NG/DL (ref 0.93–1.7)
TSH SERPL DL<=0.005 MIU/L-ACNC: 1.54 UIU/ML (ref 0.27–4.2)
VIT B12 SERPL-MCNC: 640 PG/ML (ref 232–1245)
WBC # BLD AUTO: 5.8 K/UL (ref 4.8–10.8)

## 2024-09-19 PROCEDURE — 3074F SYST BP LT 130 MM HG: CPT | Performed by: FAMILY MEDICINE

## 2024-09-19 PROCEDURE — 99214 OFFICE O/P EST MOD 30 MIN: CPT | Performed by: FAMILY MEDICINE

## 2024-09-19 PROCEDURE — 3044F HG A1C LEVEL LT 7.0%: CPT | Performed by: FAMILY MEDICINE

## 2024-09-19 PROCEDURE — 3079F DIAST BP 80-89 MM HG: CPT | Performed by: FAMILY MEDICINE

## 2024-09-19 SDOH — ECONOMIC STABILITY: FOOD INSECURITY: WITHIN THE PAST 12 MONTHS, YOU WORRIED THAT YOUR FOOD WOULD RUN OUT BEFORE YOU GOT MONEY TO BUY MORE.: NEVER TRUE

## 2024-09-19 SDOH — ECONOMIC STABILITY: FOOD INSECURITY: WITHIN THE PAST 12 MONTHS, THE FOOD YOU BOUGHT JUST DIDN'T LAST AND YOU DIDN'T HAVE MONEY TO GET MORE.: NEVER TRUE

## 2024-09-19 SDOH — ECONOMIC STABILITY: INCOME INSECURITY: HOW HARD IS IT FOR YOU TO PAY FOR THE VERY BASICS LIKE FOOD, HOUSING, MEDICAL CARE, AND HEATING?: NOT HARD AT ALL

## 2024-09-19 ASSESSMENT — ENCOUNTER SYMPTOMS
COUGH: 1
NAUSEA: 0
CONSTIPATION: 0
SHORTNESS OF BREATH: 0
ABDOMINAL PAIN: 0
ANAL BLEEDING: 0
CHEST TIGHTNESS: 0
DIARRHEA: 0

## 2024-09-20 RX ORDER — CEFDINIR 300 MG/1
300 CAPSULE ORAL 2 TIMES DAILY
Qty: 20 CAPSULE | Refills: 0 | Status: SHIPPED | OUTPATIENT
Start: 2024-09-20 | End: 2024-09-30

## 2024-09-23 DIAGNOSIS — M79.7 FIBROMYALGIA: ICD-10-CM

## 2024-09-23 RX ORDER — PREGABALIN 75 MG/1
75 CAPSULE ORAL 2 TIMES DAILY
Qty: 60 CAPSULE | Refills: 5 | OUTPATIENT
Start: 2024-09-23 | End: 2024-10-23

## 2024-09-26 ENCOUNTER — OFFICE VISIT (OUTPATIENT)
Dept: CARDIOLOGY CLINIC | Age: 44
End: 2024-09-26
Payer: COMMERCIAL

## 2024-09-26 VITALS
HEART RATE: 67 BPM | WEIGHT: 293 LBS | SYSTOLIC BLOOD PRESSURE: 132 MMHG | OXYGEN SATURATION: 99 % | DIASTOLIC BLOOD PRESSURE: 84 MMHG | BODY MASS INDEX: 55.42 KG/M2

## 2024-09-26 DIAGNOSIS — I10 ESSENTIAL (PRIMARY) HYPERTENSION: Primary | ICD-10-CM

## 2024-09-26 PROCEDURE — 99214 OFFICE O/P EST MOD 30 MIN: CPT | Performed by: INTERNAL MEDICINE

## 2024-09-26 PROCEDURE — 3079F DIAST BP 80-89 MM HG: CPT | Performed by: INTERNAL MEDICINE

## 2024-09-26 PROCEDURE — 3075F SYST BP GE 130 - 139MM HG: CPT | Performed by: INTERNAL MEDICINE

## 2024-09-26 ASSESSMENT — ENCOUNTER SYMPTOMS
ABDOMINAL DISTENTION: 0
CHEST TIGHTNESS: 0
VOMITING: 0
BLOOD IN STOOL: 0
DIARRHEA: 0
FACIAL SWELLING: 0
SHORTNESS OF BREATH: 0
SORE THROAT: 0
APNEA: 0
EYE REDNESS: 0
EYE PAIN: 0
NAUSEA: 0
WHEEZING: 0
ABDOMINAL PAIN: 0
CONSTIPATION: 0
COUGH: 0
EYE DISCHARGE: 0

## 2024-10-03 RX ORDER — FLUCONAZOLE 150 MG/1
150 TABLET ORAL ONCE
Qty: 1 TABLET | Refills: 0 | Status: SHIPPED | OUTPATIENT
Start: 2024-10-03 | End: 2024-10-03

## 2024-10-04 ENCOUNTER — HOSPITAL ENCOUNTER (OUTPATIENT)
Age: 44
Discharge: HOME | End: 2024-10-06
Attending: FAMILY MEDICINE
Payer: COMMERCIAL

## 2024-10-04 VITALS
WEIGHT: 293 LBS | BODY MASS INDEX: 53.92 KG/M2 | SYSTOLIC BLOOD PRESSURE: 134 MMHG | DIASTOLIC BLOOD PRESSURE: 81 MMHG | HEIGHT: 62 IN

## 2024-10-04 DIAGNOSIS — R06.00 DYSPNEA, UNSPECIFIED TYPE: ICD-10-CM

## 2024-10-04 DIAGNOSIS — R79.89 ELEVATED BRAIN NATRIURETIC PEPTIDE (BNP) LEVEL: ICD-10-CM

## 2024-10-04 DIAGNOSIS — R06.02 SHORTNESS OF BREATH: ICD-10-CM

## 2024-10-04 LAB
ECHO AO ASC DIAM: 2.7 CM
ECHO AO ASCENDING AORTA INDEX: 1.19 CM/M2
ECHO AO ROOT DIAM: 1.6 CM
ECHO AO ROOT INDEX: 0.7 CM/M2
ECHO AO SINUS VALSALVA DIAM: 2.1 CM
ECHO AO SINUS VALSALVA INDEX: 0.93 CM/M2
ECHO AO ST JNCT DIAM: 1.8 CM
ECHO AV AREA PEAK VELOCITY: 2.1 CM2
ECHO AV AREA VTI: 2.1 CM2
ECHO AV AREA/BSA PEAK VELOCITY: 0.9 CM2/M2
ECHO AV AREA/BSA VTI: 0.9 CM2/M2
ECHO AV MEAN GRADIENT: 6 MMHG
ECHO AV MEAN VELOCITY: 1.1 M/S
ECHO AV PEAK GRADIENT: 10 MMHG
ECHO AV PEAK VELOCITY: 1.6 M/S
ECHO AV VELOCITY RATIO: 0.69
ECHO AV VTI: 34.7 CM
ECHO BSA: 2.44 M2
ECHO EST RA PRESSURE: 8 MMHG
ECHO IVC PROX: 1.3 CM
ECHO LA AREA 2C: 22.5 CM2
ECHO LA AREA 4C: 24 CM2
ECHO LA DIAMETER INDEX: 1.76 CM/M2
ECHO LA DIAMETER: 4 CM
ECHO LA MAJOR AXIS: 6.3 CM
ECHO LA MINOR AXIS: 5.5 CM
ECHO LA TO AORTIC ROOT RATIO: 2.5
ECHO LA VOL BP: 79 ML (ref 22–52)
ECHO LA VOL MOD A2C: 75 ML (ref 22–52)
ECHO LA VOL MOD A4C: 74 ML (ref 22–52)
ECHO LA VOL/BSA BIPLANE: 35 ML/M2 (ref 16–34)
ECHO LA VOLUME INDEX MOD A2C: 33 ML/M2 (ref 16–34)
ECHO LA VOLUME INDEX MOD A4C: 33 ML/M2 (ref 16–34)
ECHO LV E' LATERAL VELOCITY: 14.5 CM/S
ECHO LV E' SEPTAL VELOCITY: 8.2 CM/S
ECHO LV EDV A2C: 135 ML
ECHO LV EDV A4C: 100 ML
ECHO LV EDV INDEX A4C: 44 ML/M2
ECHO LV EDV NDEX A2C: 59 ML/M2
ECHO LV EJECTION FRACTION A2C: 52 %
ECHO LV EJECTION FRACTION A4C: 53 %
ECHO LV EJECTION FRACTION BIPLANE: 54 % (ref 55–100)
ECHO LV ESV A2C: 65 ML
ECHO LV ESV A4C: 48 ML
ECHO LV ESV INDEX A2C: 29 ML/M2
ECHO LV ESV INDEX A4C: 21 ML/M2
ECHO LV FRACTIONAL SHORTENING: 29 % (ref 28–44)
ECHO LV INTERNAL DIMENSION DIASTOLE INDEX: 1.98 CM/M2
ECHO LV INTERNAL DIMENSION DIASTOLIC: 4.5 CM (ref 3.9–5.3)
ECHO LV INTERNAL DIMENSION SYSTOLIC INDEX: 1.41 CM/M2
ECHO LV INTERNAL DIMENSION SYSTOLIC: 3.2 CM
ECHO LV IVSD: 1.2 CM (ref 0.6–0.9)
ECHO LV MASS 2D: 164 G (ref 67–162)
ECHO LV MASS INDEX 2D: 72.2 G/M2 (ref 43–95)
ECHO LV POSTERIOR WALL DIASTOLIC: 0.9 CM (ref 0.6–0.9)
ECHO LV RELATIVE WALL THICKNESS RATIO: 0.4
ECHO LVOT AREA: 3.1 CM2
ECHO LVOT AV VTI INDEX: 0.65
ECHO LVOT DIAM: 2 CM
ECHO LVOT MEAN GRADIENT: 2 MMHG
ECHO LVOT MEAN GRADIENT: 2 MMHG
ECHO LVOT PEAK GRADIENT: 4 MMHG
ECHO LVOT PEAK VELOCITY: 1.1 M/S
ECHO LVOT STROKE VOLUME INDEX: 31.4 ML/M2
ECHO LVOT SV: 71.3 ML
ECHO LVOT VTI: 22.7 CM
ECHO MV A VELOCITY: 0.94 M/S
ECHO MV AREA VTI: 2.2 CM2
ECHO MV E DECELERATION TIME (DT): 153 MS
ECHO MV E VELOCITY: 1.04 M/S
ECHO MV E/A RATIO: 1.11
ECHO MV E/E' LATERAL: 7.17
ECHO MV E/E' RATIO (AVERAGED): 9.93
ECHO MV E/E' SEPTAL: 12.68
ECHO MV LVOT VTI INDEX: 1.46
ECHO MV MAX VELOCITY: 1.1 M/S
ECHO MV MEAN GRADIENT: 2 MMHG
ECHO MV MEAN VELOCITY: 0.6 M/S
ECHO MV PEAK GRADIENT: 4 MMHG
ECHO MV REGURGITANT PEAK GRADIENT: 58 MMHG
ECHO MV REGURGITANT PEAK VELOCITY: 3.8 M/S
ECHO MV REGURGITANT VTIA: 135 CM
ECHO MV VTI: 33.1 CM
ECHO RA AREA 4C: 10.5 CM2
ECHO RA END SYSTOLIC VOLUME APICAL 4 CHAMBER INDEX BSA: 10 ML/M2
ECHO RA VOLUME: 23 ML
ECHO RIGHT VENTRICULAR SYSTOLIC PRESSURE (RVSP): 38 MMHG
ECHO RV BASAL DIMENSION: 2.9 CM
ECHO RV INTERNAL DIMENSION: 2.7 CM
ECHO RV LONGITUDINAL DIMENSION: 6.8 CM
ECHO RV MID DIMENSION: 1.4 CM
ECHO RV TAPSE: 2.4 CM (ref 1.7–?)
ECHO TV REGURGITANT MAX VELOCITY: 2.74 M/S
ECHO TV REGURGITANT PEAK GRADIENT: 30 MMHG

## 2024-10-04 PROCEDURE — 93306 TTE W/DOPPLER COMPLETE: CPT | Performed by: INTERNAL MEDICINE

## 2024-10-04 PROCEDURE — 93356 MYOCRD STRAIN IMG SPCKL TRCK: CPT | Performed by: INTERNAL MEDICINE

## 2024-10-04 PROCEDURE — 93356 MYOCRD STRAIN IMG SPCKL TRCK: CPT

## 2024-10-09 ENCOUNTER — TELEPHONE (OUTPATIENT)
Dept: HEMATOLOGY | Age: 44
End: 2024-10-09

## 2024-10-09 NOTE — TELEPHONE ENCOUNTER
Contacted patient to remind her of her appointment. She said that she needed to reschedule. I let her know that the  with be in contact with her to get that rescheduled.  has been notified.

## 2024-10-19 ENCOUNTER — OFFICE VISIT (OUTPATIENT)
Age: 44
End: 2024-10-19
Payer: COMMERCIAL

## 2024-10-19 VITALS
WEIGHT: 293 LBS | BODY MASS INDEX: 53.92 KG/M2 | RESPIRATION RATE: 18 BRPM | SYSTOLIC BLOOD PRESSURE: 116 MMHG | DIASTOLIC BLOOD PRESSURE: 80 MMHG | OXYGEN SATURATION: 99 % | HEART RATE: 78 BPM | HEIGHT: 62 IN

## 2024-10-19 DIAGNOSIS — R05.1 ACUTE COUGH: ICD-10-CM

## 2024-10-19 DIAGNOSIS — H66.002 NON-RECURRENT ACUTE SUPPURATIVE OTITIS MEDIA OF LEFT EAR WITHOUT SPONTANEOUS RUPTURE OF TYMPANIC MEMBRANE: ICD-10-CM

## 2024-10-19 DIAGNOSIS — J02.9 SORE THROAT: Primary | ICD-10-CM

## 2024-10-19 LAB — S PYO AG THROAT QL: NORMAL

## 2024-10-19 PROCEDURE — 3079F DIAST BP 80-89 MM HG: CPT

## 2024-10-19 PROCEDURE — 3074F SYST BP LT 130 MM HG: CPT

## 2024-10-19 PROCEDURE — 87880 STREP A ASSAY W/OPTIC: CPT

## 2024-10-19 PROCEDURE — 99213 OFFICE O/P EST LOW 20 MIN: CPT

## 2024-10-19 RX ORDER — BROMPHENIRAMINE MALEATE, PSEUDOEPHEDRINE HYDROCHLORIDE, AND DEXTROMETHORPHAN HYDROBROMIDE 2; 30; 10 MG/5ML; MG/5ML; MG/5ML
10 SYRUP ORAL 4 TIMES DAILY PRN
Qty: 200 ML | Refills: 0 | Status: SHIPPED | OUTPATIENT
Start: 2024-10-19 | End: 2024-10-24

## 2024-10-19 RX ORDER — PREDNISONE 10 MG/1
10 TABLET ORAL 2 TIMES DAILY
Qty: 10 TABLET | Refills: 0 | Status: SHIPPED | OUTPATIENT
Start: 2024-10-19 | End: 2024-10-24

## 2024-10-19 ASSESSMENT — ENCOUNTER SYMPTOMS
EYE DISCHARGE: 0
ABDOMINAL PAIN: 0
RHINORRHEA: 0
WHEEZING: 0
COLOR CHANGE: 0
CONSTIPATION: 0
EYE ITCHING: 0
BLOOD IN STOOL: 0
SINUS PRESSURE: 0
VOMITING: 1
NAUSEA: 1
SORE THROAT: 1
DIARRHEA: 0
SHORTNESS OF BREATH: 0
COUGH: 1

## 2024-10-19 NOTE — PROGRESS NOTES
care plan.   Patient was given educational materials - see patient instructions below.     Patient Instructions   - Take zofran as needed for vomiting  - Increase fluid intake  - If applicable, do not take any medication like immodium to stop diarrhea. It needs to run its course.   - Take clear liquids until no more vomiting for 4-6 hours  - Advance to BRAT (bananas, rice, applesauce and toast) as tolerated.   - Monitor for signs of dehydration: decreased urine output, dark urine, feeling weak or dizzy, and go to the ER if these occur.   - If patient is not improving or developing any new/worsening symptoms then return to clinic as needed or follow up with PCP     Any condition can change, despite proper treatment. Therefore, if symptoms still persist or worsen after treatment plan intitated today, either go to the nearest ER, or call PCP, or return to UC for further evaluation.    Urgent Care evaluation today is not a substitute for PCP visit. Follow up care is your responsibility to discuss and review this UC visit.     Discussed use, benefits, and side effects of any prescribed medications. All patient questions were answered. Patient demonstrates understanding and agrees with care plan. Patient was given educational materials - see patient instructions below         Electronically signed by NATACHA Preston NP on 10/19/2024 at 4:05 PM

## 2024-12-11 DIAGNOSIS — I10 ESSENTIAL (PRIMARY) HYPERTENSION: ICD-10-CM

## 2024-12-11 RX ORDER — AMLODIPINE BESYLATE 5 MG/1
5 TABLET ORAL DAILY
Qty: 90 TABLET | Refills: 3 | Status: SHIPPED | OUTPATIENT
Start: 2024-12-11

## 2024-12-12 RX ORDER — LOSARTAN POTASSIUM 100 MG/1
TABLET ORAL
Qty: 90 TABLET | Refills: 3 | Status: SHIPPED | OUTPATIENT
Start: 2024-12-12

## 2024-12-16 ENCOUNTER — OFFICE VISIT (OUTPATIENT)
Dept: PRIMARY CARE CLINIC | Age: 44
End: 2024-12-16
Payer: COMMERCIAL

## 2024-12-16 VITALS
HEART RATE: 73 BPM | TEMPERATURE: 97.4 F | BODY MASS INDEX: 54.5 KG/M2 | OXYGEN SATURATION: 99 % | SYSTOLIC BLOOD PRESSURE: 128 MMHG | WEIGHT: 293 LBS | DIASTOLIC BLOOD PRESSURE: 62 MMHG

## 2024-12-16 DIAGNOSIS — R09.89 CHEST CONGESTION: Primary | ICD-10-CM

## 2024-12-16 DIAGNOSIS — J01.00 ACUTE NON-RECURRENT MAXILLARY SINUSITIS: ICD-10-CM

## 2024-12-16 LAB
INFLUENZA A ANTIGEN, POC: NEGATIVE
INFLUENZA B ANTIGEN, POC: NEGATIVE
LOT EXPIRE DATE: NORMAL
LOT KIT NUMBER: NORMAL
SARS-COV-2, POC: NORMAL
VALID INTERNAL CONTROL: NORMAL
VENDOR AND KIT NAME POC: NORMAL

## 2024-12-16 PROCEDURE — 87428 SARSCOV & INF VIR A&B AG IA: CPT | Performed by: FAMILY MEDICINE

## 2024-12-16 PROCEDURE — 3078F DIAST BP <80 MM HG: CPT | Performed by: FAMILY MEDICINE

## 2024-12-16 PROCEDURE — 3074F SYST BP LT 130 MM HG: CPT | Performed by: FAMILY MEDICINE

## 2024-12-16 PROCEDURE — 96372 THER/PROPH/DIAG INJ SC/IM: CPT | Performed by: FAMILY MEDICINE

## 2024-12-16 PROCEDURE — 99213 OFFICE O/P EST LOW 20 MIN: CPT | Performed by: FAMILY MEDICINE

## 2024-12-16 RX ORDER — FLUCONAZOLE 150 MG/1
150 TABLET ORAL ONCE
Qty: 1 TABLET | Refills: 0 | Status: SHIPPED | OUTPATIENT
Start: 2024-12-16 | End: 2024-12-16

## 2024-12-16 RX ORDER — HYDROCHLOROTHIAZIDE 12.5 MG/1
CAPSULE ORAL DAILY
Qty: 90 CAPSULE | Refills: 2 | Status: SHIPPED | OUTPATIENT
Start: 2024-12-16

## 2024-12-16 RX ORDER — DEXAMETHASONE SODIUM PHOSPHATE 10 MG/ML
10 INJECTION INTRAMUSCULAR; INTRAVENOUS ONCE
Status: COMPLETED | OUTPATIENT
Start: 2024-12-16 | End: 2024-12-16

## 2024-12-16 RX ADMIN — DEXAMETHASONE SODIUM PHOSPHATE 10 MG: 10 INJECTION INTRAMUSCULAR; INTRAVENOUS at 14:00

## 2024-12-16 ASSESSMENT — ENCOUNTER SYMPTOMS
DIARRHEA: 1
VOMITING: 0
SINUS PRESSURE: 1
SINUS COMPLAINT: 1
BLOOD IN STOOL: 0
CONSTIPATION: 0
RHINORRHEA: 1
COUGH: 1
ANAL BLEEDING: 0
SINUS PAIN: 1
HOARSE VOICE: 1

## 2024-12-16 NOTE — PROGRESS NOTES
[facility]  [address]  [address]  [facility] [address]  Dept: [phone]  Dept Fax: [phone]  Loc: [phone]    Yue Richardson is a 44 y.o. female who presents today for her medical conditions/complaints as noted below.  Yue Richardson is here for Chest Congestion (Slight cough body aches headaches fatigue)         Patient History:      Rheumatoid arthritis, fibromyalgia  -Rheumatologist: Dr. Willie Cordon     Obstructive sleep apnea  - Completed October 6, 2021:  - BiPAP     Iron deficiency anemia, monoclonal gammopathy of unknown significance.  - Followed by [facility] hematology     Restrictive lung disease  -Suspected related to obesity hypoventilation syndrome and obstructive sleep apnea     IBS: Constipation  -[facility] gastroenterology           Subjective:   CC:  Here today to discuss the following:    Chest Congestion  This is a recurrent problem. The current episode started in the past 7 days. Associated symptoms include chills, congestion, coughing, fatigue, headaches and myalgias. Pertinent negatives include no fever or vomiting.   Sinus Problem  This is a new problem. The current episode started in the past 7 days. There has been no fever. Associated symptoms include chills, congestion, coughing, ear pain, headaches, a hoarse voice and sinus pressure.         Review of Systems   Constitutional:  Positive for chills and fatigue. Negative for fever.   HENT:  Positive for congestion, ear pain, hoarse voice, postnasal drip, rhinorrhea, sinus pressure and sinus pain. Negative for hearing loss, mouth sores and nosebleeds.    Respiratory:  Positive for cough.    Gastrointestinal:  Positive for diarrhea. Negative for anal bleeding, blood in stool, constipation and vomiting.   Musculoskeletal:  Positive for myalgias.   Neurological:  Positive for headaches.     Objective:   /62   Pulse 73   Temp 97.4 °F (36.3 °C)   Wt 135.2 kg (298 lb)   SpO2 99%   BMI

## 2024-12-31 ENCOUNTER — TELEPHONE (OUTPATIENT)
Dept: HEMATOLOGY | Age: 44
End: 2024-12-31

## 2024-12-31 NOTE — TELEPHONE ENCOUNTER

## 2025-01-02 DIAGNOSIS — D47.2 MGUS (MONOCLONAL GAMMOPATHY OF UNKNOWN SIGNIFICANCE): Primary | ICD-10-CM

## 2025-01-02 DIAGNOSIS — D50.0 IRON DEFICIENCY ANEMIA DUE TO CHRONIC BLOOD LOSS: ICD-10-CM

## 2025-01-02 NOTE — PROGRESS NOTES
Progress Note      Pt Name: Yue Richardson  YOB: 1980  MRN: [id number]    Date of evaluation: 01/03/2025  History Obtained From:  patient, electronic medical record    CHIEF COMPLAINT:    Chief Complaint   Patient presents with    Follow-up     MGUS (monoclonal gammopathy of unknown significance     HISTORY OF PRESENT ILLNESS:    Yue Richardson is a 44 y.o.  female who is followed for iron deficiency anemia due to chronic blood loss (menorrhagia) with intolerance to oral iron replacement and monoclonal gammopathy of unknown significance (MGUS) with an IgG lambda light chain.  She has remained without myeloma defining event and continues with routine lab monitoring.  Yue is also followed by Dr. Cordon for diagnosis of rheumatoid arthritis and is currently being treated with Plaquenil.  She last received IV iron replacement in April 2024.  Yue returns today in follow-up for review of lab results and further treatment recommendations.   History of Present Illness  She reports no new health concerns since her last visit. She is not experiencing any bleeding episodes. Her menstrual cycle has been regular, with the last one being of normal intensity. She anticipates the onset of her next cycle in approximately one week. She acknowledges that her diet may not be optimal, as she often consumes only one meal per day. She has expressed interest in using iron patches due to a perceived deficiency. She also reports weight fluctuations, with a recent increase despite a previous decrease to 280 pounds from 310 pounds. She attributes this weight loss to the use of Mounjaro 10 mg, which she believes suppresses her appetite. She does not experience feelings of hunger and often skips breakfast.       Today's clinic visit to include physical assessment, review of systems, any lab or radiographic findings that were available and plan of care are documented below.    HEMATOLOGIC HISTORY:  Diagnosis:  Iron

## 2025-01-03 ENCOUNTER — HOSPITAL ENCOUNTER (OUTPATIENT)
Dept: INFUSION THERAPY | Age: 45
Discharge: HOME | End: 2025-01-03
Payer: COMMERCIAL

## 2025-01-03 ENCOUNTER — OFFICE VISIT (OUTPATIENT)
Dept: HEMATOLOGY | Age: 45
End: 2025-01-03
Payer: COMMERCIAL

## 2025-01-03 VITALS
WEIGHT: 293 LBS | DIASTOLIC BLOOD PRESSURE: 82 MMHG | BODY MASS INDEX: 53.92 KG/M2 | OXYGEN SATURATION: 99 % | HEART RATE: 68 BPM | TEMPERATURE: 97.9 F | SYSTOLIC BLOOD PRESSURE: 118 MMHG | HEIGHT: 62 IN

## 2025-01-03 DIAGNOSIS — D50.0 IRON DEFICIENCY ANEMIA DUE TO CHRONIC BLOOD LOSS: Primary | ICD-10-CM

## 2025-01-03 DIAGNOSIS — D47.2 MGUS (MONOCLONAL GAMMOPATHY OF UNKNOWN SIGNIFICANCE): ICD-10-CM

## 2025-01-03 DIAGNOSIS — N92.0 MENORRHAGIA WITH REGULAR CYCLE: ICD-10-CM

## 2025-01-03 DIAGNOSIS — D50.0 IRON DEFICIENCY ANEMIA DUE TO CHRONIC BLOOD LOSS: ICD-10-CM

## 2025-01-03 LAB
ALBUMIN SERPL-MCNC: 3.8 G/DL (ref 3.5–5.2)
ALP SERPL-CCNC: 56 U/L (ref 35–104)
ALT SERPL-CCNC: 11 U/L (ref 5–33)
ANION GAP SERPL CALCULATED.3IONS-SCNC: 9 MMOL/L (ref 7–19)
AST SERPL-CCNC: 16 U/L (ref 5–32)
BASOPHILS # BLD: 0.05 K/UL (ref 0.01–0.08)
BASOPHILS NFR BLD: 0.8 % (ref 0.1–1.2)
BILIRUB SERPL-MCNC: 0.4 MG/DL (ref 0–1.2)
BUN SERPL-MCNC: 8 MG/DL (ref 6–20)
CALCIUM SERPL-MCNC: 8.8 MG/DL (ref 8.6–10)
CHLORIDE SERPL-SCNC: 103 MMOL/L (ref 98–107)
CO2 SERPL-SCNC: 25 MMOL/L (ref 22–29)
CREAT SERPL-MCNC: 0.9 MG/DL (ref 0.5–0.9)
EOSINOPHIL # BLD: 0.32 K/UL (ref 0.04–0.54)
EOSINOPHIL NFR BLD: 4.9 % (ref 0.7–7)
ERYTHROCYTE [DISTWIDTH] IN BLOOD BY AUTOMATED COUNT: 14.4 % (ref 11.7–14.4)
FERRITIN SERPL-MCNC: 119.4 NG/ML (ref 13–150)
GLUCOSE SERPL-MCNC: 91 MG/DL (ref 70–99)
HCT VFR BLD AUTO: 34 % (ref 34.1–44.9)
HGB BLD-MCNC: 11.2 G/DL (ref 11.2–15.7)
IRON SATN MFR SERPL: 16 % (ref 14–50)
IRON SERPL-MCNC: 50 UG/DL (ref 37–145)
LYMPHOCYTES # BLD: 1.98 K/UL (ref 1.18–3.74)
LYMPHOCYTES NFR BLD: 30.2 % (ref 19.3–53.1)
MCH RBC QN AUTO: 28 PG (ref 25.6–32.2)
MCHC RBC AUTO-ENTMCNC: 32.9 G/DL (ref 32.3–35.5)
MCV RBC AUTO: 85 FL (ref 79.4–94.8)
MONOCYTES # BLD: 0.38 K/UL (ref 0.24–0.82)
MONOCYTES NFR BLD: 5.8 % (ref 4.7–12.5)
NEUTROPHILS # BLD: 3.8 K/UL (ref 1.56–6.13)
NEUTS SEG NFR BLD: 57.8 % (ref 34–71.1)
PLATELET # BLD AUTO: 242 K/UL (ref 182–369)
PMV BLD AUTO: 10.4 FL (ref 7.4–10.4)
POTASSIUM SERPL-SCNC: 4 MMOL/L (ref 3.5–5.1)
PROT SERPL-MCNC: 7.1 G/DL (ref 6.4–8.3)
RBC # BLD AUTO: 4 M/UL (ref 3.93–5.22)
SODIUM SERPL-SCNC: 137 MMOL/L (ref 136–145)
TIBC SERPL-MCNC: 319 UG/DL (ref 250–400)
WBC # BLD AUTO: 6.56 K/UL (ref 3.98–10.04)

## 2025-01-03 PROCEDURE — 99214 OFFICE O/P EST MOD 30 MIN: CPT | Performed by: NURSE PRACTITIONER

## 2025-01-03 PROCEDURE — 36415 COLL VENOUS BLD VENIPUNCTURE: CPT

## 2025-01-03 PROCEDURE — 3079F DIAST BP 80-89 MM HG: CPT | Performed by: NURSE PRACTITIONER

## 2025-01-03 PROCEDURE — 3074F SYST BP LT 130 MM HG: CPT | Performed by: NURSE PRACTITIONER

## 2025-01-03 PROCEDURE — 80053 COMPREHEN METABOLIC PANEL: CPT

## 2025-01-03 PROCEDURE — 85025 COMPLETE CBC W/AUTO DIFF WBC: CPT

## 2025-01-03 ASSESSMENT — ENCOUNTER SYMPTOMS
GASTROINTESTINAL NEGATIVE: 1
EYE PAIN: 0
SORE THROAT: 0
VOMITING: 0
COUGH: 0
SHORTNESS OF BREATH: 1
WHEEZING: 0
EYE DISCHARGE: 0
NAUSEA: 0
ABDOMINAL PAIN: 0
EYES NEGATIVE: 1
CONSTIPATION: 0
BACK PAIN: 0
EYE REDNESS: 0
DIARRHEA: 0
BLOOD IN STOOL: 0

## 2025-01-06 LAB
ALBUMIN SERPL-MCNC: 3.62 G/DL (ref 3.75–5.01)
ALPHA1 GLOB SERPL ELPH-MCNC: 0.37 G/DL (ref 0.19–0.46)
ALPHA2 GLOB SERPL ELPH-MCNC: 0.81 G/DL (ref 0.48–1.05)
B-GLOBULIN SERPL ELPH-MCNC: 0.87 G/DL (ref 0.48–1.1)
DEPRECATED KAPPA LC FREE/LAMBDA SER: 1.57 {RATIO} (ref 0.26–1.65)
EER MONOCLONAL PROTEIN AND FLC, SERUM: ABNORMAL
GAMMA GLOB SERPL ELPH-MCNC: 1.34 G/DL (ref 0.62–1.51)
IGA SERPL-MCNC: 134 MG/DL (ref 68–408)
IGG SERPL-MCNC: 1478 MG/DL (ref 768–1632)
IGM SERPL-MCNC: 69 MG/DL (ref 35–263)
INTERPRETATION SERPL IFE-IMP: ABNORMAL
INTERPRETATION SERPL IFE-IMP: ABNORMAL
KAPPA LC FREE SER-MCNC: 26.21 MG/L (ref 3.3–19.4)
LAMBDA LC FREE SERPL-MCNC: 16.67 MG/L (ref 5.71–26.3)
MONOCLONAL PROTEIN, SERUM: ABNORMAL G/DL
PROT SERPL-MCNC: 7 G/DL (ref 6.3–8.2)

## 2025-01-10 RX ORDER — ERGOCALCIFEROL 1.25 MG/1
50000 CAPSULE, LIQUID FILLED ORAL WEEKLY
Qty: 12 CAPSULE | Refills: 0 | Status: SHIPPED | OUTPATIENT
Start: 2025-01-10

## 2025-01-17 DIAGNOSIS — E11.9 TYPE 2 DIABETES MELLITUS WITHOUT COMPLICATION, WITHOUT LONG-TERM CURRENT USE OF INSULIN (HCC): ICD-10-CM

## 2025-01-20 RX ORDER — TIRZEPATIDE 10 MG/.5ML
INJECTION, SOLUTION SUBCUTANEOUS
Qty: 2 ML | Refills: 5 | Status: SHIPPED | OUTPATIENT
Start: 2025-01-20

## 2025-01-21 ENCOUNTER — OFFICE VISIT (OUTPATIENT)
Dept: PRIMARY CARE CLINIC | Age: 45
End: 2025-01-21
Payer: COMMERCIAL

## 2025-01-21 VITALS
BODY MASS INDEX: 55.42 KG/M2 | WEIGHT: 293 LBS | HEART RATE: 71 BPM | DIASTOLIC BLOOD PRESSURE: 64 MMHG | TEMPERATURE: 97.1 F | SYSTOLIC BLOOD PRESSURE: 126 MMHG | OXYGEN SATURATION: 100 %

## 2025-01-21 DIAGNOSIS — R53.83 OTHER FATIGUE: ICD-10-CM

## 2025-01-21 DIAGNOSIS — E11.9 TYPE 2 DIABETES MELLITUS WITHOUT COMPLICATION, WITHOUT LONG-TERM CURRENT USE OF INSULIN (HCC): ICD-10-CM

## 2025-01-21 DIAGNOSIS — K21.9 GASTROESOPHAGEAL REFLUX DISEASE WITHOUT ESOPHAGITIS: ICD-10-CM

## 2025-01-21 DIAGNOSIS — F41.8 DEPRESSION WITH ANXIETY: ICD-10-CM

## 2025-01-21 DIAGNOSIS — M79.7 FIBROMYALGIA: ICD-10-CM

## 2025-01-21 DIAGNOSIS — Z13.220 LIPID SCREENING: ICD-10-CM

## 2025-01-21 DIAGNOSIS — I10 ESSENTIAL (PRIMARY) HYPERTENSION: Primary | ICD-10-CM

## 2025-01-21 DIAGNOSIS — E55.9 AVITAMINOSIS D: ICD-10-CM

## 2025-01-21 LAB — HBA1C MFR BLD: 5.4 %

## 2025-01-21 PROCEDURE — 3074F SYST BP LT 130 MM HG: CPT | Performed by: FAMILY MEDICINE

## 2025-01-21 PROCEDURE — 3078F DIAST BP <80 MM HG: CPT | Performed by: FAMILY MEDICINE

## 2025-01-21 PROCEDURE — 3044F HG A1C LEVEL LT 7.0%: CPT | Performed by: FAMILY MEDICINE

## 2025-01-21 PROCEDURE — 99214 OFFICE O/P EST MOD 30 MIN: CPT | Performed by: FAMILY MEDICINE

## 2025-01-21 PROCEDURE — 83036 HEMOGLOBIN GLYCOSYLATED A1C: CPT | Performed by: FAMILY MEDICINE

## 2025-01-21 SDOH — ECONOMIC STABILITY: FOOD INSECURITY: WITHIN THE PAST 12 MONTHS, THE FOOD YOU BOUGHT JUST DIDN'T LAST AND YOU DIDN'T HAVE MONEY TO GET MORE.: NEVER TRUE

## 2025-01-21 SDOH — ECONOMIC STABILITY: FOOD INSECURITY: WITHIN THE PAST 12 MONTHS, YOU WORRIED THAT YOUR FOOD WOULD RUN OUT BEFORE YOU GOT MONEY TO BUY MORE.: NEVER TRUE

## 2025-01-21 ASSESSMENT — PATIENT HEALTH QUESTIONNAIRE - PHQ9
4. FEELING TIRED OR HAVING LITTLE ENERGY: NOT AT ALL
SUM OF ALL RESPONSES TO PHQ QUESTIONS 1-9: 2
6. FEELING BAD ABOUT YOURSELF - OR THAT YOU ARE A FAILURE OR HAVE LET YOURSELF OR YOUR FAMILY DOWN: NOT AT ALL
SUM OF ALL RESPONSES TO PHQ QUESTIONS 1-9: 2
SUM OF ALL RESPONSES TO PHQ QUESTIONS 1-9: 2
7. TROUBLE CONCENTRATING ON THINGS, SUCH AS READING THE NEWSPAPER OR WATCHING TELEVISION: NOT AT ALL
8. MOVING OR SPEAKING SO SLOWLY THAT OTHER PEOPLE COULD HAVE NOTICED. OR THE OPPOSITE, BEING SO FIGETY OR RESTLESS THAT YOU HAVE BEEN MOVING AROUND A LOT MORE THAN USUAL: NOT AT ALL
5. POOR APPETITE OR OVEREATING: NOT AT ALL
1. LITTLE INTEREST OR PLEASURE IN DOING THINGS: SEVERAL DAYS
SUM OF ALL RESPONSES TO PHQ9 QUESTIONS 1 & 2: 2
SUM OF ALL RESPONSES TO PHQ QUESTIONS 1-9: 2
3. TROUBLE FALLING OR STAYING ASLEEP: NOT AT ALL
2. FEELING DOWN, DEPRESSED OR HOPELESS: SEVERAL DAYS
9. THOUGHTS THAT YOU WOULD BE BETTER OFF DEAD, OR OF HURTING YOURSELF: NOT AT ALL
10. IF YOU CHECKED OFF ANY PROBLEMS, HOW DIFFICULT HAVE THESE PROBLEMS MADE IT FOR YOU TO DO YOUR WORK, TAKE CARE OF THINGS AT HOME, OR GET ALONG WITH OTHER PEOPLE: NOT DIFFICULT AT ALL

## 2025-01-21 NOTE — PROGRESS NOTES
[facility]  [address]  [address]  [place] [address]  Dept: [phone]  Dept Fax: [phone]  Loc: [phone]    Yue Richardson is a 44 y.o. female who presents today for her medical conditions/complaints as noted below.  Yue Richardson is here for 3 Month Follow-Up (Discuss increasing Mounjaro)         Patient History:      Rheumatoid arthritis, fibromyalgia  -Rheumatologist: Dr. Willie Cordon     Obstructive sleep apnea  - Completed October 6, 2021:  - BiPAP     Iron deficiency anemia, monoclonal gammopathy of unknown significance.  - Followed by [facility] hematology     Restrictive lung disease  -Suspected related to obesity hypoventilation syndrome and obstructive sleep apnea     IBS: Constipation  -[facility] gastroenterology           Subjective:   CC:  Here today to discuss the following:    Diabetes Mellitus  Has been compliant with medications.   No side effects of medications since last visit.   No polyuria, polydipsia, or vision changes since last visit.   No symptomatic episodes of hypoglycemia.     Hypertension  Compliant with medications.  No adverse effects from medication.  No lightheadedness, palpitations, or chest pain.      Gastroesophageal Reflux Disease  Symptoms currently under control.   Medication adequately controls symptoms.  No hematochezia or melena.     Depression with Anxiety  Compliant with medications.  No adverse effects from medication.  Depression and anxiety symptoms are stable today. No suicidal or homicidal ideation. Excessive worry, insomnia, and anxiousness are stable. Energy, concentration, and apathy are stable.        Review of Systems   Constitutional: Negative for chills and fever.   HENT: Negative for congestion.    Respiratory: Negative for cough, chest tightness and shortness of breath.  Cardiovascular: Negative for chest pain, palpitations and leg swelling.   Gastrointestinal: Negative for abdominal pain, anal bleeding,

## 2025-01-28 ENCOUNTER — HOSPITAL ENCOUNTER (OUTPATIENT)
Dept: CT IMAGING | Age: 45
Discharge: HOME | End: 2025-01-28
Payer: COMMERCIAL

## 2025-01-28 DIAGNOSIS — R91.1 LUNG NODULE: ICD-10-CM

## 2025-01-28 PROCEDURE — 71250 CT THORAX DX C-: CPT

## 2025-02-10 RX ORDER — ERGOCALCIFEROL 1.25 MG/1
50000 CAPSULE, LIQUID FILLED ORAL WEEKLY
Qty: 36 CAPSULE | Refills: 1 | OUTPATIENT
Start: 2025-02-10

## 2025-02-13 RX ORDER — ERGOCALCIFEROL 1.25 MG/1
50000 CAPSULE, LIQUID FILLED ORAL WEEKLY
Qty: 12 CAPSULE | Refills: 0 | OUTPATIENT
Start: 2025-02-13

## 2025-03-06 RX ORDER — ONDANSETRON 4 MG/1
4 TABLET, ORALLY DISINTEGRATING ORAL 3 TIMES DAILY PRN
Qty: 21 TABLET | Refills: 0 | Status: SHIPPED | OUTPATIENT
Start: 2025-03-06

## 2025-03-11 DIAGNOSIS — I10 ESSENTIAL (PRIMARY) HYPERTENSION: ICD-10-CM

## 2025-03-11 RX ORDER — CARVEDILOL 12.5 MG/1
12.5 TABLET ORAL 2 TIMES DAILY
Qty: 180 TABLET | Refills: 1 | Status: SHIPPED | OUTPATIENT
Start: 2025-03-11

## 2025-03-31 ENCOUNTER — TELEPHONE (OUTPATIENT)
Dept: HEMATOLOGY | Age: 45
End: 2025-03-31

## 2025-03-31 NOTE — TELEPHONE ENCOUNTER
Called Patient and reminded patient of their appointment on 04/04/2025 and patient confirmed they would be here. Reminded patient to just come at appointment time, and to not come at the lab appointment time. Reminded patient that we will not check them in any more than 30 minutes before appointment time.  We have now moved to the [facility] that is located between our old office and the ER at the [facility]. Letting the Pt know that our front entrance faces the  [place]. Reminded pt to eat well and be well hydrated for their labs.

## 2025-04-02 ENCOUNTER — OFFICE VISIT (OUTPATIENT)
Dept: GASTROENTEROLOGY | Age: 45
End: 2025-04-02
Payer: MEDICAID

## 2025-04-02 VITALS
HEART RATE: 78 BPM | SYSTOLIC BLOOD PRESSURE: 120 MMHG | BODY MASS INDEX: 53.92 KG/M2 | WEIGHT: 293 LBS | DIASTOLIC BLOOD PRESSURE: 77 MMHG | HEIGHT: 62 IN | OXYGEN SATURATION: 97 %

## 2025-04-02 DIAGNOSIS — R15.2 FECAL URGENCY: ICD-10-CM

## 2025-04-02 DIAGNOSIS — R19.7 DIARRHEA, UNSPECIFIED TYPE: Primary | ICD-10-CM

## 2025-04-02 DIAGNOSIS — R19.7 DIARRHEA, UNSPECIFIED TYPE: ICD-10-CM

## 2025-04-02 LAB
ADV 40+41 DNA STL QL NAA+NON-PROBE: NOT DETECTED
C CAYETANENSIS DNA STL QL NAA+NON-PROBE: NOT DETECTED
C COLI+JEJ+UPSA DNA STL QL NAA+NON-PROBE: NOT DETECTED
C DIFF TOX A+B STL QL IA: NORMAL
CRYPTOSP DNA STL QL NAA+NON-PROBE: NOT DETECTED
E HISTOLYT DNA STL QL NAA+NON-PROBE: NOT DETECTED
EAEC PAA PLAS AGGR+AATA ST NAA+NON-PRB: NOT DETECTED
EC STX1+STX2 GENES STL QL NAA+NON-PROBE: NOT DETECTED
EPEC EAE GENE STL QL NAA+NON-PROBE: NOT DETECTED
ETEC LTA+ST1A+ST1B TOX ST NAA+NON-PROBE: NOT DETECTED
G LAMBLIA DNA STL QL NAA+NON-PROBE: NOT DETECTED
GI PATH DNA+RNA PNL STL NAA+NON-PROBE: NOT DETECTED
NOROVIRUS GI+II RNA STL QL NAA+NON-PROBE: NOT DETECTED
P SHIGELLOIDES DNA STL QL NAA+NON-PROBE: NOT DETECTED
RVA RNA STL QL NAA+NON-PROBE: NOT DETECTED
S ENT+BONG DNA STL QL NAA+NON-PROBE: NOT DETECTED
SAPO I+II+IV+V RNA STL QL NAA+NON-PROBE: NOT DETECTED
SHIGELLA SP+EIEC IPAH ST NAA+NON-PROBE: NOT DETECTED
V CHOL+PARA+VUL DNA STL QL NAA+NON-PROBE: NOT DETECTED
V CHOLERAE DNA STL QL NAA+NON-PROBE: NOT DETECTED
Y ENTEROCOL DNA STL QL NAA+NON-PROBE: NOT DETECTED

## 2025-04-02 PROCEDURE — 3078F DIAST BP <80 MM HG: CPT | Performed by: NURSE PRACTITIONER

## 2025-04-02 PROCEDURE — G8428 CUR MEDS NOT DOCUMENT: HCPCS | Performed by: NURSE PRACTITIONER

## 2025-04-02 PROCEDURE — 99214 OFFICE O/P EST MOD 30 MIN: CPT | Performed by: NURSE PRACTITIONER

## 2025-04-02 PROCEDURE — 1036F TOBACCO NON-USER: CPT | Performed by: NURSE PRACTITIONER

## 2025-04-02 PROCEDURE — 3074F SYST BP LT 130 MM HG: CPT | Performed by: NURSE PRACTITIONER

## 2025-04-02 PROCEDURE — G8417 CALC BMI ABV UP PARAM F/U: HCPCS | Performed by: NURSE PRACTITIONER

## 2025-04-02 ASSESSMENT — ENCOUNTER SYMPTOMS
RECTAL PAIN: 0
CHOKING: 0
VOMITING: 0
TROUBLE SWALLOWING: 0
NAUSEA: 0
DIARRHEA: 1
ABDOMINAL PAIN: 1
SHORTNESS OF BREATH: 0
ANAL BLEEDING: 0
ABDOMINAL DISTENTION: 0
COUGH: 0
BLOOD IN STOOL: 0
CONSTIPATION: 0

## 2025-04-02 NOTE — PROGRESS NOTES
Subjective:     Patient ID: Yue Richardson is a 44 y.o. female  PCP: Carlos Bowers MD  Referring Provider: No ref. provider found    HPI  Patient presents to the office today with the following complaints: Diarrhea      History of Present Illness  The patient presents for evaluation of diarrhea.    She reports experiencing severe diarrhea, which she attributes to an increase in her Mounjaro dosage. She has been on Mounjaro for approximately 2 years, with a gradual increase in dosage from 2.5 to 10, without any adverse effects. However, upon reaching the second week of a 12 mg dose, she began experiencing severe abdominal pain, diarrhea, and gas, similar to her previous experience with Ozempic. She has been off Mounjaro for 2.5 weeks, but her symptoms have worsened. She reports frequent bowel movements, often immediately after eating, and describes her stools as soft and foul-smelling. She also reports increased urination, which she suspects may be due to the multivitamins she recently started taking. She has been consuming large quantities of water and grape juice. She reports no blood in her stool but does experience pain prior to bowel movements. She is not currently taking Linzess and has not recently taken antibiotics, although she did take a single dose of amoxicillin 2 days ago. She has a history of cholecystectomy.      MEDICATIONS  Current: Mounjaro, multivitamins  Discontinued: Ozempic, Linzess      Last EGD 2/1/2022 - [id number] appearing stricture noted in the distal esophagus, gastritis, no h pylori  Pt has never had a colonoscopy  Denies any family hx colon cancer or colon polyps     Results      Assessment:     1. Diarrhea, unspecified type    2. Fecal urgency              Plan:     Assessment & Plan  1. Diarrhea: Acute.  - Stool test to rule out infection.  - Consider medication to manage symptoms if stool test is negative.  - Possible IBS component exacerbated by absence of

## 2025-04-03 ENCOUNTER — RESULTS FOLLOW-UP (OUTPATIENT)
Dept: GASTROENTEROLOGY | Age: 45
End: 2025-04-03

## 2025-04-03 DIAGNOSIS — D47.2 MGUS (MONOCLONAL GAMMOPATHY OF UNKNOWN SIGNIFICANCE): ICD-10-CM

## 2025-04-03 DIAGNOSIS — D50.0 IRON DEFICIENCY ANEMIA DUE TO CHRONIC BLOOD LOSS: Primary | ICD-10-CM

## 2025-04-03 RX ORDER — COLESTIPOL HYDROCHLORIDE 1 G/1
1 TABLET ORAL 2 TIMES DAILY
Qty: 60 TABLET | Refills: 3 | Status: SHIPPED | OUTPATIENT
Start: 2025-04-03 | End: 2025-04-06 | Stop reason: ALTCHOICE

## 2025-04-03 NOTE — PROGRESS NOTES
Progress Note      Pt Name: Yue Richardson  YOB: 1980  MRN: [id number]    Date of evaluation: 04/04/2025  History Obtained From:  patient, electronic medical record    CHIEF COMPLAINT:    Chief Complaint   Patient presents with    Follow-up     Iron deficiency anemia due to chronic blood loss  Patient wants to discuss with the provider that she has been really tired and thought it was her Iron.      HISTORY OF PRESENT ILLNESS:    Yue Richardson is a 44 y.o.  female who is followed for iron deficiency anemia due to chronic blood loss (menorrhagia) with intolerance to oral iron replacement and monoclonal gammopathy of unknown significance (MGUS) with an IgG lambda light chain.  She has remained without myeloma defining event and continues with routine lab monitoring.  Yue is also followed by Dr. Cordon for diagnosis of rheumatoid arthritis and is currently being treated with Plaquenil.  She has had no myeloma defining events to suggest progression of MGUS.  Current recommendation is for iron plus patch: 300 mg vitamin C and Fe 50 mg: wear for 8 hours every 24 hour.  Yue returns today in follow-up for review of lab results and further treatment recommendations.   History of Present Illness  She reports experiencing significant fatigue, which she attributes to her iron deficiency. She has been utilizing iron patches for the past 2 months, applying them daily for 8 hours. Additionally, she mentions a craving for ice. Despite these interventions, she continues to feel extremely tired.    - Onset: Fatigue attributed to iron deficiency.  - Duration: Fatigue persists despite 2 months of iron patch use.  - Character: Significant fatigue and craving for ice.  - Alleviating Factors: Iron patches applied daily for 8 hours.  - Severity: Continues to feel extremely tired.         Today's clinic visit to include physical assessment, review of systems, any lab or radiographic findings that were

## 2025-04-04 ENCOUNTER — HOSPITAL ENCOUNTER (OUTPATIENT)
Dept: INFUSION THERAPY | Age: 45
Discharge: HOME | End: 2025-04-04
Payer: MEDICAID

## 2025-04-04 ENCOUNTER — OFFICE VISIT (OUTPATIENT)
Dept: HEMATOLOGY | Age: 45
End: 2025-04-04
Payer: MEDICAID

## 2025-04-04 VITALS
HEART RATE: 59 BPM | OXYGEN SATURATION: 99 % | BODY MASS INDEX: 53.92 KG/M2 | WEIGHT: 293 LBS | HEIGHT: 62 IN | SYSTOLIC BLOOD PRESSURE: 130 MMHG | DIASTOLIC BLOOD PRESSURE: 86 MMHG | TEMPERATURE: 97.7 F

## 2025-04-04 DIAGNOSIS — D50.0 IRON DEFICIENCY ANEMIA DUE TO CHRONIC BLOOD LOSS: ICD-10-CM

## 2025-04-04 DIAGNOSIS — D50.0 IRON DEFICIENCY ANEMIA DUE TO CHRONIC BLOOD LOSS: Primary | ICD-10-CM

## 2025-04-04 DIAGNOSIS — K90.9 IRON MALABSORPTION: ICD-10-CM

## 2025-04-04 DIAGNOSIS — D47.2 MGUS (MONOCLONAL GAMMOPATHY OF UNKNOWN SIGNIFICANCE): ICD-10-CM

## 2025-04-04 LAB
ALBUMIN SERPL-MCNC: 4 G/DL (ref 3.5–5.2)
ALP SERPL-CCNC: 69 U/L (ref 35–104)
ALT SERPL-CCNC: 13 U/L (ref 5–33)
ANION GAP SERPL CALCULATED.3IONS-SCNC: 31 MMOL/L (ref 7–19)
AST SERPL-CCNC: 13 U/L (ref 5–32)
BASOPHILS # BLD: 0.04 K/UL (ref 0–0.2)
BASOPHILS NFR BLD: 0.5 % (ref 0–1)
BILIRUB SERPL-MCNC: 0.7 MG/DL (ref 0–1.2)
BUN SERPL-MCNC: 14 MG/DL (ref 6–20)
CALCIUM SERPL-MCNC: 8.7 MG/DL (ref 8.6–10)
CHLORIDE SERPL-SCNC: 80 MMOL/L (ref 98–107)
CO2 SERPL-SCNC: 26 MMOL/L (ref 22–29)
CREAT SERPL-MCNC: 0.8 MG/DL (ref 0.5–0.9)
EOSINOPHIL # BLD: 0.19 K/UL (ref 0–0.6)
EOSINOPHIL NFR BLD: 2.3 % (ref 0–5)
ERYTHROCYTE [DISTWIDTH] IN BLOOD BY AUTOMATED COUNT: 14.2 % (ref 11.5–14.5)
FERRITIN SERPL-MCNC: 69.5 NG/ML (ref 13–150)
GLUCOSE SERPL-MCNC: 115 MG/DL (ref 70–99)
HCT VFR BLD AUTO: 36 % (ref 37–47)
HGB BLD-MCNC: 11.7 G/DL (ref 12–16)
IRON SATN MFR SERPL: 15 % (ref 15–50)
IRON SERPL-MCNC: 61 UG/DL (ref 37–145)
LYMPHOCYTES # BLD: 2.13 K/UL (ref 1.1–4.5)
LYMPHOCYTES NFR BLD: 26.3 % (ref 20–40)
MCH RBC QN AUTO: 27.3 PG (ref 27–31)
MCHC RBC AUTO-ENTMCNC: 32.5 G/DL (ref 33–37)
MCV RBC AUTO: 84.1 FL (ref 81–99)
MONOCYTES # BLD: 0.62 K/UL (ref 0–0.9)
MONOCYTES NFR BLD: 7.7 % (ref 1–10)
NEUTROPHILS # BLD: 5.07 K/UL (ref 1.5–7.5)
NEUTS SEG NFR BLD: 62.7 % (ref 50–65)
PLATELET # BLD AUTO: 256 K/UL (ref 130–400)
PMV BLD AUTO: 10.5 FL (ref 9.4–12.3)
POTASSIUM SERPL-SCNC: 4 MMOL/L (ref 3.5–5.1)
PROT SERPL-MCNC: 7.7 G/DL (ref 6.4–8.3)
RBC # BLD AUTO: 4.28 M/UL (ref 4.2–5.4)
SODIUM SERPL-SCNC: 137 MMOL/L (ref 136–145)
TIBC SERPL-MCNC: 395 UG/DL (ref 250–400)
WBC # BLD AUTO: 8.09 K/UL (ref 4.8–10.8)

## 2025-04-04 PROCEDURE — 1036F TOBACCO NON-USER: CPT | Performed by: NURSE PRACTITIONER

## 2025-04-04 PROCEDURE — 82728 ASSAY OF FERRITIN: CPT

## 2025-04-04 PROCEDURE — 82232 ASSAY OF BETA-2 PROTEIN: CPT

## 2025-04-04 PROCEDURE — 80053 COMPREHEN METABOLIC PANEL: CPT

## 2025-04-04 PROCEDURE — 86334 IMMUNOFIX E-PHORESIS SERUM: CPT

## 2025-04-04 PROCEDURE — 36415 COLL VENOUS BLD VENIPUNCTURE: CPT

## 2025-04-04 PROCEDURE — 3079F DIAST BP 80-89 MM HG: CPT | Performed by: NURSE PRACTITIONER

## 2025-04-04 PROCEDURE — 83550 IRON BINDING TEST: CPT

## 2025-04-04 PROCEDURE — 84165 PROTEIN E-PHORESIS SERUM: CPT

## 2025-04-04 PROCEDURE — 3075F SYST BP GE 130 - 139MM HG: CPT | Performed by: NURSE PRACTITIONER

## 2025-04-04 PROCEDURE — 82784 ASSAY IGA/IGD/IGG/IGM EACH: CPT

## 2025-04-04 PROCEDURE — 85025 COMPLETE CBC W/AUTO DIFF WBC: CPT

## 2025-04-04 PROCEDURE — 99214 OFFICE O/P EST MOD 30 MIN: CPT | Performed by: NURSE PRACTITIONER

## 2025-04-04 PROCEDURE — G8427 DOCREV CUR MEDS BY ELIG CLIN: HCPCS | Performed by: NURSE PRACTITIONER

## 2025-04-04 PROCEDURE — 83540 ASSAY OF IRON: CPT

## 2025-04-04 PROCEDURE — G8417 CALC BMI ABV UP PARAM F/U: HCPCS | Performed by: NURSE PRACTITIONER

## 2025-04-04 PROCEDURE — 83521 IG LIGHT CHAINS FREE EACH: CPT

## 2025-04-04 PROCEDURE — 84155 ASSAY OF PROTEIN SERUM: CPT

## 2025-04-04 PROCEDURE — 99213 OFFICE O/P EST LOW 20 MIN: CPT

## 2025-04-04 PROCEDURE — 83883 ASSAY NEPHELOMETRY NOT SPEC: CPT

## 2025-04-06 ENCOUNTER — RESULTS FOLLOW-UP (OUTPATIENT)
Dept: HEMATOLOGY | Age: 45
End: 2025-04-06

## 2025-04-07 LAB — B2 MICROGLOB SERPL-MCNC: 2 MG/L (ref 0.8–2.4)

## 2025-04-08 LAB
ALBUMIN SERPL-MCNC: 3.79 G/DL (ref 3.75–5.01)
ALPHA1 GLOB SERPL ELPH-MCNC: 0.32 G/DL (ref 0.19–0.46)
ALPHA2 GLOB SERPL ELPH-MCNC: 0.95 G/DL (ref 0.48–1.05)
B-GLOBULIN SERPL ELPH-MCNC: 0.83 G/DL (ref 0.48–1.1)
DEPRECATED KAPPA LC FREE/LAMBDA SER: 1.77 {RATIO} (ref 0.26–1.65)
EER MONOCLONAL PROTEIN AND FLC, SERUM: ABNORMAL
GAMMA GLOB SERPL ELPH-MCNC: 1.62 G/DL (ref 0.62–1.51)
IGA SERPL-MCNC: 149 MG/DL (ref 68–408)
IGG SERPL-MCNC: 1745 MG/DL (ref 768–1632)
IGM SERPL-MCNC: 76 MG/DL (ref 35–263)
INTERPRETATION SERPL IFE-IMP: ABNORMAL
INTERPRETATION SERPL IFE-IMP: ABNORMAL
KAPPA LC FREE SER-MCNC: 24.04 MG/L (ref 3.3–19.4)
LAMBDA LC FREE SERPL-MCNC: 13.58 MG/L (ref 5.71–26.3)
MONOCLONAL PROTEIN, SERUM: ABNORMAL G/DL
PROT SERPL-MCNC: 7.5 G/DL (ref 6.3–8.2)

## 2025-04-15 DIAGNOSIS — Z13.220 LIPID SCREENING: ICD-10-CM

## 2025-04-15 DIAGNOSIS — E11.9 TYPE 2 DIABETES MELLITUS WITHOUT COMPLICATION, WITHOUT LONG-TERM CURRENT USE OF INSULIN (HCC): ICD-10-CM

## 2025-04-15 DIAGNOSIS — E55.9 AVITAMINOSIS D: ICD-10-CM

## 2025-04-15 DIAGNOSIS — R53.83 OTHER FATIGUE: ICD-10-CM

## 2025-04-15 DIAGNOSIS — I10 ESSENTIAL (PRIMARY) HYPERTENSION: ICD-10-CM

## 2025-04-15 LAB
25(OH)D3 SERPL-MCNC: 41.6 NG/ML
ALBUMIN SERPL-MCNC: 4 G/DL (ref 3.5–5.2)
ALP SERPL-CCNC: 68 U/L (ref 35–104)
ALT SERPL-CCNC: 21 U/L (ref 10–35)
ANION GAP SERPL CALCULATED.3IONS-SCNC: 10 MMOL/L (ref 8–16)
AST SERPL-CCNC: 15 U/L (ref 10–35)
BASOPHILS # BLD: 0.1 K/UL (ref 0–0.2)
BASOPHILS NFR BLD: 0.7 % (ref 0–1)
BILIRUB SERPL-MCNC: 0.4 MG/DL (ref 0.2–1.2)
BUN SERPL-MCNC: 12 MG/DL (ref 6–20)
CALCIUM SERPL-MCNC: 9.3 MG/DL (ref 8.6–10)
CHLORIDE SERPL-SCNC: 100 MMOL/L (ref 98–107)
CHOLEST SERPL-MCNC: 155 MG/DL (ref 0–199)
CO2 SERPL-SCNC: 28 MMOL/L (ref 22–29)
CREAT SERPL-MCNC: 0.9 MG/DL (ref 0.5–0.9)
CREAT UR-MCNC: 140 MG/DL (ref 28–217)
EOSINOPHIL # BLD: 0.1 K/UL (ref 0–0.6)
EOSINOPHIL NFR BLD: 1.6 % (ref 0–5)
ERYTHROCYTE [DISTWIDTH] IN BLOOD BY AUTOMATED COUNT: 14.3 % (ref 11.5–14.5)
GLUCOSE SERPL-MCNC: 159 MG/DL (ref 70–99)
HBA1C MFR BLD: 5.9 % (ref 4–5.6)
HCT VFR BLD AUTO: 34.7 % (ref 37–47)
HDLC SERPL-MCNC: 68 MG/DL (ref 40–60)
HGB BLD-MCNC: 10.9 G/DL (ref 12–16)
IMM GRANULOCYTES # BLD: 0.1 K/UL
LDLC SERPL CALC-MCNC: 77 MG/DL
LYMPHOCYTES # BLD: 2 K/UL (ref 1.1–4.5)
LYMPHOCYTES NFR BLD: 24.4 % (ref 20–40)
MCH RBC QN AUTO: 27 PG (ref 27–31)
MCHC RBC AUTO-ENTMCNC: 31.4 G/DL (ref 33–37)
MCV RBC AUTO: 85.9 FL (ref 81–99)
MICROALBUMIN UR-MCNC: <1.2 MG/DL (ref 0–1.99)
MICROALBUMIN/CREAT UR-RTO: NORMAL MG/G (ref 0–29)
MONOCYTES # BLD: 0.5 K/UL (ref 0–0.9)
MONOCYTES NFR BLD: 6 % (ref 0–10)
NEUTROPHILS # BLD: 5.5 K/UL (ref 1.5–7.5)
NEUTS SEG NFR BLD: 66.7 % (ref 50–65)
PLATELET # BLD AUTO: 227 K/UL (ref 130–400)
PMV BLD AUTO: 11.2 FL (ref 9.4–12.3)
POTASSIUM SERPL-SCNC: 4 MMOL/L (ref 3.5–5.1)
PROT SERPL-MCNC: 6.8 G/DL (ref 6.4–8.3)
RBC # BLD AUTO: 4.04 M/UL (ref 4.2–5.4)
SODIUM SERPL-SCNC: 138 MMOL/L (ref 136–145)
T4 FREE SERPL-MCNC: 1.33 NG/DL (ref 0.93–1.7)
TRIGL SERPL-MCNC: 49 MG/DL (ref 0–149)
TSH SERPL DL<=0.005 MIU/L-ACNC: 2.26 UIU/ML (ref 0.27–4.2)
WBC # BLD AUTO: 8.3 K/UL (ref 4.8–10.8)

## 2025-04-16 ENCOUNTER — OFFICE VISIT (OUTPATIENT)
Dept: PRIMARY CARE CLINIC | Age: 45
End: 2025-04-16
Payer: MEDICAID

## 2025-04-16 ENCOUNTER — RESULTS FOLLOW-UP (OUTPATIENT)
Dept: PRIMARY CARE CLINIC | Age: 45
End: 2025-04-16

## 2025-04-16 VITALS
TEMPERATURE: 97 F | BODY MASS INDEX: 53.92 KG/M2 | HEIGHT: 62 IN | HEART RATE: 60 BPM | SYSTOLIC BLOOD PRESSURE: 124 MMHG | WEIGHT: 293 LBS | OXYGEN SATURATION: 98 % | DIASTOLIC BLOOD PRESSURE: 70 MMHG

## 2025-04-16 DIAGNOSIS — D64.9 ANEMIA, UNSPECIFIED TYPE: ICD-10-CM

## 2025-04-16 DIAGNOSIS — E28.2 PCOS (POLYCYSTIC OVARIAN SYNDROME): ICD-10-CM

## 2025-04-16 DIAGNOSIS — F41.8 DEPRESSION WITH ANXIETY: ICD-10-CM

## 2025-04-16 DIAGNOSIS — M05.79 RHEUMATOID ARTHRITIS INVOLVING MULTIPLE SITES WITH POSITIVE RHEUMATOID FACTOR (HCC): ICD-10-CM

## 2025-04-16 DIAGNOSIS — I10 PRIMARY HYPERTENSION: ICD-10-CM

## 2025-04-16 DIAGNOSIS — E11.9 TYPE 2 DIABETES MELLITUS WITHOUT COMPLICATION, WITHOUT LONG-TERM CURRENT USE OF INSULIN: Primary | ICD-10-CM

## 2025-04-16 DIAGNOSIS — K21.9 GASTROESOPHAGEAL REFLUX DISEASE WITHOUT ESOPHAGITIS: ICD-10-CM

## 2025-04-16 DIAGNOSIS — M79.7 FIBROMYALGIA: ICD-10-CM

## 2025-04-16 PROCEDURE — 3074F SYST BP LT 130 MM HG: CPT | Performed by: FAMILY MEDICINE

## 2025-04-16 PROCEDURE — 99214 OFFICE O/P EST MOD 30 MIN: CPT | Performed by: FAMILY MEDICINE

## 2025-04-16 PROCEDURE — 3078F DIAST BP <80 MM HG: CPT | Performed by: FAMILY MEDICINE

## 2025-04-16 PROCEDURE — 3044F HG A1C LEVEL LT 7.0%: CPT | Performed by: FAMILY MEDICINE

## 2025-04-16 PROCEDURE — 1036F TOBACCO NON-USER: CPT | Performed by: FAMILY MEDICINE

## 2025-04-16 PROCEDURE — 2022F DILAT RTA XM EVC RTNOPTHY: CPT | Performed by: FAMILY MEDICINE

## 2025-04-16 PROCEDURE — G8417 CALC BMI ABV UP PARAM F/U: HCPCS | Performed by: FAMILY MEDICINE

## 2025-04-16 PROCEDURE — G8427 DOCREV CUR MEDS BY ELIG CLIN: HCPCS | Performed by: FAMILY MEDICINE

## 2025-04-16 RX ORDER — VALACYCLOVIR HYDROCHLORIDE 1 G/1
2000 TABLET, FILM COATED ORAL 2 TIMES DAILY
Qty: 4 TABLET | Refills: 2 | Status: SHIPPED | OUTPATIENT
Start: 2025-04-16 | End: 2025-04-17

## 2025-04-16 RX ORDER — ERGOCALCIFEROL 1.25 MG/1
50000 CAPSULE, LIQUID FILLED ORAL WEEKLY
Qty: 12 CAPSULE | Refills: 3 | Status: SHIPPED | OUTPATIENT
Start: 2025-04-16

## 2025-04-16 RX ORDER — ONDANSETRON 4 MG/1
4 TABLET, ORALLY DISINTEGRATING ORAL 3 TIMES DAILY PRN
Qty: 21 TABLET | Refills: 0 | Status: SHIPPED | OUTPATIENT
Start: 2025-04-16

## 2025-04-16 NOTE — PROGRESS NOTES
Father     Stroke Father     Diabetes Sister     Heart Attack Maternal Uncle     Heart Attack Paternal Uncle     Diabetes Maternal Grandmother     Unknown Maternal Grandfather     Unknown Paternal Grandmother     Ovarian Cancer Paternal Grandmother     Unknown Paternal Grandfather     Colon Polyps Neg Hx     Crohn's Disease Neg Hx     Esophageal Cancer Neg Hx     Liver Cancer Neg Hx     Rectal Cancer Neg Hx     Stomach Cancer Neg Hx     Social History     Tobacco Use    Smoking status: Former     Current packs/day: 0.00     Types: Cigarettes     Start date: 1996     Quit date: 1998     Years since quittin.3     Passive exposure: Past    Smokeless tobacco: Never   Substance Use Topics    Alcohol use: Yes     Alcohol/week: 1.0 standard drink of alcohol     Types: 1 Glasses of wine per week     Comment: rarely        _______________________________________________________________    Note dictated using Dragon Dictation software  Sometimes this dictation software makes erroneous transcriptions.

## 2025-04-16 NOTE — PATIENT INSTRUCTIONS
Labs before next appointment:  Go to room 405 for labs prior to next visit.   Be sure to fast for at least 10 hours prior to laboratory appointment.   Would recommend getting labs about 1 week prior to the appointment time to ensure they are available at the time of the appointment.   No appointment is necessary for laboratory work as the orders have already been placed.    Lab opens at 6:30 am and closes at 4:30 pm.     Examine your lifestyle and the barriers to bad and good habits and how you can design your life to make better choices      Make it EASY to do the RIGHT THINGS.    1)  You can choose to Get 150 min/week of moderate exercise (can talk but can't sing) or 75 min/week of vigorous exercise (can't talk)   Examples: Walking, treadmill, bicycling, attending a gym.    2)  You can choose to Get 7-9 hours of sleep per night    Allow enough time each night to get adequate sleep.  Keep regular evening hours, same to bed and getting up every day.    3)  You can choose to Strength Train 2 x a week on non-consecutive days   Bodyweight exercises such push ups, sit ups, pilates or yoga   Purchase resistance bands to perform exercises   Utilize phone apps such as: [employer] or [employer]   Contact your local gym for a     4)  You can choose good nutrition.    Only eat your goal weight (in lbs) x 10 calories/day (Goal weight 150 lbs x 10 = 1500 calories per day)  Get 5 servings of Vegetables/day   Choose to eat a healthy diet such as the Mediterranean diet.    Plant based diets reduce risk of heart attack/stroke and will help you feel full on less food.    Avoid highly processed foods and processed carbohydrates.   Utilize apps to track your food:  Examples: Loseit, Myfitnesspal, or CalorieCounter by [employer]   Choose to follow a program.   Examples: Weight Watchers, Ninfa Carlisle, Whole30 or Nutrisystem   Utilize apps identify and improve eating habits:   Examples: Eat Right Now, Noom    5)  You

## 2025-04-21 ENCOUNTER — TELEPHONE (OUTPATIENT)
Dept: PRIMARY CARE CLINIC | Age: 45
End: 2025-04-21

## 2025-05-15 ENCOUNTER — OFFICE VISIT (OUTPATIENT)
Dept: PRIMARY CARE CLINIC | Age: 45
End: 2025-05-15
Payer: MEDICAID

## 2025-05-15 VITALS
HEART RATE: 75 BPM | OXYGEN SATURATION: 98 % | TEMPERATURE: 97.5 F | BODY MASS INDEX: 53.92 KG/M2 | WEIGHT: 293 LBS | DIASTOLIC BLOOD PRESSURE: 72 MMHG | HEIGHT: 62 IN | SYSTOLIC BLOOD PRESSURE: 130 MMHG

## 2025-05-15 DIAGNOSIS — J30.2 SEASONAL ALLERGIC RHINITIS, UNSPECIFIED TRIGGER: Primary | ICD-10-CM

## 2025-05-15 PROCEDURE — G8417 CALC BMI ABV UP PARAM F/U: HCPCS | Performed by: NURSE PRACTITIONER

## 2025-05-15 PROCEDURE — 99213 OFFICE O/P EST LOW 20 MIN: CPT | Performed by: NURSE PRACTITIONER

## 2025-05-15 PROCEDURE — 1036F TOBACCO NON-USER: CPT | Performed by: NURSE PRACTITIONER

## 2025-05-15 PROCEDURE — 3078F DIAST BP <80 MM HG: CPT | Performed by: NURSE PRACTITIONER

## 2025-05-15 PROCEDURE — 3075F SYST BP GE 130 - 139MM HG: CPT | Performed by: NURSE PRACTITIONER

## 2025-05-15 PROCEDURE — G8427 DOCREV CUR MEDS BY ELIG CLIN: HCPCS | Performed by: NURSE PRACTITIONER

## 2025-05-15 RX ORDER — AZELASTINE 1 MG/ML
2 SPRAY, METERED NASAL 2 TIMES DAILY
Qty: 120 ML | Refills: 1 | Status: SHIPPED | OUTPATIENT
Start: 2025-05-15

## 2025-05-15 RX ORDER — FLUTICASONE PROPIONATE 50 MCG
1 SPRAY, SUSPENSION (ML) NASAL DAILY
Qty: 32 G | Refills: 1 | Status: SHIPPED | OUTPATIENT
Start: 2025-05-15

## 2025-05-15 RX ORDER — LEVOCETIRIZINE DIHYDROCHLORIDE 5 MG/1
5 TABLET, FILM COATED ORAL NIGHTLY
Qty: 30 TABLET | Refills: 5 | Status: SHIPPED | OUTPATIENT
Start: 2025-05-15

## 2025-05-15 ASSESSMENT — ENCOUNTER SYMPTOMS
PHOTOPHOBIA: 0
NAUSEA: 0
COUGH: 0
VOMITING: 0
RHINORRHEA: 0
SHORTNESS OF BREATH: 0
VOICE CHANGE: 0
BACK PAIN: 0
COLOR CHANGE: 0

## 2025-05-15 NOTE — PROGRESS NOTES
[facility]  [address]  [address]  [place] [address]  Dept: [phone]  Dept Fax: [phone]  Loc: [phone]    Yue Richardson is a 44 y.o. female who presents today for her medical conditions/complaints as noted below.  Yue Richardson is c/o of Allergies        HPI:     History of Present Illness  The patient presents for evaluation of allergies.    She reports a persistent itch in her throat, which she attributes to her allergies. This symptom has been present for approximately one week. Additionally, she experiences a sensation of heaviness in her chest, which she suspects may be related to asthma. She does not report any wheezing but notes severe coughing and occasional sneezing. Her cough is productive, often leading to expectoration and emesis, but without excessive mucus production. She has attempted to manage these symptoms with loratadine, but it has proven ineffective. She has not tried any other medications due to associated dizziness. She is not currently using Flonase nasal spray.             Past Medical History:   Diagnosis Date    Anemia     Chronic rheumatic arthritis (HCC)     Elevated blood pressure reading     Fibromyalgia     GERD (gastroesophageal reflux disease)     Hypertension     Intracranial hypertension     Iron malabsorption 04/01/2024    Obesity     PCOS (polycystic ovarian syndrome)     RA (rheumatoid arthritis) ([facility]) 2015    Sleep apnea     bi pap    SOB (shortness of breath)     Trouble swallowing     Type 2 diabetes mellitus (HCC)       Past Surgical History:   Procedure Laterality Date    BREAST BIOPSY Right 2019    benign    CHOLECYSTECTOMY      UPPER GASTROINTESTINAL ENDOSCOPY N/A 02/01/2022    Dr CM Scruggs-[id number] appearing stricture noted in the distal esophagus, gastritis, no h pylori    UPPER GASTROINTESTINAL ENDOSCOPY  02/01/2022    Dr CM Scruggs-[id number] appearing stricture noted in the distal esophagus,

## 2025-06-04 RX ORDER — CLONIDINE HYDROCHLORIDE 0.1 MG/1
0.1 TABLET ORAL 3 TIMES DAILY PRN
Qty: 90 TABLET | Refills: 1 | Status: SHIPPED | OUTPATIENT
Start: 2025-06-04

## 2025-07-11 RX ORDER — AZELASTINE HYDROCHLORIDE 137 UG/1
SPRAY, METERED NASAL
Qty: 3 EACH | Refills: 0 | Status: SHIPPED | OUTPATIENT
Start: 2025-07-11

## 2025-07-14 RX ORDER — CLONIDINE HYDROCHLORIDE 0.1 MG/1
0.1 TABLET ORAL 3 TIMES DAILY PRN
Qty: 270 TABLET | Refills: 1 | Status: SHIPPED | OUTPATIENT
Start: 2025-07-14

## 2025-07-17 ENCOUNTER — OFFICE VISIT (OUTPATIENT)
Dept: PRIMARY CARE CLINIC | Age: 45
End: 2025-07-17
Payer: MEDICAID

## 2025-07-17 VITALS
HEIGHT: 62 IN | WEIGHT: 293 LBS | OXYGEN SATURATION: 98 % | SYSTOLIC BLOOD PRESSURE: 122 MMHG | DIASTOLIC BLOOD PRESSURE: 76 MMHG | TEMPERATURE: 97.1 F | BODY MASS INDEX: 53.92 KG/M2 | HEART RATE: 74 BPM

## 2025-07-17 DIAGNOSIS — M05.79 RHEUMATOID ARTHRITIS INVOLVING MULTIPLE SITES WITH POSITIVE RHEUMATOID FACTOR (HCC): ICD-10-CM

## 2025-07-17 DIAGNOSIS — M25.50 ARTHRALGIA, UNSPECIFIED JOINT: ICD-10-CM

## 2025-07-17 DIAGNOSIS — E28.2 PCOS (POLYCYSTIC OVARIAN SYNDROME): ICD-10-CM

## 2025-07-17 DIAGNOSIS — M79.7 FIBROMYALGIA: ICD-10-CM

## 2025-07-17 DIAGNOSIS — E11.9 TYPE 2 DIABETES MELLITUS WITHOUT COMPLICATION, WITHOUT LONG-TERM CURRENT USE OF INSULIN (HCC): Primary | ICD-10-CM

## 2025-07-17 DIAGNOSIS — D64.9 ANEMIA, UNSPECIFIED TYPE: ICD-10-CM

## 2025-07-17 DIAGNOSIS — K21.9 GASTROESOPHAGEAL REFLUX DISEASE WITHOUT ESOPHAGITIS: ICD-10-CM

## 2025-07-17 DIAGNOSIS — F41.8 DEPRESSION WITH ANXIETY: ICD-10-CM

## 2025-07-17 DIAGNOSIS — I10 PRIMARY HYPERTENSION: ICD-10-CM

## 2025-07-17 PROCEDURE — 3078F DIAST BP <80 MM HG: CPT | Performed by: FAMILY MEDICINE

## 2025-07-17 PROCEDURE — 3044F HG A1C LEVEL LT 7.0%: CPT | Performed by: FAMILY MEDICINE

## 2025-07-17 PROCEDURE — G8417 CALC BMI ABV UP PARAM F/U: HCPCS | Performed by: FAMILY MEDICINE

## 2025-07-17 PROCEDURE — G8427 DOCREV CUR MEDS BY ELIG CLIN: HCPCS | Performed by: FAMILY MEDICINE

## 2025-07-17 PROCEDURE — 2022F DILAT RTA XM EVC RTNOPTHY: CPT | Performed by: FAMILY MEDICINE

## 2025-07-17 PROCEDURE — 1036F TOBACCO NON-USER: CPT | Performed by: FAMILY MEDICINE

## 2025-07-17 PROCEDURE — 3074F SYST BP LT 130 MM HG: CPT | Performed by: FAMILY MEDICINE

## 2025-07-17 PROCEDURE — 99214 OFFICE O/P EST MOD 30 MIN: CPT | Performed by: FAMILY MEDICINE

## 2025-07-17 RX ORDER — MILNACIPRAN HCL 12.5 MG
12.5 TABLET ORAL 2 TIMES DAILY
COMMUNITY

## 2025-07-17 NOTE — PROGRESS NOTES
[facility]  [facility]  [address] [place] [address] [address]  [address]  [place] [address]  Dept: [phone]  Dept Fax: [phone]  Loc: [phone]    Yue Richardson is a 44 y.o. female who presents today for her medical conditions/complaints as noted below.  Yue Richardson is here for 3 Month Follow-Up         Patient History:      Rheumatoid arthritis, fibromyalgia  -Rheumatologist: Dr. Willie Cordon     Obstructive sleep apnea  - Completed October 6, 2021:  - BiPAP     Iron deficiency anemia, monoclonal gammopathy of unknown significance.  - Followed by [facility] hematology     Restrictive lung disease  -Suspected related to obesity hypoventilation syndrome and obstructive sleep apnea     IBS: Constipation  -[facility] gastroenterology           Subjective:   CC:  Here today to discuss the following:    She started savella for fibromyalgia.  Has been on for about 4-5 weeks. No improvement yet.     Diabetes Mellitus  Has been compliant with medications.   No side effects of medications since last visit.   No polyuria, polydipsia, or vision changes since last visit.   No symptomatic episodes of hypoglycemia.     Hypertension  Compliant with medications.  No adverse effects from medication.  No lightheadedness, palpitations, or chest pain.      Depression with Anxiety  Compliant with medications.  No adverse effects from medication.  Depression and anxiety symptoms are stable today. No suicidal or homicidal ideation. Excessive worry, insomnia, and anxiousness are stable. Energy, concentration, and apathy are stable.    Gastroesophageal Reflux Disease  Symptoms currently under control.   Medication adequately controls symptoms.  No hematochezia or melena.       She is continue to be concerned about her weight gain.  She states she is having challenges losing weight despite being on GLP-1 agonist.  She is not complaining of excessive edema but does notice some lower extremity swelling from time to

## 2025-07-17 NOTE — PATIENT INSTRUCTIONS
What are FODMAPs?    Recent research has shown that certain carbohydrates can contribute to symptoms of irritable bowel sufferers and these carbs are:    Fermentable, Oligo-saccharides, Di-saccharides, Mono-saccharides and Polyols        https://www.ibsdiets.org/

## 2025-07-30 ENCOUNTER — TELEPHONE (OUTPATIENT)
Dept: HEMATOLOGY | Age: 45
End: 2025-07-30

## 2025-07-30 NOTE — TELEPHONE ENCOUNTER
I called patient and reminded patient of their appt on 08/04/2025 and patient confirmed they would be here. I also let patient know that we have moved into our new cancer facility and asked patient if they were aware of where we were now located, and patient voiced understanding of our new location. Patient knows not to arrive any earlier their appointment because we are unable to check patients in early and to come in well hydrated incase labs are needed at any time throughout their visit.

## 2025-08-04 DIAGNOSIS — D47.2 MGUS (MONOCLONAL GAMMOPATHY OF UNKNOWN SIGNIFICANCE): ICD-10-CM

## 2025-08-04 DIAGNOSIS — D50.0 IRON DEFICIENCY ANEMIA DUE TO CHRONIC BLOOD LOSS: Primary | ICD-10-CM

## 2025-08-30 DIAGNOSIS — I10 ESSENTIAL (PRIMARY) HYPERTENSION: ICD-10-CM

## 2025-09-02 RX ORDER — CARVEDILOL 12.5 MG/1
12.5 TABLET ORAL 2 TIMES DAILY
Qty: 180 TABLET | Refills: 1 | Status: SHIPPED | OUTPATIENT
Start: 2025-09-02

## (undated) DEVICE — Z DUPLICATE USE 2738952 SYSTEM VENT M AD NSL PAP DEV HD STRP 2L HYPRINFL BG MRI

## (undated) DEVICE — ENDO KIT,LOURDES HOSPITAL: Brand: MEDLINE INDUSTRIES, INC.

## (undated) DEVICE — FORCEPS BX L240CM JAW DIA2.4MM ORNG L CAP W/ NDL DISP RAD